# Patient Record
Sex: FEMALE | Race: BLACK OR AFRICAN AMERICAN | NOT HISPANIC OR LATINO | Employment: FULL TIME | URBAN - METROPOLITAN AREA
[De-identification: names, ages, dates, MRNs, and addresses within clinical notes are randomized per-mention and may not be internally consistent; named-entity substitution may affect disease eponyms.]

---

## 2017-04-25 ENCOUNTER — HOSPITAL ENCOUNTER (INPATIENT)
Facility: HOSPITAL | Age: 57
LOS: 5 days | Discharge: HOME | DRG: 392 | End: 2017-04-30
Attending: EMERGENCY MEDICINE | Admitting: INTERNAL MEDICINE
Payer: COMMERCIAL

## 2017-04-25 ENCOUNTER — APPOINTMENT (EMERGENCY)
Dept: CT IMAGING | Facility: HOSPITAL | Age: 57
DRG: 392 | End: 2017-04-25
Payer: COMMERCIAL

## 2017-04-25 DIAGNOSIS — K57.80 DIVERTICULITIS OF INTESTINE WITH ABSCESS: Primary | ICD-10-CM

## 2017-04-25 PROBLEM — E11.9 DM (DIABETES MELLITUS) (HCC): Chronic | Status: ACTIVE | Noted: 2017-04-25

## 2017-04-25 PROBLEM — E87.6 HYPOKALEMIA: Status: ACTIVE | Noted: 2017-04-25

## 2017-04-25 PROBLEM — K57.20 DIVERTICULITIS OF LARGE INTESTINE WITH PERFORATION AND ABSCESS: Status: ACTIVE | Noted: 2017-04-25

## 2017-04-25 PROBLEM — I10 HTN (HYPERTENSION): Chronic | Status: ACTIVE | Noted: 2017-04-25

## 2017-04-25 LAB
ALBUMIN SERPL BCP-MCNC: 3.3 G/DL (ref 3.5–5)
ALP SERPL-CCNC: 91 U/L (ref 46–116)
ALT SERPL W P-5'-P-CCNC: 19 U/L (ref 12–78)
ANION GAP SERPL CALCULATED.3IONS-SCNC: 9 MMOL/L (ref 4–13)
AST SERPL W P-5'-P-CCNC: 9 U/L (ref 5–45)
BACTERIA UR QL AUTO: ABNORMAL /HPF
BASOPHILS # BLD AUTO: 0.02 THOUSANDS/ΜL (ref 0–0.1)
BASOPHILS NFR BLD AUTO: 0 % (ref 0–1)
BILIRUB SERPL-MCNC: 1.8 MG/DL (ref 0.2–1)
BILIRUB UR QL STRIP: ABNORMAL
BUN SERPL-MCNC: 18 MG/DL (ref 5–25)
CALCIUM SERPL-MCNC: 9.4 MG/DL (ref 8.3–10.1)
CHLORIDE SERPL-SCNC: 100 MMOL/L (ref 100–108)
CLARITY UR: CLEAR
CLARITY, POC: CLEAR
CO2 SERPL-SCNC: 29 MMOL/L (ref 21–32)
COLOR UR: ABNORMAL
COLOR, POC: ABNORMAL
CREAT SERPL-MCNC: 0.74 MG/DL (ref 0.6–1.3)
EOSINOPHIL # BLD AUTO: 0.04 THOUSAND/ΜL (ref 0–0.61)
EOSINOPHIL NFR BLD AUTO: 0 % (ref 0–6)
ERYTHROCYTE [DISTWIDTH] IN BLOOD BY AUTOMATED COUNT: 12.9 % (ref 11.6–15.1)
EXT BILIRUBIN, UA: ABNORMAL
EXT BLOOD URINE: ABNORMAL
EXT GLUCOSE, UA: NEGATIVE
EXT KETONES: ABNORMAL
EXT NITRITE, UA: NEGATIVE
EXT PH, UA: 6
EXT PROTEIN, UA: ABNORMAL
EXT SPECIFIC GRAVITY, UA: 1.01
EXT UROBILINOGEN: 8
GFR SERPL CREATININE-BSD FRML MDRD: >60 ML/MIN/1.73SQ M
GLUCOSE SERPL-MCNC: 135 MG/DL (ref 65–140)
GLUCOSE SERPL-MCNC: 174 MG/DL (ref 65–140)
GLUCOSE UR STRIP-MCNC: NEGATIVE MG/DL
HCT VFR BLD AUTO: 38.5 % (ref 34.8–46.1)
HGB BLD-MCNC: 12.7 G/DL (ref 11.5–15.4)
HGB UR QL STRIP.AUTO: ABNORMAL
KETONES UR STRIP-MCNC: ABNORMAL MG/DL
LEUKOCYTE ESTERASE UR QL STRIP: NEGATIVE
LIPASE SERPL-CCNC: 83 U/L (ref 73–393)
LYMPHOCYTES # BLD AUTO: 2.05 THOUSANDS/ΜL (ref 0.6–4.47)
LYMPHOCYTES NFR BLD AUTO: 14 % (ref 14–44)
MCH RBC QN AUTO: 31.2 PG (ref 26.8–34.3)
MCHC RBC AUTO-ENTMCNC: 33 G/DL (ref 31.4–37.4)
MCV RBC AUTO: 95 FL (ref 82–98)
MONOCYTES # BLD AUTO: 0.93 THOUSAND/ΜL (ref 0.17–1.22)
MONOCYTES NFR BLD AUTO: 6 % (ref 4–12)
NEUTROPHILS # BLD AUTO: 11.38 THOUSANDS/ΜL (ref 1.85–7.62)
NEUTS SEG NFR BLD AUTO: 79 % (ref 43–75)
NITRITE UR QL STRIP: NEGATIVE
NON-SQ EPI CELLS URNS QL MICRO: ABNORMAL /HPF
NRBC BLD AUTO-RTO: 0 /100 WBCS
PH UR STRIP.AUTO: 5.5 [PH] (ref 4.5–8)
PLATELET # BLD AUTO: 291 THOUSANDS/UL (ref 149–390)
PMV BLD AUTO: 8.9 FL (ref 8.9–12.7)
POTASSIUM SERPL-SCNC: 3.2 MMOL/L (ref 3.5–5.3)
PROT SERPL-MCNC: 7.9 G/DL (ref 6.4–8.2)
PROT UR STRIP-MCNC: ABNORMAL MG/DL
RBC # BLD AUTO: 4.07 MILLION/UL (ref 3.81–5.12)
RBC #/AREA URNS AUTO: ABNORMAL /HPF
SODIUM SERPL-SCNC: 138 MMOL/L (ref 136–145)
SP GR UR STRIP.AUTO: 1.02 (ref 1–1.03)
UROBILINOGEN UR QL STRIP.AUTO: >=8 E.U./DL
WBC # BLD AUTO: 14.5 THOUSAND/UL (ref 4.31–10.16)
WBC # BLD EST: NEGATIVE 10*3/UL
WBC #/AREA URNS AUTO: ABNORMAL /HPF

## 2017-04-25 PROCEDURE — 74177 CT ABD & PELVIS W/CONTRAST: CPT

## 2017-04-25 PROCEDURE — 36415 COLL VENOUS BLD VENIPUNCTURE: CPT | Performed by: EMERGENCY MEDICINE

## 2017-04-25 PROCEDURE — 96375 TX/PRO/DX INJ NEW DRUG ADDON: CPT

## 2017-04-25 PROCEDURE — 96361 HYDRATE IV INFUSION ADD-ON: CPT

## 2017-04-25 PROCEDURE — 81002 URINALYSIS NONAUTO W/O SCOPE: CPT | Performed by: EMERGENCY MEDICINE

## 2017-04-25 PROCEDURE — 81001 URINALYSIS AUTO W/SCOPE: CPT | Performed by: EMERGENCY MEDICINE

## 2017-04-25 PROCEDURE — A9270 NON-COVERED ITEM OR SERVICE: HCPCS | Performed by: PHYSICIAN ASSISTANT

## 2017-04-25 PROCEDURE — 83036 HEMOGLOBIN GLYCOSYLATED A1C: CPT | Performed by: PHYSICIAN ASSISTANT

## 2017-04-25 PROCEDURE — 85025 COMPLETE CBC W/AUTO DIFF WBC: CPT | Performed by: EMERGENCY MEDICINE

## 2017-04-25 PROCEDURE — 80053 COMPREHEN METABOLIC PANEL: CPT | Performed by: EMERGENCY MEDICINE

## 2017-04-25 PROCEDURE — 83690 ASSAY OF LIPASE: CPT | Performed by: EMERGENCY MEDICINE

## 2017-04-25 PROCEDURE — 96374 THER/PROPH/DIAG INJ IV PUSH: CPT

## 2017-04-25 PROCEDURE — 82948 REAGENT STRIP/BLOOD GLUCOSE: CPT

## 2017-04-25 RX ORDER — ONDANSETRON 2 MG/ML
4 INJECTION INTRAMUSCULAR; INTRAVENOUS ONCE
Status: COMPLETED | OUTPATIENT
Start: 2017-04-25 | End: 2017-04-25

## 2017-04-25 RX ORDER — ACETAMINOPHEN 325 MG/1
650 TABLET ORAL EVERY 6 HOURS PRN
Status: DISCONTINUED | OUTPATIENT
Start: 2017-04-25 | End: 2017-04-30 | Stop reason: HOSPADM

## 2017-04-25 RX ORDER — METOPROLOL SUCCINATE 100 MG/1
100 TABLET, EXTENDED RELEASE ORAL DAILY
Status: ON HOLD | COMMUNITY
End: 2017-05-16

## 2017-04-25 RX ORDER — CALCIUM CARBONATE 200(500)MG
1000 TABLET,CHEWABLE ORAL DAILY PRN
Status: DISCONTINUED | OUTPATIENT
Start: 2017-04-25 | End: 2017-04-30 | Stop reason: HOSPADM

## 2017-04-25 RX ORDER — MORPHINE SULFATE 2 MG/ML
1 INJECTION, SOLUTION INTRAMUSCULAR; INTRAVENOUS EVERY 4 HOURS PRN
Status: DISCONTINUED | OUTPATIENT
Start: 2017-04-25 | End: 2017-04-29

## 2017-04-25 RX ORDER — OXYCODONE HYDROCHLORIDE 5 MG/1
5 TABLET ORAL EVERY 4 HOURS PRN
Status: DISCONTINUED | OUTPATIENT
Start: 2017-04-25 | End: 2017-04-28

## 2017-04-25 RX ORDER — ONDANSETRON 2 MG/ML
4 INJECTION INTRAMUSCULAR; INTRAVENOUS EVERY 6 HOURS PRN
Status: DISCONTINUED | OUTPATIENT
Start: 2017-04-25 | End: 2017-04-30 | Stop reason: HOSPADM

## 2017-04-25 RX ORDER — POLYETHYLENE GLYCOL 3350 17 G/17G
17 POWDER, FOR SOLUTION ORAL DAILY
Status: DISCONTINUED | OUTPATIENT
Start: 2017-04-26 | End: 2017-04-30 | Stop reason: HOSPADM

## 2017-04-25 RX ORDER — SODIUM CHLORIDE 9 MG/ML
100 INJECTION, SOLUTION INTRAVENOUS CONTINUOUS
Status: DISCONTINUED | OUTPATIENT
Start: 2017-04-25 | End: 2017-04-26

## 2017-04-25 RX ORDER — POTASSIUM CHLORIDE 20 MEQ/1
20 TABLET, EXTENDED RELEASE ORAL ONCE
Status: COMPLETED | OUTPATIENT
Start: 2017-04-25 | End: 2017-04-25

## 2017-04-25 RX ADMIN — SODIUM CHLORIDE 100 ML/HR: 0.9 INJECTION, SOLUTION INTRAVENOUS at 22:54

## 2017-04-25 RX ADMIN — METRONIDAZOLE 500 MG: 500 INJECTION, SOLUTION INTRAVENOUS at 21:06

## 2017-04-25 RX ADMIN — POTASSIUM CHLORIDE 20 MEQ: 1500 TABLET, EXTENDED RELEASE ORAL at 21:37

## 2017-04-25 RX ADMIN — SODIUM CHLORIDE 1000 ML: 0.9 INJECTION, SOLUTION INTRAVENOUS at 19:04

## 2017-04-25 RX ADMIN — CEFAZOLIN SODIUM 2000 MG: 2 SOLUTION INTRAVENOUS at 20:22

## 2017-04-25 RX ADMIN — IOHEXOL 85 ML: 350 INJECTION, SOLUTION INTRAVENOUS at 19:31

## 2017-04-25 RX ADMIN — ONDANSETRON 4 MG: 2 INJECTION INTRAMUSCULAR; INTRAVENOUS at 19:02

## 2017-04-26 ENCOUNTER — APPOINTMENT (INPATIENT)
Dept: CT IMAGING | Facility: HOSPITAL | Age: 57
DRG: 392 | End: 2017-04-26
Payer: COMMERCIAL

## 2017-04-26 PROBLEM — E11.9 CONTROLLED TYPE 2 DIABETES MELLITUS WITHOUT COMPLICATION (HCC): Status: ACTIVE | Noted: 2017-04-25

## 2017-04-26 PROBLEM — E87.6 HYPOKALEMIA: Status: RESOLVED | Noted: 2017-04-25 | Resolved: 2017-04-26

## 2017-04-26 LAB
ANION GAP SERPL CALCULATED.3IONS-SCNC: 8 MMOL/L (ref 4–13)
BUN SERPL-MCNC: 11 MG/DL (ref 5–25)
CALCIUM SERPL-MCNC: 8.3 MG/DL (ref 8.3–10.1)
CHLORIDE SERPL-SCNC: 104 MMOL/L (ref 100–108)
CO2 SERPL-SCNC: 26 MMOL/L (ref 21–32)
CREAT SERPL-MCNC: 0.52 MG/DL (ref 0.6–1.3)
ERYTHROCYTE [DISTWIDTH] IN BLOOD BY AUTOMATED COUNT: 13.1 % (ref 11.6–15.1)
EST. AVERAGE GLUCOSE BLD GHB EST-MCNC: 120 MG/DL
GFR SERPL CREATININE-BSD FRML MDRD: >60 ML/MIN/1.73SQ M
GLUCOSE SERPL-MCNC: 123 MG/DL (ref 65–140)
GLUCOSE SERPL-MCNC: 129 MG/DL (ref 65–140)
GLUCOSE SERPL-MCNC: 135 MG/DL (ref 65–140)
GLUCOSE SERPL-MCNC: 142 MG/DL (ref 65–140)
GLUCOSE SERPL-MCNC: 146 MG/DL (ref 65–140)
HBA1C MFR BLD: 5.8 % (ref 4.2–6.3)
HCT VFR BLD AUTO: 35.8 % (ref 34.8–46.1)
HGB BLD-MCNC: 11.9 G/DL (ref 11.5–15.4)
MCH RBC QN AUTO: 31.6 PG (ref 26.8–34.3)
MCHC RBC AUTO-ENTMCNC: 33.2 G/DL (ref 31.4–37.4)
MCV RBC AUTO: 95 FL (ref 82–98)
PLATELET # BLD AUTO: 263 THOUSANDS/UL (ref 149–390)
PLATELET # BLD AUTO: 292 THOUSANDS/UL (ref 149–390)
PMV BLD AUTO: 8.8 FL (ref 8.9–12.7)
PMV BLD AUTO: 8.9 FL (ref 8.9–12.7)
POTASSIUM SERPL-SCNC: 3.5 MMOL/L (ref 3.5–5.3)
RBC # BLD AUTO: 3.77 MILLION/UL (ref 3.81–5.12)
SODIUM SERPL-SCNC: 138 MMOL/L (ref 136–145)
WBC # BLD AUTO: 12.92 THOUSAND/UL (ref 4.31–10.16)

## 2017-04-26 PROCEDURE — 85027 COMPLETE CBC AUTOMATED: CPT | Performed by: PHYSICIAN ASSISTANT

## 2017-04-26 PROCEDURE — 82948 REAGENT STRIP/BLOOD GLUCOSE: CPT

## 2017-04-26 PROCEDURE — 99285 EMERGENCY DEPT VISIT HI MDM: CPT

## 2017-04-26 PROCEDURE — 36415 COLL VENOUS BLD VENIPUNCTURE: CPT | Performed by: PHYSICIAN ASSISTANT

## 2017-04-26 PROCEDURE — A9270 NON-COVERED ITEM OR SERVICE: HCPCS | Performed by: PHYSICIAN ASSISTANT

## 2017-04-26 PROCEDURE — 85049 AUTOMATED PLATELET COUNT: CPT | Performed by: PHYSICIAN ASSISTANT

## 2017-04-26 PROCEDURE — 74176 CT ABD & PELVIS W/O CONTRAST: CPT

## 2017-04-26 PROCEDURE — 80048 BASIC METABOLIC PNL TOTAL CA: CPT | Performed by: PHYSICIAN ASSISTANT

## 2017-04-26 RX ORDER — DEXTROSE MONOHYDRATE, SODIUM CHLORIDE, SODIUM LACTATE, POTASSIUM CHLORIDE, CALCIUM CHLORIDE 5; 600; 310; 179; 20 G/100ML; MG/100ML; MG/100ML; MG/100ML; MG/100ML
100 INJECTION, SOLUTION INTRAVENOUS CONTINUOUS
Status: DISCONTINUED | OUTPATIENT
Start: 2017-04-26 | End: 2017-04-26 | Stop reason: SDUPTHER

## 2017-04-26 RX ORDER — METOPROLOL SUCCINATE 100 MG/1
100 TABLET, EXTENDED RELEASE ORAL DAILY
Status: DISCONTINUED | OUTPATIENT
Start: 2017-04-26 | End: 2017-04-30 | Stop reason: HOSPADM

## 2017-04-26 RX ADMIN — POTASSIUM CHLORIDE: 2 INJECTION, SOLUTION, CONCENTRATE INTRAVENOUS at 11:32

## 2017-04-26 RX ADMIN — METRONIDAZOLE 500 MG: 500 INJECTION, SOLUTION INTRAVENOUS at 14:22

## 2017-04-26 RX ADMIN — POLYETHYLENE GLYCOL 3350 17 G: 17 POWDER, FOR SOLUTION ORAL at 09:03

## 2017-04-26 RX ADMIN — OXYCODONE HYDROCHLORIDE 5 MG: 5 TABLET ORAL at 09:00

## 2017-04-26 RX ADMIN — MORPHINE SULFATE 1 MG: 2 INJECTION, SOLUTION INTRAMUSCULAR; INTRAVENOUS at 01:09

## 2017-04-26 RX ADMIN — CEFAZOLIN SODIUM 2000 MG: 2 SOLUTION INTRAVENOUS at 13:22

## 2017-04-26 RX ADMIN — METRONIDAZOLE 500 MG: 500 INJECTION, SOLUTION INTRAVENOUS at 06:06

## 2017-04-26 RX ADMIN — CEFAZOLIN SODIUM 2000 MG: 2 SOLUTION INTRAVENOUS at 21:55

## 2017-04-26 RX ADMIN — METRONIDAZOLE 500 MG: 500 INJECTION, SOLUTION INTRAVENOUS at 22:31

## 2017-04-26 RX ADMIN — OXYCODONE HYDROCHLORIDE 5 MG: 5 TABLET ORAL at 03:15

## 2017-04-26 RX ADMIN — ENOXAPARIN SODIUM 40 MG: 40 INJECTION SUBCUTANEOUS at 13:21

## 2017-04-26 RX ADMIN — CEFAZOLIN SODIUM 2000 MG: 2 SOLUTION INTRAVENOUS at 05:33

## 2017-04-26 RX ADMIN — METOPROLOL SUCCINATE 100 MG: 100 TABLET, FILM COATED, EXTENDED RELEASE ORAL at 11:44

## 2017-04-27 LAB
ANION GAP SERPL CALCULATED.3IONS-SCNC: 9 MMOL/L (ref 4–13)
BUN SERPL-MCNC: 10 MG/DL (ref 5–25)
CALCIUM SERPL-MCNC: 8.6 MG/DL (ref 8.3–10.1)
CHLORIDE SERPL-SCNC: 105 MMOL/L (ref 100–108)
CO2 SERPL-SCNC: 28 MMOL/L (ref 21–32)
CREAT SERPL-MCNC: 0.51 MG/DL (ref 0.6–1.3)
ERYTHROCYTE [DISTWIDTH] IN BLOOD BY AUTOMATED COUNT: 13 % (ref 11.6–15.1)
GFR SERPL CREATININE-BSD FRML MDRD: >60 ML/MIN/1.73SQ M
GLUCOSE SERPL-MCNC: 130 MG/DL (ref 65–140)
GLUCOSE SERPL-MCNC: 144 MG/DL (ref 65–140)
GLUCOSE SERPL-MCNC: 159 MG/DL (ref 65–140)
GLUCOSE SERPL-MCNC: 77 MG/DL (ref 65–140)
GLUCOSE SERPL-MCNC: 84 MG/DL (ref 65–140)
HCT VFR BLD AUTO: 34.5 % (ref 34.8–46.1)
HGB BLD-MCNC: 11.4 G/DL (ref 11.5–15.4)
MCH RBC QN AUTO: 31.4 PG (ref 26.8–34.3)
MCHC RBC AUTO-ENTMCNC: 33 G/DL (ref 31.4–37.4)
MCV RBC AUTO: 95 FL (ref 82–98)
PLATELET # BLD AUTO: 297 THOUSANDS/UL (ref 149–390)
PMV BLD AUTO: 9.5 FL (ref 8.9–12.7)
POTASSIUM SERPL-SCNC: 3.5 MMOL/L (ref 3.5–5.3)
RBC # BLD AUTO: 3.63 MILLION/UL (ref 3.81–5.12)
SODIUM SERPL-SCNC: 142 MMOL/L (ref 136–145)
WBC # BLD AUTO: 11.49 THOUSAND/UL (ref 4.31–10.16)

## 2017-04-27 PROCEDURE — 85027 COMPLETE CBC AUTOMATED: CPT | Performed by: FAMILY MEDICINE

## 2017-04-27 PROCEDURE — A9270 NON-COVERED ITEM OR SERVICE: HCPCS | Performed by: PHYSICIAN ASSISTANT

## 2017-04-27 PROCEDURE — 82948 REAGENT STRIP/BLOOD GLUCOSE: CPT

## 2017-04-27 PROCEDURE — 80048 BASIC METABOLIC PNL TOTAL CA: CPT | Performed by: FAMILY MEDICINE

## 2017-04-27 RX ORDER — HYDRALAZINE HYDROCHLORIDE 10 MG/1
5 TABLET, FILM COATED ORAL 2 TIMES DAILY
Status: DISCONTINUED | OUTPATIENT
Start: 2017-04-27 | End: 2017-04-29

## 2017-04-27 RX ADMIN — ACETAMINOPHEN 650 MG: 325 TABLET ORAL at 00:35

## 2017-04-27 RX ADMIN — CEFAZOLIN SODIUM 2000 MG: 2 SOLUTION INTRAVENOUS at 06:40

## 2017-04-27 RX ADMIN — CEFAZOLIN SODIUM 2000 MG: 2 SOLUTION INTRAVENOUS at 21:45

## 2017-04-27 RX ADMIN — ACETAMINOPHEN 650 MG: 325 TABLET ORAL at 07:55

## 2017-04-27 RX ADMIN — CEFAZOLIN SODIUM 2000 MG: 2 SOLUTION INTRAVENOUS at 13:39

## 2017-04-27 RX ADMIN — METRONIDAZOLE 500 MG: 500 INJECTION, SOLUTION INTRAVENOUS at 05:46

## 2017-04-27 RX ADMIN — METRONIDAZOLE 500 MG: 500 INJECTION, SOLUTION INTRAVENOUS at 13:42

## 2017-04-27 RX ADMIN — INSULIN LISPRO 1 UNITS: 100 INJECTION, SOLUTION INTRAVENOUS; SUBCUTANEOUS at 13:45

## 2017-04-27 RX ADMIN — ACETAMINOPHEN 650 MG: 325 TABLET ORAL at 21:41

## 2017-04-27 RX ADMIN — METOPROLOL SUCCINATE 100 MG: 100 TABLET, FILM COATED, EXTENDED RELEASE ORAL at 09:25

## 2017-04-27 RX ADMIN — ACETAMINOPHEN 650 MG: 325 TABLET ORAL at 13:42

## 2017-04-27 RX ADMIN — METRONIDAZOLE 500 MG: 500 INJECTION, SOLUTION INTRAVENOUS at 21:49

## 2017-04-27 RX ADMIN — ENOXAPARIN SODIUM 40 MG: 40 INJECTION SUBCUTANEOUS at 09:25

## 2017-04-27 RX ADMIN — POTASSIUM CHLORIDE: 2 INJECTION, SOLUTION, CONCENTRATE INTRAVENOUS at 02:22

## 2017-04-28 LAB
ERYTHROCYTE [DISTWIDTH] IN BLOOD BY AUTOMATED COUNT: 12.6 % (ref 11.6–15.1)
GLUCOSE SERPL-MCNC: 137 MG/DL (ref 65–140)
GLUCOSE SERPL-MCNC: 155 MG/DL (ref 65–140)
GLUCOSE SERPL-MCNC: 75 MG/DL (ref 65–140)
GLUCOSE SERPL-MCNC: 83 MG/DL (ref 65–140)
HCT VFR BLD AUTO: 33.6 % (ref 34.8–46.1)
HGB BLD-MCNC: 10.9 G/DL (ref 11.5–15.4)
MCH RBC QN AUTO: 30.8 PG (ref 26.8–34.3)
MCHC RBC AUTO-ENTMCNC: 32.4 G/DL (ref 31.4–37.4)
MCV RBC AUTO: 95 FL (ref 82–98)
PLATELET # BLD AUTO: 312 THOUSANDS/UL (ref 149–390)
PMV BLD AUTO: 9.2 FL (ref 8.9–12.7)
RBC # BLD AUTO: 3.54 MILLION/UL (ref 3.81–5.12)
WBC # BLD AUTO: 11.8 THOUSAND/UL (ref 4.31–10.16)

## 2017-04-28 PROCEDURE — A9270 NON-COVERED ITEM OR SERVICE: HCPCS | Performed by: PHYSICIAN ASSISTANT

## 2017-04-28 PROCEDURE — 85027 COMPLETE CBC AUTOMATED: CPT | Performed by: FAMILY MEDICINE

## 2017-04-28 PROCEDURE — 82948 REAGENT STRIP/BLOOD GLUCOSE: CPT

## 2017-04-28 PROCEDURE — A9270 NON-COVERED ITEM OR SERVICE: HCPCS | Performed by: FAMILY MEDICINE

## 2017-04-28 RX ORDER — OXYCODONE HYDROCHLORIDE AND ACETAMINOPHEN 5; 325 MG/1; MG/1
1 TABLET ORAL EVERY 4 HOURS PRN
Status: DISCONTINUED | OUTPATIENT
Start: 2017-04-28 | End: 2017-04-30 | Stop reason: HOSPADM

## 2017-04-28 RX ADMIN — HYDRALAZINE HYDROCHLORIDE 5 MG: 10 TABLET, FILM COATED ORAL at 09:55

## 2017-04-28 RX ADMIN — HYDRALAZINE HYDROCHLORIDE 5 MG: 10 TABLET, FILM COATED ORAL at 17:13

## 2017-04-28 RX ADMIN — METRONIDAZOLE 500 MG: 500 INJECTION, SOLUTION INTRAVENOUS at 05:56

## 2017-04-28 RX ADMIN — CEFAZOLIN SODIUM 2000 MG: 2 SOLUTION INTRAVENOUS at 06:49

## 2017-04-28 RX ADMIN — ACETAMINOPHEN 650 MG: 325 TABLET ORAL at 16:33

## 2017-04-28 RX ADMIN — METRONIDAZOLE 500 MG: 500 INJECTION, SOLUTION INTRAVENOUS at 22:30

## 2017-04-28 RX ADMIN — CEFAZOLIN SODIUM 2000 MG: 2 SOLUTION INTRAVENOUS at 21:43

## 2017-04-28 RX ADMIN — ENOXAPARIN SODIUM 40 MG: 40 INJECTION SUBCUTANEOUS at 09:40

## 2017-04-28 RX ADMIN — INSULIN LISPRO 1 UNITS: 100 INJECTION, SOLUTION INTRAVENOUS; SUBCUTANEOUS at 16:35

## 2017-04-28 RX ADMIN — ACETAMINOPHEN 650 MG: 325 TABLET ORAL at 05:56

## 2017-04-28 RX ADMIN — CEFAZOLIN SODIUM 2000 MG: 2 SOLUTION INTRAVENOUS at 14:08

## 2017-04-28 RX ADMIN — METOPROLOL SUCCINATE 100 MG: 100 TABLET, FILM COATED, EXTENDED RELEASE ORAL at 09:41

## 2017-04-28 RX ADMIN — METRONIDAZOLE 500 MG: 500 INJECTION, SOLUTION INTRAVENOUS at 14:32

## 2017-04-29 LAB
ERYTHROCYTE [DISTWIDTH] IN BLOOD BY AUTOMATED COUNT: 12.9 % (ref 11.6–15.1)
GLUCOSE SERPL-MCNC: 121 MG/DL (ref 65–140)
GLUCOSE SERPL-MCNC: 189 MG/DL (ref 65–140)
GLUCOSE SERPL-MCNC: 192 MG/DL (ref 65–140)
GLUCOSE SERPL-MCNC: 210 MG/DL (ref 65–140)
HCT VFR BLD AUTO: 33.1 % (ref 34.8–46.1)
HGB BLD-MCNC: 10.9 G/DL (ref 11.5–15.4)
MCH RBC QN AUTO: 31 PG (ref 26.8–34.3)
MCHC RBC AUTO-ENTMCNC: 32.9 G/DL (ref 31.4–37.4)
MCV RBC AUTO: 94 FL (ref 82–98)
PLATELET # BLD AUTO: 237 THOUSANDS/UL (ref 149–390)
PMV BLD AUTO: 9.5 FL (ref 8.9–12.7)
RBC # BLD AUTO: 3.52 MILLION/UL (ref 3.81–5.12)
WBC # BLD AUTO: 11.79 THOUSAND/UL (ref 4.31–10.16)

## 2017-04-29 PROCEDURE — 82948 REAGENT STRIP/BLOOD GLUCOSE: CPT

## 2017-04-29 PROCEDURE — A9270 NON-COVERED ITEM OR SERVICE: HCPCS | Performed by: FAMILY MEDICINE

## 2017-04-29 PROCEDURE — A9270 NON-COVERED ITEM OR SERVICE: HCPCS | Performed by: PHYSICIAN ASSISTANT

## 2017-04-29 PROCEDURE — 85027 COMPLETE CBC AUTOMATED: CPT | Performed by: FAMILY MEDICINE

## 2017-04-29 RX ORDER — LACTOBACILLUS ACIDOPHILUS / LACTOBACILLUS BULGARICUS 100 MILLION CFU STRENGTH
1 GRANULES ORAL
Status: DISCONTINUED | OUTPATIENT
Start: 2017-04-29 | End: 2017-04-30 | Stop reason: HOSPADM

## 2017-04-29 RX ORDER — SIMETHICONE 80 MG
80 TABLET,CHEWABLE ORAL EVERY 6 HOURS PRN
Status: DISCONTINUED | OUTPATIENT
Start: 2017-04-29 | End: 2017-04-30 | Stop reason: HOSPADM

## 2017-04-29 RX ORDER — METRONIDAZOLE 500 MG/1
500 TABLET ORAL EVERY 8 HOURS SCHEDULED
Status: DISCONTINUED | OUTPATIENT
Start: 2017-04-29 | End: 2017-04-30 | Stop reason: HOSPADM

## 2017-04-29 RX ORDER — HYDRALAZINE HYDROCHLORIDE 10 MG/1
10 TABLET, FILM COATED ORAL 2 TIMES DAILY
Status: DISCONTINUED | OUTPATIENT
Start: 2017-04-29 | End: 2017-04-30 | Stop reason: HOSPADM

## 2017-04-29 RX ORDER — AMOXICILLIN AND CLAVULANATE POTASSIUM 875; 125 MG/1; MG/1
1 TABLET, FILM COATED ORAL EVERY 12 HOURS SCHEDULED
Status: DISCONTINUED | OUTPATIENT
Start: 2017-04-29 | End: 2017-04-30 | Stop reason: HOSPADM

## 2017-04-29 RX ORDER — KETOROLAC TROMETHAMINE 30 MG/ML
30 INJECTION, SOLUTION INTRAMUSCULAR; INTRAVENOUS EVERY 6 HOURS PRN
Status: DISCONTINUED | OUTPATIENT
Start: 2017-04-29 | End: 2017-04-30 | Stop reason: HOSPADM

## 2017-04-29 RX ADMIN — KETOROLAC TROMETHAMINE 30 MG: 30 INJECTION, SOLUTION INTRAMUSCULAR at 17:09

## 2017-04-29 RX ADMIN — KETOROLAC TROMETHAMINE 30 MG: 30 INJECTION, SOLUTION INTRAMUSCULAR at 08:54

## 2017-04-29 RX ADMIN — CEFAZOLIN SODIUM 2000 MG: 2 SOLUTION INTRAVENOUS at 05:19

## 2017-04-29 RX ADMIN — INSULIN LISPRO 2 UNITS: 100 INJECTION, SOLUTION INTRAVENOUS; SUBCUTANEOUS at 11:41

## 2017-04-29 RX ADMIN — METRONIDAZOLE 500 MG: 500 TABLET ORAL at 21:00

## 2017-04-29 RX ADMIN — METRONIDAZOLE 500 MG: 500 INJECTION, SOLUTION INTRAVENOUS at 06:13

## 2017-04-29 RX ADMIN — AMOXICILLIN AND CLAVULANATE POTASSIUM 1 TABLET: 875; 125 TABLET, FILM COATED ORAL at 08:29

## 2017-04-29 RX ADMIN — INSULIN LISPRO 1 UNITS: 100 INJECTION, SOLUTION INTRAVENOUS; SUBCUTANEOUS at 22:54

## 2017-04-29 RX ADMIN — LACTOBACILLUS ACIDOPHILUS / LACTOBACILLUS BULGARICUS 1 PACKET: 100 MILLION CFU STRENGTH GRANULES at 08:29

## 2017-04-29 RX ADMIN — INSULIN LISPRO 2 UNITS: 100 INJECTION, SOLUTION INTRAVENOUS; SUBCUTANEOUS at 17:05

## 2017-04-29 RX ADMIN — HYDRALAZINE HYDROCHLORIDE 10 MG: 10 TABLET, FILM COATED ORAL at 17:10

## 2017-04-29 RX ADMIN — AMOXICILLIN AND CLAVULANATE POTASSIUM 1 TABLET: 875; 125 TABLET, FILM COATED ORAL at 21:00

## 2017-04-29 RX ADMIN — HYDRALAZINE HYDROCHLORIDE 5 MG: 10 TABLET, FILM COATED ORAL at 08:00

## 2017-04-29 RX ADMIN — LACTOBACILLUS ACIDOPHILUS / LACTOBACILLUS BULGARICUS 1 PACKET: 100 MILLION CFU STRENGTH GRANULES at 11:40

## 2017-04-29 RX ADMIN — LACTOBACILLUS ACIDOPHILUS / LACTOBACILLUS BULGARICUS 1 PACKET: 100 MILLION CFU STRENGTH GRANULES at 17:14

## 2017-04-29 RX ADMIN — KETOROLAC TROMETHAMINE 30 MG: 30 INJECTION, SOLUTION INTRAMUSCULAR at 23:00

## 2017-04-29 RX ADMIN — OXYCODONE HYDROCHLORIDE AND ACETAMINOPHEN 1 TABLET: 5; 325 TABLET ORAL at 01:07

## 2017-04-29 RX ADMIN — METOPROLOL SUCCINATE 100 MG: 100 TABLET, FILM COATED, EXTENDED RELEASE ORAL at 08:29

## 2017-04-29 RX ADMIN — METRONIDAZOLE 500 MG: 500 TABLET ORAL at 13:40

## 2017-04-30 VITALS
TEMPERATURE: 98.6 F | WEIGHT: 177.25 LBS | BODY MASS INDEX: 34.8 KG/M2 | HEART RATE: 75 BPM | DIASTOLIC BLOOD PRESSURE: 79 MMHG | HEIGHT: 60 IN | OXYGEN SATURATION: 96 % | SYSTOLIC BLOOD PRESSURE: 162 MMHG | RESPIRATION RATE: 18 BRPM

## 2017-04-30 LAB
BACTERIA UR QL AUTO: ABNORMAL /HPF
BASOPHILS # BLD MANUAL: 0 THOUSAND/UL (ref 0–0.1)
BASOPHILS NFR MAR MANUAL: 0 % (ref 0–1)
BILIRUB UR QL STRIP: ABNORMAL
CLARITY UR: CLEAR
COLOR UR: YELLOW
EOSINOPHIL # BLD MANUAL: 0.24 THOUSAND/UL (ref 0–0.4)
EOSINOPHIL NFR BLD MANUAL: 2 % (ref 0–6)
ERYTHROCYTE [DISTWIDTH] IN BLOOD BY AUTOMATED COUNT: 13.1 % (ref 11.6–15.1)
ERYTHROCYTE [DISTWIDTH] IN BLOOD BY AUTOMATED COUNT: 13.2 % (ref 11.6–15.1)
GLUCOSE SERPL-MCNC: 141 MG/DL (ref 65–140)
GLUCOSE SERPL-MCNC: 225 MG/DL (ref 65–140)
GLUCOSE UR STRIP-MCNC: NEGATIVE MG/DL
HCT VFR BLD AUTO: 31.3 % (ref 34.8–46.1)
HCT VFR BLD AUTO: 32.7 % (ref 34.8–46.1)
HGB BLD-MCNC: 10.4 G/DL (ref 11.5–15.4)
HGB BLD-MCNC: 10.8 G/DL (ref 11.5–15.4)
HGB UR QL STRIP.AUTO: NEGATIVE
KETONES UR STRIP-MCNC: ABNORMAL MG/DL
LEUKOCYTE ESTERASE UR QL STRIP: ABNORMAL
LYMPHOCYTES # BLD AUTO: 1.65 THOUSAND/UL (ref 0.6–4.47)
LYMPHOCYTES # BLD AUTO: 14 % (ref 14–44)
MCH RBC QN AUTO: 31.2 PG (ref 26.8–34.3)
MCH RBC QN AUTO: 31.3 PG (ref 26.8–34.3)
MCHC RBC AUTO-ENTMCNC: 33 G/DL (ref 31.4–37.4)
MCHC RBC AUTO-ENTMCNC: 33.2 G/DL (ref 31.4–37.4)
MCV RBC AUTO: 94 FL (ref 82–98)
MCV RBC AUTO: 95 FL (ref 82–98)
MONOCYTES # BLD AUTO: 0.24 THOUSAND/UL (ref 0–1.22)
MONOCYTES NFR BLD: 2 % (ref 4–12)
MUCOUS THREADS UR QL AUTO: ABNORMAL
NEUTROPHILS # BLD MANUAL: 9.65 THOUSAND/UL (ref 1.85–7.62)
NEUTS BAND NFR BLD MANUAL: 1 % (ref 0–8)
NEUTS SEG NFR BLD AUTO: 81 % (ref 43–75)
NITRITE UR QL STRIP: POSITIVE
NON-SQ EPI CELLS URNS QL MICRO: ABNORMAL /HPF
NRBC BLD AUTO-RTO: 0 /100 WBCS
PH UR STRIP.AUTO: 6.5 [PH] (ref 4.5–8)
PLATELET # BLD AUTO: 350 THOUSANDS/UL (ref 149–390)
PLATELET # BLD AUTO: 354 THOUSANDS/UL (ref 149–390)
PLATELET BLD QL SMEAR: ADEQUATE
PMV BLD AUTO: 9 FL (ref 8.9–12.7)
PMV BLD AUTO: 9.5 FL (ref 8.9–12.7)
PROT UR STRIP-MCNC: ABNORMAL MG/DL
RBC # BLD AUTO: 3.33 MILLION/UL (ref 3.81–5.12)
RBC # BLD AUTO: 3.45 MILLION/UL (ref 3.81–5.12)
RBC #/AREA URNS AUTO: ABNORMAL /HPF
SP GR UR STRIP.AUTO: 1.02 (ref 1–1.03)
TOTAL CELLS COUNTED SPEC: 100
UROBILINOGEN UR QL STRIP.AUTO: 1 E.U./DL
WBC # BLD AUTO: 11.77 THOUSAND/UL (ref 4.31–10.16)
WBC # BLD AUTO: 12.12 THOUSAND/UL (ref 4.31–10.16)
WBC #/AREA URNS AUTO: ABNORMAL /HPF

## 2017-04-30 PROCEDURE — A9270 NON-COVERED ITEM OR SERVICE: HCPCS | Performed by: FAMILY MEDICINE

## 2017-04-30 PROCEDURE — A9270 NON-COVERED ITEM OR SERVICE: HCPCS | Performed by: PHYSICIAN ASSISTANT

## 2017-04-30 PROCEDURE — 81001 URINALYSIS AUTO W/SCOPE: CPT | Performed by: FAMILY MEDICINE

## 2017-04-30 PROCEDURE — 85027 COMPLETE CBC AUTOMATED: CPT | Performed by: FAMILY MEDICINE

## 2017-04-30 PROCEDURE — 85007 BL SMEAR W/DIFF WBC COUNT: CPT | Performed by: FAMILY MEDICINE

## 2017-04-30 PROCEDURE — 87086 URINE CULTURE/COLONY COUNT: CPT | Performed by: FAMILY MEDICINE

## 2017-04-30 PROCEDURE — 82948 REAGENT STRIP/BLOOD GLUCOSE: CPT

## 2017-04-30 RX ORDER — TRAMADOL HYDROCHLORIDE 50 MG/1
50 TABLET ORAL EVERY 6 HOURS PRN
Qty: 5 TABLET | Refills: 0 | Status: SHIPPED | OUTPATIENT
Start: 2017-04-30 | End: 2017-05-16 | Stop reason: HOSPADM

## 2017-04-30 RX ORDER — LACTOBACILLUS ACIDOPHILUS / LACTOBACILLUS BULGARICUS 100 MILLION CFU STRENGTH
1 GRANULES ORAL
Qty: 90 PACKET | Refills: 0 | Status: SHIPPED | OUTPATIENT
Start: 2017-04-30 | End: 2017-05-30

## 2017-04-30 RX ORDER — KETOROLAC TROMETHAMINE 10 MG/1
10 TABLET, FILM COATED ORAL EVERY 6 HOURS PRN
Qty: 20 TABLET | Refills: 0 | Status: SHIPPED | OUTPATIENT
Start: 2017-04-30 | End: 2017-05-16 | Stop reason: HOSPADM

## 2017-04-30 RX ORDER — POLYETHYLENE GLYCOL 3350 17 G/17G
17 POWDER, FOR SOLUTION ORAL DAILY PRN
Qty: 510 G | Refills: 0 | Status: SHIPPED | OUTPATIENT
Start: 2017-04-30 | End: 2017-05-16 | Stop reason: HOSPADM

## 2017-04-30 RX ORDER — METRONIDAZOLE 500 MG/1
500 TABLET ORAL EVERY 8 HOURS SCHEDULED
Qty: 12 TABLET | Refills: 0 | Status: SHIPPED | OUTPATIENT
Start: 2017-04-30 | End: 2017-05-16 | Stop reason: HOSPADM

## 2017-04-30 RX ORDER — HYDRALAZINE HYDROCHLORIDE 10 MG/1
10 TABLET, FILM COATED ORAL 2 TIMES DAILY
Qty: 60 TABLET | Refills: 0 | Status: SHIPPED | OUTPATIENT
Start: 2017-04-30 | End: 2017-09-14 | Stop reason: HOSPADM

## 2017-04-30 RX ORDER — AMOXICILLIN AND CLAVULANATE POTASSIUM 875; 125 MG/1; MG/1
1 TABLET, FILM COATED ORAL EVERY 12 HOURS SCHEDULED
Qty: 8 TABLET | Refills: 0 | Status: SHIPPED | OUTPATIENT
Start: 2017-04-30 | End: 2017-05-16 | Stop reason: HOSPADM

## 2017-04-30 RX ORDER — SIMETHICONE 80 MG
80 TABLET,CHEWABLE ORAL EVERY 6 HOURS PRN
Qty: 30 TABLET | Refills: 0 | Status: SHIPPED | OUTPATIENT
Start: 2017-04-30 | End: 2017-05-16 | Stop reason: HOSPADM

## 2017-04-30 RX ADMIN — LACTOBACILLUS ACIDOPHILUS / LACTOBACILLUS BULGARICUS 1 PACKET: 100 MILLION CFU STRENGTH GRANULES at 07:54

## 2017-04-30 RX ADMIN — LACTOBACILLUS ACIDOPHILUS / LACTOBACILLUS BULGARICUS 1 PACKET: 100 MILLION CFU STRENGTH GRANULES at 11:40

## 2017-04-30 RX ADMIN — METRONIDAZOLE 500 MG: 500 TABLET ORAL at 13:08

## 2017-04-30 RX ADMIN — METOPROLOL SUCCINATE 100 MG: 100 TABLET, FILM COATED, EXTENDED RELEASE ORAL at 08:04

## 2017-04-30 RX ADMIN — METRONIDAZOLE 500 MG: 500 TABLET ORAL at 06:26

## 2017-04-30 RX ADMIN — KETOROLAC TROMETHAMINE 30 MG: 30 INJECTION, SOLUTION INTRAMUSCULAR at 14:43

## 2017-04-30 RX ADMIN — AMOXICILLIN AND CLAVULANATE POTASSIUM 1 TABLET: 875; 125 TABLET, FILM COATED ORAL at 08:04

## 2017-04-30 RX ADMIN — HYDRALAZINE HYDROCHLORIDE 10 MG: 10 TABLET, FILM COATED ORAL at 08:05

## 2017-04-30 RX ADMIN — INSULIN LISPRO 2 UNITS: 100 INJECTION, SOLUTION INTRAVENOUS; SUBCUTANEOUS at 11:40

## 2017-04-30 RX ADMIN — POLYETHYLENE GLYCOL 3350 17 G: 17 POWDER, FOR SOLUTION ORAL at 08:04

## 2017-04-30 RX ADMIN — KETOROLAC TROMETHAMINE 30 MG: 30 INJECTION, SOLUTION INTRAMUSCULAR at 06:29

## 2017-05-01 ENCOUNTER — ANESTHESIA (INPATIENT)
Dept: PERIOP | Facility: HOSPITAL | Age: 57
DRG: 329 | End: 2017-05-01
Payer: COMMERCIAL

## 2017-05-01 ENCOUNTER — GENERIC CONVERSION - ENCOUNTER (OUTPATIENT)
Dept: OTHER | Facility: OTHER | Age: 57
End: 2017-05-01

## 2017-05-01 ENCOUNTER — ANESTHESIA EVENT (INPATIENT)
Dept: PERIOP | Facility: HOSPITAL | Age: 57
DRG: 329 | End: 2017-05-01
Payer: COMMERCIAL

## 2017-05-01 ENCOUNTER — HOSPITAL ENCOUNTER (INPATIENT)
Facility: HOSPITAL | Age: 57
LOS: 15 days | Discharge: HOME HEALTH | DRG: 329 | End: 2017-05-16
Attending: EMERGENCY MEDICINE | Admitting: SURGERY
Payer: COMMERCIAL

## 2017-05-01 ENCOUNTER — APPOINTMENT (EMERGENCY)
Dept: CT IMAGING | Facility: HOSPITAL | Age: 57
DRG: 329 | End: 2017-05-01
Payer: COMMERCIAL

## 2017-05-01 DIAGNOSIS — K57.20 DIVERTICULITIS OF LARGE INTESTINE WITH PERFORATION AND ABSCESS WITHOUT BLEEDING: Primary | ICD-10-CM

## 2017-05-01 DIAGNOSIS — E11.9 CONTROLLED TYPE 2 DIABETES MELLITUS WITHOUT COMPLICATION (HCC): ICD-10-CM

## 2017-05-01 DIAGNOSIS — I31.3 PERICARDIAL EFFUSION: ICD-10-CM

## 2017-05-01 DIAGNOSIS — E87.6 HYPOKALEMIA: ICD-10-CM

## 2017-05-01 DIAGNOSIS — I10 HTN (HYPERTENSION): Chronic | ICD-10-CM

## 2017-05-01 PROBLEM — E83.42 HYPOMAGNESEMIA: Status: ACTIVE | Noted: 2017-05-01

## 2017-05-01 PROBLEM — J45.909 UNCOMPLICATED ASTHMA: Status: ACTIVE | Noted: 2017-05-01

## 2017-05-01 LAB
ALBUMIN SERPL BCP-MCNC: 2.1 G/DL (ref 3.5–5)
ALBUMIN SERPL BCP-MCNC: 2.4 G/DL (ref 3.5–5)
ALBUMIN SERPL BCP-MCNC: 2.9 G/DL (ref 3.5–5)
ALP SERPL-CCNC: 110 U/L (ref 46–116)
ALP SERPL-CCNC: 64 U/L (ref 46–116)
ALP SERPL-CCNC: 89 U/L (ref 46–116)
ALT SERPL W P-5'-P-CCNC: 19 U/L (ref 12–78)
ALT SERPL W P-5'-P-CCNC: 20 U/L (ref 12–78)
ALT SERPL W P-5'-P-CCNC: 23 U/L (ref 12–78)
ANION GAP SERPL CALCULATED.3IONS-SCNC: 10 MMOL/L (ref 4–13)
ANION GAP SERPL CALCULATED.3IONS-SCNC: 10 MMOL/L (ref 4–13)
ANION GAP SERPL CALCULATED.3IONS-SCNC: 9 MMOL/L (ref 4–13)
APTT PPP: 24 SECONDS (ref 23–35)
AST SERPL W P-5'-P-CCNC: 28 U/L (ref 5–45)
AST SERPL W P-5'-P-CCNC: 29 U/L (ref 5–45)
AST SERPL W P-5'-P-CCNC: 32 U/L (ref 5–45)
ATRIAL RATE: 100 BPM
ATRIAL RATE: 81 BPM
BACTERIA UR CULT: NORMAL
BACTERIA UR QL AUTO: ABNORMAL /HPF
BASOPHILS # BLD MANUAL: 0 THOUSAND/UL (ref 0–0.1)
BASOPHILS # BLD MANUAL: 0 THOUSAND/UL (ref 0–0.1)
BASOPHILS NFR MAR MANUAL: 0 % (ref 0–1)
BASOPHILS NFR MAR MANUAL: 0 % (ref 0–1)
BILIRUB SERPL-MCNC: 1.1 MG/DL (ref 0.2–1)
BILIRUB SERPL-MCNC: 1.2 MG/DL (ref 0.2–1)
BILIRUB SERPL-MCNC: 1.4 MG/DL (ref 0.2–1)
BILIRUB UR QL STRIP: ABNORMAL
BUN SERPL-MCNC: 17 MG/DL (ref 5–25)
BUN SERPL-MCNC: 18 MG/DL (ref 5–25)
BUN SERPL-MCNC: 20 MG/DL (ref 5–25)
CA-I BLD-SCNC: 1.06 MMOL/L (ref 1.12–1.32)
CALCIUM SERPL-MCNC: 7.3 MG/DL (ref 8.3–10.1)
CALCIUM SERPL-MCNC: 8.1 MG/DL (ref 8.3–10.1)
CALCIUM SERPL-MCNC: 9.1 MG/DL (ref 8.3–10.1)
CHLORIDE SERPL-SCNC: 104 MMOL/L (ref 100–108)
CHLORIDE SERPL-SCNC: 104 MMOL/L (ref 100–108)
CHLORIDE SERPL-SCNC: 99 MMOL/L (ref 100–108)
CLARITY UR: ABNORMAL
CO2 SERPL-SCNC: 24 MMOL/L (ref 21–32)
CO2 SERPL-SCNC: 27 MMOL/L (ref 21–32)
CO2 SERPL-SCNC: 29 MMOL/L (ref 21–32)
COLOR UR: YELLOW
CREAT SERPL-MCNC: 0.63 MG/DL (ref 0.6–1.3)
CREAT SERPL-MCNC: 0.72 MG/DL (ref 0.6–1.3)
CREAT SERPL-MCNC: 0.74 MG/DL (ref 0.6–1.3)
EOSINOPHIL # BLD MANUAL: 0 THOUSAND/UL (ref 0–0.4)
EOSINOPHIL # BLD MANUAL: 0 THOUSAND/UL (ref 0–0.4)
EOSINOPHIL NFR BLD MANUAL: 0 % (ref 0–6)
EOSINOPHIL NFR BLD MANUAL: 0 % (ref 0–6)
ERYTHROCYTE [DISTWIDTH] IN BLOOD BY AUTOMATED COUNT: 13.5 % (ref 11.6–15.1)
ERYTHROCYTE [DISTWIDTH] IN BLOOD BY AUTOMATED COUNT: 13.6 % (ref 11.6–15.1)
ERYTHROCYTE [DISTWIDTH] IN BLOOD BY AUTOMATED COUNT: 13.7 % (ref 11.6–15.1)
GFR SERPL CREATININE-BSD FRML MDRD: >60 ML/MIN/1.73SQ M
GLUCOSE SERPL-MCNC: 187 MG/DL (ref 65–140)
GLUCOSE SERPL-MCNC: 205 MG/DL (ref 65–140)
GLUCOSE SERPL-MCNC: 210 MG/DL (ref 65–140)
GLUCOSE SERPL-MCNC: 230 MG/DL (ref 65–140)
GLUCOSE SERPL-MCNC: 231 MG/DL (ref 65–140)
GLUCOSE SERPL-MCNC: 252 MG/DL (ref 65–140)
GLUCOSE UR STRIP-MCNC: NEGATIVE MG/DL
HCT VFR BLD AUTO: 31.7 % (ref 34.8–46.1)
HCT VFR BLD AUTO: 32.8 % (ref 34.8–46.1)
HCT VFR BLD AUTO: 36.2 % (ref 34.8–46.1)
HGB BLD-MCNC: 10.2 G/DL (ref 11.5–15.4)
HGB BLD-MCNC: 10.8 G/DL (ref 11.5–15.4)
HGB BLD-MCNC: 11.9 G/DL (ref 11.5–15.4)
HGB UR QL STRIP.AUTO: NEGATIVE
INR PPP: 1.07 (ref 0.86–1.16)
KETONES UR STRIP-MCNC: ABNORMAL MG/DL
LACTATE SERPL-SCNC: 1 MMOL/L (ref 0.5–2)
LACTATE SERPL-SCNC: 1.7 MMOL/L (ref 0.5–2)
LEUKOCYTE ESTERASE UR QL STRIP: NEGATIVE
LG PLATELETS BLD QL SMEAR: PRESENT
LIPASE SERPL-CCNC: 62 U/L (ref 73–393)
LYMPHOCYTES # BLD AUTO: 0.7 THOUSAND/UL (ref 0.6–4.47)
LYMPHOCYTES # BLD AUTO: 0.88 THOUSAND/UL (ref 0.6–4.47)
LYMPHOCYTES # BLD AUTO: 13 % (ref 14–44)
LYMPHOCYTES # BLD AUTO: 19 % (ref 14–44)
MAGNESIUM SERPL-MCNC: 1.1 MG/DL (ref 1.6–2.6)
MAGNESIUM SERPL-MCNC: 1.5 MG/DL (ref 1.6–2.6)
MCH RBC QN AUTO: 31.4 PG (ref 26.8–34.3)
MCH RBC QN AUTO: 31.4 PG (ref 26.8–34.3)
MCH RBC QN AUTO: 31.5 PG (ref 26.8–34.3)
MCHC RBC AUTO-ENTMCNC: 32.2 G/DL (ref 31.4–37.4)
MCHC RBC AUTO-ENTMCNC: 32.9 G/DL (ref 31.4–37.4)
MCHC RBC AUTO-ENTMCNC: 32.9 G/DL (ref 31.4–37.4)
MCV RBC AUTO: 96 FL (ref 82–98)
MCV RBC AUTO: 96 FL (ref 82–98)
MCV RBC AUTO: 98 FL (ref 82–98)
METAMYELOCYTES NFR BLD MANUAL: 3 % (ref 0–1)
METAMYELOCYTES NFR BLD MANUAL: 3 % (ref 0–1)
MONOCYTES # BLD AUTO: 0.14 THOUSAND/UL (ref 0–1.22)
MONOCYTES # BLD AUTO: 0.16 THOUSAND/UL (ref 0–1.22)
MONOCYTES NFR BLD: 3 % (ref 4–12)
MONOCYTES NFR BLD: 3 % (ref 4–12)
MYELOCYTES NFR BLD MANUAL: 1 % (ref 0–1)
NEUTROPHILS # BLD MANUAL: 3.33 THOUSAND/UL (ref 1.85–7.62)
NEUTROPHILS # BLD MANUAL: 4.13 THOUSAND/UL (ref 1.85–7.62)
NEUTS BAND NFR BLD MANUAL: 21 % (ref 0–8)
NEUTS BAND NFR BLD MANUAL: 25 % (ref 0–8)
NEUTS SEG NFR BLD AUTO: 51 % (ref 43–75)
NEUTS SEG NFR BLD AUTO: 52 % (ref 43–75)
NITRITE UR QL STRIP: NEGATIVE
NON-SQ EPI CELLS URNS QL MICRO: ABNORMAL /HPF
NRBC BLD AUTO-RTO: 0 /100 WBCS
NRBC BLD AUTO-RTO: 0 /100 WBCS
P AXIS: 65 DEGREES
P AXIS: 75 DEGREES
PH UR STRIP.AUTO: 5.5 [PH] (ref 4.5–8)
PHOSPHATE SERPL-MCNC: 3.5 MG/DL (ref 2.7–4.5)
PLATELET # BLD AUTO: 335 THOUSANDS/UL (ref 149–390)
PLATELET # BLD AUTO: 348 THOUSANDS/UL (ref 149–390)
PLATELET # BLD AUTO: 389 THOUSANDS/UL (ref 149–390)
PLATELET BLD QL SMEAR: ADEQUATE
PLATELET BLD QL SMEAR: ADEQUATE
PLATELET CLUMP BLD QL SMEAR: PRESENT
PMV BLD AUTO: 8.8 FL (ref 8.9–12.7)
PMV BLD AUTO: 9.1 FL (ref 8.9–12.7)
PMV BLD AUTO: 9.3 FL (ref 8.9–12.7)
POTASSIUM SERPL-SCNC: 3.4 MMOL/L (ref 3.5–5.3)
POTASSIUM SERPL-SCNC: 3.6 MMOL/L (ref 3.5–5.3)
POTASSIUM SERPL-SCNC: 3.8 MMOL/L (ref 3.5–5.3)
PR INTERVAL: 166 MS
PR INTERVAL: 182 MS
PROT SERPL-MCNC: 5.4 G/DL (ref 6.4–8.2)
PROT SERPL-MCNC: 6.1 G/DL (ref 6.4–8.2)
PROT SERPL-MCNC: 7.2 G/DL (ref 6.4–8.2)
PROT UR STRIP-MCNC: ABNORMAL MG/DL
PROTHROMBIN TIME: 14.1 SECONDS (ref 12.1–14.4)
QRS AXIS: -1 DEGREES
QRS AXIS: 11 DEGREES
QRSD INTERVAL: 90 MS
QRSD INTERVAL: 94 MS
QT INTERVAL: 344 MS
QT INTERVAL: 396 MS
QTC INTERVAL: 443 MS
QTC INTERVAL: 460 MS
RBC # BLD AUTO: 3.25 MILLION/UL (ref 3.81–5.12)
RBC # BLD AUTO: 3.43 MILLION/UL (ref 3.81–5.12)
RBC # BLD AUTO: 3.79 MILLION/UL (ref 3.81–5.12)
RBC #/AREA URNS AUTO: ABNORMAL /HPF
SODIUM SERPL-SCNC: 138 MMOL/L (ref 136–145)
SODIUM SERPL-SCNC: 138 MMOL/L (ref 136–145)
SODIUM SERPL-SCNC: 140 MMOL/L (ref 136–145)
SP GR UR STRIP.AUTO: 1.01 (ref 1–1.03)
T WAVE AXIS: 109 DEGREES
T WAVE AXIS: 167 DEGREES
TOTAL CELLS COUNTED SPEC: 100
TOTAL CELLS COUNTED SPEC: 100
UROBILINOGEN UR QL STRIP.AUTO: 1 E.U./DL
VARIANT LYMPHS # BLD AUTO: 3 %
VARIANT LYMPHS # BLD AUTO: 3 %
VENTRICULAR RATE: 100 BPM
VENTRICULAR RATE: 81 BPM
WBC # BLD AUTO: 4.63 THOUSAND/UL (ref 4.31–10.16)
WBC # BLD AUTO: 5.37 THOUSAND/UL (ref 4.31–10.16)
WBC # BLD AUTO: 7.48 THOUSAND/UL (ref 4.31–10.16)
WBC #/AREA URNS AUTO: ABNORMAL /HPF

## 2017-05-01 PROCEDURE — 88307 TISSUE EXAM BY PATHOLOGIST: CPT | Performed by: SURGERY

## 2017-05-01 PROCEDURE — 82948 REAGENT STRIP/BLOOD GLUCOSE: CPT

## 2017-05-01 PROCEDURE — 85027 COMPLETE CBC AUTOMATED: CPT | Performed by: SURGERY

## 2017-05-01 PROCEDURE — 85027 COMPLETE CBC AUTOMATED: CPT | Performed by: PHYSICIAN ASSISTANT

## 2017-05-01 PROCEDURE — 80053 COMPREHEN METABOLIC PANEL: CPT | Performed by: PHYSICIAN ASSISTANT

## 2017-05-01 PROCEDURE — 85007 BL SMEAR W/DIFF WBC COUNT: CPT | Performed by: EMERGENCY MEDICINE

## 2017-05-01 PROCEDURE — 83605 ASSAY OF LACTIC ACID: CPT | Performed by: PHYSICIAN ASSISTANT

## 2017-05-01 PROCEDURE — 81001 URINALYSIS AUTO W/SCOPE: CPT | Performed by: EMERGENCY MEDICINE

## 2017-05-01 PROCEDURE — 36415 COLL VENOUS BLD VENIPUNCTURE: CPT

## 2017-05-01 PROCEDURE — 99285 EMERGENCY DEPT VISIT HI MDM: CPT

## 2017-05-01 PROCEDURE — 83735 ASSAY OF MAGNESIUM: CPT | Performed by: SURGERY

## 2017-05-01 PROCEDURE — 87070 CULTURE OTHR SPECIMN AEROBIC: CPT | Performed by: SURGERY

## 2017-05-01 PROCEDURE — 87040 BLOOD CULTURE FOR BACTERIA: CPT | Performed by: EMERGENCY MEDICINE

## 2017-05-01 PROCEDURE — 93005 ELECTROCARDIOGRAM TRACING: CPT

## 2017-05-01 PROCEDURE — 85730 THROMBOPLASTIN TIME PARTIAL: CPT | Performed by: EMERGENCY MEDICINE

## 2017-05-01 PROCEDURE — 87205 SMEAR GRAM STAIN: CPT | Performed by: SURGERY

## 2017-05-01 PROCEDURE — 93005 ELECTROCARDIOGRAM TRACING: CPT | Performed by: PHYSICIAN ASSISTANT

## 2017-05-01 PROCEDURE — 96375 TX/PRO/DX INJ NEW DRUG ADDON: CPT

## 2017-05-01 PROCEDURE — 0D1M0Z4 BYPASS DESCENDING COLON TO CUTANEOUS, OPEN APPROACH: ICD-10-PCS | Performed by: SURGERY

## 2017-05-01 PROCEDURE — 0DTN0ZZ RESECTION OF SIGMOID COLON, OPEN APPROACH: ICD-10-PCS | Performed by: SURGERY

## 2017-05-01 PROCEDURE — A9270 NON-COVERED ITEM OR SERVICE: HCPCS | Performed by: NURSE PRACTITIONER

## 2017-05-01 PROCEDURE — 85610 PROTHROMBIN TIME: CPT | Performed by: EMERGENCY MEDICINE

## 2017-05-01 PROCEDURE — 82330 ASSAY OF CALCIUM: CPT | Performed by: SURGERY

## 2017-05-01 PROCEDURE — C1765 ADHESION BARRIER: HCPCS | Performed by: SURGERY

## 2017-05-01 PROCEDURE — 83735 ASSAY OF MAGNESIUM: CPT | Performed by: PHYSICIAN ASSISTANT

## 2017-05-01 PROCEDURE — A9270 NON-COVERED ITEM OR SERVICE: HCPCS | Performed by: NURSE ANESTHETIST, CERTIFIED REGISTERED

## 2017-05-01 PROCEDURE — 87176 TISSUE HOMOGENIZATION CULTR: CPT | Performed by: SURGERY

## 2017-05-01 PROCEDURE — 87186 SC STD MICRODIL/AGAR DIL: CPT | Performed by: SURGERY

## 2017-05-01 PROCEDURE — 74177 CT ABD & PELVIS W/CONTRAST: CPT

## 2017-05-01 PROCEDURE — 84100 ASSAY OF PHOSPHORUS: CPT | Performed by: SURGERY

## 2017-05-01 PROCEDURE — 87075 CULTR BACTERIA EXCEPT BLOOD: CPT | Performed by: SURGERY

## 2017-05-01 PROCEDURE — 83605 ASSAY OF LACTIC ACID: CPT | Performed by: EMERGENCY MEDICINE

## 2017-05-01 PROCEDURE — 85007 BL SMEAR W/DIFF WBC COUNT: CPT | Performed by: SURGERY

## 2017-05-01 PROCEDURE — 96361 HYDRATE IV INFUSION ADD-ON: CPT

## 2017-05-01 PROCEDURE — 85027 COMPLETE CBC AUTOMATED: CPT | Performed by: EMERGENCY MEDICINE

## 2017-05-01 PROCEDURE — 80053 COMPREHEN METABOLIC PANEL: CPT | Performed by: EMERGENCY MEDICINE

## 2017-05-01 PROCEDURE — 93005 ELECTROCARDIOGRAM TRACING: CPT | Performed by: EMERGENCY MEDICINE

## 2017-05-01 PROCEDURE — 87077 CULTURE AEROBIC IDENTIFY: CPT | Performed by: SURGERY

## 2017-05-01 PROCEDURE — 83690 ASSAY OF LIPASE: CPT | Performed by: EMERGENCY MEDICINE

## 2017-05-01 PROCEDURE — 96374 THER/PROPH/DIAG INJ IV PUSH: CPT

## 2017-05-01 PROCEDURE — 80053 COMPREHEN METABOLIC PANEL: CPT | Performed by: SURGERY

## 2017-05-01 PROCEDURE — C9113 INJ PANTOPRAZOLE SODIUM, VIA: HCPCS | Performed by: SURGERY

## 2017-05-01 RX ORDER — ONDANSETRON 2 MG/ML
4 INJECTION INTRAMUSCULAR; INTRAVENOUS ONCE
Status: COMPLETED | OUTPATIENT
Start: 2017-05-01 | End: 2017-05-01

## 2017-05-01 RX ORDER — FENTANYL CITRATE/PF 50 MCG/ML
25 SYRINGE (ML) INJECTION
Status: DISCONTINUED | OUTPATIENT
Start: 2017-05-01 | End: 2017-05-01 | Stop reason: HOSPADM

## 2017-05-01 RX ORDER — SODIUM CHLORIDE 9 MG/ML
INJECTION, SOLUTION INTRAVENOUS CONTINUOUS PRN
Status: DISCONTINUED | OUTPATIENT
Start: 2017-05-01 | End: 2017-05-01 | Stop reason: SURG

## 2017-05-01 RX ORDER — MAGNESIUM SULFATE HEPTAHYDRATE 40 MG/ML
2 INJECTION, SOLUTION INTRAVENOUS ONCE
Status: COMPLETED | OUTPATIENT
Start: 2017-05-01 | End: 2017-05-01

## 2017-05-01 RX ORDER — FENTANYL CITRATE 50 UG/ML
INJECTION, SOLUTION INTRAMUSCULAR; INTRAVENOUS AS NEEDED
Status: DISCONTINUED | OUTPATIENT
Start: 2017-05-01 | End: 2017-05-01 | Stop reason: SURG

## 2017-05-01 RX ORDER — ONDANSETRON 2 MG/ML
INJECTION INTRAMUSCULAR; INTRAVENOUS AS NEEDED
Status: DISCONTINUED | OUTPATIENT
Start: 2017-05-01 | End: 2017-05-01 | Stop reason: SURG

## 2017-05-01 RX ORDER — GLYCOPYRROLATE 0.2 MG/ML
INJECTION INTRAMUSCULAR; INTRAVENOUS AS NEEDED
Status: DISCONTINUED | OUTPATIENT
Start: 2017-05-01 | End: 2017-05-01 | Stop reason: SURG

## 2017-05-01 RX ORDER — MIDAZOLAM HYDROCHLORIDE 1 MG/ML
INJECTION INTRAMUSCULAR; INTRAVENOUS AS NEEDED
Status: DISCONTINUED | OUTPATIENT
Start: 2017-05-01 | End: 2017-05-01 | Stop reason: SURG

## 2017-05-01 RX ORDER — HYDRALAZINE HYDROCHLORIDE 20 MG/ML
5 INJECTION INTRAMUSCULAR; INTRAVENOUS EVERY 4 HOURS PRN
Status: DISCONTINUED | OUTPATIENT
Start: 2017-05-01 | End: 2017-05-04

## 2017-05-01 RX ORDER — ALBUTEROL SULFATE 90 UG/1
AEROSOL, METERED RESPIRATORY (INHALATION) AS NEEDED
Status: DISCONTINUED | OUTPATIENT
Start: 2017-05-01 | End: 2017-05-01 | Stop reason: SURG

## 2017-05-01 RX ORDER — MAGNESIUM SULFATE 500 MG/ML
VIAL (ML) INJECTION AS NEEDED
Status: DISCONTINUED | OUTPATIENT
Start: 2017-05-01 | End: 2017-05-01 | Stop reason: SURG

## 2017-05-01 RX ORDER — ALBUMIN, HUMAN INJ 5% 5 %
SOLUTION INTRAVENOUS CONTINUOUS PRN
Status: DISCONTINUED | OUTPATIENT
Start: 2017-05-01 | End: 2017-05-01 | Stop reason: SURG

## 2017-05-01 RX ORDER — PANTOPRAZOLE SODIUM 40 MG/1
40 INJECTION, POWDER, FOR SOLUTION INTRAVENOUS
Status: DISCONTINUED | OUTPATIENT
Start: 2017-05-01 | End: 2017-05-11

## 2017-05-01 RX ORDER — SODIUM CHLORIDE, SODIUM LACTATE, POTASSIUM CHLORIDE, CALCIUM CHLORIDE 600; 310; 30; 20 MG/100ML; MG/100ML; MG/100ML; MG/100ML
125 INJECTION, SOLUTION INTRAVENOUS CONTINUOUS
Status: DISCONTINUED | OUTPATIENT
Start: 2017-05-01 | End: 2017-05-01

## 2017-05-01 RX ORDER — KETOROLAC TROMETHAMINE 30 MG/ML
30 INJECTION, SOLUTION INTRAMUSCULAR; INTRAVENOUS ONCE
Status: COMPLETED | OUTPATIENT
Start: 2017-05-01 | End: 2017-05-01

## 2017-05-01 RX ORDER — MEPERIDINE HYDROCHLORIDE 25 MG/ML
12.5 INJECTION INTRAMUSCULAR; INTRAVENOUS; SUBCUTANEOUS ONCE AS NEEDED
Status: DISCONTINUED | OUTPATIENT
Start: 2017-05-01 | End: 2017-05-01 | Stop reason: HOSPADM

## 2017-05-01 RX ORDER — MORPHINE SULFATE 2 MG/ML
1 INJECTION, SOLUTION INTRAMUSCULAR; INTRAVENOUS EVERY 2 HOUR PRN
Status: DISCONTINUED | OUTPATIENT
Start: 2017-05-01 | End: 2017-05-01

## 2017-05-01 RX ORDER — MORPHINE SULFATE 10 MG/ML
6 INJECTION, SOLUTION INTRAMUSCULAR; INTRAVENOUS ONCE
Status: COMPLETED | OUTPATIENT
Start: 2017-05-01 | End: 2017-05-01

## 2017-05-01 RX ORDER — ONDANSETRON 2 MG/ML
4 INJECTION INTRAMUSCULAR; INTRAVENOUS ONCE AS NEEDED
Status: DISCONTINUED | OUTPATIENT
Start: 2017-05-01 | End: 2017-05-01 | Stop reason: HOSPADM

## 2017-05-01 RX ORDER — HEPARIN SODIUM 5000 [USP'U]/ML
5000 INJECTION, SOLUTION INTRAVENOUS; SUBCUTANEOUS EVERY 8 HOURS SCHEDULED
Status: DISCONTINUED | OUTPATIENT
Start: 2017-05-01 | End: 2017-05-01

## 2017-05-01 RX ORDER — MORPHINE SULFATE 2 MG/ML
2 INJECTION, SOLUTION INTRAMUSCULAR; INTRAVENOUS
Status: DISCONTINUED | OUTPATIENT
Start: 2017-05-01 | End: 2017-05-03

## 2017-05-01 RX ORDER — ROCURONIUM BROMIDE 10 MG/ML
INJECTION, SOLUTION INTRAVENOUS AS NEEDED
Status: DISCONTINUED | OUTPATIENT
Start: 2017-05-01 | End: 2017-05-01 | Stop reason: SURG

## 2017-05-01 RX ORDER — MAGNESIUM HYDROXIDE 1200 MG/15ML
LIQUID ORAL AS NEEDED
Status: DISCONTINUED | OUTPATIENT
Start: 2017-05-01 | End: 2017-05-01 | Stop reason: HOSPADM

## 2017-05-01 RX ORDER — METOCLOPRAMIDE HYDROCHLORIDE 5 MG/ML
10 INJECTION INTRAMUSCULAR; INTRAVENOUS ONCE
Status: DISCONTINUED | OUTPATIENT
Start: 2017-05-01 | End: 2017-05-01 | Stop reason: HOSPADM

## 2017-05-01 RX ORDER — KETAMINE HYDROCHLORIDE 50 MG/ML
INJECTION, SOLUTION, CONCENTRATE INTRAMUSCULAR; INTRAVENOUS AS NEEDED
Status: DISCONTINUED | OUTPATIENT
Start: 2017-05-01 | End: 2017-05-01 | Stop reason: SURG

## 2017-05-01 RX ORDER — DEXTROSE, SODIUM CHLORIDE, AND POTASSIUM CHLORIDE 5; .45; .15 G/100ML; G/100ML; G/100ML
125 INJECTION INTRAVENOUS CONTINUOUS
Status: DISCONTINUED | OUTPATIENT
Start: 2017-05-01 | End: 2017-05-02

## 2017-05-01 RX ORDER — SUCCINYLCHOLINE/SOD CL,ISO/PF 100 MG/5ML
SYRINGE (ML) INTRAVENOUS AS NEEDED
Status: DISCONTINUED | OUTPATIENT
Start: 2017-05-01 | End: 2017-05-01 | Stop reason: SURG

## 2017-05-01 RX ORDER — PROPOFOL 10 MG/ML
INJECTION, EMULSION INTRAVENOUS AS NEEDED
Status: DISCONTINUED | OUTPATIENT
Start: 2017-05-01 | End: 2017-05-01 | Stop reason: SURG

## 2017-05-01 RX ADMIN — ENOXAPARIN SODIUM 40 MG: 40 INJECTION SUBCUTANEOUS at 10:29

## 2017-05-01 RX ADMIN — DEXTROSE, SODIUM CHLORIDE, AND POTASSIUM CHLORIDE 125 ML/HR: 5; .45; .15 INJECTION INTRAVENOUS at 13:20

## 2017-05-01 RX ADMIN — METRONIDAZOLE 500 MG: 500 SOLUTION INTRAVENOUS at 06:09

## 2017-05-01 RX ADMIN — SODIUM CHLORIDE, SODIUM LACTATE, POTASSIUM CHLORIDE, AND CALCIUM CHLORIDE: .6; .31; .03; .02 INJECTION, SOLUTION INTRAVENOUS at 06:20

## 2017-05-01 RX ADMIN — Medication 100 MG: at 06:32

## 2017-05-01 RX ADMIN — CEFAZOLIN SODIUM 2000 MG: 2 SOLUTION INTRAVENOUS at 11:03

## 2017-05-01 RX ADMIN — SODIUM CHLORIDE: 0.9 INJECTION, SOLUTION INTRAVENOUS at 07:35

## 2017-05-01 RX ADMIN — ONDANSETRON 4 MG: 2 INJECTION INTRAMUSCULAR; INTRAVENOUS at 02:22

## 2017-05-01 RX ADMIN — INSULIN LISPRO 2 UNITS: 100 INJECTION, SOLUTION INTRAVENOUS; SUBCUTANEOUS at 18:08

## 2017-05-01 RX ADMIN — MORPHINE SULFATE 1 MG: 2 INJECTION, SOLUTION INTRAMUSCULAR; INTRAVENOUS at 15:38

## 2017-05-01 RX ADMIN — PANTOPRAZOLE SODIUM 40 MG: 40 INJECTION, POWDER, FOR SOLUTION INTRAVENOUS at 10:29

## 2017-05-01 RX ADMIN — KETOROLAC TROMETHAMINE 30 MG: 30 INJECTION, SOLUTION INTRAMUSCULAR at 02:22

## 2017-05-01 RX ADMIN — METOPROLOL TARTRATE 5 MG: 5 INJECTION INTRAVENOUS at 10:29

## 2017-05-01 RX ADMIN — DEXTROSE, SODIUM CHLORIDE, AND POTASSIUM CHLORIDE 125 ML/HR: 5; .45; .15 INJECTION INTRAVENOUS at 21:23

## 2017-05-01 RX ADMIN — ALBUMIN HUMAN: 0.05 INJECTION, SOLUTION INTRAVENOUS at 06:38

## 2017-05-01 RX ADMIN — SODIUM CHLORIDE, SODIUM LACTATE, POTASSIUM CHLORIDE, AND CALCIUM CHLORIDE 125 ML/HR: .6; .31; .03; .02 INJECTION, SOLUTION INTRAVENOUS at 04:22

## 2017-05-01 RX ADMIN — MORPHINE SULFATE 6 MG: 10 INJECTION, SOLUTION INTRAMUSCULAR; INTRAVENOUS at 02:28

## 2017-05-01 RX ADMIN — METRONIDAZOLE 500 MG: 500 INJECTION, SOLUTION INTRAVENOUS at 20:58

## 2017-05-01 RX ADMIN — PROPOFOL 200 MG: 10 INJECTION, EMULSION INTRAVENOUS at 06:32

## 2017-05-01 RX ADMIN — SODIUM CHLORIDE: 0.9 INJECTION, SOLUTION INTRAVENOUS at 06:37

## 2017-05-01 RX ADMIN — SODIUM CHLORIDE 500 ML: 0.9 INJECTION, SOLUTION INTRAVENOUS at 13:53

## 2017-05-01 RX ADMIN — METOPROLOL TARTRATE 5 MG: 5 INJECTION INTRAVENOUS at 21:26

## 2017-05-01 RX ADMIN — HYDROMORPHONE HYDROCHLORIDE 0.5 MG: 1 INJECTION, SOLUTION INTRAMUSCULAR; INTRAVENOUS; SUBCUTANEOUS at 08:20

## 2017-05-01 RX ADMIN — ALBUTEROL SULFATE 1 PUFF: 90 AEROSOL, METERED RESPIRATORY (INHALATION) at 06:29

## 2017-05-01 RX ADMIN — INSULIN LISPRO 2 UNITS: 100 INJECTION, SOLUTION INTRAVENOUS; SUBCUTANEOUS at 13:23

## 2017-05-01 RX ADMIN — MORPHINE SULFATE 1 MG: 2 INJECTION, SOLUTION INTRAMUSCULAR; INTRAVENOUS at 19:18

## 2017-05-01 RX ADMIN — MAGNESIUM SULFATE HEPTAHYDRATE 2 G: 40 INJECTION, SOLUTION INTRAVENOUS at 11:39

## 2017-05-01 RX ADMIN — METRONIDAZOLE 500 MG: 500 INJECTION, SOLUTION INTRAVENOUS at 11:42

## 2017-05-01 RX ADMIN — ONDANSETRON 4 MG: 2 INJECTION INTRAMUSCULAR; INTRAVENOUS at 08:10

## 2017-05-01 RX ADMIN — PIPERACILLIN SODIUM AND TAZOBACTAM SODIUM 4.5 G: 4; .5 INJECTION, POWDER, LYOPHILIZED, FOR SOLUTION INTRAVENOUS at 04:38

## 2017-05-01 RX ADMIN — GLYCOPYRROLATE 0.6 MG: 0.2 INJECTION, SOLUTION INTRAMUSCULAR; INTRAVENOUS at 08:10

## 2017-05-01 RX ADMIN — CEFTRIAXONE 1000 MG: 1 INJECTION, POWDER, FOR SOLUTION INTRAMUSCULAR; INTRAVENOUS at 11:48

## 2017-05-01 RX ADMIN — CEFAZOLIN SODIUM 2000 MG: 2 SOLUTION INTRAVENOUS at 05:28

## 2017-05-01 RX ADMIN — INSULIN LISPRO 2 UNITS: 100 INJECTION, SOLUTION INTRAVENOUS; SUBCUTANEOUS at 23:15

## 2017-05-01 RX ADMIN — SODIUM CHLORIDE, SODIUM LACTATE, POTASSIUM CHLORIDE, AND CALCIUM CHLORIDE 125 ML/HR: .6; .31; .03; .02 INJECTION, SOLUTION INTRAVENOUS at 09:28

## 2017-05-01 RX ADMIN — FENTANYL CITRATE 100 MCG: 50 INJECTION, SOLUTION INTRAMUSCULAR; INTRAVENOUS at 06:32

## 2017-05-01 RX ADMIN — HYDROMORPHONE HYDROCHLORIDE 0.2 MG: 1 INJECTION, SOLUTION INTRAMUSCULAR; INTRAVENOUS; SUBCUTANEOUS at 23:11

## 2017-05-01 RX ADMIN — IOHEXOL 100 ML: 350 INJECTION, SOLUTION INTRAVENOUS at 02:18

## 2017-05-01 RX ADMIN — LIDOCAINE HYDROCHLORIDE 100 MG: 20 INJECTION, SOLUTION INTRAVENOUS at 06:32

## 2017-05-01 RX ADMIN — KETAMINE HYDROCHLORIDE 25 MG: 50 INJECTION INTRAMUSCULAR; INTRAVENOUS at 06:55

## 2017-05-01 RX ADMIN — MIDAZOLAM HYDROCHLORIDE 2 MG: 1 INJECTION, SOLUTION INTRAMUSCULAR; INTRAVENOUS at 06:28

## 2017-05-01 RX ADMIN — IOHEXOL 100 ML: 350 INJECTION, SOLUTION INTRAVENOUS at 02:37

## 2017-05-01 RX ADMIN — NEOSTIGMINE METHYLSULFATE 3 MG: 1 INJECTION INTRAMUSCULAR; INTRAVENOUS; SUBCUTANEOUS at 08:10

## 2017-05-01 RX ADMIN — METOPROLOL TARTRATE 5 MG: 5 INJECTION INTRAVENOUS at 16:57

## 2017-05-01 RX ADMIN — HYDROMORPHONE HYDROCHLORIDE 0.5 MG: 1 INJECTION, SOLUTION INTRAMUSCULAR; INTRAVENOUS; SUBCUTANEOUS at 07:39

## 2017-05-01 RX ADMIN — MAGNESIUM SULFATE 2 G: 500 INJECTION, SOLUTION INTRAMUSCULAR; INTRAVENOUS at 06:41

## 2017-05-01 RX ADMIN — ROCURONIUM BROMIDE 30 MG: 10 INJECTION, SOLUTION INTRAVENOUS at 06:41

## 2017-05-01 RX ADMIN — SODIUM CHLORIDE 3000 ML: 0.9 INJECTION, SOLUTION INTRAVENOUS at 02:22

## 2017-05-01 RX ADMIN — HYDROMORPHONE HYDROCHLORIDE 0.5 MG: 1 INJECTION, SOLUTION INTRAMUSCULAR; INTRAVENOUS; SUBCUTANEOUS at 08:24

## 2017-05-02 LAB
ANION GAP SERPL CALCULATED.3IONS-SCNC: 6 MMOL/L (ref 4–13)
ATRIAL RATE: 86 BPM
BASOPHILS # BLD MANUAL: 0 THOUSAND/UL (ref 0–0.1)
BASOPHILS NFR MAR MANUAL: 0 % (ref 0–1)
BUN SERPL-MCNC: 16 MG/DL (ref 5–25)
CALCIUM SERPL-MCNC: 7.6 MG/DL (ref 8.3–10.1)
CHLORIDE SERPL-SCNC: 105 MMOL/L (ref 100–108)
CO2 SERPL-SCNC: 27 MMOL/L (ref 21–32)
CREAT SERPL-MCNC: 0.7 MG/DL (ref 0.6–1.3)
EOSINOPHIL # BLD MANUAL: 0 THOUSAND/UL (ref 0–0.4)
EOSINOPHIL NFR BLD MANUAL: 0 % (ref 0–6)
ERYTHROCYTE [DISTWIDTH] IN BLOOD BY AUTOMATED COUNT: 13.8 % (ref 11.6–15.1)
GFR SERPL CREATININE-BSD FRML MDRD: >60 ML/MIN/1.73SQ M
GLUCOSE SERPL-MCNC: 183 MG/DL (ref 65–140)
GLUCOSE SERPL-MCNC: 220 MG/DL (ref 65–140)
GLUCOSE SERPL-MCNC: 220 MG/DL (ref 65–140)
GLUCOSE SERPL-MCNC: 237 MG/DL (ref 65–140)
HCT VFR BLD AUTO: 31.3 % (ref 34.8–46.1)
HGB BLD-MCNC: 10 G/DL (ref 11.5–15.4)
LYMPHOCYTES # BLD AUTO: 1.21 THOUSAND/UL (ref 0.6–4.47)
LYMPHOCYTES # BLD AUTO: 7 % (ref 14–44)
MAGNESIUM SERPL-MCNC: 2.2 MG/DL (ref 1.6–2.6)
MCH RBC QN AUTO: 31.4 PG (ref 26.8–34.3)
MCHC RBC AUTO-ENTMCNC: 31.9 G/DL (ref 31.4–37.4)
MCV RBC AUTO: 98 FL (ref 82–98)
METAMYELOCYTES NFR BLD MANUAL: 2 % (ref 0–1)
MONOCYTES # BLD AUTO: 0.69 THOUSAND/UL (ref 0–1.22)
MONOCYTES NFR BLD: 4 % (ref 4–12)
NEUTROPHILS # BLD MANUAL: 15.02 THOUSAND/UL (ref 1.85–7.62)
NEUTS BAND NFR BLD MANUAL: 6 % (ref 0–8)
NEUTS SEG NFR BLD AUTO: 81 % (ref 43–75)
NRBC BLD AUTO-RTO: 0 /100 WBCS
P AXIS: 67 DEGREES
PLATELET # BLD AUTO: 342 THOUSANDS/UL (ref 149–390)
PLATELET BLD QL SMEAR: ADEQUATE
PMV BLD AUTO: 9 FL (ref 8.9–12.7)
POTASSIUM SERPL-SCNC: 4.6 MMOL/L (ref 3.5–5.3)
PR INTERVAL: 166 MS
QRS AXIS: 10 DEGREES
QRSD INTERVAL: 88 MS
QT INTERVAL: 354 MS
QTC INTERVAL: 423 MS
RBC # BLD AUTO: 3.18 MILLION/UL (ref 3.81–5.12)
SODIUM SERPL-SCNC: 138 MMOL/L (ref 136–145)
T WAVE AXIS: 144 DEGREES
TOTAL CELLS COUNTED SPEC: 100
VENTRICULAR RATE: 86 BPM
WBC # BLD AUTO: 17.27 THOUSAND/UL (ref 4.31–10.16)

## 2017-05-02 PROCEDURE — 82948 REAGENT STRIP/BLOOD GLUCOSE: CPT

## 2017-05-02 PROCEDURE — 85027 COMPLETE CBC AUTOMATED: CPT | Performed by: PHYSICIAN ASSISTANT

## 2017-05-02 PROCEDURE — C9113 INJ PANTOPRAZOLE SODIUM, VIA: HCPCS | Performed by: SURGERY

## 2017-05-02 PROCEDURE — 83735 ASSAY OF MAGNESIUM: CPT | Performed by: NURSE PRACTITIONER

## 2017-05-02 PROCEDURE — A9270 NON-COVERED ITEM OR SERVICE: HCPCS | Performed by: NURSE PRACTITIONER

## 2017-05-02 PROCEDURE — 85007 BL SMEAR W/DIFF WBC COUNT: CPT | Performed by: PHYSICIAN ASSISTANT

## 2017-05-02 PROCEDURE — 80048 BASIC METABOLIC PNL TOTAL CA: CPT | Performed by: PHYSICIAN ASSISTANT

## 2017-05-02 RX ORDER — LEVALBUTEROL INHALATION SOLUTION 0.63 MG/3ML
0.63 SOLUTION RESPIRATORY (INHALATION) EVERY 8 HOURS PRN
Status: DISCONTINUED | OUTPATIENT
Start: 2017-05-02 | End: 2017-05-16

## 2017-05-02 RX ORDER — DEXTROSE AND SODIUM CHLORIDE 5; .45 G/100ML; G/100ML
75 INJECTION, SOLUTION INTRAVENOUS CONTINUOUS
Status: DISCONTINUED | OUTPATIENT
Start: 2017-05-02 | End: 2017-05-03

## 2017-05-02 RX ORDER — SODIUM CHLORIDE, SODIUM LACTATE, POTASSIUM CHLORIDE, CALCIUM CHLORIDE 600; 310; 30; 20 MG/100ML; MG/100ML; MG/100ML; MG/100ML
1000 INJECTION, SOLUTION INTRAVENOUS CONTINUOUS
Status: DISPENSED | OUTPATIENT
Start: 2017-05-02 | End: 2017-05-02

## 2017-05-02 RX ORDER — KETOROLAC TROMETHAMINE 30 MG/ML
15 INJECTION, SOLUTION INTRAMUSCULAR; INTRAVENOUS EVERY 6 HOURS SCHEDULED
Status: COMPLETED | OUTPATIENT
Start: 2017-05-02 | End: 2017-05-03

## 2017-05-02 RX ADMIN — DEXTROSE AND SODIUM CHLORIDE 125 ML/HR: 5; .45 INJECTION, SOLUTION INTRAVENOUS at 12:10

## 2017-05-02 RX ADMIN — HYDROMORPHONE HYDROCHLORIDE 0.2 MG: 1 INJECTION, SOLUTION INTRAMUSCULAR; INTRAVENOUS; SUBCUTANEOUS at 03:39

## 2017-05-02 RX ADMIN — METRONIDAZOLE 500 MG: 500 INJECTION, SOLUTION INTRAVENOUS at 03:40

## 2017-05-02 RX ADMIN — KETOROLAC TROMETHAMINE 15 MG: 30 INJECTION, SOLUTION INTRAMUSCULAR at 17:53

## 2017-05-02 RX ADMIN — HYDROMORPHONE HYDROCHLORIDE 0.2 MG: 1 INJECTION, SOLUTION INTRAMUSCULAR; INTRAVENOUS; SUBCUTANEOUS at 15:37

## 2017-05-02 RX ADMIN — HYDROMORPHONE HYDROCHLORIDE 0.2 MG: 1 INJECTION, SOLUTION INTRAMUSCULAR; INTRAVENOUS; SUBCUTANEOUS at 07:16

## 2017-05-02 RX ADMIN — INSULIN LISPRO 4 UNITS: 100 INJECTION, SOLUTION INTRAVENOUS; SUBCUTANEOUS at 11:57

## 2017-05-02 RX ADMIN — DEXTROSE, SODIUM CHLORIDE, AND POTASSIUM CHLORIDE 125 ML/HR: 5; .45; .15 INJECTION INTRAVENOUS at 07:23

## 2017-05-02 RX ADMIN — KETOROLAC TROMETHAMINE 15 MG: 30 INJECTION, SOLUTION INTRAMUSCULAR at 23:42

## 2017-05-02 RX ADMIN — INSULIN LISPRO 4 UNITS: 100 INJECTION, SOLUTION INTRAVENOUS; SUBCUTANEOUS at 17:00

## 2017-05-02 RX ADMIN — INSULIN LISPRO 1 UNITS: 100 INJECTION, SOLUTION INTRAVENOUS; SUBCUTANEOUS at 05:24

## 2017-05-02 RX ADMIN — MORPHINE SULFATE 2 MG: 2 INJECTION, SOLUTION INTRAMUSCULAR; INTRAVENOUS at 12:30

## 2017-05-02 RX ADMIN — CEFTRIAXONE 1000 MG: 1 INJECTION, POWDER, FOR SOLUTION INTRAMUSCULAR; INTRAVENOUS at 12:05

## 2017-05-02 RX ADMIN — METOPROLOL TARTRATE 5 MG: 5 INJECTION INTRAVENOUS at 03:40

## 2017-05-02 RX ADMIN — MORPHINE SULFATE 2 MG: 2 INJECTION, SOLUTION INTRAMUSCULAR; INTRAVENOUS at 10:00

## 2017-05-02 RX ADMIN — KETOROLAC TROMETHAMINE 15 MG: 30 INJECTION, SOLUTION INTRAMUSCULAR at 11:55

## 2017-05-02 RX ADMIN — MORPHINE SULFATE 2 MG: 2 INJECTION, SOLUTION INTRAMUSCULAR; INTRAVENOUS at 20:14

## 2017-05-02 RX ADMIN — METRONIDAZOLE 500 MG: 500 INJECTION, SOLUTION INTRAVENOUS at 20:14

## 2017-05-02 RX ADMIN — ENOXAPARIN SODIUM 40 MG: 40 INJECTION SUBCUTANEOUS at 08:09

## 2017-05-02 RX ADMIN — SODIUM CHLORIDE, SODIUM LACTATE, POTASSIUM CHLORIDE, AND CALCIUM CHLORIDE 1000 ML/HR: .6; .31; .03; .02 INJECTION, SOLUTION INTRAVENOUS at 20:15

## 2017-05-02 RX ADMIN — METOPROLOL TARTRATE 5 MG: 5 INJECTION INTRAVENOUS at 10:07

## 2017-05-02 RX ADMIN — METRONIDAZOLE 500 MG: 500 INJECTION, SOLUTION INTRAVENOUS at 12:16

## 2017-05-02 RX ADMIN — DEXTROSE AND SODIUM CHLORIDE 125 ML/HR: 5; .45 INJECTION, SOLUTION INTRAVENOUS at 21:15

## 2017-05-02 RX ADMIN — PANTOPRAZOLE SODIUM 40 MG: 40 INJECTION, POWDER, FOR SOLUTION INTRAVENOUS at 08:09

## 2017-05-02 RX ADMIN — SODIUM CHLORIDE, SODIUM LACTATE, POTASSIUM CHLORIDE, AND CALCIUM CHLORIDE 1000 ML/HR: .6; .31; .03; .02 INJECTION, SOLUTION INTRAVENOUS at 11:52

## 2017-05-03 ENCOUNTER — APPOINTMENT (INPATIENT)
Dept: RADIOLOGY | Facility: HOSPITAL | Age: 57
DRG: 329 | End: 2017-05-03
Payer: COMMERCIAL

## 2017-05-03 ENCOUNTER — APPOINTMENT (INPATIENT)
Dept: NON INVASIVE DIAGNOSTICS | Facility: HOSPITAL | Age: 57
DRG: 329 | End: 2017-05-03
Payer: COMMERCIAL

## 2017-05-03 ENCOUNTER — GENERIC CONVERSION - ENCOUNTER (OUTPATIENT)
Dept: OTHER | Facility: OTHER | Age: 57
End: 2017-05-03

## 2017-05-03 PROBLEM — E83.42 HYPOMAGNESEMIA: Status: RESOLVED | Noted: 2017-05-01 | Resolved: 2017-05-03

## 2017-05-03 PROBLEM — E87.6 HYPOKALEMIA: Status: RESOLVED | Noted: 2017-04-25 | Resolved: 2017-05-03

## 2017-05-03 LAB
ANION GAP SERPL CALCULATED.3IONS-SCNC: 5 MMOL/L (ref 4–13)
BACTERIA SPEC ANAEROBE CULT: NORMAL
BACTERIA TISS AEROBE CULT: NORMAL
BUN SERPL-MCNC: 17 MG/DL (ref 5–25)
CALCIUM SERPL-MCNC: 8.1 MG/DL (ref 8.3–10.1)
CHLORIDE SERPL-SCNC: 105 MMOL/L (ref 100–108)
CO2 SERPL-SCNC: 28 MMOL/L (ref 21–32)
CREAT SERPL-MCNC: 0.67 MG/DL (ref 0.6–1.3)
ERYTHROCYTE [DISTWIDTH] IN BLOOD BY AUTOMATED COUNT: 14.1 % (ref 11.6–15.1)
GFR SERPL CREATININE-BSD FRML MDRD: >60 ML/MIN/1.73SQ M
GLUCOSE SERPL-MCNC: 117 MG/DL (ref 65–140)
GLUCOSE SERPL-MCNC: 137 MG/DL (ref 65–140)
GLUCOSE SERPL-MCNC: 173 MG/DL (ref 65–140)
GLUCOSE SERPL-MCNC: 191 MG/DL (ref 65–140)
GLUCOSE SERPL-MCNC: 218 MG/DL (ref 65–140)
GRAM STN SPEC: NORMAL
HCT VFR BLD AUTO: 28.6 % (ref 34.8–46.1)
HGB BLD-MCNC: 8.8 G/DL (ref 11.5–15.4)
MAGNESIUM SERPL-MCNC: 2.3 MG/DL (ref 1.6–2.6)
MCH RBC QN AUTO: 31 PG (ref 26.8–34.3)
MCHC RBC AUTO-ENTMCNC: 30.8 G/DL (ref 31.4–37.4)
MCV RBC AUTO: 101 FL (ref 82–98)
PLATELET # BLD AUTO: 297 THOUSANDS/UL (ref 149–390)
PMV BLD AUTO: 9.5 FL (ref 8.9–12.7)
POTASSIUM SERPL-SCNC: 4.3 MMOL/L (ref 3.5–5.3)
RBC # BLD AUTO: 2.84 MILLION/UL (ref 3.81–5.12)
SODIUM SERPL-SCNC: 138 MMOL/L (ref 136–145)
WBC # BLD AUTO: 15.98 THOUSAND/UL (ref 4.31–10.16)

## 2017-05-03 PROCEDURE — C9113 INJ PANTOPRAZOLE SODIUM, VIA: HCPCS | Performed by: SURGERY

## 2017-05-03 PROCEDURE — A9270 NON-COVERED ITEM OR SERVICE: HCPCS | Performed by: PHYSICIAN ASSISTANT

## 2017-05-03 PROCEDURE — G8988 SELF CARE GOAL STATUS: HCPCS

## 2017-05-03 PROCEDURE — 94760 N-INVAS EAR/PLS OXIMETRY 1: CPT

## 2017-05-03 PROCEDURE — 93306 TTE W/DOPPLER COMPLETE: CPT

## 2017-05-03 PROCEDURE — 94640 AIRWAY INHALATION TREATMENT: CPT

## 2017-05-03 PROCEDURE — 85027 COMPLETE CBC AUTOMATED: CPT | Performed by: NURSE PRACTITIONER

## 2017-05-03 PROCEDURE — 97167 OT EVAL HIGH COMPLEX 60 MIN: CPT

## 2017-05-03 PROCEDURE — 82948 REAGENT STRIP/BLOOD GLUCOSE: CPT

## 2017-05-03 PROCEDURE — 80048 BASIC METABOLIC PNL TOTAL CA: CPT | Performed by: NURSE PRACTITIONER

## 2017-05-03 PROCEDURE — G8987 SELF CARE CURRENT STATUS: HCPCS

## 2017-05-03 PROCEDURE — G8979 MOBILITY GOAL STATUS: HCPCS

## 2017-05-03 PROCEDURE — G8978 MOBILITY CURRENT STATUS: HCPCS

## 2017-05-03 PROCEDURE — 97163 PT EVAL HIGH COMPLEX 45 MIN: CPT

## 2017-05-03 PROCEDURE — 94664 DEMO&/EVAL PT USE INHALER: CPT

## 2017-05-03 PROCEDURE — 71010 HB CHEST X-RAY 1 VIEW FRONTAL (PORTABLE): CPT

## 2017-05-03 PROCEDURE — 2W03X6Z CHANGE PRESSURE DRESSING ON ABDOMINAL WALL: ICD-10-PCS | Performed by: SURGERY

## 2017-05-03 PROCEDURE — 83735 ASSAY OF MAGNESIUM: CPT | Performed by: NURSE PRACTITIONER

## 2017-05-03 RX ORDER — ACETAMINOPHEN 325 MG/1
650 TABLET ORAL EVERY 6 HOURS SCHEDULED
Status: DISCONTINUED | OUTPATIENT
Start: 2017-05-03 | End: 2017-05-16

## 2017-05-03 RX ORDER — OXYCODONE HYDROCHLORIDE 10 MG/1
10 TABLET ORAL EVERY 4 HOURS PRN
Status: DISCONTINUED | OUTPATIENT
Start: 2017-05-03 | End: 2017-05-04

## 2017-05-03 RX ORDER — FUROSEMIDE 10 MG/ML
10 INJECTION INTRAMUSCULAR; INTRAVENOUS ONCE
Status: COMPLETED | OUTPATIENT
Start: 2017-05-03 | End: 2017-05-03

## 2017-05-03 RX ORDER — LEVALBUTEROL 1.25 MG/.5ML
1.25 SOLUTION, CONCENTRATE RESPIRATORY (INHALATION)
Status: DISCONTINUED | OUTPATIENT
Start: 2017-05-03 | End: 2017-05-09

## 2017-05-03 RX ORDER — FUROSEMIDE 10 MG/ML
20 INJECTION INTRAMUSCULAR; INTRAVENOUS ONCE
Status: COMPLETED | OUTPATIENT
Start: 2017-05-03 | End: 2017-05-03

## 2017-05-03 RX ORDER — OXYCODONE HYDROCHLORIDE 5 MG/1
5 TABLET ORAL EVERY 4 HOURS PRN
Status: DISCONTINUED | OUTPATIENT
Start: 2017-05-03 | End: 2017-05-16

## 2017-05-03 RX ORDER — SODIUM CHLORIDE FOR INHALATION 0.9 %
VIAL, NEBULIZER (ML) INHALATION
Status: DISCONTINUED
Start: 2017-05-03 | End: 2017-05-03 | Stop reason: WASHOUT

## 2017-05-03 RX ADMIN — METRONIDAZOLE 500 MG: 500 INJECTION, SOLUTION INTRAVENOUS at 05:28

## 2017-05-03 RX ADMIN — LEVALBUTEROL HYDROCHLORIDE 1.25 MG: 1.25 SOLUTION, CONCENTRATE RESPIRATORY (INHALATION) at 13:13

## 2017-05-03 RX ADMIN — LEVALBUTEROL HYDROCHLORIDE 1.25 MG: 1.25 SOLUTION, CONCENTRATE RESPIRATORY (INHALATION) at 07:49

## 2017-05-03 RX ADMIN — PANTOPRAZOLE SODIUM 40 MG: 40 INJECTION, POWDER, FOR SOLUTION INTRAVENOUS at 10:00

## 2017-05-03 RX ADMIN — DEXTROSE AND SODIUM CHLORIDE 125 ML/HR: 5; .45 INJECTION, SOLUTION INTRAVENOUS at 08:03

## 2017-05-03 RX ADMIN — LEVALBUTEROL HYDROCHLORIDE 1.25 MG: 1.25 SOLUTION, CONCENTRATE RESPIRATORY (INHALATION) at 19:21

## 2017-05-03 RX ADMIN — METOPROLOL TARTRATE 5 MG: 5 INJECTION INTRAVENOUS at 10:51

## 2017-05-03 RX ADMIN — METRONIDAZOLE 500 MG: 500 INJECTION, SOLUTION INTRAVENOUS at 12:49

## 2017-05-03 RX ADMIN — METRONIDAZOLE 500 MG: 500 INJECTION, SOLUTION INTRAVENOUS at 20:16

## 2017-05-03 RX ADMIN — ENOXAPARIN SODIUM 40 MG: 40 INJECTION SUBCUTANEOUS at 10:50

## 2017-05-03 RX ADMIN — CEFAZOLIN SODIUM 2000 MG: 2 SOLUTION INTRAVENOUS at 19:44

## 2017-05-03 RX ADMIN — HYDROMORPHONE HYDROCHLORIDE 0.5 MG: 1 INJECTION, SOLUTION INTRAMUSCULAR; INTRAVENOUS; SUBCUTANEOUS at 14:23

## 2017-05-03 RX ADMIN — KETOROLAC TROMETHAMINE 15 MG: 30 INJECTION, SOLUTION INTRAMUSCULAR at 06:06

## 2017-05-03 RX ADMIN — INSULIN LISPRO 2 UNITS: 100 INJECTION, SOLUTION INTRAVENOUS; SUBCUTANEOUS at 00:38

## 2017-05-03 RX ADMIN — LEVALBUTEROL HYDROCHLORIDE 0.63 MG: 0.63 SOLUTION RESPIRATORY (INHALATION) at 02:32

## 2017-05-03 RX ADMIN — INSULIN LISPRO 2 UNITS: 100 INJECTION, SOLUTION INTRAVENOUS; SUBCUTANEOUS at 16:28

## 2017-05-03 RX ADMIN — FUROSEMIDE 20 MG: 10 INJECTION, SOLUTION INTRAMUSCULAR; INTRAVENOUS at 14:23

## 2017-05-03 RX ADMIN — INSULIN LISPRO 2 UNITS: 100 INJECTION, SOLUTION INTRAVENOUS; SUBCUTANEOUS at 12:00

## 2017-05-03 RX ADMIN — ACETAMINOPHEN 650 MG: 325 TABLET ORAL at 20:04

## 2017-05-03 RX ADMIN — METOPROLOL TARTRATE 5 MG: 5 INJECTION INTRAVENOUS at 16:27

## 2017-05-03 RX ADMIN — IPRATROPIUM BROMIDE 0.5 MG: 0.5 SOLUTION RESPIRATORY (INHALATION) at 07:49

## 2017-05-03 RX ADMIN — IPRATROPIUM BROMIDE 0.5 MG: 0.5 SOLUTION RESPIRATORY (INHALATION) at 19:21

## 2017-05-03 RX ADMIN — FUROSEMIDE 10 MG: 10 INJECTION, SOLUTION INTRAMUSCULAR; INTRAVENOUS at 02:43

## 2017-05-03 RX ADMIN — IPRATROPIUM BROMIDE 0.5 MG: 0.5 SOLUTION RESPIRATORY (INHALATION) at 13:13

## 2017-05-03 RX ADMIN — METOPROLOL TARTRATE 5 MG: 5 INJECTION INTRAVENOUS at 22:40

## 2017-05-03 RX ADMIN — CEFAZOLIN SODIUM 2000 MG: 2 SOLUTION INTRAVENOUS at 10:51

## 2017-05-03 RX ADMIN — HYDROMORPHONE HYDROCHLORIDE 0.5 MG: 1 INJECTION, SOLUTION INTRAMUSCULAR; INTRAVENOUS; SUBCUTANEOUS at 20:06

## 2017-05-03 RX ADMIN — INSULIN LISPRO 2 UNITS: 100 INJECTION, SOLUTION INTRAVENOUS; SUBCUTANEOUS at 06:02

## 2017-05-04 LAB
ANION GAP SERPL CALCULATED.3IONS-SCNC: 7 MMOL/L (ref 4–13)
BASOPHILS # BLD MANUAL: 0 THOUSAND/UL (ref 0–0.1)
BASOPHILS NFR MAR MANUAL: 0 % (ref 0–1)
BUN SERPL-MCNC: 16 MG/DL (ref 5–25)
CALCIUM SERPL-MCNC: 8.5 MG/DL (ref 8.3–10.1)
CHLORIDE SERPL-SCNC: 104 MMOL/L (ref 100–108)
CO2 SERPL-SCNC: 28 MMOL/L (ref 21–32)
CREAT SERPL-MCNC: 0.65 MG/DL (ref 0.6–1.3)
EOSINOPHIL # BLD MANUAL: 0.13 THOUSAND/UL (ref 0–0.4)
EOSINOPHIL NFR BLD MANUAL: 1 % (ref 0–6)
ERYTHROCYTE [DISTWIDTH] IN BLOOD BY AUTOMATED COUNT: 14.3 % (ref 11.6–15.1)
GFR SERPL CREATININE-BSD FRML MDRD: >60 ML/MIN/1.73SQ M
GLUCOSE SERPL-MCNC: 130 MG/DL (ref 65–140)
GLUCOSE SERPL-MCNC: 151 MG/DL (ref 65–140)
GLUCOSE SERPL-MCNC: 173 MG/DL (ref 65–140)
GLUCOSE SERPL-MCNC: 176 MG/DL (ref 65–140)
GLUCOSE SERPL-MCNC: 183 MG/DL (ref 65–140)
HCT VFR BLD AUTO: 27.5 % (ref 34.8–46.1)
HGB BLD-MCNC: 8.6 G/DL (ref 11.5–15.4)
LYMPHOCYTES # BLD AUTO: 1.55 THOUSAND/UL (ref 0.6–4.47)
LYMPHOCYTES # BLD AUTO: 12 % (ref 14–44)
MAGNESIUM SERPL-MCNC: 2.2 MG/DL (ref 1.6–2.6)
MCH RBC QN AUTO: 30.8 PG (ref 26.8–34.3)
MCHC RBC AUTO-ENTMCNC: 31.3 G/DL (ref 31.4–37.4)
MCV RBC AUTO: 99 FL (ref 82–98)
MONOCYTES # BLD AUTO: 0.26 THOUSAND/UL (ref 0–1.22)
MONOCYTES NFR BLD: 2 % (ref 4–12)
NEUTROPHILS # BLD MANUAL: 10.69 THOUSAND/UL (ref 1.85–7.62)
NEUTS BAND NFR BLD MANUAL: 4 % (ref 0–8)
NEUTS SEG NFR BLD AUTO: 79 % (ref 43–75)
NRBC BLD AUTO-RTO: 0 /100 WBCS
PHOSPHATE SERPL-MCNC: 3 MG/DL (ref 2.7–4.5)
PLATELET # BLD AUTO: 324 THOUSANDS/UL (ref 149–390)
PLATELET BLD QL SMEAR: ADEQUATE
PMV BLD AUTO: 8.8 FL (ref 8.9–12.7)
POTASSIUM SERPL-SCNC: 4 MMOL/L (ref 3.5–5.3)
RBC # BLD AUTO: 2.79 MILLION/UL (ref 3.81–5.12)
SODIUM SERPL-SCNC: 139 MMOL/L (ref 136–145)
TOTAL CELLS COUNTED SPEC: 100
VARIANT LYMPHS # BLD AUTO: 2 %
WBC # BLD AUTO: 12.88 THOUSAND/UL (ref 4.31–10.16)

## 2017-05-04 PROCEDURE — A9270 NON-COVERED ITEM OR SERVICE: HCPCS | Performed by: PHYSICIAN ASSISTANT

## 2017-05-04 PROCEDURE — 94760 N-INVAS EAR/PLS OXIMETRY 1: CPT

## 2017-05-04 PROCEDURE — 84100 ASSAY OF PHOSPHORUS: CPT | Performed by: PHYSICIAN ASSISTANT

## 2017-05-04 PROCEDURE — 80048 BASIC METABOLIC PNL TOTAL CA: CPT | Performed by: PHYSICIAN ASSISTANT

## 2017-05-04 PROCEDURE — 83735 ASSAY OF MAGNESIUM: CPT | Performed by: PHYSICIAN ASSISTANT

## 2017-05-04 PROCEDURE — 82948 REAGENT STRIP/BLOOD GLUCOSE: CPT

## 2017-05-04 PROCEDURE — 94669 MECHANICAL CHEST WALL OSCILL: CPT

## 2017-05-04 PROCEDURE — 85007 BL SMEAR W/DIFF WBC COUNT: CPT | Performed by: PHYSICIAN ASSISTANT

## 2017-05-04 PROCEDURE — 85027 COMPLETE CBC AUTOMATED: CPT | Performed by: PHYSICIAN ASSISTANT

## 2017-05-04 PROCEDURE — 94640 AIRWAY INHALATION TREATMENT: CPT

## 2017-05-04 PROCEDURE — C9113 INJ PANTOPRAZOLE SODIUM, VIA: HCPCS | Performed by: SURGERY

## 2017-05-04 PROCEDURE — A9270 NON-COVERED ITEM OR SERVICE: HCPCS | Performed by: NURSE PRACTITIONER

## 2017-05-04 RX ORDER — SODIUM CHLORIDE 9 MG/ML
75 INJECTION, SOLUTION INTRAVENOUS CONTINUOUS
Status: DISCONTINUED | OUTPATIENT
Start: 2017-05-04 | End: 2017-05-06

## 2017-05-04 RX ORDER — HYDRALAZINE HYDROCHLORIDE 20 MG/ML
10 INJECTION INTRAMUSCULAR; INTRAVENOUS EVERY 4 HOURS PRN
Status: DISCONTINUED | OUTPATIENT
Start: 2017-05-04 | End: 2017-05-07

## 2017-05-04 RX ORDER — FUROSEMIDE 10 MG/ML
20 INJECTION INTRAMUSCULAR; INTRAVENOUS ONCE
Status: COMPLETED | OUTPATIENT
Start: 2017-05-04 | End: 2017-05-04

## 2017-05-04 RX ORDER — LORAZEPAM 2 MG/ML
0.5 INJECTION INTRAMUSCULAR ONCE
Status: COMPLETED | OUTPATIENT
Start: 2017-05-04 | End: 2017-05-04

## 2017-05-04 RX ADMIN — HYDRALAZINE HYDROCHLORIDE 5 MG: 20 INJECTION INTRAMUSCULAR; INTRAVENOUS at 06:33

## 2017-05-04 RX ADMIN — IPRATROPIUM BROMIDE 0.5 MG: 0.5 SOLUTION RESPIRATORY (INHALATION) at 14:38

## 2017-05-04 RX ADMIN — LEVALBUTEROL HYDROCHLORIDE 1.25 MG: 1.25 SOLUTION, CONCENTRATE RESPIRATORY (INHALATION) at 01:52

## 2017-05-04 RX ADMIN — METRONIDAZOLE 500 MG: 500 INJECTION, SOLUTION INTRAVENOUS at 13:27

## 2017-05-04 RX ADMIN — METOPROLOL TARTRATE 5 MG: 5 INJECTION INTRAVENOUS at 16:17

## 2017-05-04 RX ADMIN — METRONIDAZOLE 500 MG: 500 INJECTION, SOLUTION INTRAVENOUS at 05:26

## 2017-05-04 RX ADMIN — ACETAMINOPHEN 650 MG: 325 TABLET ORAL at 00:56

## 2017-05-04 RX ADMIN — FUROSEMIDE 20 MG: 10 INJECTION, SOLUTION INTRAMUSCULAR; INTRAVENOUS at 09:25

## 2017-05-04 RX ADMIN — METOPROLOL TARTRATE 5 MG: 5 INJECTION INTRAVENOUS at 05:26

## 2017-05-04 RX ADMIN — INSULIN LISPRO 2 UNITS: 100 INJECTION, SOLUTION INTRAVENOUS; SUBCUTANEOUS at 08:00

## 2017-05-04 RX ADMIN — METOPROLOL TARTRATE 5 MG: 5 INJECTION INTRAVENOUS at 22:10

## 2017-05-04 RX ADMIN — OXYCODONE HYDROCHLORIDE 5 MG: 5 TABLET ORAL at 14:28

## 2017-05-04 RX ADMIN — HYDRALAZINE HYDROCHLORIDE 10 MG: 20 INJECTION INTRAMUSCULAR; INTRAVENOUS at 19:13

## 2017-05-04 RX ADMIN — LEVALBUTEROL HYDROCHLORIDE 1.25 MG: 1.25 SOLUTION, CONCENTRATE RESPIRATORY (INHALATION) at 08:02

## 2017-05-04 RX ADMIN — HYDRALAZINE HYDROCHLORIDE 5 MG: 20 INJECTION INTRAMUSCULAR; INTRAVENOUS at 16:13

## 2017-05-04 RX ADMIN — LORAZEPAM 0.5 MG: 2 INJECTION INTRAMUSCULAR; INTRAVENOUS at 22:09

## 2017-05-04 RX ADMIN — LEVALBUTEROL HYDROCHLORIDE 1.25 MG: 1.25 SOLUTION, CONCENTRATE RESPIRATORY (INHALATION) at 14:38

## 2017-05-04 RX ADMIN — METOPROLOL TARTRATE 5 MG: 5 INJECTION INTRAVENOUS at 09:25

## 2017-05-04 RX ADMIN — HYDROMORPHONE HYDROCHLORIDE 0.3 MG: 1 INJECTION, SOLUTION INTRAMUSCULAR; INTRAVENOUS; SUBCUTANEOUS at 20:41

## 2017-05-04 RX ADMIN — OXYCODONE HYDROCHLORIDE 10 MG: 10 TABLET ORAL at 01:58

## 2017-05-04 RX ADMIN — ENOXAPARIN SODIUM 40 MG: 40 INJECTION SUBCUTANEOUS at 08:13

## 2017-05-04 RX ADMIN — PANTOPRAZOLE SODIUM 40 MG: 40 INJECTION, POWDER, FOR SOLUTION INTRAVENOUS at 08:14

## 2017-05-04 RX ADMIN — IPRATROPIUM BROMIDE 0.5 MG: 0.5 SOLUTION RESPIRATORY (INHALATION) at 21:04

## 2017-05-04 RX ADMIN — CEFAZOLIN SODIUM 2000 MG: 2 SOLUTION INTRAVENOUS at 13:24

## 2017-05-04 RX ADMIN — ACETAMINOPHEN 650 MG: 325 TABLET ORAL at 05:25

## 2017-05-04 RX ADMIN — ACETAMINOPHEN 650 MG: 325 TABLET ORAL at 13:26

## 2017-05-04 RX ADMIN — IPRATROPIUM BROMIDE 0.5 MG: 0.5 SOLUTION RESPIRATORY (INHALATION) at 08:02

## 2017-05-04 RX ADMIN — INSULIN LISPRO 1 UNITS: 100 INJECTION, SOLUTION INTRAVENOUS; SUBCUTANEOUS at 22:10

## 2017-05-04 RX ADMIN — LEVALBUTEROL HYDROCHLORIDE 1.25 MG: 1.25 SOLUTION, CONCENTRATE RESPIRATORY (INHALATION) at 21:04

## 2017-05-04 RX ADMIN — IPRATROPIUM BROMIDE 0.5 MG: 0.5 SOLUTION RESPIRATORY (INHALATION) at 01:52

## 2017-05-04 RX ADMIN — METRONIDAZOLE 500 MG: 500 INJECTION, SOLUTION INTRAVENOUS at 20:15

## 2017-05-04 RX ADMIN — SODIUM CHLORIDE 125 ML/HR: 0.9 INJECTION, SOLUTION INTRAVENOUS at 18:00

## 2017-05-04 RX ADMIN — CEFAZOLIN SODIUM 2000 MG: 2 SOLUTION INTRAVENOUS at 19:18

## 2017-05-04 RX ADMIN — CEFAZOLIN SODIUM 2000 MG: 2 SOLUTION INTRAVENOUS at 02:03

## 2017-05-05 ENCOUNTER — GENERIC CONVERSION - ENCOUNTER (OUTPATIENT)
Dept: OTHER | Facility: OTHER | Age: 57
End: 2017-05-05

## 2017-05-05 ENCOUNTER — APPOINTMENT (INPATIENT)
Dept: RADIOLOGY | Facility: HOSPITAL | Age: 57
DRG: 329 | End: 2017-05-05
Payer: COMMERCIAL

## 2017-05-05 LAB
ANION GAP SERPL CALCULATED.3IONS-SCNC: 10 MMOL/L (ref 4–13)
ANION GAP SERPL CALCULATED.3IONS-SCNC: 7 MMOL/L (ref 4–13)
BUN SERPL-MCNC: 10 MG/DL (ref 5–25)
BUN SERPL-MCNC: 11 MG/DL (ref 5–25)
CALCIUM SERPL-MCNC: 8.5 MG/DL (ref 8.3–10.1)
CALCIUM SERPL-MCNC: 8.5 MG/DL (ref 8.3–10.1)
CHLORIDE SERPL-SCNC: 105 MMOL/L (ref 100–108)
CHLORIDE SERPL-SCNC: 105 MMOL/L (ref 100–108)
CO2 SERPL-SCNC: 26 MMOL/L (ref 21–32)
CO2 SERPL-SCNC: 32 MMOL/L (ref 21–32)
CREAT SERPL-MCNC: 0.58 MG/DL (ref 0.6–1.3)
CREAT SERPL-MCNC: 0.65 MG/DL (ref 0.6–1.3)
ERYTHROCYTE [DISTWIDTH] IN BLOOD BY AUTOMATED COUNT: 14.7 % (ref 11.6–15.1)
GFR SERPL CREATININE-BSD FRML MDRD: >60 ML/MIN/1.73SQ M
GFR SERPL CREATININE-BSD FRML MDRD: >60 ML/MIN/1.73SQ M
GLUCOSE SERPL-MCNC: 139 MG/DL (ref 65–140)
GLUCOSE SERPL-MCNC: 145 MG/DL (ref 65–140)
GLUCOSE SERPL-MCNC: 160 MG/DL (ref 65–140)
GLUCOSE SERPL-MCNC: 168 MG/DL (ref 65–140)
GLUCOSE SERPL-MCNC: 169 MG/DL (ref 65–140)
GLUCOSE SERPL-MCNC: 185 MG/DL (ref 65–140)
HCT VFR BLD AUTO: 30.2 % (ref 34.8–46.1)
HGB BLD-MCNC: 9.2 G/DL (ref 11.5–15.4)
MAGNESIUM SERPL-MCNC: 1.5 MG/DL (ref 1.6–2.6)
MCH RBC QN AUTO: 31.4 PG (ref 26.8–34.3)
MCHC RBC AUTO-ENTMCNC: 30.5 G/DL (ref 31.4–37.4)
MCV RBC AUTO: 103 FL (ref 82–98)
PLATELET # BLD AUTO: 367 THOUSANDS/UL (ref 149–390)
PMV BLD AUTO: 9.2 FL (ref 8.9–12.7)
POTASSIUM SERPL-SCNC: 2.8 MMOL/L (ref 3.5–5.3)
POTASSIUM SERPL-SCNC: 4 MMOL/L (ref 3.5–5.3)
RBC # BLD AUTO: 2.93 MILLION/UL (ref 3.81–5.12)
SODIUM SERPL-SCNC: 141 MMOL/L (ref 136–145)
SODIUM SERPL-SCNC: 144 MMOL/L (ref 136–145)
WBC # BLD AUTO: 13.38 THOUSAND/UL (ref 4.31–10.16)

## 2017-05-05 PROCEDURE — 94760 N-INVAS EAR/PLS OXIMETRY 1: CPT

## 2017-05-05 PROCEDURE — 71010 HB CHEST X-RAY 1 VIEW FRONTAL (PORTABLE): CPT

## 2017-05-05 PROCEDURE — A9270 NON-COVERED ITEM OR SERVICE: HCPCS | Performed by: PHYSICIAN ASSISTANT

## 2017-05-05 PROCEDURE — 80048 BASIC METABOLIC PNL TOTAL CA: CPT | Performed by: PHYSICIAN ASSISTANT

## 2017-05-05 PROCEDURE — 80048 BASIC METABOLIC PNL TOTAL CA: CPT | Performed by: NURSE PRACTITIONER

## 2017-05-05 PROCEDURE — 82948 REAGENT STRIP/BLOOD GLUCOSE: CPT

## 2017-05-05 PROCEDURE — C9113 INJ PANTOPRAZOLE SODIUM, VIA: HCPCS | Performed by: SURGERY

## 2017-05-05 PROCEDURE — 94640 AIRWAY INHALATION TREATMENT: CPT

## 2017-05-05 PROCEDURE — 94668 MNPJ CHEST WALL SBSQ: CPT

## 2017-05-05 PROCEDURE — 97530 THERAPEUTIC ACTIVITIES: CPT

## 2017-05-05 PROCEDURE — 97110 THERAPEUTIC EXERCISES: CPT

## 2017-05-05 PROCEDURE — 83735 ASSAY OF MAGNESIUM: CPT | Performed by: PHYSICIAN ASSISTANT

## 2017-05-05 PROCEDURE — A9270 NON-COVERED ITEM OR SERVICE: HCPCS | Performed by: NURSE PRACTITIONER

## 2017-05-05 PROCEDURE — 85027 COMPLETE CBC AUTOMATED: CPT | Performed by: NURSE PRACTITIONER

## 2017-05-05 PROCEDURE — 94669 MECHANICAL CHEST WALL OSCILL: CPT

## 2017-05-05 RX ORDER — FUROSEMIDE 10 MG/ML
20 INJECTION INTRAMUSCULAR; INTRAVENOUS ONCE
Status: COMPLETED | OUTPATIENT
Start: 2017-05-05 | End: 2017-05-05

## 2017-05-05 RX ORDER — POTASSIUM CHLORIDE 14.9 MG/ML
20 INJECTION INTRAVENOUS
Status: COMPLETED | OUTPATIENT
Start: 2017-05-05 | End: 2017-05-13

## 2017-05-05 RX ORDER — LABETALOL HYDROCHLORIDE 5 MG/ML
10 INJECTION, SOLUTION INTRAVENOUS EVERY 6 HOURS PRN
Status: DISCONTINUED | OUTPATIENT
Start: 2017-05-05 | End: 2017-05-16 | Stop reason: HOSPADM

## 2017-05-05 RX ORDER — MAGNESIUM SULFATE HEPTAHYDRATE 40 MG/ML
4 INJECTION, SOLUTION INTRAVENOUS ONCE
Status: COMPLETED | OUTPATIENT
Start: 2017-05-05 | End: 2017-05-13

## 2017-05-05 RX ADMIN — ENOXAPARIN SODIUM 40 MG: 40 INJECTION SUBCUTANEOUS at 10:00

## 2017-05-05 RX ADMIN — METOPROLOL TARTRATE 5 MG: 5 INJECTION INTRAVENOUS at 10:02

## 2017-05-05 RX ADMIN — HYDROMORPHONE HYDROCHLORIDE 0.3 MG: 1 INJECTION, SOLUTION INTRAMUSCULAR; INTRAVENOUS; SUBCUTANEOUS at 06:34

## 2017-05-05 RX ADMIN — CEFAZOLIN SODIUM 2000 MG: 2 SOLUTION INTRAVENOUS at 18:00

## 2017-05-05 RX ADMIN — CEFAZOLIN SODIUM 2000 MG: 2 SOLUTION INTRAVENOUS at 10:13

## 2017-05-05 RX ADMIN — LEVALBUTEROL HYDROCHLORIDE 1.25 MG: 1.25 SOLUTION, CONCENTRATE RESPIRATORY (INHALATION) at 20:44

## 2017-05-05 RX ADMIN — IPRATROPIUM BROMIDE 0.5 MG: 0.5 SOLUTION RESPIRATORY (INHALATION) at 01:39

## 2017-05-05 RX ADMIN — HYDRALAZINE HYDROCHLORIDE 10 MG: 20 INJECTION INTRAMUSCULAR; INTRAVENOUS at 11:53

## 2017-05-05 RX ADMIN — ACETAMINOPHEN 650 MG: 325 TABLET ORAL at 17:00

## 2017-05-05 RX ADMIN — INSULIN LISPRO 1 UNITS: 100 INJECTION, SOLUTION INTRAVENOUS; SUBCUTANEOUS at 09:58

## 2017-05-05 RX ADMIN — SODIUM CHLORIDE 125 ML/HR: 0.9 INJECTION, SOLUTION INTRAVENOUS at 02:27

## 2017-05-05 RX ADMIN — HYDROMORPHONE HYDROCHLORIDE 0.3 MG: 1 INJECTION, SOLUTION INTRAMUSCULAR; INTRAVENOUS; SUBCUTANEOUS at 10:00

## 2017-05-05 RX ADMIN — SODIUM CHLORIDE 125 ML/HR: 0.9 INJECTION, SOLUTION INTRAVENOUS at 13:46

## 2017-05-05 RX ADMIN — IPRATROPIUM BROMIDE 0.5 MG: 0.5 SOLUTION RESPIRATORY (INHALATION) at 13:52

## 2017-05-05 RX ADMIN — IPRATROPIUM BROMIDE 0.5 MG: 0.5 SOLUTION RESPIRATORY (INHALATION) at 08:24

## 2017-05-05 RX ADMIN — LEVALBUTEROL HYDROCHLORIDE 1.25 MG: 1.25 SOLUTION, CONCENTRATE RESPIRATORY (INHALATION) at 13:52

## 2017-05-05 RX ADMIN — POTASSIUM CHLORIDE 20 MEQ: 200 INJECTION, SOLUTION INTRAVENOUS at 21:29

## 2017-05-05 RX ADMIN — LEVALBUTEROL HYDROCHLORIDE 1.25 MG: 1.25 SOLUTION, CONCENTRATE RESPIRATORY (INHALATION) at 01:39

## 2017-05-05 RX ADMIN — METRONIDAZOLE 500 MG: 500 INJECTION, SOLUTION INTRAVENOUS at 11:36

## 2017-05-05 RX ADMIN — OXYCODONE HYDROCHLORIDE 5 MG: 5 TABLET ORAL at 11:53

## 2017-05-05 RX ADMIN — INSULIN LISPRO 1 UNITS: 100 INJECTION, SOLUTION INTRAVENOUS; SUBCUTANEOUS at 18:25

## 2017-05-05 RX ADMIN — METOPROLOL TARTRATE 5 MG: 5 INJECTION INTRAVENOUS at 21:27

## 2017-05-05 RX ADMIN — HYDROMORPHONE HYDROCHLORIDE 0.3 MG: 1 INJECTION, SOLUTION INTRAMUSCULAR; INTRAVENOUS; SUBCUTANEOUS at 02:33

## 2017-05-05 RX ADMIN — FUROSEMIDE 20 MG: 10 INJECTION, SOLUTION INTRAMUSCULAR; INTRAVENOUS at 17:42

## 2017-05-05 RX ADMIN — IPRATROPIUM BROMIDE 0.5 MG: 0.5 SOLUTION RESPIRATORY (INHALATION) at 20:44

## 2017-05-05 RX ADMIN — PANTOPRAZOLE SODIUM 40 MG: 40 INJECTION, POWDER, FOR SOLUTION INTRAVENOUS at 09:00

## 2017-05-05 RX ADMIN — METRONIDAZOLE 500 MG: 500 INJECTION, SOLUTION INTRAVENOUS at 20:31

## 2017-05-05 RX ADMIN — HYDROMORPHONE HYDROCHLORIDE 0.3 MG: 1 INJECTION, SOLUTION INTRAMUSCULAR; INTRAVENOUS; SUBCUTANEOUS at 16:52

## 2017-05-05 RX ADMIN — LEVALBUTEROL HYDROCHLORIDE 1.25 MG: 1.25 SOLUTION, CONCENTRATE RESPIRATORY (INHALATION) at 08:24

## 2017-05-05 RX ADMIN — METRONIDAZOLE 500 MG: 500 INJECTION, SOLUTION INTRAVENOUS at 04:51

## 2017-05-05 RX ADMIN — FUROSEMIDE 20 MG: 10 INJECTION, SOLUTION INTRAMUSCULAR; INTRAVENOUS at 10:01

## 2017-05-05 RX ADMIN — CEFAZOLIN SODIUM 2000 MG: 2 SOLUTION INTRAVENOUS at 02:27

## 2017-05-05 RX ADMIN — LABETALOL HYDROCHLORIDE 10 MG: 5 INJECTION, SOLUTION INTRAVENOUS at 12:44

## 2017-05-05 RX ADMIN — INSULIN LISPRO 1 UNITS: 100 INJECTION, SOLUTION INTRAVENOUS; SUBCUTANEOUS at 11:44

## 2017-05-05 RX ADMIN — HYDROMORPHONE HYDROCHLORIDE 0.5 MG: 1 INJECTION, SOLUTION INTRAMUSCULAR; INTRAVENOUS; SUBCUTANEOUS at 20:30

## 2017-05-05 RX ADMIN — ACETAMINOPHEN 650 MG: 325 TABLET ORAL at 11:36

## 2017-05-05 RX ADMIN — METOPROLOL TARTRATE 5 MG: 5 INJECTION INTRAVENOUS at 04:51

## 2017-05-05 RX ADMIN — METOPROLOL TARTRATE 5 MG: 5 INJECTION INTRAVENOUS at 16:52

## 2017-05-06 ENCOUNTER — APPOINTMENT (INPATIENT)
Dept: RADIOLOGY | Facility: HOSPITAL | Age: 57
DRG: 329 | End: 2017-05-06
Payer: COMMERCIAL

## 2017-05-06 LAB
ALBUMIN SERPL BCP-MCNC: 1.8 G/DL (ref 3.5–5)
ALP SERPL-CCNC: 67 U/L (ref 46–116)
ALT SERPL W P-5'-P-CCNC: 8 U/L (ref 12–78)
ANION GAP SERPL CALCULATED.3IONS-SCNC: 9 MMOL/L (ref 4–13)
ANION GAP SERPL CALCULATED.3IONS-SCNC: 9 MMOL/L (ref 4–13)
AST SERPL W P-5'-P-CCNC: 10 U/L (ref 5–45)
BACTERIA BLD CULT: NORMAL
BACTERIA BLD CULT: NORMAL
BILIRUB SERPL-MCNC: 1 MG/DL (ref 0.2–1)
BUN SERPL-MCNC: 11 MG/DL (ref 5–25)
BUN SERPL-MCNC: 11 MG/DL (ref 5–25)
CALCIUM SERPL-MCNC: 8.4 MG/DL (ref 8.3–10.1)
CALCIUM SERPL-MCNC: 8.5 MG/DL (ref 8.3–10.1)
CHLORIDE SERPL-SCNC: 105 MMOL/L (ref 100–108)
CHLORIDE SERPL-SCNC: 106 MMOL/L (ref 100–108)
CO2 SERPL-SCNC: 30 MMOL/L (ref 21–32)
CO2 SERPL-SCNC: 30 MMOL/L (ref 21–32)
CREAT SERPL-MCNC: 0.56 MG/DL (ref 0.6–1.3)
CREAT SERPL-MCNC: 0.61 MG/DL (ref 0.6–1.3)
ERYTHROCYTE [DISTWIDTH] IN BLOOD BY AUTOMATED COUNT: 14.8 % (ref 11.6–15.1)
GFR SERPL CREATININE-BSD FRML MDRD: >60 ML/MIN/1.73SQ M
GFR SERPL CREATININE-BSD FRML MDRD: >60 ML/MIN/1.73SQ M
GLUCOSE SERPL-MCNC: 136 MG/DL (ref 65–140)
GLUCOSE SERPL-MCNC: 141 MG/DL (ref 65–140)
GLUCOSE SERPL-MCNC: 155 MG/DL (ref 65–140)
GLUCOSE SERPL-MCNC: 160 MG/DL (ref 65–140)
GLUCOSE SERPL-MCNC: 168 MG/DL (ref 65–140)
GLUCOSE SERPL-MCNC: 171 MG/DL (ref 65–140)
HCT VFR BLD AUTO: 26.7 % (ref 34.8–46.1)
HGB BLD-MCNC: 8.5 G/DL (ref 11.5–15.4)
MAGNESIUM SERPL-MCNC: 1.8 MG/DL (ref 1.6–2.6)
MCH RBC QN AUTO: 31.6 PG (ref 26.8–34.3)
MCHC RBC AUTO-ENTMCNC: 31.8 G/DL (ref 31.4–37.4)
MCV RBC AUTO: 99 FL (ref 82–98)
PLATELET # BLD AUTO: 388 THOUSANDS/UL (ref 149–390)
PMV BLD AUTO: 9.1 FL (ref 8.9–12.7)
POTASSIUM SERPL-SCNC: 3.3 MMOL/L (ref 3.5–5.3)
POTASSIUM SERPL-SCNC: 3.7 MMOL/L (ref 3.5–5.3)
PROT SERPL-MCNC: 6.4 G/DL (ref 6.4–8.2)
RBC # BLD AUTO: 2.69 MILLION/UL (ref 3.81–5.12)
SODIUM SERPL-SCNC: 144 MMOL/L (ref 136–145)
SODIUM SERPL-SCNC: 145 MMOL/L (ref 136–145)
WBC # BLD AUTO: 12.38 THOUSAND/UL (ref 4.31–10.16)

## 2017-05-06 PROCEDURE — 83735 ASSAY OF MAGNESIUM: CPT | Performed by: PHYSICIAN ASSISTANT

## 2017-05-06 PROCEDURE — 82948 REAGENT STRIP/BLOOD GLUCOSE: CPT

## 2017-05-06 PROCEDURE — 94760 N-INVAS EAR/PLS OXIMETRY 1: CPT

## 2017-05-06 PROCEDURE — 80053 COMPREHEN METABOLIC PANEL: CPT | Performed by: NURSE PRACTITIONER

## 2017-05-06 PROCEDURE — 94669 MECHANICAL CHEST WALL OSCILL: CPT

## 2017-05-06 PROCEDURE — 94640 AIRWAY INHALATION TREATMENT: CPT

## 2017-05-06 PROCEDURE — 85027 COMPLETE CBC AUTOMATED: CPT | Performed by: NURSE PRACTITIONER

## 2017-05-06 PROCEDURE — A9270 NON-COVERED ITEM OR SERVICE: HCPCS | Performed by: PHYSICIAN ASSISTANT

## 2017-05-06 PROCEDURE — 80048 BASIC METABOLIC PNL TOTAL CA: CPT | Performed by: PHYSICIAN ASSISTANT

## 2017-05-06 PROCEDURE — 71010 HB CHEST X-RAY 1 VIEW FRONTAL (PORTABLE): CPT

## 2017-05-06 PROCEDURE — C9113 INJ PANTOPRAZOLE SODIUM, VIA: HCPCS | Performed by: SURGERY

## 2017-05-06 RX ORDER — HYDRALAZINE HYDROCHLORIDE 20 MG/ML
5 INJECTION INTRAMUSCULAR; INTRAVENOUS ONCE
Status: COMPLETED | OUTPATIENT
Start: 2017-05-06 | End: 2017-05-06

## 2017-05-06 RX ORDER — FUROSEMIDE 10 MG/ML
20 INJECTION INTRAMUSCULAR; INTRAVENOUS ONCE
Status: COMPLETED | OUTPATIENT
Start: 2017-05-06 | End: 2017-05-06

## 2017-05-06 RX ORDER — POTASSIUM CHLORIDE 14.9 MG/ML
20 INJECTION INTRAVENOUS ONCE
Status: COMPLETED | OUTPATIENT
Start: 2017-05-07 | End: 2017-05-13

## 2017-05-06 RX ORDER — MAGNESIUM SULFATE HEPTAHYDRATE 40 MG/ML
2 INJECTION, SOLUTION INTRAVENOUS ONCE
Status: COMPLETED | OUTPATIENT
Start: 2017-05-06 | End: 2017-05-13

## 2017-05-06 RX ORDER — POTASSIUM CHLORIDE 14.9 MG/ML
20 INJECTION INTRAVENOUS ONCE
Status: COMPLETED | OUTPATIENT
Start: 2017-05-06 | End: 2017-05-13

## 2017-05-06 RX ADMIN — IPRATROPIUM BROMIDE 0.5 MG: 0.5 SOLUTION RESPIRATORY (INHALATION) at 01:59

## 2017-05-06 RX ADMIN — METOPROLOL TARTRATE 5 MG: 5 INJECTION INTRAVENOUS at 04:52

## 2017-05-06 RX ADMIN — FUROSEMIDE 20 MG: 10 INJECTION, SOLUTION INTRAMUSCULAR; INTRAVENOUS at 09:00

## 2017-05-06 RX ADMIN — MAGNESIUM SULFATE HEPTAHYDRATE 4 G: 40 INJECTION, SOLUTION INTRAVENOUS at 02:52

## 2017-05-06 RX ADMIN — HYDROMORPHONE HYDROCHLORIDE 0.5 MG: 1 INJECTION, SOLUTION INTRAMUSCULAR; INTRAVENOUS; SUBCUTANEOUS at 10:32

## 2017-05-06 RX ADMIN — MAGNESIUM SULFATE HEPTAHYDRATE 2 G: 40 INJECTION, SOLUTION INTRAVENOUS at 23:30

## 2017-05-06 RX ADMIN — HYDRALAZINE HYDROCHLORIDE 10 MG: 20 INJECTION INTRAMUSCULAR; INTRAVENOUS at 21:01

## 2017-05-06 RX ADMIN — ACETAMINOPHEN 650 MG: 325 TABLET ORAL at 17:31

## 2017-05-06 RX ADMIN — CEFAZOLIN SODIUM 2000 MG: 2 SOLUTION INTRAVENOUS at 04:51

## 2017-05-06 RX ADMIN — IPRATROPIUM BROMIDE 0.5 MG: 0.5 SOLUTION RESPIRATORY (INHALATION) at 20:04

## 2017-05-06 RX ADMIN — ACETAMINOPHEN 650 MG: 325 TABLET ORAL at 00:36

## 2017-05-06 RX ADMIN — METOPROLOL TARTRATE 5 MG: 5 INJECTION INTRAVENOUS at 09:15

## 2017-05-06 RX ADMIN — METOPROLOL TARTRATE 5 MG: 5 INJECTION INTRAVENOUS at 15:25

## 2017-05-06 RX ADMIN — LEVALBUTEROL HYDROCHLORIDE 1.25 MG: 1.25 SOLUTION, CONCENTRATE RESPIRATORY (INHALATION) at 01:59

## 2017-05-06 RX ADMIN — HYDRALAZINE HYDROCHLORIDE 5 MG: 20 INJECTION INTRAMUSCULAR; INTRAVENOUS at 00:37

## 2017-05-06 RX ADMIN — METRONIDAZOLE 500 MG: 500 INJECTION, SOLUTION INTRAVENOUS at 20:43

## 2017-05-06 RX ADMIN — LEVALBUTEROL HYDROCHLORIDE 1.25 MG: 1.25 SOLUTION, CONCENTRATE RESPIRATORY (INHALATION) at 07:52

## 2017-05-06 RX ADMIN — CEFAZOLIN SODIUM 2000 MG: 2 SOLUTION INTRAVENOUS at 10:36

## 2017-05-06 RX ADMIN — ENOXAPARIN SODIUM 40 MG: 40 INJECTION SUBCUTANEOUS at 09:00

## 2017-05-06 RX ADMIN — IPRATROPIUM BROMIDE 0.5 MG: 0.5 SOLUTION RESPIRATORY (INHALATION) at 07:52

## 2017-05-06 RX ADMIN — HYDRALAZINE HYDROCHLORIDE 10 MG: 20 INJECTION INTRAMUSCULAR; INTRAVENOUS at 10:31

## 2017-05-06 RX ADMIN — POTASSIUM CHLORIDE 20 MEQ: 200 INJECTION, SOLUTION INTRAVENOUS at 02:47

## 2017-05-06 RX ADMIN — POTASSIUM CHLORIDE 20 MEQ: 200 INJECTION, SOLUTION INTRAVENOUS at 23:29

## 2017-05-06 RX ADMIN — HYDROMORPHONE HYDROCHLORIDE 0.5 MG: 1 INJECTION, SOLUTION INTRAMUSCULAR; INTRAVENOUS; SUBCUTANEOUS at 15:27

## 2017-05-06 RX ADMIN — CEFAZOLIN SODIUM 2000 MG: 2 SOLUTION INTRAVENOUS at 21:49

## 2017-05-06 RX ADMIN — LEVALBUTEROL HYDROCHLORIDE 1.25 MG: 1.25 SOLUTION, CONCENTRATE RESPIRATORY (INHALATION) at 20:04

## 2017-05-06 RX ADMIN — METOPROLOL TARTRATE 5 MG: 5 INJECTION INTRAVENOUS at 21:57

## 2017-05-06 RX ADMIN — OXYCODONE HYDROCHLORIDE 5 MG: 5 TABLET ORAL at 00:36

## 2017-05-06 RX ADMIN — HYDROMORPHONE HYDROCHLORIDE 0.5 MG: 1 INJECTION, SOLUTION INTRAMUSCULAR; INTRAVENOUS; SUBCUTANEOUS at 02:19

## 2017-05-06 RX ADMIN — INSULIN LISPRO 1 UNITS: 100 INJECTION, SOLUTION INTRAVENOUS; SUBCUTANEOUS at 17:32

## 2017-05-06 RX ADMIN — NYSTATIN 500000 UNITS: 500000 SUSPENSION ORAL at 21:57

## 2017-05-06 RX ADMIN — POTASSIUM CHLORIDE 20 MEQ: 200 INJECTION, SOLUTION INTRAVENOUS at 04:51

## 2017-05-06 RX ADMIN — LABETALOL HYDROCHLORIDE 10 MG: 5 INJECTION, SOLUTION INTRAVENOUS at 02:11

## 2017-05-06 RX ADMIN — ACETAMINOPHEN 650 MG: 325 TABLET ORAL at 05:07

## 2017-05-06 RX ADMIN — HYDROMORPHONE HYDROCHLORIDE 0.5 MG: 1 INJECTION, SOLUTION INTRAMUSCULAR; INTRAVENOUS; SUBCUTANEOUS at 21:00

## 2017-05-06 RX ADMIN — OXYCODONE HYDROCHLORIDE 5 MG: 5 TABLET ORAL at 05:07

## 2017-05-06 RX ADMIN — ACETAMINOPHEN 650 MG: 325 TABLET ORAL at 11:44

## 2017-05-06 RX ADMIN — PANTOPRAZOLE SODIUM 40 MG: 40 INJECTION, POWDER, FOR SOLUTION INTRAVENOUS at 09:00

## 2017-05-06 RX ADMIN — LEVALBUTEROL HYDROCHLORIDE 1.25 MG: 1.25 SOLUTION, CONCENTRATE RESPIRATORY (INHALATION) at 14:00

## 2017-05-06 RX ADMIN — INSULIN LISPRO 1 UNITS: 100 INJECTION, SOLUTION INTRAVENOUS; SUBCUTANEOUS at 12:48

## 2017-05-06 RX ADMIN — IPRATROPIUM BROMIDE 0.5 MG: 0.5 SOLUTION RESPIRATORY (INHALATION) at 14:00

## 2017-05-07 LAB
ANION GAP SERPL CALCULATED.3IONS-SCNC: 11 MMOL/L (ref 4–13)
BUN SERPL-MCNC: 11 MG/DL (ref 5–25)
CALCIUM SERPL-MCNC: 8.3 MG/DL (ref 8.3–10.1)
CHLORIDE SERPL-SCNC: 103 MMOL/L (ref 100–108)
CO2 SERPL-SCNC: 30 MMOL/L (ref 21–32)
CREAT SERPL-MCNC: 0.56 MG/DL (ref 0.6–1.3)
ERYTHROCYTE [DISTWIDTH] IN BLOOD BY AUTOMATED COUNT: 15 % (ref 11.6–15.1)
GFR SERPL CREATININE-BSD FRML MDRD: >60 ML/MIN/1.73SQ M
GLUCOSE SERPL-MCNC: 115 MG/DL (ref 65–140)
GLUCOSE SERPL-MCNC: 150 MG/DL (ref 65–140)
GLUCOSE SERPL-MCNC: 152 MG/DL (ref 65–140)
GLUCOSE SERPL-MCNC: 167 MG/DL (ref 65–140)
GLUCOSE SERPL-MCNC: 179 MG/DL (ref 65–140)
GLUCOSE SERPL-MCNC: 181 MG/DL (ref 65–140)
HCT VFR BLD AUTO: 28 % (ref 34.8–46.1)
HGB BLD-MCNC: 8.8 G/DL (ref 11.5–15.4)
MCH RBC QN AUTO: 31.1 PG (ref 26.8–34.3)
MCHC RBC AUTO-ENTMCNC: 31.4 G/DL (ref 31.4–37.4)
MCV RBC AUTO: 99 FL (ref 82–98)
PLATELET # BLD AUTO: 423 THOUSANDS/UL (ref 149–390)
PMV BLD AUTO: 8.5 FL (ref 8.9–12.7)
POTASSIUM SERPL-SCNC: 3.8 MMOL/L (ref 3.5–5.3)
RBC # BLD AUTO: 2.83 MILLION/UL (ref 3.81–5.12)
SODIUM SERPL-SCNC: 144 MMOL/L (ref 136–145)
WBC # BLD AUTO: 10.91 THOUSAND/UL (ref 4.31–10.16)

## 2017-05-07 PROCEDURE — 97110 THERAPEUTIC EXERCISES: CPT

## 2017-05-07 PROCEDURE — 94669 MECHANICAL CHEST WALL OSCILL: CPT

## 2017-05-07 PROCEDURE — 82948 REAGENT STRIP/BLOOD GLUCOSE: CPT

## 2017-05-07 PROCEDURE — C9113 INJ PANTOPRAZOLE SODIUM, VIA: HCPCS | Performed by: SURGERY

## 2017-05-07 PROCEDURE — 97116 GAIT TRAINING THERAPY: CPT

## 2017-05-07 PROCEDURE — 85027 COMPLETE CBC AUTOMATED: CPT | Performed by: NURSE PRACTITIONER

## 2017-05-07 PROCEDURE — A9270 NON-COVERED ITEM OR SERVICE: HCPCS | Performed by: PHYSICIAN ASSISTANT

## 2017-05-07 PROCEDURE — 94640 AIRWAY INHALATION TREATMENT: CPT

## 2017-05-07 PROCEDURE — 94760 N-INVAS EAR/PLS OXIMETRY 1: CPT

## 2017-05-07 PROCEDURE — 80048 BASIC METABOLIC PNL TOTAL CA: CPT | Performed by: NURSE PRACTITIONER

## 2017-05-07 RX ORDER — MAGNESIUM SULFATE HEPTAHYDRATE 40 MG/ML
2 INJECTION, SOLUTION INTRAVENOUS ONCE
Status: DISCONTINUED | OUTPATIENT
Start: 2017-05-07 | End: 2017-05-07

## 2017-05-07 RX ORDER — HYDRALAZINE HYDROCHLORIDE 20 MG/ML
10 INJECTION INTRAMUSCULAR; INTRAVENOUS EVERY 6 HOURS SCHEDULED
Status: DISCONTINUED | OUTPATIENT
Start: 2017-05-07 | End: 2017-05-07

## 2017-05-07 RX ORDER — POTASSIUM CHLORIDE 14.9 MG/ML
20 INJECTION INTRAVENOUS ONCE
Status: COMPLETED | OUTPATIENT
Start: 2017-05-07 | End: 2017-05-07

## 2017-05-07 RX ORDER — FUROSEMIDE 10 MG/ML
20 INJECTION INTRAMUSCULAR; INTRAVENOUS ONCE
Status: COMPLETED | OUTPATIENT
Start: 2017-05-07 | End: 2017-05-07

## 2017-05-07 RX ORDER — POTASSIUM CHLORIDE 14.9 MG/ML
20 INJECTION INTRAVENOUS ONCE
Status: DISCONTINUED | OUTPATIENT
Start: 2017-05-07 | End: 2017-05-07

## 2017-05-07 RX ORDER — HYDRALAZINE HYDROCHLORIDE 20 MG/ML
10 INJECTION INTRAMUSCULAR; INTRAVENOUS EVERY 8 HOURS
Status: DISCONTINUED | OUTPATIENT
Start: 2017-05-07 | End: 2017-05-10

## 2017-05-07 RX ADMIN — LEVALBUTEROL HYDROCHLORIDE 1.25 MG: 1.25 SOLUTION, CONCENTRATE RESPIRATORY (INHALATION) at 02:01

## 2017-05-07 RX ADMIN — CEFAZOLIN SODIUM 2000 MG: 2 SOLUTION INTRAVENOUS at 03:33

## 2017-05-07 RX ADMIN — ACETAMINOPHEN 650 MG: 325 TABLET ORAL at 17:31

## 2017-05-07 RX ADMIN — LEVALBUTEROL HYDROCHLORIDE 1.25 MG: 1.25 SOLUTION, CONCENTRATE RESPIRATORY (INHALATION) at 14:27

## 2017-05-07 RX ADMIN — IPRATROPIUM BROMIDE 0.5 MG: 0.5 SOLUTION RESPIRATORY (INHALATION) at 07:53

## 2017-05-07 RX ADMIN — POTASSIUM CHLORIDE 20 MEQ: 200 INJECTION, SOLUTION INTRAVENOUS at 00:00

## 2017-05-07 RX ADMIN — METOPROLOL TARTRATE 5 MG: 5 INJECTION INTRAVENOUS at 10:46

## 2017-05-07 RX ADMIN — METRONIDAZOLE 500 MG: 500 INJECTION, SOLUTION INTRAVENOUS at 03:43

## 2017-05-07 RX ADMIN — METRONIDAZOLE 500 MG: 500 INJECTION, SOLUTION INTRAVENOUS at 10:43

## 2017-05-07 RX ADMIN — LABETALOL HYDROCHLORIDE 10 MG: 5 INJECTION, SOLUTION INTRAVENOUS at 12:30

## 2017-05-07 RX ADMIN — POTASSIUM CHLORIDE 20 MEQ: 200 INJECTION, SOLUTION INTRAVENOUS at 08:14

## 2017-05-07 RX ADMIN — METOPROLOL TARTRATE 5 MG: 5 INJECTION INTRAVENOUS at 05:00

## 2017-05-07 RX ADMIN — METRONIDAZOLE 500 MG: 500 INJECTION, SOLUTION INTRAVENOUS at 17:34

## 2017-05-07 RX ADMIN — IPRATROPIUM BROMIDE 0.5 MG: 0.5 SOLUTION RESPIRATORY (INHALATION) at 14:27

## 2017-05-07 RX ADMIN — METOPROLOL TARTRATE 5 MG: 5 INJECTION INTRAVENOUS at 15:22

## 2017-05-07 RX ADMIN — LEVALBUTEROL HYDROCHLORIDE 1.25 MG: 1.25 SOLUTION, CONCENTRATE RESPIRATORY (INHALATION) at 07:53

## 2017-05-07 RX ADMIN — CEFAZOLIN SODIUM 2000 MG: 2 SOLUTION INTRAVENOUS at 10:43

## 2017-05-07 RX ADMIN — HYDROMORPHONE HYDROCHLORIDE 0.5 MG: 1 INJECTION, SOLUTION INTRAMUSCULAR; INTRAVENOUS; SUBCUTANEOUS at 20:35

## 2017-05-07 RX ADMIN — ACETAMINOPHEN 650 MG: 325 TABLET ORAL at 01:00

## 2017-05-07 RX ADMIN — NYSTATIN 500000 UNITS: 500000 SUSPENSION ORAL at 08:15

## 2017-05-07 RX ADMIN — INSULIN LISPRO 1 UNITS: 100 INJECTION, SOLUTION INTRAVENOUS; SUBCUTANEOUS at 22:21

## 2017-05-07 RX ADMIN — NYSTATIN 500000 UNITS: 500000 SUSPENSION ORAL at 17:31

## 2017-05-07 RX ADMIN — ENOXAPARIN SODIUM 40 MG: 40 INJECTION SUBCUTANEOUS at 08:14

## 2017-05-07 RX ADMIN — HYDRALAZINE HYDROCHLORIDE 10 MG: 20 INJECTION INTRAMUSCULAR; INTRAVENOUS at 10:47

## 2017-05-07 RX ADMIN — INSULIN LISPRO 1 UNITS: 100 INJECTION, SOLUTION INTRAVENOUS; SUBCUTANEOUS at 12:30

## 2017-05-07 RX ADMIN — NYSTATIN 500000 UNITS: 500000 SUSPENSION ORAL at 12:30

## 2017-05-07 RX ADMIN — CEFAZOLIN SODIUM 2000 MG: 2 SOLUTION INTRAVENOUS at 18:22

## 2017-05-07 RX ADMIN — ACETAMINOPHEN 650 MG: 325 TABLET ORAL at 05:35

## 2017-05-07 RX ADMIN — IPRATROPIUM BROMIDE 0.5 MG: 0.5 SOLUTION RESPIRATORY (INHALATION) at 20:29

## 2017-05-07 RX ADMIN — INSULIN LISPRO 1 UNITS: 100 INJECTION, SOLUTION INTRAVENOUS; SUBCUTANEOUS at 06:20

## 2017-05-07 RX ADMIN — NYSTATIN 500000 UNITS: 500000 SUSPENSION ORAL at 22:22

## 2017-05-07 RX ADMIN — HYDROMORPHONE HYDROCHLORIDE 0.5 MG: 1 INJECTION, SOLUTION INTRAMUSCULAR; INTRAVENOUS; SUBCUTANEOUS at 01:31

## 2017-05-07 RX ADMIN — ACETAMINOPHEN 650 MG: 325 TABLET ORAL at 12:29

## 2017-05-07 RX ADMIN — HYDRALAZINE HYDROCHLORIDE 10 MG: 20 INJECTION INTRAMUSCULAR; INTRAVENOUS at 20:34

## 2017-05-07 RX ADMIN — FUROSEMIDE 20 MG: 10 INJECTION, SOLUTION INTRAMUSCULAR; INTRAVENOUS at 10:42

## 2017-05-07 RX ADMIN — PANTOPRAZOLE SODIUM 40 MG: 40 INJECTION, POWDER, FOR SOLUTION INTRAVENOUS at 08:14

## 2017-05-07 RX ADMIN — LEVALBUTEROL HYDROCHLORIDE 1.25 MG: 1.25 SOLUTION, CONCENTRATE RESPIRATORY (INHALATION) at 20:29

## 2017-05-07 RX ADMIN — LABETALOL HYDROCHLORIDE 10 MG: 5 INJECTION, SOLUTION INTRAVENOUS at 07:18

## 2017-05-07 RX ADMIN — METOPROLOL TARTRATE 5 MG: 5 INJECTION INTRAVENOUS at 22:21

## 2017-05-07 RX ADMIN — IPRATROPIUM BROMIDE 0.5 MG: 0.5 SOLUTION RESPIRATORY (INHALATION) at 02:01

## 2017-05-07 RX ADMIN — INSULIN LISPRO 1 UNITS: 100 INJECTION, SOLUTION INTRAVENOUS; SUBCUTANEOUS at 01:00

## 2017-05-08 LAB
ANION GAP SERPL CALCULATED.3IONS-SCNC: 9 MMOL/L (ref 4–13)
BUN SERPL-MCNC: 11 MG/DL (ref 5–25)
CALCIUM SERPL-MCNC: 8.1 MG/DL (ref 8.3–10.1)
CHLORIDE SERPL-SCNC: 103 MMOL/L (ref 100–108)
CO2 SERPL-SCNC: 30 MMOL/L (ref 21–32)
CREAT SERPL-MCNC: 0.62 MG/DL (ref 0.6–1.3)
ERYTHROCYTE [DISTWIDTH] IN BLOOD BY AUTOMATED COUNT: 15.9 % (ref 11.6–15.1)
GFR SERPL CREATININE-BSD FRML MDRD: >60 ML/MIN/1.73SQ M
GLUCOSE SERPL-MCNC: 160 MG/DL (ref 65–140)
GLUCOSE SERPL-MCNC: 174 MG/DL (ref 65–140)
GLUCOSE SERPL-MCNC: 175 MG/DL (ref 65–140)
GLUCOSE SERPL-MCNC: 175 MG/DL (ref 65–140)
GLUCOSE SERPL-MCNC: 193 MG/DL (ref 65–140)
HCT VFR BLD AUTO: 27.9 % (ref 34.8–46.1)
HGB BLD-MCNC: 8.7 G/DL (ref 11.5–15.4)
MAGNESIUM SERPL-MCNC: 1.8 MG/DL (ref 1.6–2.6)
MCH RBC QN AUTO: 31.4 PG (ref 26.8–34.3)
MCHC RBC AUTO-ENTMCNC: 31.2 G/DL (ref 31.4–37.4)
MCV RBC AUTO: 101 FL (ref 82–98)
PLATELET # BLD AUTO: 453 THOUSANDS/UL (ref 149–390)
PMV BLD AUTO: 8.7 FL (ref 8.9–12.7)
POTASSIUM SERPL-SCNC: 3.6 MMOL/L (ref 3.5–5.3)
RBC # BLD AUTO: 2.77 MILLION/UL (ref 3.81–5.12)
SODIUM SERPL-SCNC: 142 MMOL/L (ref 136–145)
WBC # BLD AUTO: 11.37 THOUSAND/UL (ref 4.31–10.16)

## 2017-05-08 PROCEDURE — 94760 N-INVAS EAR/PLS OXIMETRY 1: CPT

## 2017-05-08 PROCEDURE — 82948 REAGENT STRIP/BLOOD GLUCOSE: CPT

## 2017-05-08 PROCEDURE — A9270 NON-COVERED ITEM OR SERVICE: HCPCS | Performed by: PHYSICIAN ASSISTANT

## 2017-05-08 PROCEDURE — A9270 NON-COVERED ITEM OR SERVICE: HCPCS | Performed by: NURSE PRACTITIONER

## 2017-05-08 PROCEDURE — 94640 AIRWAY INHALATION TREATMENT: CPT

## 2017-05-08 PROCEDURE — 85027 COMPLETE CBC AUTOMATED: CPT | Performed by: NURSE PRACTITIONER

## 2017-05-08 PROCEDURE — 97110 THERAPEUTIC EXERCISES: CPT

## 2017-05-08 PROCEDURE — 97116 GAIT TRAINING THERAPY: CPT

## 2017-05-08 PROCEDURE — 80048 BASIC METABOLIC PNL TOTAL CA: CPT | Performed by: NURSE PRACTITIONER

## 2017-05-08 PROCEDURE — C9113 INJ PANTOPRAZOLE SODIUM, VIA: HCPCS | Performed by: SURGERY

## 2017-05-08 PROCEDURE — 94669 MECHANICAL CHEST WALL OSCILL: CPT

## 2017-05-08 PROCEDURE — 83735 ASSAY OF MAGNESIUM: CPT | Performed by: NURSE PRACTITIONER

## 2017-05-08 RX ORDER — POTASSIUM CHLORIDE 14.9 MG/ML
20 INJECTION INTRAVENOUS ONCE
Status: DISCONTINUED | OUTPATIENT
Start: 2017-05-08 | End: 2017-05-08

## 2017-05-08 RX ORDER — MAGNESIUM SULFATE HEPTAHYDRATE 40 MG/ML
2 INJECTION, SOLUTION INTRAVENOUS ONCE
Status: COMPLETED | OUTPATIENT
Start: 2017-05-08 | End: 2017-05-08

## 2017-05-08 RX ORDER — POTASSIUM CHLORIDE 29.8 MG/ML
40 INJECTION INTRAVENOUS ONCE
Status: DISCONTINUED | OUTPATIENT
Start: 2017-05-08 | End: 2017-05-08 | Stop reason: SDUPTHER

## 2017-05-08 RX ORDER — POTASSIUM CHLORIDE 20 MEQ/1
40 TABLET, EXTENDED RELEASE ORAL ONCE
Status: COMPLETED | OUTPATIENT
Start: 2017-05-08 | End: 2017-05-08

## 2017-05-08 RX ADMIN — METOPROLOL TARTRATE 5 MG: 5 INJECTION INTRAVENOUS at 22:02

## 2017-05-08 RX ADMIN — MAGNESIUM SULFATE HEPTAHYDRATE 2 G: 40 INJECTION, SOLUTION INTRAVENOUS at 05:55

## 2017-05-08 RX ADMIN — LEVALBUTEROL HYDROCHLORIDE 1.25 MG: 1.25 SOLUTION, CONCENTRATE RESPIRATORY (INHALATION) at 01:22

## 2017-05-08 RX ADMIN — HYDROMORPHONE HYDROCHLORIDE 0.5 MG: 1 INJECTION, SOLUTION INTRAMUSCULAR; INTRAVENOUS; SUBCUTANEOUS at 20:12

## 2017-05-08 RX ADMIN — HYDROMORPHONE HYDROCHLORIDE 0.5 MG: 1 INJECTION, SOLUTION INTRAMUSCULAR; INTRAVENOUS; SUBCUTANEOUS at 06:03

## 2017-05-08 RX ADMIN — IPRATROPIUM BROMIDE 0.5 MG: 0.5 SOLUTION RESPIRATORY (INHALATION) at 08:34

## 2017-05-08 RX ADMIN — HYDRALAZINE HYDROCHLORIDE 10 MG: 20 INJECTION INTRAMUSCULAR; INTRAVENOUS at 11:33

## 2017-05-08 RX ADMIN — LEVALBUTEROL HYDROCHLORIDE 1.25 MG: 1.25 SOLUTION, CONCENTRATE RESPIRATORY (INHALATION) at 21:12

## 2017-05-08 RX ADMIN — CEFAZOLIN SODIUM 2000 MG: 2 SOLUTION INTRAVENOUS at 11:29

## 2017-05-08 RX ADMIN — NYSTATIN 500000 UNITS: 500000 SUSPENSION ORAL at 22:01

## 2017-05-08 RX ADMIN — INSULIN LISPRO 1 UNITS: 100 INJECTION, SOLUTION INTRAVENOUS; SUBCUTANEOUS at 11:29

## 2017-05-08 RX ADMIN — METRONIDAZOLE 500 MG: 500 INJECTION, SOLUTION INTRAVENOUS at 10:07

## 2017-05-08 RX ADMIN — IPRATROPIUM BROMIDE 0.5 MG: 0.5 SOLUTION RESPIRATORY (INHALATION) at 13:58

## 2017-05-08 RX ADMIN — LEVALBUTEROL HYDROCHLORIDE 1.25 MG: 1.25 SOLUTION, CONCENTRATE RESPIRATORY (INHALATION) at 13:58

## 2017-05-08 RX ADMIN — ENOXAPARIN SODIUM 40 MG: 40 INJECTION SUBCUTANEOUS at 10:04

## 2017-05-08 RX ADMIN — NYSTATIN 500000 UNITS: 500000 SUSPENSION ORAL at 10:05

## 2017-05-08 RX ADMIN — METRONIDAZOLE 500 MG: 500 INJECTION, SOLUTION INTRAVENOUS at 17:49

## 2017-05-08 RX ADMIN — HYDRALAZINE HYDROCHLORIDE 10 MG: 20 INJECTION INTRAMUSCULAR; INTRAVENOUS at 04:13

## 2017-05-08 RX ADMIN — CEFAZOLIN SODIUM 2000 MG: 2 SOLUTION INTRAVENOUS at 19:58

## 2017-05-08 RX ADMIN — ACETAMINOPHEN 650 MG: 325 TABLET ORAL at 17:48

## 2017-05-08 RX ADMIN — POTASSIUM CHLORIDE 40 MEQ: 1500 TABLET, EXTENDED RELEASE ORAL at 05:55

## 2017-05-08 RX ADMIN — IPRATROPIUM BROMIDE 0.5 MG: 0.5 SOLUTION RESPIRATORY (INHALATION) at 01:22

## 2017-05-08 RX ADMIN — INSULIN LISPRO 1 UNITS: 100 INJECTION, SOLUTION INTRAVENOUS; SUBCUTANEOUS at 06:04

## 2017-05-08 RX ADMIN — LEVALBUTEROL HYDROCHLORIDE 1.25 MG: 1.25 SOLUTION, CONCENTRATE RESPIRATORY (INHALATION) at 08:34

## 2017-05-08 RX ADMIN — INSULIN LISPRO 1 UNITS: 100 INJECTION, SOLUTION INTRAVENOUS; SUBCUTANEOUS at 16:08

## 2017-05-08 RX ADMIN — METRONIDAZOLE 500 MG: 500 INJECTION, SOLUTION INTRAVENOUS at 02:48

## 2017-05-08 RX ADMIN — METOPROLOL TARTRATE 5 MG: 5 INJECTION INTRAVENOUS at 04:13

## 2017-05-08 RX ADMIN — IPRATROPIUM BROMIDE 0.5 MG: 0.5 SOLUTION RESPIRATORY (INHALATION) at 21:12

## 2017-05-08 RX ADMIN — PANTOPRAZOLE SODIUM 40 MG: 40 INJECTION, POWDER, FOR SOLUTION INTRAVENOUS at 10:04

## 2017-05-08 RX ADMIN — NYSTATIN 500000 UNITS: 500000 SUSPENSION ORAL at 17:48

## 2017-05-08 RX ADMIN — ACETAMINOPHEN 650 MG: 325 TABLET ORAL at 05:55

## 2017-05-08 RX ADMIN — HYDRALAZINE HYDROCHLORIDE 10 MG: 20 INJECTION INTRAMUSCULAR; INTRAVENOUS at 19:59

## 2017-05-08 RX ADMIN — HYDROMORPHONE HYDROCHLORIDE 1 MG: 1 INJECTION, SOLUTION INTRAMUSCULAR; INTRAVENOUS; SUBCUTANEOUS at 11:31

## 2017-05-08 RX ADMIN — CEFAZOLIN SODIUM 2000 MG: 2 SOLUTION INTRAVENOUS at 02:52

## 2017-05-08 RX ADMIN — METOPROLOL TARTRATE 5 MG: 5 INJECTION INTRAVENOUS at 10:04

## 2017-05-08 RX ADMIN — INSULIN LISPRO 1 UNITS: 100 INJECTION, SOLUTION INTRAVENOUS; SUBCUTANEOUS at 22:16

## 2017-05-08 RX ADMIN — ACETAMINOPHEN 650 MG: 325 TABLET ORAL at 00:02

## 2017-05-08 RX ADMIN — METOPROLOL TARTRATE 5 MG: 5 INJECTION INTRAVENOUS at 16:07

## 2017-05-09 LAB
ANION GAP SERPL CALCULATED.3IONS-SCNC: 4 MMOL/L (ref 4–13)
BUN SERPL-MCNC: 12 MG/DL (ref 5–25)
CALCIUM SERPL-MCNC: 8.4 MG/DL (ref 8.3–10.1)
CHLORIDE SERPL-SCNC: 103 MMOL/L (ref 100–108)
CO2 SERPL-SCNC: 33 MMOL/L (ref 21–32)
CREAT SERPL-MCNC: 0.67 MG/DL (ref 0.6–1.3)
ERYTHROCYTE [DISTWIDTH] IN BLOOD BY AUTOMATED COUNT: 16.6 % (ref 11.6–15.1)
GFR SERPL CREATININE-BSD FRML MDRD: >60 ML/MIN/1.73SQ M
GLUCOSE SERPL-MCNC: 104 MG/DL (ref 65–140)
GLUCOSE SERPL-MCNC: 145 MG/DL (ref 65–140)
GLUCOSE SERPL-MCNC: 152 MG/DL (ref 65–140)
GLUCOSE SERPL-MCNC: 176 MG/DL (ref 65–140)
GLUCOSE SERPL-MCNC: 180 MG/DL (ref 65–140)
HCT VFR BLD AUTO: 29.8 % (ref 34.8–46.1)
HGB BLD-MCNC: 9.2 G/DL (ref 11.5–15.4)
MAGNESIUM SERPL-MCNC: 1.8 MG/DL (ref 1.6–2.6)
MCH RBC QN AUTO: 31.6 PG (ref 26.8–34.3)
MCHC RBC AUTO-ENTMCNC: 30.9 G/DL (ref 31.4–37.4)
MCV RBC AUTO: 102 FL (ref 82–98)
PLATELET # BLD AUTO: 498 THOUSANDS/UL (ref 149–390)
PMV BLD AUTO: 8.9 FL (ref 8.9–12.7)
POTASSIUM SERPL-SCNC: 4.1 MMOL/L (ref 3.5–5.3)
RBC # BLD AUTO: 2.91 MILLION/UL (ref 3.81–5.12)
SODIUM SERPL-SCNC: 140 MMOL/L (ref 136–145)
WBC # BLD AUTO: 11.06 THOUSAND/UL (ref 4.31–10.16)

## 2017-05-09 PROCEDURE — 94760 N-INVAS EAR/PLS OXIMETRY 1: CPT

## 2017-05-09 PROCEDURE — 85027 COMPLETE CBC AUTOMATED: CPT | Performed by: NURSE PRACTITIONER

## 2017-05-09 PROCEDURE — A9270 NON-COVERED ITEM OR SERVICE: HCPCS | Performed by: PHYSICIAN ASSISTANT

## 2017-05-09 PROCEDURE — 94640 AIRWAY INHALATION TREATMENT: CPT

## 2017-05-09 PROCEDURE — 83735 ASSAY OF MAGNESIUM: CPT | Performed by: NURSE PRACTITIONER

## 2017-05-09 PROCEDURE — 80048 BASIC METABOLIC PNL TOTAL CA: CPT | Performed by: NURSE PRACTITIONER

## 2017-05-09 PROCEDURE — C9113 INJ PANTOPRAZOLE SODIUM, VIA: HCPCS | Performed by: SURGERY

## 2017-05-09 PROCEDURE — 94669 MECHANICAL CHEST WALL OSCILL: CPT

## 2017-05-09 PROCEDURE — 82948 REAGENT STRIP/BLOOD GLUCOSE: CPT

## 2017-05-09 PROCEDURE — 94668 MNPJ CHEST WALL SBSQ: CPT

## 2017-05-09 RX ORDER — LEVALBUTEROL 1.25 MG/.5ML
1.25 SOLUTION, CONCENTRATE RESPIRATORY (INHALATION)
Status: DISCONTINUED | OUTPATIENT
Start: 2017-05-09 | End: 2017-05-16 | Stop reason: HOSPADM

## 2017-05-09 RX ORDER — MAGNESIUM SULFATE HEPTAHYDRATE 40 MG/ML
2 INJECTION, SOLUTION INTRAVENOUS ONCE
Status: COMPLETED | OUTPATIENT
Start: 2017-05-09 | End: 2017-05-09

## 2017-05-09 RX ADMIN — ACETAMINOPHEN 650 MG: 325 TABLET ORAL at 23:11

## 2017-05-09 RX ADMIN — CEFAZOLIN SODIUM 2000 MG: 2 SOLUTION INTRAVENOUS at 18:29

## 2017-05-09 RX ADMIN — CEFAZOLIN SODIUM 2000 MG: 2 SOLUTION INTRAVENOUS at 10:44

## 2017-05-09 RX ADMIN — PANTOPRAZOLE SODIUM 40 MG: 40 INJECTION, POWDER, FOR SOLUTION INTRAVENOUS at 08:17

## 2017-05-09 RX ADMIN — LEVALBUTEROL HYDROCHLORIDE 1.25 MG: 1.25 SOLUTION, CONCENTRATE RESPIRATORY (INHALATION) at 08:08

## 2017-05-09 RX ADMIN — INSULIN LISPRO 1 UNITS: 100 INJECTION, SOLUTION INTRAVENOUS; SUBCUTANEOUS at 13:26

## 2017-05-09 RX ADMIN — MAGNESIUM SULFATE HEPTAHYDRATE 2 G: 40 INJECTION, SOLUTION INTRAVENOUS at 06:19

## 2017-05-09 RX ADMIN — INSULIN LISPRO 1 UNITS: 100 INJECTION, SOLUTION INTRAVENOUS; SUBCUTANEOUS at 08:36

## 2017-05-09 RX ADMIN — LEVALBUTEROL HYDROCHLORIDE 1.25 MG: 1.25 SOLUTION, CONCENTRATE RESPIRATORY (INHALATION) at 19:55

## 2017-05-09 RX ADMIN — ENOXAPARIN SODIUM 40 MG: 40 INJECTION SUBCUTANEOUS at 08:17

## 2017-05-09 RX ADMIN — HYDROMORPHONE HYDROCHLORIDE 0.5 MG: 1 INJECTION, SOLUTION INTRAMUSCULAR; INTRAVENOUS; SUBCUTANEOUS at 01:08

## 2017-05-09 RX ADMIN — OXYCODONE HYDROCHLORIDE 5 MG: 5 TABLET ORAL at 23:10

## 2017-05-09 RX ADMIN — METRONIDAZOLE 500 MG: 500 INJECTION, SOLUTION INTRAVENOUS at 03:31

## 2017-05-09 RX ADMIN — METOPROLOL TARTRATE 5 MG: 5 INJECTION INTRAVENOUS at 21:59

## 2017-05-09 RX ADMIN — METOPROLOL TARTRATE 5 MG: 5 INJECTION INTRAVENOUS at 10:38

## 2017-05-09 RX ADMIN — CEFAZOLIN SODIUM 2000 MG: 2 SOLUTION INTRAVENOUS at 03:45

## 2017-05-09 RX ADMIN — ACETAMINOPHEN 650 MG: 325 TABLET ORAL at 01:08

## 2017-05-09 RX ADMIN — ACETAMINOPHEN 650 MG: 325 TABLET ORAL at 13:30

## 2017-05-09 RX ADMIN — METRONIDAZOLE 500 MG: 500 INJECTION, SOLUTION INTRAVENOUS at 17:51

## 2017-05-09 RX ADMIN — HYDRALAZINE HYDROCHLORIDE 10 MG: 20 INJECTION INTRAMUSCULAR; INTRAVENOUS at 20:23

## 2017-05-09 RX ADMIN — NYSTATIN 500000 UNITS: 500000 SUSPENSION ORAL at 13:27

## 2017-05-09 RX ADMIN — METRONIDAZOLE 500 MG: 500 INJECTION, SOLUTION INTRAVENOUS at 10:38

## 2017-05-09 RX ADMIN — ACETAMINOPHEN 650 MG: 325 TABLET ORAL at 05:31

## 2017-05-09 RX ADMIN — IPRATROPIUM BROMIDE 0.5 MG: 0.5 SOLUTION RESPIRATORY (INHALATION) at 13:59

## 2017-05-09 RX ADMIN — ACETAMINOPHEN 650 MG: 325 TABLET ORAL at 17:27

## 2017-05-09 RX ADMIN — IPRATROPIUM BROMIDE 0.5 MG: 0.5 SOLUTION RESPIRATORY (INHALATION) at 08:08

## 2017-05-09 RX ADMIN — HYDRALAZINE HYDROCHLORIDE 10 MG: 20 INJECTION INTRAMUSCULAR; INTRAVENOUS at 13:30

## 2017-05-09 RX ADMIN — HYDRALAZINE HYDROCHLORIDE 10 MG: 20 INJECTION INTRAMUSCULAR; INTRAVENOUS at 03:38

## 2017-05-09 RX ADMIN — LEVALBUTEROL HYDROCHLORIDE 1.25 MG: 1.25 SOLUTION, CONCENTRATE RESPIRATORY (INHALATION) at 13:59

## 2017-05-09 RX ADMIN — NYSTATIN 500000 UNITS: 500000 SUSPENSION ORAL at 17:27

## 2017-05-09 RX ADMIN — IPRATROPIUM BROMIDE 0.5 MG: 0.5 SOLUTION RESPIRATORY (INHALATION) at 19:55

## 2017-05-09 RX ADMIN — METOPROLOL TARTRATE 5 MG: 5 INJECTION INTRAVENOUS at 05:31

## 2017-05-09 RX ADMIN — METOPROLOL TARTRATE 5 MG: 5 INJECTION INTRAVENOUS at 17:00

## 2017-05-10 LAB
ANION GAP SERPL CALCULATED.3IONS-SCNC: 16 MMOL/L (ref 4–13)
BUN SERPL-MCNC: 12 MG/DL (ref 5–25)
CALCIUM SERPL-MCNC: 7.8 MG/DL (ref 8.3–10.1)
CHLORIDE SERPL-SCNC: 94 MMOL/L (ref 100–108)
CO2 SERPL-SCNC: 25 MMOL/L (ref 21–32)
CREAT SERPL-MCNC: 1.66 MG/DL (ref 0.6–1.3)
ERYTHROCYTE [DISTWIDTH] IN BLOOD BY AUTOMATED COUNT: 17.4 % (ref 11.6–15.1)
GFR SERPL CREATININE-BSD FRML MDRD: 38.7 ML/MIN/1.73SQ M
GLUCOSE SERPL-MCNC: 162 MG/DL (ref 65–140)
GLUCOSE SERPL-MCNC: 177 MG/DL (ref 65–140)
GLUCOSE SERPL-MCNC: 201 MG/DL (ref 65–140)
GLUCOSE SERPL-MCNC: 336 MG/DL (ref 65–140)
HCT VFR BLD AUTO: 26.7 % (ref 34.8–46.1)
HGB BLD-MCNC: 8.3 G/DL (ref 11.5–15.4)
MAGNESIUM SERPL-MCNC: 1.7 MG/DL (ref 1.6–2.6)
MCH RBC QN AUTO: 32.3 PG (ref 26.8–34.3)
MCHC RBC AUTO-ENTMCNC: 31.1 G/DL (ref 31.4–37.4)
MCV RBC AUTO: 104 FL (ref 82–98)
PLATELET # BLD AUTO: 413 THOUSANDS/UL (ref 149–390)
PMV BLD AUTO: 9.4 FL (ref 8.9–12.7)
POTASSIUM SERPL-SCNC: 3.7 MMOL/L (ref 3.5–5.3)
RBC # BLD AUTO: 2.57 MILLION/UL (ref 3.81–5.12)
SODIUM SERPL-SCNC: 135 MMOL/L (ref 136–145)
WBC # BLD AUTO: 11.87 THOUSAND/UL (ref 4.31–10.16)

## 2017-05-10 PROCEDURE — A9270 NON-COVERED ITEM OR SERVICE: HCPCS | Performed by: PHYSICIAN ASSISTANT

## 2017-05-10 PROCEDURE — C9113 INJ PANTOPRAZOLE SODIUM, VIA: HCPCS | Performed by: SURGERY

## 2017-05-10 PROCEDURE — 94760 N-INVAS EAR/PLS OXIMETRY 1: CPT

## 2017-05-10 PROCEDURE — A9270 NON-COVERED ITEM OR SERVICE: HCPCS | Performed by: NURSE PRACTITIONER

## 2017-05-10 PROCEDURE — 82948 REAGENT STRIP/BLOOD GLUCOSE: CPT

## 2017-05-10 PROCEDURE — 83735 ASSAY OF MAGNESIUM: CPT | Performed by: NURSE PRACTITIONER

## 2017-05-10 PROCEDURE — 94668 MNPJ CHEST WALL SBSQ: CPT

## 2017-05-10 PROCEDURE — 80048 BASIC METABOLIC PNL TOTAL CA: CPT | Performed by: NURSE PRACTITIONER

## 2017-05-10 PROCEDURE — 85027 COMPLETE CBC AUTOMATED: CPT | Performed by: NURSE PRACTITIONER

## 2017-05-10 PROCEDURE — 94640 AIRWAY INHALATION TREATMENT: CPT

## 2017-05-10 RX ORDER — HYDRALAZINE HYDROCHLORIDE 10 MG/1
10 TABLET, FILM COATED ORAL 2 TIMES DAILY
Status: DISCONTINUED | OUTPATIENT
Start: 2017-05-10 | End: 2017-05-16 | Stop reason: HOSPADM

## 2017-05-10 RX ORDER — METOPROLOL SUCCINATE 100 MG/1
100 TABLET, EXTENDED RELEASE ORAL DAILY
Status: DISCONTINUED | OUTPATIENT
Start: 2017-05-10 | End: 2017-05-16 | Stop reason: HOSPADM

## 2017-05-10 RX ORDER — LORAZEPAM 2 MG/ML
0.5 INJECTION INTRAMUSCULAR ONCE
Status: COMPLETED | OUTPATIENT
Start: 2017-05-10 | End: 2017-05-11

## 2017-05-10 RX ORDER — LANOLIN ALCOHOL/MO/W.PET/CERES
3 CREAM (GRAM) TOPICAL
Status: DISCONTINUED | OUTPATIENT
Start: 2017-05-10 | End: 2017-05-16 | Stop reason: HOSPADM

## 2017-05-10 RX ADMIN — METRONIDAZOLE 500 MG: 500 INJECTION, SOLUTION INTRAVENOUS at 03:23

## 2017-05-10 RX ADMIN — INSULIN LISPRO 1 UNITS: 100 INJECTION, SOLUTION INTRAVENOUS; SUBCUTANEOUS at 11:54

## 2017-05-10 RX ADMIN — HYDRALAZINE HYDROCHLORIDE 10 MG: 10 TABLET, FILM COATED ORAL at 17:48

## 2017-05-10 RX ADMIN — CEFAZOLIN SODIUM 2000 MG: 2 SOLUTION INTRAVENOUS at 10:25

## 2017-05-10 RX ADMIN — NYSTATIN 500000 UNITS: 500000 SUSPENSION ORAL at 17:48

## 2017-05-10 RX ADMIN — ACETAMINOPHEN 650 MG: 325 TABLET ORAL at 05:04

## 2017-05-10 RX ADMIN — ACETAMINOPHEN 650 MG: 325 TABLET ORAL at 17:47

## 2017-05-10 RX ADMIN — LEVALBUTEROL HYDROCHLORIDE 1.25 MG: 1.25 SOLUTION, CONCENTRATE RESPIRATORY (INHALATION) at 08:33

## 2017-05-10 RX ADMIN — HYDRALAZINE HYDROCHLORIDE 10 MG: 10 TABLET, FILM COATED ORAL at 08:59

## 2017-05-10 RX ADMIN — LEVALBUTEROL HYDROCHLORIDE 1.25 MG: 1.25 SOLUTION, CONCENTRATE RESPIRATORY (INHALATION) at 13:20

## 2017-05-10 RX ADMIN — LEVALBUTEROL HYDROCHLORIDE 1.25 MG: 1.25 SOLUTION, CONCENTRATE RESPIRATORY (INHALATION) at 20:17

## 2017-05-10 RX ADMIN — ACETAMINOPHEN 650 MG: 325 TABLET ORAL at 23:25

## 2017-05-10 RX ADMIN — CEFAZOLIN SODIUM 2000 MG: 2 SOLUTION INTRAVENOUS at 04:00

## 2017-05-10 RX ADMIN — METOPROLOL TARTRATE 5 MG: 5 INJECTION INTRAVENOUS at 04:19

## 2017-05-10 RX ADMIN — ENOXAPARIN SODIUM 40 MG: 40 INJECTION SUBCUTANEOUS at 08:59

## 2017-05-10 RX ADMIN — IPRATROPIUM BROMIDE 0.5 MG: 0.5 SOLUTION RESPIRATORY (INHALATION) at 08:33

## 2017-05-10 RX ADMIN — IPRATROPIUM BROMIDE 0.5 MG: 0.5 SOLUTION RESPIRATORY (INHALATION) at 13:20

## 2017-05-10 RX ADMIN — HYDRALAZINE HYDROCHLORIDE 10 MG: 20 INJECTION INTRAMUSCULAR; INTRAVENOUS at 03:22

## 2017-05-10 RX ADMIN — IPRATROPIUM BROMIDE 0.5 MG: 0.5 SOLUTION RESPIRATORY (INHALATION) at 20:17

## 2017-05-10 RX ADMIN — MELATONIN TAB 3 MG 3 MG: 3 TAB at 21:40

## 2017-05-10 RX ADMIN — CEFAZOLIN SODIUM 2000 MG: 2 SOLUTION INTRAVENOUS at 18:09

## 2017-05-10 RX ADMIN — METOPROLOL SUCCINATE 100 MG: 100 TABLET, FILM COATED, EXTENDED RELEASE ORAL at 08:59

## 2017-05-10 RX ADMIN — NYSTATIN 500000 UNITS: 500000 SUSPENSION ORAL at 08:59

## 2017-05-10 RX ADMIN — NYSTATIN 500000 UNITS: 500000 SUSPENSION ORAL at 11:56

## 2017-05-10 RX ADMIN — METRONIDAZOLE 500 MG: 500 INJECTION, SOLUTION INTRAVENOUS at 10:23

## 2017-05-10 RX ADMIN — NYSTATIN 500000 UNITS: 500000 SUSPENSION ORAL at 21:40

## 2017-05-10 RX ADMIN — INSULIN LISPRO 1 UNITS: 100 INJECTION, SOLUTION INTRAVENOUS; SUBCUTANEOUS at 21:41

## 2017-05-10 RX ADMIN — PANTOPRAZOLE SODIUM 40 MG: 40 INJECTION, POWDER, FOR SOLUTION INTRAVENOUS at 09:15

## 2017-05-10 RX ADMIN — INSULIN LISPRO 2 UNITS: 100 INJECTION, SOLUTION INTRAVENOUS; SUBCUTANEOUS at 16:18

## 2017-05-10 RX ADMIN — METRONIDAZOLE 500 MG: 500 INJECTION, SOLUTION INTRAVENOUS at 17:48

## 2017-05-11 ENCOUNTER — APPOINTMENT (INPATIENT)
Dept: CT IMAGING | Facility: HOSPITAL | Age: 57
DRG: 329 | End: 2017-05-11
Payer: COMMERCIAL

## 2017-05-11 ENCOUNTER — GENERIC CONVERSION - ENCOUNTER (OUTPATIENT)
Dept: OTHER | Facility: OTHER | Age: 57
End: 2017-05-11

## 2017-05-11 LAB
ANION GAP SERPL CALCULATED.3IONS-SCNC: 8 MMOL/L (ref 4–13)
BACTERIA UR QL AUTO: ABNORMAL /HPF
BILIRUB UR QL STRIP: ABNORMAL
BUN SERPL-MCNC: 12 MG/DL (ref 5–25)
CALCIUM SERPL-MCNC: 8.4 MG/DL (ref 8.3–10.1)
CHLORIDE SERPL-SCNC: 102 MMOL/L (ref 100–108)
CLARITY UR: ABNORMAL
CO2 SERPL-SCNC: 30 MMOL/L (ref 21–32)
COLOR UR: ABNORMAL
CREAT SERPL-MCNC: 0.67 MG/DL (ref 0.6–1.3)
ERYTHROCYTE [DISTWIDTH] IN BLOOD BY AUTOMATED COUNT: 18.2 % (ref 11.6–15.1)
GFR SERPL CREATININE-BSD FRML MDRD: >60 ML/MIN/1.73SQ M
GLUCOSE SERPL-MCNC: 129 MG/DL (ref 65–140)
GLUCOSE SERPL-MCNC: 140 MG/DL (ref 65–140)
GLUCOSE SERPL-MCNC: 147 MG/DL (ref 65–140)
GLUCOSE SERPL-MCNC: 150 MG/DL (ref 65–140)
GLUCOSE SERPL-MCNC: 185 MG/DL (ref 65–140)
GLUCOSE SERPL-MCNC: 72 MG/DL (ref 65–140)
GLUCOSE UR STRIP-MCNC: NEGATIVE MG/DL
HCT VFR BLD AUTO: 29.5 % (ref 34.8–46.1)
HGB BLD-MCNC: 9.1 G/DL (ref 11.5–15.4)
HGB UR QL STRIP.AUTO: NEGATIVE
KETONES UR STRIP-MCNC: ABNORMAL MG/DL
LEUKOCYTE ESTERASE UR QL STRIP: NEGATIVE
MCH RBC QN AUTO: 32 PG (ref 26.8–34.3)
MCHC RBC AUTO-ENTMCNC: 30.8 G/DL (ref 31.4–37.4)
MCV RBC AUTO: 104 FL (ref 82–98)
NITRITE UR QL STRIP: POSITIVE
NON-SQ EPI CELLS URNS QL MICRO: ABNORMAL /HPF
PH UR STRIP.AUTO: 5.5 [PH] (ref 4.5–8)
PLATELET # BLD AUTO: 503 THOUSANDS/UL (ref 149–390)
PMV BLD AUTO: 9.2 FL (ref 8.9–12.7)
POTASSIUM SERPL-SCNC: 4.2 MMOL/L (ref 3.5–5.3)
PROT UR STRIP-MCNC: ABNORMAL MG/DL
RBC # BLD AUTO: 2.84 MILLION/UL (ref 3.81–5.12)
RBC #/AREA URNS AUTO: ABNORMAL /HPF
SODIUM SERPL-SCNC: 140 MMOL/L (ref 136–145)
SP GR UR STRIP.AUTO: 1.02 (ref 1–1.03)
UROBILINOGEN UR QL STRIP.AUTO: 1 E.U./DL
WBC # BLD AUTO: 13.17 THOUSAND/UL (ref 4.31–10.16)
WBC #/AREA URNS AUTO: ABNORMAL /HPF

## 2017-05-11 PROCEDURE — A9270 NON-COVERED ITEM OR SERVICE: HCPCS | Performed by: PHYSICIAN ASSISTANT

## 2017-05-11 PROCEDURE — 80048 BASIC METABOLIC PNL TOTAL CA: CPT | Performed by: NURSE PRACTITIONER

## 2017-05-11 PROCEDURE — 82948 REAGENT STRIP/BLOOD GLUCOSE: CPT

## 2017-05-11 PROCEDURE — 94669 MECHANICAL CHEST WALL OSCILL: CPT

## 2017-05-11 PROCEDURE — 85027 COMPLETE CBC AUTOMATED: CPT | Performed by: NURSE PRACTITIONER

## 2017-05-11 PROCEDURE — A9270 NON-COVERED ITEM OR SERVICE: HCPCS | Performed by: NURSE PRACTITIONER

## 2017-05-11 PROCEDURE — 81001 URINALYSIS AUTO W/SCOPE: CPT | Performed by: NURSE PRACTITIONER

## 2017-05-11 PROCEDURE — 74177 CT ABD & PELVIS W/CONTRAST: CPT

## 2017-05-11 PROCEDURE — 94760 N-INVAS EAR/PLS OXIMETRY 1: CPT

## 2017-05-11 PROCEDURE — 94640 AIRWAY INHALATION TREATMENT: CPT

## 2017-05-11 PROCEDURE — 87040 BLOOD CULTURE FOR BACTERIA: CPT | Performed by: NURSE PRACTITIONER

## 2017-05-11 RX ORDER — PANTOPRAZOLE SODIUM 40 MG/1
40 TABLET, DELAYED RELEASE ORAL
Status: DISCONTINUED | OUTPATIENT
Start: 2017-05-11 | End: 2017-05-16 | Stop reason: HOSPADM

## 2017-05-11 RX ORDER — SODIUM CHLORIDE 450 MG/100ML
75 INJECTION, SOLUTION INTRAVENOUS CONTINUOUS
Status: DISCONTINUED | OUTPATIENT
Start: 2017-05-11 | End: 2017-05-11

## 2017-05-11 RX ADMIN — LEVALBUTEROL HYDROCHLORIDE 1.25 MG: 1.25 SOLUTION, CONCENTRATE RESPIRATORY (INHALATION) at 13:00

## 2017-05-11 RX ADMIN — IOHEXOL 100 ML: 350 INJECTION, SOLUTION INTRAVENOUS at 17:10

## 2017-05-11 RX ADMIN — CEFAZOLIN SODIUM 2000 MG: 2 SOLUTION INTRAVENOUS at 03:25

## 2017-05-11 RX ADMIN — INSULIN LISPRO 1 UNITS: 100 INJECTION, SOLUTION INTRAVENOUS; SUBCUTANEOUS at 12:00

## 2017-05-11 RX ADMIN — PANTOPRAZOLE SODIUM 40 MG: 40 TABLET, DELAYED RELEASE ORAL at 05:34

## 2017-05-11 RX ADMIN — MELATONIN TAB 3 MG 3 MG: 3 TAB at 23:40

## 2017-05-11 RX ADMIN — METRONIDAZOLE 500 MG: 500 INJECTION, SOLUTION INTRAVENOUS at 03:00

## 2017-05-11 RX ADMIN — IPRATROPIUM BROMIDE 0.5 MG: 0.5 SOLUTION RESPIRATORY (INHALATION) at 23:22

## 2017-05-11 RX ADMIN — HYDRALAZINE HYDROCHLORIDE 10 MG: 10 TABLET, FILM COATED ORAL at 08:19

## 2017-05-11 RX ADMIN — NYSTATIN 500000 UNITS: 500000 SUSPENSION ORAL at 12:00

## 2017-05-11 RX ADMIN — ACETAMINOPHEN 650 MG: 325 TABLET ORAL at 11:51

## 2017-05-11 RX ADMIN — NYSTATIN 500000 UNITS: 500000 SUSPENSION ORAL at 08:20

## 2017-05-11 RX ADMIN — NYSTATIN 500000 UNITS: 500000 SUSPENSION ORAL at 21:48

## 2017-05-11 RX ADMIN — IPRATROPIUM BROMIDE 0.5 MG: 0.5 SOLUTION RESPIRATORY (INHALATION) at 13:00

## 2017-05-11 RX ADMIN — LEVALBUTEROL HYDROCHLORIDE 1.25 MG: 1.25 SOLUTION, CONCENTRATE RESPIRATORY (INHALATION) at 23:21

## 2017-05-11 RX ADMIN — METRONIDAZOLE 500 MG: 500 INJECTION, SOLUTION INTRAVENOUS at 11:30

## 2017-05-11 RX ADMIN — NYSTATIN 500000 UNITS: 500000 SUSPENSION ORAL at 17:49

## 2017-05-11 RX ADMIN — HYDRALAZINE HYDROCHLORIDE 10 MG: 10 TABLET, FILM COATED ORAL at 17:49

## 2017-05-11 RX ADMIN — ACETAMINOPHEN 650 MG: 325 TABLET ORAL at 05:34

## 2017-05-11 RX ADMIN — ENOXAPARIN SODIUM 40 MG: 40 INJECTION SUBCUTANEOUS at 08:19

## 2017-05-11 RX ADMIN — CEFAZOLIN SODIUM 2000 MG: 2 SOLUTION INTRAVENOUS at 12:01

## 2017-05-11 RX ADMIN — METOPROLOL SUCCINATE 100 MG: 100 TABLET, FILM COATED, EXTENDED RELEASE ORAL at 08:19

## 2017-05-11 RX ADMIN — IOHEXOL 50 ML: 240 INJECTION, SOLUTION INTRATHECAL; INTRAVASCULAR; INTRAVENOUS; ORAL at 17:10

## 2017-05-11 RX ADMIN — LORAZEPAM 0.5 MG: 2 INJECTION INTRAMUSCULAR; INTRAVENOUS at 00:08

## 2017-05-12 ENCOUNTER — GENERIC CONVERSION - ENCOUNTER (OUTPATIENT)
Dept: OTHER | Facility: OTHER | Age: 57
End: 2017-05-12

## 2017-05-12 LAB
ANION GAP SERPL CALCULATED.3IONS-SCNC: 5 MMOL/L (ref 4–13)
BASOPHILS # BLD AUTO: 0.01 THOUSANDS/ΜL (ref 0–0.1)
BASOPHILS NFR BLD AUTO: 0 % (ref 0–1)
BUN SERPL-MCNC: 9 MG/DL (ref 5–25)
CALCIUM SERPL-MCNC: 7.9 MG/DL (ref 8.3–10.1)
CHLORIDE SERPL-SCNC: 102 MMOL/L (ref 100–108)
CO2 SERPL-SCNC: 31 MMOL/L (ref 21–32)
CREAT SERPL-MCNC: 0.56 MG/DL (ref 0.6–1.3)
EOSINOPHIL # BLD AUTO: 0.07 THOUSAND/ΜL (ref 0–0.61)
EOSINOPHIL NFR BLD AUTO: 1 % (ref 0–6)
ERYTHROCYTE [DISTWIDTH] IN BLOOD BY AUTOMATED COUNT: 18.4 % (ref 11.6–15.1)
GFR SERPL CREATININE-BSD FRML MDRD: >60 ML/MIN/1.73SQ M
GLUCOSE SERPL-MCNC: 135 MG/DL (ref 65–140)
GLUCOSE SERPL-MCNC: 153 MG/DL (ref 65–140)
GLUCOSE SERPL-MCNC: 164 MG/DL (ref 65–140)
GLUCOSE SERPL-MCNC: 164 MG/DL (ref 65–140)
GLUCOSE SERPL-MCNC: 185 MG/DL (ref 65–140)
HCT VFR BLD AUTO: 27.7 % (ref 34.8–46.1)
HGB BLD-MCNC: 8.4 G/DL (ref 11.5–15.4)
LYMPHOCYTES # BLD AUTO: 1.6 THOUSANDS/ΜL (ref 0.6–4.47)
LYMPHOCYTES NFR BLD AUTO: 14 % (ref 14–44)
MCH RBC QN AUTO: 31.8 PG (ref 26.8–34.3)
MCHC RBC AUTO-ENTMCNC: 30.3 G/DL (ref 31.4–37.4)
MCV RBC AUTO: 105 FL (ref 82–98)
MONOCYTES # BLD AUTO: 0.76 THOUSAND/ΜL (ref 0.17–1.22)
MONOCYTES NFR BLD AUTO: 7 % (ref 4–12)
NEUTROPHILS # BLD AUTO: 9.12 THOUSANDS/ΜL (ref 1.85–7.62)
NEUTS SEG NFR BLD AUTO: 78 % (ref 43–75)
NRBC BLD AUTO-RTO: 0 /100 WBCS
PLATELET # BLD AUTO: 423 THOUSANDS/UL (ref 149–390)
PMV BLD AUTO: 8.7 FL (ref 8.9–12.7)
POTASSIUM SERPL-SCNC: 4.2 MMOL/L (ref 3.5–5.3)
RBC # BLD AUTO: 2.64 MILLION/UL (ref 3.81–5.12)
SODIUM SERPL-SCNC: 138 MMOL/L (ref 136–145)
WBC # BLD AUTO: 11.68 THOUSAND/UL (ref 4.31–10.16)

## 2017-05-12 PROCEDURE — 82948 REAGENT STRIP/BLOOD GLUCOSE: CPT

## 2017-05-12 PROCEDURE — A9270 NON-COVERED ITEM OR SERVICE: HCPCS | Performed by: NURSE PRACTITIONER

## 2017-05-12 PROCEDURE — 94668 MNPJ CHEST WALL SBSQ: CPT

## 2017-05-12 PROCEDURE — 80048 BASIC METABOLIC PNL TOTAL CA: CPT | Performed by: NURSE PRACTITIONER

## 2017-05-12 PROCEDURE — 94760 N-INVAS EAR/PLS OXIMETRY 1: CPT

## 2017-05-12 PROCEDURE — A9270 NON-COVERED ITEM OR SERVICE: HCPCS | Performed by: PHYSICIAN ASSISTANT

## 2017-05-12 PROCEDURE — 94640 AIRWAY INHALATION TREATMENT: CPT

## 2017-05-12 PROCEDURE — 85025 COMPLETE CBC W/AUTO DIFF WBC: CPT | Performed by: NURSE PRACTITIONER

## 2017-05-12 RX ORDER — FUROSEMIDE 10 MG/ML
20 INJECTION INTRAMUSCULAR; INTRAVENOUS DAILY
Status: DISCONTINUED | OUTPATIENT
Start: 2017-05-12 | End: 2017-05-13

## 2017-05-12 RX ADMIN — LEVALBUTEROL HYDROCHLORIDE 1.25 MG: 1.25 SOLUTION, CONCENTRATE RESPIRATORY (INHALATION) at 09:57

## 2017-05-12 RX ADMIN — ACETAMINOPHEN 650 MG: 325 TABLET ORAL at 18:04

## 2017-05-12 RX ADMIN — INSULIN LISPRO 1 UNITS: 100 INJECTION, SOLUTION INTRAVENOUS; SUBCUTANEOUS at 08:03

## 2017-05-12 RX ADMIN — IPRATROPIUM BROMIDE 0.5 MG: 0.5 SOLUTION RESPIRATORY (INHALATION) at 15:05

## 2017-05-12 RX ADMIN — HYDRALAZINE HYDROCHLORIDE 10 MG: 10 TABLET, FILM COATED ORAL at 18:04

## 2017-05-12 RX ADMIN — NYSTATIN 500000 UNITS: 500000 SUSPENSION ORAL at 11:49

## 2017-05-12 RX ADMIN — HYDRALAZINE HYDROCHLORIDE 10 MG: 10 TABLET, FILM COATED ORAL at 08:03

## 2017-05-12 RX ADMIN — FUROSEMIDE 20 MG: 10 INJECTION, SOLUTION INTRAMUSCULAR; INTRAVENOUS at 15:07

## 2017-05-12 RX ADMIN — IPRATROPIUM BROMIDE 0.5 MG: 0.5 SOLUTION RESPIRATORY (INHALATION) at 09:57

## 2017-05-12 RX ADMIN — INSULIN LISPRO 1 UNITS: 100 INJECTION, SOLUTION INTRAVENOUS; SUBCUTANEOUS at 21:47

## 2017-05-12 RX ADMIN — METOPROLOL SUCCINATE 100 MG: 100 TABLET, FILM COATED, EXTENDED RELEASE ORAL at 08:03

## 2017-05-12 RX ADMIN — LEVALBUTEROL HYDROCHLORIDE 1.25 MG: 1.25 SOLUTION, CONCENTRATE RESPIRATORY (INHALATION) at 20:33

## 2017-05-12 RX ADMIN — PANTOPRAZOLE SODIUM 40 MG: 40 TABLET, DELAYED RELEASE ORAL at 05:11

## 2017-05-12 RX ADMIN — INSULIN LISPRO 1 UNITS: 100 INJECTION, SOLUTION INTRAVENOUS; SUBCUTANEOUS at 11:49

## 2017-05-12 RX ADMIN — IPRATROPIUM BROMIDE 0.5 MG: 0.5 SOLUTION RESPIRATORY (INHALATION) at 20:33

## 2017-05-12 RX ADMIN — ACETAMINOPHEN 650 MG: 325 TABLET ORAL at 11:49

## 2017-05-12 RX ADMIN — LEVALBUTEROL HYDROCHLORIDE 1.25 MG: 1.25 SOLUTION, CONCENTRATE RESPIRATORY (INHALATION) at 15:05

## 2017-05-12 RX ADMIN — ENOXAPARIN SODIUM 40 MG: 40 INJECTION SUBCUTANEOUS at 08:03

## 2017-05-12 RX ADMIN — INSULIN LISPRO 1 UNITS: 100 INJECTION, SOLUTION INTRAVENOUS; SUBCUTANEOUS at 16:40

## 2017-05-12 RX ADMIN — ACETAMINOPHEN 650 MG: 325 TABLET ORAL at 05:11

## 2017-05-12 RX ADMIN — NYSTATIN 500000 UNITS: 500000 SUSPENSION ORAL at 21:46

## 2017-05-13 LAB
ERYTHROCYTE [DISTWIDTH] IN BLOOD BY AUTOMATED COUNT: 18.8 % (ref 11.6–15.1)
GLUCOSE SERPL-MCNC: 152 MG/DL (ref 65–140)
GLUCOSE SERPL-MCNC: 154 MG/DL (ref 65–140)
GLUCOSE SERPL-MCNC: 157 MG/DL (ref 65–140)
GLUCOSE SERPL-MCNC: 172 MG/DL (ref 65–140)
HCT VFR BLD AUTO: 30.9 % (ref 34.8–46.1)
HGB BLD-MCNC: 9.5 G/DL (ref 11.5–15.4)
MCH RBC QN AUTO: 32.2 PG (ref 26.8–34.3)
MCHC RBC AUTO-ENTMCNC: 30.7 G/DL (ref 31.4–37.4)
MCV RBC AUTO: 105 FL (ref 82–98)
NT-PROBNP SERPL-MCNC: 2574 PG/ML
PLATELET # BLD AUTO: 437 THOUSANDS/UL (ref 149–390)
PMV BLD AUTO: 8.9 FL (ref 8.9–12.7)
RBC # BLD AUTO: 2.95 MILLION/UL (ref 3.81–5.12)
WBC # BLD AUTO: 9.28 THOUSAND/UL (ref 4.31–10.16)

## 2017-05-13 PROCEDURE — 94668 MNPJ CHEST WALL SBSQ: CPT

## 2017-05-13 PROCEDURE — 94760 N-INVAS EAR/PLS OXIMETRY 1: CPT

## 2017-05-13 PROCEDURE — A9270 NON-COVERED ITEM OR SERVICE: HCPCS | Performed by: PHYSICIAN ASSISTANT

## 2017-05-13 PROCEDURE — 82948 REAGENT STRIP/BLOOD GLUCOSE: CPT

## 2017-05-13 PROCEDURE — 85027 COMPLETE CBC AUTOMATED: CPT | Performed by: FAMILY MEDICINE

## 2017-05-13 PROCEDURE — 83880 ASSAY OF NATRIURETIC PEPTIDE: CPT | Performed by: FAMILY MEDICINE

## 2017-05-13 PROCEDURE — 94640 AIRWAY INHALATION TREATMENT: CPT

## 2017-05-13 RX ORDER — FUROSEMIDE 10 MG/ML
40 INJECTION INTRAMUSCULAR; INTRAVENOUS DAILY
Status: DISCONTINUED | OUTPATIENT
Start: 2017-05-14 | End: 2017-05-16

## 2017-05-13 RX ORDER — FUROSEMIDE 10 MG/ML
20 INJECTION INTRAMUSCULAR; INTRAVENOUS ONCE
Status: COMPLETED | OUTPATIENT
Start: 2017-05-13 | End: 2017-05-13

## 2017-05-13 RX ADMIN — INSULIN LISPRO 1 UNITS: 100 INJECTION, SOLUTION INTRAVENOUS; SUBCUTANEOUS at 09:00

## 2017-05-13 RX ADMIN — ACETAMINOPHEN 650 MG: 325 TABLET ORAL at 23:00

## 2017-05-13 RX ADMIN — IPRATROPIUM BROMIDE 0.5 MG: 0.5 SOLUTION RESPIRATORY (INHALATION) at 19:59

## 2017-05-13 RX ADMIN — PANTOPRAZOLE SODIUM 40 MG: 40 TABLET, DELAYED RELEASE ORAL at 07:19

## 2017-05-13 RX ADMIN — IPRATROPIUM BROMIDE 0.5 MG: 0.5 SOLUTION RESPIRATORY (INHALATION) at 13:51

## 2017-05-13 RX ADMIN — MELATONIN TAB 3 MG 3 MG: 3 TAB at 02:12

## 2017-05-13 RX ADMIN — LEVALBUTEROL HYDROCHLORIDE 1.25 MG: 1.25 SOLUTION, CONCENTRATE RESPIRATORY (INHALATION) at 13:51

## 2017-05-13 RX ADMIN — FUROSEMIDE 20 MG: 10 INJECTION, SOLUTION INTRAMUSCULAR; INTRAVENOUS at 20:54

## 2017-05-13 RX ADMIN — ENOXAPARIN SODIUM 40 MG: 40 INJECTION SUBCUTANEOUS at 08:57

## 2017-05-13 RX ADMIN — MELATONIN TAB 3 MG 3 MG: 3 TAB at 22:57

## 2017-05-13 RX ADMIN — HYDRALAZINE HYDROCHLORIDE 10 MG: 10 TABLET, FILM COATED ORAL at 20:56

## 2017-05-13 RX ADMIN — ACETAMINOPHEN 650 MG: 325 TABLET ORAL at 07:20

## 2017-05-13 RX ADMIN — INSULIN LISPRO 1 UNITS: 100 INJECTION, SOLUTION INTRAVENOUS; SUBCUTANEOUS at 12:04

## 2017-05-13 RX ADMIN — LEVALBUTEROL HYDROCHLORIDE 1.25 MG: 1.25 SOLUTION, CONCENTRATE RESPIRATORY (INHALATION) at 19:59

## 2017-05-13 RX ADMIN — ACETAMINOPHEN 650 MG: 325 TABLET ORAL at 12:03

## 2017-05-13 RX ADMIN — NYSTATIN 500000 UNITS: 500000 SUSPENSION ORAL at 12:03

## 2017-05-13 RX ADMIN — NYSTATIN 500000 UNITS: 500000 SUSPENSION ORAL at 08:57

## 2017-05-13 RX ADMIN — METOPROLOL SUCCINATE 100 MG: 100 TABLET, FILM COATED, EXTENDED RELEASE ORAL at 08:57

## 2017-05-13 RX ADMIN — HYDRALAZINE HYDROCHLORIDE 10 MG: 10 TABLET, FILM COATED ORAL at 08:59

## 2017-05-13 RX ADMIN — INSULIN LISPRO 1 UNITS: 100 INJECTION, SOLUTION INTRAVENOUS; SUBCUTANEOUS at 16:20

## 2017-05-13 RX ADMIN — FUROSEMIDE 20 MG: 10 INJECTION, SOLUTION INTRAMUSCULAR; INTRAVENOUS at 08:57

## 2017-05-13 RX ADMIN — ACETAMINOPHEN 650 MG: 325 TABLET ORAL at 02:13

## 2017-05-14 LAB
ANION GAP SERPL CALCULATED.3IONS-SCNC: 7 MMOL/L (ref 4–13)
BASOPHILS # BLD AUTO: 0.02 THOUSANDS/ΜL (ref 0–0.1)
BASOPHILS NFR BLD AUTO: 0 % (ref 0–1)
BUN SERPL-MCNC: 10 MG/DL (ref 5–25)
CALCIUM SERPL-MCNC: 8.3 MG/DL (ref 8.3–10.1)
CHLORIDE SERPL-SCNC: 102 MMOL/L (ref 100–108)
CO2 SERPL-SCNC: 31 MMOL/L (ref 21–32)
CREAT SERPL-MCNC: 0.57 MG/DL (ref 0.6–1.3)
EOSINOPHIL # BLD AUTO: 0.03 THOUSAND/ΜL (ref 0–0.61)
EOSINOPHIL NFR BLD AUTO: 0 % (ref 0–6)
ERYTHROCYTE [DISTWIDTH] IN BLOOD BY AUTOMATED COUNT: 18.9 % (ref 11.6–15.1)
GFR SERPL CREATININE-BSD FRML MDRD: >60 ML/MIN/1.73SQ M
GLUCOSE SERPL-MCNC: 146 MG/DL (ref 65–140)
GLUCOSE SERPL-MCNC: 147 MG/DL (ref 65–140)
GLUCOSE SERPL-MCNC: 151 MG/DL (ref 65–140)
GLUCOSE SERPL-MCNC: 172 MG/DL (ref 65–140)
GLUCOSE SERPL-MCNC: 194 MG/DL (ref 65–140)
HCT VFR BLD AUTO: 27.7 % (ref 34.8–46.1)
HGB BLD-MCNC: 8.4 G/DL (ref 11.5–15.4)
LYMPHOCYTES # BLD AUTO: 1.5 THOUSANDS/ΜL (ref 0.6–4.47)
LYMPHOCYTES NFR BLD AUTO: 20 % (ref 14–44)
MAGNESIUM SERPL-MCNC: 1.6 MG/DL (ref 1.6–2.6)
MCH RBC QN AUTO: 31.8 PG (ref 26.8–34.3)
MCHC RBC AUTO-ENTMCNC: 30.3 G/DL (ref 31.4–37.4)
MCV RBC AUTO: 105 FL (ref 82–98)
MONOCYTES # BLD AUTO: 0.71 THOUSAND/ΜL (ref 0.17–1.22)
MONOCYTES NFR BLD AUTO: 9 % (ref 4–12)
NEUTROPHILS # BLD AUTO: 5.23 THOUSANDS/ΜL (ref 1.85–7.62)
NEUTS SEG NFR BLD AUTO: 69 % (ref 43–75)
NRBC BLD AUTO-RTO: 0 /100 WBCS
PHOSPHATE SERPL-MCNC: 3.7 MG/DL (ref 2.7–4.5)
PLATELET # BLD AUTO: 430 THOUSANDS/UL (ref 149–390)
PMV BLD AUTO: 9 FL (ref 8.9–12.7)
POTASSIUM SERPL-SCNC: 4.1 MMOL/L (ref 3.5–5.3)
RBC # BLD AUTO: 2.64 MILLION/UL (ref 3.81–5.12)
SODIUM SERPL-SCNC: 140 MMOL/L (ref 136–145)
WBC # BLD AUTO: 7.54 THOUSAND/UL (ref 4.31–10.16)

## 2017-05-14 PROCEDURE — 80048 BASIC METABOLIC PNL TOTAL CA: CPT | Performed by: FAMILY MEDICINE

## 2017-05-14 PROCEDURE — A9270 NON-COVERED ITEM OR SERVICE: HCPCS | Performed by: PHYSICIAN ASSISTANT

## 2017-05-14 PROCEDURE — 94640 AIRWAY INHALATION TREATMENT: CPT

## 2017-05-14 PROCEDURE — 85025 COMPLETE CBC W/AUTO DIFF WBC: CPT | Performed by: FAMILY MEDICINE

## 2017-05-14 PROCEDURE — 94668 MNPJ CHEST WALL SBSQ: CPT

## 2017-05-14 PROCEDURE — 94760 N-INVAS EAR/PLS OXIMETRY 1: CPT

## 2017-05-14 PROCEDURE — 82948 REAGENT STRIP/BLOOD GLUCOSE: CPT

## 2017-05-14 PROCEDURE — 84100 ASSAY OF PHOSPHORUS: CPT | Performed by: FAMILY MEDICINE

## 2017-05-14 PROCEDURE — 83735 ASSAY OF MAGNESIUM: CPT | Performed by: FAMILY MEDICINE

## 2017-05-14 RX ADMIN — NYSTATIN 500000 UNITS: 500000 SUSPENSION ORAL at 11:27

## 2017-05-14 RX ADMIN — HYDRALAZINE HYDROCHLORIDE 10 MG: 10 TABLET, FILM COATED ORAL at 18:06

## 2017-05-14 RX ADMIN — LEVALBUTEROL HYDROCHLORIDE 1.25 MG: 1.25 SOLUTION, CONCENTRATE RESPIRATORY (INHALATION) at 20:08

## 2017-05-14 RX ADMIN — ENOXAPARIN SODIUM 40 MG: 40 INJECTION SUBCUTANEOUS at 11:27

## 2017-05-14 RX ADMIN — ACETAMINOPHEN 650 MG: 325 TABLET ORAL at 18:06

## 2017-05-14 RX ADMIN — IPRATROPIUM BROMIDE 0.5 MG: 0.5 SOLUTION RESPIRATORY (INHALATION) at 14:17

## 2017-05-14 RX ADMIN — HYDRALAZINE HYDROCHLORIDE 10 MG: 10 TABLET, FILM COATED ORAL at 11:28

## 2017-05-14 RX ADMIN — INSULIN LISPRO 1 UNITS: 100 INJECTION, SOLUTION INTRAVENOUS; SUBCUTANEOUS at 18:07

## 2017-05-14 RX ADMIN — LEVALBUTEROL HYDROCHLORIDE 1.25 MG: 1.25 SOLUTION, CONCENTRATE RESPIRATORY (INHALATION) at 14:17

## 2017-05-14 RX ADMIN — INSULIN LISPRO 2 UNITS: 100 INJECTION, SOLUTION INTRAVENOUS; SUBCUTANEOUS at 11:28

## 2017-05-14 RX ADMIN — PANTOPRAZOLE SODIUM 40 MG: 40 TABLET, DELAYED RELEASE ORAL at 06:34

## 2017-05-14 RX ADMIN — ACETAMINOPHEN 650 MG: 325 TABLET ORAL at 06:34

## 2017-05-14 RX ADMIN — FUROSEMIDE 40 MG: 10 INJECTION, SOLUTION INTRAMUSCULAR; INTRAVENOUS at 11:28

## 2017-05-14 RX ADMIN — NYSTATIN 500000 UNITS: 500000 SUSPENSION ORAL at 18:05

## 2017-05-14 RX ADMIN — ACETAMINOPHEN 650 MG: 325 TABLET ORAL at 11:29

## 2017-05-14 RX ADMIN — METOPROLOL SUCCINATE 100 MG: 100 TABLET, FILM COATED, EXTENDED RELEASE ORAL at 11:28

## 2017-05-14 RX ADMIN — LEVALBUTEROL HYDROCHLORIDE 1.25 MG: 1.25 SOLUTION, CONCENTRATE RESPIRATORY (INHALATION) at 07:52

## 2017-05-14 RX ADMIN — IPRATROPIUM BROMIDE 0.5 MG: 0.5 SOLUTION RESPIRATORY (INHALATION) at 07:52

## 2017-05-14 RX ADMIN — IPRATROPIUM BROMIDE 0.5 MG: 0.5 SOLUTION RESPIRATORY (INHALATION) at 20:08

## 2017-05-15 ENCOUNTER — APPOINTMENT (INPATIENT)
Dept: NON INVASIVE DIAGNOSTICS | Facility: HOSPITAL | Age: 57
DRG: 329 | End: 2017-05-15
Payer: COMMERCIAL

## 2017-05-15 ENCOUNTER — GENERIC CONVERSION - ENCOUNTER (OUTPATIENT)
Dept: OTHER | Facility: OTHER | Age: 57
End: 2017-05-15

## 2017-05-15 LAB
ALBUMIN SERPL BCP-MCNC: 2.1 G/DL (ref 3.5–5)
ALP SERPL-CCNC: 68 U/L (ref 46–116)
ALT SERPL W P-5'-P-CCNC: <6 U/L (ref 12–78)
ANION GAP SERPL CALCULATED.3IONS-SCNC: 4 MMOL/L (ref 4–13)
ANION GAP SERPL CALCULATED.3IONS-SCNC: 5 MMOL/L (ref 4–13)
AST SERPL W P-5'-P-CCNC: 17 U/L (ref 5–45)
BILIRUB SERPL-MCNC: 0.4 MG/DL (ref 0.2–1)
BUN SERPL-MCNC: 6 MG/DL (ref 5–25)
BUN SERPL-MCNC: 8 MG/DL (ref 5–25)
CALCIUM SERPL-MCNC: 8 MG/DL (ref 8.3–10.1)
CALCIUM SERPL-MCNC: 8.1 MG/DL (ref 8.3–10.1)
CHLORIDE SERPL-SCNC: 102 MMOL/L (ref 100–108)
CHLORIDE SERPL-SCNC: 102 MMOL/L (ref 100–108)
CO2 SERPL-SCNC: 32 MMOL/L (ref 21–32)
CO2 SERPL-SCNC: 34 MMOL/L (ref 21–32)
CREAT SERPL-MCNC: 0.58 MG/DL (ref 0.6–1.3)
CREAT SERPL-MCNC: 0.67 MG/DL (ref 0.6–1.3)
ERYTHROCYTE [DISTWIDTH] IN BLOOD BY AUTOMATED COUNT: 18.6 % (ref 11.6–15.1)
GFR SERPL CREATININE-BSD FRML MDRD: >60 ML/MIN/1.73SQ M
GFR SERPL CREATININE-BSD FRML MDRD: >60 ML/MIN/1.73SQ M
GLUCOSE SERPL-MCNC: 127 MG/DL (ref 65–140)
GLUCOSE SERPL-MCNC: 142 MG/DL (ref 65–140)
GLUCOSE SERPL-MCNC: 142 MG/DL (ref 65–140)
GLUCOSE SERPL-MCNC: 187 MG/DL (ref 65–140)
GLUCOSE SERPL-MCNC: 191 MG/DL (ref 65–140)
GLUCOSE SERPL-MCNC: 214 MG/DL (ref 65–140)
HCT VFR BLD AUTO: 29.7 % (ref 34.8–46.1)
HGB BLD-MCNC: 9 G/DL (ref 11.5–15.4)
MCH RBC QN AUTO: 31.7 PG (ref 26.8–34.3)
MCHC RBC AUTO-ENTMCNC: 30.3 G/DL (ref 31.4–37.4)
MCV RBC AUTO: 105 FL (ref 82–98)
PLATELET # BLD AUTO: 404 THOUSANDS/UL (ref 149–390)
PMV BLD AUTO: 9.2 FL (ref 8.9–12.7)
POTASSIUM SERPL-SCNC: 3.8 MMOL/L (ref 3.5–5.3)
POTASSIUM SERPL-SCNC: 4.1 MMOL/L (ref 3.5–5.3)
PROT SERPL-MCNC: 6.6 G/DL (ref 6.4–8.2)
RBC # BLD AUTO: 2.84 MILLION/UL (ref 3.81–5.12)
SODIUM SERPL-SCNC: 139 MMOL/L (ref 136–145)
SODIUM SERPL-SCNC: 140 MMOL/L (ref 136–145)
WBC # BLD AUTO: 6.22 THOUSAND/UL (ref 4.31–10.16)

## 2017-05-15 PROCEDURE — 85027 COMPLETE CBC AUTOMATED: CPT | Performed by: FAMILY MEDICINE

## 2017-05-15 PROCEDURE — A9270 NON-COVERED ITEM OR SERVICE: HCPCS | Performed by: PHYSICIAN ASSISTANT

## 2017-05-15 PROCEDURE — 97164 PT RE-EVAL EST PLAN CARE: CPT

## 2017-05-15 PROCEDURE — G8979 MOBILITY GOAL STATUS: HCPCS

## 2017-05-15 PROCEDURE — G8978 MOBILITY CURRENT STATUS: HCPCS

## 2017-05-15 PROCEDURE — 94760 N-INVAS EAR/PLS OXIMETRY 1: CPT

## 2017-05-15 PROCEDURE — 80053 COMPREHEN METABOLIC PANEL: CPT | Performed by: PHYSICIAN ASSISTANT

## 2017-05-15 PROCEDURE — 80048 BASIC METABOLIC PNL TOTAL CA: CPT | Performed by: FAMILY MEDICINE

## 2017-05-15 PROCEDURE — 93308 TTE F-UP OR LMTD: CPT

## 2017-05-15 PROCEDURE — 82948 REAGENT STRIP/BLOOD GLUCOSE: CPT

## 2017-05-15 PROCEDURE — 94640 AIRWAY INHALATION TREATMENT: CPT

## 2017-05-15 RX ADMIN — IPRATROPIUM BROMIDE 0.5 MG: 0.5 SOLUTION RESPIRATORY (INHALATION) at 07:57

## 2017-05-15 RX ADMIN — HYDRALAZINE HYDROCHLORIDE 10 MG: 10 TABLET, FILM COATED ORAL at 12:05

## 2017-05-15 RX ADMIN — FUROSEMIDE 40 MG: 10 INJECTION, SOLUTION INTRAMUSCULAR; INTRAVENOUS at 10:39

## 2017-05-15 RX ADMIN — NYSTATIN 500000 UNITS: 500000 SUSPENSION ORAL at 22:10

## 2017-05-15 RX ADMIN — INSULIN LISPRO 1 UNITS: 100 INJECTION, SOLUTION INTRAVENOUS; SUBCUTANEOUS at 22:09

## 2017-05-15 RX ADMIN — LEVALBUTEROL HYDROCHLORIDE 1.25 MG: 1.25 SOLUTION, CONCENTRATE RESPIRATORY (INHALATION) at 20:47

## 2017-05-15 RX ADMIN — ENOXAPARIN SODIUM 40 MG: 40 INJECTION SUBCUTANEOUS at 12:07

## 2017-05-15 RX ADMIN — ACETAMINOPHEN 650 MG: 325 TABLET ORAL at 12:05

## 2017-05-15 RX ADMIN — IPRATROPIUM BROMIDE 0.5 MG: 0.5 SOLUTION RESPIRATORY (INHALATION) at 20:48

## 2017-05-15 RX ADMIN — LEVALBUTEROL HYDROCHLORIDE 1.25 MG: 1.25 SOLUTION, CONCENTRATE RESPIRATORY (INHALATION) at 14:16

## 2017-05-15 RX ADMIN — LEVALBUTEROL HYDROCHLORIDE 1.25 MG: 1.25 SOLUTION, CONCENTRATE RESPIRATORY (INHALATION) at 07:57

## 2017-05-15 RX ADMIN — NYSTATIN 500000 UNITS: 500000 SUSPENSION ORAL at 10:38

## 2017-05-15 RX ADMIN — INSULIN LISPRO 2 UNITS: 100 INJECTION, SOLUTION INTRAVENOUS; SUBCUTANEOUS at 12:07

## 2017-05-15 RX ADMIN — PANTOPRAZOLE SODIUM 40 MG: 40 TABLET, DELAYED RELEASE ORAL at 09:18

## 2017-05-15 RX ADMIN — ACETAMINOPHEN 650 MG: 325 TABLET ORAL at 18:15

## 2017-05-15 RX ADMIN — ACETAMINOPHEN 650 MG: 325 TABLET ORAL at 09:18

## 2017-05-15 RX ADMIN — HYDRALAZINE HYDROCHLORIDE 10 MG: 10 TABLET, FILM COATED ORAL at 18:16

## 2017-05-15 RX ADMIN — METOPROLOL SUCCINATE 100 MG: 100 TABLET, FILM COATED, EXTENDED RELEASE ORAL at 10:38

## 2017-05-15 RX ADMIN — IPRATROPIUM BROMIDE 0.5 MG: 0.5 SOLUTION RESPIRATORY (INHALATION) at 14:16

## 2017-05-15 RX ADMIN — NYSTATIN 500000 UNITS: 500000 SUSPENSION ORAL at 18:15

## 2017-05-16 ENCOUNTER — ALLSCRIPTS OFFICE VISIT (OUTPATIENT)
Dept: OTHER | Facility: OTHER | Age: 57
End: 2017-05-16

## 2017-05-16 VITALS
DIASTOLIC BLOOD PRESSURE: 89 MMHG | SYSTOLIC BLOOD PRESSURE: 173 MMHG | HEIGHT: 60 IN | BODY MASS INDEX: 37.57 KG/M2 | HEART RATE: 76 BPM | RESPIRATION RATE: 19 BRPM | TEMPERATURE: 98.4 F | WEIGHT: 191.36 LBS | OXYGEN SATURATION: 97 %

## 2017-05-16 LAB
BACTERIA BLD CULT: NORMAL
BACTERIA BLD CULT: NORMAL
GLUCOSE SERPL-MCNC: 133 MG/DL (ref 65–140)
GLUCOSE SERPL-MCNC: 142 MG/DL (ref 65–140)

## 2017-05-16 PROCEDURE — 82948 REAGENT STRIP/BLOOD GLUCOSE: CPT

## 2017-05-16 PROCEDURE — A9270 NON-COVERED ITEM OR SERVICE: HCPCS | Performed by: PHYSICIAN ASSISTANT

## 2017-05-16 PROCEDURE — 94640 AIRWAY INHALATION TREATMENT: CPT

## 2017-05-16 PROCEDURE — 94760 N-INVAS EAR/PLS OXIMETRY 1: CPT

## 2017-05-16 RX ORDER — METOPROLOL SUCCINATE 100 MG/1
100 TABLET, EXTENDED RELEASE ORAL DAILY
Qty: 30 TABLET | Refills: 0 | Status: SHIPPED | OUTPATIENT
Start: 2017-05-16 | End: 2017-09-14 | Stop reason: HOSPADM

## 2017-05-16 RX ORDER — PANTOPRAZOLE SODIUM 40 MG/1
40 TABLET, DELAYED RELEASE ORAL
Qty: 30 TABLET | Refills: 0 | Status: SHIPPED | OUTPATIENT
Start: 2017-05-16 | End: 2017-09-14 | Stop reason: HOSPADM

## 2017-05-16 RX ORDER — LANOLIN ALCOHOL/MO/W.PET/CERES
3 CREAM (GRAM) TOPICAL
Qty: 30 TABLET | Refills: 0 | Status: SHIPPED | OUTPATIENT
Start: 2017-05-16 | End: 2017-06-15

## 2017-05-16 RX ORDER — FUROSEMIDE 20 MG/1
20 TABLET ORAL DAILY
Qty: 30 TABLET | Refills: 0 | Status: SHIPPED | OUTPATIENT
Start: 2017-05-16 | End: 2017-05-16

## 2017-05-16 RX ORDER — FUROSEMIDE 20 MG/1
20 TABLET ORAL DAILY
Qty: 30 TABLET | Refills: 0 | Status: SHIPPED | OUTPATIENT
Start: 2017-05-16 | End: 2017-08-15 | Stop reason: ALTCHOICE

## 2017-05-16 RX ADMIN — IPRATROPIUM BROMIDE 0.5 MG: 0.5 SOLUTION RESPIRATORY (INHALATION) at 07:29

## 2017-05-16 RX ADMIN — FUROSEMIDE 40 MG: 10 INJECTION, SOLUTION INTRAMUSCULAR; INTRAVENOUS at 09:34

## 2017-05-16 RX ADMIN — HYDRALAZINE HYDROCHLORIDE 10 MG: 10 TABLET, FILM COATED ORAL at 09:33

## 2017-05-16 RX ADMIN — MELATONIN TAB 3 MG 3 MG: 3 TAB at 00:11

## 2017-05-16 RX ADMIN — LEVALBUTEROL HYDROCHLORIDE 1.25 MG: 1.25 SOLUTION, CONCENTRATE RESPIRATORY (INHALATION) at 07:29

## 2017-05-16 RX ADMIN — ACETAMINOPHEN 650 MG: 325 TABLET ORAL at 00:11

## 2017-05-16 RX ADMIN — METOPROLOL SUCCINATE 100 MG: 100 TABLET, FILM COATED, EXTENDED RELEASE ORAL at 09:34

## 2017-05-16 RX ADMIN — PANTOPRAZOLE SODIUM 40 MG: 40 TABLET, DELAYED RELEASE ORAL at 05:50

## 2017-05-16 RX ADMIN — ENOXAPARIN SODIUM 40 MG: 40 INJECTION SUBCUTANEOUS at 09:34

## 2017-05-16 RX ADMIN — NYSTATIN 500000 UNITS: 500000 SUSPENSION ORAL at 09:33

## 2017-05-16 RX ADMIN — ACETAMINOPHEN 650 MG: 325 TABLET ORAL at 05:50

## 2017-05-23 ENCOUNTER — ALLSCRIPTS OFFICE VISIT (OUTPATIENT)
Dept: OTHER | Facility: OTHER | Age: 57
End: 2017-05-23

## 2017-05-23 DIAGNOSIS — I31.3 NONINFLAMMATORY PERICARDIAL EFFUSION: ICD-10-CM

## 2017-05-24 ENCOUNTER — ALLSCRIPTS OFFICE VISIT (OUTPATIENT)
Dept: OTHER | Facility: OTHER | Age: 57
End: 2017-05-24

## 2017-05-25 ENCOUNTER — GENERIC CONVERSION - ENCOUNTER (OUTPATIENT)
Dept: OTHER | Facility: OTHER | Age: 57
End: 2017-05-25

## 2017-06-01 ENCOUNTER — ALLSCRIPTS OFFICE VISIT (OUTPATIENT)
Dept: OTHER | Facility: OTHER | Age: 57
End: 2017-06-01

## 2017-06-01 DIAGNOSIS — E78.5 HYPERLIPIDEMIA: ICD-10-CM

## 2017-06-01 DIAGNOSIS — D62 ACUTE POSTHEMORRHAGIC ANEMIA: ICD-10-CM

## 2017-06-01 DIAGNOSIS — E11.9 TYPE 2 DIABETES MELLITUS WITHOUT COMPLICATIONS (HCC): ICD-10-CM

## 2017-06-01 DIAGNOSIS — I31.3 NONINFLAMMATORY PERICARDIAL EFFUSION: ICD-10-CM

## 2017-06-08 ENCOUNTER — GENERIC CONVERSION - ENCOUNTER (OUTPATIENT)
Dept: OTHER | Facility: OTHER | Age: 57
End: 2017-06-08

## 2017-06-15 ENCOUNTER — ALLSCRIPTS OFFICE VISIT (OUTPATIENT)
Dept: OTHER | Facility: OTHER | Age: 57
End: 2017-06-15

## 2017-06-20 ENCOUNTER — TRANSCRIBE ORDERS (OUTPATIENT)
Dept: LAB | Facility: CLINIC | Age: 57
End: 2017-06-20

## 2017-06-20 ENCOUNTER — ALLSCRIPTS OFFICE VISIT (OUTPATIENT)
Dept: OTHER | Facility: OTHER | Age: 57
End: 2017-06-20

## 2017-06-20 ENCOUNTER — APPOINTMENT (OUTPATIENT)
Dept: LAB | Facility: CLINIC | Age: 57
End: 2017-06-20
Payer: COMMERCIAL

## 2017-06-20 DIAGNOSIS — E11.9 TYPE 2 DIABETES MELLITUS WITHOUT COMPLICATIONS (HCC): ICD-10-CM

## 2017-06-20 DIAGNOSIS — D62 ACUTE POSTHEMORRHAGIC ANEMIA: ICD-10-CM

## 2017-06-20 DIAGNOSIS — I31.3 NONINFLAMMATORY PERICARDIAL EFFUSION: ICD-10-CM

## 2017-06-20 LAB
ANION GAP SERPL CALCULATED.3IONS-SCNC: 8 MMOL/L (ref 4–13)
BUN SERPL-MCNC: 11 MG/DL (ref 5–25)
CALCIUM SERPL-MCNC: 9 MG/DL (ref 8.3–10.1)
CHLORIDE SERPL-SCNC: 103 MMOL/L (ref 100–108)
CHOLEST SERPL-MCNC: 255 MG/DL (ref 50–200)
CO2 SERPL-SCNC: 31 MMOL/L (ref 21–32)
CREAT SERPL-MCNC: 0.63 MG/DL (ref 0.6–1.3)
CREAT UR-MCNC: 284 MG/DL
ERYTHROCYTE [DISTWIDTH] IN BLOOD BY AUTOMATED COUNT: 16 % (ref 11.6–15.1)
GFR SERPL CREATININE-BSD FRML MDRD: >60 ML/MIN/1.73SQ M
GLUCOSE P FAST SERPL-MCNC: 88 MG/DL (ref 65–99)
HCT VFR BLD AUTO: 41.7 % (ref 34.8–46.1)
HDLC SERPL-MCNC: 39 MG/DL (ref 40–60)
HGB BLD-MCNC: 13.3 G/DL (ref 11.5–15.4)
LDLC SERPL CALC-MCNC: 181 MG/DL (ref 0–100)
MCH RBC QN AUTO: 29.8 PG (ref 26.8–34.3)
MCHC RBC AUTO-ENTMCNC: 31.9 G/DL (ref 31.4–37.4)
MCV RBC AUTO: 93 FL (ref 82–98)
MICROALBUMIN UR-MCNC: 936 MG/L (ref 0–20)
MICROALBUMIN/CREAT 24H UR: 330 MG/G CREATININE (ref 0–30)
PLATELET # BLD AUTO: 351 THOUSANDS/UL (ref 149–390)
PMV BLD AUTO: 9.7 FL (ref 8.9–12.7)
POTASSIUM SERPL-SCNC: 3.5 MMOL/L (ref 3.5–5.3)
RBC # BLD AUTO: 4.47 MILLION/UL (ref 3.81–5.12)
SODIUM SERPL-SCNC: 142 MMOL/L (ref 136–145)
TRIGL SERPL-MCNC: 177 MG/DL
WBC # BLD AUTO: 5.82 THOUSAND/UL (ref 4.31–10.16)

## 2017-06-20 PROCEDURE — 82570 ASSAY OF URINE CREATININE: CPT

## 2017-06-20 PROCEDURE — 82043 UR ALBUMIN QUANTITATIVE: CPT

## 2017-06-20 PROCEDURE — 80061 LIPID PANEL: CPT

## 2017-06-20 PROCEDURE — 36415 COLL VENOUS BLD VENIPUNCTURE: CPT

## 2017-06-20 PROCEDURE — 85027 COMPLETE CBC AUTOMATED: CPT

## 2017-06-20 PROCEDURE — 80048 BASIC METABOLIC PNL TOTAL CA: CPT

## 2017-06-22 ENCOUNTER — ALLSCRIPTS OFFICE VISIT (OUTPATIENT)
Dept: OTHER | Facility: OTHER | Age: 57
End: 2017-06-22

## 2017-07-26 ENCOUNTER — HOSPITAL ENCOUNTER (OUTPATIENT)
Dept: NON INVASIVE DIAGNOSTICS | Facility: CLINIC | Age: 57
Discharge: HOME | End: 2017-07-26
Payer: COMMERCIAL

## 2017-07-26 DIAGNOSIS — I50.32 CHRONIC DIASTOLIC HEART FAILURE OF UNKNOWN ETIOLOGY (HCC): ICD-10-CM

## 2017-07-26 DIAGNOSIS — E78.5 HYPERLIPIDEMIA: ICD-10-CM

## 2017-07-26 PROCEDURE — 93350 STRESS TTE ONLY: CPT

## 2017-07-27 LAB
ARRHY DURING EX: NORMAL
CHEST PAIN STATEMENT: NORMAL
MAX DIASTOLIC BP: 120 MMHG
MAX HEART RATE: 117 BPM
MAX PREDICTED HEART RATE: 164 BPM
MAX. SYSTOLIC BP: 206 MMHG
PROTOCOL NAME: NORMAL
REASON FOR TERMINATION: NORMAL
TARGET HR FORMULA: NORMAL
TEST INDICATION: NORMAL
TIME IN EXERCISE PHASE: 174 S

## 2017-07-31 ENCOUNTER — GENERIC CONVERSION - ENCOUNTER (OUTPATIENT)
Dept: OTHER | Facility: OTHER | Age: 57
End: 2017-07-31

## 2017-08-02 ENCOUNTER — ALLSCRIPTS OFFICE VISIT (OUTPATIENT)
Dept: OTHER | Facility: OTHER | Age: 57
End: 2017-08-02

## 2017-08-03 ENCOUNTER — APPOINTMENT (OUTPATIENT)
Dept: LAB | Facility: CLINIC | Age: 57
End: 2017-08-03
Payer: COMMERCIAL

## 2017-08-03 ENCOUNTER — ALLSCRIPTS OFFICE VISIT (OUTPATIENT)
Dept: OTHER | Facility: OTHER | Age: 57
End: 2017-08-03

## 2017-08-03 DIAGNOSIS — K57.20 DIVERTICULITIS OF LARGE INTESTINE WITH PERFORATION AND ABSCESS WITHOUT BLEEDING: ICD-10-CM

## 2017-08-03 DIAGNOSIS — E11.21 TYPE 2 DIABETES MELLITUS WITH DIABETIC NEPHROPATHY (HCC): ICD-10-CM

## 2017-08-03 DIAGNOSIS — E11.9 TYPE 2 DIABETES MELLITUS WITHOUT COMPLICATIONS (HCC): ICD-10-CM

## 2017-08-03 DIAGNOSIS — Z93.3 COLOSTOMY STATUS (HCC): ICD-10-CM

## 2017-08-03 LAB
ALBUMIN SERPL BCP-MCNC: 3.6 G/DL (ref 3.5–5)
ALP SERPL-CCNC: 57 U/L (ref 46–116)
ALT SERPL W P-5'-P-CCNC: 17 U/L (ref 12–78)
ANION GAP SERPL CALCULATED.3IONS-SCNC: 7 MMOL/L (ref 4–13)
AST SERPL W P-5'-P-CCNC: 9 U/L (ref 5–45)
BASOPHILS # BLD AUTO: 0.01 THOUSANDS/ΜL (ref 0–0.1)
BASOPHILS NFR BLD AUTO: 0 % (ref 0–1)
BILIRUB SERPL-MCNC: 0.91 MG/DL (ref 0.2–1)
BUN SERPL-MCNC: 13 MG/DL (ref 5–25)
CALCIUM SERPL-MCNC: 9.4 MG/DL (ref 8.3–10.1)
CHLORIDE SERPL-SCNC: 103 MMOL/L (ref 100–108)
CO2 SERPL-SCNC: 30 MMOL/L (ref 21–32)
CREAT SERPL-MCNC: 0.62 MG/DL (ref 0.6–1.3)
EOSINOPHIL # BLD AUTO: 0.07 THOUSAND/ΜL (ref 0–0.61)
EOSINOPHIL NFR BLD AUTO: 1 % (ref 0–6)
ERYTHROCYTE [DISTWIDTH] IN BLOOD BY AUTOMATED COUNT: 16.7 % (ref 11.6–15.1)
GFR SERPL CREATININE-BSD FRML MDRD: 117 ML/MIN/1.73SQ M
GLUCOSE P FAST SERPL-MCNC: 105 MG/DL (ref 65–99)
HCT VFR BLD AUTO: 42.4 % (ref 34.8–46.1)
HGB BLD-MCNC: 14.1 G/DL (ref 11.5–15.4)
LYMPHOCYTES # BLD AUTO: 2.16 THOUSANDS/ΜL (ref 0.6–4.47)
LYMPHOCYTES NFR BLD AUTO: 32 % (ref 14–44)
MCH RBC QN AUTO: 30.1 PG (ref 26.8–34.3)
MCHC RBC AUTO-ENTMCNC: 33.3 G/DL (ref 31.4–37.4)
MCV RBC AUTO: 90 FL (ref 82–98)
MONOCYTES # BLD AUTO: 0.34 THOUSAND/ΜL (ref 0.17–1.22)
MONOCYTES NFR BLD AUTO: 5 % (ref 4–12)
NEUTROPHILS # BLD AUTO: 4.24 THOUSANDS/ΜL (ref 1.85–7.62)
NEUTS SEG NFR BLD AUTO: 62 % (ref 43–75)
NRBC BLD AUTO-RTO: 0 /100 WBCS
PLATELET # BLD AUTO: 306 THOUSANDS/UL (ref 149–390)
PMV BLD AUTO: 9.9 FL (ref 8.9–12.7)
POTASSIUM SERPL-SCNC: 3.7 MMOL/L (ref 3.5–5.3)
PROT SERPL-MCNC: 7.7 G/DL (ref 6.4–8.2)
RBC # BLD AUTO: 4.69 MILLION/UL (ref 3.81–5.12)
SODIUM SERPL-SCNC: 140 MMOL/L (ref 136–145)
WBC # BLD AUTO: 6.83 THOUSAND/UL (ref 4.31–10.16)

## 2017-08-03 PROCEDURE — 36415 COLL VENOUS BLD VENIPUNCTURE: CPT

## 2017-08-03 PROCEDURE — 85025 COMPLETE CBC W/AUTO DIFF WBC: CPT

## 2017-08-03 PROCEDURE — 80053 COMPREHEN METABOLIC PANEL: CPT

## 2017-08-04 RX ORDER — ATORVASTATIN CALCIUM 20 MG/1
20 TABLET, FILM COATED ORAL DAILY
COMMUNITY
End: 2018-04-23 | Stop reason: SDUPTHER

## 2017-08-04 RX ORDER — FUROSEMIDE 40 MG/1
40 TABLET ORAL 2 TIMES DAILY
COMMUNITY
End: 2018-01-17 | Stop reason: ALTCHOICE

## 2017-08-04 RX ORDER — PANTOPRAZOLE SODIUM 40 MG/1
40 TABLET, DELAYED RELEASE ORAL DAILY
COMMUNITY
End: 2018-01-17 | Stop reason: ALTCHOICE

## 2017-08-04 RX ORDER — METOPROLOL SUCCINATE 100 MG/1
100 TABLET, EXTENDED RELEASE ORAL DAILY
COMMUNITY
End: 2018-01-17 | Stop reason: ALTCHOICE

## 2017-08-04 RX ORDER — HYDRALAZINE HYDROCHLORIDE 10 MG/1
20 TABLET, FILM COATED ORAL DAILY
COMMUNITY
End: 2018-01-28 | Stop reason: SDUPTHER

## 2017-08-04 RX ORDER — LISINOPRIL 5 MG/1
5 TABLET ORAL DAILY
COMMUNITY
End: 2017-09-14 | Stop reason: HOSPADM

## 2017-08-04 RX ORDER — ASPIRIN 81 MG/1
81 TABLET, CHEWABLE ORAL DAILY
COMMUNITY
End: 2019-01-31 | Stop reason: CLARIF

## 2017-08-15 ENCOUNTER — ANESTHESIA EVENT (OUTPATIENT)
Dept: PERIOP | Facility: HOSPITAL | Age: 57
End: 2017-08-15
Payer: COMMERCIAL

## 2017-08-16 ENCOUNTER — HOSPITAL ENCOUNTER (OUTPATIENT)
Facility: HOSPITAL | Age: 57
Setting detail: OUTPATIENT SURGERY
Discharge: HOME | End: 2017-08-16
Attending: SURGERY | Admitting: SURGERY
Payer: COMMERCIAL

## 2017-08-16 ENCOUNTER — ANESTHESIA (OUTPATIENT)
Dept: PERIOP | Facility: HOSPITAL | Age: 57
End: 2017-08-16
Payer: COMMERCIAL

## 2017-08-16 VITALS
HEART RATE: 62 BPM | DIASTOLIC BLOOD PRESSURE: 76 MMHG | BODY MASS INDEX: 31.16 KG/M2 | RESPIRATION RATE: 18 BRPM | HEIGHT: 60 IN | OXYGEN SATURATION: 100 % | WEIGHT: 158.73 LBS | TEMPERATURE: 97.8 F | SYSTOLIC BLOOD PRESSURE: 163 MMHG

## 2017-08-16 DIAGNOSIS — K57.20 DIVERTICULITIS OF LARGE INTESTINE WITH PERFORATION AND ABSCESS WITHOUT BLEEDING: ICD-10-CM

## 2017-08-16 PROBLEM — D36.9 ADENOMATOUS POLYPS: Chronic | Status: ACTIVE | Noted: 2017-08-16

## 2017-08-16 LAB — GLUCOSE SERPL-MCNC: 79 MG/DL (ref 65–140)

## 2017-08-16 PROCEDURE — 88305 TISSUE EXAM BY PATHOLOGIST: CPT | Performed by: SURGERY

## 2017-08-16 PROCEDURE — 82948 REAGENT STRIP/BLOOD GLUCOSE: CPT

## 2017-08-16 RX ORDER — LIDOCAINE HYDROCHLORIDE 10 MG/ML
INJECTION, SOLUTION INFILTRATION; PERINEURAL AS NEEDED
Status: DISCONTINUED | OUTPATIENT
Start: 2017-08-16 | End: 2017-08-16 | Stop reason: SURG

## 2017-08-16 RX ORDER — SODIUM CHLORIDE, SODIUM LACTATE, POTASSIUM CHLORIDE, CALCIUM CHLORIDE 600; 310; 30; 20 MG/100ML; MG/100ML; MG/100ML; MG/100ML
100 INJECTION, SOLUTION INTRAVENOUS CONTINUOUS
Status: DISCONTINUED | OUTPATIENT
Start: 2017-08-16 | End: 2017-08-16 | Stop reason: HOSPADM

## 2017-08-16 RX ORDER — PROPOFOL 10 MG/ML
INJECTION, EMULSION INTRAVENOUS AS NEEDED
Status: DISCONTINUED | OUTPATIENT
Start: 2017-08-16 | End: 2017-08-16 | Stop reason: SURG

## 2017-08-16 RX ORDER — SODIUM CHLORIDE, SODIUM LACTATE, POTASSIUM CHLORIDE, CALCIUM CHLORIDE 600; 310; 30; 20 MG/100ML; MG/100ML; MG/100ML; MG/100ML
125 INJECTION, SOLUTION INTRAVENOUS CONTINUOUS
Status: DISCONTINUED | OUTPATIENT
Start: 2017-08-16 | End: 2017-08-16 | Stop reason: HOSPADM

## 2017-08-16 RX ADMIN — SODIUM CHLORIDE, SODIUM LACTATE, POTASSIUM CHLORIDE, AND CALCIUM CHLORIDE: .6; .31; .03; .02 INJECTION, SOLUTION INTRAVENOUS at 12:00

## 2017-08-16 RX ADMIN — PROPOFOL 30 MG: 10 INJECTION, EMULSION INTRAVENOUS at 12:14

## 2017-08-16 RX ADMIN — PROPOFOL 120 MG: 10 INJECTION, EMULSION INTRAVENOUS at 12:05

## 2017-08-16 RX ADMIN — PROPOFOL 30 MG: 10 INJECTION, EMULSION INTRAVENOUS at 12:11

## 2017-08-16 RX ADMIN — LIDOCAINE HYDROCHLORIDE 50 MG: 10 INJECTION, SOLUTION INFILTRATION; PERINEURAL at 12:05

## 2017-08-16 RX ADMIN — PROPOFOL 10 MG: 10 INJECTION, EMULSION INTRAVENOUS at 12:18

## 2017-08-16 RX ADMIN — PROPOFOL 30 MG: 10 INJECTION, EMULSION INTRAVENOUS at 12:08

## 2017-09-01 ENCOUNTER — ALLSCRIPTS OFFICE VISIT (OUTPATIENT)
Dept: OTHER | Facility: OTHER | Age: 57
End: 2017-09-01

## 2017-09-01 ENCOUNTER — OFFICE VISIT (OUTPATIENT)
Dept: LAB | Facility: HOSPITAL | Age: 57
End: 2017-09-01
Attending: SURGERY
Payer: COMMERCIAL

## 2017-09-01 ENCOUNTER — APPOINTMENT (OUTPATIENT)
Dept: LAB | Facility: CLINIC | Age: 57
End: 2017-09-01
Payer: COMMERCIAL

## 2017-09-01 ENCOUNTER — TRANSCRIBE ORDERS (OUTPATIENT)
Dept: ADMINISTRATIVE | Facility: HOSPITAL | Age: 57
End: 2017-09-01

## 2017-09-01 ENCOUNTER — APPOINTMENT (OUTPATIENT)
Dept: LAB | Facility: HOSPITAL | Age: 57
End: 2017-09-01
Attending: SURGERY
Payer: COMMERCIAL

## 2017-09-01 DIAGNOSIS — E11.9 TYPE 2 DIABETES MELLITUS WITHOUT COMPLICATIONS (HCC): ICD-10-CM

## 2017-09-01 DIAGNOSIS — Z93.3 COLOSTOMY STATUS (HCC): Primary | ICD-10-CM

## 2017-09-01 DIAGNOSIS — Z93.3 COLOSTOMY STATUS (HCC): ICD-10-CM

## 2017-09-01 LAB
ALBUMIN SERPL BCP-MCNC: 3.9 G/DL (ref 3.5–5)
ALP SERPL-CCNC: 75 U/L (ref 46–116)
ALT SERPL W P-5'-P-CCNC: 17 U/L (ref 12–78)
ANION GAP SERPL CALCULATED.3IONS-SCNC: 5 MMOL/L (ref 4–13)
AST SERPL W P-5'-P-CCNC: 8 U/L (ref 5–45)
ATRIAL RATE: 66 BPM
BILIRUB SERPL-MCNC: 0.77 MG/DL (ref 0.2–1)
BUN SERPL-MCNC: 14 MG/DL (ref 5–25)
CALCIUM SERPL-MCNC: 9.7 MG/DL (ref 8.3–10.1)
CHLORIDE SERPL-SCNC: 101 MMOL/L (ref 100–108)
CO2 SERPL-SCNC: 32 MMOL/L (ref 21–32)
CREAT SERPL-MCNC: 0.66 MG/DL (ref 0.6–1.3)
ERYTHROCYTE [DISTWIDTH] IN BLOOD BY AUTOMATED COUNT: 16 % (ref 11.6–15.1)
EST. AVERAGE GLUCOSE BLD GHB EST-MCNC: 103 MG/DL
GFR SERPL CREATININE-BSD FRML MDRD: 114 ML/MIN/1.73SQ M
GLUCOSE P FAST SERPL-MCNC: 109 MG/DL (ref 65–99)
HBA1C MFR BLD: 5.2 % (ref 4.2–6.3)
HCT VFR BLD AUTO: 43.2 % (ref 34.8–46.1)
HGB BLD-MCNC: 14.3 G/DL (ref 11.5–15.4)
MCH RBC QN AUTO: 30.9 PG (ref 26.8–34.3)
MCHC RBC AUTO-ENTMCNC: 33.1 G/DL (ref 31.4–37.4)
MCV RBC AUTO: 93 FL (ref 82–98)
P AXIS: 63 DEGREES
PLATELET # BLD AUTO: 369 THOUSANDS/UL (ref 149–390)
PMV BLD AUTO: 10.3 FL (ref 8.9–12.7)
POTASSIUM SERPL-SCNC: 3.7 MMOL/L (ref 3.5–5.3)
PR INTERVAL: 264 MS
PREALB SERPL-MCNC: 22.7 MG/DL (ref 18–40)
PROT SERPL-MCNC: 8.4 G/DL (ref 6.4–8.2)
QRS AXIS: -10 DEGREES
QRSD INTERVAL: 88 MS
QT INTERVAL: 402 MS
QTC INTERVAL: 421 MS
RBC # BLD AUTO: 4.63 MILLION/UL (ref 3.81–5.12)
SODIUM SERPL-SCNC: 138 MMOL/L (ref 136–145)
T WAVE AXIS: 256 DEGREES
VENTRICULAR RATE: 66 BPM
WBC # BLD AUTO: 8.41 THOUSAND/UL (ref 4.31–10.16)

## 2017-09-01 PROCEDURE — 84134 ASSAY OF PREALBUMIN: CPT

## 2017-09-01 PROCEDURE — 87081 CULTURE SCREEN ONLY: CPT

## 2017-09-01 PROCEDURE — 85027 COMPLETE CBC AUTOMATED: CPT

## 2017-09-01 PROCEDURE — 83036 HEMOGLOBIN GLYCOSYLATED A1C: CPT

## 2017-09-01 PROCEDURE — 93005 ELECTROCARDIOGRAM TRACING: CPT

## 2017-09-01 PROCEDURE — 36415 COLL VENOUS BLD VENIPUNCTURE: CPT

## 2017-09-01 PROCEDURE — 80053 COMPREHEN METABOLIC PANEL: CPT

## 2017-09-02 LAB — MRSA NOSE QL CULT: NORMAL

## 2017-09-07 ENCOUNTER — ANESTHESIA EVENT (OUTPATIENT)
Dept: PERIOP | Facility: HOSPITAL | Age: 57
DRG: 330 | End: 2017-09-07
Payer: COMMERCIAL

## 2017-09-08 ENCOUNTER — HOSPITAL ENCOUNTER (INPATIENT)
Facility: HOSPITAL | Age: 57
LOS: 6 days | Discharge: HOME | DRG: 330 | End: 2017-09-14
Attending: SURGERY | Admitting: SURGERY
Payer: COMMERCIAL

## 2017-09-08 ENCOUNTER — ANESTHESIA (OUTPATIENT)
Dept: PERIOP | Facility: HOSPITAL | Age: 57
DRG: 330 | End: 2017-09-08
Payer: COMMERCIAL

## 2017-09-08 DIAGNOSIS — I10 ESSENTIAL HYPERTENSION: Chronic | ICD-10-CM

## 2017-09-08 DIAGNOSIS — J45.909 UNCOMPLICATED ASTHMA, UNSPECIFIED ASTHMA SEVERITY: ICD-10-CM

## 2017-09-08 DIAGNOSIS — I50.22 CHRONIC SYSTOLIC HEART FAILURE (HCC): Chronic | ICD-10-CM

## 2017-09-08 DIAGNOSIS — E61.2 MAGNESIUM DEFICIENCY: ICD-10-CM

## 2017-09-08 DIAGNOSIS — E11.9 CONTROLLED TYPE 2 DIABETES MELLITUS WITHOUT COMPLICATION, WITHOUT LONG-TERM CURRENT USE OF INSULIN (HCC): Primary | ICD-10-CM

## 2017-09-08 PROBLEM — IMO0002 HISTORY OF COLOSTOMY: Chronic | Status: ACTIVE | Noted: 2017-09-08

## 2017-09-08 LAB
ABO GROUP BLD: NORMAL
BLD GP AB SCN SERPL QL: NEGATIVE
GLUCOSE SERPL-MCNC: 139 MG/DL (ref 65–140)
GLUCOSE SERPL-MCNC: 139 MG/DL (ref 65–140)
GLUCOSE SERPL-MCNC: 75 MG/DL (ref 65–140)
RH BLD: POSITIVE
SPECIMEN EXPIRATION DATE: NORMAL

## 2017-09-08 PROCEDURE — 94760 N-INVAS EAR/PLS OXIMETRY 1: CPT

## 2017-09-08 PROCEDURE — 0DQM0ZZ REPAIR DESCENDING COLON, OPEN APPROACH: ICD-10-PCS | Performed by: SURGERY

## 2017-09-08 PROCEDURE — 86850 RBC ANTIBODY SCREEN: CPT | Performed by: ANESTHESIOLOGY

## 2017-09-08 PROCEDURE — C9113 INJ PANTOPRAZOLE SODIUM, VIA: HCPCS | Performed by: PHYSICIAN ASSISTANT

## 2017-09-08 PROCEDURE — 86900 BLOOD TYPING SEROLOGIC ABO: CPT | Performed by: ANESTHESIOLOGY

## 2017-09-08 PROCEDURE — 0WQF0ZZ REPAIR ABDOMINAL WALL, OPEN APPROACH: ICD-10-PCS | Performed by: SURGERY

## 2017-09-08 PROCEDURE — 94640 AIRWAY INHALATION TREATMENT: CPT

## 2017-09-08 PROCEDURE — 86901 BLOOD TYPING SEROLOGIC RH(D): CPT | Performed by: ANESTHESIOLOGY

## 2017-09-08 PROCEDURE — 0DQP0ZZ REPAIR RECTUM, OPEN APPROACH: ICD-10-PCS | Performed by: SURGERY

## 2017-09-08 PROCEDURE — 82948 REAGENT STRIP/BLOOD GLUCOSE: CPT

## 2017-09-08 RX ORDER — LIDOCAINE HYDROCHLORIDE 10 MG/ML
INJECTION, SOLUTION INFILTRATION; PERINEURAL AS NEEDED
Status: DISCONTINUED | OUTPATIENT
Start: 2017-09-08 | End: 2017-09-08 | Stop reason: SURG

## 2017-09-08 RX ORDER — LABETALOL HYDROCHLORIDE 5 MG/ML
INJECTION, SOLUTION INTRAVENOUS AS NEEDED
Status: DISCONTINUED | OUTPATIENT
Start: 2017-09-08 | End: 2017-09-08 | Stop reason: SURG

## 2017-09-08 RX ORDER — MIDAZOLAM HYDROCHLORIDE 1 MG/ML
INJECTION INTRAMUSCULAR; INTRAVENOUS AS NEEDED
Status: DISCONTINUED | OUTPATIENT
Start: 2017-09-08 | End: 2017-09-08 | Stop reason: SURG

## 2017-09-08 RX ORDER — HYDRALAZINE HYDROCHLORIDE 20 MG/ML
INJECTION INTRAMUSCULAR; INTRAVENOUS AS NEEDED
Status: DISCONTINUED | OUTPATIENT
Start: 2017-09-08 | End: 2017-09-08 | Stop reason: SURG

## 2017-09-08 RX ORDER — LEVALBUTEROL 1.25 MG/.5ML
1.25 SOLUTION, CONCENTRATE RESPIRATORY (INHALATION)
Status: DISCONTINUED | OUTPATIENT
Start: 2017-09-08 | End: 2017-09-09

## 2017-09-08 RX ORDER — SODIUM CHLORIDE 9 MG/ML
125 INJECTION, SOLUTION INTRAVENOUS CONTINUOUS
Status: DISCONTINUED | OUTPATIENT
Start: 2017-09-08 | End: 2017-09-09

## 2017-09-08 RX ORDER — ONDANSETRON 2 MG/ML
4 INJECTION INTRAMUSCULAR; INTRAVENOUS ONCE
Status: DISCONTINUED | OUTPATIENT
Start: 2017-09-08 | End: 2017-09-08 | Stop reason: HOSPADM

## 2017-09-08 RX ORDER — LEVALBUTEROL 1.25 MG/.5ML
1.25 SOLUTION, CONCENTRATE RESPIRATORY (INHALATION)
Status: DISCONTINUED | OUTPATIENT
Start: 2017-09-08 | End: 2017-09-08

## 2017-09-08 RX ORDER — SODIUM CHLORIDE 9 MG/ML
INJECTION, SOLUTION INTRAVENOUS CONTINUOUS PRN
Status: DISCONTINUED | OUTPATIENT
Start: 2017-09-08 | End: 2017-09-08 | Stop reason: SURG

## 2017-09-08 RX ORDER — SODIUM CHLORIDE, SODIUM LACTATE, POTASSIUM CHLORIDE, CALCIUM CHLORIDE 600; 310; 30; 20 MG/100ML; MG/100ML; MG/100ML; MG/100ML
INJECTION, SOLUTION INTRAVENOUS CONTINUOUS PRN
Status: DISCONTINUED | OUTPATIENT
Start: 2017-09-08 | End: 2017-09-08 | Stop reason: SURG

## 2017-09-08 RX ORDER — ONDANSETRON 2 MG/ML
4 INJECTION INTRAMUSCULAR; INTRAVENOUS EVERY 6 HOURS PRN
Status: DISCONTINUED | OUTPATIENT
Start: 2017-09-08 | End: 2017-09-14 | Stop reason: HOSPADM

## 2017-09-08 RX ORDER — PROPOFOL 10 MG/ML
INJECTION, EMULSION INTRAVENOUS AS NEEDED
Status: DISCONTINUED | OUTPATIENT
Start: 2017-09-08 | End: 2017-09-08 | Stop reason: SURG

## 2017-09-08 RX ORDER — ROCURONIUM BROMIDE 10 MG/ML
INJECTION, SOLUTION INTRAVENOUS AS NEEDED
Status: DISCONTINUED | OUTPATIENT
Start: 2017-09-08 | End: 2017-09-08 | Stop reason: SURG

## 2017-09-08 RX ORDER — FENTANYL CITRATE/PF 50 MCG/ML
25 SYRINGE (ML) INJECTION
Status: DISCONTINUED | OUTPATIENT
Start: 2017-09-08 | End: 2017-09-08 | Stop reason: HOSPADM

## 2017-09-08 RX ORDER — MAGNESIUM HYDROXIDE 1200 MG/15ML
LIQUID ORAL AS NEEDED
Status: DISCONTINUED | OUTPATIENT
Start: 2017-09-08 | End: 2017-09-08 | Stop reason: HOSPADM

## 2017-09-08 RX ORDER — BUPIVACAINE HYDROCHLORIDE 2.5 MG/ML
INJECTION, SOLUTION INFILTRATION; PERINEURAL AS NEEDED
Status: DISCONTINUED | OUTPATIENT
Start: 2017-09-08 | End: 2017-09-08 | Stop reason: SURG

## 2017-09-08 RX ORDER — PANTOPRAZOLE SODIUM 40 MG/1
40 INJECTION, POWDER, FOR SOLUTION INTRAVENOUS
Status: DISCONTINUED | OUTPATIENT
Start: 2017-09-08 | End: 2017-09-14 | Stop reason: HOSPADM

## 2017-09-08 RX ORDER — ACETAMINOPHEN 325 MG/1
650 TABLET ORAL EVERY 6 HOURS PRN
Status: DISCONTINUED | OUTPATIENT
Start: 2017-09-08 | End: 2017-09-14 | Stop reason: HOSPADM

## 2017-09-08 RX ORDER — ONDANSETRON 2 MG/ML
INJECTION INTRAMUSCULAR; INTRAVENOUS AS NEEDED
Status: DISCONTINUED | OUTPATIENT
Start: 2017-09-08 | End: 2017-09-08 | Stop reason: SURG

## 2017-09-08 RX ORDER — DEXMEDETOMIDINE HYDROCHLORIDE 100 UG/ML
INJECTION, SOLUTION INTRAVENOUS AS NEEDED
Status: DISCONTINUED | OUTPATIENT
Start: 2017-09-08 | End: 2017-09-08 | Stop reason: SURG

## 2017-09-08 RX ORDER — SODIUM CHLORIDE, SODIUM LACTATE, POTASSIUM CHLORIDE, CALCIUM CHLORIDE 600; 310; 30; 20 MG/100ML; MG/100ML; MG/100ML; MG/100ML
100 INJECTION, SOLUTION INTRAVENOUS
Status: COMPLETED | OUTPATIENT
Start: 2017-09-08 | End: 2017-09-08

## 2017-09-08 RX ORDER — FENTANYL CITRATE 50 UG/ML
INJECTION, SOLUTION INTRAMUSCULAR; INTRAVENOUS AS NEEDED
Status: DISCONTINUED | OUTPATIENT
Start: 2017-09-08 | End: 2017-09-08 | Stop reason: SURG

## 2017-09-08 RX ORDER — SUCCINYLCHOLINE CHLORIDE 20 MG/ML
INJECTION INTRAMUSCULAR; INTRAVENOUS AS NEEDED
Status: DISCONTINUED | OUTPATIENT
Start: 2017-09-08 | End: 2017-09-08 | Stop reason: SURG

## 2017-09-08 RX ORDER — GLYCOPYRROLATE 0.2 MG/ML
INJECTION INTRAMUSCULAR; INTRAVENOUS AS NEEDED
Status: DISCONTINUED | OUTPATIENT
Start: 2017-09-08 | End: 2017-09-08 | Stop reason: SURG

## 2017-09-08 RX ADMIN — LEVALBUTEROL HYDROCHLORIDE 1.25 MG: 1.25 SOLUTION, CONCENTRATE RESPIRATORY (INHALATION) at 20:37

## 2017-09-08 RX ADMIN — BUPIVACAINE HYDROCHLORIDE 25 ML: 2.5 INJECTION, SOLUTION INFILTRATION; PERINEURAL at 14:34

## 2017-09-08 RX ADMIN — PANTOPRAZOLE SODIUM 40 MG: 40 INJECTION, POWDER, FOR SOLUTION INTRAVENOUS at 18:18

## 2017-09-08 RX ADMIN — SODIUM CHLORIDE, SODIUM LACTATE, POTASSIUM CHLORIDE, AND CALCIUM CHLORIDE: .6; .31; .03; .02 INJECTION, SOLUTION INTRAVENOUS at 10:36

## 2017-09-08 RX ADMIN — SODIUM CHLORIDE 125 ML/HR: 0.9 INJECTION, SOLUTION INTRAVENOUS at 23:06

## 2017-09-08 RX ADMIN — METRONIDAZOLE 500 MG: 500 INJECTION, SOLUTION INTRAVENOUS at 18:18

## 2017-09-08 RX ADMIN — HYDRALAZINE HYDROCHLORIDE 5 MG: 20 INJECTION INTRAMUSCULAR; INTRAVENOUS at 13:20

## 2017-09-08 RX ADMIN — FENTANYL CITRATE 25 MCG: 50 INJECTION INTRAMUSCULAR; INTRAVENOUS at 15:01

## 2017-09-08 RX ADMIN — FENTANYL CITRATE 25 MCG: 50 INJECTION INTRAMUSCULAR; INTRAVENOUS at 14:50

## 2017-09-08 RX ADMIN — HYDRALAZINE HYDROCHLORIDE 5 MG: 20 INJECTION INTRAMUSCULAR; INTRAVENOUS at 13:15

## 2017-09-08 RX ADMIN — METRONIDAZOLE 500 MG: 500 INJECTION, SOLUTION INTRAVENOUS at 11:27

## 2017-09-08 RX ADMIN — SODIUM CHLORIDE: 0.9 INJECTION, SOLUTION INTRAVENOUS at 11:32

## 2017-09-08 RX ADMIN — IPRATROPIUM BROMIDE 0.5 MG: 0.5 SOLUTION RESPIRATORY (INHALATION) at 20:37

## 2017-09-08 RX ADMIN — ENOXAPARIN SODIUM 40 MG: 40 INJECTION SUBCUTANEOUS at 10:33

## 2017-09-08 RX ADMIN — BUPIVACAINE HYDROCHLORIDE 25 ML: 2.5 INJECTION, SOLUTION INFILTRATION; PERINEURAL at 14:27

## 2017-09-08 RX ADMIN — HYDROMORPHONE HYDROCHLORIDE 0.2 MG: 1 INJECTION, SOLUTION INTRAMUSCULAR; INTRAVENOUS; SUBCUTANEOUS at 15:18

## 2017-09-08 RX ADMIN — HYDROMORPHONE HYDROCHLORIDE 1 MG: 1 INJECTION, SOLUTION INTRAMUSCULAR; INTRAVENOUS; SUBCUTANEOUS at 12:50

## 2017-09-08 RX ADMIN — HYDROMORPHONE HYDROCHLORIDE 0.2 MG: 1 INJECTION, SOLUTION INTRAMUSCULAR; INTRAVENOUS; SUBCUTANEOUS at 15:04

## 2017-09-08 RX ADMIN — DEXMEDETOMIDINE 12 MCG: 100 INJECTION, SOLUTION, CONCENTRATE INTRAVENOUS at 13:20

## 2017-09-08 RX ADMIN — MIDAZOLAM HYDROCHLORIDE 2 MG: 1 INJECTION, SOLUTION INTRAMUSCULAR; INTRAVENOUS at 11:18

## 2017-09-08 RX ADMIN — ROCURONIUM BROMIDE 50 MG: 10 INJECTION, SOLUTION INTRAVENOUS at 11:40

## 2017-09-08 RX ADMIN — FENTANYL CITRATE 50 MCG: 50 INJECTION, SOLUTION INTRAMUSCULAR; INTRAVENOUS at 11:50

## 2017-09-08 RX ADMIN — GLYCOPYRROLATE 0.4 MG: 0.2 INJECTION, SOLUTION INTRAMUSCULAR; INTRAVENOUS at 14:15

## 2017-09-08 RX ADMIN — FENTANYL CITRATE 25 MCG: 50 INJECTION INTRAMUSCULAR; INTRAVENOUS at 14:54

## 2017-09-08 RX ADMIN — HYDROMORPHONE HYDROCHLORIDE 0.2 MG: 1 INJECTION, SOLUTION INTRAMUSCULAR; INTRAVENOUS; SUBCUTANEOUS at 15:23

## 2017-09-08 RX ADMIN — GLUCAGON HYDROCHLORIDE 1 MG: KIT at 12:16

## 2017-09-08 RX ADMIN — NEOSTIGMINE METHYLSULFATE 3 MG: 1 INJECTION, SOLUTION INTRAMUSCULAR; INTRAVENOUS; SUBCUTANEOUS at 14:15

## 2017-09-08 RX ADMIN — HYDROMORPHONE HYDROCHLORIDE 0.2 MG: 1 INJECTION, SOLUTION INTRAMUSCULAR; INTRAVENOUS; SUBCUTANEOUS at 15:10

## 2017-09-08 RX ADMIN — SUCCINYLCHOLINE CHLORIDE 100 MG: 20 INJECTION, SOLUTION INTRAMUSCULAR; INTRAVENOUS at 11:27

## 2017-09-08 RX ADMIN — SODIUM CHLORIDE 125 ML/HR: 0.9 INJECTION, SOLUTION INTRAVENOUS at 16:22

## 2017-09-08 RX ADMIN — FENTANYL CITRATE 25 MCG: 50 INJECTION INTRAMUSCULAR; INTRAVENOUS at 14:58

## 2017-09-08 RX ADMIN — DEXAMETHASONE SODIUM PHOSPHATE 10 MG: 10 INJECTION INTRAMUSCULAR; INTRAVENOUS at 11:42

## 2017-09-08 RX ADMIN — ROCURONIUM BROMIDE 10 MG: 10 INJECTION, SOLUTION INTRAVENOUS at 13:00

## 2017-09-08 RX ADMIN — CEFAZOLIN SODIUM 2000 MG: 2 SOLUTION INTRAVENOUS at 18:18

## 2017-09-08 RX ADMIN — SODIUM CHLORIDE, SODIUM LACTATE, POTASSIUM CHLORIDE, AND CALCIUM CHLORIDE: .6; .31; .03; .02 INJECTION, SOLUTION INTRAVENOUS at 12:50

## 2017-09-08 RX ADMIN — LABETALOL HYDROCHLORIDE 10 MG: 5 INJECTION, SOLUTION INTRAVENOUS at 11:57

## 2017-09-08 RX ADMIN — PROPOFOL 200 MG: 10 INJECTION, EMULSION INTRAVENOUS at 11:26

## 2017-09-08 RX ADMIN — FENTANYL CITRATE 50 MCG: 50 INJECTION, SOLUTION INTRAMUSCULAR; INTRAVENOUS at 11:26

## 2017-09-08 RX ADMIN — CEFAZOLIN SODIUM 2000 MG: 2 SOLUTION INTRAVENOUS at 11:21

## 2017-09-08 RX ADMIN — Medication: at 16:07

## 2017-09-08 RX ADMIN — DEXMEDETOMIDINE 12 MCG: 100 INJECTION, SOLUTION, CONCENTRATE INTRAVENOUS at 12:20

## 2017-09-08 RX ADMIN — HYDROMORPHONE HYDROCHLORIDE 0.2 MG: 1 INJECTION, SOLUTION INTRAMUSCULAR; INTRAVENOUS; SUBCUTANEOUS at 15:29

## 2017-09-08 RX ADMIN — LIDOCAINE HYDROCHLORIDE 50 MG: 10 INJECTION, SOLUTION INFILTRATION; PERINEURAL at 11:26

## 2017-09-08 RX ADMIN — LABETALOL HYDROCHLORIDE 10 MG: 5 INJECTION, SOLUTION INTRAVENOUS at 12:30

## 2017-09-08 RX ADMIN — HYDRALAZINE HYDROCHLORIDE 5 MG: 20 INJECTION INTRAMUSCULAR; INTRAVENOUS at 13:45

## 2017-09-08 RX ADMIN — DEXMEDETOMIDINE 16 MCG: 100 INJECTION, SOLUTION, CONCENTRATE INTRAVENOUS at 11:31

## 2017-09-08 RX ADMIN — ONDANSETRON 4 MG: 2 INJECTION INTRAMUSCULAR; INTRAVENOUS at 13:47

## 2017-09-08 RX ADMIN — HYDRALAZINE HYDROCHLORIDE 5 MG: 20 INJECTION INTRAMUSCULAR; INTRAVENOUS at 14:30

## 2017-09-09 PROBLEM — E78.5 HYPERLIPIDEMIA: Status: ACTIVE | Noted: 2017-09-09

## 2017-09-09 PROBLEM — I10 HYPERTENSION: Status: ACTIVE | Noted: 2017-09-09

## 2017-09-09 PROBLEM — E11.9 DIABETES MELLITUS (HCC): Status: ACTIVE | Noted: 2017-09-09

## 2017-09-09 LAB
ALBUMIN SERPL BCP-MCNC: 2.8 G/DL (ref 3.5–5)
ALP SERPL-CCNC: 52 U/L (ref 46–116)
ALT SERPL W P-5'-P-CCNC: 15 U/L (ref 12–78)
ANION GAP SERPL CALCULATED.3IONS-SCNC: 11 MMOL/L (ref 4–13)
AST SERPL W P-5'-P-CCNC: 15 U/L (ref 5–45)
BILIRUB SERPL-MCNC: 0.6 MG/DL (ref 0.2–1)
BUN SERPL-MCNC: 18 MG/DL (ref 5–25)
CALCIUM SERPL-MCNC: 8.3 MG/DL (ref 8.3–10.1)
CHLORIDE SERPL-SCNC: 107 MMOL/L (ref 100–108)
CO2 SERPL-SCNC: 23 MMOL/L (ref 21–32)
CREAT SERPL-MCNC: 0.8 MG/DL (ref 0.6–1.3)
ERYTHROCYTE [DISTWIDTH] IN BLOOD BY AUTOMATED COUNT: 14.9 % (ref 11.6–15.1)
GFR SERPL CREATININE-BSD FRML MDRD: 95 ML/MIN/1.73SQ M
GLUCOSE SERPL-MCNC: 138 MG/DL (ref 65–140)
GLUCOSE SERPL-MCNC: 141 MG/DL (ref 65–140)
GLUCOSE SERPL-MCNC: 179 MG/DL (ref 65–140)
GLUCOSE SERPL-MCNC: 192 MG/DL (ref 65–140)
HCT VFR BLD AUTO: 38.1 % (ref 34.8–46.1)
HGB BLD-MCNC: 12.4 G/DL (ref 11.5–15.4)
MCH RBC QN AUTO: 31.1 PG (ref 26.8–34.3)
MCHC RBC AUTO-ENTMCNC: 32.5 G/DL (ref 31.4–37.4)
MCV RBC AUTO: 96 FL (ref 82–98)
PLATELET # BLD AUTO: 297 THOUSANDS/UL (ref 149–390)
PMV BLD AUTO: 9.2 FL (ref 8.9–12.7)
POTASSIUM SERPL-SCNC: 4 MMOL/L (ref 3.5–5.3)
PROT SERPL-MCNC: 6.4 G/DL (ref 6.4–8.2)
RBC # BLD AUTO: 3.99 MILLION/UL (ref 3.81–5.12)
SODIUM SERPL-SCNC: 141 MMOL/L (ref 136–145)
WBC # BLD AUTO: 8.55 THOUSAND/UL (ref 4.31–10.16)

## 2017-09-09 PROCEDURE — C9113 INJ PANTOPRAZOLE SODIUM, VIA: HCPCS | Performed by: PHYSICIAN ASSISTANT

## 2017-09-09 PROCEDURE — 94760 N-INVAS EAR/PLS OXIMETRY 1: CPT

## 2017-09-09 PROCEDURE — 85027 COMPLETE CBC AUTOMATED: CPT | Performed by: PHYSICIAN ASSISTANT

## 2017-09-09 PROCEDURE — 94640 AIRWAY INHALATION TREATMENT: CPT

## 2017-09-09 PROCEDURE — 82948 REAGENT STRIP/BLOOD GLUCOSE: CPT

## 2017-09-09 PROCEDURE — 80053 COMPREHEN METABOLIC PANEL: CPT | Performed by: PHYSICIAN ASSISTANT

## 2017-09-09 RX ORDER — LEVALBUTEROL 1.25 MG/.5ML
1.25 SOLUTION, CONCENTRATE RESPIRATORY (INHALATION) EVERY 8 HOURS PRN
Status: DISCONTINUED | OUTPATIENT
Start: 2017-09-09 | End: 2017-09-09

## 2017-09-09 RX ORDER — METOPROLOL SUCCINATE 100 MG/1
100 TABLET, EXTENDED RELEASE ORAL DAILY
Status: DISCONTINUED | OUTPATIENT
Start: 2017-09-09 | End: 2017-09-14 | Stop reason: HOSPADM

## 2017-09-09 RX ORDER — SODIUM CHLORIDE FOR INHALATION 0.9 %
3 VIAL, NEBULIZER (ML) INHALATION EVERY 8 HOURS PRN
Status: DISCONTINUED | OUTPATIENT
Start: 2017-09-09 | End: 2017-09-09

## 2017-09-09 RX ORDER — SODIUM CHLORIDE FOR INHALATION 0.9 %
3 VIAL, NEBULIZER (ML) INHALATION EVERY MORNING
Status: DISCONTINUED | OUTPATIENT
Start: 2017-09-10 | End: 2017-09-14 | Stop reason: HOSPADM

## 2017-09-09 RX ORDER — DEXTROSE, SODIUM CHLORIDE, AND POTASSIUM CHLORIDE 5; .45; .15 G/100ML; G/100ML; G/100ML
75 INJECTION INTRAVENOUS CONTINUOUS
Status: DISCONTINUED | OUTPATIENT
Start: 2017-09-09 | End: 2017-09-12

## 2017-09-09 RX ORDER — LEVALBUTEROL 1.25 MG/.5ML
1.25 SOLUTION, CONCENTRATE RESPIRATORY (INHALATION) EVERY MORNING
Status: DISCONTINUED | OUTPATIENT
Start: 2017-09-10 | End: 2017-09-14 | Stop reason: HOSPADM

## 2017-09-09 RX ADMIN — LEVALBUTEROL HYDROCHLORIDE 1.25 MG: 1.25 SOLUTION, CONCENTRATE RESPIRATORY (INHALATION) at 08:20

## 2017-09-09 RX ADMIN — METRONIDAZOLE 500 MG: 500 INJECTION, SOLUTION INTRAVENOUS at 18:29

## 2017-09-09 RX ADMIN — CEFAZOLIN SODIUM 2000 MG: 2 SOLUTION INTRAVENOUS at 17:18

## 2017-09-09 RX ADMIN — IPRATROPIUM BROMIDE 0.5 MG: 0.5 SOLUTION RESPIRATORY (INHALATION) at 08:20

## 2017-09-09 RX ADMIN — METRONIDAZOLE 500 MG: 500 INJECTION, SOLUTION INTRAVENOUS at 09:26

## 2017-09-09 RX ADMIN — DEXTROSE, SODIUM CHLORIDE, AND POTASSIUM CHLORIDE 75 ML/HR: 5; .45; .15 INJECTION INTRAVENOUS at 13:19

## 2017-09-09 RX ADMIN — CEFAZOLIN SODIUM 2000 MG: 2 SOLUTION INTRAVENOUS at 09:26

## 2017-09-09 RX ADMIN — INSULIN LISPRO 1 UNITS: 100 INJECTION, SOLUTION INTRAVENOUS; SUBCUTANEOUS at 22:40

## 2017-09-09 RX ADMIN — LEVALBUTEROL HYDROCHLORIDE 1.25 MG: 1.25 SOLUTION, CONCENTRATE RESPIRATORY (INHALATION) at 13:38

## 2017-09-09 RX ADMIN — LEVALBUTEROL HYDROCHLORIDE 1.25 MG: 1.25 SOLUTION, CONCENTRATE RESPIRATORY (INHALATION) at 19:41

## 2017-09-09 RX ADMIN — PANTOPRAZOLE SODIUM 40 MG: 40 INJECTION, POWDER, FOR SOLUTION INTRAVENOUS at 09:26

## 2017-09-09 RX ADMIN — IPRATROPIUM BROMIDE 0.5 MG: 0.5 SOLUTION RESPIRATORY (INHALATION) at 13:38

## 2017-09-09 RX ADMIN — METRONIDAZOLE 500 MG: 500 INJECTION, SOLUTION INTRAVENOUS at 00:44

## 2017-09-09 RX ADMIN — IPRATROPIUM BROMIDE 0.5 MG: 0.5 SOLUTION RESPIRATORY (INHALATION) at 19:41

## 2017-09-09 RX ADMIN — DEXTROSE, SODIUM CHLORIDE, AND POTASSIUM CHLORIDE 75 ML/HR: 5; .45; .15 INJECTION INTRAVENOUS at 21:33

## 2017-09-09 RX ADMIN — SODIUM CHLORIDE 125 ML/HR: 0.9 INJECTION, SOLUTION INTRAVENOUS at 07:38

## 2017-09-09 RX ADMIN — CEFAZOLIN SODIUM 2000 MG: 2 SOLUTION INTRAVENOUS at 00:20

## 2017-09-09 RX ADMIN — METOPROLOL SUCCINATE 100 MG: 100 TABLET, FILM COATED, EXTENDED RELEASE ORAL at 13:25

## 2017-09-10 LAB
ANION GAP SERPL CALCULATED.3IONS-SCNC: 7 MMOL/L (ref 4–13)
BUN SERPL-MCNC: 12 MG/DL (ref 5–25)
CALCIUM SERPL-MCNC: 8.1 MG/DL (ref 8.3–10.1)
CHLORIDE SERPL-SCNC: 104 MMOL/L (ref 100–108)
CO2 SERPL-SCNC: 26 MMOL/L (ref 21–32)
CREAT SERPL-MCNC: 0.59 MG/DL (ref 0.6–1.3)
ERYTHROCYTE [DISTWIDTH] IN BLOOD BY AUTOMATED COUNT: 15 % (ref 11.6–15.1)
GFR SERPL CREATININE-BSD FRML MDRD: 118 ML/MIN/1.73SQ M
GLUCOSE SERPL-MCNC: 126 MG/DL (ref 65–140)
GLUCOSE SERPL-MCNC: 138 MG/DL (ref 65–140)
GLUCOSE SERPL-MCNC: 175 MG/DL (ref 65–140)
GLUCOSE SERPL-MCNC: 180 MG/DL (ref 65–140)
HCT VFR BLD AUTO: 33.9 % (ref 34.8–46.1)
HGB BLD-MCNC: 11 G/DL (ref 11.5–15.4)
MCH RBC QN AUTO: 30.6 PG (ref 26.8–34.3)
MCHC RBC AUTO-ENTMCNC: 32.4 G/DL (ref 31.4–37.4)
MCV RBC AUTO: 94 FL (ref 82–98)
PLATELET # BLD AUTO: 256 THOUSANDS/UL (ref 149–390)
PMV BLD AUTO: 9.4 FL (ref 8.9–12.7)
POTASSIUM SERPL-SCNC: 4.1 MMOL/L (ref 3.5–5.3)
RBC # BLD AUTO: 3.59 MILLION/UL (ref 3.81–5.12)
SODIUM SERPL-SCNC: 137 MMOL/L (ref 136–145)
WBC # BLD AUTO: 8.69 THOUSAND/UL (ref 4.31–10.16)

## 2017-09-10 PROCEDURE — 94640 AIRWAY INHALATION TREATMENT: CPT

## 2017-09-10 PROCEDURE — C9113 INJ PANTOPRAZOLE SODIUM, VIA: HCPCS | Performed by: PHYSICIAN ASSISTANT

## 2017-09-10 PROCEDURE — 82948 REAGENT STRIP/BLOOD GLUCOSE: CPT

## 2017-09-10 PROCEDURE — 94760 N-INVAS EAR/PLS OXIMETRY 1: CPT

## 2017-09-10 PROCEDURE — 80048 BASIC METABOLIC PNL TOTAL CA: CPT | Performed by: FAMILY MEDICINE

## 2017-09-10 PROCEDURE — 85027 COMPLETE CBC AUTOMATED: CPT | Performed by: FAMILY MEDICINE

## 2017-09-10 RX ORDER — HYDRALAZINE HYDROCHLORIDE 10 MG/1
10 TABLET, FILM COATED ORAL 2 TIMES DAILY
Status: DISCONTINUED | OUTPATIENT
Start: 2017-09-10 | End: 2017-09-14 | Stop reason: HOSPADM

## 2017-09-10 RX ORDER — LISINOPRIL 5 MG/1
5 TABLET ORAL DAILY
Status: DISCONTINUED | OUTPATIENT
Start: 2017-09-10 | End: 2017-09-12

## 2017-09-10 RX ADMIN — METRONIDAZOLE 500 MG: 500 INJECTION, SOLUTION INTRAVENOUS at 02:30

## 2017-09-10 RX ADMIN — METRONIDAZOLE 500 MG: 500 INJECTION, SOLUTION INTRAVENOUS at 19:14

## 2017-09-10 RX ADMIN — CEFAZOLIN SODIUM 2000 MG: 2 SOLUTION INTRAVENOUS at 01:49

## 2017-09-10 RX ADMIN — CEFAZOLIN SODIUM 2000 MG: 2 SOLUTION INTRAVENOUS at 17:05

## 2017-09-10 RX ADMIN — PANTOPRAZOLE SODIUM 40 MG: 40 INJECTION, POWDER, FOR SOLUTION INTRAVENOUS at 09:37

## 2017-09-10 RX ADMIN — DEXTROSE, SODIUM CHLORIDE, AND POTASSIUM CHLORIDE 75 ML/HR: 5; .45; .15 INJECTION INTRAVENOUS at 05:53

## 2017-09-10 RX ADMIN — ENOXAPARIN SODIUM 40 MG: 40 INJECTION SUBCUTANEOUS at 10:48

## 2017-09-10 RX ADMIN — ISODIUM CHLORIDE 3 ML: 0.03 SOLUTION RESPIRATORY (INHALATION) at 07:48

## 2017-09-10 RX ADMIN — HYDRALAZINE HYDROCHLORIDE 10 MG: 10 TABLET, FILM COATED ORAL at 18:50

## 2017-09-10 RX ADMIN — HYDRALAZINE HYDROCHLORIDE 10 MG: 10 TABLET, FILM COATED ORAL at 14:04

## 2017-09-10 RX ADMIN — LEVALBUTEROL HYDROCHLORIDE 1.25 MG: 1.25 SOLUTION, CONCENTRATE RESPIRATORY (INHALATION) at 07:48

## 2017-09-10 RX ADMIN — CEFAZOLIN SODIUM 2000 MG: 2 SOLUTION INTRAVENOUS at 09:36

## 2017-09-10 RX ADMIN — Medication: at 16:34

## 2017-09-10 RX ADMIN — METOPROLOL SUCCINATE 100 MG: 100 TABLET, FILM COATED, EXTENDED RELEASE ORAL at 09:37

## 2017-09-10 RX ADMIN — METRONIDAZOLE 500 MG: 500 INJECTION, SOLUTION INTRAVENOUS at 10:39

## 2017-09-10 RX ADMIN — LISINOPRIL 5 MG: 5 TABLET ORAL at 12:43

## 2017-09-11 LAB
ANION GAP SERPL CALCULATED.3IONS-SCNC: 6 MMOL/L (ref 4–13)
BUN SERPL-MCNC: 5 MG/DL (ref 5–25)
CALCIUM SERPL-MCNC: 8.7 MG/DL (ref 8.3–10.1)
CHLORIDE SERPL-SCNC: 100 MMOL/L (ref 100–108)
CO2 SERPL-SCNC: 29 MMOL/L (ref 21–32)
CREAT SERPL-MCNC: 0.6 MG/DL (ref 0.6–1.3)
GFR SERPL CREATININE-BSD FRML MDRD: 118 ML/MIN/1.73SQ M
GLUCOSE SERPL-MCNC: 126 MG/DL (ref 65–140)
GLUCOSE SERPL-MCNC: 128 MG/DL (ref 65–140)
GLUCOSE SERPL-MCNC: 133 MG/DL (ref 65–140)
GLUCOSE SERPL-MCNC: 137 MG/DL (ref 65–140)
GLUCOSE SERPL-MCNC: 138 MG/DL (ref 65–140)
POTASSIUM SERPL-SCNC: 3.8 MMOL/L (ref 3.5–5.3)
SODIUM SERPL-SCNC: 135 MMOL/L (ref 136–145)

## 2017-09-11 PROCEDURE — C9113 INJ PANTOPRAZOLE SODIUM, VIA: HCPCS | Performed by: PHYSICIAN ASSISTANT

## 2017-09-11 PROCEDURE — 94640 AIRWAY INHALATION TREATMENT: CPT

## 2017-09-11 PROCEDURE — 80048 BASIC METABOLIC PNL TOTAL CA: CPT | Performed by: FAMILY MEDICINE

## 2017-09-11 PROCEDURE — 94760 N-INVAS EAR/PLS OXIMETRY 1: CPT

## 2017-09-11 PROCEDURE — 82948 REAGENT STRIP/BLOOD GLUCOSE: CPT

## 2017-09-11 RX ADMIN — METOPROLOL SUCCINATE 100 MG: 100 TABLET, FILM COATED, EXTENDED RELEASE ORAL at 08:53

## 2017-09-11 RX ADMIN — DEXTROSE, SODIUM CHLORIDE, AND POTASSIUM CHLORIDE 75 ML/HR: 5; .45; .15 INJECTION INTRAVENOUS at 22:48

## 2017-09-11 RX ADMIN — HYDRALAZINE HYDROCHLORIDE 10 MG: 10 TABLET, FILM COATED ORAL at 08:53

## 2017-09-11 RX ADMIN — LEVALBUTEROL HYDROCHLORIDE 1.25 MG: 1.25 SOLUTION, CONCENTRATE RESPIRATORY (INHALATION) at 08:11

## 2017-09-11 RX ADMIN — ISODIUM CHLORIDE 3 ML: 0.03 SOLUTION RESPIRATORY (INHALATION) at 08:11

## 2017-09-11 RX ADMIN — ENOXAPARIN SODIUM 40 MG: 40 INJECTION SUBCUTANEOUS at 08:53

## 2017-09-11 RX ADMIN — Medication: at 18:37

## 2017-09-11 RX ADMIN — PANTOPRAZOLE SODIUM 40 MG: 40 INJECTION, POWDER, FOR SOLUTION INTRAVENOUS at 08:54

## 2017-09-11 RX ADMIN — CEFAZOLIN SODIUM 2000 MG: 2 SOLUTION INTRAVENOUS at 01:42

## 2017-09-11 RX ADMIN — METRONIDAZOLE 500 MG: 500 INJECTION, SOLUTION INTRAVENOUS at 08:55

## 2017-09-11 RX ADMIN — METRONIDAZOLE 500 MG: 500 INJECTION, SOLUTION INTRAVENOUS at 01:01

## 2017-09-11 RX ADMIN — CEFAZOLIN SODIUM 2000 MG: 2 SOLUTION INTRAVENOUS at 08:52

## 2017-09-11 RX ADMIN — LISINOPRIL 5 MG: 5 TABLET ORAL at 08:54

## 2017-09-11 RX ADMIN — HYDRALAZINE HYDROCHLORIDE 10 MG: 10 TABLET, FILM COATED ORAL at 18:48

## 2017-09-12 LAB
GLUCOSE SERPL-MCNC: 100 MG/DL (ref 65–140)
GLUCOSE SERPL-MCNC: 134 MG/DL (ref 65–140)
GLUCOSE SERPL-MCNC: 141 MG/DL (ref 65–140)
GLUCOSE SERPL-MCNC: 95 MG/DL (ref 65–140)

## 2017-09-12 PROCEDURE — 94760 N-INVAS EAR/PLS OXIMETRY 1: CPT

## 2017-09-12 PROCEDURE — 94640 AIRWAY INHALATION TREATMENT: CPT

## 2017-09-12 PROCEDURE — C9113 INJ PANTOPRAZOLE SODIUM, VIA: HCPCS | Performed by: PHYSICIAN ASSISTANT

## 2017-09-12 PROCEDURE — 82948 REAGENT STRIP/BLOOD GLUCOSE: CPT

## 2017-09-12 RX ORDER — SIMETHICONE 80 MG
80 TABLET,CHEWABLE ORAL EVERY 6 HOURS PRN
Status: DISCONTINUED | OUTPATIENT
Start: 2017-09-12 | End: 2017-09-14 | Stop reason: HOSPADM

## 2017-09-12 RX ORDER — BUTALBITAL, ACETAMINOPHEN AND CAFFEINE 50; 325; 40 MG/1; MG/1; MG/1
1 TABLET ORAL EVERY 6 HOURS PRN
Status: DISCONTINUED | OUTPATIENT
Start: 2017-09-12 | End: 2017-09-14 | Stop reason: HOSPADM

## 2017-09-12 RX ORDER — LISINOPRIL 20 MG/1
20 TABLET ORAL DAILY
Status: DISCONTINUED | OUTPATIENT
Start: 2017-09-13 | End: 2017-09-14 | Stop reason: HOSPADM

## 2017-09-12 RX ADMIN — ISODIUM CHLORIDE 3 ML: 0.03 SOLUTION RESPIRATORY (INHALATION) at 07:57

## 2017-09-12 RX ADMIN — HYDRALAZINE HYDROCHLORIDE 10 MG: 10 TABLET, FILM COATED ORAL at 17:28

## 2017-09-12 RX ADMIN — SIMETHICONE CHEW TAB 80 MG 80 MG: 80 TABLET ORAL at 09:28

## 2017-09-12 RX ADMIN — LISINOPRIL 5 MG: 5 TABLET ORAL at 09:29

## 2017-09-12 RX ADMIN — LEVALBUTEROL HYDROCHLORIDE 1.25 MG: 1.25 SOLUTION, CONCENTRATE RESPIRATORY (INHALATION) at 07:57

## 2017-09-12 RX ADMIN — METOPROLOL SUCCINATE 100 MG: 100 TABLET, FILM COATED, EXTENDED RELEASE ORAL at 09:21

## 2017-09-12 RX ADMIN — HYDRALAZINE HYDROCHLORIDE 10 MG: 10 TABLET, FILM COATED ORAL at 09:28

## 2017-09-12 RX ADMIN — Medication: at 19:52

## 2017-09-12 RX ADMIN — ACETAMINOPHEN 650 MG: 325 TABLET ORAL at 09:21

## 2017-09-12 RX ADMIN — PANTOPRAZOLE SODIUM 40 MG: 40 INJECTION, POWDER, FOR SOLUTION INTRAVENOUS at 09:22

## 2017-09-13 LAB
ANION GAP SERPL CALCULATED.3IONS-SCNC: 6 MMOL/L (ref 4–13)
BUN SERPL-MCNC: 5 MG/DL (ref 5–25)
CALCIUM SERPL-MCNC: 8.7 MG/DL (ref 8.3–10.1)
CHLORIDE SERPL-SCNC: 101 MMOL/L (ref 100–108)
CO2 SERPL-SCNC: 29 MMOL/L (ref 21–32)
CREAT SERPL-MCNC: 0.48 MG/DL (ref 0.6–1.3)
GFR SERPL CREATININE-BSD FRML MDRD: 127 ML/MIN/1.73SQ M
GLUCOSE SERPL-MCNC: 118 MG/DL (ref 65–140)
GLUCOSE SERPL-MCNC: 142 MG/DL (ref 65–140)
GLUCOSE SERPL-MCNC: 75 MG/DL (ref 65–140)
GLUCOSE SERPL-MCNC: 78 MG/DL (ref 65–140)
GLUCOSE SERPL-MCNC: 90 MG/DL (ref 65–140)
MAGNESIUM SERPL-MCNC: 1.3 MG/DL (ref 1.6–2.6)
POTASSIUM SERPL-SCNC: 3.6 MMOL/L (ref 3.5–5.3)
SODIUM SERPL-SCNC: 136 MMOL/L (ref 136–145)

## 2017-09-13 PROCEDURE — 83735 ASSAY OF MAGNESIUM: CPT | Performed by: SURGERY

## 2017-09-13 PROCEDURE — 82948 REAGENT STRIP/BLOOD GLUCOSE: CPT

## 2017-09-13 PROCEDURE — 94640 AIRWAY INHALATION TREATMENT: CPT

## 2017-09-13 PROCEDURE — 80048 BASIC METABOLIC PNL TOTAL CA: CPT | Performed by: SURGERY

## 2017-09-13 PROCEDURE — 94760 N-INVAS EAR/PLS OXIMETRY 1: CPT

## 2017-09-13 PROCEDURE — C9113 INJ PANTOPRAZOLE SODIUM, VIA: HCPCS | Performed by: PHYSICIAN ASSISTANT

## 2017-09-13 RX ORDER — MAGNESIUM SULFATE HEPTAHYDRATE 40 MG/ML
2 INJECTION, SOLUTION INTRAVENOUS ONCE
Status: DISCONTINUED | OUTPATIENT
Start: 2017-09-13 | End: 2017-09-13

## 2017-09-13 RX ORDER — OXYCODONE HYDROCHLORIDE 10 MG/1
10 TABLET ORAL EVERY 4 HOURS PRN
Status: DISCONTINUED | OUTPATIENT
Start: 2017-09-13 | End: 2017-09-14

## 2017-09-13 RX ORDER — MORPHINE SULFATE 4 MG/ML
4 INJECTION, SOLUTION INTRAMUSCULAR; INTRAVENOUS EVERY 4 HOURS PRN
Status: DISCONTINUED | OUTPATIENT
Start: 2017-09-13 | End: 2017-09-14

## 2017-09-13 RX ORDER — OXYCODONE HYDROCHLORIDE 5 MG/1
5 TABLET ORAL EVERY 4 HOURS PRN
Status: DISCONTINUED | OUTPATIENT
Start: 2017-09-13 | End: 2017-09-14

## 2017-09-13 RX ADMIN — OXYCODONE HYDROCHLORIDE 5 MG: 5 TABLET ORAL at 12:51

## 2017-09-13 RX ADMIN — METOPROLOL SUCCINATE 100 MG: 100 TABLET, FILM COATED, EXTENDED RELEASE ORAL at 09:05

## 2017-09-13 RX ADMIN — LEVALBUTEROL HYDROCHLORIDE 1.25 MG: 1.25 SOLUTION, CONCENTRATE RESPIRATORY (INHALATION) at 08:10

## 2017-09-13 RX ADMIN — LISINOPRIL 20 MG: 20 TABLET ORAL at 09:05

## 2017-09-13 RX ADMIN — ENOXAPARIN SODIUM 40 MG: 40 INJECTION SUBCUTANEOUS at 09:05

## 2017-09-13 RX ADMIN — Medication 800 MG: at 18:51

## 2017-09-13 RX ADMIN — HYDRALAZINE HYDROCHLORIDE 10 MG: 10 TABLET, FILM COATED ORAL at 09:07

## 2017-09-13 RX ADMIN — OXYCODONE HYDROCHLORIDE 5 MG: 5 TABLET ORAL at 18:56

## 2017-09-13 RX ADMIN — HYDRALAZINE HYDROCHLORIDE 10 MG: 10 TABLET, FILM COATED ORAL at 18:51

## 2017-09-13 RX ADMIN — PANTOPRAZOLE SODIUM 40 MG: 40 INJECTION, POWDER, FOR SOLUTION INTRAVENOUS at 09:05

## 2017-09-13 RX ADMIN — ISODIUM CHLORIDE 3 ML: 0.03 SOLUTION RESPIRATORY (INHALATION) at 08:10

## 2017-09-14 VITALS
WEIGHT: 154.1 LBS | TEMPERATURE: 97.8 F | OXYGEN SATURATION: 99 % | HEART RATE: 71 BPM | BODY MASS INDEX: 30.25 KG/M2 | DIASTOLIC BLOOD PRESSURE: 86 MMHG | HEIGHT: 60 IN | SYSTOLIC BLOOD PRESSURE: 186 MMHG | RESPIRATION RATE: 18 BRPM

## 2017-09-14 LAB
ANION GAP SERPL CALCULATED.3IONS-SCNC: 9 MMOL/L (ref 4–13)
BUN SERPL-MCNC: 9 MG/DL (ref 5–25)
CALCIUM SERPL-MCNC: 8.5 MG/DL (ref 8.3–10.1)
CHLORIDE SERPL-SCNC: 100 MMOL/L (ref 100–108)
CO2 SERPL-SCNC: 28 MMOL/L (ref 21–32)
CREAT SERPL-MCNC: 0.51 MG/DL (ref 0.6–1.3)
GFR SERPL CREATININE-BSD FRML MDRD: 124 ML/MIN/1.73SQ M
GLUCOSE SERPL-MCNC: 165 MG/DL (ref 65–140)
GLUCOSE SERPL-MCNC: 90 MG/DL (ref 65–140)
GLUCOSE SERPL-MCNC: 91 MG/DL (ref 65–140)
MAGNESIUM SERPL-MCNC: 1.4 MG/DL (ref 1.6–2.6)
POTASSIUM SERPL-SCNC: 3.5 MMOL/L (ref 3.5–5.3)
SODIUM SERPL-SCNC: 137 MMOL/L (ref 136–145)

## 2017-09-14 PROCEDURE — 82948 REAGENT STRIP/BLOOD GLUCOSE: CPT

## 2017-09-14 PROCEDURE — 80048 BASIC METABOLIC PNL TOTAL CA: CPT | Performed by: SURGERY

## 2017-09-14 PROCEDURE — 94640 AIRWAY INHALATION TREATMENT: CPT

## 2017-09-14 PROCEDURE — 94760 N-INVAS EAR/PLS OXIMETRY 1: CPT

## 2017-09-14 PROCEDURE — 83735 ASSAY OF MAGNESIUM: CPT | Performed by: SURGERY

## 2017-09-14 RX ORDER — OXYCODONE HYDROCHLORIDE AND ACETAMINOPHEN 5; 325 MG/1; MG/1
1 TABLET ORAL EVERY 4 HOURS PRN
Status: DISCONTINUED | OUTPATIENT
Start: 2017-09-14 | End: 2017-09-14 | Stop reason: HOSPADM

## 2017-09-14 RX ORDER — OXYCODONE HYDROCHLORIDE AND ACETAMINOPHEN 5; 325 MG/1; MG/1
1 TABLET ORAL EVERY 4 HOURS PRN
Qty: 20 TABLET | Refills: 0 | Status: SHIPPED | OUTPATIENT
Start: 2017-09-14 | End: 2017-09-17

## 2017-09-14 RX ORDER — LISINOPRIL 20 MG/1
20 TABLET ORAL DAILY
Qty: 30 TABLET | Refills: 2 | Status: SHIPPED | OUTPATIENT
Start: 2017-09-14 | End: 2018-11-15 | Stop reason: CLARIF

## 2017-09-14 RX ORDER — OXYCODONE HYDROCHLORIDE AND ACETAMINOPHEN 5; 325 MG/1; MG/1
2 TABLET ORAL EVERY 6 HOURS PRN
Status: DISCONTINUED | OUTPATIENT
Start: 2017-09-14 | End: 2017-09-14 | Stop reason: HOSPADM

## 2017-09-14 RX ADMIN — OXYCODONE HYDROCHLORIDE 5 MG: 5 TABLET ORAL at 00:57

## 2017-09-14 RX ADMIN — HYDRALAZINE HYDROCHLORIDE 10 MG: 10 TABLET, FILM COATED ORAL at 09:01

## 2017-09-14 RX ADMIN — LISINOPRIL 20 MG: 20 TABLET ORAL at 09:02

## 2017-09-14 RX ADMIN — METOPROLOL SUCCINATE 100 MG: 100 TABLET, FILM COATED, EXTENDED RELEASE ORAL at 09:01

## 2017-09-14 RX ADMIN — OXYCODONE HYDROCHLORIDE AND ACETAMINOPHEN 2 TABLET: 5; 325 TABLET ORAL at 12:39

## 2017-09-14 RX ADMIN — ISODIUM CHLORIDE 3 ML: 0.03 SOLUTION RESPIRATORY (INHALATION) at 07:35

## 2017-09-14 RX ADMIN — INSULIN LISPRO 1 UNITS: 100 INJECTION, SOLUTION INTRAVENOUS; SUBCUTANEOUS at 12:47

## 2017-09-14 RX ADMIN — Medication 800 MG: at 09:01

## 2017-09-14 RX ADMIN — LEVALBUTEROL HYDROCHLORIDE 1.25 MG: 1.25 SOLUTION, CONCENTRATE RESPIRATORY (INHALATION) at 07:35

## 2017-09-19 ENCOUNTER — GENERIC CONVERSION - ENCOUNTER (OUTPATIENT)
Dept: OTHER | Facility: OTHER | Age: 57
End: 2017-09-19

## 2017-09-19 ENCOUNTER — ALLSCRIPTS OFFICE VISIT (OUTPATIENT)
Dept: OTHER | Facility: OTHER | Age: 57
End: 2017-09-19

## 2017-09-26 ENCOUNTER — GENERIC CONVERSION - ENCOUNTER (OUTPATIENT)
Dept: OTHER | Facility: OTHER | Age: 57
End: 2017-09-26

## 2017-10-17 ENCOUNTER — GENERIC CONVERSION - ENCOUNTER (OUTPATIENT)
Dept: OTHER | Facility: OTHER | Age: 57
End: 2017-10-17

## 2017-11-07 NOTE — PRE-PROCEDURE INSTRUCTIONS
Pre-Surgery Instructions:   Medication Instructions    aspirin 81 mg chewable tablet Patient was instructed by Physician and understands   atorvastatin (LIPITOR) 20 mg tablet Patient was instructed by Physician and understands   furosemide (LASIX) 40 mg tablet Patient was instructed by Physician and understands   hydrALAZINE (APRESOLINE) 10 mg tablet Patient was instructed by Physician and understands   lisinopril (ZESTRIL) 20 mg tablet Patient was instructed by Physician and understands   metoprolol succinate (TOPROL-XL) 100 mg 24 hr tablet Patient was instructed by Physician and understands   pantoprazole (PROTONIX) 40 mg tablet Patient was instructed by Physician and understands

## 2017-11-29 ENCOUNTER — ANESTHESIA EVENT (OUTPATIENT)
Dept: PERIOP | Facility: HOSPITAL | Age: 57
End: 2017-11-29
Payer: COMMERCIAL

## 2017-11-29 ENCOUNTER — HOSPITAL ENCOUNTER (OUTPATIENT)
Facility: HOSPITAL | Age: 57
Setting detail: OUTPATIENT SURGERY
Discharge: HOME | End: 2017-11-29
Attending: SURGERY | Admitting: SURGERY
Payer: COMMERCIAL

## 2017-11-29 ENCOUNTER — ANESTHESIA (OUTPATIENT)
Dept: PERIOP | Facility: HOSPITAL | Age: 57
End: 2017-11-29
Payer: COMMERCIAL

## 2017-11-29 VITALS
HEART RATE: 76 BPM | DIASTOLIC BLOOD PRESSURE: 67 MMHG | OXYGEN SATURATION: 94 % | SYSTOLIC BLOOD PRESSURE: 138 MMHG | RESPIRATION RATE: 20 BRPM | TEMPERATURE: 97.8 F

## 2017-11-29 DIAGNOSIS — L72.9 SCALP CYST: ICD-10-CM

## 2017-11-29 LAB — GLUCOSE SERPL-MCNC: 92 MG/DL (ref 65–140)

## 2017-11-29 PROCEDURE — 82948 REAGENT STRIP/BLOOD GLUCOSE: CPT

## 2017-11-29 PROCEDURE — 88304 TISSUE EXAM BY PATHOLOGIST: CPT | Performed by: SURGERY

## 2017-11-29 RX ORDER — IPRATROPIUM BROMIDE AND ALBUTEROL SULFATE 2.5; .5 MG/3ML; MG/3ML
3 SOLUTION RESPIRATORY (INHALATION) ONCE
Status: COMPLETED | OUTPATIENT
Start: 2017-11-29 | End: 2017-11-29

## 2017-11-29 RX ORDER — FENTANYL CITRATE/PF 50 MCG/ML
25 SYRINGE (ML) INJECTION AS NEEDED
Status: DISCONTINUED | OUTPATIENT
Start: 2017-11-29 | End: 2017-11-29 | Stop reason: HOSPADM

## 2017-11-29 RX ORDER — METOCLOPRAMIDE HYDROCHLORIDE 5 MG/ML
10 INJECTION INTRAMUSCULAR; INTRAVENOUS ONCE AS NEEDED
Status: DISCONTINUED | OUTPATIENT
Start: 2017-11-29 | End: 2017-11-29 | Stop reason: HOSPADM

## 2017-11-29 RX ORDER — FENTANYL CITRATE 50 UG/ML
INJECTION, SOLUTION INTRAMUSCULAR; INTRAVENOUS AS NEEDED
Status: DISCONTINUED | OUTPATIENT
Start: 2017-11-29 | End: 2017-11-29 | Stop reason: SURG

## 2017-11-29 RX ORDER — ONDANSETRON 2 MG/ML
4 INJECTION INTRAMUSCULAR; INTRAVENOUS ONCE AS NEEDED
Status: COMPLETED | OUTPATIENT
Start: 2017-11-29 | End: 2017-11-29

## 2017-11-29 RX ORDER — LIDOCAINE HYDROCHLORIDE 10 MG/ML
INJECTION, SOLUTION EPIDURAL; INFILTRATION; INTRACAUDAL; PERINEURAL AS NEEDED
Status: DISCONTINUED | OUTPATIENT
Start: 2017-11-29 | End: 2017-11-29 | Stop reason: SURG

## 2017-11-29 RX ORDER — SODIUM CHLORIDE, SODIUM LACTATE, POTASSIUM CHLORIDE, CALCIUM CHLORIDE 600; 310; 30; 20 MG/100ML; MG/100ML; MG/100ML; MG/100ML
125 INJECTION, SOLUTION INTRAVENOUS CONTINUOUS
Status: DISCONTINUED | OUTPATIENT
Start: 2017-11-29 | End: 2017-11-29 | Stop reason: HOSPADM

## 2017-11-29 RX ORDER — ONDANSETRON 2 MG/ML
4 INJECTION INTRAMUSCULAR; INTRAVENOUS EVERY 6 HOURS PRN
Status: DISCONTINUED | OUTPATIENT
Start: 2017-11-29 | End: 2017-11-29 | Stop reason: HOSPADM

## 2017-11-29 RX ORDER — SODIUM CHLORIDE, SODIUM LACTATE, POTASSIUM CHLORIDE, CALCIUM CHLORIDE 600; 310; 30; 20 MG/100ML; MG/100ML; MG/100ML; MG/100ML
100 INJECTION, SOLUTION INTRAVENOUS CONTINUOUS
Status: DISCONTINUED | OUTPATIENT
Start: 2017-11-29 | End: 2017-11-29 | Stop reason: HOSPADM

## 2017-11-29 RX ORDER — MIDAZOLAM HYDROCHLORIDE 1 MG/ML
INJECTION INTRAMUSCULAR; INTRAVENOUS AS NEEDED
Status: DISCONTINUED | OUTPATIENT
Start: 2017-11-29 | End: 2017-11-29 | Stop reason: SURG

## 2017-11-29 RX ORDER — ACETAMINOPHEN AND CODEINE PHOSPHATE 300; 30 MG/1; MG/1
1 TABLET ORAL EVERY 6 HOURS PRN
Qty: 20 TABLET | Refills: 0 | Status: SHIPPED | OUTPATIENT
Start: 2017-11-29 | End: 2017-12-09

## 2017-11-29 RX ORDER — LIDOCAINE HYDROCHLORIDE 10 MG/ML
INJECTION, SOLUTION INFILTRATION; PERINEURAL AS NEEDED
Status: DISCONTINUED | OUTPATIENT
Start: 2017-11-29 | End: 2017-11-29 | Stop reason: HOSPADM

## 2017-11-29 RX ORDER — OXYCODONE HYDROCHLORIDE AND ACETAMINOPHEN 5; 325 MG/1; MG/1
1 TABLET ORAL EVERY 4 HOURS PRN
Status: DISCONTINUED | OUTPATIENT
Start: 2017-11-29 | End: 2017-11-29 | Stop reason: HOSPADM

## 2017-11-29 RX ORDER — PROPOFOL 10 MG/ML
INJECTION, EMULSION INTRAVENOUS AS NEEDED
Status: DISCONTINUED | OUTPATIENT
Start: 2017-11-29 | End: 2017-11-29 | Stop reason: SURG

## 2017-11-29 RX ADMIN — SODIUM CHLORIDE, SODIUM LACTATE, POTASSIUM CHLORIDE, AND CALCIUM CHLORIDE: .6; .31; .03; .02 INJECTION, SOLUTION INTRAVENOUS at 08:59

## 2017-11-29 RX ADMIN — LIDOCAINE HYDROCHLORIDE 20 MG: 10 INJECTION, SOLUTION EPIDURAL; INFILTRATION; INTRACAUDAL; PERINEURAL at 09:29

## 2017-11-29 RX ADMIN — CEFAZOLIN SODIUM 2000 MG: 2 SOLUTION INTRAVENOUS at 09:23

## 2017-11-29 RX ADMIN — ONDANSETRON 4 MG: 2 INJECTION INTRAMUSCULAR; INTRAVENOUS at 09:41

## 2017-11-29 RX ADMIN — MIDAZOLAM 1 MG: 1 INJECTION INTRAMUSCULAR; INTRAVENOUS at 09:15

## 2017-11-29 RX ADMIN — FENTANYL CITRATE 50 MCG: 50 INJECTION, SOLUTION INTRAMUSCULAR; INTRAVENOUS at 09:23

## 2017-11-29 RX ADMIN — MIDAZOLAM 1 MG: 1 INJECTION INTRAMUSCULAR; INTRAVENOUS at 09:23

## 2017-11-29 RX ADMIN — PROPOFOL 40 MG: 10 INJECTION, EMULSION INTRAVENOUS at 09:30

## 2017-11-29 RX ADMIN — PROPOFOL 40 MG: 10 INJECTION, EMULSION INTRAVENOUS at 09:36

## 2017-11-29 RX ADMIN — PROPOFOL 40 MG: 10 INJECTION, EMULSION INTRAVENOUS at 09:45

## 2017-11-29 RX ADMIN — FENTANYL CITRATE 50 MCG: 50 INJECTION, SOLUTION INTRAMUSCULAR; INTRAVENOUS at 09:15

## 2017-11-29 RX ADMIN — IPRATROPIUM BROMIDE AND ALBUTEROL SULFATE 3 ML: .5; 3 SOLUTION RESPIRATORY (INHALATION) at 08:42

## 2017-11-29 NOTE — ANESTHESIA POSTPROCEDURE EVALUATION
Post-Op Assessment Note      CV Status:  Stable    Mental Status:  Alert    Hydration Status:  Stable    PONV Controlled:  None    Airway Patency:  Patent    Post Op Vitals Reviewed: Yes          Staff: Anesthesiologist, CRNA           /62 (11/29/17 1001)    Temp 98 1 °F (36 7 °C) (11/29/17 1001)    Pulse 78 (11/29/17 1001)   Resp 12 (11/29/17 1001)    SpO2 100 % (11/29/17 1001)

## 2017-11-29 NOTE — DISCHARGE INSTRUCTIONS
Cyst   WHAT YOU NEED TO KNOW:   A cyst is a round, firm lump found almost anywhere on your body  Cysts may grow slowly but are not cancerous  Treatment is not needed if you have no symptoms  Cysts can be opened and drained if they become infected or cause problems  Cysts can grow larger and make it hard for you to do your daily activities  You may also need antibiotics if there is an infection  You may need surgery to remove the cyst completely  DISCHARGE INSTRUCTIONS:   Medicines:   · Antibiotics  may be given to treat or prevent an infection  · Take your medicine as directed  Contact your healthcare provider if you think your medicine is not helping or if you have side effects  Tell him of her if you are allergic to any medicine  Keep a list of the medicines, vitamins, and herbs you take  Include the amounts, and when and why you take them  Bring the list or the pill bottles to follow-up visits  Carry your medicine list with you in case of an emergency  Return to the emergency department if:   · You develop a fever  · The area around your cyst becomes swollen, red, and painful  · Your cyst continues to drain for 2 days after you start antibiotics  Care for your wound as directed: If you have had your cyst drained or removed, care for your wound as directed  Carefully wash the wound with soap and water  Dry the area and put on new, clean bandages as directed  Change your bandages when they get wet or dirty  Follow up with your healthcare provider as directed: Your wound may need to be checked if your cyst was removed in the emergency department  You may need to see a surgeon if your cyst could not be removed  Write down your questions so you remember to ask during your visits  © 2017 [address]  Information is for End User's use only and may not be sold, redistributed or otherwise used for commercial purposes   All illustrations and images included in CareNotes® are the copyrighted property of [employer]  or Vishal Dang  The above information is an  only  It is not intended as medical advice for individual conditions or treatments  Talk to your doctor, nurse or pharmacist before following any medical regimen to see if it is safe and effective for you    No diet restriction for this surgery  May shower every day  Call office to make an appointment in 2 weeks  Call office with any issues regarding the surgery  No driving, heavy lifting or strenuous exercise until tomorrow  Resume home medications  Apply ice to the incisions  May take Tylenol 3, regular Tylenol or ibuprofen for pain

## 2017-11-29 NOTE — ANESTHESIA PREPROCEDURE EVALUATION
Review of Systems/Medical History  Patient summary reviewed  Chart reviewed  No history of anesthetic complications     Cardiovascular  Negative cardio ROS Exercise tolerance: good,  Hyperlipidemia, Hypertension , CAD, ,   Comment: Hx of positive exercise stress test  Patient tolerated ostomy reversal previously  Will proceed with MAC + local for scalp mass excision,  Pulmonary  Asthma: PRN med  controlled , No shortness of breath, ,        GI/Hepatic      Comment: Diverticulitis  History of colon resection, ostomy creation, reversal           Endo/Other  Diabetes Diet controlled,      GYN       Hematology  Negative hematology ROS      Musculoskeletal  Negative musculoskeletal ROS        Neurology  Negative neurology ROS      Psychology           Physical Exam    Airway    Mallampati score: II         Dental   No notable dental hx     Cardiovascular  Comment: Negative ROS, Rhythm: regular, Rate: normal,     Pulmonary  Comment: Faint right lung wheezing, Wheezes,     Other Findings        Anesthesia Plan  ASA Score- 3       Anesthesia Type- IV sedation with anesthesia with ASA Monitors  Additional Monitors:   Airway Plan:           Induction- intravenous  Informed Consent- Anesthetic plan and risks discussed with patient  I personally reviewed this patient with the CRNA  Discussed and agreed on the Anesthesia Plan with the CRNA  Nisa Polk

## 2017-11-29 NOTE — OP NOTE
OPERATIVE REPORT  PATIENT NAME: Douglas Glynn    :  1960  MRN: [id number]  Pt Location: [facility] OR ROOM 04    SURGERY DATE: 2017    Surgeon(s) and Role:     Ethel Valencia MD - Primary    Preop Diagnosis:  Scalp cyst [L72 9]    Post-Op Diagnosis Codes:     * Scalp cyst [L72 9]    Procedure(s) (LRB):  SCALP MASS EXCISION X 2 (N/A)    Specimen(s):  ID Type Source Tests Collected by Time Destination   1 : scalp cyst Tissue Head TISSUE EXAM Adam Starkey MD [id number]        Estimated Blood Loss:   Minimal    Drains:       Anesthesia Type:   IV Sedation with Anesthesia    Operative Indications:  Scalp cyst [L72 9]  [age] year female who had a scalp cysts for a long time, increasing in size and causing discomfort  She also had a small scalp cyst anterior to the large scalp cyst   The patient was advised to undergo excision  Operative Findings:  The patient had dermoid cyst of the scalp measure approximately 5 cm in largest diameter  She had a small cyst measure approximately 0 8 cm  Complications:   None    Procedure and Technique:  The patient was identified she was placed on the operating table in a supine position  A after adequate IV sedation the scalp with a 2 cysts were was prepped and draped in a sterile usual fashion with ChloraPrep  Time-out was called the patient was identified as well as surgical site  1% lidocaine was infiltrated  Transverse incision was made on the large scalps it with a scalpel, taken down through the subcutaneous tissue with hemostat dissection until the cyst was completely excised bluntly and removed  Then hemostasis was accomplished with electrocautery  The incision was closed with a 2 nylon in a continuous interlocking fashion  Our attention was directed to the small cyst   Longitudinal incision was made over the cyst with a scalpel, taken down through the subcutaneous tissue with mosquito dissection until the cyst was completely excised    The incision was closed with a 2 0 nylon in a continuous interlocking fashion  Neosporin ointment was applied to both incisions  At the end of the case instrument, needles, and sponges counts were correct  The patient tolerated the procedure well     I was present for the entire procedure and A qualified resident physician was not available    Patient Disposition:  PACU     SIGNATURE: Leah Mike MD  DATE: November 29, 2017  TIME: 9:58 AM

## 2017-12-12 ENCOUNTER — ALLSCRIPTS OFFICE VISIT (OUTPATIENT)
Dept: OTHER | Facility: OTHER | Age: 57
End: 2017-12-12

## 2017-12-13 NOTE — PROGRESS NOTES
Assessment    1  Postoperative state (V45 89) (Z98 890)    Plan  Postoperative state    · Follow-up PRN Evaluation and Treatment  Follow-up  Status: Complete  Done:80Oip3372 09:57AM   Ordered; For: Postoperative state; Ordered By: Elyssa Ramos Performed:  Due: [id number]    Discussion/Summary    The patient did well after excision of cyst from the scalp x2  She is discharged from my care and I will be glad to see her if any problem arises in the future  The treatment plan was reviewed with the patient/guardian  The patient/guardian understands and agrees with the treatment plan      Chief Complaint  Post op suture removal from scalp      Post-Op  HPI: I had the pleasure of seeing Douglas Glynn in the office today for 1st postop follow-up after excision of Trichilemmal cyst of the scalp x2  She offers no complaints at this time  Active Problems    1  Abnormal stress test (794 39) (R94 39)   2  Chronic diastolic HF (heart failure) (428 32) (I50 32)   3  Controlled type 2 diabetes mellitus (250 00) (E11 9)   4  Diabetic nephropathy associated with type 2 diabetes mellitus (250 40,583 81) (E11 21)   5  Diverticulitis of large intestine with perforation and abscess without bleeding (562 11) (K57 20)   6  Encounter for preprocedural cardiovascular examination (V72 81) (Z01 810)   7  Essential hypertension (401 9) (I10)   8  History of colostomy (V12 70) (Z98 890)   9  Hyperlipidemia (272 4) (E78 5)   10  Obesity (BMI 30-39 9) (278 00) (E66 9)   11  Pericardial effusion (423 9) (I31 3)   12  Postoperative state (V45 89) (Z98 890)   13  S/P colostomy (V44 3) (Z93 3)   14  Scalp cyst (706 2) (L72 9)    Social History     · Denied: History of Alcohol use   · Denied: History of Drug use   · Former smoker (V15 82) (M02 597)    Current Meds   1  AmLODIPine Besylate 10 MG Oral Tablet; TAKE 1 TABLET DAILY; Therapy: 58IKW0742 to (Evaluate:38Hvv0279)  Requested for: 38ZSE0209; Last Rx:53Twm5435 Ordered   2   Aspirin 81 MG Oral Tablet Delayed Release; TAKE 1 TABLET DAILY; Therapy: 02Aug2017 to (Evaluate:81Bqq8988)  Requested for: 45Zht2183; Last Rx:64Jux0251 Ordered   3  Atorvastatin Calcium 20 MG Oral Tablet; Take 1 tablet daily; Therapy: 21Jun2017 to (Last Jeramy Lock)  Requested for: 21Jun2017 Ordered   4  Floranex Oral Packet; TAKE 1 PACKET 3 times daily; Therapy: 75LUS3179 to (Last Rx:68Jdl4793)  Requested for: 67CZW9847 Ordered   5  Furosemide 40 MG Oral Tablet; TAKE 1 TABLET TWICE DAILY  Requested for: 34FKI1395; Last JEAN:68MKO7679 Ordered   6  Ipratropium Bromide 0 02 % Inhalation Solution; Therapy: (Recorded:16May2017) to Recorded   7  Lisinopril 20 MG Oral Tablet; TAKE 1 TABLET DAILY; Therapy: 21Jun2017 to (Evaluate:13Jan2018)  Requested for: 37Cbx4316 Recorded   8  Melatonin 3 MG Oral Tablet; Therapy: (Recorded:16May2017) to Recorded   9  Protonix 40 MG Oral Tablet Delayed Release; Therapy: (Recorded:16May2017) to Recorded   10  Toprol  MG Oral Tablet Extended Release 24 Hour; TAKE 1 TABLET DAILY; Therapy: (Recorded:16May2017) to Recorded    Allergies  1  Ciprofloxacin HCl TABS    Vitals   Recorded: 84JHV7838 54:66GA   Systolic 850, RUE, Sitting   Diastolic 99, RUE, Sitting   Height 5 ft 1 in   Weight 153 lb 3 2 oz   BMI Calculated 28 95   BSA Calculated 1 69       Physical Exam   Additional Exam:  The incisions from the scalp bowel well-healed without evidence of infection  The sutures were removed the office  Future Appointments    Date/Time Provider Specialty Site   12/27/2017 02:00 PM DIANN Wetsbrook   Cardiology [facility]   01/16/2018 11:30 AM Leyla Acevedo Arm,  Internal Medicine [facility]       Signatures   Electronically signed by : Anand King MD; Dec 12 2017  9:57AM EST                       (Author)

## 2018-01-08 DIAGNOSIS — R94.39 ABNORMAL RESULT OF OTHER CARDIOVASCULAR FUNCTION STUDY (CODE): ICD-10-CM

## 2018-01-08 DIAGNOSIS — I31.3 NONINFLAMMATORY PERICARDIAL EFFUSION: ICD-10-CM

## 2018-01-08 DIAGNOSIS — E11.9 TYPE 2 DIABETES MELLITUS WITHOUT COMPLICATIONS (HCC): ICD-10-CM

## 2018-01-09 ENCOUNTER — GENERIC CONVERSION - ENCOUNTER (OUTPATIENT)
Dept: OTHER | Facility: OTHER | Age: 58
End: 2018-01-09

## 2018-01-12 VITALS
SYSTOLIC BLOOD PRESSURE: 140 MMHG | HEIGHT: 60 IN | WEIGHT: 161 LBS | TEMPERATURE: 97.6 F | DIASTOLIC BLOOD PRESSURE: 92 MMHG | BODY MASS INDEX: 31.61 KG/M2 | RESPIRATION RATE: 14 BRPM

## 2018-01-12 VITALS
HEIGHT: 60 IN | RESPIRATION RATE: 14 BRPM | BODY MASS INDEX: 34.16 KG/M2 | TEMPERATURE: 98.1 F | DIASTOLIC BLOOD PRESSURE: 98 MMHG | WEIGHT: 174 LBS | SYSTOLIC BLOOD PRESSURE: 148 MMHG | HEART RATE: 70 BPM

## 2018-01-12 VITALS — TEMPERATURE: 98.1 F | HEIGHT: 61 IN | BODY MASS INDEX: 28.89 KG/M2 | WEIGHT: 153 LBS

## 2018-01-12 NOTE — PROCEDURES
Procedures by Ilana Massey PA-C  at 5/12/2017  2:08 PM      Author:  Ilana Massey PA-C Service:  Surgery-General Author Type:  Physician Assistant    Filed:  5/12/2017  2:12 PM Date of Service:  5/12/2017  2:08 PM Status:  Signed    :  Ilana Massey PA-C (Physician Assistant)  Cosigner:  Jimy To MD at 5/12/2017  2:31 PM         V  A C  Change Note    Date: 5/12 17    Location of wound: abd incisions    Dimensions of wound: 15 x1x1 cm     Description of wound: incision is almost completely healed  Wound is clean, no erythema minimal drainage    Vac was changed because it was not maintaining suction     Sponges removed:  2 Black Sponges  0 White Sponges    Sponges placed:  2 Black Sponges  0 White Sponges    VAC settings:  125 mmHg  Continuous    Cande Kay           Received for:Bobbi Blandon [facility]  May 12 2017  2:32PM [place] Standard Time

## 2018-01-13 VITALS
OXYGEN SATURATION: 97 % | BODY MASS INDEX: 29.83 KG/M2 | SYSTOLIC BLOOD PRESSURE: 180 MMHG | HEART RATE: 67 BPM | DIASTOLIC BLOOD PRESSURE: 120 MMHG | HEIGHT: 61 IN | WEIGHT: 158 LBS

## 2018-01-13 VITALS
WEIGHT: 183.25 LBS | SYSTOLIC BLOOD PRESSURE: 128 MMHG | HEIGHT: 60 IN | DIASTOLIC BLOOD PRESSURE: 84 MMHG | BODY MASS INDEX: 35.98 KG/M2 | HEART RATE: 80 BPM | OXYGEN SATURATION: 83 %

## 2018-01-13 VITALS
BODY MASS INDEX: 28.51 KG/M2 | OXYGEN SATURATION: 100 % | HEART RATE: 65 BPM | DIASTOLIC BLOOD PRESSURE: 100 MMHG | HEIGHT: 61 IN | SYSTOLIC BLOOD PRESSURE: 154 MMHG | WEIGHT: 151 LBS

## 2018-01-13 VITALS
SYSTOLIC BLOOD PRESSURE: 180 MMHG | WEIGHT: 174.38 LBS | HEART RATE: 74 BPM | HEIGHT: 60 IN | OXYGEN SATURATION: 97 % | BODY MASS INDEX: 34.24 KG/M2 | DIASTOLIC BLOOD PRESSURE: 98 MMHG

## 2018-01-13 VITALS
HEART RATE: 86 BPM | BODY MASS INDEX: 30.23 KG/M2 | DIASTOLIC BLOOD PRESSURE: 86 MMHG | HEIGHT: 61 IN | SYSTOLIC BLOOD PRESSURE: 132 MMHG | WEIGHT: 160.13 LBS | OXYGEN SATURATION: 95 %

## 2018-01-13 NOTE — PROGRESS NOTES
History of Present Illness  Care Coordination Encounter Information:   Type of Encounter: Telephonic   Contact: Initial Contact    Spoke to Patient  Care Coordination SL Nurse [facility]:   The reason for call is to discuss outreach for follow up/needed services and coordination of meeting care plan treatment goals  Patient auto enrolled into care coordination s/p discharge  Multiple attempts made to contact patient with out success  Left x3 Voicemails; no return call  Active Problems    1  Chronic diastolic HF (heart failure) (428 32) (I50 32)   2  Controlled type 2 diabetes mellitus (250 00) (E11 9)   3  Diverticulitis of large intestine with perforation and abscess without bleeding (562 11)   (K57 20)   4  Essential hypertension (401 9) (I10)   5  Obesity (BMI 30-39 9) (278 00) (E66 9)   6  Postoperative anemia due to acute blood loss (285 1) (D62)   7  Postoperative state (V45 89) (Z98 890)    Past Medical History    1  History of Acute on chronic congestive heart failure with left ventricular diastolic   dysfunction (972 77) (I50 33)   2  History of Atelectasis (518 0) (J98 11)   3  History of asthma (V12 69) (Z87 09)   4  Denied: History of depression   5  Denied: History of substance abuse   6  History of Lesion of spleen (289 50) (D73 9)   7  History of Pericardial effusion (423 9) (I31 3)    Surgical History    1  History of Negative Pressure Wound Therapy Using Durable Medical Equipment   2  History of Partial Colectomy, End Colostomy & Distal Segment Closure    Family History  Mother    1  Family history of diabetes mellitus (V18 0) (Z83 3)   2  Family history of hypertension (V17 49) (Z82 49)   3  Denied: Family history of mental disorder   4  Denied: Family history of substance abuse  Father    5  Denied: Family history of mental disorder   6   Denied: Family history of substance abuse    Social History    · Denied: History of Alcohol use   · Denied: History of Drug use   · Former smoker (V15 82) (O75 569)    Current Meds    1  Furosemide 40 MG Oral Tablet; TAKE 1 TABLET TWICE DAILY; Therapy: (Recorded:23May2017) to Recorded   2  Toprol  MG Oral Tablet Extended Release 24 Hour (Metoprolol Succinate ER);   TAKE 1 TABLET DAILY; Therapy: (Recorded:16May2017) to Recorded    3  Janumet  MG Oral Tablet; TAKE 1 TABLET DAILY AS DIRECTED; Therapy: (Recorded:16May2017) to Recorded    4  Floranex Oral Packet; TAKE 1 PACKET 3 times daily; Therapy: 39AMO4976 to (Last Rx:17May2017)  Requested for: 19PNX6928 Ordered    5  HydrALAZINE HCl - 10 MG Oral Tablet; Take 1 tablet twice daily  Requested for:   73FSJ8888; Last Rx:17May2017 Ordered    6  Ipratropium Bromide 0 02 % Inhalation Solution; Therapy: (Recorded:16May2017) to Recorded   7  Melatonin 3 MG Oral Tablet; Therapy: (Recorded:16May2017) to Recorded   8  Protonix 40 MG Oral Tablet Delayed Release (Pantoprazole Sodium); Therapy: (Recorded:16May2017) to Recorded    Allergies    1  Ciprofloxacin HCl TABS    End of Encounter Meds    1  Furosemide 40 MG Oral Tablet; TAKE 1 TABLET TWICE DAILY; Therapy: (Recorded:23May2017) to Recorded   2  Toprol  MG Oral Tablet Extended Release 24 Hour (Metoprolol Succinate ER);   TAKE 1 TABLET DAILY; Therapy: (Recorded:16May2017) to Recorded    3  Janumet  MG Oral Tablet; TAKE 1 TABLET DAILY AS DIRECTED; Therapy: (Recorded:16May2017) to Recorded    4  Floranex Oral Packet; TAKE 1 PACKET 3 times daily; Therapy: 63HMA0834 to (Last Rx:17May2017)  Requested for: 40MAP8468 Ordered    5  HydrALAZINE HCl - 10 MG Oral Tablet; Take 1 tablet twice daily  Requested for:   75EIJ2887; Last Rx:17May2017 Ordered    6  Ipratropium Bromide 0 02 % Inhalation Solution; Therapy: (Recorded:16May2017) to Recorded   7  Melatonin 3 MG Oral Tablet; Therapy: (Recorded:16May2017) to Recorded   8  Protonix 40 MG Oral Tablet Delayed Release (Pantoprazole Sodium);    Therapy: (Recorded:16May2017) to Recorded    Future Appointments    Date/Time Provider Specialty Site   06/15/2017 11:30 AM Merline Poor, MD General Surgery [facility] [place]   06/22/2017 02:00 PM DIANN Richard   Cardiology [facility]   06/20/2017 01:30 PM Harleen Acevedo DO Internal Medicine [facility]     Signatures   Electronically signed by : Priyanka Najera RN; Jun 8 2017  4:02PM EST                       (Author)

## 2018-01-13 NOTE — PROCEDURES
Procedures by Greg Cheney PA-C at  5/5/2017  3:15 PM      Author:  Greg Cheney PA-C Service:  Surgery-General Author Type:  Physician Assistant    Filed:  5/5/2017  3:18 PM Date of Service:  5/5/2017  3:15 PM Status:  Signed    :  Greg Cheney PA-C (Physician Assistant)  Cosigner:  Alejandra Graham MD at 5/6/2017 11:14 AM         V  A C   Change Note    Date: 5/5/17    Location of wound: abd    Dimensions of wound: 15cm x 2 cm x 1 cm    Description of wound: open abd incision     Sponges removed:  1 Black Sponges  0 White Sponges    Sponges placed:  1 Black Sponges  0 White Sponges    VAC settings:  125 mmHg  Continuous    Greg Cheney [place]           Received for:Bobbi Mccrary ankush [facility]  May  6 2017 11:14AM [place] Standard Time

## 2018-01-14 VITALS
DIASTOLIC BLOOD PRESSURE: 86 MMHG | SYSTOLIC BLOOD PRESSURE: 136 MMHG | HEIGHT: 61 IN | WEIGHT: 154 LBS | BODY MASS INDEX: 29.07 KG/M2 | TEMPERATURE: 98.2 F

## 2018-01-14 VITALS
SYSTOLIC BLOOD PRESSURE: 130 MMHG | HEART RATE: 77 BPM | TEMPERATURE: 97.8 F | DIASTOLIC BLOOD PRESSURE: 88 MMHG | RESPIRATION RATE: 14 BRPM

## 2018-01-14 NOTE — RESULT NOTES
Verified Results  ECHO STRESS TEST W CONTRAST IF INDICATED 10Wtm3662 03:12PM Makenna Dykes Order Number: [id number]    - Patient Instructions: To schedule this appointment, please contact Central Scheduling at [phone]  Test Name Result Flag Reference   ECHO STRESS TEST W CONTRAST IF INDICATED (Report)     [address]   [phone]     Exercise Stress Echocardiography     Study date: 2017     Patient: Jennifer Ocampo   MR number: [id number]   Account number: [de-identified]   : 1960   Age: 65 years   Gender: Female   Study date: 2017   Status: Outpatient   Location: [facility]   Height: 61 in   Weight: 158 lb   BP: 178// 98 mmHg     Indications: Chronic Heart Failure     Diagnosis: I50 32 - Chronic diastolic (congestive) heart failure     Sonographer: Jordan RCS   Interpreting Physician: Ke Lees MD   Primary Physician: Jessa Frank   Referring Physician: Ke Lees MD   Group: [facility]   Other: Ramnó Jaime MS, CCT     IMPRESSIONS:   Abnormal study  Findings suggest single vessel coronary artery disease  Diagnostic sensitivity was limited by submaximal stress  SUMMARY     STRESS RESULTS:   Duration of exercise was 3 min  Maximal work rate was 4 6 METs  Functional capacity was decreased (greater than 40%)  Maximal heart rate during stress was 115 bpm ( 70 % of maximal predicted heart rate)  Target heart rate was not achieved  There was resting hypertension with a hypertensive blood pressure response to stress  There was no chest pain during stress  ECG CONCLUSIONS:   The stress ECG was consistent with myocardial ischemia  BASELINE:   There were no regional wall motion abnormalities  Estimated left ventricular ejection fraction was 55 %   PEAK STRESS:   There was akinesis of the basal-mid inferior wall(s)  ECHO CONCLUSIONS:   Echocardiographic findings were positive for stress-induced ischemia  HISTORY: The patient is a 65year old  female  Chest pain status: no chest pain  Coronary artery disease risk factors: dyslipidemia, hypertension, diabetes mellitus, and post-menopausal state  Cardiovascular history:   congestive heart failure  Medications: a beta blocker, an ACE inhibitor/ARB, a diuretic, and a lipid lowering agent  REST ECG: There was 2 mm, downsloping ST depression in leads II, III, aVF, V5 and V6  PROCEDURE: The study was performed in the stress lab  The study was performed in the [address] [place] [address]  Treadmill exercise testing was performed, using the Ravin protocol  Stress and rest echocardiographic evaluation was   performed from multiple acoustic windows for evaluation of ventricular function  RAVIN PROTOCOL:   HR bpm SBP mmHg DBP mmHg Symptoms Rhythm/conduct   Baseline 75 178 98 none --   Stage 1 115 206 120 -- occasional PVC's   Stage 7 -- -- -- -- ventricular bigeminy   Immediate 115 -- -- -- --   Recovery 1 93 -- -- -- --   Recovery 2 91 170 118 -- rare PVC's   Recovery 3 80 -- -- -- --   Recovery 5 74 144 90 -- --     MEDICATIONS GIVEN: No medications or fluids given  STRESS RESULTS: Duration of exercise was 3 min  The patient exercised to protocol stage 1  Maximal work rate was 4 6 METs  Functional capacity was decreased (greater than 40%)  Maximal heart rate during stress was 115 bpm ( 70 % of maximal   predicted heart rate)  Target heart rate was not achieved  The heart rate response to stress was normal  Maximal systolic blood pressure during stress was 206 mmHg  There was resting hypertension with a hypertensive blood pressure response   to stress  The rate-pressure product for the peak heart rate and blood pressure was 55887  There was no chest pain during stress  The stress test was terminated due to hypertension       ECG CONCLUSIONS: The stress ECG was consistent with myocardial ischemia  There were no stress arrhythmias or conduction abnormalities  STRESS 2D ECHO RESULTS:     BASELINE: There were no regional wall motion abnormalities  Left ventricular size was normal  Overall left ventricular systolic function was normal  Estimated left ventricular ejection fraction was 55 %   PEAK STRESS: There was akinesis of the basal-mid inferior wall(s)  ECHO CONCLUSIONS: Echocardiographic findings were positive for stress-induced ischemia       Prepared and electronically signed by     Carol Purcell MD   Signed 27-Jul-2017 18:39:49

## 2018-01-14 NOTE — PROGRESS NOTES
History of Present Illness  Care Coordination Encounter Information:   Type of Encounter: Telephonic   Contact: Initial Contact    Spoke to Patient  Care Coordination SL Nurse [facility]:   The reason for call is to discuss outreach for follow up/needed services and coordination of meeting care plan treatment goals  Received patient information from care coordination at [facility]  Patient recently discharged on 5/16/17  Attempted to contact patient for f/u and any needed services  Voicemail left; awaiting return call  Active Problems    1  Chronic diastolic HF (heart failure) (428 32) (I50 32)   2  Controlled type 2 diabetes mellitus (250 00) (E11 9)   3  Diverticulitis of large intestine with perforation and abscess without bleeding (562 11)   (K57 20)   4  Essential hypertension (401 9) (I10)   5  Obesity (BMI 30-39 9) (278 00) (E66 9)   6  Postoperative anemia due to acute blood loss (285 1) (D62)   7  Postoperative state (V45 89) (Z98 890)    Past Medical History    1  History of Acute on chronic congestive heart failure with left ventricular diastolic   dysfunction (357 02) (I50 33)   2  History of Atelectasis (518 0) (J98 11)   3  History of asthma (V12 69) (Z87 09)   4  Denied: History of depression   5  Denied: History of substance abuse   6  History of Lesion of spleen (289 50) (D73 9)   7  History of Pericardial effusion (423 9) (I31 3)    Surgical History    1  History of Negative Pressure Wound Therapy Using Durable Medical Equipment   2  History of Partial Colectomy, End Colostomy & Distal Segment Closure    Family History  Mother    1  Family history of diabetes mellitus (V18 0) (Z83 3)   2  Family history of hypertension (V17 49) (Z82 49)   3  Denied: Family history of mental disorder   4  Denied: Family history of substance abuse  Father    5  Denied: Family history of mental disorder   6   Denied: Family history of substance abuse    Social History    · Denied: History of Alcohol use   · Denied: History of Drug use   · Former smoker (G67 41) (Y71 350)    Current Meds    1  Furosemide 40 MG Oral Tablet; TAKE 1 TABLET TWICE DAILY; Therapy: (Recorded:23May2017) to Recorded   2  Toprol  MG Oral Tablet Extended Release 24 Hour (Metoprolol Succinate ER);   TAKE 1 TABLET DAILY; Therapy: (Recorded:16May2017) to Recorded    3  Janumet  MG Oral Tablet; TAKE 1 TABLET DAILY AS DIRECTED; Therapy: (Recorded:16May2017) to Recorded    4  Floranex Oral Packet; TAKE 1 PACKET 3 times daily; Therapy: [id number] to (Last Rx:17May2017)  Requested for: 72SLL1423 Ordered    5  HydrALAZINE HCl - 10 MG Oral Tablet; Take 1 tablet twice daily  Requested for:   16KHP4410; Last Rx:17May2017 Ordered    6  Ipratropium Bromide 0 02 % Inhalation Solution; Therapy: (Recorded:16May2017) to Recorded   7  Melatonin 3 MG Oral Tablet; Therapy: (Recorded:16May2017) to Recorded   8  Protonix 40 MG Oral Tablet Delayed Release (Pantoprazole Sodium); Therapy: (Recorded:16May2017) to Recorded    Allergies    1  Ciprofloxacin HCl TABS    End of Encounter Meds    1  Furosemide 40 MG Oral Tablet; TAKE 1 TABLET TWICE DAILY; Therapy: (Recorded:23May2017) to Recorded   2  Toprol  MG Oral Tablet Extended Release 24 Hour (Metoprolol Succinate ER);   TAKE 1 TABLET DAILY; Therapy: (Recorded:16May2017) to Recorded    3  Janumet  MG Oral Tablet; TAKE 1 TABLET DAILY AS DIRECTED; Therapy: (Recorded:16May2017) to Recorded    4  Floranex Oral Packet; TAKE 1 PACKET 3 times daily; Therapy: 19VND7139 to (Last Rx:17May2017)  Requested for: 67IDZ7695 Ordered    5  HydrALAZINE HCl - 10 MG Oral Tablet; Take 1 tablet twice daily  Requested for:   00PES2325; Last Rx:17May2017 Ordered    6  Ipratropium Bromide 0 02 % Inhalation Solution; Therapy: (Recorded:16May2017) to Recorded   7  Melatonin 3 MG Oral Tablet; Therapy: (Recorded:16May2017) to Recorded   8   Protonix 40 MG Oral Tablet Delayed Release (Pantoprazole Sodium); Therapy: (Recorded:00Hrj5266) to Recorded    Future Appointments    Date/Time Provider Specialty Site   06/01/2017 09:00 AM Rey Moreno MD General Surgery [facility] SURGICAL [place]   06/22/2017 02:00 PM DIANN Westbrook   Cardiology [facility]IOLOGY [facility]   06/20/2017 01:30 PM Leyla Acevedo Arm, DO Internal Medicine [facility]     Signatures   Electronically signed by : Irene Vick RN; May 25 2017  3:56PM EST                       (Author)

## 2018-01-16 ENCOUNTER — TELEPHONE (OUTPATIENT)
Dept: NON INVASIVE DIAGNOSTICS | Facility: HOSPITAL | Age: 58
End: 2018-01-16

## 2018-01-16 RX ORDER — SODIUM CHLORIDE 9 MG/ML
75 INJECTION, SOLUTION INTRAVENOUS CONTINUOUS
Status: CANCELLED | OUTPATIENT
Start: 2018-01-16

## 2018-01-17 ENCOUNTER — HOSPITAL ENCOUNTER (OUTPATIENT)
Dept: INTERVENTIONAL RADIOLOGY/VASCULAR | Facility: HOSPITAL | Age: 58
Discharge: HOME | End: 2018-01-17
Attending: INTERNAL MEDICINE | Admitting: INTERNAL MEDICINE
Payer: COMMERCIAL

## 2018-01-17 ENCOUNTER — TELEPHONE (OUTPATIENT)
Dept: SURGERY | Facility: HOSPITAL | Age: 58
End: 2018-01-17

## 2018-01-17 ENCOUNTER — TELEPHONE (OUTPATIENT)
Dept: INTERVENTIONAL RADIOLOGY/VASCULAR | Facility: HOSPITAL | Age: 58
End: 2018-01-17

## 2018-01-17 ENCOUNTER — GENERIC CONVERSION - ENCOUNTER (OUTPATIENT)
Dept: OTHER | Facility: OTHER | Age: 58
End: 2018-01-17

## 2018-01-17 VITALS
HEIGHT: 60 IN | TEMPERATURE: 97.4 F | RESPIRATION RATE: 20 BRPM | SYSTOLIC BLOOD PRESSURE: 149 MMHG | HEART RATE: 71 BPM | DIASTOLIC BLOOD PRESSURE: 75 MMHG | WEIGHT: 156.53 LBS | OXYGEN SATURATION: 100 % | BODY MASS INDEX: 30.73 KG/M2

## 2018-01-17 DIAGNOSIS — R94.39 ABNORMAL RESULT OF OTHER CARDIOVASCULAR FUNCTION STUDY (CODE): ICD-10-CM

## 2018-01-17 LAB
ANION GAP SERPL CALCULATED.3IONS-SCNC: 7 MMOL/L (ref 4–13)
BUN SERPL-MCNC: 17 MG/DL (ref 5–25)
CALCIUM SERPL-MCNC: 9.2 MG/DL (ref 8.3–10.1)
CHLORIDE SERPL-SCNC: 106 MMOL/L (ref 100–108)
CO2 SERPL-SCNC: 30 MMOL/L (ref 21–32)
CREAT SERPL-MCNC: 0.58 MG/DL (ref 0.6–1.3)
ERYTHROCYTE [DISTWIDTH] IN BLOOD BY AUTOMATED COUNT: 14.3 % (ref 11.6–15.1)
GFR SERPL CREATININE-BSD FRML MDRD: 119 ML/MIN/1.73SQ M
GLUCOSE P FAST SERPL-MCNC: 115 MG/DL (ref 65–99)
GLUCOSE SERPL-MCNC: 114 MG/DL (ref 65–140)
GLUCOSE SERPL-MCNC: 115 MG/DL (ref 65–140)
HCT VFR BLD AUTO: 38 % (ref 34.8–46.1)
HGB BLD-MCNC: 11.9 G/DL (ref 11.5–15.4)
INR PPP: 0.95 (ref 0.86–1.16)
MCH RBC QN AUTO: 29.2 PG (ref 26.8–34.3)
MCHC RBC AUTO-ENTMCNC: 31.3 G/DL (ref 31.4–37.4)
MCV RBC AUTO: 93 FL (ref 82–98)
PLATELET # BLD AUTO: 311 THOUSANDS/UL (ref 149–390)
PMV BLD AUTO: 9.2 FL (ref 8.9–12.7)
POTASSIUM SERPL-SCNC: 4.6 MMOL/L (ref 3.5–5.3)
PROTHROMBIN TIME: 12.9 SECONDS (ref 12.1–14.4)
RBC # BLD AUTO: 4.08 MILLION/UL (ref 3.81–5.12)
SODIUM SERPL-SCNC: 143 MMOL/L (ref 136–145)
WBC # BLD AUTO: 6.44 THOUSAND/UL (ref 4.31–10.16)

## 2018-01-17 PROCEDURE — C1894 INTRO/SHEATH, NON-LASER: HCPCS | Performed by: INTERNAL MEDICINE

## 2018-01-17 PROCEDURE — 93458 L HRT ARTERY/VENTRICLE ANGIO: CPT | Performed by: INTERNAL MEDICINE

## 2018-01-17 PROCEDURE — 99153 MOD SED SAME PHYS/QHP EA: CPT | Performed by: INTERNAL MEDICINE

## 2018-01-17 PROCEDURE — 85027 COMPLETE CBC AUTOMATED: CPT | Performed by: INTERNAL MEDICINE

## 2018-01-17 PROCEDURE — C1769 GUIDE WIRE: HCPCS | Performed by: INTERNAL MEDICINE

## 2018-01-17 PROCEDURE — 80048 BASIC METABOLIC PNL TOTAL CA: CPT | Performed by: INTERNAL MEDICINE

## 2018-01-17 PROCEDURE — 82948 REAGENT STRIP/BLOOD GLUCOSE: CPT

## 2018-01-17 PROCEDURE — 85610 PROTHROMBIN TIME: CPT | Performed by: INTERNAL MEDICINE

## 2018-01-17 PROCEDURE — 99152 MOD SED SAME PHYS/QHP 5/>YRS: CPT | Performed by: INTERNAL MEDICINE

## 2018-01-17 RX ORDER — LIDOCAINE HYDROCHLORIDE 10 MG/ML
INJECTION, SOLUTION INFILTRATION; PERINEURAL CODE/TRAUMA/SEDATION MEDICATION
Status: COMPLETED | OUTPATIENT
Start: 2018-01-17 | End: 2018-01-17

## 2018-01-17 RX ORDER — LANOLIN ALCOHOL/MO/W.PET/CERES
3 CREAM (GRAM) TOPICAL
COMMUNITY
End: 2018-01-17 | Stop reason: ALTCHOICE

## 2018-01-17 RX ORDER — VERAPAMIL HCL 2.5 MG/ML
AMPUL (ML) INTRAVENOUS CODE/TRAUMA/SEDATION MEDICATION
Status: COMPLETED | OUTPATIENT
Start: 2018-01-17 | End: 2018-01-17

## 2018-01-17 RX ORDER — MIDAZOLAM HYDROCHLORIDE 1 MG/ML
INJECTION INTRAMUSCULAR; INTRAVENOUS CODE/TRAUMA/SEDATION MEDICATION
Status: COMPLETED | OUTPATIENT
Start: 2018-01-17 | End: 2018-01-17

## 2018-01-17 RX ORDER — AMLODIPINE BESYLATE 10 MG/1
10 TABLET ORAL DAILY
COMMUNITY
End: 2018-10-18 | Stop reason: SDUPTHER

## 2018-01-17 RX ORDER — SODIUM CHLORIDE 9 MG/ML
75 INJECTION, SOLUTION INTRAVENOUS CONTINUOUS
Status: DISPENSED | OUTPATIENT
Start: 2018-01-17 | End: 2018-01-17

## 2018-01-17 RX ORDER — LACTOBACILLUS ACIDOPHILUS / LACTOBACILLUS BULGARICUS 100 MILLION CFU STRENGTH
1 GRANULES ORAL
COMMUNITY
End: 2018-01-17 | Stop reason: ALTCHOICE

## 2018-01-17 RX ORDER — NITROGLYCERIN 20 MG/100ML
INJECTION INTRAVENOUS CODE/TRAUMA/SEDATION MEDICATION
Status: COMPLETED | OUTPATIENT
Start: 2018-01-17 | End: 2018-01-17

## 2018-01-17 RX ORDER — FENTANYL CITRATE 50 UG/ML
INJECTION, SOLUTION INTRAMUSCULAR; INTRAVENOUS CODE/TRAUMA/SEDATION MEDICATION
Status: COMPLETED | OUTPATIENT
Start: 2018-01-17 | End: 2018-01-17

## 2018-01-17 RX ORDER — SODIUM CHLORIDE 9 MG/ML
75 INJECTION, SOLUTION INTRAVENOUS CONTINUOUS
Status: DISCONTINUED | OUTPATIENT
Start: 2018-01-17 | End: 2018-01-21 | Stop reason: HOSPADM

## 2018-01-17 RX ADMIN — IOHEXOL 55 ML: 350 INJECTION, SOLUTION INTRAVENOUS at 11:52

## 2018-01-17 RX ADMIN — VERAPAMIL HYDROCHLORIDE 2.5 MG: 2.5 INJECTION, SOLUTION INTRAVENOUS at 11:25

## 2018-01-17 RX ADMIN — LIDOCAINE HYDROCHLORIDE 10 ML: 10 INJECTION, SOLUTION INFILTRATION; PERINEURAL at 11:30

## 2018-01-17 RX ADMIN — NITROGLYCERIN 200 MCG: 20 INJECTION INTRAVENOUS at 11:25

## 2018-01-17 RX ADMIN — SODIUM CHLORIDE 75 ML/HR: 0.9 INJECTION, SOLUTION INTRAVENOUS at 10:35

## 2018-01-17 RX ADMIN — MIDAZOLAM HYDROCHLORIDE 1 MG: 1 INJECTION, SOLUTION INTRAMUSCULAR; INTRAVENOUS at 11:29

## 2018-01-17 RX ADMIN — LIDOCAINE HYDROCHLORIDE 1 ML: 10 INJECTION, SOLUTION INFILTRATION; PERINEURAL at 11:22

## 2018-01-17 RX ADMIN — FENTANYL CITRATE 25 MCG: 50 INJECTION, SOLUTION INTRAMUSCULAR; INTRAVENOUS at 11:29

## 2018-01-17 RX ADMIN — FENTANYL CITRATE 50 MCG: 50 INJECTION, SOLUTION INTRAMUSCULAR; INTRAVENOUS at 11:24

## 2018-01-17 RX ADMIN — MIDAZOLAM HYDROCHLORIDE 1 MG: 1 INJECTION, SOLUTION INTRAMUSCULAR; INTRAVENOUS at 11:24

## 2018-01-17 NOTE — DISCHARGE INSTRUCTIONS
After Heart Catheterization   AMBULATORY CARE:   Call 911 for any of the following:   · You have any of the following signs of a heart attack:      ¨ Squeezing, pressure, or pain in your chest that lasts longer than 5 minutes or returns    ¨ Discomfort or pain in your back, neck, jaw, stomach, or arm     ¨ Trouble breathing    ¨ Nausea or vomiting    ¨ Lightheadedness or a sudden cold sweat, especially with chest pain or trouble breathing    · You have any of the following signs of a stroke:      ¨ Numbness or drooping on one side of your face     ¨ Weakness in an arm or leg    ¨ Confusion or difficulty speaking    ¨ Dizziness, a severe headache, or vision loss    · You feel lightheaded, short of breath, and have chest pain  · You cough up blood  · You have trouble breathing  · You cannot stop the bleeding from your wound even after you hold firm pressure for 10 minutes  Seek care immediately if:   · Blood soaks through your bandage  · Your stitches come apart  · Your arm or leg feels numb, cool, or looks pale  · Your wound gets swollen quickly  Contact your healthcare provider if:   · You have a fever or chills  · Your wound is red, swollen, or draining pus  · Your wound looks more bruised or you have new bruising on the side of your leg or arm  · You have nausea or are vomiting  · Your skin is itchy, swollen, or you have a rash  · You have questions or concerns about your condition or care  Medicines: You may need any of the following:  · Blood thinners    help prevent blood clots  Examples of blood thinners include heparin and warfarin  Clots can cause strokes, heart attacks, and death  The following are general safety guidelines to follow while you are taking a blood thinner:    ¨ Watch for bleeding and bruising while you take blood thinners  Watch for bleeding from your gums or nose  Watch for blood in your urine and bowel movements   Use a soft washcloth on your skin, and a soft toothbrush to brush your teeth  This can keep your skin and gums from bleeding  If you shave, use an electric shaver  Do not play contact sports  ¨ Tell your dentist and other healthcare providers that you take anticoagulants  Wear a bracelet or necklace that says you take this medicine  ¨ Do not start or stop any medicines unless your healthcare provider tells you to  Many medicines cannot be used with blood thinners  ¨ Tell your healthcare provider right away if you forget to take the medicine, or if you take too much  ¨ Warfarin  is a blood thinner that you may need to take  The following are things you should be aware of if you take warfarin  § Foods and medicines can affect the amount of warfarin in your blood  Do not make major changes to your diet while you take warfarin  Warfarin works best when you eat about the same amount of vitamin K every day  Vitamin K is found in green leafy vegetables and certain other foods  Ask for more information about what to eat when you are taking warfarin  § You will need to see your healthcare provider for follow-up visits when you are on warfarin  You will need regular blood tests  These tests are used to decide how much medicine you need  · Acetaminophen  helps decrease pain and fever  This medicine is available without a doctor's order  Ask how much medicine is safe to take, and how often to take it  Acetaminophen can cause liver damage if not taken correctly  · Take your medicine as directed  Contact your healthcare provider if you think your medicine is not helping or if you have side effects  Tell him or her if you are allergic to any medicine  Keep a list of the medicines, vitamins, and herbs you take  Include the amounts, and when and why you take them  Bring the list or the pill bottles to follow-up visits  Carry your medicine list with you in case of an emergency  Bathing:   You may be able to shower the day after your procedure  Remove your pressure bandage before you shower  Do not take baths or go in hot tubs or pools  Carefully wash the wound with soap and water  Pat the area dry  Care for your wound as directed:  Change your bandage when it gets wet or dirty  A small bandage can be placed on your wound after you remove the pressure bandage  Do not put powders, lotions, or creams on your wound  They may cause your wound to get infected  Monitor your wound every day for signs of infection, such as redness, swelling, or pus  Mild bruising is normal and expected  If bleeding from your wound occurs:  Apply firm, steady pressure to stop the bleeding  Apply pressure with a clean gauze or towel for 5 to 10 minutes  Call 911 if bleeding becomes heavy or does not stop  Activity:  Do not lift anything heavier than 5 pounds until directed by your healthcare provider  Heavy lifting can put stress on your wound and cause bleeding  Do not push or pull with the arm that was used for the procedure  Do not do vigorous activity for at least 48 hours  Vigorous activity may cause bleeding from your wound  Rest and do quiet activities  Short walks to the bathroom and around the house are okay  Limit your stair climbing to prevent bleeding  Ask your healthcare provider when you can return to your normal activities  Do not strain when you have a bowel movement:  Your wound may bleed if you strain to have a bowel movement  Keep your legs flat on the floor and your hips at a 90° angle  Talk to your healthcare provider if you are constipated  You may need medicine to make it easier for you to have a bowel movement and to prevent straining  Drink liquids as directed:  Liquids will help flush the contrast liquid from your body  Ask how much liquid to drink each day and which liquids are best for you  Driving:  Ask your healthcare provider when it is okay for you to drive   He may tell you to wait 48 hours before you drive to decrease your risk for bleeding  Returning to work: You may not be able to return to work for at least 2 days after your procedure if your job involves heavy lifting  Ask your healthcare provider when it is okay for you to return to work  © 2017 [address] [employer] Information is for End User's use only and may not be sold, redistributed or otherwise used for commercial purposes  All illustrations and images included in CareNotes® are the copyrighted property of [employer]  or [address]  The above information is an  only  It is not intended as medical advice for individual conditions or treatments  Talk to your doctor, nurse or pharmacist before following any medical regimen to see if it is safe and effective for you

## 2018-01-17 NOTE — DISCHARGE SUMMARY
Discharge Summary - Amaya Caban 65 y o  female MRN: [id number]  Unit/Bed#:  Encounter: [id number]    Admission Date: 1/17/2018   Discharge Date:  01/17/2018    Disposition: Home    Discharge Diagnosis:  Nonobstructive CAD    Condition at Discharge: stable   Procedures:  Left heart cardiac catheterization  Discharge weight:   Vitals:    01/17/18 1034   Weight: 71 kg (156 lb 8 4 oz)           Physical Exam    HPI and Hospital Course:  Patient presented for elective outpatient cardiac catheterization as part of evaluation of abnormal stress test   Left heart catheterization was performed which revealed nonobstructive CAD  No interventions were performed  Patient to be discharged with Cardiology follow-up in 1-2 weeks  Risk factor modification to continue  Discharge Medications:  See after visit summary for reconciled discharge medications provided to patient and family        Current Facility-Administered Medications   Medication Dose Route Frequency    sodium chloride 0 9 % infusion  75 mL/hr Intravenous Continuous       Pertinent Labs/diagnostics:        CBC with diff:   Results from last 7 days  Lab Units 01/17/18  1045   WBC Thousand/uL 6 44   HEMOGLOBIN g/dL 11 9   HEMATOCRIT % 38 0   MCV fL 93   PLATELETS Thousands/uL 311   MCH pg 29 2   MCHC g/dL 31 3*   RDW % 14 3   MPV fL 9 2       CMP:  Results from last 7 days  Lab Units 01/17/18  1045   SODIUM mmol/L 143   POTASSIUM mmol/L 4 6   CHLORIDE mmol/L 106   CO2 mmol/L 30   ANION GAP mmol/L 7   BUN mg/dL 17   CREATININE mg/dL 0 58*   GLUCOSE RANDOM mg/dL 115   CALCIUM mg/dL 9 2   EGFR ml/min/1 73sq m 119       Lipid Profile:   Lab Results   Component Value Date    CHOL 255 (H) 06/20/2017     Lab Results   Component Value Date    HDL 39 (L) 06/20/2017     Lab Results   Component Value Date    LDLCALC 181 (H) 06/20/2017     Lab Results   Component Value Date    TRIG 177 (H) 06/20/2017         Cardiac testing:   Results for orders placed during the hospital encounter of 17   Echo complete with contrast if indicated    Narrative [address] [place] [phone]    Transthoracic Echocardiogram  2D, M-mode, Doppler, and Color Doppler    Study date:  03-May-2017    Patient: Fani Saldana  MR number: [id number]  Account number: [de-identified]  : 1960  Age: 65 years  Gender: Female  Status: Inpatient  Location: Bedside  Height: 60 in  Weight: 198 lb  BP: 141/ 77 mmHg    Indications: Cardiomegaly, Assess left ventricular function  Diagnoses: I51 7 - Cardiomegaly    Sonographer:   Tc Tran RDCS  Interpreting Physician:  Arthur Chery MD  Primary Physician:  Estelle Iyer PA-C  Group:  [facility]    SUMMARY    LEFT VENTRICLE:  Systolic function was normal  Ejection fraction was estimated in the range of 55 % to 65 %  There were no regional wall motion abnormalities  Doppler parameters were consistent with abnormal left ventricular relaxation (grade 1 diastolic dysfunction)  LEFT ATRIUM:  The atrium was mildly dilated  MITRAL VALVE:  There was mild regurgitation  IVC, HEPATIC VEINS:  The inferior vena cava was dilated  HISTORY: PRIOR HISTORY: Risk factors: hypertension and diabetes  PROCEDURE: The procedure was performed at the bedside  This was a routine study  The transthoracic approach was used  The study included complete 2D imaging, M-mode, complete spectral Doppler, and color Doppler  The heart rate was 100 bpm,  at the start of the study  Images were obtained from the parasternal, apical, subcostal, and suprasternal notch acoustic windows  Image quality was adequate  LEFT VENTRICLE: Size was normal  Systolic function was normal  Ejection fraction was estimated in the range of 55 % to 65 %  There were no regional wall motion abnormalities   Wall thickness was normal  DOPPLER: Doppler parameters were  consistent with abnormal left ventricular relaxation (grade 1 diastolic dysfunction)  RIGHT VENTRICLE: The size was normal  Systolic function was normal  Wall thickness was normal     LEFT ATRIUM: The atrium was mildly dilated  RIGHT ATRIUM: Size was normal     MITRAL VALVE: Valve structure was normal  There was normal leaflet separation  DOPPLER: The transmitral velocity was within the normal range  There was no evidence for stenosis  There was mild regurgitation  AORTIC VALVE: The valve was trileaflet  Leaflets exhibited normal thickness and normal cuspal separation  DOPPLER: Transaortic velocity was within the normal range  There was no evidence for stenosis  There was no regurgitation  TRICUSPID VALVE: The valve structure was normal  There was normal leaflet separation  DOPPLER: The transtricuspid velocity was within the normal range  There was no evidence for stenosis  There was no regurgitation  PULMONIC VALVE: Leaflets exhibited normal thickness, no calcification, and normal cuspal separation  DOPPLER: The transpulmonic velocity was within the normal range  There was no regurgitation  PERICARDIUM: There was no pericardial effusion  The pericardium was normal in appearance  AORTA: The root exhibited normal size  SYSTEMIC VEINS: IVC: The inferior vena cava was dilated      SYSTEM MEASUREMENT TABLES    2D  %FS: 35 4 %  Ao Diam: 3 5 cm  EDV(Teich): 163 3 ml  EF(Teich): 64 1 %  ESV(Teich): 58 7 ml  IVSd: 0 8 cm  LA Area: 24 4 cm2  LA Diam: 3 8 cm  LVEDV MOD A4C: 75 4 ml  LVEF MOD A4C: 64 4 %  LVESV MOD A4C: 26 9 ml  LVIDd: 5 7 cm  LVIDs: 3 7 cm  LVLd A4C: 8 6 cm  LVLs A4C: 6 9 cm  LVPWd: 0 8 cm  RA Area: 21 8 cm2  RVIDd: 5 1 cm  SV MOD A4C: 48 5 ml  SV(Teich): 104 6 ml    MM  TAPSE: 2 3 cm    PW  MV A [place]: 1 m/s  MV Dec [place]: 6 5 m/s2  MV DecT: 141 6 ms  MV E [place]: 0 9 m/s  MV E/A Ratio: 0 9  MV PHT: 41 1 ms  MVA By PHT: 5 4 cm2    [employer] Accredited Echocardiography Laboratory    Prepared and electronically signed by    Nasra Gallegos MD  Signed [id number] 12:27:06           Discharge instructions/Information to patient and family:   See after visit summary for information provided to patient and family  Provisions for Follow-Up Care:  See after visit summary for information related to follow-up care and any pertinent home health orders  Discharge Statement   I spent 15 minutes minutes discharging the patient  This time was spent on the day of discharge  I had direct contact with the patient on the day of discharge  Additional documentation is required if more than 30 minutes were spent on discharge       Rita Delgado PA-C  1/17/2018,,12:07 PM

## 2018-01-17 NOTE — PROCEDURES
Procedures by Lacie Nuñez PA-C at  5/3/2017  2:52 PM      Author:  Lacie Nuñez PA-C Service:  Surgery-General Author Type:  Physician Assistant    Filed:  5/3/2017  2:56 PM Date of Service:  5/3/2017  2:52 PM Status:  Attested    :  Lacie Nuñez PA-C (Physician Assistant)  Cosigner:  Wyline Schilder, MD at 5/3/2017  4:55 PM      Pre-procedure Diagnoses:       1  Open abdominal incision with drainage, initial encounter [T81 31XA]                Procedures:       1  [address] [facility] [[id number] (Custom)]              Attestation signed by Wyline Schilder, MD at 5/3/2017  4:55 PM               Split/Shared Statement  I saw/examined the patient  I agree with the Advanced Practitioner's note with the following additions/exceptions: Passing flatus and stool via colostomy bag  Also C/O of SOB but no wheezing or chest pain  The abdomen is soft, mildly distended with a wound VAC on the incision  JAROD serous fluid  Plan: cap IV, diuresis,increase activity, continue with IV antibiotics  VAC DRESSING CHANGE    V  A C   Change Note    Date: 5/3/17    Location of wound: abd    Dimensions of wound: 12 cm x 2 cm x 2 cm     Description of wound: open surgical incision abd    Sponges removed:  1 Black Sponges  0 White Sponges    Sponges placed:  1 Black Sponges  0 White Sponges    VAC settings:  125 mmHg  Continuous    Cande Lucero           Received for:Bobbi Dc [facility]  May  3 2017  4:55PM [facility] Standard Time

## 2018-01-17 NOTE — MISCELLANEOUS
Assessment   1  1   2  History of colostomy (V12 70) (Z98 890)  3  Diverticulitis of large intestine with perforation and abscess without bleeding (562 11)   (K57 20)  4  Diabetic nephropathy associated with type 2 diabetes mellitus (250 40,583 81) (E11 21)  5  Controlled type 2 diabetes mellitus (250 00) (E11 9)  6  Chronic diastolic HF (heart failure) (428 32) (I50 32)  7  Abnormal stress test (794 39) (R94 39)  8  Essential hypertension (401 9) (I10)  9  Postoperative state (V45 89) (Z98 890)1      1 Amended By: Michele Early; Sep 19 2017 1:07 PM EST    Plan  Controlled type 2 diabetes mellitus    · (1) BASIC METABOLIC PROFILE; Status:Active; Requested PVH:[id number]; Perform:[facility]; PXA:[id number];ISZKZAG; For:Controlled type 2 diabetes   mellitus; Ordered By:Travis Acevedo;   · (1) HEMOGLOBIN A1C; Status:Active; Requested NKI:75GKU1632; Perform:[facility]; TGE:[id number];XTEQMPE; For:Controlled type 2 diabetes   mellitus; Ordered By:Gallo Acevedo; Health Maintenance    · COLONOSCOPY; Status:Complete;   Done: 61PES0147 12:00AM  Performed:*Other; Due:10Wzr3083; Ordered; For:Health Maintenance; Ordered   By:Gallo Acevedo;    Discussion/Summary  Discussion Summary:   [facility] records reviewed in full with the patient  - Labs were reviewed as well as stress test from back in July  - She is going to call to schedule appointment with cardiology and discuss getting left heart cath  - She should continue current BP meds at this time and return in 1 month to repeat  Suspect there is pain aspect to her elevated BPs   - Follow-up with Dr Myah Mora in 2 weeks   - Diabetes has been well controlled with last A1C 5 2%  She would like to trial off DM medications to see if she can control it with just diet  Will repeat A1C in January 2018 to see how well her sugars are controlled with just diet  Discussed possibility of adding back meds if A1C significantly increases     Counseling Documentation With Imm: The patient was counseled regarding diagnostic results, instructions for management, risk factor reductions, prognosis, patient and family education, impressions, risks and benefits of treatment options, importance of compliance with treatment  Medication SE Review and Pt Understands Tx: Possible side effects of new medications were reviewed with the patient/guardian today  The treatment plan was reviewed with the patient/guardian  The patient/guardian understands and agrees with the treatment plan   Self Referrals:   Self Referrals: No      Chief Complaint  Chief Complaint Free Text Note Form: hospital follow-up reversal of colostomy      History of Present Illness  TCM Communication [facility] 8730 records were reviewed  She was hospitalized at [address] [place] [address]  The date of admission: 9/8/17, date of discharge: 9/14/17  Diagnosis: s/p reversal of colostomy  She was discharged to home  Medications reviewed and updated today  She scheduled a follow up appointment  Communication performed and completed by   HPI: Back in May patient had perforated diverticulitis s/p Hernandez procedure  She recently presented to [address] [place] [address] for reversal of colostomy  She had an abnormal stress test (ECHO stress 7/31/17: findings suggest single vessel CAD) and case discussed with cardiology who ultimately decided to proceed with surgery and to perform left heart catheterization to evaluate for CAD  She had no complications during surgery  EKG on September 5th showed sinus rhythm with 1st degree AV block and LVH with repolarization abnormality  She has history of chronic diastolic CHF, HTN, HLD, diabetes, and diabetic neuropathy  A1C was 5 2% recently  She was able to be discharged on post-op day #6  She was seen by internal medicine in the hospital due to spikes in her blood pressure  Lisinopril was increased to 20mg from 5mg  She followed up today with Dr Dior Simons for first post-operative visit  Noted that she was doing well and he would see her back in 2 weeks  Back in August she had colonoscopy by Dr Yuniel Hawkins and 2 benign polyps were removed  No fever, chills, chest pain, shortness of breath  In mild amount of pain  Only taking tylenol  Threw away narcotic script  Review of Systems  Complete-Female:   Constitutional: No fever, no chills, feels well, no tiredness, no recent weight gain or weight loss  Eyes: No complaints of eye pain, no red eyes, no eyesight problems, no discharge, no dry eyes, no itching of eyes  ENT: no complaints of earache, no loss of hearing, no nose bleeds, no nasal discharge, no sore throat, no hoarseness  Cardiovascular: No complaints of slow heart rate, no fast heart rate, no chest pain, no palpitations, no leg claudication, no lower extremity edema  Respiratory: No complaints of shortness of breath, no wheezing, no cough, no SOB on exertion, no orthopnea, no PND  Gastrointestinal: abdominal pain, but no nausea, no vomiting, no constipation, no diarrhea and no blood in stools  Genitourinary: No complaints of dysuria, no incontinence, no pelvic pain, no dysmenorrhea, no vaginal discharge or bleeding  Musculoskeletal: No complaints of arthralgias, no myalgias, no joint swelling or stiffness, no limb pain or swelling  Integumentary: skin wound, but no rashes, no itching and no skin lesions  Neurological: No complaints of headache, no confusion, no convulsions, no numbness, no dizziness or fainting, no tingling, no limb weakness, no difficulty walking  Active Problems    1  Abnormal stress test (794 39) (R94 39)  2  Chronic diastolic HF (heart failure) (428 32) (I50 32)  3  Controlled type 2 diabetes mellitus (250 00) (E11 9)  4  Diabetic nephropathy associated with type 2 diabetes mellitus (250 40,583 81) (E11 21)  5  Diverticulitis of large intestine with perforation and abscess without bleeding (562 11)   (K57 20)  6   Encounter for preprocedural cardiovascular examination (V72 81) (Z01 810)  7  Essential hypertension (401 9) (I10)  8  History of colostomy (V12 70) (Z98 890)  9  Hyperlipidemia (272 4) (E78 5)  10  Obesity (BMI 30-39 9) (278 00) (E66 9)  11  Pericardial effusion (423 9) (I31 3)  12  Postoperative anemia due to acute blood loss (285 1) (D62)  13  S/P colostomy (V44 3) (Z93 3)    Postoperative state (V45 89) (Z98 890)          Past Medical History   1  History of Acute on chronic congestive heart failure with left ventricular diastolic   dysfunction (835 14) (I50 33)  2  History of Atelectasis (518 0) (J98 11)  3  History of asthma (V12 69) (Z87 09)  4  Denied: History of depression  5  Denied: History of substance abuse  6  History of Lesion of spleen (289 50) (D73 9)    Surgical History   1  History of Negative Pressure Wound Therapy Using Durable Medical Equipment  2  History of Partial Colectomy, End Colostomy & Distal Segment Closure  Surgical History Reviewed: The surgical history was reviewed and updated today  Family History  Mother   1  Family history of diabetes mellitus (V18 0) (Z83 3)  2  Family history of hypertension (V17 49) (Z82 49)  3  Denied: Family history of mental disorder  4  Denied: Family history of substance abuse  Father   5  Denied: Family history of mental disorder  6  Denied: Family history of substance abuse  Family History Reviewed: The family history was reviewed and updated today  Social History    · Denied: History of Alcohol use   · Denied: History of Drug use   · Former smoker (V15 82) (Q12 560)  Social History Reviewed: The social history was reviewed and updated today  Current Meds  1  Aspirin 81 MG Oral Tablet Delayed Release; TAKE 1 TABLET DAILY; Therapy: 60Syb8754 to (Evaluate:93Nqo4930)  Requested for: 02Aug2017; Last   Rx:27Dpm1367 Ordered  2  Atorvastatin Calcium 20 MG Oral Tablet; Take 1 tablet daily;    Therapy: 21Jun2017 to (Last Jw Barrio)  Requested for: 21Jun2017 Ordered  3  Floranex Oral Packet; TAKE 1 PACKET 3 times daily; Therapy: 56VLO6125 to (Last Rx:17May2017)  Requested for: 24BBO6471 Ordered  4  Furosemide 40 MG Oral Tablet; TAKE 1 TABLET TWICE DAILY  Requested for:   26Jun2017; Last KB:86ZNH2734 Ordered  5  HydrALAZINE HCl - 10 MG Oral Tablet; Take 1 tablet twice daily  Requested for:   75COP0086; Last Rx:17May2017 Ordered  6  Ipratropium Bromide 0 02 % Inhalation Solution; Therapy: (Recorded:16May2017) to Recorded  7  Lisinopril 20 MG Oral Tablet; TAKE 1 TABLET DAILY; Therapy: 21Jun2017 to (Evaluate:13Jan2018)  Requested for: 20Tgg4485 Recorded  8  Melatonin 3 MG Oral Tablet; Therapy: (Recorded:16May2017) to Recorded  9  Protonix 40 MG Oral Tablet Delayed Release; Therapy: (Recorded:16May2017) to Recorded  10  Toprol  MG Oral Tablet Extended Release 24 Hour; TAKE 1 TABLET DAILY; Therapy: (Recorded:16May2017) to Recorded  Medication List Reviewed: The medication list was reviewed and updated today  Allergies   1  Ciprofloxacin HCl TABS    Vitals  Signs   Recorded: 13UCS9502 87:40CZ   Systolic: 042  Diastolic: 88  Recorded: 85MNB7654 12:43PM   Temperature: 98 4 F  Heart Rate: 70  Systolic: 885  Diastolic: 98  Height: 5 ft 1 in  Weight: 155 lb 4 oz  BMI Calculated: 29 33  BSA Calculated: 1 7  O2 Saturation: 99    Physical Exam    Constitutional   General appearance: No acute distress, well appearing and well nourished  Ears, Nose, Mouth, and Throat   Oropharynx: Normal with no erythema, edema, exudate or lesions  Pulmonary   Respiratory effort: No increased work of breathing or signs of respiratory distress  Auscultation of lungs: Clear to auscultation  Cardiovascular   Auscultation of heart: Normal rate and rhythm, normal S1 and S2, without murmurs  Examination of extremities for edema and/or varicosities: Normal     Carotid pulses: Normal     Abdomen   Abdomen: Abnormal   soft/NT/ND/+BS; incision healing well   No evidence of infection or abnormal drainage from ostomy side wound  Liver and spleen: No hepatomegaly or splenomegaly      Psychiatric   Orientation to person, place, and time: Normal     Mood and affect: Normal          Results/Data  COLONOSCOPY 37Nfw3601 12:00AM Jose Antonio Wagner     Test Name Result Flag Reference   Colonoscopy 08/16/2017       Summary / No summary entered :      No summary entered  Documents attached :      EPIC OP NOTE - Connor Monson; Enc: [id number] - Transcription Encounter - [facility] - (General Surgery) (Additional Information Document)    Future Appointments    Date/Time Provider Specialty Site   10/03/2017 09:15 AM Connor Monson MD General Surgery [facility]     Signatures   Electronically signed by : Yarely Bueno DO; Sep 19 2017  1:07PM EST                       (Author)    Electronically signed by : Yarely Bueno DO; Sep 19 2017  1:08PM EST                       (Author)

## 2018-01-17 NOTE — ED NOTES
Report given to ASHLEY Atnoine Rt wrist and Rt groin no signs of bleeding no hematoma   Site witnessed by receving RN

## 2018-01-22 VITALS
SYSTOLIC BLOOD PRESSURE: 170 MMHG | WEIGHT: 150 LBS | OXYGEN SATURATION: 99 % | HEIGHT: 61 IN | HEART RATE: 68 BPM | DIASTOLIC BLOOD PRESSURE: 102 MMHG | BODY MASS INDEX: 28.32 KG/M2

## 2018-01-22 VITALS
SYSTOLIC BLOOD PRESSURE: 140 MMHG | DIASTOLIC BLOOD PRESSURE: 100 MMHG | BODY MASS INDEX: 28.34 KG/M2 | TEMPERATURE: 97.6 F | WEIGHT: 150 LBS

## 2018-01-22 VITALS
SYSTOLIC BLOOD PRESSURE: 144 MMHG | HEIGHT: 61 IN | DIASTOLIC BLOOD PRESSURE: 86 MMHG | TEMPERATURE: 98.6 F | BODY MASS INDEX: 28.7 KG/M2 | WEIGHT: 152 LBS

## 2018-01-22 VITALS
HEIGHT: 61 IN | OXYGEN SATURATION: 99 % | WEIGHT: 155.25 LBS | TEMPERATURE: 98.4 F | SYSTOLIC BLOOD PRESSURE: 150 MMHG | DIASTOLIC BLOOD PRESSURE: 88 MMHG | BODY MASS INDEX: 29.31 KG/M2 | HEART RATE: 70 BPM

## 2018-01-23 VITALS
DIASTOLIC BLOOD PRESSURE: 99 MMHG | BODY MASS INDEX: 28.92 KG/M2 | SYSTOLIC BLOOD PRESSURE: 144 MMHG | WEIGHT: 153.2 LBS | HEIGHT: 61 IN

## 2018-01-24 ENCOUNTER — OFFICE VISIT (OUTPATIENT)
Dept: CARDIOLOGY CLINIC | Facility: CLINIC | Age: 58
End: 2018-01-24
Payer: COMMERCIAL

## 2018-01-24 VITALS
BODY MASS INDEX: 29.66 KG/M2 | SYSTOLIC BLOOD PRESSURE: 138 MMHG | HEIGHT: 61 IN | DIASTOLIC BLOOD PRESSURE: 88 MMHG | WEIGHT: 157.13 LBS | HEART RATE: 80 BPM

## 2018-01-24 VITALS
HEART RATE: 79 BPM | OXYGEN SATURATION: 90 % | SYSTOLIC BLOOD PRESSURE: 164 MMHG | BODY MASS INDEX: 31.18 KG/M2 | WEIGHT: 158.8 LBS | DIASTOLIC BLOOD PRESSURE: 88 MMHG | HEIGHT: 60 IN

## 2018-01-24 DIAGNOSIS — I10 HTN (HYPERTENSION): ICD-10-CM

## 2018-01-24 DIAGNOSIS — I25.10 CAD (CORONARY ARTERY DISEASE): Primary | ICD-10-CM

## 2018-01-24 DIAGNOSIS — E78.5 HYPERLIPIDEMIA: ICD-10-CM

## 2018-01-24 PROCEDURE — 99213 OFFICE O/P EST LOW 20 MIN: CPT | Performed by: INTERNAL MEDICINE

## 2018-01-24 NOTE — PATIENT INSTRUCTIONS
Coronary Artery Disease   WHAT YOU SHOULD KNOW:   Coronary artery disease (CAD) is narrowing of the arteries to your heart caused by a buildup of plaque  Plaque is made up of cholesterol and other substances  The narrowing in your arteries decreases the amount of blood that can flow to your heart  This causes your heart to get less oxygen  INSTRUCTIONS:   Medicines: You may  need any of the following:  · Blood pressure medicines  are given to lower your blood pressure  These medicines may include ACE inhibitors and beta-blockers  ACE inhibitors help keep your blood vessels relaxed and open, which helps keep blood flowing into your heart  Beta-blockers keep your heart pumping strongly and regularly  This helps keep your heart from working too hard to get oxygen  · Cholesterol-lowering medicines  help lower high blood cholesterol levels  · Nitrates , such as nitroglycerin, relax the arteries of your heart so it gets more oxygen  They help to relieve your chest pain  · Antiplatelet medicines , such as aspirin, keep platelets from sticking to a damaged part of your artery  Platelets are a part of your blood that stick together to help heal injuries  They may cause a blockage in your artery and keep blood from flowing to your heart  · Anticoagulants    are a type of blood thinner medicine that helps prevent clots  Clots can cause strokes, heart attacks, and death  These medicines may cause you to bleed or bruise more easily  ¨ Watch for bleeding from your gums or nose  Watch for blood in your urine and bowel movements  Use a soft washcloth and a soft toothbrush  If you shave, use an electric razor  Avoid activities that can cause bruising or bleeding  ¨ Tell your healthcare provider about all medicines you take because many medicines cannot be used with anticoagulants  Do not start or stop any medicines unless your healthcare provider tells you to   Tell your dentist and other healthcare providers that you take anticoagulants  Wear a bracelet or necklace that says you take this medicine  ¨ You will need regular blood tests so your healthcare provider can decide how much medicine you need  Take anticoagulants exactly as directed  Tell your healthcare provider right away if you forget to take the medicine, or if you take too much  ¨ If you take warfarin, some foods can change how your blood clots  Do not make major changes to your diet while you take warfarin  Warfarin works best when you eat about the same amount of vitamin K every day  Vitamin K is found in green leafy vegetables, broccoli, grapes, and other foods  Ask for more information about what to eat when you take warfarin  · Take your medicine as directed  Call your healthcare provider if you think your medicine is not helping or if you have side effects  Tell him if you are allergic to any medicine  Keep a list of the medicines, vitamins, and herbs you take  Include the amounts, and when and why you take them  Bring the list or the pill bottles to follow-up visits  Carry your medicine list with you in case of an emergency  Follow up with your primary healthcare provider or cardiologist as directed: You may need to return for other tests  You may also be referred to a cardiac surgeon  Write down your questions so you remember to ask them during your visits  Cardiac rehabilitation:  Your primary healthcare provider or cardiologist may recommend that you attend cardiac rehabilitation (rehab)  This is a program run by specialists who will help you safely strengthen your heart and reduce the risk of more heart disease  The plan includes exercise, relaxation, stress management, and heart-healthy nutrition  Caregivers will also check to make sure any medicines you are taking are working  Manage CAD:   · Exercise regularly  Exercise at least 30 minutes per day, on most days of the week   Exercise helps to lower high cholesterol and high blood pressure  It can also help you to maintain a healthy weight  Ask your primary healthcare provider about the kind of exercise you should do and how to get started  · Maintain a healthy weight  If you are overweight, talk to your primary healthcare provider about how to lose weight  A weight loss of 10% can improve your heart health  · Eat heart-healthy foods  Include fresh fruits and vegetables in your meal plan  Choose low-fat foods, such as skim or 1% fat milk, low-fat cheese and yogurt, fish, chicken (without skin), and lean meats  Eat two 4-ounce servings of fish high in omega-3 fats each week, such as salmon, fresh tuna, and herring  Avoid foods that are high in sodium, such as canned foods, potato chips, salty snacks, and cold cuts  Put less table salt on your food  · Do not smoke  Smoking increases your risk of a heart attack  If you smoke, it is never too late to quit  Ask primary healthcare provider for information if you need help quitting  · Limit alcohol  Women should limit alcohol to 1 drink a day  Men should limit alcohol to 2 drinks a day  A drink of alcohol is 12 ounces of beer, 5 ounces of wine, or 1½ ounces of liquor  · Manage other health conditions  Follow your primary healthcare provider's advice on how to manage other conditions that can affect your heart health  These include diabetes, high blood pressure, and high cholesterol  You may need to take medicines for these conditions and make other lifestyle changes  Talk to your primary healthcare provider if you are depressed  He may recommend treatment for your depression  · Ask if you should have a flu vaccine  The flu can be dangerous for a person who has CAD  The flu vaccine is available every year in the fall         Contact your primary healthcare provider if:   · You have chest pain that is more frequent, or you have chest pain at rest      · You have questions or concerns about your condition or care   Return to the emergency department if:  You have any of the following signs of a heart attack:  · Squeezing, pressure, fullness, or pain in your chest that lasts longer than a few minutes or returns     · Discomfort or pain in your back, neck, jaw, stomach, or arm    · Shortness of breath or breathing problems    · A sudden cold sweat, lightheadedness, dizziness, or nausea, especially with chest pain or trouble breathing  © 2014 [address] is for End User's use only and may not be sold, redistributed or otherwise used for commercial purposes  All illustrations and images included in CareNotes® are the copyrighted property of [employer]  or [address]  The above information is an  only  It is not intended as medical advice for individual conditions or treatments  Talk to your doctor, nurse or pharmacist before following any medical regimen to see if it is safe and effective for you

## 2018-01-24 NOTE — PROGRESS NOTES
General Cardiology   Progress Note   Shiela Maldonado 65 y o  female MRN: [id number]   Encounter: [id number]        Subjective:   Patient is here for follow-up after cardiac catheterization  Patient cardiac catheterization due to abnormal nuclear stress test  Cardiac catheter showed 50% RCA lesion  Patient is doing well and did not have any complications after cardiac catheterization  She denies having any chest pain or shortness of breath on exertion  I discussed the cardiac catheter results with the patient  REVIEW OF SYSTEMS:  Constitutional:  Denies fever or chills   Eyes:  Denies change in visual acuity   HENT:  Denies nasal congestion or sore throat   Respiratory:  Denies cough or shortness of breath   Cardiovascular:  Denies chest pain or edema   GI:  Denies abdominal pain, nausea, vomiting, bloody stools or diarrhea   :  Denies dysuria, frequency, difficulty in micturition and nocturia  Musculoskeletal:  Denies back pain or joint pain   Neurologic:  Denies headache, focal weakness or sensory changes   Endocrine:  Denies polyuria or polydipsia   Lymphatic:  Denies swollen glands   Psychiatric:  Denies depression or anxiety     Objective:   Vitals:  Vitals:    01/24/18 1535   BP: 164/88   Pulse: 79   SpO2: 90%       Body mass index is 31 01 kg/m²  @HGWRLP(79)@  @SFYFKH(61)@  [unfilled]  Weight (last 2 days)     Date/Time   Weight    01/24/18 1535  72 (158 8)              PHYSICAL EXAMS:  General:  Patient is not in acute distress, laying in the bed comfortably, awake, alert responding to commands  Head: Normocephalic, Atraumatic  HEENT: White sclera, pink conjunctiva,  PERRLA,pharynx benign  Neck:  Supple, no neck vein distention, carotids+2/+2 no bruits, thyromegaly, adenopathy  Respiratory: clear to P/A  Cardiovascular:  PMI normal, S1-S2 normal, No  Murmurs, thrills, gallops, rubs   Regular rhythm  GI:  Abdomen soft nontender   No hepatosplenomegaly, adenopathy, ascites,or rebound tenderness  Extremities: No edema, normal pulses, no calf tenderness, no joint deformities, no venous disease   Integument:  No skin rashes or ulceration  Lymphatic:  No cervical or inguinal lymphadenopathy  Neurologic:  Patient is awake alert, responding to command, well-oriented to time and place and person moving all extremities      LABORATORY RESULTS:      CBC with diff:       Invalid input(s):  RBC, TOTALCELLSCOUNTED, SEGS%, GRANS%, LYMPHS%, EOS%, BASO%, ABNEUT, ABGRANS, ABLYMPHS, ABMOMOS, ABEOS, ABBASO    CMP:      BMP:                             Lipid Profile:   Lab Results   Component Value Date    CHOL 255 (H) 2017     Lab Results   Component Value Date    HDL 39 (L) 2017     Lab Results   Component Value Date    LDLCALC 181 (H) 2017     Lab Results   Component Value Date    TRIG 177 (H) 2017       Cardiac testing:   Results for orders placed during the hospital encounter of 17   Echo complete with contrast if indicated    [address] [id number] [phone]    Transthoracic Echocardiogram  2D, M-mode, Doppler, and Color Doppler    Study date:  03-May-2017    Patient: Mila Mercado  MR number: [id number]  Account number: [de-identified]  : 1960  Age: 65 years  Gender: Female  Status: Inpatient  Location: Bedside  Height: 60 in  Weight: 198 lb  BP: 141/ 77 mmHg    Indications: Cardiomegaly, Assess left ventricular function  Diagnoses: I51 7 - Cardiomegaly    Sonographer:   [facility] Tr[facility]  Interpreting Physician:  Jeremy Bustillo MD  Primary Physician:  Casa Eli PA-C  Group:  [facility]    SUMMARY    LEFT VENTRICLE:  Systolic function was normal  Ejection fraction was estimated in the range of 55 % to 65 %  There were no regional wall motion abnormalities    Doppler parameters were consistent with abnormal left ventricular relaxation (grade 1 diastolic dysfunction)  LEFT ATRIUM:  The atrium was mildly dilated  MITRAL VALVE:  There was mild regurgitation  IVC, HEPATIC VEINS:  The inferior vena cava was dilated  HISTORY: PRIOR HISTORY: Risk factors: hypertension and diabetes  PROCEDURE: The procedure was performed at the bedside  This was a routine study  The transthoracic approach was used  The study included complete 2D imaging, M-mode, complete spectral Doppler, and color Doppler  The heart rate was 100 bpm,  at the start of the study  Images were obtained from the parasternal, apical, subcostal, and suprasternal notch acoustic windows  Image quality was adequate  LEFT VENTRICLE: Size was normal  Systolic function was normal  Ejection fraction was estimated in the range of 55 % to 65 %  There were no regional wall motion abnormalities  Wall thickness was normal  DOPPLER: Doppler parameters were  consistent with abnormal left ventricular relaxation (grade 1 diastolic dysfunction)  RIGHT VENTRICLE: The size was normal  Systolic function was normal  Wall thickness was normal     LEFT ATRIUM: The atrium was mildly dilated  RIGHT ATRIUM: Size was normal     MITRAL VALVE: Valve structure was normal  There was normal leaflet separation  DOPPLER: The transmitral velocity was within the normal range  There was no evidence for stenosis  There was mild regurgitation  AORTIC VALVE: The valve was trileaflet  Leaflets exhibited normal thickness and normal cuspal separation  DOPPLER: Transaortic velocity was within the normal range  There was no evidence for stenosis  There was no regurgitation  TRICUSPID VALVE: The valve structure was normal  There was normal leaflet separation  DOPPLER: The transtricuspid velocity was within the normal range  There was no evidence for stenosis  There was no regurgitation  PULMONIC VALVE: Leaflets exhibited normal thickness, no calcification, and normal cuspal separation   DOPPLER: The transpulmonic velocity was within the normal range  There was no regurgitation  PERICARDIUM: There was no pericardial effusion  The pericardium was normal in appearance  AORTA: The root exhibited normal size  SYSTEMIC VEINS: IVC: The inferior vena cava was dilated  SYSTEM MEASUREMENT TABLES    2D  %FS: 35 4 %  Ao Diam: 3 5 cm  EDV(Teich): 163 3 ml  EF(Teich): 64 1 %  ESV(Teich): 58 7 ml  IVSd: 0 8 cm  LA Area: 24 4 cm2  LA Diam: 3 8 cm  LVEDV MOD A4C: 75 4 ml  LVEF MOD A4C: 64 4 %  LVESV MOD A4C: 26 9 ml  LVIDd: 5 7 cm  LVIDs: 3 7 cm  LVLd A4C: 8 6 cm  LVLs A4C: 6 9 cm  LVPWd: 0 8 cm  RA Area: 21 8 cm2  RVIDd: 5 1 cm  SV MOD A4C: 48 5 ml  SV(Teich): 104 6 ml    MM  TAPSE: 2 3 cm    PW  MV A [place]: 1 m/s  MV Dec Morton: 6 5 m/s2  MV DecT: 141 6 ms  MV E [place]: 0 9 m/s  MV E/A Ratio: 0 9  MV PHT: 41 1 ms  MVA By PHT: 5 4 cm2    [facility] Accredited Echocardiography [facility]    Prepared and electronically signed by    Angelica Gerard MD  Signed 03-May-2017 12:27:06       No results found for this or any previous visit  No results found for this or any previous visit  No procedure found  No results found for this or any previous visit  Meds/Allergies   all current active meds have been reviewed    Assessment/Plan:  #1  Coronary artery disease, 50% mid RCA lesion  She is currently asymptomatic  Continue aspirin and statin for now  Follow-up with me in 6 months    #2  Hypertension: blood pressure is elevated in office today  I asked the patient to monitor blood pressure and called me for systolic blood pressures consistently more than 140 mmHg  #3  Hyperlipidemia, on statins  Counseling / Coordination of Care  Total floor / unit time spent today 25 minutes  Greater than 50% of total time was spent with the patient and / or family counseling and / or coordination of care  ** Please Note: Dragon 360 Dictation voice to text software may have been used in the creation of this document   **

## 2018-01-28 DIAGNOSIS — I10 ESSENTIAL HYPERTENSION: Primary | ICD-10-CM

## 2018-01-28 RX ORDER — HYDRALAZINE HYDROCHLORIDE 10 MG/1
TABLET, FILM COATED ORAL
Qty: 60 TABLET | Refills: 5 | Status: SHIPPED | OUTPATIENT
Start: 2018-01-28 | End: 2018-01-30 | Stop reason: SDUPTHER

## 2018-01-30 DIAGNOSIS — I10 ESSENTIAL HYPERTENSION: ICD-10-CM

## 2018-01-30 RX ORDER — HYDRALAZINE HYDROCHLORIDE 10 MG/1
TABLET, FILM COATED ORAL
Qty: 60 TABLET | Refills: 5 | Status: SHIPPED | OUTPATIENT
Start: 2018-01-30 | End: 2018-02-01 | Stop reason: SDUPTHER

## 2018-02-01 DIAGNOSIS — I10 ESSENTIAL HYPERTENSION: ICD-10-CM

## 2018-02-01 RX ORDER — HYDRALAZINE HYDROCHLORIDE 10 MG/1
10 TABLET, FILM COATED ORAL 2 TIMES DAILY
Qty: 60 TABLET | Refills: 5 | Status: SHIPPED | OUTPATIENT
Start: 2018-02-01 | End: 2018-11-15 | Stop reason: CLARIF

## 2018-02-01 RX ORDER — HYDRALAZINE HYDROCHLORIDE 10 MG/1
10 TABLET, FILM COATED ORAL 2 TIMES DAILY
Qty: 60 TABLET | Refills: 5 | Status: SHIPPED | OUTPATIENT
Start: 2018-02-01 | End: 2018-02-01 | Stop reason: SDUPTHER

## 2018-04-23 DIAGNOSIS — E78.2 MIXED HYPERLIPIDEMIA: Primary | ICD-10-CM

## 2018-04-23 RX ORDER — ATORVASTATIN CALCIUM 20 MG/1
TABLET, FILM COATED ORAL
Qty: 30 TABLET | Refills: 6 | Status: SHIPPED | OUTPATIENT
Start: 2018-04-23 | End: 2019-04-07 | Stop reason: SDUPTHER

## 2018-07-12 DIAGNOSIS — I50.9 CHRONIC CONGESTIVE HEART FAILURE, UNSPECIFIED HEART FAILURE TYPE (HCC): Primary | ICD-10-CM

## 2018-07-12 RX ORDER — FUROSEMIDE 40 MG/1
TABLET ORAL
Qty: 180 TABLET | Refills: 2 | Status: SHIPPED | OUTPATIENT
Start: 2018-07-12 | End: 2020-05-20 | Stop reason: SDUPTHER

## 2018-07-24 ENCOUNTER — OFFICE VISIT (OUTPATIENT)
Dept: SURGERY | Facility: CLINIC | Age: 58
End: 2018-07-24
Payer: COMMERCIAL

## 2018-07-24 ENCOUNTER — APPOINTMENT (OUTPATIENT)
Dept: LAB | Facility: HOSPITAL | Age: 58
End: 2018-07-24
Attending: SURGERY
Payer: COMMERCIAL

## 2018-07-24 VITALS
HEIGHT: 60 IN | RESPIRATION RATE: 16 BRPM | BODY MASS INDEX: 33.97 KG/M2 | SYSTOLIC BLOOD PRESSURE: 136 MMHG | HEART RATE: 85 BPM | TEMPERATURE: 98.9 F | DIASTOLIC BLOOD PRESSURE: 90 MMHG | WEIGHT: 173.04 LBS

## 2018-07-24 DIAGNOSIS — K43.2 INCISIONAL HERNIA, WITHOUT OBSTRUCTION OR GANGRENE: Primary | ICD-10-CM

## 2018-07-24 DIAGNOSIS — K43.2 INCISIONAL HERNIA, WITHOUT OBSTRUCTION OR GANGRENE: ICD-10-CM

## 2018-07-24 LAB
ANION GAP SERPL CALCULATED.3IONS-SCNC: 5 MMOL/L (ref 4–13)
BUN SERPL-MCNC: 16 MG/DL (ref 5–25)
CALCIUM SERPL-MCNC: 9.3 MG/DL (ref 8.3–10.1)
CHLORIDE SERPL-SCNC: 102 MMOL/L (ref 100–108)
CO2 SERPL-SCNC: 31 MMOL/L (ref 21–32)
CREAT SERPL-MCNC: 0.61 MG/DL (ref 0.6–1.3)
GFR SERPL CREATININE-BSD FRML MDRD: 116 ML/MIN/1.73SQ M
GLUCOSE P FAST SERPL-MCNC: 177 MG/DL (ref 65–99)
POTASSIUM SERPL-SCNC: 4 MMOL/L (ref 3.5–5.3)
SODIUM SERPL-SCNC: 138 MMOL/L (ref 136–145)

## 2018-07-24 PROCEDURE — 80048 BASIC METABOLIC PNL TOTAL CA: CPT

## 2018-07-24 PROCEDURE — 36415 COLL VENOUS BLD VENIPUNCTURE: CPT

## 2018-07-24 PROCEDURE — 99213 OFFICE O/P EST LOW 20 MIN: CPT | Performed by: SURGERY

## 2018-07-24 NOTE — PROGRESS NOTES
Follow Up - General Surgery   Vasu hCaves 63 y o  female MRN: [id number]  Unit/Bed#:  Encounter: [id number]    Assessment/Plan     Assessment:  Incisional hernia from the colostomy site  Plan:  The patient noticed a bulge for the past 2 months and is getting bigger  I also suspect midline incisional hernia  I advised the patient to have a CT scan of the abdomen and pelvis with p o  and IV contrast and she will come back to my office in 2 weeks to review the results  History of Present Illness     HPI:  Vasu Chaves is a 63 y o  female who presents to my office complaining of a bulge from the colostomy site for the past 2 months, she noticed that the bulge has increased in size  She also stated that she gained some weight in the bulge is being more prominent  She denies having any nausea, vomiting, diarrhea, constipation, chills, fever or urinary symptoms  She does not recall any single event the provoked the bulge  The patient is status post Dexter's procedure for perforated diverticulitis and laparoscopic hand assisted reversal colostomy  Review of Systems: The rest of the review of system total of 10 were negative except for the HPI      Historical Information   Past Medical History:   Diagnosis Date    Asthma     [facility]     Diabetes mellitus [facility]     Diverticulitis     with perforation and abscess     Hyperlipidemia     Hypertension     Pericardial effusion      Past Surgical History:   Procedure Laterality Date    ABDOMINAL SURGERY       SECTION      COLON SURGERY      COLONOSCOPY      COLOSTOMY  2017    HARTMANS PROCEDURE N/A 2017    Procedure: EXPLORATORY LAPAROTOMY; PARTIAL SMALL BOWEL RESECTION; HARTMANS PROCEDURE; OSTOMY;  Surgeon: Delmy Multani MD;  [facility];  Service:     VT CLOSE ENTEROSTOMY N/A 2017    Procedure: OPEN COLOSTOMY REVERSAL;  Surgeon: Delmy Multani MD;  [facility];  Service: General  MD COLONOSCOPY FLX DX W/COLLJ SPEC WHEN PFRMD N/A 8/16/2017    Procedure: COLONOSCOPY; DILATION OF OSTOMY;  Surgeon: Itzel Pelaez MD;  [facility]; Service: General    MD EXC SKIN BENIG <0 5 CM REMAINDER BODY N/A 11/29/2017    Procedure: SCALP MASS EXCISION X 2;  Surgeon: Itzel Pelaez MD;  [facility];  Service: General    VAC DRESSING APPLICATION N/A 0/3/1892    Procedure: APPLICATION VAC DRESSING;  Surgeon: Itzel Pelaez MD;  [facility];  Service:      Social History   History   Alcohol Use    Yes     Comment: socially     History   Drug Use No     History   Smoking Status    Current Some Day Smoker    Packs/day: 0 25    Years: 20 00   Smokeless Tobacco    Never Used     Family History: non-contributory    Meds/Allergies   all medications and allergies reviewed     Current Outpatient Prescriptions:     amLODIPine (NORVASC) 10 mg tablet, Take 10 mg by mouth daily, Disp: , Rfl:     aspirin 81 mg chewable tablet, Chew 81 mg daily, Disp: , Rfl:     atorvastatin (LIPITOR) 20 mg tablet, TAKE 1 TABLET DAILY, Disp: 30 tablet, Rfl: 6    furosemide (LASIX) 40 mg tablet, TAKE 1 TABLET TWICE DAILY  , Disp: 180 tablet, Rfl: 2    hydrALAZINE (APRESOLINE) 10 mg tablet, Take 1 tablet (10 mg total) by mouth 2 (two) times a day, Disp: 60 tablet, Rfl: 5    lisinopril (ZESTRIL) 20 mg tablet, Take 1 tablet by mouth daily, Disp: 30 tablet, Rfl: 2  Allergies   Allergen Reactions    Ciprofloxacin Itching       Objective     Current Vitals:          Invasive Devices          No matching active lines, drains, or airways          Physical Exam   Constitutional: She is oriented to person, place, and time  She appears well-developed and well-nourished  No distress  HENT:   Head: Normocephalic  Mouth/Throat: No oropharyngeal exudate  Eyes: Pupils are equal, round, and reactive to light  No scleral icterus  Neck: Normal range of motion     Cardiovascular: Normal rate and regular rhythm  No murmur heard  Pulmonary/Chest: Effort normal and breath sounds normal  No respiratory distress  Abdominal:   Abdomen is soft, nondistended and nontender  An obvious hernia from the colostomy site, I was able to reduce it without any issues  There is also small bulge from the midline incision, nontender to palpation  There is no visceromegaly or mass palpable at this time  Musculoskeletal: She exhibits no edema or tenderness  Lymphadenopathy:     She has no cervical adenopathy  Neurological: She is alert and oriented to person, place, and time  No cranial nerve deficit  Skin: No rash noted  No erythema  Psychiatric: She has a normal mood and affect   Her behavior is normal

## 2018-07-31 ENCOUNTER — HOSPITAL ENCOUNTER (OUTPATIENT)
Dept: CT IMAGING | Facility: HOSPITAL | Age: 58
Discharge: HOME | End: 2018-07-31
Attending: SURGERY
Payer: COMMERCIAL

## 2018-07-31 DIAGNOSIS — K43.2 INCISIONAL HERNIA, WITHOUT OBSTRUCTION OR GANGRENE: ICD-10-CM

## 2018-07-31 PROCEDURE — 74177 CT ABD & PELVIS W/CONTRAST: CPT

## 2018-07-31 RX ADMIN — IOHEXOL 100 ML: 350 INJECTION, SOLUTION INTRAVENOUS at 07:58

## 2018-08-01 RX ORDER — LANOLIN ALCOHOL/MO/W.PET/CERES
CREAM (GRAM) TOPICAL
Status: ON HOLD | COMMUNITY
End: 2019-02-27 | Stop reason: ALTCHOICE

## 2018-08-01 RX ORDER — METOPROLOL SUCCINATE 100 MG/1
1 TABLET, EXTENDED RELEASE ORAL DAILY
COMMUNITY
End: 2018-09-11 | Stop reason: ALTCHOICE

## 2018-08-01 RX ORDER — PANTOPRAZOLE SODIUM 40 MG/1
TABLET, DELAYED RELEASE ORAL
COMMUNITY
End: 2018-09-11 | Stop reason: ALTCHOICE

## 2018-08-02 ENCOUNTER — OFFICE VISIT (OUTPATIENT)
Dept: INTERNAL MEDICINE CLINIC | Facility: CLINIC | Age: 58
End: 2018-08-02
Payer: COMMERCIAL

## 2018-08-02 VITALS
HEART RATE: 89 BPM | SYSTOLIC BLOOD PRESSURE: 128 MMHG | DIASTOLIC BLOOD PRESSURE: 88 MMHG | HEIGHT: 60 IN | WEIGHT: 176.4 LBS | OXYGEN SATURATION: 94 % | BODY MASS INDEX: 34.63 KG/M2

## 2018-08-02 DIAGNOSIS — E11.9 CONTROLLED TYPE 2 DIABETES MELLITUS WITHOUT COMPLICATION, WITHOUT LONG-TERM CURRENT USE OF INSULIN (HCC): Chronic | ICD-10-CM

## 2018-08-02 DIAGNOSIS — Z13.31 DEPRESSION SCREENING NEGATIVE: ICD-10-CM

## 2018-08-02 DIAGNOSIS — L84 CORN OF TOE: Primary | ICD-10-CM

## 2018-08-02 DIAGNOSIS — L81.9 PIGMENTED SKIN LESION: ICD-10-CM

## 2018-08-02 DIAGNOSIS — Z12.31 ENCOUNTER FOR SCREENING MAMMOGRAM FOR BREAST CANCER: ICD-10-CM

## 2018-08-02 DIAGNOSIS — Z71.6 TOBACCO ABUSE COUNSELING: ICD-10-CM

## 2018-08-02 DIAGNOSIS — Z00.00 ADULT GENERAL MEDICAL EXAMINATION: ICD-10-CM

## 2018-08-02 PROBLEM — K57.20 DIVERTICULITIS OF LARGE INTESTINE WITH PERFORATION AND ABSCESS: Status: RESOLVED | Noted: 2017-04-25 | Resolved: 2018-08-02

## 2018-08-02 PROBLEM — E11.21 DIABETIC NEPHROPATHY ASSOCIATED WITH TYPE 2 DIABETES MELLITUS (HCC): Status: ACTIVE | Noted: 2017-06-21

## 2018-08-02 PROBLEM — I31.3 PERICARDIAL EFFUSION: Status: ACTIVE | Noted: 2017-05-16

## 2018-08-02 PROBLEM — L72.9 SCALP CYST: Chronic | Status: RESOLVED | Noted: 2017-11-29 | Resolved: 2018-08-02

## 2018-08-02 PROBLEM — J45.909 UNCOMPLICATED ASTHMA: Chronic | Status: ACTIVE | Noted: 2017-05-01

## 2018-08-02 PROBLEM — I25.10 CAD IN NATIVE ARTERY: Chronic | Status: ACTIVE | Noted: 2018-01-17

## 2018-08-02 PROBLEM — I10 HTN (HYPERTENSION): Chronic | Status: RESOLVED | Noted: 2017-04-25 | Resolved: 2018-08-02

## 2018-08-02 PROBLEM — I31.39 PERICARDIAL EFFUSION: Status: ACTIVE | Noted: 2017-05-16

## 2018-08-02 PROBLEM — D36.9 ADENOMATOUS POLYPS: Chronic | Status: RESOLVED | Noted: 2017-08-16 | Resolved: 2018-08-02

## 2018-08-02 PROBLEM — I31.39 PERICARDIAL EFFUSION: Status: RESOLVED | Noted: 2017-05-16 | Resolved: 2018-08-02

## 2018-08-02 PROBLEM — E78.5 HYPERLIPIDEMIA: Chronic | Status: ACTIVE | Noted: 2017-09-09

## 2018-08-02 PROBLEM — I50.22 CHRONIC SYSTOLIC HEART FAILURE (HCC): Chronic | Status: RESOLVED | Noted: 2017-09-08 | Resolved: 2018-08-02

## 2018-08-02 PROBLEM — I10 ESSENTIAL HYPERTENSION: Chronic | Status: ACTIVE | Noted: 2017-09-09

## 2018-08-02 PROBLEM — E11.21 DIABETIC NEPHROPATHY ASSOCIATED WITH TYPE 2 DIABETES MELLITUS (HCC): Chronic | Status: ACTIVE | Noted: 2017-06-21

## 2018-08-02 PROBLEM — I50.32 CHRONIC DIASTOLIC HF (HEART FAILURE) (HCC): Status: ACTIVE | Noted: 2017-05-16

## 2018-08-02 PROBLEM — I50.32 CHRONIC DIASTOLIC HF (HEART FAILURE) (HCC): Chronic | Status: ACTIVE | Noted: 2017-05-16

## 2018-08-02 PROBLEM — I31.3 PERICARDIAL EFFUSION: Status: RESOLVED | Noted: 2017-05-16 | Resolved: 2018-08-02

## 2018-08-02 PROCEDURE — 99213 OFFICE O/P EST LOW 20 MIN: CPT | Performed by: INTERNAL MEDICINE

## 2018-08-02 NOTE — PROGRESS NOTES
INTERNAL MEDICINE FOLLOW-UP OFFICE VISIT  [facility] SELIN HAMMOND    NAME: Eilse Heller  AGE: 63 y o  SEX: female  : 1960     DATE: 2018     Assessment and Plan:     1  Pigmented skin lesion  2  Controlled type 2 diabetes mellitus without complication, without long-term current use of insulin ([address] )    Need to get back on diet  Wear proper footwear  Follow-up with podiatry  Patient counseled on need to quit smoking  Patient counseled on need to lose weight  Return in about 3 months (around 2018) for Follow-up  Chief Complaint:     Chief Complaint   Patient presents with    Diabetes     questions/concerns      History of Present Illness:     Patient has underlying diabetes without vascular disease or neuropathy  Was in [place] about 1 month ago  When she came back she noticed small brown lesion on her distal pad of left 3rd toe  She denies any pain  Lesion hasn't grown  The following portions of the patient's history were reviewed and updated as appropriate: allergies, current medications, past family history, past medical history, past social history, past surgical history and problem list      Review of Systems:     Review of Systems   Constitutional: Negative for chills, diaphoresis and fatigue  Skin: Positive for wound  Negative for rash  Neurological: Negative for numbness        Problem List:     Patient Active Problem List   Diagnosis    Controlled type 2 diabetes mellitus without complication ([address] )    Uncomplicated asthma    History of colostomy    Essential hypertension    Hyperlipidemia    Chronic diastolic HF (heart failure) ([address] )    Diabetic nephropathy associated with type 2 diabetes mellitus ([address] )    CAD in native artery      Objective:     /88 (BP Location: Left arm, Patient Position: Sitting, Cuff Size: Standard)   Pulse 89   Ht 5' (1 524 m)   Wt 80 kg (176 lb 6 4 oz)   LMP 2014 (Approximate)   SpO2 94% BMI 34 45 kg/m²     Physical Exam   Constitutional: She appears well-developed and well-nourished  No distress  Musculoskeletal:        Feet:    Skin: She is not diaphoretic  PHQ-9  Negative for depression with PHQ2 score of 0  Current Medications:     Current Outpatient Prescriptions   Medication Sig Dispense Refill    amLODIPine (NORVASC) 10 mg tablet Take 10 mg by mouth daily      aspirin 81 mg chewable tablet Chew 81 mg daily      atorvastatin (LIPITOR) 20 mg tablet TAKE 1 TABLET DAILY 30 tablet 6    furosemide (LASIX) 40 mg tablet TAKE 1 TABLET TWICE DAILY  180 tablet 2    hydrALAZINE (APRESOLINE) 10 mg tablet Take 1 tablet (10 mg total) by mouth 2 (two) times a day 60 tablet 5    lisinopril (ZESTRIL) 20 mg tablet Take 1 tablet by mouth daily 30 tablet 2    melatonin 3 mg Take by mouth      metoprolol succinate (TOPROL XL) 100 mg 24 hr tablet Take 1 tablet by mouth daily      pantoprazole (PROTONIX) 40 mg tablet Take by mouth      ipratropium (ATROVENT) 0 02 % nebulizer solution Inhale      Lactobacillus (FLORANEX PO) Take by mouth every 8 (eight) hours       No current facility-administered medications for this visit        Facility-Administered Medications Ordered in Other Visits   Medication Dose Route Frequency Provider Last Rate Last Dose    barium sulfate 2 1 % suspension 900 mL  900 mL Oral Once in imaging MD Chilango Luna [place]

## 2018-08-31 DIAGNOSIS — I10 ESSENTIAL HYPERTENSION: Primary | ICD-10-CM

## 2018-08-31 RX ORDER — ASPIRIN 81 MG/1
TABLET ORAL
Qty: 30 TABLET | Refills: 9 | Status: SHIPPED | OUTPATIENT
Start: 2018-08-31 | End: 2019-02-08 | Stop reason: SDUPTHER

## 2018-09-09 DIAGNOSIS — I10 ESSENTIAL HYPERTENSION: ICD-10-CM

## 2018-09-09 RX ORDER — HYDRALAZINE HYDROCHLORIDE 10 MG/1
10 TABLET, FILM COATED ORAL 2 TIMES DAILY
Qty: 60 TABLET | Refills: 5 | Status: SHIPPED | OUTPATIENT
Start: 2018-09-09 | End: 2018-11-15 | Stop reason: CLARIF

## 2018-09-11 ENCOUNTER — OFFICE VISIT (OUTPATIENT)
Dept: SURGERY | Facility: CLINIC | Age: 58
End: 2018-09-11
Payer: COMMERCIAL

## 2018-09-11 ENCOUNTER — HOSPITAL ENCOUNTER (OUTPATIENT)
Dept: MAMMOGRAPHY | Facility: CLINIC | Age: 58
Discharge: HOME | End: 2018-09-11
Payer: COMMERCIAL

## 2018-09-11 VITALS
HEIGHT: 60 IN | HEART RATE: 78 BPM | TEMPERATURE: 98.2 F | SYSTOLIC BLOOD PRESSURE: 148 MMHG | RESPIRATION RATE: 17 BRPM | WEIGHT: 173 LBS | BODY MASS INDEX: 33.96 KG/M2 | DIASTOLIC BLOOD PRESSURE: 100 MMHG

## 2018-09-11 DIAGNOSIS — K43.2 INCISIONAL HERNIA, WITHOUT OBSTRUCTION OR GANGRENE: Primary | ICD-10-CM

## 2018-09-11 DIAGNOSIS — Z12.31 ENCOUNTER FOR SCREENING MAMMOGRAM FOR BREAST CANCER: ICD-10-CM

## 2018-09-11 PROCEDURE — 77067 SCR MAMMO BI INCL CAD: CPT

## 2018-09-11 PROCEDURE — 77063 BREAST TOMOSYNTHESIS BI: CPT

## 2018-09-11 PROCEDURE — 99213 OFFICE O/P EST LOW 20 MIN: CPT | Performed by: SURGERY

## 2018-09-11 NOTE — PROGRESS NOTES
Follow Up - General Surgery   Quang Ford 63 y o  female MRN: [id number]  Unit/Bed#:  Encounter: [id number]    Assessment/Plan     Assessment:  Incisional hernias, stable    Plan:  The patient would like to wait to have hernias repair until the beginning of next year  The patient will return to my office in December for preop evaluation  History of Present Illness     HPI:  Quang Ford is a 63 y o  female who presents to my office for follow-up  The patient stated doing well, having mild discomfort from the incisional hernias, denies having any nausea, vomiting or abdominal pain  She is complaining of increasing flatulence  I discussed the CT scan report and films with in the office with the patient  Review of Systems: The rest of the review of system total of 10 were negative except for the HPI      Historical Information   Past Medical History:   Diagnosis Date    Acute on chronic congestive heart failure with left ventricular diastolic dysfunction (HCC)     last assessed 2017    Asthma     Atelectasis     [facility]     Depression     Diabetes mellitus ([facility]     Diverticulitis     with perforation and abscess     Hyperlipidemia     Hypertension     Lesion of spleen     Pericardial effusion      Past Surgical History:   Procedure Laterality Date    ABDOMINAL SURGERY       SECTION      COLON SURGERY      COLONOSCOPY      COLOSTOMY  2017    HARTMANS PROCEDURE N/A 2017    Procedure: EXPLORATORY LAPAROTOMY; PARTIAL SMALL BOWEL RESECTION; HARTMANS PROCEDURE; OSTOMY;  Surgeon: Enedelia Peñaloza MD;  [facility];  Service:     RI CLOSE ENTEROSTOMY N/A 2017    Procedure: OPEN COLOSTOMY REVERSAL;  Surgeon: Enedelia Peñaloza MD;  [facility];  Service: General    RI COLONOSCOPY FLX DX W/MAYELA Mccain 1978 PFRMD N/A 2017    Procedure: COLONOSCOPY; DILATION OF OSTOMY;  Surgeon: Enedelia Peñaloza MD;  [facility]; Service: General    ME EXC SKIN BENIG <0 5 CM REMAINDER BODY N/A 11/29/2017    Procedure: SCALP MASS EXCISION X 2;  Surgeon: Huy Benavides MD;  [facility];  Service: General    VAC DRESSING APPLICATION N/A 6/8/6237    Procedure: Everardo Montague;  Surgeon: Huy Benavides MD;  [facility];  Service:      Social History   History   Alcohol Use    Yes     Comment: socially - per allscripts 'denied hx of alcohol use'     History   Drug Use No     History   Smoking Status    Current Every Day Smoker    Packs/day: 0 25    Years: 20 00    Types: Cigarettes    Start date: 8/2/1991   Smokeless Tobacco    Never Used     Comment: per allscripts 'former smoker'     Family History: non-contributory    Meds/Allergies   all medications and allergies reviewed     Current Outpatient Prescriptions:     amLODIPine (NORVASC) 10 mg tablet, Take 10 mg by mouth daily, Disp: , Rfl:     aspirin (ECOTRIN LOW STRENGTH) 81 mg EC tablet, TAKE 1 TABLET DAILY  , Disp: 30 tablet, Rfl: 9    atorvastatin (LIPITOR) 20 mg tablet, TAKE 1 TABLET DAILY, Disp: 30 tablet, Rfl: 6    furosemide (LASIX) 40 mg tablet, TAKE 1 TABLET TWICE DAILY  , Disp: 180 tablet, Rfl: 2    hydrALAZINE (APRESOLINE) 10 mg tablet, TAKE 1 TABLET (10 MG TOTAL) BY MOUTH 2 (TWO) TIMES A DAY, Disp: 60 tablet, Rfl: 5    aspirin 81 mg chewable tablet, Chew 81 mg daily, Disp: , Rfl:     hydrALAZINE (APRESOLINE) 10 mg tablet, Take 1 tablet (10 mg total) by mouth 2 (two) times a day (Patient not taking: Reported on 9/11/2018 ), Disp: 60 tablet, Rfl: 5    ipratropium (ATROVENT) 0 02 % nebulizer solution, Inhale, Disp: , Rfl:     lisinopril (ZESTRIL) 20 mg tablet, Take 1 tablet by mouth daily (Patient not taking: Reported on 9/11/2018 ), Disp: 30 tablet, Rfl: 2    melatonin 3 mg, Take by mouth, Disp: , Rfl:   Allergies   Allergen Reactions    Ciprofloxacin Itching       Objective     Current Vitals:   Blood Pressure: 148/100 (09/11/18 1042)  Pulse: 78 (09/11/18 1042)  Temperature: 98 2 °F (36 8 °C) (09/11/18 1042)  Temp Source: Oral (09/11/18 1042)  Respirations: 17 (09/11/18 1042)  Height: 5' (152 4 cm) (09/11/18 1042)  Weight - Scale: 78 5 kg (173 lb) (09/11/18 1042)      Invasive Devices          No matching active lines, drains, or airways          Physical Exam   Constitutional: She is oriented to person, place, and time  She appears well-developed and well-nourished  No distress  HENT:   Head: Normocephalic  Mouth/Throat: No oropharyngeal exudate  Eyes: Pupils are equal, round, and reactive to light  No scleral icterus  Neck: Normal range of motion  Cardiovascular: Normal rate and regular rhythm  No murmur heard  Pulmonary/Chest: Effort normal and breath sounds normal  No respiratory distress  Abdominal: Soft  Bowel sounds are normal  She exhibits no mass  There is no tenderness  There are least 2 palpable incisional hernias 1 from the colostomy side and 1 from the midline incision  Musculoskeletal: She exhibits no edema or tenderness  Lymphadenopathy:     She has no cervical adenopathy  Neurological: She is alert and oriented to person, place, and time  No cranial nerve deficit  Skin: No rash noted  No erythema  Psychiatric: She has a normal mood and affect  Her behavior is normal        Procedure: Ct Abdomen Pelvis W Contrast    Result Date: 8/1/2018  Narrative: CT ABDOMEN AND PELVIS WITH IV CONTRAST INDICATION:   K43 2: Incisional hernia without obstruction or gangrene  COMPARISON:  None  TECHNIQUE:  CT examination of the abdomen and pelvis was performed  Axial, sagittal, and coronal 2D reformatted images were created from the source data and submitted for interpretation  Radiation dose length product (DLP) for this visit:  871 mGy-cm     This examination, like all CT scans performed in the [facility], was performed utilizing techniques to minimize radiation dose exposure, including the use of iterative reconstruction and automated exposure control  IV Contrast:  100 mL of iohexol (OMNIPAQUE) Enteric Contrast:  Enteric contrast was not administered  FINDINGS: ABDOMEN LOWER CHEST:  No clinically significant abnormality identified in the visualized lower chest  LIVER/BILIARY TREE:  A rounded hypodensity seen in the right liver, measuring about 8 mm, stable GALLBLADDER:  No calcified gallstones  No pericholecystic inflammatory change  SPLEEN:  Again noted is a splenic lesion measuring about 6 7 x 6 2 cm  This is indeterminate  This is not a cystic lesion  On the previous study of April 20 1517 this was measuring 6 7 x 6 2 cm  Additional lesion is seen in the spleen posteriorly, measuring about 1 8 x 1 4 cm  PANCREAS:  Unremarkable  ADRENAL GLANDS:  The right adrenal gland appear unremarkable There is enlargement of the left adrenal gland related to adenoma  KIDNEYS/URETERS:  Unremarkable  No hydronephrosis  STOMACH AND BOWEL:  A ventral hernia seen containing small bowel loops in the left mid to lower abdomen There is no evidence of bowel obstruction Moderate amount of fecal debris in the colon seen Surgical anastomosis seen in the colon APPENDIX:  No findings to suggest appendicitis  ABDOMINOPELVIC CAVITY:  No ascites or free intraperitoneal air  No lymphadenopathy  VESSELS:  The celiac trunk appears unremarkable The SMA appears unremarkable PELVIS REPRODUCTIVE ORGANS:  Fibroid uterus seen URINARY BLADDER:  Unremarkable  ABDOMINAL WALL/INGUINAL REGIONS:  Ventral abdominal hernia seen in the left mid to lower abdomen Wall midline incisional hernia at the level of the umbilicus, seen in sagittal image 68 of series 602 OSSEOUS STRUCTURES:  No acute compression collapse of the vertebra No gross lytic lesion seen     Impression: Ventral abdominal wall hernia in the mid to lower abdomen on the left side with hernial sac measuring 10 2 x 2 9 cm the neck of the hernial sac measuring about 3 9 cm   Small midline ventral abdominal hernia seen containing fat at the level of the umbilicus  Indeterminate , stable solid splenic lesions, can be characterized further with MRI There is no new focal liver lesion No retroperitoneal or abdominopelvic lymphadenopathy   Workstation performed: [id number]     EKG, Pathology, and Other Studies: I have personally reviewed pertinent films in PACS

## 2018-09-11 NOTE — PATIENT INSTRUCTIONS
Gas and Bloating   WHAT YOU NEED TO KNOW:   What is gas and bloating? Gas forms inside your body when you eat certain foods or swallow too much air  Bloating is the tight, full feeling you get from too much gas  What causes gas and bloating? · Vegetables, such as beans, cabbage, and broccoli    · Starches, such as potatoes, corn, wheat, and pasta    · Dairy products    · High-fiber cereals and breads    · High-fat foods    · Carbonated drinks    · Chewing gum, smoking, and loose dentures  What are the symptoms of gas and bloating? · Abdominal pain    · Bloating that is worse during the day and better at night    · Burping or passing gas more often than usual    · Constipation  How is gas and bloating diagnosed? Your healthcare provider will ask about what you eat and examine your abdomen  You may need any of the following tests to check for a medical condition that may be causing your symptoms:  · Blood tests: You may need blood taken to give caregivers information about how your body is working  The blood may be taken from your hand, arm, or IV  · Imaging tests: These tests can help healthcare providers find a blockage in your bowels  · Endoscopy: This test uses a scope to see the inside of your digestive system  A scope is a long, flexible tube with a light and tiny camera on the end  This test is used to check for blockage or other problems  Samples may be taken from your bowels and sent to a lab for tests  How is gas and bloating treated? Gas relief medicines may help decrease gas pain and bloating  These can be bought without a doctor's order  How do I manage my symptoms? · Keep a log:  Write down what you eat and drink and how often you pass gas each day  · Eat and drink slowly:  Choose foods that do not cause gas, such as meat, poultry, fish, and eggs  Avoid high-fat foods and vegetables or starches that can cause gas  Do not drink carbonated drinks   Add foods back into your diet one at a time after 1 week  If the food causes symptoms, avoid it  · Exercise:  Exercise can help relieve gas  · Do not smoke or chew gum: This can cause you to swallow air  · Make sure your dentures fit properly:  Have your dentures fixed if they are loose  Loose dentures can cause you to swallow too much air  When should I call my healthcare provider? · You have a fever  · You vomit or have diarrhea  · You lose weight without trying  · You have questions or concerns about your condition or care  When should I seek immediate care? · You have severe abdominal pain  · You have blood in your bowel movement  CARE AGREEMENT:   You have the right to help plan your care  Learn about your health condition and how it may be treated  Discuss treatment options with your caregivers to decide what care you want to receive  You always have the right to refuse treatment  The above information is an  only  It is not intended as medical advice for individual conditions or treatments  Talk to your doctor, nurse or pharmacist before following any medical regimen to see if it is safe and effective for you  © 2017 [address] [employer]  Information is for End User's use only and may not be sold, redistributed or otherwise used for commercial purposes  All illustrations and images included in CareNotes® are the copyrighted property of [employer]  or Vishal Dang

## 2018-09-25 ENCOUNTER — HOSPITAL ENCOUNTER (OUTPATIENT)
Dept: MAMMOGRAPHY | Facility: CLINIC | Age: 58
Discharge: HOME | End: 2018-09-25
Payer: COMMERCIAL

## 2018-09-25 DIAGNOSIS — R92.8 ABNORMAL MAMMOGRAM: ICD-10-CM

## 2018-09-25 PROCEDURE — 77065 DX MAMMO INCL CAD UNI: CPT

## 2018-09-25 NOTE — PROGRESS NOTES
Met with patient and Dr Matthew Brown regarding recommendation for;      _____ RIGHT ___X___LEFT      _____Ultrasound guided  ___X___Stereotactic  Breast biopsy  __X___Verbalized understanding        Blood thinners:  __X___yes _____no (ASA 81mg)    Date stopped: ____N/A_______    Biopsy teaching sheet given:  ___X____yes ______no

## 2018-10-04 ENCOUNTER — HOSPITAL ENCOUNTER (OUTPATIENT)
Dept: MAMMOGRAPHY | Facility: CLINIC | Age: 58
Discharge: HOME | End: 2018-10-04

## 2018-10-04 ENCOUNTER — HOSPITAL ENCOUNTER (OUTPATIENT)
Dept: MAMMOGRAPHY | Facility: CLINIC | Age: 58
Discharge: HOME | End: 2018-10-04
Payer: COMMERCIAL

## 2018-10-04 VITALS — HEART RATE: 82 BPM | DIASTOLIC BLOOD PRESSURE: 90 MMHG | SYSTOLIC BLOOD PRESSURE: 192 MMHG

## 2018-10-04 DIAGNOSIS — R92.8 ABNORMAL MAMMOGRAM: ICD-10-CM

## 2018-10-04 PROCEDURE — 88342 IMHCHEM/IMCYTCHM 1ST ANTB: CPT | Performed by: PATHOLOGY

## 2018-10-04 PROCEDURE — 88305 TISSUE EXAM BY PATHOLOGIST: CPT | Performed by: PATHOLOGY

## 2018-10-04 PROCEDURE — 88341 IMHCHEM/IMCYTCHM EA ADD ANTB: CPT | Performed by: PATHOLOGY

## 2018-10-04 PROCEDURE — 19081 BX BREAST 1ST LESION STRTCTC: CPT

## 2018-10-04 RX ORDER — LIDOCAINE HYDROCHLORIDE 10 MG/ML
5 INJECTION, SOLUTION INFILTRATION; PERINEURAL ONCE
Status: COMPLETED | OUTPATIENT
Start: 2018-10-04 | End: 2018-10-04

## 2018-10-04 RX ORDER — LIDOCAINE HYDROCHLORIDE AND EPINEPHRINE 20; 5 MG/ML; UG/ML
10 INJECTION, SOLUTION EPIDURAL; INFILTRATION; INTRACAUDAL; PERINEURAL ONCE
Status: COMPLETED | OUTPATIENT
Start: 2018-10-04 | End: 2018-10-04

## 2018-10-04 RX ADMIN — LIDOCAINE HYDROCHLORIDE 5 ML: 10 INJECTION, SOLUTION INFILTRATION; PERINEURAL at 14:26

## 2018-10-04 RX ADMIN — LIDOCAINE HYDROCHLORIDE AND EPINEPHRINE 10 ML: 20; 5 INJECTION, SOLUTION EPIDURAL; INFILTRATION; INTRACAUDAL; PERINEURAL at 14:26

## 2018-10-04 NOTE — PROGRESS NOTES
Ice pack given:    ___x__yes _____no    Discharge instructions signed by patient:    __x___yes _____no    Discharge instructions given to patient:    ___x__yes _____no    Discharged via:    __x___amulatory    _____wheelchair    _____stretcher    Stable on discharge:    ___x__yes ____no  Patient wants results over the phone

## 2018-10-04 NOTE — DISCHARGE INSTR - OTHER ORDERS
POST LARGE CORE BREAST BIOPSY PATIENT INFORMATION      1  Place an ice pack inside your bra over the top of the dressing every hour for 20 minutes (20 minutes on, 60 minutes off)  Do this until bedtime  2  Do not shower or bathe until the following morning  3  You may bathe your breast carefully with the steri-strips in place  Be careful    Not to loosen them  The steri-strips will fall off in 3-5 days  4  You may have mild discomfort, and you may have some bruising where the   Needle entered the skin  This should clear within 5-7 days  5  If you need medicine for discomfort, take acetaminophen products such as   Tylenol  You may also take Advil or Motrin products  6  Do not participate in strenuous activities such as-tennis, aerobics, skiing,  Weight lifting, etc  for 24 hours  Refrain from swimming/soaking for 72 hours  7  Wearing a bra for sleeping may be more comfortable for the first 24-48 hours  8  Watch for continued bleeding, pain or fever over 101; please call with any questions or concerns  For procedures done at the [facility]  [facility] [address] call:  Luc Ohara RN at [phone]  Amalia Venegas RN at [phone]                    *After 4 PM call the Interventional Radiology Department                    [phone] and ask to speak with the nurse on call  For procedures done at the [address] [place] [address] call:         Elizabeth Domínguez RN at   *After 4 PM call the Interventional Radiology Department   [phone] and ask to speak with the nurse on call  For procedures done at [address] [place] [address] call: The Radiology Nurse at [phone]  *After 4 PM call your physician, or go to the Emergency Department  9          The final results of your biopsy are usually available within one week

## 2018-10-04 NOTE — PROGRESS NOTES
Patient arrived via:    ___x__ambulatory    _____wheelchair    _____stretcher      Domestic violence screen    ____x__negative______positive    Breast Implants:    _______yes ____x____no

## 2018-10-05 NOTE — PROGRESS NOTES
Post procedure call completed    Bleeding: _____yes __X___no    Pain: __X___yes ______no (Pt c/o tenderness, denies taking any medications and states she does not want to take any)    Redness/Swelling: ______yes ___X___no (pt states she has some slight bruising)    Band aid removed: __X___yes _____no    Steri-Strips intact: ___X___yes _____no

## 2018-10-09 ENCOUNTER — TELEPHONE (OUTPATIENT)
Dept: MAMMOGRAPHY | Facility: CLINIC | Age: 58
End: 2018-10-09

## 2018-10-09 NOTE — TELEPHONE ENCOUNTER
Email sent to Dr Perez Look:     Hi Dr Perez Look,     I just wanted to let you know that the above patient was given her negative breast biopsy results   The patient had no questions and was advised to return to yearly screenings  If you have any questions or need further assistance please let me know       Thank you,   Luz Maria Simmons, MSN, RN   Breast Nurse Navigator

## 2018-10-18 DIAGNOSIS — I10 ESSENTIAL HYPERTENSION: Primary | ICD-10-CM

## 2018-10-18 RX ORDER — AMLODIPINE BESYLATE 10 MG/1
TABLET ORAL
Qty: 90 TABLET | Refills: 2 | Status: SHIPPED | OUTPATIENT
Start: 2018-10-18 | End: 2019-09-20 | Stop reason: SDUPTHER

## 2018-11-07 ENCOUNTER — TELEPHONE (OUTPATIENT)
Dept: INTERNAL MEDICINE CLINIC | Facility: CLINIC | Age: 58
End: 2018-11-07

## 2018-11-15 ENCOUNTER — OFFICE VISIT (OUTPATIENT)
Dept: INTERNAL MEDICINE CLINIC | Facility: CLINIC | Age: 58
End: 2018-11-15
Payer: COMMERCIAL

## 2018-11-15 ENCOUNTER — APPOINTMENT (OUTPATIENT)
Dept: LAB | Facility: CLINIC | Age: 58
End: 2018-11-15
Payer: COMMERCIAL

## 2018-11-15 VITALS
HEART RATE: 103 BPM | HEIGHT: 60 IN | DIASTOLIC BLOOD PRESSURE: 70 MMHG | WEIGHT: 167.8 LBS | BODY MASS INDEX: 32.94 KG/M2 | OXYGEN SATURATION: 97 % | SYSTOLIC BLOOD PRESSURE: 126 MMHG

## 2018-11-15 DIAGNOSIS — Z11.59 NEED FOR HEPATITIS C SCREENING TEST: ICD-10-CM

## 2018-11-15 DIAGNOSIS — E78.2 MIXED HYPERLIPIDEMIA: Chronic | ICD-10-CM

## 2018-11-15 DIAGNOSIS — E11.9 CONTROLLED TYPE 2 DIABETES MELLITUS WITHOUT COMPLICATION, WITHOUT LONG-TERM CURRENT USE OF INSULIN (HCC): Primary | Chronic | ICD-10-CM

## 2018-11-15 DIAGNOSIS — E66.9 OBESITY (BMI 30-39.9): ICD-10-CM

## 2018-11-15 DIAGNOSIS — E11.9 CONTROLLED TYPE 2 DIABETES MELLITUS WITHOUT COMPLICATION, WITHOUT LONG-TERM CURRENT USE OF INSULIN (HCC): Chronic | ICD-10-CM

## 2018-11-15 DIAGNOSIS — B37.3 CANDIDAL VAGINITIS: ICD-10-CM

## 2018-11-15 DIAGNOSIS — I25.10 CAD IN NATIVE ARTERY: Chronic | ICD-10-CM

## 2018-11-15 DIAGNOSIS — I10 ESSENTIAL HYPERTENSION: Chronic | ICD-10-CM

## 2018-11-15 DIAGNOSIS — Z72.0 TOBACCO ABUSE: ICD-10-CM

## 2018-11-15 LAB
ANION GAP SERPL CALCULATED.3IONS-SCNC: 4 MMOL/L (ref 4–13)
BUN SERPL-MCNC: 13 MG/DL (ref 5–25)
CALCIUM SERPL-MCNC: 8.8 MG/DL (ref 8.3–10.1)
CHLORIDE SERPL-SCNC: 96 MMOL/L (ref 100–108)
CHOLEST SERPL-MCNC: 137 MG/DL (ref 50–200)
CO2 SERPL-SCNC: 27 MMOL/L (ref 21–32)
CREAT SERPL-MCNC: 0.75 MG/DL (ref 0.6–1.3)
CREAT UR-MCNC: 42.1 MG/DL
EST. AVERAGE GLUCOSE BLD GHB EST-MCNC: 301 MG/DL
GFR SERPL CREATININE-BSD FRML MDRD: 102 ML/MIN/1.73SQ M
GLUCOSE P FAST SERPL-MCNC: 464 MG/DL (ref 65–99)
HBA1C MFR BLD: 12.1 % (ref 4.2–6.3)
HCV AB SER QL: NORMAL
HDLC SERPL-MCNC: 40 MG/DL (ref 40–60)
LDLC SERPL CALC-MCNC: 60 MG/DL (ref 0–100)
LEFT EYE DIABETIC RETINOPATHY: NORMAL
LEFT EYE IMAGE QUALITY: NORMAL
MICROALBUMIN UR-MCNC: 59.4 MG/L (ref 0–20)
MICROALBUMIN/CREAT 24H UR: 141 MG/G CREATININE (ref 0–30)
POTASSIUM SERPL-SCNC: 4.1 MMOL/L (ref 3.5–5.3)
RIGHT EYE DIABETIC RETINOPATHY: NORMAL
RIGHT EYE IMAGE QUALITY: NORMAL
SEVERITY (EYE EXAM): NORMAL
SODIUM SERPL-SCNC: 127 MMOL/L (ref 136–145)
TRIGL SERPL-MCNC: 186 MG/DL

## 2018-11-15 PROCEDURE — 92250 FUNDUS PHOTOGRAPHY W/I&R: CPT | Performed by: INTERNAL MEDICINE

## 2018-11-15 PROCEDURE — 82043 UR ALBUMIN QUANTITATIVE: CPT

## 2018-11-15 PROCEDURE — 82570 ASSAY OF URINE CREATININE: CPT

## 2018-11-15 PROCEDURE — 80048 BASIC METABOLIC PNL TOTAL CA: CPT

## 2018-11-15 PROCEDURE — 3060F POS MICROALBUMINURIA REV: CPT | Performed by: INTERNAL MEDICINE

## 2018-11-15 PROCEDURE — 80061 LIPID PANEL: CPT

## 2018-11-15 PROCEDURE — 3074F SYST BP LT 130 MM HG: CPT | Performed by: INTERNAL MEDICINE

## 2018-11-15 PROCEDURE — 3078F DIAST BP <80 MM HG: CPT | Performed by: INTERNAL MEDICINE

## 2018-11-15 PROCEDURE — 83036 HEMOGLOBIN GLYCOSYLATED A1C: CPT

## 2018-11-15 PROCEDURE — 3072F LOW RISK FOR RETINOPATHY: CPT | Performed by: INTERNAL MEDICINE

## 2018-11-15 PROCEDURE — 4004F PT TOBACCO SCREEN RCVD TLK: CPT | Performed by: INTERNAL MEDICINE

## 2018-11-15 PROCEDURE — 86803 HEPATITIS C AB TEST: CPT

## 2018-11-15 PROCEDURE — 99214 OFFICE O/P EST MOD 30 MIN: CPT | Performed by: INTERNAL MEDICINE

## 2018-11-15 PROCEDURE — 36415 COLL VENOUS BLD VENIPUNCTURE: CPT

## 2018-11-15 PROCEDURE — 3008F BODY MASS INDEX DOCD: CPT | Performed by: INTERNAL MEDICINE

## 2018-11-15 RX ORDER — FLUCONAZOLE 150 MG/1
TABLET ORAL
Qty: 2 TABLET | Refills: 0 | Status: SHIPPED | OUTPATIENT
Start: 2018-11-15 | End: 2018-11-20

## 2018-11-15 NOTE — PROGRESS NOTES
INTERNAL MEDICINE FOLLOW-UP OFFICE VISIT  [facility] [address] [place] [address]    NAME: Cecilia Gibson  AGE: 63 y o  SEX: female  : 1960     DATE: 11/15/2018     Assessment and Plan:     1  Controlled type 2 diabetes mellitus without complication, without long-term current use of insulin ([address] )    Patient is up-to-date lab work  Will call patient with results  Diabetic diet and increasing physical activity was discussed with patient  Routine monitoring of her blood sugar was discussed with patient  Basic carbohydrate counting was discussed with patient  Will get an office eye exam today  - Basic metabolic panel; Future  - Lipid Panel with Direct LDL reflex; Future  - Microalbumin / creatinine urine ratio; Future  - Hemoglobin A1C; Future  - POCT diabetic eye exam    2  Essential hypertension    Patient's blood pressure is well controlled  Continue current antihypertensives as prescribed  3  Mixed hyperlipidemia    Check lipid panel  Continue statin as prescribed  4  CAD in native artery    Stable without current angina  Heart healthy diet was recommended  Increase physical activity was recommended  Continue current medications as prescribed  5  Candidal vaginitis    Will prescribe a course of fluconazole  Patient previously has tried Monistat and did not improve her symptoms fully  - fluconazole (DIFLUCAN) 150 mg tablet; Take 1 tablet for one dose and then repeat in 4 days  Dispense: 2 tablet; Refill: 0    6  Need for hepatitis C screening test  - Hepatitis C antibody; Future    7  Obesity (BMI 30-39  9)    Body mass index is 32 77 kg/m²  Discussed with patient's BMI with her  The BMI is above average  BMI counseling and education was provided to the patient   Nutrition recommendations include reducing portion sizes, decreasing overall calorie intake, 3-5 servings of fruits/vegetables daily, decreasing soda and/or juice intake, moderation in carbohydrate intake, increasing intake of lean protein and reducing intake of saturated fat and trans fat  Exercise recommendations include moderate aerobic physical activity for 150 minutes/week, exercising 3-5 times per week and joining a gym  8  Tobacco abuse     Tobacco cessation counseling and education was provided  The patient is sincerely urged to quit consumption of tobacco  She is not ready to quit tobacco  The numerous health risks of tobacco consumption were discussed  If she decides to quit, there are  anumber of helpful adjunctive aids, and she can see me to discuss nicotine replacement therapy, chantix, or bupropion anytime in the future  Return in about 4 months (around 3/15/2019) for Follow-up  Chief Complaint:     Chief Complaint   Patient presents with    Follow-up     3 month and labs- 10/04/2018      History of Present Illness:     Patient presents for routine follow-up  Patient has not had any recent blood work for her diabetes  Patient states that she has been very bad has not been eating healthy  Patient thinks she has a vaginal yeast infection with increased discharge and vaginal pruritus  She denies any chest pain, shortness of breath, palpitations, lower extremity swelling  She has not been exercising on a regular basis  Patient does not want the flu and the pneumonia shot  Patient is due for an eye exam       The following portions of the patient's history were reviewed and updated as appropriate: allergies, current medications, past family history, past medical history, past social history, past surgical history and problem list      Review of Systems:     Review of Systems   Constitutional: Negative for activity change, appetite change and fatigue  Respiratory: Negative for apnea, cough, chest tightness, shortness of breath and wheezing  Cardiovascular: Negative for chest pain, palpitations and leg swelling     Gastrointestinal: Negative for abdominal distention, abdominal pain, blood in stool, constipation, diarrhea, nausea and vomiting  Genitourinary: Positive for vaginal discharge  Negative for difficulty urinating and dysuria  Musculoskeletal: Negative for arthralgias, back pain, gait problem, joint swelling and myalgias  Skin: Negative for rash and wound  Neurological: Negative for dizziness, tremors, seizures, syncope, facial asymmetry, speech difficulty, weakness, light-headedness, numbness and headaches  Psychiatric/Behavioral: Negative for behavioral problems, confusion, hallucinations, sleep disturbance and suicidal ideas  The patient is not nervous/anxious  Problem List:     Patient Active Problem List   Diagnosis    Controlled type 2 diabetes mellitus without complication ([facility] )    Uncomplicated asthma    History of colostomy    Essential hypertension    Hyperlipidemia    Chronic diastolic HF (heart failure) ([facility] )    Diabetic nephropathy associated with type 2 diabetes mellitus ([place] [address] )    CAD in native artery      Objective:     /70 (BP Location: Left arm, Patient Position: Sitting, Cuff Size: Standard)   Pulse 103   Ht 5' (1 524 m)   Wt 76 1 kg (167 lb 12 8 oz)   LMP 08/16/2014 (Approximate)   SpO2 97%   BMI 32 77 kg/m²     Physical Exam   Constitutional: She is oriented to person, place, and time  She appears well-developed and well-nourished  No distress  Obesity   Eyes: Conjunctivae are normal  Right eye exhibits no discharge  Left eye exhibits no discharge  No scleral icterus  Neck: Neck supple  No JVD present  No thyromegaly present  Cardiovascular: Normal rate, regular rhythm and normal heart sounds  Pulses are no weak pulses  No murmur heard  Pulses:       Dorsalis pedis pulses are 2+ on the right side, and 2+ on the left side  Posterior tibial pulses are 2+ on the right side, and 2+ on the left side  Pulmonary/Chest: Effort normal and breath sounds normal  No respiratory distress  She has no wheezes   She has no rales  She exhibits no tenderness  Abdominal: Soft  Bowel sounds are normal  She exhibits no distension and no mass  There is no tenderness  There is no rebound and no guarding  No hernia  Musculoskeletal: She exhibits no edema  Feet:   Right Foot:   Skin Integrity: Negative for ulcer, skin breakdown, erythema, warmth, callus or dry skin  Left Foot:   Skin Integrity: Negative for ulcer, skin breakdown, erythema, warmth, callus or dry skin  Lymphadenopathy:     She has no cervical adenopathy  Neurological: She is alert and oriented to person, place, and time  Skin: Skin is warm and dry  She is not diaphoretic  Psychiatric: She has a normal mood and affect  Her behavior is normal    Vitals reviewed  Diabetic Foot Exam  Patient's shoes and socks removed  Right Foot/Ankle   Right Foot Inspection  Skin Exam: skin normal and skin intact no dry skin, no warmth, no callus, no erythema, no maceration, no abnormal color, no pre-ulcer, no ulcer and no callus                          Toe Exam: ROM and strength within normal limits  Sensory     Proprioception: intact   Monofilament testing: intact  Vascular  Capillary refills: < 3 seconds  The right DP pulse is 2+  The right PT pulse is 2+  Left Foot/Ankle  Left Foot Inspection  Skin Exam: skin normal and skin intactno dry skin, no warmth, no erythema, no maceration, normal color, no pre-ulcer, no ulcer and no callus                         Toe Exam: ROM and strength within normal limits                   Sensory     Proprioception: intact  Monofilament: intact  Vascular  Capillary refills: < 3 seconds  The left DP pulse is 2+  The left PT pulse is 2+  Assign Risk Category:  No deformity present; No loss of protective sensation;  No weak pulses       Risk: 0    Patricia Preston,   MEDICAL [id number]

## 2018-11-15 NOTE — PATIENT INSTRUCTIONS
Type 2 Diabetes in Adults   AMBULATORY CARE:   Type 2 diabetes  is a disease that affects how your body uses glucose (sugar)  Normally, when the blood sugar level increases, the pancreas makes more insulin  Insulin helps move sugar out of the blood so it can be used for energy  Type 2 diabetes develops because either the body cannot make enough insulin, or it cannot use the insulin correctly  After many years, your pancreas may stop making insulin  Common symptoms include the following:   · More hunger or thirst than usual     · Frequent urination     · Weight loss without trying     · Blurred vision  Call 911 if you have any of the following:   · You have any of the following signs of a stroke:      ¨ Numbness or drooping on one side of your face     ¨ Weakness in an arm or leg    ¨ Confusion or difficulty speaking    ¨ Dizziness, a severe headache, or vision loss    · You have any of the following signs of a heart attack:      ¨ Squeezing, pressure, or pain in your chest that lasts longer than 5 minutes or returns    ¨ Discomfort or pain in your back, neck, jaw, stomach, or arm     ¨ Trouble breathing    ¨ Nausea or vomiting    ¨ Lightheadedness or a sudden cold sweat, especially with chest pain or trouble breathing  Seek care immediately if:   · You have severe abdominal pain, or the pain spreads to your back  You may also be vomiting  · You have trouble staying awake or focusing  · You are shaking or sweating  · You have blurred or double vision  · Your breath has a fruity, sweet smell  · Your breathing is deep and labored, or rapid and shallow  · Your heartbeat is fast and weak  Contact your healthcare provider if:   · You are vomiting or have diarrhea  · You have an upset stomach and cannot eat the foods on your meal plan  · You feel weak or more tired than usual      · You feel dizzy, have headaches, or are easily irritated  · Your skin is red, warm, dry, or swollen  · You have a wound that does not heal      · You have numbness in your arms or legs  · You have trouble coping with your illness, or you feel anxious or depressed  · You have questions or concerns about your condition or care  Treatment for type 2 diabetes  includes keeping your blood sugar at a normal level  You must eat the right foods, and exercise regularly  You may need medicine if you cannot control your blood sugar level with nutrition and exercise  You may also need medicine to prevent heart disease, a complication of type 2 diabetes  You may  need any of the following:  · Hypoglycemic medicines or insulin  may be given to decrease the amount of sugar in your blood  · Blood pressure medicine  may be given to lower your blood pressure  Your blood pressure should be less than 140/90  · Cholesterol lowering medicine  may be given to prevent heart disease  · Antiplatelets , such as aspirin, help prevent blood clots  Take your antiplatelet medicine exactly as directed  These medicines make it more likely for you to bleed or bruise  If you are told to take aspirin, do not take acetaminophen or ibuprofen instead  · Take your medicine as directed  Contact your healthcare provider if you think your medicine is not helping or if you have side effects  Tell him or her if you are allergic to any medicine  Keep a list of the medicines, vitamins, and herbs you take  Include the amounts, and when and why you take them  Bring the list or the pill bottles to follow-up visits  Carry your medicine list with you in case of an emergency  Check your blood sugar level: You will be taught how to check a small drop of blood in a glucose monitor  You will need to check your blood sugar level at least 3 times each day if you are on insulin  Ask your healthcare provider when and how often to check during the day  If you check your blood sugar level before a meal , it should be between 80 and 130 mg/dL   If you check your blood sugar level 1 to 2 hours after a meal , it should be less than 180 mg/dL  Ask your healthcare provider if these are good goals for you  Write down your results, and show them to your healthcare provider  He may use the results to make changes to your medicine, food, and exercise schedules  If your blood sugar level is too low: Your blood sugar level is too low if it goes below 70 mg/dL  If the level is too low, eat or drink 15 grams of fast-acting carbohydrate  These are found naturally in fruits  Fast-acting carbohydrates will raise your blood sugar level quickly  Examples of 15 grams of fast-acting carbohydrate are 4 ounces (½ cup) of fruit juice or 4 ounces of regular soda  Other examples are 2 tablespoons of raisins or 3 to 4 glucose tablets  Check your blood sugar level 15 minutes later  If the level is still low (less than 100 mg/dL), eat another 15 grams of carbohydrate  When the level returns to 100 mg/dL, eat a snack or meal that contains carbohydrates  This will help prevent another drop in blood sugar  Always carefully follow your healthcare provider's instructions on how to treat low blood sugar levels  Check your feet each day for sores:  Wear shoes and socks that fit correctly  Do not trim your toenails  Ask your healthcare provider for more information about foot care  Follow your meal plan:  A dietitian will help you make a meal plan to keep your blood sugar level steady  Do not skip meals  Your blood sugar level may drop too low if you have taken diabetes medicine and do not eat  · Keep track of carbohydrates (sugar and starchy foods)  Your blood sugar level can get too high if you eat too many carbohydrates  Your dietitian will help you plan meals and snacks that have the right amount of carbohydrates  · Eat low-fat foods , such as skinless chicken and low-fat milk  · Eat less sodium (salt)    Limit high-sodium foods, such as soy sauce, potato chips, and soup  Do not add salt to food you cook  Limit your use of table salt  You should have less than 2,300 mg of sodium per day  · Eat high-fiber foods , such as vegetables, whole grain breads, and beans  · Limit alcohol  Alcohol affects your blood sugar level and can make it harder to manage your diabetes  Limit alcohol to 1 drink a day if you are a woman  Limit alcohol to 2 drinks a day if you are a man  A drink of alcohol is 12 ounces of beer, 5 ounces of wine, or 1½ ounces of liquor  Maintain a healthy weight:  Ask your healthcare provider how much you should weigh  A healthy weight can help you control your diabetes  Ask your provider to help you create a weight loss plan if you are overweight  Together you can set manageable weight loss goals  Exercise as directed:  Exercise can help keep your blood sugar level steady, decrease your risk of heart disease, and help you lose weight  Stretch before and after you exercise  Exercise for at least 150 minutes every week  Spread this amount of exercise over at least 3 days a week  Do not skip exercise more than 2 days in a row  Include muscle strengthening activities 2 to 3 days each week  Older adults should include balance training 2 to 3 times each week  Activities that help increase balance include yoga and dwayne chi  Work with your healthcare provider to create an exercise plan  · Check your blood sugar level before and after exercise  Healthcare providers may tell you to change the amount of insulin you take or food you eat  If your blood sugar level is high, check your blood or urine for ketones before you exercise  Do not exercise if your blood sugar level is high and you have ketones  · If your blood sugar level is less than 100 mg/dL, have a carbohydrate snack before you exercise  Examples are 4 to 6 crackers, ½ banana, 8 ounces (1 cup) of milk, or 4 ounces (½ cup) of juice   Drink water or liquids that do not contain sugar before, during, and after exercise  Ask your dietitian or healthcare provider which liquids you should drink when you exercise  · Do not sit for longer than 30 minutes  If you cannot walk around, at least stand up  This will help you stay active and keep your blood circulating  Do not smoke:  Nicotine and other chemicals in cigarettes and cigars can cause lung damage and make it more difficult to manage your diabetes  Ask your healthcare provider for information if you currently smoke and need help to quit  Do not use e-cigarettes or smokeless tobacco in place of cigarettes or to help you quit  They still contain nicotine  Check your blood pressure as directed:  Ask your healthcare provider what your blood pressure should be  Most adults with diabetes and high blood pressure should have a systolic blood pressure (first number) less than 140  Your diastolic blood pressure (second number) should be less than 90  Wear medical alert identification:  Wear medical alert jewelry or carry a card that says you have diabetes  Ask your healthcare provider where to get these items  Ask about vaccines: You have a higher risk for serious illness if you get the flu, pneumonia, or hepatitis  Ask your healthcare provider if you should get a flu, pneumonia, or hepatitis B vaccine, and when to get the vaccine  Follow up with your healthcare provider as directed: You may need to return to have your A1c checked every 3 months  You will need to return at least once each year to have your feet checked  You will need an eye exam once a year to check for retinopathy  You will also need urine tests every year to check for kidney problems  You may need tests to monitor for heart disease such as an EKG, stress test, blood pressure monitoring, and blood tests  Write down your questions so you remember to ask them during your visits     © 2017 [employer] [employer] Information is for End User's use only and may not be sold, redistributed or otherwise used for commercial purposes  All illustrations and images included in CareNotes® are the copyrighted property of [employer]  or Vishal Dang  The above information is an  only  It is not intended as medical advice for individual conditions or treatments  Talk to your doctor, nurse or pharmacist before following any medical regimen to see if it is safe and effective for you

## 2018-11-16 ENCOUNTER — TELEPHONE (OUTPATIENT)
Dept: INTERNAL MEDICINE CLINIC | Facility: CLINIC | Age: 58
End: 2018-11-16

## 2018-11-16 DIAGNOSIS — I10 ESSENTIAL HYPERTENSION: Chronic | ICD-10-CM

## 2018-11-16 DIAGNOSIS — E78.2 MIXED HYPERLIPIDEMIA: Chronic | ICD-10-CM

## 2018-11-16 DIAGNOSIS — E11.9 CONTROLLED TYPE 2 DIABETES MELLITUS WITHOUT COMPLICATION, WITHOUT LONG-TERM CURRENT USE OF INSULIN (HCC): Primary | Chronic | ICD-10-CM

## 2018-11-16 NOTE — TELEPHONE ENCOUNTER
Spoke to patient about labs  She does not wish to go back on insulin at this time  Will make diet changes  I will put her back on januvia and metformin

## 2018-11-19 ENCOUNTER — TELEPHONE (OUTPATIENT)
Dept: INTERNAL MEDICINE CLINIC | Facility: CLINIC | Age: 58
End: 2018-11-19

## 2018-11-19 NOTE — TELEPHONE ENCOUNTER
----- Message from Laurence Fernandes DO sent at 11/19/2018  9:40 AM EST -----  Please let patient know there was no evidence of diabetic retinopathy on her eye exam from our office

## 2018-12-17 ENCOUNTER — TELEPHONE (OUTPATIENT)
Dept: INTERNAL MEDICINE CLINIC | Facility: CLINIC | Age: 58
End: 2018-12-17

## 2018-12-17 DIAGNOSIS — I10 ESSENTIAL HYPERTENSION: ICD-10-CM

## 2018-12-17 RX ORDER — AMLODIPINE BESYLATE 10 MG
10 TABLET ORAL DAILY
Qty: 90 TABLET | Refills: 0 | Status: SHIPPED | OUTPATIENT
Start: 2018-12-17 | End: 2019-01-31 | Stop reason: CLARIF

## 2018-12-17 NOTE — TELEPHONE ENCOUNTER
Patient called: She takes Amlodipine 10mg and the pharmacy didn't tell her of changes to the look-color and changes in size and wasn't told  She's asking to take the name brand: Norvasc  Can we send that in for her?     [facility] in [place]    OP#[phone]

## 2018-12-17 NOTE — TELEPHONE ENCOUNTER
Pt called back, she is aware the medication could be most costly   States she just picked up medication

## 2018-12-17 NOTE — TELEPHONE ENCOUNTER
I can certainly send it but more than likely the brand Norvasc may end up being more costly than generic amlodipine

## 2018-12-18 NOTE — TELEPHONE ENCOUNTER
Norvasc is to expensive for the patient she is requesting that you send in another alternative    Please advise

## 2018-12-18 NOTE — TELEPHONE ENCOUNTER
Patient called, she is unable to take the [facility], the cost is $700, she was expecting at least $200 but not $700, would it be possible for something else to be called in     # [phone]

## 2019-01-16 ENCOUNTER — TELEPHONE (OUTPATIENT)
Dept: SURGERY | Facility: CLINIC | Age: 59
End: 2019-01-16

## 2019-01-16 NOTE — TELEPHONE ENCOUNTER
[facility]ara Miranda pt last seen 9/11/18  Pt said she developed a surgical hernia from when Chiara Miranda operated  I offered her the first available appt ([facility])- which is next week Thursday  Pt declined  Wants Tuesday  She is aware nothing available  She asked to speak to  or MA to riage her call and open the schedule to accommodate her    Pt is needing a call back today

## 2019-01-16 NOTE — TELEPHONE ENCOUNTER
Pt called and stated that her hernia was causing her some pain, I scheduled the pt for tomorrow at 2:30pm

## 2019-01-17 ENCOUNTER — OFFICE VISIT (OUTPATIENT)
Dept: SURGERY | Facility: CLINIC | Age: 59
End: 2019-01-17
Payer: COMMERCIAL

## 2019-01-17 VITALS
WEIGHT: 173 LBS | DIASTOLIC BLOOD PRESSURE: 102 MMHG | BODY MASS INDEX: 33.79 KG/M2 | TEMPERATURE: 98.4 F | SYSTOLIC BLOOD PRESSURE: 170 MMHG

## 2019-01-17 DIAGNOSIS — K43.2 INCISIONAL HERNIA, WITHOUT OBSTRUCTION OR GANGRENE: Primary | ICD-10-CM

## 2019-01-17 PROCEDURE — 99213 OFFICE O/P EST LOW 20 MIN: CPT | Performed by: SURGERY

## 2019-01-17 NOTE — PROGRESS NOTES
Follow Up - General Surgery   Amarjit Rivera 63 y o  female MRN: [id number]  Unit/Bed#:  Encounter: [id number]    Assessment/Plan     Assessment:  Incarcerated incisional hernia, symptomatic  History of diabetes  Dilated cardiomyopathy  Plan:  The patient will require cardiac risk assessment before surgery  I will also order basic lab work  The patient will return to my office in 2 weeks for follow-up  She will ready had CT scan of the abdomen and pelvis back in 2018  History of Present Illness     HPI:  Amarjit Rivera is a 63 y o  female who presents to my office complaining of moderate to severe pain from the incisional hernia  She stated that she gained some weight and she started complaining of pain on and off for the past 3 months, exacerbated with increasing activities, walking and sitting long periods of time  She denied having any nausea, vomiting, diarrhea, constipation or any other constitutional symptoms  I reviewed the CT scan report and films in the office with the patient from 2018  Review of Systems   All other systems reviewed and are negative        Historical Information   Past Medical History:   Diagnosis Date    Acute on chronic congestive heart failure with left ventricular diastolic dysfunction (HCC)     last assessed 2017    Asthma     Atelectasis     [facility] [address] [facility]     Depression     Diabetes mellitus [facility] [address] [facility]     Diverticulitis 2017    with perforation and abscess     Hyperlipidemia     Hypertension     Lesion of spleen     Pericardial effusion      Past Surgical History:   Procedure Laterality Date    ABDOMINAL SURGERY       SECTION      COLON SURGERY      COLONOSCOPY      COLOSTOMY  2017    HARTMANS PROCEDURE N/A 2017    Procedure: EXPLORATORY LAPAROTOMY; PARTIAL SMALL BOWEL RESECTION; HARTMANS PROCEDURE; OSTOMY;  Surgeon: Nathaniel Garrido MD;  Location: [facility];  Service:     MAMMO STEREOTACTIC BREAST BIOPSY LEFT (ALL INC) Left 10/4/2018    NM CLOSE ENTEROSTOMY N/A 9/8/2017    Procedure: OPEN COLOSTOMY REVERSAL;  Surgeon: Pavel Choudhury MD;  [facility];  Service: General    NM COLONOSCOPY FLX DX W/COLLJ SPEC WHEN PFRMD N/A 8/16/2017    Procedure: COLONOSCOPY; DILATION OF OSTOMY;  Surgeon: Pavel Choudhury MD;  Location: [facility]; Service: General    NM EXC SKIN BENIG <0 5 CM REMAINDER BODY N/A 11/29/2017    Procedure: SCALP MASS EXCISION X 2;  Surgeon: Pavel Choudhury MD;  [facility];  Service: General    VAC DRESSING APPLICATION N/A 3/9/8400    Procedure: Arden Meliza;  Surgeon: Pavel Choudhury MD;  [facility];  Service:      Social History   History   Alcohol Use    Yes     Comment: socially - per allscripts 'denied hx of alcohol use'     History   Drug Use No     History   Smoking Status    Current Every Day Smoker    Packs/day: 0 25    Years: 20 00    Types: Cigarettes    Start date: 8/2/1991   Smokeless Tobacco    Never Used     Family History: non-contributory    Meds/Allergies   all medications and allergies reviewed     Current Outpatient Prescriptions:     amLODIPine (NORVASC) 10 mg tablet, TAKE 1 TABLET DAILY  , Disp: 90 tablet, Rfl: 2    aspirin (ECOTRIN LOW STRENGTH) 81 mg EC tablet, TAKE 1 TABLET DAILY  , Disp: 30 tablet, Rfl: 9    aspirin 81 mg chewable tablet, Chew 81 mg daily, Disp: , Rfl:     atorvastatin (LIPITOR) 20 mg tablet, TAKE 1 TABLET DAILY, Disp: 30 tablet, Rfl: 6    furosemide (LASIX) 40 mg tablet, TAKE 1 TABLET TWICE DAILY  , Disp: 180 tablet, Rfl: 2    ipratropium (ATROVENT) 0 02 % nebulizer solution, Inhale, Disp: , Rfl:     melatonin 3 mg, Take by mouth, Disp: , Rfl:     metFORMIN (GLUCOPHAGE) 1000 MG tablet, Take 1 tablet (1,000 mg total) by mouth 2 (two) times a day with meals, Disp: 60 tablet, Rfl: 5    NORVASC 10 MG tablet, Take 1 tablet (10 mg total) by mouth daily, Disp: 90 tablet, Rfl: 0    sitaGLIPtin (JANUVIA) 100 mg tablet, Take 1 tablet (100 mg total) by mouth daily, Disp: 30 tablet, Rfl: 5  Allergies   Allergen Reactions    Ciprofloxacin Itching     Starts at hands to feet then the whole entire body       Objective     Current Vitals:   Blood Pressure: (!) 170/102 (01/17/19 1405)  Temperature: 98 4 °F (36 9 °C) (01/17/19 1405)  Temp Source: Oral (01/17/19 1405)  Weight - Scale: 78 5 kg (173 lb) (01/17/19 1405)      Invasive Devices          No matching active lines, drains, or airways          Physical Exam   Constitutional: She is oriented to person, place, and time  She appears well-developed and well-nourished  No distress  HENT:   Head: Normocephalic  Mouth/Throat: No oropharyngeal exudate  Eyes: Pupils are equal, round, and reactive to light  No scleral icterus  Neck: Normal range of motion  Cardiovascular: Normal rate and regular rhythm  No murmur heard  Pulmonary/Chest: [place] normal and breath sounds normal  No respiratory distress  Abdominal:   Abdomen is obese, soft, nondistended and mildly tender over the incarcerated incisional hernia from the ostomy site  There is a small incisional hernia from the midline incision  There is no visceromegaly or mass palpable  Musculoskeletal: She exhibits no edema or tenderness  Lymphadenopathy:     She has no cervical adenopathy  Neurological: She is alert and oriented to person, place, and time  No cranial nerve deficit  Skin: No rash noted  No erythema  Psychiatric: She has a normal mood and affect   Her behavior is normal

## 2019-01-17 NOTE — PATIENT INSTRUCTIONS
Incisional Hernia   WHAT YOU NEED TO KNOW:   What is an incisional hernia? An incisional hernia is a bulge through the healed incision of a previous surgery in your abdomen  An incisional hernia is usually caused by weakness in the tissues and muscles of your abdomen  The bulge is usually caused by a part of your intestine, but it may also be tissue or fat pushing through the weakness  What increases my risk for an incisional hernia? · Older age    · Obesity or poor nutrition    · Pregnancy    · Previous infection at the incision site    · Smoking    · Straining during bowel movements, coughing, or heavy lifting    · Medicines, such as steroids  What are the signs and symptoms of an incisional hernia? · Pain    · Nausea or vomiting    · Swelling or a soft bulge along your old incision    · Bloating  How is an incisional hernia diagnosed? Your healthcare provider will look and feel for a bulge in your incision  He may ask you to lie down with your legs bent  He may also ask you to cough while standing up  You may need a CT scan to show the hernia  You may be asked to drink contrast liquid to help the hernia show up better in the pictures  Tell the healthcare provider if you have ever had an allergic reaction to contrast liquid  How is an incisional hernia treated? · Medicines:      ¨ NSAIDs , such as ibuprofen, help decrease swelling, pain, and fever  This medicine is available with or without a doctor's order  NSAIDs can cause stomach bleeding or kidney problems in certain people  If you take blood thinner medicine, always ask your healthcare provider if NSAIDs are safe for you  Always read the medicine label and follow directions  ¨ Prescription pain medicine  may be given  Ask your how to take this medicine safely  · A hernia reduction  is when your healthcare provider pushes the hernia back into your body without surgery   You may be given medicine to relax your muscles to make it easier to push the bulge back in  · Surgery  may be needed to repair your hernia  Surgery may be done through a few small incisions in your abdomen or one larger incision  Mesh may be placed to strengthen your abdominal tissues  How can I help prevent another incisional hernia? · Maintain a healthy weight  Ask your healthcare provider how much you should weigh  Ask him to help you create a weight loss plan if you are overweight  Ask your healthcare provider what types of food you should eat  · Do not strain  when you have a bowel movement  Take an over-the-counter bowel movement softener and drink plenty of water  When you cough, hold a pillow against your incision to prevent strain  · Do not smoke  If you smoke, it is never too late to quit  Ask for information if you need help quitting  · Wear your support device as directed  You may need to wear a support device, such as an abdominal binder for up to 2 weeks after surgery  This helps decrease pain and the risk of fluid collecting under your skin  · Return to your usual activities as directed  Do not lift more than 10 pounds or do strenuous activity for up to 6 weeks  Call 911 for any of the following:   · You have sudden trouble breathing  When should I seek immediate care? · You have severe pain  · You have bloody bowel movements  · You stop having bowel movements or passing gas  · Your abdomen is suddenly very hard  When should I contact my healthcare provider? · You have a fever  · You have nausea and are vomiting  · You are constipated  · Your hernia has returned  · You have a lump, or collection of fluid, under your skin  · You have pain that does not go away, even after you take pain medicine  · You have questions or concerns about your condition or care  CARE AGREEMENT:   You have the right to help plan your care  Learn about your health condition and how it may be treated   Discuss treatment options with your caregivers to decide what care you want to receive  You always have the right to refuse treatment  The above information is an  only  It is not intended as medical advice for individual conditions or treatments  Talk to your doctor, nurse or pharmacist before following any medical regimen to see if it is safe and effective for you  © 2017 [employer] Information is for End User's use only and may not be sold, redistributed or otherwise used for commercial purposes  All illustrations and images included in CareNotes® are the copyrighted property of A [employer] M [employer]  or Vishal Dang

## 2019-01-21 ENCOUNTER — TELEPHONE (OUTPATIENT)
Dept: CARDIOLOGY CLINIC | Facility: CLINIC | Age: 59
End: 2019-01-21

## 2019-01-21 NOTE — TELEPHONE ENCOUNTER
PT NEEDS A CLEARANCE APPT AND HAD ONE FOR TODAY BUT CANCELED  PT WANTS Wednesday OR Thursday OF THIS WEEK ONLY  I OFFERED PT Friday AT [facility] WHERE THERE WAS AN OPENING BUT PT DECLINED AND SAID SHE CANNOT WAIT UNTIL February  WILL DR Denver Kitten SEE PT ON Wednesday 01/23 OR Thursday 01/24  PLEASE ADVISE   Brooks Braxton

## 2019-01-31 ENCOUNTER — HOSPITAL ENCOUNTER (OUTPATIENT)
Dept: RADIOLOGY | Facility: HOSPITAL | Age: 59
Discharge: HOME | End: 2019-01-31
Attending: SURGERY
Payer: COMMERCIAL

## 2019-01-31 ENCOUNTER — OFFICE VISIT (OUTPATIENT)
Dept: CARDIOLOGY CLINIC | Facility: CLINIC | Age: 59
End: 2019-01-31
Payer: COMMERCIAL

## 2019-01-31 ENCOUNTER — LAB (OUTPATIENT)
Dept: LAB | Facility: HOSPITAL | Age: 59
End: 2019-01-31
Attending: SURGERY
Payer: COMMERCIAL

## 2019-01-31 VITALS
SYSTOLIC BLOOD PRESSURE: 158 MMHG | DIASTOLIC BLOOD PRESSURE: 90 MMHG | BODY MASS INDEX: 33.38 KG/M2 | WEIGHT: 170 LBS | HEIGHT: 60 IN | HEART RATE: 91 BPM | OXYGEN SATURATION: 97 %

## 2019-01-31 DIAGNOSIS — E78.2 MIXED HYPERLIPIDEMIA: Chronic | ICD-10-CM

## 2019-01-31 DIAGNOSIS — Z01.818 PRE-OPERATIVE CLEARANCE: Primary | ICD-10-CM

## 2019-01-31 DIAGNOSIS — K43.2 INCISIONAL HERNIA, WITHOUT OBSTRUCTION OR GANGRENE: ICD-10-CM

## 2019-01-31 DIAGNOSIS — I31.3 PERICARDIAL EFFUSION: ICD-10-CM

## 2019-01-31 DIAGNOSIS — I10 ESSENTIAL HYPERTENSION: Chronic | ICD-10-CM

## 2019-01-31 DIAGNOSIS — I25.10 CAD IN NATIVE ARTERY: Chronic | ICD-10-CM

## 2019-01-31 DIAGNOSIS — I50.32 CHRONIC DIASTOLIC HF (HEART FAILURE) (HCC): Chronic | ICD-10-CM

## 2019-01-31 LAB
ALBUMIN SERPL BCP-MCNC: 3.9 G/DL (ref 3.5–5)
ALP SERPL-CCNC: 56 U/L (ref 46–116)
ALT SERPL W P-5'-P-CCNC: 10 U/L (ref 12–78)
ANION GAP SERPL CALCULATED.3IONS-SCNC: 12 MMOL/L (ref 4–13)
AST SERPL W P-5'-P-CCNC: 13 U/L (ref 5–45)
BASOPHILS # BLD AUTO: 0.04 THOUSANDS/ΜL (ref 0–0.1)
BASOPHILS NFR BLD AUTO: 1 % (ref 0–1)
BILIRUB SERPL-MCNC: 0.8 MG/DL (ref 0.2–1)
BUN SERPL-MCNC: 16 MG/DL (ref 5–25)
CALCIUM SERPL-MCNC: 9.3 MG/DL (ref 8.3–10.1)
CHLORIDE SERPL-SCNC: 101 MMOL/L (ref 100–108)
CO2 SERPL-SCNC: 27 MMOL/L (ref 21–32)
CREAT SERPL-MCNC: 0.65 MG/DL (ref 0.6–1.3)
EOSINOPHIL # BLD AUTO: 0.04 THOUSAND/ΜL (ref 0–0.61)
EOSINOPHIL NFR BLD AUTO: 1 % (ref 0–6)
ERYTHROCYTE [DISTWIDTH] IN BLOOD BY AUTOMATED COUNT: 14.7 % (ref 11.6–15.1)
EST. AVERAGE GLUCOSE BLD GHB EST-MCNC: 120 MG/DL
GFR SERPL CREATININE-BSD FRML MDRD: 114 ML/MIN/1.73SQ M
GLUCOSE P FAST SERPL-MCNC: 129 MG/DL (ref 65–99)
HBA1C MFR BLD: 5.8 % (ref 4.2–6.3)
HCT VFR BLD AUTO: 41.6 % (ref 34.8–46.1)
HGB BLD-MCNC: 13.4 G/DL (ref 11.5–15.4)
IMM GRANULOCYTES # BLD AUTO: 0.04 THOUSAND/UL (ref 0–0.2)
IMM GRANULOCYTES NFR BLD AUTO: 1 % (ref 0–2)
LYMPHOCYTES # BLD AUTO: 1.9 THOUSANDS/ΜL (ref 0.6–4.47)
LYMPHOCYTES NFR BLD AUTO: 26 % (ref 14–44)
MCH RBC QN AUTO: 30.2 PG (ref 26.8–34.3)
MCHC RBC AUTO-ENTMCNC: 32.2 G/DL (ref 31.4–37.4)
MCV RBC AUTO: 94 FL (ref 82–98)
MONOCYTES # BLD AUTO: 0.44 THOUSAND/ΜL (ref 0.17–1.22)
MONOCYTES NFR BLD AUTO: 6 % (ref 4–12)
NEUTROPHILS # BLD AUTO: 4.73 THOUSANDS/ΜL (ref 1.85–7.62)
NEUTS SEG NFR BLD AUTO: 65 % (ref 43–75)
NRBC BLD AUTO-RTO: 0 /100 WBCS
PLATELET # BLD AUTO: 326 THOUSANDS/UL (ref 149–390)
PMV BLD AUTO: 9.8 FL (ref 8.9–12.7)
POTASSIUM SERPL-SCNC: 3.9 MMOL/L (ref 3.5–5.3)
PREALB SERPL-MCNC: 24.9 MG/DL (ref 18–40)
PROT SERPL-MCNC: 7.7 G/DL (ref 6.4–8.2)
RBC # BLD AUTO: 4.44 MILLION/UL (ref 3.81–5.12)
SODIUM SERPL-SCNC: 140 MMOL/L (ref 136–145)
WBC # BLD AUTO: 7.19 THOUSAND/UL (ref 4.31–10.16)

## 2019-01-31 PROCEDURE — 80053 COMPREHEN METABOLIC PANEL: CPT

## 2019-01-31 PROCEDURE — 99214 OFFICE O/P EST MOD 30 MIN: CPT | Performed by: INTERNAL MEDICINE

## 2019-01-31 PROCEDURE — 71046 X-RAY EXAM CHEST 2 VIEWS: CPT

## 2019-01-31 PROCEDURE — 85025 COMPLETE CBC W/AUTO DIFF WBC: CPT

## 2019-01-31 PROCEDURE — 87081 CULTURE SCREEN ONLY: CPT

## 2019-01-31 PROCEDURE — 36415 COLL VENOUS BLD VENIPUNCTURE: CPT

## 2019-01-31 PROCEDURE — 93000 ELECTROCARDIOGRAM COMPLETE: CPT | Performed by: INTERNAL MEDICINE

## 2019-01-31 PROCEDURE — 83036 HEMOGLOBIN GLYCOSYLATED A1C: CPT

## 2019-01-31 PROCEDURE — 84134 ASSAY OF PREALBUMIN: CPT

## 2019-01-31 RX ORDER — METOPROLOL SUCCINATE 25 MG/1
25 TABLET, EXTENDED RELEASE ORAL DAILY
Qty: 90 TABLET | Refills: 3 | Status: SHIPPED | OUTPATIENT
Start: 2019-01-31 | End: 2019-08-14 | Stop reason: SDUPTHER

## 2019-01-31 NOTE — PROGRESS NOTES
Cardiology Consultation     Maurilio Rowe  [id number]  1960  [address] [place] [address]    1  Pre-operative clearance  POCT ECG   2  Incisional hernia, without obstruction or gangrene  Ambulatory referral to Cardiology    POCT ECG     INTERVAL HISTORY:  52-year-old female patient with past medical history of chronic diastolic CHF, coronary disease (50% RCA lesion), hypertension, hyperlipidemia, pericardial effusion, diabetes is here for preop evaluation prior to incisional hernia repair  Patient is very active and walks every day, BETWEEN [phone] STEPS A DAY  She denies having any chest pain or shortness of breath on exertion  Patient also denies having any significant weight gain, lower extremity edema, orthopnea paroxysmal nocturnal dyspnea  She is compliant with a cardiac medications  Patient monitor her blood pressure at home and her systolic blood pressure is usually between 140-160 mm of mercury      Normal sinus rhythm, left ventricular hypertrophy, T-wave inversions inferior lateral leads probably due to LVH, frequent PVCs    Patient Active Problem List   Diagnosis    Controlled type 2 diabetes mellitus without complication ([address] )    Uncomplicated asthma    History of colostomy    Essential hypertension    Hyperlipidemia    Chronic diastolic HF (heart failure) ([address] )    Diabetic nephropathy associated with type 2 diabetes mellitus ([address] )    CAD in native artery     Past Medical History:   Diagnosis Date    Acute on chronic congestive heart failure with left ventricular diastolic dysfunction ([address] )     last assessed 5/23/2017    Asthma     Atelectasis     CHF (congestive heart failure) ([address] )     Depression     Diabetes mellitus ([address] )     Diverticulitis 2017    with perforation and abscess     Hyperlipidemia     Hypertension     Lesion of spleen     Pericardial effusion      Social History     Social History    Marital status:      Spouse name: N/A    Number of children: N/A    Years of education: N/A     Occupational History    Not on file  Social History Main Topics    Smoking status: Current Every Day Smoker     Packs/day: 0 25     Years: 20 00     Types: Cigarettes     Start date: 1991    Smokeless tobacco: Never Used    Alcohol use Yes      Comment: socially - per allscripts 'denied hx of alcohol use'    Drug use: No    Sexual activity: Yes     Partners: Male     Other Topics Concern    Not on file     Social History Narrative    No narrative on file      Family History   Problem Relation Age of Onset    Hypertension Mother     Diabetes Mother      Past Surgical History:   Procedure Laterality Date    ABDOMINAL SURGERY       SECTION      COLON SURGERY      COLONOSCOPY      COLOSTOMY  2017    HARTMANS PROCEDURE N/A 2017    Procedure: EXPLORATORY LAPAROTOMY; PARTIAL SMALL BOWEL RESECTION; HARTMANS PROCEDURE; OSTOMY;  Surgeon: Ahmet Andersen MD;  [facility];  Service:     MAMMO STEREOTACTIC BREAST BIOPSY LEFT (ALL INC) Left 10/4/2018    ND CLOSE ENTEROSTOMY N/A 2017    Procedure: OPEN COLOSTOMY REVERSAL;  Surgeon: Ahmet Andersen MD;  [facility];  Service: General    ND COLONOSCOPY FLX DX W/SIMEONJ Soevie 1978 PFRMD N/A 2017    Procedure: COLONOSCOPY; DILATION OF OSTOMY;  Surgeon: Ahmet Andersen MD;  [facility]; Service: General    ND EXC SKIN BENIG <0 5 CM REMAINDER BODY N/A 2017    Procedure: SCALP MASS EXCISION X 2;  Surgeon: Ahmet Andersen MD;  [facility];  Service: General    VAC DRESSING APPLICATION N/A     Procedure: APPLICATION VAC DRESSING;  Surgeon: Ahmet Andersen MD;  [facility];  Service:        Current Outpatient Prescriptions:     amLODIPine (NORVASC) 10 mg tablet, TAKE 1 TABLET DAILY  , Disp: 90 tablet, Rfl: 2   aspirin (ECOTRIN LOW STRENGTH) 81 mg EC tablet, TAKE 1 TABLET DAILY  , Disp: 30 tablet, Rfl: 9    atorvastatin (LIPITOR) 20 mg tablet, TAKE 1 TABLET DAILY, Disp: 30 tablet, Rfl: 6    furosemide (LASIX) 40 mg tablet, TAKE 1 TABLET TWICE DAILY  , Disp: 180 tablet, Rfl: 2    metFORMIN (GLUCOPHAGE) 1000 MG tablet, Take 1 tablet (1,000 mg total) by mouth 2 (two) times a day with meals, Disp: 60 tablet, Rfl: 5    sitaGLIPtin (JANUVIA) 100 mg tablet, Take 1 tablet (100 mg total) by mouth daily, Disp: 30 tablet, Rfl: 5    ipratropium (ATROVENT) 0 02 % nebulizer solution, Inhale, Disp: , Rfl:     melatonin 3 mg, Take by mouth, Disp: , Rfl:   Allergies   Allergen Reactions    Ciprofloxacin Itching     Starts at hands to feet then the whole entire body     Vitals:    01/31/19 1325   BP: 158/90   BP Location: Left arm   Patient Position: Sitting   Cuff Size: Adult   Pulse: 91   SpO2: 97%   Weight: 77 1 kg (170 lb)   Height: 5' (1 524 m)       Labs:  No visits with results within 2 Month(s) from this visit  Latest known visit with results is:   Appointment on 11/15/2018   Component Date Value    Sodium 11/15/2018 127*    Potassium 11/15/2018 4 1     Chloride 11/15/2018 96*    CO2 11/15/2018 27     ANION GAP 11/15/2018 4     BUN 11/15/2018 13     Creatinine 11/15/2018 0 75     Glucose, Fasting 11/15/2018 464*    Calcium 11/15/2018 8 8     eGFR 11/15/2018 102     Cholesterol 11/15/2018 137     Triglycerides 11/15/2018 186*    HDL, Direct 11/15/2018 40     LDL Calculated 11/15/2018 60     Creatinine, Ur 11/15/2018 42 1     Microalbum  ,U,Random 11/15/2018 59 4*    Microalb Creat Ratio 11/15/2018 141*    Hemoglobin A1C 11/15/2018 12 1*    EAG 11/15/2018 301     Hepatitis C Ab 11/15/2018 Non-reactive      Imaging: No results found  Review of Systems:  Review of Systems   Constitutional: Negative for diaphoresis and fatigue  HENT: Negative for congestion and facial swelling      Eyes: Negative for photophobia and visual disturbance  Respiratory: Negative for chest tightness and shortness of breath  Cardiovascular: Negative for chest pain, palpitations and leg swelling  Gastrointestinal: Negative for abdominal pain and nausea  Endocrine: Negative for cold intolerance and heat intolerance  Musculoskeletal: Negative for arthralgias and myalgias  Skin: Negative for pallor and rash  Neurological: Negative for dizziness and tremors  Psychiatric/Behavioral: Negative for sleep disturbance  The patient is not nervous/anxious  Physical Exam:  Physical Exam   Constitutional: She is oriented to person, place, and time  She appears well-developed and well-nourished  HENT:   Head: Normocephalic and atraumatic  Eyes: Pupils are equal, round, and reactive to light  Conjunctivae and EOM are normal    Neck: Normal range of motion  Neck supple  No JVD present  No thyromegaly present  Cardiovascular: Normal rate, regular rhythm, S1 normal, S2 normal and intact distal pulses  Frequent extrasystoles are present  Exam reveals no gallop and no friction rub  Murmur heard  Systolic murmur is present with a grade of 3/6   Pulses:       Carotid pulses are 2+ on the right side, and 2+ on the left side  Pulmonary/Chest: Effort normal and breath sounds normal  No respiratory distress  She has no wheezes  She has no rales  Abdominal: Soft  Bowel sounds are normal  She exhibits no distension  There is no tenderness  There is no rebound and no guarding  Musculoskeletal: Normal range of motion  She exhibits no edema  Neurological: She is alert and oriented to person, place, and time  She has normal reflexes  No cranial nerve deficit  Skin: Skin is warm and dry  Psychiatric: She has a normal mood and affect  Discussion/Summary:  Preop evaluation prior to incisional hernia repair:  I see no cardiac contraindications for surgery    She should be at low-to-moderate risk for cardiac events during surgery  I will get echocardiogram to evaluate for pericardial effusion and left ventricular hypertrophy  I will also start the patient on low-dose beta-blocker for better control of blood pressure prior to surgery and to control the PVCs  Call me if you have any questions  Continue low-dose aspirin vicente-operatively if possible    Coronary artery disease, 50% mid RCA lesion  She is currently asymptomatic  Continue aspirin and statin for now     Hypertension:  Blood pressure elevated  Will order low-dose beta-blocker     Hyperlipidemia, on statins  Chronic diastolic CHF/pericardial effusion  Currently euvolemic  Continue Lasix    Follow-up after surgery to evaluate blood pressure

## 2019-02-01 ENCOUNTER — TELEPHONE (OUTPATIENT)
Dept: CARDIOLOGY CLINIC | Facility: CLINIC | Age: 59
End: 2019-02-01

## 2019-02-01 NOTE — TELEPHONE ENCOUNTER
PT HAD AN APPT WITH DR LYN YESTERDAY ON 01/31/19  PT WAS GIVEN A NEW BP MED AND PT WANTS TO KNOW IF SHE SHOULD CONTINUE TO TAKE HER OLD ONE AS WELL   Brooks Braxton

## 2019-02-02 LAB — MRSA NOSE QL CULT: NORMAL

## 2019-02-04 ENCOUNTER — TELEPHONE (OUTPATIENT)
Dept: OBGYN CLINIC | Age: 59
End: 2019-02-04

## 2019-02-04 ENCOUNTER — TELEPHONE (OUTPATIENT)
Dept: SURGERY | Facility: CLINIC | Age: 59
End: 2019-02-04

## 2019-02-04 NOTE — TELEPHONE ENCOUNTER
I called Dr Du Preston office today to schedule a consult for Mrs Gonzalez Herrera  However they did not have any openings until April 2019  The young lady I spoke with Zachery Slaughter said she will speak with the Dr Du Preston and call the pt herself to schedule her  I called Mrs Gonzalez Herrera and informed her of this  She is aware and awaiting on their call  I explained the Dx is for Large Fibroid Uterus and that this pt will be undergoing surgery with Dr Ambar Ortiz as well  That he would like to see if both procedures can be done the same day to avoid pt having to go under on two separate times

## 2019-02-04 NOTE — TELEPHONE ENCOUNTER
Dr Tommie Lozoya:    I have gone ahead and scheduled Ms Alexandra Tobias for a consultation with you tomorrow @ 11:45  Dr Ayla Mitchell is requesting to do a dual surgery with you ASAP  Let me know if there's anything else I can do

## 2019-02-04 NOTE — TELEPHONE ENCOUNTER
Patient has large uterine fibroid, Dr Cathi Quinn would like to do a joint case with KTM  Dr Cathi Quinn would like to do surgery ASAP

## 2019-02-05 ENCOUNTER — OFFICE VISIT (OUTPATIENT)
Dept: SURGERY | Facility: CLINIC | Age: 59
End: 2019-02-05
Payer: COMMERCIAL

## 2019-02-05 ENCOUNTER — HOSPITAL ENCOUNTER (OUTPATIENT)
Dept: NON INVASIVE DIAGNOSTICS | Facility: CLINIC | Age: 59
Discharge: HOME | End: 2019-02-05
Payer: COMMERCIAL

## 2019-02-05 ENCOUNTER — OFFICE VISIT (OUTPATIENT)
Dept: OBGYN CLINIC | Age: 59
End: 2019-02-05
Payer: COMMERCIAL

## 2019-02-05 VITALS — WEIGHT: 172 LBS | BODY MASS INDEX: 33.77 KG/M2 | TEMPERATURE: 98.6 F | HEIGHT: 60 IN

## 2019-02-05 VITALS
DIASTOLIC BLOOD PRESSURE: 86 MMHG | BODY MASS INDEX: 33.77 KG/M2 | WEIGHT: 172 LBS | HEIGHT: 60 IN | SYSTOLIC BLOOD PRESSURE: 126 MMHG

## 2019-02-05 DIAGNOSIS — D73.89 LESION OF SPLEEN: ICD-10-CM

## 2019-02-05 DIAGNOSIS — I31.3 PERICARDIAL EFFUSION: ICD-10-CM

## 2019-02-05 DIAGNOSIS — I10 ESSENTIAL HYPERTENSION: Chronic | ICD-10-CM

## 2019-02-05 DIAGNOSIS — K43.2 INCISIONAL HERNIA, WITHOUT OBSTRUCTION OR GANGRENE: Primary | ICD-10-CM

## 2019-02-05 DIAGNOSIS — D25.9 UTERINE LEIOMYOMA, UNSPECIFIED LOCATION: Primary | ICD-10-CM

## 2019-02-05 DIAGNOSIS — I50.32 CHRONIC DIASTOLIC HF (HEART FAILURE) (HCC): Chronic | ICD-10-CM

## 2019-02-05 DIAGNOSIS — Z01.419 ENCOUNTER FOR GYNECOLOGICAL EXAMINATION WITHOUT ABNORMAL FINDING: ICD-10-CM

## 2019-02-05 PROCEDURE — 99214 OFFICE O/P EST MOD 30 MIN: CPT | Performed by: SURGERY

## 2019-02-05 PROCEDURE — G0145 SCR C/V CYTO,THINLAYER,RESCR: HCPCS | Performed by: OBSTETRICS & GYNECOLOGY

## 2019-02-05 PROCEDURE — 93306 TTE W/DOPPLER COMPLETE: CPT

## 2019-02-05 PROCEDURE — 99214 OFFICE O/P EST MOD 30 MIN: CPT | Performed by: OBSTETRICS & GYNECOLOGY

## 2019-02-05 PROCEDURE — 87624 HPV HI-RISK TYP POOLED RSLT: CPT | Performed by: OBSTETRICS & GYNECOLOGY

## 2019-02-05 RX ORDER — CEFAZOLIN SODIUM 2 G/50ML
2000 SOLUTION INTRAVENOUS
Status: CANCELLED | OUTPATIENT
Start: 2019-02-27 | End: 2019-02-28

## 2019-02-05 RX ORDER — SODIUM CHLORIDE, SODIUM LACTATE, POTASSIUM CHLORIDE, CALCIUM CHLORIDE 600; 310; 30; 20 MG/100ML; MG/100ML; MG/100ML; MG/100ML
125 INJECTION, SOLUTION INTRAVENOUS
Status: CANCELLED | OUTPATIENT
Start: 2019-02-27 | End: 2019-02-28

## 2019-02-05 RX ORDER — ENOXAPARIN SODIUM 300 MG/3ML
40 INJECTION INTRAVENOUS; SUBCUTANEOUS
Status: CANCELLED | OUTPATIENT
Start: 2019-02-05 | End: 2019-02-06

## 2019-02-05 NOTE — ASSESSMENT & PLAN NOTE
Uterus enlarged 16 week size  Plan for joint procedure with Dr Good Bernal for hernia repair  [id number]

## 2019-02-05 NOTE — H&P (VIEW-ONLY)
Follow Up - General Surgery   Paul Mcgee 63 y o  female MRN: [id number]  Unit/Bed#:  Encounter: [id number]    Assessment/Plan     Assessment:  Incarcerated incisional hernia  Diabetes  Hypercholesterolemia  Hypertension  Dilated cardiomyopathy  Morbid obesity  Plan:  In light of the symptomatology for the incisional hernia in the increasing in size the patient was advised to undergo incisional hernia repair with mesh, possible component separation  In addition the patient is scheduled to see GYN for a large calcified fibroid uterus, she may require hysterectomy at the same time  I discussed the operative procedure for incisional hernia, risks, benefits and alternatives, she understood and agreed to proceed  I will also get MRI of the abdomen to better characterize the spleen lesion  History of Present Illness     HPI:  Paul Mcgee is a 63 y o  female who presents to my office for follow-up  The patient still complains of pain and discomfort specially the incisional hernia from the ostomy site  Denies having any nausea vomiting  She also noted increasing size of the hernia  Review of Systems   All other systems reviewed and are negative        Historical Information   Past Medical History:   Diagnosis Date    Acute on chronic congestive heart failure with left ventricular diastolic dysfunction (HCC)     last assessed 2017    Asthma     Atelectasis     CHF (congestive heart failure) ([address]     Depression     Diabetes mellitus ([address] )     Diverticulitis 2017    with perforation and abscess     Hyperlipidemia     Hypertension     Lesion of spleen     Pericardial effusion      Past Surgical History:   Procedure Laterality Date    ABDOMINAL SURGERY       SECTION      COLON SURGERY      COLONOSCOPY      COLOSTOMY  2017    HARTMANS PROCEDURE N/A 2017    Procedure: EXPLORATORY LAPAROTOMY; PARTIAL SMALL BOWEL RESECTION; HARTMANS PROCEDURE; OSTOMY;  Surgeon: Deepali Yao MD; [facility];  Service:     MAMMO STEREOTACTIC BREAST BIOPSY LEFT (ALL INC) Left 10/4/2018    IL CLOSE ENTEROSTOMY N/A 9/8/2017    Procedure: OPEN COLOSTOMY REVERSAL;  Surgeon: Mary Bolton MD;  [facility];  Service: General    IL COLONOSCOPY FLX DX W/COLLJ SPEC WHEN PFRMD N/A 8/16/2017    Procedure: COLONOSCOPY; DILATION OF OSTOMY;  Surgeon: Mary Bolton MD;  [facility]; Service: General    IL EXC SKIN BENIG <0 5 CM REMAINDER BODY N/A 11/29/2017    Procedure: SCALP MASS EXCISION X 2;  Surgeon: Mary Bolton MD;  [facility];  Service: General    VAC DRESSING APPLICATION N/A 5/0/1617    Procedure: Bearl Mad;  Surgeon: Mary Bolton MD;  [facility];  Service:      Social History   History   Alcohol Use    Yes     Comment: socially - per allscripts 'denied hx of alcohol use'     History   Drug Use No     History   Smoking Status    Current Every Day Smoker    Packs/day: 0 25    Years: 20 00    Types: Cigarettes    Start date: 8/2/1991   Smokeless Tobacco    Never Used     Family History: non-contributory    Meds/Allergies   all medications and allergies reviewed     Current Outpatient Prescriptions:     amLODIPine (NORVASC) 10 mg tablet, TAKE 1 TABLET DAILY  , Disp: 90 tablet, Rfl: 2    aspirin (ECOTRIN LOW STRENGTH) 81 mg EC tablet, TAKE 1 TABLET DAILY  , Disp: 30 tablet, Rfl: 9    atorvastatin (LIPITOR) 20 mg tablet, TAKE 1 TABLET DAILY, Disp: 30 tablet, Rfl: 6    furosemide (LASIX) 40 mg tablet, TAKE 1 TABLET TWICE DAILY  , Disp: 180 tablet, Rfl: 2    ipratropium (ATROVENT) 0 02 % nebulizer solution, Inhale, Disp: , Rfl:     melatonin 3 mg, Take by mouth, Disp: , Rfl:     metFORMIN (GLUCOPHAGE) 1000 MG tablet, Take 1 tablet (1,000 mg total) by mouth 2 (two) times a day with meals, Disp: 60 tablet, Rfl: 5    metoprolol succinate (TOPROL-XL) 25 mg 24 hr tablet, Take 1 tablet (25 mg total) by mouth daily, Disp: 90 tablet, Rfl: 3    sitaGLIPtin (JANUVIA) 100 mg tablet, Take 1 tablet (100 mg total) by mouth daily, Disp: 30 tablet, Rfl: 5  Allergies   Allergen Reactions    Ciprofloxacin Itching     Starts at hands to feet then the whole entire body       Objective     Current Vitals:   Temperature: 98 6 °F (37 °C) (02/05/19 0804)  Temp Source: Oral (02/05/19 0804)  Height: 5' (152 4 cm) (02/05/19 0804)  Weight - Scale: 78 kg (172 lb) (02/05/19 0804)      Invasive Devices          No matching active lines, drains, or airways          Physical Exam   Constitutional: She is oriented to person, place, and time  She appears well-developed and well-nourished  No distress  HENT:   Head: Normocephalic  Mouth/Throat: No oropharyngeal exudate  Eyes: Pupils are equal, round, and reactive to light  No scleral icterus  Neck: Normal range of motion  Neck supple  Cardiovascular: Normal rate and regular rhythm  No murmur heard  Pulmonary/Chest: Effort normal and breath sounds normal  No respiratory distress  Abdominal:   The abdomen is soft, mildly obese, nondistended and nontender  There is an obvious ostomy site incisional hernia, partially reducible  There is a small midline incisional hernia, partially reducible  Musculoskeletal: She exhibits no edema or tenderness  Lymphadenopathy:     She has no cervical adenopathy  Neurological: She is alert and oriented to person, place, and time  No cranial nerve deficit  Skin: No rash noted  No erythema  Psychiatric: She has a normal mood and affect  Her behavior is normal        Lab Results:   MRSA culture   Order: [id number]   Status:  Final result   Visible to patient:  No (Inaccessible in [address])   Next appt: Today at 11:00 AM in Cardiology Imaging (AN POC ECHO 1)   Dx: Incisional hernia, without obstructio       Specimen Information: Nares        MRSA Culture Only No Methicillin Resistant Staphlyococcus aureus (MRSA) isolated             Specimen Collected: 01/31/19 12:19 PM Last Resulted: 02/02/19 12:52 PM              Hemoglobin A1C   Order: [id number]   Status:  Final result   Visible to patient:  No (Inaccessible in [address])   Next appt: Today at 11:00 AM in Cardiology Imaging (AN POC ECHO 1)   Dx: Incisional hernia, without obstructio    Notes recorded by Jak Mays MA on 2/1/2019 at 9:39 AM EST  Called pt regarding results  lmom  ------    Notes recorded by Franki Nichole MD on 2/1/2019 at 8:52 AM EST  I will review results with patient on next office visit  Ref Range & Units 1/31/19 12:19 PM   Hemoglobin A1C 4 2 - 6 3 % 5 8    EAG mg/dl 120       Specimen Collected: 01/31/19 12:19 PM   Last Resulted: 01/31/19  7:30 PM              Prealbumin   Order: [id number]   Status:  Final result   Visible to patient:  No (Inaccessible in MyChart)   Next appt: Today at 11:00 AM in Cardiology Imaging (AN POC ECHO 1)   Dx: Incisional hernia, without obstructio    Notes recorded by Jak Mays MA on 2/1/2019 at 9:39 AM EST  Called pt regarding results  lmom  ------    Notes recorded by Franki Nichole MD on 2/1/2019 at 8:52 AM EST  I will review results with patient on next office visit  Ref Range & Units 1/31/19 12:19 PM   Prealbumin 18 0 - 40 0 mg/dL 24 9       Specimen Collected: 01/31/19 12:19 PM   Last Resulted: 01/31/19  7:07 PM              Comprehensive metabolic panel   Order: [id number]   Status:  Final result   Visible to patient:  No (Inaccessible in MyChart)   Next appt: Today at 11:00 AM in Cardiology Imaging (AN POC ECHO 1)   Dx: Incisional hernia, without obstructio    Notes recorded by Jak Mays MA on 2/1/2019 at 9:39 AM EST  Called pt regarding results  lmom  ------    Notes recorded by Jak Mays MA on 2/1/2019 at 9:39 AM EST  Called pt regarding results   lmom  ------    Notes recorded by Franki Nichole MD on 2/1/2019 at 8:52 AM EST  I will review results with patient on next office visit   ------    Notes recorded by Precious Ferrara MD on 1/31/2019 at 2:08 PM EST  I will review results with patient on next office visit  Ref Range & Units 1/31/19 12:19 PM Flag   Sodium 136 - 145 mmol/L 140     Potassium 3 5 - 5 3 mmol/L 3 9     Chloride 100 - 108 mmol/L 101     CO2 21 - 32 mmol/L 27     ANION GAP 4 - 13 mmol/L 12     BUN 5 - 25 mg/dL 16     Creatinine 0 60 - 1 30 mg/dL 0 65     Comment: Standardized to IDMS reference method   Glucose, Fasting 65 - 99 mg/dL 129   H    Comment:    Specimen collection should occur prior to Sulfasalazine administration due to the potential for falsely depressed results  Specimen collection should occur prior to Sulfapyridine administration due to the potential for falsely elevated results  Calcium 8 3 - 10 1 mg/dL 9 3     AST 5 - 45 U/L 13     Comment:    Specimen collection should occur prior to Sulfasalazine administration due to the potential for falsely depressed results  ALT 12 - 78 U/L 10   L    Comment:    Specimen collection should occur prior to Sulfasalazine administration due to the potential for falsely depressed results      Alkaline Phosphatase 46 - 116 U/L 56     Total Protein 6 4 - 8 2 g/dL 7 7     Albumin 3 5 - 5 0 g/dL 3 9     Total Bilirubin 0 20 - 1 00 mg/dL 0 80     eGFR ml/min/1 73sq m 114             Component      Latest Ref Rng & Units 1/31/2019             WBC      4 31 - 10 16 Thousand/uL 7 19   Red Blood Cell Count      3 81 - 5 12 Million/uL 4 44   Hemoglobin      11 5 - 15 4 g/dL 13 4   HCT      34 8 - 46 1 % 41 6   MCV      82 - 98 fL 94   MCH      26 8 - 34 3 pg 30 2   MCHC      31 4 - 37 4 g/dL 32 2   RDW      11 6 - 15 1 % 14 7   MPV      8 9 - 12 7 fL 9 8   Platelet Count      066 - 390 Thousands/uL 326   nRBC      /100 WBCs 0   Neutrophils %      43 - 75 % 65   Immat GRANS %      0 - 2 % 1   Lymphocytes Relative      14 - 44 % 26   Monocytes Relative      4 - 12 % 6   Eosinophils      0 - 6 % 1   Basophils Relative      0 - 1 % 1   Absolute Neutrophils      1 85 - 7 62 Thousands/µL 4 73   Immature Grans Absolute      0 00 - 0 20 Thousand/uL 0 04   Lymphocytes Absolute      0 60 - 4 47 Thousands/µL 1 90   Absolute Monocytes      0 17 - 1 22 Thousand/µL 0 44   Absolute Eosinophils      0 00 - 0 61 Thousand/µL 0 04   Basophils Absolute      0 00 - 0 10 Thousands/µL 0 04   Segs Relative      43 - 75 %    Bands Relative      0 - 8 %    Lymphocytes %      14 - 44 %    Monocytes      4 - 12 %    Metamyelocytes%      0 - 1 %    Myelocytes %      0 - 1 %    Atypical Lymphocytes %      <=0 %    Abs Neutrophils      1 85 - 7 62 Thousand/uL    LYMPHOCYTES ABSOLUTE      0 60 - 4 47 Thousand/uL    Monocytes Absolute      0 00 - 1 22 Thousand/uL    Total Counted          Platelet Estimate      Adequate    Large Platelet          CLUMPED PLATELETS          CT ABDOMEN AND PELVIS WITH IV CONTRAST     INDICATION:   K43 2: Incisional hernia without obstruction or gangrene      COMPARISON:  None      TECHNIQUE:  CT examination of the abdomen and pelvis was performed  Axial, sagittal, and coronal 2D reformatted images were created from the source data and submitted for interpretation      Radiation dose length product (DLP) for this visit:  871 mGy-cm   This examination, like all CT scans performed in the [facility], was performed utilizing techniques to minimize radiation dose exposure, including the use of iterative   reconstruction and automated exposure control      IV Contrast:  100 mL of iohexol (OMNIPAQUE)  Enteric Contrast:  Enteric contrast was not administered      FINDINGS:     ABDOMEN     LOWER CHEST:  No clinically significant abnormality identified in the visualized lower chest      LIVER/BILIARY TREE:  A rounded hypodensity seen in the right liver, measuring about 8 mm, stable     GALLBLADDER:  No calcified gallstones  No pericholecystic inflammatory change      SPLEEN:  Again noted is a splenic lesion measuring about 6 7 x 6 2 cm    This is indeterminate  This is not a cystic lesion  On the previous study of April 20 1517 this was measuring 6 7 x 6 2 cm  Additional lesion is seen in the spleen posteriorly, measuring about 1 8 x 1 4 cm  PANCREAS:  Unremarkable      ADRENAL GLANDS:  The right adrenal gland appear unremarkable  There is enlargement of the left adrenal gland related to adenoma      KIDNEYS/URETERS:  Unremarkable  No hydronephrosis      STOMACH AND BOWEL:  A ventral hernia seen containing small bowel loops in the left mid to lower abdomen  There is no evidence of bowel obstruction  Moderate amount of fecal debris in the colon seen  Surgical anastomosis seen in the colon     APPENDIX:  No findings to suggest appendicitis      ABDOMINOPELVIC CAVITY:  No ascites or free intraperitoneal air  No lymphadenopathy      VESSELS:  The celiac trunk appears unremarkable  The SMA appears unremarkable     PELVIS     REPRODUCTIVE ORGANS:  Fibroid uterus seen     URINARY BLADDER:  Unremarkable      ABDOMINAL WALL/INGUINAL REGIONS:  Ventral abdominal hernia seen in the left mid to lower abdomen  Wall midline incisional hernia at the level of the umbilicus, seen in sagittal image 68 of series 602     OSSEOUS STRUCTURES:  No acute compression collapse of the vertebra  No gross lytic lesion seen     IMPRESSION:  Ventral abdominal wall hernia in the mid to lower abdomen on the left side with hernial sac measuring 10 2 x 2 9 cm the neck of the hernial sac measuring about 3 9 cm      Small midline ventral abdominal hernia seen containing fat at the level of the umbilicus      Indeterminate , stable solid splenic lesions, can be characterized further with MRI     There is no new focal liver lesion     No retroperitoneal or abdominopelvic lymphadenopathy  Imaging: I have personally reviewed pertinent reports  No results found    EKG, Pathology, and Other Studies: I have personally reviewed pertinent films in PACS

## 2019-02-05 NOTE — PATIENT INSTRUCTIONS
Uterine Fibroids   WHAT YOU NEED TO KNOW:   What are uterine fibroids? Uterine fibroids are growths found inside your uterus (womb)  Uterine fibroids also may be called tumors (lumps) or leiomyomas  Uterine fibroids often appear in groups, or you may have only one  They can be small or large, and they can grow in size  They are almost always benign (not cancer) and likely will not spread to other parts of your body  What increases my risk of getting uterine fibroids? The cause of uterine fibroids is not clear  Ask your healthcare provider about these and other risk factors for uterine fibroids:  · Heredity:  Your risk for uterine fibroids may increase if a close family member also has fibroids  · Hormone imbalance:  Hormones are chemicals that affect your growth  Too many hormones may cause uterine fibroids or make them grow  · Early onset of menstrual periods: If you started your period at an early age, you may be at risk for uterine fibroids  · Childbearing age:  Uterine fibroids occur more often in women of childbearing age  They are even more common when you are 36to 61years old  They may grow when you are pregnant and shrink after you no longer have a monthly period  · Excess weight:  Too much body weight may increase your hormone levels and lead to uterine fibroids  Ask your healthcare provider about your ideal weight for your height  What are the signs and symptoms of uterine fibroids? You may have no signs or symptoms  Symptoms depend on the size, type, and number of fibroids you have  Symptoms also depend on where the fibroids are inside your uterus  Signs and symptoms of fibroids include the following:  · Heavy or painful menstrual bleeding  · Pelvic pressure and pain  You may have increased pelvic pain during sex  · Constipation or pain when you have a bowel movement (BM)  · Frequent need to urinate  How are uterine fibroids diagnosed?   Ask your healthcare provider about these and other tests that you may need:  · Blood tests: You may need blood taken to give caregivers information about how your body is working  The blood may be taken from your hand, arm, or IV  · Pelvic exam:  This is also called an internal or vaginal exam  During a pelvic exam, your healthcare provider gently puts a speculum into your vagina  A speculum is a tool that opens your vagina  This lets your healthcare provider see your cervix (bottom part of your uterus)  With gloved hands, your healthcare provider will check the size and shape of your uterus and ovaries  · Vaginal ultrasound:  During this test, your healthcare provider places a small wand in your vagina  Sound waves from the wand show pictures of the inside of your uterus (womb) and ovaries  · Biopsy:  A biopsy is a tissue sample of a fibroid that your healthcare provider takes from your uterus for testing  How are uterine fibroids treated? You may not need treatment for your fibroids if you do not have symptoms  The following treatments may shrink your fibroids and help your pain:  · Medicines:     ¨ Hormone medicine: This medicine changes the level of certain hormones and may then help shrink your fibroids  ¨ Contraceptives: These medicines help prevent pregnancy  They also may help shrink your fibroids  ¨ Nonsteroidal anti-inflammatory medicine: This group of medicine is also called NSAIDs  Nonsteroidal anti-inflammatory medicine may help decrease pain, fever, and swelling  This medicine can be bought without a doctor's order  This medicine can cause stomach bleeding or kidney problems in certain people  · Surgery: The type of surgery you may have depends on the size, number, and location of your fibroids  Ask your healthcare provider for more information about the following:     ¨ Embolization: This surgery blocks or slows the flow of blood to the fibroid  This may make your fibroids shrink or disappear  ¨ Myomectomy: This surgery removes your uterine fibroids  ¨ Hysterectomy:  For this surgery, your healthcare provider removes your uterus from your body  You may need a hysterectomy if your condition is severe (very bad)  After this surgery, you will no longer be able to have children  When should I contact my healthcare provider? · Your symptoms, such as heavy bleeding, pain, or pelvic pressure, worsen  · You feel weak and are more tired than usual      · You do not feel like your bladder is empty after you urinate  You also may urinate small amounts more often  · You have more trouble having or are not able to have a BM  · You have new or worse hot flashes  · You have any questions about your condition or care  When should I seek immediate care or call 911? · Your heart begins to race, and you feel faint  · You begin to pass large blood clots from your vagina  CARE AGREEMENT:   You have the right to help plan your care  Learn about your health condition and how it may be treated  Discuss treatment options with your caregivers to decide what care you want to receive  You always have the right to refuse treatment  The above information is an  only  It is not intended as medical advice for individual conditions or treatments  Talk to your doctor, nurse or pharmacist before following any medical regimen to see if it is safe and effective for you  © 2017 [employer] Information is for End User's use only and may not be sold, redistributed or otherwise used for commercial purposes  All illustrations and images included in CareNotes® are the copyrighted property of [employer]  or Vishal Dang

## 2019-02-05 NOTE — PROGRESS NOTES
Follow Up - General Surgery   Pieter Rubi 63 y o  female MRN: [id number]  Unit/Bed#:  Encounter: [id number]    Assessment/Plan     Assessment:  Incarcerated incisional hernia  Diabetes  Hypercholesterolemia  Hypertension  Dilated cardiomyopathy  Morbid obesity  Plan:  In light of the symptomatology for the incisional hernia in the increasing in size the patient was advised to undergo incisional hernia repair with mesh, possible component separation  In addition the patient is scheduled to see GYN for a large calcified fibroid uterus, she may require hysterectomy at the same time  I discussed the operative procedure for incisional hernia, risks, benefits and alternatives, she understood and agreed to proceed  I will also get MRI of the abdomen to better characterize the spleen lesion  History of Present Illness     HPI:  Pieter Rubi is a 63 y o  female who presents to my office for follow-up  The patient still complains of pain and discomfort specially the incisional hernia from the ostomy site  Denies having any nausea vomiting  She also noted increasing size of the hernia  Review of Systems   All other systems reviewed and are negative        Historical Information   Past Medical History:   Diagnosis Date    Acute on chronic congestive heart failure with left ventricular diastolic dysfunction (HCC)     last assessed 2017    Asthma     Atelectasis     [facility] [address] [facility]     Depression     [facility]     Diverticulitis 2017    with perforation and abscess     Hyperlipidemia     Hypertension     Lesion of spleen     Pericardial effusion      Past Surgical History:   Procedure Laterality Date    ABDOMINAL SURGERY       SECTION      COLON SURGERY      COLONOSCOPY      COLOSTOMY  2017    HARTMANS PROCEDURE N/A 2017    Procedure: EXPLORATORY LAPAROTOMY; PARTIAL SMALL BOWEL RESECTION; HARTMANS PROCEDURE; OSTOMY;  Surgeon: Precious Ferrara MD; Location: [facility];  Service:     MAMMO STEREOTACTIC BREAST BIOPSY LEFT (ALL INC) Left 10/4/2018    WI CLOSE ENTEROSTOMY N/A 9/8/2017    Procedure: OPEN COLOSTOMY REVERSAL;  Surgeon: Pavel Cohudhury MD;  [facility];  Service: General    WI COLONOSCOPY FLX DX W/COLLJ SPEC WHEN PFRMD N/A 8/16/2017    Procedure: COLONOSCOPY; DILATION OF OSTOMY;  Surgeon: Pavel Choudhury MD;  Location: [facility]; Service: General    WI EXC SKIN BENIG <0 5 CM REMAINDER BODY N/A 11/29/2017    Procedure: SCALP MASS EXCISION X 2;  Surgeon: Pavel Choudhury MD;  [facility];  Service: General    VAC DRESSING APPLICATION N/A 8/2/8819    Procedure: Arden Meliza;  Surgeon: Pavel Choudhury MD;  [facility];  Service:      Social History   History   Alcohol Use    Yes     Comment: socially - per allscripts 'denied hx of alcohol use'     History   Drug Use No     History   Smoking Status    Current Every Day Smoker    Packs/day: 0 25    Years: 20 00    Types: Cigarettes    Start date: 8/2/1991   Smokeless Tobacco    Never Used     Family History: non-contributory    Meds/Allergies   all medications and allergies reviewed     Current Outpatient Prescriptions:     amLODIPine (NORVASC) 10 mg tablet, TAKE 1 TABLET DAILY  , Disp: 90 tablet, Rfl: 2    aspirin (ECOTRIN LOW STRENGTH) 81 mg EC tablet, TAKE 1 TABLET DAILY  , Disp: 30 tablet, Rfl: 9    atorvastatin (LIPITOR) 20 mg tablet, TAKE 1 TABLET DAILY, Disp: 30 tablet, Rfl: 6    furosemide (LASIX) 40 mg tablet, TAKE 1 TABLET TWICE DAILY  , Disp: 180 tablet, Rfl: 2    ipratropium (ATROVENT) 0 02 % nebulizer solution, Inhale, Disp: , Rfl:     melatonin 3 mg, Take by mouth, Disp: , Rfl:     metFORMIN (GLUCOPHAGE) 1000 MG tablet, Take 1 tablet (1,000 mg total) by mouth 2 (two) times a day with meals, Disp: 60 tablet, Rfl: 5    metoprolol succinate (TOPROL-XL) 25 mg 24 hr tablet, Take 1 tablet (25 mg total) by mouth daily, Disp: 90 tablet, Rfl: 3    sitaGLIPtin (JANUVIA) 100 mg tablet, Take 1 tablet (100 mg total) by mouth daily, Disp: 30 tablet, Rfl: 5  Allergies   Allergen Reactions    Ciprofloxacin Itching     Starts at hands to feet then the whole entire body       Objective     Current Vitals:   Temperature: 98 6 °F (37 °C) (02/05/19 0804)  Temp Source: Oral (02/05/19 0804)  Height: 5' (152 4 cm) (02/05/19 0804)  Weight - Scale: 78 kg (172 lb) (02/05/19 0804)      Invasive Devices          No matching active lines, drains, or airways          Physical Exam   Constitutional: She is oriented to person, place, and time  She appears well-developed and well-nourished  No distress  HENT:   Head: Normocephalic  Mouth/Throat: No oropharyngeal exudate  Eyes: Pupils are equal, round, and reactive to light  No scleral icterus  Neck: Normal range of motion  Neck supple  Cardiovascular: Normal rate and regular rhythm  No murmur heard  Pulmonary/Chest: Effort normal and breath sounds normal  No respiratory distress  Abdominal:   The abdomen is soft, mildly obese, nondistended and nontender  There is an obvious ostomy site incisional hernia, partially reducible  There is a small midline incisional hernia, partially reducible  Musculoskeletal: She exhibits no edema or tenderness  Lymphadenopathy:     She has no cervical adenopathy  Neurological: She is alert and oriented to person, place, and time  No cranial nerve deficit  Skin: No rash noted  No erythema  Psychiatric: She has a normal mood and affect  Her behavior is normal        Lab Results:   MRSA culture   Order: [id number]   Status:  Final result   Visible to patient:  No (Inaccessible in [address])   Next appt: Today at 11:00 AM in Cardiology Imaging (AN POC ECHO 1)   Dx: Incisional hernia, without obstructio       Specimen Information: Nares        MRSA Culture Only No Methicillin Resistant Staphlyococcus aureus (MRSA) isolated             Specimen Collected: 01/31/19 12:19 PM Last Resulted: 02/02/19 12:52 PM              Hemoglobin A1C   Order: [id number]   Status:  Final result   Visible to patient:  No (Inaccessible in [address])   Next appt: Today at 11:00 AM in Cardiology [facility] (AN POC ECHO 1)   Dx: Incisional hernia, without obstructio    Notes recorded by Aleksander Gould MA on 2/1/2019 at 9:39 AM EST  Called pt regarding results  lmom  ------    Notes recorded by Maru Lee MD on 2/1/2019 at 8:52 AM EST  I will review results with patient on next office visit  Ref Range & Units 1/31/19 12:19 PM   Hemoglobin A1C 4 2 - 6 3 % 5 8    EAG mg/dl 120       Specimen Collected: 01/31/19 12:19 PM   Last Resulted: 01/31/19  7:30 PM              Prealbumin   Order: [id number]   Status:  Final result   Visible to patient:  No (Inaccessible in MyChart)   Next appt: Today at 11:00 AM in Cardiology Imaging (AN POC ECHO 1)   Dx: Incisional hernia, without obstructio    Notes recorded by Aleksander Gould MA on 2/1/2019 at 9:39 AM EST  Called pt regarding results  lmom  ------    Notes recorded by Maru Lee MD on 2/1/2019 at 8:52 AM EST  I will review results with patient on next office visit  Ref Range & Units 1/31/19 12:19 PM   Prealbumin 18 0 - 40 0 mg/dL 24 9       Specimen Collected: 01/31/19 12:19 PM   Last Resulted: 01/31/19  7:07 PM              Comprehensive metabolic panel   Order: [id number]   Status:  Final result   Visible to patient:  No (Inaccessible in MyChart)   Next appt: Today at 11:00 AM in Cardiology Imaging (AN POC ECHO 1)   Dx: Incisional hernia, without obstructio    Notes recorded by Aleksander Gould MA on 2/1/2019 at 9:39 AM EST  Called pt regarding results  lmom  ------    Notes recorded by Aleksander Gould MA on 2/1/2019 at 9:39 AM EST  Called pt regarding results   lmom  ------    Notes recorded by Maru Lee MD on 2/1/2019 at 8:52 AM EST  I will review results with patient on next office visit   ------    Notes recorded by Teo Oates MD on 1/31/2019 at 2:08 PM EST  I will review results with patient on next office visit  Ref Range & Units 1/31/19 12:19 PM Flag   Sodium 136 - 145 mmol/L 140     Potassium 3 5 - 5 3 mmol/L 3 9     Chloride 100 - 108 mmol/L 101     CO2 21 - 32 mmol/L 27     ANION GAP 4 - 13 mmol/L 12     BUN 5 - 25 mg/dL 16     Creatinine 0 60 - 1 30 mg/dL 0 65     Comment: Standardized to IDMS reference method   Glucose, Fasting 65 - 99 mg/dL 129   H    Comment:    Specimen collection should occur prior to Sulfasalazine administration due to the potential for falsely depressed results  Specimen collection should occur prior to Sulfapyridine administration due to the potential for falsely elevated results  Calcium 8 3 - 10 1 mg/dL 9 3     AST 5 - 45 U/L 13     Comment:    Specimen collection should occur prior to Sulfasalazine administration due to the potential for falsely depressed results  ALT 12 - 78 U/L 10   L    Comment:    Specimen collection should occur prior to Sulfasalazine administration due to the potential for falsely depressed results      Alkaline Phosphatase 46 - 116 U/L 56     Total Protein 6 4 - 8 2 g/dL 7 7     Albumin 3 5 - 5 0 g/dL 3 9     Total Bilirubin 0 20 - 1 00 mg/dL 0 80     eGFR ml/min/1 73sq m 114             Component      Latest Ref Rng & Units 1/31/2019             WBC      4 31 - 10 16 Thousand/uL 7 19   Red Blood Cell Count      3 81 - 5 12 Million/uL 4 44   Hemoglobin      11 5 - 15 4 g/dL 13 4   HCT      34 8 - 46 1 % 41 6   MCV      82 - 98 fL 94   MCH      26 8 - 34 3 pg 30 2   MCHC      31 4 - 37 4 g/dL 32 2   RDW      11 6 - 15 1 % 14 7   MPV      8 9 - 12 7 fL 9 8   Platelet Count      202 - 390 Thousands/uL 326   nRBC      /100 WBCs 0   Neutrophils %      43 - 75 % 65   Immat GRANS %      0 - 2 % 1   Lymphocytes Relative      14 - 44 % 26   Monocytes Relative      4 - 12 % 6   Eosinophils      0 - 6 % 1   Basophils Relative      0 - 1 % 1   Absolute Neutrophils      1 85 - 7 62 Thousands/µL 4 73   Immature Grans Absolute      0 00 - 0 20 Thousand/uL 0 04   Lymphocytes Absolute      0 60 - 4 47 Thousands/µL 1 90   Absolute Monocytes      0 17 - 1 22 Thousand/µL 0 44   Absolute Eosinophils      0 00 - 0 61 Thousand/µL 0 04   Basophils Absolute      0 00 - 0 10 Thousands/µL 0 04   Segs Relative      43 - 75 %    Bands Relative      0 - 8 %    Lymphocytes %      14 - 44 %    Monocytes      4 - 12 %    Metamyelocytes%      0 - 1 %    Myelocytes %      0 - 1 %    Atypical Lymphocytes %      <=0 %    Abs Neutrophils      1 85 - 7 62 Thousand/uL    LYMPHOCYTES ABSOLUTE      0 60 - 4 47 Thousand/uL    Monocytes Absolute      0 00 - 1 22 Thousand/uL    Total Counted          Platelet Estimate      Adequate    Large Platelet          CLUMPED PLATELETS          CT ABDOMEN AND PELVIS WITH IV CONTRAST     INDICATION:   K43 2: Incisional hernia without obstruction or gangrene      COMPARISON:  None      TECHNIQUE:  CT examination of the abdomen and pelvis was performed  Axial, sagittal, and coronal 2D reformatted images were created from the source data and submitted for interpretation      Radiation dose length product (DLP) for this visit:  871 mGy-cm   This examination, like all CT scans performed in the [facility], was performed utilizing techniques to minimize radiation dose exposure, including the use of iterative   reconstruction and automated exposure control      IV Contrast:  100 mL of iohexol (OMNIPAQUE)  Enteric Contrast:  Enteric contrast was not administered      FINDINGS:     ABDOMEN     LOWER CHEST:  No clinically significant abnormality identified in the visualized lower chest      LIVER/BILIARY TREE:  A rounded hypodensity seen in the right liver, measuring about 8 mm, stable     GALLBLADDER:  No calcified gallstones  No pericholecystic inflammatory change      SPLEEN:  Again noted is a splenic lesion measuring about 6 7 x 6 2 cm    This is indeterminate  This is not a cystic lesion  On the previous study of April 20 1517 this was measuring 6 7 x 6 2 cm  Additional lesion is seen in the spleen posteriorly, measuring about 1 8 x 1 4 cm  PANCREAS:  Unremarkable      ADRENAL GLANDS:  The right adrenal gland appear unremarkable  There is enlargement of the left adrenal gland related to adenoma      KIDNEYS/URETERS:  Unremarkable  No hydronephrosis      STOMACH AND BOWEL:  A ventral hernia seen containing small bowel loops in the left mid to lower abdomen  There is no evidence of bowel obstruction  Moderate amount of fecal debris in the colon seen  Surgical anastomosis seen in the colon     APPENDIX:  No findings to suggest appendicitis      ABDOMINOPELVIC CAVITY:  No ascites or free intraperitoneal air  No lymphadenopathy      VESSELS:  The celiac trunk appears unremarkable  The SMA appears unremarkable     PELVIS     REPRODUCTIVE ORGANS:  Fibroid uterus seen     URINARY BLADDER:  Unremarkable      ABDOMINAL WALL/INGUINAL REGIONS:  Ventral abdominal hernia seen in the left mid to lower abdomen  Wall midline incisional hernia at the level of the umbilicus, seen in sagittal image 68 of series 602     OSSEOUS STRUCTURES:  No acute compression collapse of the vertebra  No gross lytic lesion seen     IMPRESSION:  Ventral abdominal wall hernia in the mid to lower abdomen on the left side with hernial sac measuring 10 2 x 2 9 cm the neck of the hernial sac measuring about 3 9 cm      Small midline ventral abdominal hernia seen containing fat at the level of the umbilicus      Indeterminate , stable solid splenic lesions, can be characterized further with MRI     There is no new focal liver lesion     No retroperitoneal or abdominopelvic lymphadenopathy  Imaging: I have personally reviewed pertinent reports  No results found    EKG, Pathology, and Other Studies: I have personally reviewed pertinent films in PACS

## 2019-02-05 NOTE — PROGRESS NOTES
Assessment/Plan:    Uterine leiomyoma  Uterus enlarged 16 week size  Plan for joint procedure with Dr Yuniel Hawkins for hernia repair  KHUSHI-BSO           Diagnoses and all orders for this visit:    Uterine leiomyoma, unspecified location    Encounter for gynecological examination without abnormal finding  -     Liquid-based pap, screening          Subjective:      Patient ID: Gustavo Gaming is a 63 y o  female  Patient here for new patient consult for fibroids reff by Emerald Pedersen  U/s done 18 showed fibroid uterus  63year old  (cs x 3, tubal) who presents for gyn evaluation  She has not seen a gyn in many years  Menopause occurred 4 years ago  She has  not had bleeding  Denies pelvic pain  She is having abdominal pain associated with incisional hernia  On CT SCAN her uterus is enlarged due to fibroids  Largest fibroid is 7cm    Patient is requesting myomectomy only  Reason for consultation is because she is about to undergo ventral hernia repair with mesh  Dr Yuniel Hawkins is requesting hysterectomy be completed in hopes that no further abdominal incisions will be needed in the future  A myomectomy alone would not accomplish this  In fact, may increase her risk of needing further surgery if pain/scar tissue occurs after surgery  Gynecologic Exam   The patient's pertinent negatives include no genital itching, genital lesions, genital odor, genital rash, pelvic pain, vaginal bleeding or vaginal discharge  The patient is experiencing no pain  Associated symptoms include abdominal pain and urgency  Pertinent negatives include no chills, constipation, diarrhea, fever, frequency, nausea, painful intercourse, sore throat or vomiting  She is not sexually active  She uses tubal ligation for contraception  She is postmenopausal  Her past medical history is significant for a  section         The following portions of the patient's history were reviewed and updated as appropriate:   She  has a past medical history of Acute on chronic congestive heart failure with left ventricular diastolic dysfunction ([facility] [address] ); Asthma; Atelectasis; CHF (congestive heart failure) ([facility] ); Depression; Diabetes mellitus ([facility] ); Diverticulitis (); Hyperlipidemia; Hypertension; Lesion of spleen; and Pericardial effusion  She   Patient Active Problem List    Diagnosis Date Noted    Uterine leiomyoma 2019    Incisional hernia, without obstruction or gangrene 2019    Pre-operative clearance 2019    CAD in native artery 2018    Essential hypertension 2017    Hyperlipidemia 2017    History of colostomy 2017    Diabetic nephropathy associated with type 2 diabetes mellitus ([place] [address] ) 2017    Chronic diastolic HF (heart failure) ([place] [address] ) 2017    Pericardial effusion     Uncomplicated asthma     Controlled type 2 diabetes mellitus without complication ([place] [address] )      She  has a past surgical history that includes HARTMANS PROCEDURE (N/A, 2017); VAC DRESSING APPLICATION (N/A, 4257); Colostomy (2017); Abdominal surgery;  section; pr colonoscopy flx dx w/collj spec when pfrmd (N/A, 2017); Colonoscopy; Colon surgery; pr close enterostomy (N/A, 2017); pr exc skin benig <0 5 cm remainder body (N/A, 2017); and Mammo stereotactic breast biopsy left (all inc) (Left, 10/4/2018)  Her family history includes Diabetes in her mother; Hypertension in her mother  She  reports that she has been smoking Cigarettes  She started smoking about 27 years ago  She has a 5 00 pack-year smoking history  She has never used smokeless tobacco  She reports that she drinks alcohol  She reports that she does not use drugs  Current Outpatient Prescriptions   Medication Sig Dispense Refill    amLODIPine (NORVASC) 10 mg tablet TAKE 1 TABLET DAILY  90 tablet 2    aspirin (ECOTRIN LOW STRENGTH) 81 mg EC tablet TAKE 1 TABLET DAILY   30 tablet 9    atorvastatin (LIPITOR) 20 mg tablet TAKE 1 TABLET DAILY 30 tablet 6    furosemide (LASIX) 40 mg tablet TAKE 1 TABLET TWICE DAILY  180 tablet 2    ipratropium (ATROVENT) 0 02 % nebulizer solution Inhale      melatonin 3 mg Take by mouth      metFORMIN (GLUCOPHAGE) 1000 MG tablet Take 1 tablet (1,000 mg total) by mouth 2 (two) times a day with meals 60 tablet 5    metoprolol succinate (TOPROL-XL) 25 mg 24 hr tablet Take 1 tablet (25 mg total) by mouth daily 90 tablet 3    sitaGLIPtin (JANUVIA) 100 mg tablet Take 1 tablet (100 mg total) by mouth daily 30 tablet 5     No current facility-administered medications for this visit  Current Outpatient Prescriptions on File Prior to Visit   Medication Sig    amLODIPine (NORVASC) 10 mg tablet TAKE 1 TABLET DAILY   aspirin (ECOTRIN LOW STRENGTH) 81 mg EC tablet TAKE 1 TABLET DAILY   atorvastatin (LIPITOR) 20 mg tablet TAKE 1 TABLET DAILY    furosemide (LASIX) 40 mg tablet TAKE 1 TABLET TWICE DAILY   ipratropium (ATROVENT) 0 02 % nebulizer solution Inhale    melatonin 3 mg Take by mouth    metFORMIN (GLUCOPHAGE) 1000 MG tablet Take 1 tablet (1,000 mg total) by mouth 2 (two) times a day with meals    metoprolol succinate (TOPROL-XL) 25 mg 24 hr tablet Take 1 tablet (25 mg total) by mouth daily    sitaGLIPtin (JANUVIA) 100 mg tablet Take 1 tablet (100 mg total) by mouth daily     No current facility-administered medications on file prior to visit  She is allergic to ciprofloxacin       Review of Systems   Constitutional: Negative for activity change, appetite change, chills, fatigue and fever  HENT: Negative for rhinorrhea, sneezing and sore throat  Eyes: Negative for visual disturbance  Respiratory: Negative for cough, shortness of breath and wheezing  Cardiovascular: Negative for chest pain, palpitations and leg swelling  Gastrointestinal: Positive for abdominal pain   Negative for abdominal distention, constipation, diarrhea, nausea and vomiting  Genitourinary: Positive for urgency  Negative for difficulty urinating, frequency, pelvic pain and vaginal discharge  Neurological: Negative for syncope and light-headedness  Objective:      /86 (BP Location: Right arm, Patient Position: Sitting, Cuff Size: Standard)   Ht 5' (1 524 m)   Wt 78 kg (172 lb)   LMP 08/16/2014 (Approximate)   Breastfeeding? No   BMI 33 59 kg/m²          Physical Exam   Constitutional: She is oriented to person, place, and time  She appears well-developed and well-nourished  No distress  Cardiovascular: Normal rate, regular rhythm and normal heart sounds  Exam reveals no gallop and no friction rub  No murmur heard  Pulmonary/Chest: Effort normal and breath sounds normal  No respiratory distress  Abdominal: Soft  Bowel sounds are normal  She exhibits mass  She exhibits no distension  There is tenderness  There is no rebound and no guarding  Genitourinary: There is no rash, tenderness or lesion on the right labia  There is no rash, tenderness or lesion on the left labia  Uterus is enlarged and fixed  Uterus is not deviated and not tender  Cervix exhibits no motion tenderness, no discharge and no friability  Right adnexum displays no mass, no tenderness and no fullness  Left adnexum displays no mass, no tenderness and no fullness  No erythema, tenderness or bleeding in the vagina  No vaginal discharge found  Genitourinary Comments: Fibroid palpated filling posterior cul de sac  Uterus is round, firm immobile, Fibroid estimated to be 10cm in size   Neurological: She is alert and oriented to person, place, and time  She has normal reflexes  Skin: She is not diaphoretic  Counseling / Coordination of Care  Total floor / unit time spent today 30 minutes  Greater than 50% of total time was spent with the patient and / or family counseling and / or coordination of care    A description of the counseling / coordination of care: treatment options for fibroids, types of surgeries and future implications  Irish Tomlinson

## 2019-02-05 NOTE — H&P (VIEW-ONLY)
Assessment/Plan:    Uterine leiomyoma  Uterus enlarged 16 week size  Plan for joint procedure with Dr Lucretia Burgess for hernia repair  KHUSHI-BSO           Diagnoses and all orders for this visit:    Uterine leiomyoma, unspecified location    Encounter for gynecological examination without abnormal finding  -     Liquid-based pap, screening          Subjective:      Patient ID: Amaya Caban is a 63 y o  female  Patient here for new patient consult for fibroids reff by Analilia White  U/s done 18 showed fibroid uterus  63year old  (cs x 3, tubal) who presents for gyn evaluation  She has not seen a gyn in many years  Menopause occurred 4 years ago  She has  not had bleeding  Denies pelvic pain  She is having abdominal pain associated with incisional hernia  On CT SCAN her uterus is enlarged due to fibroids  Largest fibroid is 7cm    Patient is requesting myomectomy only  Reason for consultation is because she is about to undergo ventral hernia repair with mesh  Dr Lucretia Burgess is requesting hysterectomy be completed in hopes that no further abdominal incisions will be needed in the future  A myomectomy alone would not accomplish this  In fact, may increase her risk of needing further surgery if pain/scar tissue occurs after surgery  Gynecologic Exam   The patient's pertinent negatives include no genital itching, genital lesions, genital odor, genital rash, pelvic pain, vaginal bleeding or vaginal discharge  The patient is experiencing no pain  Associated symptoms include abdominal pain and urgency  Pertinent negatives include no chills, constipation, diarrhea, fever, frequency, nausea, painful intercourse, sore throat or vomiting  She is not sexually active  She uses tubal ligation for contraception  She is postmenopausal  Her past medical history is significant for a  section         The following portions of the patient's history were reviewed and updated as appropriate:   She  has a past medical history of Acute on chronic congestive heart failure with left ventricular diastolic dysfunction ([facility] [address] ); Asthma; Atelectasis; CHF (congestive heart failure) ([facility] ); Depression; Diabetes mellitus ([facility] ); Diverticulitis (); Hyperlipidemia; Hypertension; Lesion of spleen; and Pericardial effusion  She   Patient Active Problem List    Diagnosis Date Noted    Uterine leiomyoma 2019    Incisional hernia, without obstruction or gangrene 2019    Pre-operative clearance 2019    CAD in native artery 2018    Essential hypertension 2017    Hyperlipidemia 2017    History of colostomy 2017    Diabetic nephropathy associated with type 2 diabetes mellitus ([place] [address] ) 2017    Chronic diastolic HF (heart failure) ([place] [address] ) 2017    Pericardial effusion     Uncomplicated asthma     Controlled type 2 diabetes mellitus without complication ([place] [address] )      She  has a past surgical history that includes HARTMANS PROCEDURE (N/A, 2017); VAC DRESSING APPLICATION (N/A, 7588); Colostomy (2017); Abdominal surgery;  section; pr colonoscopy flx dx w/collj spec when pfrmd (N/A, 2017); Colonoscopy; Colon surgery; pr close enterostomy (N/A, 2017); pr exc skin benig <0 5 cm remainder body (N/A, 2017); and Mammo stereotactic breast biopsy left (all inc) (Left, 10/4/2018)  Her family history includes Diabetes in her mother; Hypertension in her mother  She  reports that she has been smoking Cigarettes  She started smoking about 27 years ago  She has a 5 00 pack-year smoking history  She has never used smokeless tobacco  She reports that she drinks alcohol  She reports that she does not use drugs  Current Outpatient Prescriptions   Medication Sig Dispense Refill    amLODIPine (NORVASC) 10 mg tablet TAKE 1 TABLET DAILY  90 tablet 2    aspirin (ECOTRIN LOW STRENGTH) 81 mg EC tablet TAKE 1 TABLET DAILY   30 tablet 9    atorvastatin (LIPITOR) 20 mg tablet TAKE 1 TABLET DAILY 30 tablet 6    furosemide (LASIX) 40 mg tablet TAKE 1 TABLET TWICE DAILY  180 tablet 2    ipratropium (ATROVENT) 0 02 % nebulizer solution Inhale      melatonin 3 mg Take by mouth      metFORMIN (GLUCOPHAGE) 1000 MG tablet Take 1 tablet (1,000 mg total) by mouth 2 (two) times a day with meals 60 tablet 5    metoprolol succinate (TOPROL-XL) 25 mg 24 hr tablet Take 1 tablet (25 mg total) by mouth daily 90 tablet 3    sitaGLIPtin (JANUVIA) 100 mg tablet Take 1 tablet (100 mg total) by mouth daily 30 tablet 5     No current facility-administered medications for this visit  Current Outpatient Prescriptions on File Prior to Visit   Medication Sig    amLODIPine (NORVASC) 10 mg tablet TAKE 1 TABLET DAILY   aspirin (ECOTRIN LOW STRENGTH) 81 mg EC tablet TAKE 1 TABLET DAILY   atorvastatin (LIPITOR) 20 mg tablet TAKE 1 TABLET DAILY    furosemide (LASIX) 40 mg tablet TAKE 1 TABLET TWICE DAILY   ipratropium (ATROVENT) 0 02 % nebulizer solution Inhale    melatonin 3 mg Take by mouth    metFORMIN (GLUCOPHAGE) 1000 MG tablet Take 1 tablet (1,000 mg total) by mouth 2 (two) times a day with meals    metoprolol succinate (TOPROL-XL) 25 mg 24 hr tablet Take 1 tablet (25 mg total) by mouth daily    sitaGLIPtin (JANUVIA) 100 mg tablet Take 1 tablet (100 mg total) by mouth daily     No current facility-administered medications on file prior to visit  She is allergic to ciprofloxacin       Review of Systems   Constitutional: Negative for activity change, appetite change, chills, fatigue and fever  HENT: Negative for rhinorrhea, sneezing and sore throat  Eyes: Negative for visual disturbance  Respiratory: Negative for cough, shortness of breath and wheezing  Cardiovascular: Negative for chest pain, palpitations and leg swelling  Gastrointestinal: Positive for abdominal pain   Negative for abdominal distention, constipation, diarrhea, nausea and vomiting  Genitourinary: Positive for urgency  Negative for difficulty urinating, frequency, pelvic pain and vaginal discharge  Neurological: Negative for syncope and light-headedness  Objective:      /86 (BP Location: Right arm, Patient Position: Sitting, Cuff Size: Standard)   Ht 5' (1 524 m)   Wt 78 kg (172 lb)   LMP 08/16/2014 (Approximate)   Breastfeeding? No   BMI 33 59 kg/m²          Physical Exam   Constitutional: She is oriented to person, place, and time  She appears well-developed and well-nourished  No distress  Cardiovascular: Normal rate, regular rhythm and normal heart sounds  Exam reveals no gallop and no friction rub  No murmur heard  Pulmonary/Chest: Effort normal and breath sounds normal  No respiratory distress  Abdominal: Soft  Bowel sounds are normal  She exhibits mass  She exhibits no distension  There is tenderness  There is no rebound and no guarding  Genitourinary: There is no rash, tenderness or lesion on the right labia  There is no rash, tenderness or lesion on the left labia  Uterus is enlarged and fixed  Uterus is not deviated and not tender  Cervix exhibits no motion tenderness, no discharge and no friability  Right adnexum displays no mass, no tenderness and no fullness  Left adnexum displays no mass, no tenderness and no fullness  No erythema, tenderness or bleeding in the vagina  No vaginal discharge found  Genitourinary Comments: Fibroid palpated filling posterior cul de sac  Uterus is round, firm immobile, Fibroid estimated to be 10cm in size   Neurological: She is alert and oriented to person, place, and time  She has normal reflexes  Skin: She is not diaphoretic  Counseling / Coordination of Care  Total floor / unit time spent today 30 minutes  Greater than 50% of total time was spent with the patient and / or family counseling and / or coordination of care    A description of the counseling / coordination of care: treatment options for fibroids, types of surgeries and future implications  Matilde Nieves

## 2019-02-06 ENCOUNTER — TELEPHONE (OUTPATIENT)
Dept: SURGERY | Facility: CLINIC | Age: 59
End: 2019-02-06

## 2019-02-06 ENCOUNTER — TELEPHONE (OUTPATIENT)
Dept: CARDIOLOGY CLINIC | Facility: CLINIC | Age: 59
End: 2019-02-06

## 2019-02-06 DIAGNOSIS — D73.89 LESION OF SPLEEN: Primary | ICD-10-CM

## 2019-02-06 LAB
HPV HR 12 DNA CVX QL NAA+PROBE: POSITIVE
HPV16 DNA CVX QL NAA+PROBE: NEGATIVE
HPV18 DNA CVX QL NAA+PROBE: NEGATIVE

## 2019-02-06 PROCEDURE — 93306 TTE W/DOPPLER COMPLETE: CPT | Performed by: INTERNAL MEDICINE

## 2019-02-06 NOTE — TELEPHONE ENCOUNTER
----- Message from Lily Callejas MD sent at 2/6/2019 10:44 AM EST -----  Please inform the patient that her LV systolic function is low normal and will repeat echocardiogram in 1 year

## 2019-02-08 DIAGNOSIS — I10 ESSENTIAL HYPERTENSION: ICD-10-CM

## 2019-02-08 LAB
LAB AP GYN PRIMARY INTERPRETATION: NORMAL
Lab: NORMAL

## 2019-02-08 RX ORDER — ASPIRIN 81 MG/1
81 TABLET ORAL DAILY
Qty: 30 TABLET | Refills: 11 | Status: SHIPPED | OUTPATIENT
Start: 2019-02-08 | End: 2020-02-11

## 2019-02-15 ENCOUNTER — HOSPITAL ENCOUNTER (EMERGENCY)
Facility: HOSPITAL | Age: 59
Discharge: HOME | End: 2019-02-15
Attending: EMERGENCY MEDICINE | Admitting: EMERGENCY MEDICINE
Payer: COMMERCIAL

## 2019-02-15 ENCOUNTER — OFFICE VISIT (OUTPATIENT)
Dept: INTERNAL MEDICINE CLINIC | Facility: CLINIC | Age: 59
End: 2019-02-15
Payer: COMMERCIAL

## 2019-02-15 VITALS
HEART RATE: 88 BPM | SYSTOLIC BLOOD PRESSURE: 114 MMHG | WEIGHT: 173.4 LBS | TEMPERATURE: 98.5 F | BODY MASS INDEX: 34.04 KG/M2 | OXYGEN SATURATION: 90 % | DIASTOLIC BLOOD PRESSURE: 70 MMHG | HEIGHT: 60 IN

## 2019-02-15 VITALS
OXYGEN SATURATION: 100 % | RESPIRATION RATE: 16 BRPM | SYSTOLIC BLOOD PRESSURE: 142 MMHG | HEART RATE: 74 BPM | TEMPERATURE: 98.2 F | DIASTOLIC BLOOD PRESSURE: 80 MMHG

## 2019-02-15 DIAGNOSIS — K11.20 SIALOADENITIS OF SUBMANDIBULAR GLAND: ICD-10-CM

## 2019-02-15 DIAGNOSIS — R22.1 NECK MASS: ICD-10-CM

## 2019-02-15 DIAGNOSIS — K11.20 SIALOADENITIS: Primary | ICD-10-CM

## 2019-02-15 DIAGNOSIS — K11.8 SUBMANDIBULAR GLAND TENDERNESS: Primary | ICD-10-CM

## 2019-02-15 PROCEDURE — 99283 EMERGENCY DEPT VISIT LOW MDM: CPT

## 2019-02-15 PROCEDURE — 99214 OFFICE O/P EST MOD 30 MIN: CPT | Performed by: INTERNAL MEDICINE

## 2019-02-15 RX ORDER — AMOXICILLIN AND CLAVULANATE POTASSIUM 875; 125 MG/1; MG/1
1 TABLET, FILM COATED ORAL EVERY 12 HOURS
Qty: 14 TABLET | Refills: 0 | Status: SHIPPED | OUTPATIENT
Start: 2019-02-15 | End: 2019-02-22

## 2019-02-15 NOTE — DISCHARGE INSTRUCTIONS
Adenitis   WHAT YOU NEED TO KNOW:   What is adenitis? Adenitis is a condition that causes your lymph nodes to become swollen and tender You may also have a fever  Adenitis is a sign of infection usually caused by bacteria  What increases my risk for adenitis? · IV drug use     · Contact sports    · Animal bites or scratches    · Infection in your mouth and throat    · Recent surgery or hospital stay  How is adenitis diagnosed? Your healthcare provider will examine you to find the cause of your adenitis  A biopsy of the swollen node may be needed  How is adenitis treated? · Antibiotics  will treat your bacterial infection  · NSAIDs or acetaminophen  will help decrease pain, swelling and fever  These medicines are available without a doctor's order  NSAIDs can cause stomach bleeding or kidney problems in certain people  If you take blood thinner medicine, always ask if NSAIDs are safe for you  Always read the medicine label and follow directions  Do not give these medicines to children under 10months of age without direction from your child's healthcare provider  How can I manage my symptoms? · Apply moist heat  on your swollen lymph nodes for 20 to 30 minutes every 2 hours or as directed  Heat helps decrease pain and swelling  You can make a moist heat pack by soaking a small towel in hot water  Let it cool until you can hold it with your bare hands  Then wring out the excess water  Place the towel in a plastic bag, and wrap the bag with a dry towel around the bag  Place the pack over your swollen lymph nodes  · Elevate your head and upper back  Keep your head and upper back elevated when you rest, such as in a recliner  Place extra pillows under your head and neck when you sleep in bed  Elevation helps decrease swelling  When should I contact my healthcare provider? · Your symptoms do not improve after 10 days of treatment       · You have questions or concerns about your condition or care   When should I seek immediate care or call 911? · You have new or worsening redness or swelling  · You develop a large, soft bump that may leak pus  · You have difficulty breathing or swallowing  CARE AGREEMENT:   You have the right to help plan your care  Learn about your health condition and how it may be treated  Discuss treatment options with your caregivers to decide what care you want to receive  You always have the right to refuse treatment  The above information is an  only  It is not intended as medical advice for individual conditions or treatments  Talk to your doctor, nurse or pharmacist before following any medical regimen to see if it is safe and effective for you  © 2017 [address] [employer]  Information is for End User's use only and may not be sold, redistributed or otherwise used for commercial purposes  All illustrations and images included in CareNotes® are the copyrighted property of [employer]  or Vishal Dang  Sialoadeniebony   WHAT YOU NEED TO KNOW:   Sialoadenitis is an inflammation or infection of one or more of your salivary glands  A small stone can block the salivary gland and cause inflammation  Infection may be caused by a virus or bacteria  You can develop sialoadenitis on one or both sides of your face  You may have sialoadenitis once, or it may come back and last a long time  DISCHARGE INSTRUCTIONS:   Manage your sialoadenitis:  It is important to manage your sialoadenitis to help prevent future infections  · Drinking liquids:  Adults should drink about 9 to 13 cups of liquid each day  One cup is 8 ounces  Good choices of liquids for most people include water, juice, and milk  Coffee, soup, and fruit may be counted in your daily liquid amount  Ask your caregiver how much liquid you should drink each day  · Keep your mouth moist:  Suck on hard candy or chew sugarless gum to get your saliva flowing   Sour and tart flavors such as lemon and orange will help get saliva to flow  This will help keep your mouth moist and help push out a stone blocking your salivary duct  · Rinse your mouth:  Use water or mouthwash to clean out pus that may be draining into your mouth  · Massage your jaw:  Massage the area of your swollen gland  This may help relieve swelling and pain by pushing the pus out of the gland  · Apply heat:  Place a warm, moist cloth on the area  Medicines:   · NSAIDs  help decrease swelling and pain or fever  This medicine is available with or without a doctor's order  NSAIDs can cause stomach bleeding or kidney problems in certain people  If you take blood thinner medicine, always ask your healthcare provider if NSAIDs are safe for you  Always read the medicine label and follow directions  · Do not give aspirin to children under 25years of age  Your child could develop Reye syndrome if he takes aspirin  Reye syndrome can cause life-threatening brain and liver damage  Check your child's medicine labels for aspirin, salicylates, or oil of wintergreen  · Pain medicine: You may be given medicine to take away or decrease pain  Do not wait until the pain is severe before you take your medicine  · Antibiotics: This medicine will help fight an infection  You will be given antibiotics if your sialoadenitis is caused by a bacterial infection  Take your antibiotics until they are gone, even if you feel better  · Take your medicine as directed  Contact your healthcare provider if you think your medicine is not helping or if you have side effects  Tell him of her if you are allergic to any medicine  Keep a list of the medicines, vitamins, and herbs you take  Include the amounts, and when and why you take them  Bring the list or the pill bottles to follow-up visits  Carry your medicine list with you in case of an emergency    Follow up with your healthcare provider or ear, nose, and throat specialist as directed: Write down your questions so you remember to ask them during your visits  Contact your healthcare provider or ear, nose, and throat specialist if:   · The pain and swelling do not go away within 2 days, or they get worse  · Your mouth and eyes are very dry  · You lose movement on one side of your face  · You have questions about your condition or care  Return to the emergency department if:   · You have a fever  · Your salivary gland gets red and hot or drains pus  · You have trouble opening your mouth because of swelling  · You have trouble breathing or swallowing because of swelling  © 2017 [address] [employer]  Information is for End User's use only and may not be sold, redistributed or otherwise used for commercial purposes  All illustrations and images included in CareNotes® are the copyrighted property of [employer]  or Vishal Dang  The above information is an  only  It is not intended as medical advice for individual conditions or treatments  Talk to your doctor, nurse or pharmacist before following any medical regimen to see if it is safe and effective for you  Sialoadenitis   WHAT YOU NEED TO KNOW:   Sialoadenitis is an inflammation or infection of one or more of your salivary glands  A small stone can block the salivary gland and cause inflammation  Infection may be caused by a virus or bacteria  You can develop sialoadenitis on one or both sides of your face  You may have sialoadenitis once, or it may come back and last a long time  DISCHARGE INSTRUCTIONS:   Manage your sialoadenitis:  It is important to manage your sialoadenitis to help prevent future infections  · Drinking liquids:  Adults should drink about 9 to 13 cups of liquid each day  One cup is 8 ounces  Good choices of liquids for most people include water, juice, and milk  Coffee, soup, and fruit may be counted in your daily liquid amount   Ask your caregiver how much liquid you should drink each day  · Keep your mouth moist:  Suck on hard candy or chew sugarless gum to get your saliva flowing  Sour and tart flavors such as lemon and orange will help get saliva to flow  This will help keep your mouth moist and help push out a stone blocking your salivary duct  · Rinse your mouth:  Use water or mouthwash to clean out pus that may be draining into your mouth  · Massage your jaw:  Massage the area of your swollen gland  This may help relieve swelling and pain by pushing the pus out of the gland  · Apply heat:  Place a warm, moist cloth on the area  Medicines:   · NSAIDs  help decrease swelling and pain or fever  This medicine is available with or without a doctor's order  NSAIDs can cause stomach bleeding or kidney problems in certain people  If you take blood thinner medicine, always ask your healthcare provider if NSAIDs are safe for you  Always read the medicine label and follow directions  · Do not give aspirin to children under 25years of age  Your child could develop Reye syndrome if he takes aspirin  Reye syndrome can cause life-threatening brain and liver damage  Check your child's medicine labels for aspirin, salicylates, or oil of wintergreen  · Pain medicine: You may be given medicine to take away or decrease pain  Do not wait until the pain is severe before you take your medicine  · Antibiotics: This medicine will help fight an infection  You will be given antibiotics if your sialoadenitis is caused by a bacterial infection  Take your antibiotics until they are gone, even if you feel better  · Take your medicine as directed  Contact your healthcare provider if you think your medicine is not helping or if you have side effects  Tell him of her if you are allergic to any medicine  Keep a list of the medicines, vitamins, and herbs you take  Include the amounts, and when and why you take them  Bring the list or the pill bottles to follow-up visits  Carry your medicine list with you in case of an emergency  Follow up with your healthcare provider or ear, nose, and throat specialist as directed:  Write down your questions so you remember to ask them during your visits  Contact your healthcare provider or ear, nose, and throat specialist if:   · The pain and swelling do not go away within 2 days, or they get worse  · Your mouth and eyes are very dry  · You lose movement on one side of your face  · You have questions about your condition or care  Return to the emergency department if:   · You have a fever  · Your salivary gland gets red and hot or drains pus  · You have trouble opening your mouth because of swelling  · You have trouble breathing or swallowing because of swelling  © 2017 [address] [facility] Information is for End User's use only and may not be sold, redistributed or otherwise used for commercial purposes  All illustrations and images included in CareNotes® are the copyrighted property of [employer]  or Vishal Dang  The above information is an  only  It is not intended as medical advice for individual conditions or treatments  Talk to your doctor, nurse or pharmacist before following any medical regimen to see if it is safe and effective for you

## 2019-02-15 NOTE — ED PROVIDER NOTES
History  Chief Complaint   Patient presents with    Neck Swelling     pt presents with c/o R sided neck swelling, noticed swelling on tuesday     R sided neck pain and swelling x 3d which began immediately after eating  Pain is mild, localized, somewhat worsened after PO intake  Not associated w fever, neck stiffness, difficulty swallowing or abnormal phonation  No swelling of tongue or mouth  No dental pain  No other sxs  No h/o similar sxs  Currently symptomatic  Prior to Admission Medications   Prescriptions Last Dose Informant Patient Reported? Taking? amLODIPine (NORVASC) 10 mg tablet  Self No No   Sig: TAKE 1 TABLET DAILY  aspirin (ECOTRIN LOW STRENGTH) 81 mg EC tablet  Self No No   Sig: Take 1 tablet (81 mg total) by mouth daily   atorvastatin (LIPITOR) 20 mg tablet  Self No No   Sig: TAKE 1 TABLET DAILY   furosemide (LASIX) 40 mg tablet  Self No No   Sig: TAKE 1 TABLET TWICE DAILY     ipratropium (ATROVENT) 0 02 % nebulizer solution  Self Yes No   Sig: Inhale 0 5 mg as needed    melatonin 3 mg  Self Yes No   Sig: Take by mouth   metFORMIN (GLUCOPHAGE) 1000 MG tablet  Self No No   Sig: Take 1 tablet (1,000 mg total) by mouth 2 (two) times a day with meals   metoprolol succinate (TOPROL-XL) 25 mg 24 hr tablet  Self No No   Sig: Take 1 tablet (25 mg total) by mouth daily   sitaGLIPtin (JANUVIA) 100 mg tablet  Self No No   Sig: Take 1 tablet (100 mg total) by mouth daily      Facility-Administered Medications: None       Past Medical History:   Diagnosis Date    Acute on chronic congestive heart failure with left ventricular diastolic dysfunction (HCC)     last assessed 5/23/2017    Asthma     Atelectasis     [facility]     Depression     Diabetes mellitus ([facility] )     Diverticulitis 2017    with perforation and abscess     Hyperlipidemia     Hypertension     Lesion of spleen     Pericardial effusion        Past Surgical History:   Procedure Laterality Date    ABDOMINAL SURGERY       SECTION      COLON SURGERY      COLONOSCOPY      COLOSTOMY  2017    HARTMANS PROCEDURE N/A 2017    Procedure: EXPLORATORY LAPAROTOMY; PARTIAL SMALL BOWEL RESECTION; HARTMANS PROCEDURE; OSTOMY;  Surgeon: Precious Ferrara MD;  Location: [facility];  Service:     MAMMO STEREOTACTIC BREAST BIOPSY LEFT (ALL INC) Left 10/4/2018    NH CLOSE ENTEROSTOMY N/A 2017    Procedure: OPEN COLOSTOMY REVERSAL;  Surgeon: Precious Ferrara MD;  [facility]: [facility];  Service: General    NH COLONOSCOPY FLX DX W/COLLJ SPEC WHEN PFRMD N/A 2017    Procedure: COLONOSCOPY; DILATION OF OSTOMY;  Surgeon: Precious Ferrara MD;  Location: [facility]; Service: General    NH EXC SKIN BENIG <0 5 CM REMAINDER BODY N/A 2017    Procedure: SCALP MASS EXCISION X 2;  Surgeon: Precious Ferrara MD;  [facility];  Service: General    VAC DRESSING APPLICATION N/A 4719    Procedure: APPLICATION VAC DRESSING;  Surgeon: Precious Ferrara MD;  [facility];  Service:        Family History   Problem Relation Age of Onset    Hypertension Mother     Diabetes Mother     Heart disease Son      I have reviewed and agree with the history as documented  Social History     Tobacco Use    Smoking status: Current Every Day Smoker     Packs/day: 0 25     Years: 20 00     Pack years: 5 00     Types: Cigarettes     Start date: 1991    Smokeless tobacco: Never Used   Substance Use Topics    Alcohol use: Yes     Frequency: Monthly or less     Drinks per session: 1 or 2     Comment: socially     Drug use: No        Review of Systems   Constitutional: Negative for fatigue and fever  HENT: Positive for facial swelling  Negative for congestion, dental problem, drooling, ear discharge, ear pain, hearing loss, mouth sores, nosebleeds, postnasal drip, rhinorrhea, sinus pressure, sinus pain, sneezing, sore throat, tinnitus, trouble swallowing and voice change  Eyes: Negative      Respiratory: Negative for cough and shortness of breath  Gastrointestinal: Negative  Endocrine: Negative  Genitourinary: Negative  Musculoskeletal: Negative  Neurological: Negative  Hematological: Negative  Physical Exam  Physical Exam   Constitutional: She is oriented to person, place, and time  She appears well-developed and well-nourished  No distress  HENT:   Head: Normocephalic and atraumatic  Eyes: Pupils are equal, round, and reactive to light  Conjunctivae and EOM are normal  Right eye exhibits no discharge  Left eye exhibits no discharge  Neck: Normal range of motion  Neck supple  No JVD present  No tracheal deviation present  No thyromegaly present  Focal R submandibular swelling  No fluctuance  No tenderness or erythema  No skin breakdown  No crepitus  Floor of mouth soft, nontender  No visible dental abscess  Pulmonary/Chest: No stridor  Musculoskeletal: Normal range of motion  She exhibits no edema, tenderness or deformity  Lymphadenopathy:     She has no cervical adenopathy  Neurological: She is alert and oriented to person, place, and time  No cranial nerve deficit or sensory deficit  She exhibits normal muscle tone  Coordination normal    Skin: Skin is warm and dry  Capillary refill takes less than 2 seconds  She is not diaphoretic  Nursing note and vitals reviewed        Vital Signs  ED Triage Vitals   Temperature Pulse Respirations Blood Pressure SpO2   02/15/19 1701 02/15/19 1703 02/15/19 1703 02/15/19 1703 02/15/19 1703   98 2 °F (36 8 °C) 76 20 (!) 180/84 99 %      Temp Source Heart Rate Source Patient Position - Orthostatic VS BP Location FiO2 (%)   02/15/19 1701 02/15/19 1703 02/15/19 1703 02/15/19 1703 --   Oral Monitor Lying Left arm       Pain Score       02/15/19 1703       3           Vitals:    02/15/19 1703 02/15/19 1851   BP: (!) 180/84 142/80   Pulse: 76 74   Patient Position - Orthostatic VS: Lying        Visual Acuity      ED Medications  Medications - No data to display    Diagnostic Studies  Results Reviewed     None                 No orders to display              Procedures  Procedures       Phone Contacts  ED Phone Contact    ED Course                               MDM  Number of Diagnoses or Management Options  Sialoadenitis of submandibular gland:   Sialoadenitis:   Diagnosis management comments: Several days R submandibular swelling which began immediately after eating  No fever  No fluctuance  ddx discussed w pt including most likely sialadenitis or sialolithiasis, less likely malignancy  Plan - f/u pcp in next week for imaging if not resolved (offered labs and CT in ED but pt declines these) or rted if new or worsening sxs or fever etc        Disposition  Final diagnoses:   Sialoadenitis   Sialoadenitis of submandibular gland     Time reflects when diagnosis was documented in both MDM as applicable and the Disposition within this note     Time User Action Codes Description Comment    2/15/2019  6:46 PM Brittni Bryson Add [K11 20] Sialoadenitis     2/15/2019  6:46 PM Tre Irene Add [K11 20] Sialoadenitis of submandibular gland       ED Disposition     ED Disposition Condition Date/Time Comment    Discharge Stable Fri Feb 15, 2019  6:46 PM Reuben Martinez discharge to home/self care              Follow-up Information    None         Discharge Medication List as of 2/15/2019  6:46 PM      START taking these medications    Details   amoxicillin-clavulanate (AUGMENTIN) 875-125 mg per tablet Take 1 tablet by mouth every 12 (twelve) hours for 7 days, Starting Fri 2/15/2019, Until Fri 2/22/2019, Normal         CONTINUE these medications which have NOT CHANGED    Details   amLODIPine (NORVASC) 10 mg tablet TAKE 1 TABLET DAILY , Normal      aspirin (ECOTRIN LOW STRENGTH) 81 mg EC tablet Take 1 tablet (81 mg total) by mouth daily, Starting Fri 2/8/2019, Normal      atorvastatin (LIPITOR) 20 mg tablet TAKE 1 TABLET DAILY, Normal      furosemide (LASIX) 40 mg tablet TAKE 1 TABLET TWICE DAILY , Normal      ipratropium (ATROVENT) 0 02 % nebulizer solution Inhale 0 5 mg as needed , Historical Med      melatonin 3 mg Take by mouth, Historical Med      metFORMIN (GLUCOPHAGE) 1000 MG tablet Take 1 tablet (1,000 mg total) by mouth 2 (two) times a day with meals, Starting Fri 11/16/2018, Normal      metoprolol succinate (TOPROL-XL) 25 mg 24 hr tablet Take 1 tablet (25 mg total) by mouth daily, Starting Thu 1/31/2019, Normal      sitaGLIPtin (JANUVIA) 100 mg tablet Take 1 tablet (100 mg total) by mouth daily, Starting Fri 11/16/2018, Normal           No discharge procedures on file      ED Provider  Electronically Signed by           Jasmeet Collins MD  02/16/19 2004

## 2019-02-15 NOTE — PATIENT INSTRUCTIONS
Sialoadenitis   WHAT YOU NEED TO KNOW:   Sialoadenitis is an inflammation or infection of one or more of your salivary glands  A small stone can block the salivary gland and cause inflammation  Infection may be caused by a virus or bacteria  You can develop sialoadenitis on one or both sides of your face  You may have sialoadenitis once, or it may come back and last a long time  DISCHARGE INSTRUCTIONS:   Manage your sialoadenitis:  It is important to manage your sialoadenitis to help prevent future infections  · Drinking liquids:  Adults should drink about 9 to 13 cups of liquid each day  One cup is 8 ounces  Good choices of liquids for most people include water, juice, and milk  Coffee, soup, and fruit may be counted in your daily liquid amount  Ask your caregiver how much liquid you should drink each day  · Keep your mouth moist:  Suck on hard candy or chew sugarless gum to get your saliva flowing  Sour and tart flavors such as lemon and orange will help get saliva to flow  This will help keep your mouth moist and help push out a stone blocking your salivary duct  · Rinse your mouth:  Use water or mouthwash to clean out pus that may be draining into your mouth  · Massage your jaw:  Massage the area of your swollen gland  This may help relieve swelling and pain by pushing the pus out of the gland  · Apply heat:  Place a warm, moist cloth on the area  Medicines:   · NSAIDs  help decrease swelling and pain or fever  This medicine is available with or without a doctor's order  NSAIDs can cause stomach bleeding or kidney problems in certain people  If you take blood thinner medicine, always ask your healthcare provider if NSAIDs are safe for you  Always read the medicine label and follow directions  · Do not give aspirin to children under 25years of age  Your child could develop Reye syndrome if he takes aspirin  Reye syndrome can cause life-threatening brain and liver damage   Check your child's medicine labels for aspirin, salicylates, or oil of wintergreen  · Pain medicine: You may be given medicine to take away or decrease pain  Do not wait until the pain is severe before you take your medicine  · Antibiotics: This medicine will help fight an infection  You will be given antibiotics if your sialoadenitis is caused by a bacterial infection  Take your antibiotics until they are gone, even if you feel better  · Take your medicine as directed  Contact your healthcare provider if you think your medicine is not helping or if you have side effects  Tell him of her if you are allergic to any medicine  Keep a list of the medicines, vitamins, and herbs you take  Include the amounts, and when and why you take them  Bring the list or the pill bottles to follow-up visits  Carry your medicine list with you in case of an emergency  Follow up with your healthcare provider or ear, nose, and throat specialist as directed:  Write down your questions so you remember to ask them during your visits  Contact your healthcare provider or ear, nose, and throat specialist if:   · The pain and swelling do not go away within 2 days, or they get worse  · Your mouth and eyes are very dry  · You lose movement on one side of your face  · You have questions about your condition or care  Return to the emergency department if:   · You have a fever  · Your salivary gland gets red and hot or drains pus  · You have trouble opening your mouth because of swelling  · You have trouble breathing or swallowing because of swelling  © 2017 [address] [employer] Information is for End User's use only and may not be sold, redistributed or otherwise used for commercial purposes  All illustrations and images included in CareNotes® are the copyrighted property of [employer]  or Vishal Dang  The above information is an  only   It is not intended as medical advice for individual conditions or treatments  Talk to your doctor, nurse or pharmacist before following any medical regimen to see if it is safe and effective for you

## 2019-02-17 NOTE — PROGRESS NOTES
INTERNAL MEDICINE FOLLOW-UP OFFICE VISIT  [facility] - [facility]EL[facility]    NAME: Drew Duque  AGE: 63 y o  SEX: female  : 1960     DATE: 2019     Assessment and Plan:     1  Submandibular gland tenderness  2  Neck mass    Further evaluation recommend in ER for STAT imaging to delineate her anatomy and make sure there has been no spread into the retroperitoneal area  Chief Complaint:     Chief Complaint   Patient presents with    Facial Swelling     (R) jaw line swollen and tender        History of Present Illness:     3 days ago patient noted swelling in the right submandibular region  Swelling got worse, but things it is a little better over past day  The area is tender and hurts to swallow  No overlying erythema  No fever, chills, palpitations, night sweats  No dental pain or drainage from her mouth  She denies any headaches, vision changes, shortness of breath, abdominal pain, nausea, vomiting  The following portions of the patient's history were reviewed and updated as appropriate: allergies, current medications, past family history, past medical history, past social history, past surgical history and problem list      Review of Systems:     Review of Systems   Constitutional: Negative for chills, diaphoresis, fatigue and fever  HENT:        Painful swallowing  R submandibular swelling and tenderness   Respiratory: Negative  Cardiovascular: Negative  Gastrointestinal: Negative  Neurological: Negative         Problem List:     Patient Active Problem List   Diagnosis    Controlled type 2 diabetes mellitus without complication ([address] )    Uncomplicated asthma    History of colostomy    Essential hypertension    Hyperlipidemia    Chronic diastolic HF (heart failure) (HCC)    Pericardial effusion    Diabetic nephropathy associated with type 2 diabetes mellitus ([address] )    CAD in native artery    Incisional hernia, without obstruction or gangrene  Pre-operative clearance    Uterine leiomyoma        Objective:     /70 (BP Location: Left arm, Patient Position: Sitting, Cuff Size: Standard)   Pulse 88   Temp 98 5 °F (36 9 °C) (Tympanic) Comment: aspirin  Ht 5' (1 524 m)   Wt 78 7 kg (173 lb 6 4 oz)   LMP 08/16/2014 (Approximate)   SpO2 90%   BMI 33 86 kg/m²     Physical Exam   Constitutional: She appears well-developed and well-nourished  No distress  HENT:   Mouth/Throat: Oropharynx is clear and moist  No oropharyngeal exudate  Right submandibular swelling and tenderness; enlargement of right submandibular gland; no overlying erythema or warmth   Cardiovascular: Normal rate and regular rhythm  Exam reveals no friction rub  No murmur heard  Pulmonary/Chest: Effort normal and breath sounds normal  No stridor  No respiratory distress  She has no wheezes  Abdominal: Soft  Bowel sounds are normal  She exhibits no distension  There is no tenderness  Skin: Skin is warm and dry  She is not diaphoretic       Ann Or, DO  [facility]

## 2019-02-21 ENCOUNTER — HOSPITAL ENCOUNTER (OUTPATIENT)
Dept: MRI IMAGING | Facility: HOSPITAL | Age: 59
Discharge: HOME | End: 2019-02-21
Attending: SURGERY
Payer: COMMERCIAL

## 2019-02-21 DIAGNOSIS — D73.89 LESION OF SPLEEN: ICD-10-CM

## 2019-02-21 PROCEDURE — 74183 MRI ABD W/O CNTR FLWD CNTR: CPT

## 2019-02-21 PROCEDURE — A9585 GADOBUTROL INJECTION: HCPCS | Performed by: SURGERY

## 2019-02-21 RX ADMIN — GADOBUTROL 7 ML: 604.72 INJECTION INTRAVENOUS at 09:10

## 2019-02-25 PROBLEM — D25.0 SUBMUCOUS LEIOMYOMA OF UTERUS: Status: ACTIVE | Noted: 2019-02-06

## 2019-02-25 NOTE — PRE-PROCEDURE INSTRUCTIONS
Pre-Surgery Instructions:   Medication Instructions    amLODIPine (NORVASC) 10 mg tablet pt can take this med day of surgery    atorvastatin (LIPITOR) 20 mg tablet Patient was instructed by Physician and understands   furosemide (LASIX) 40 mg tablet Patient was instructed by Physician and understands   metFORMIN (GLUCOPHAGE) 1000 MG tablet Patient was instructed by Physician and understands   metoprolol succinate (TOPROL-XL) 25 mg 24 hr tablet pt can take this med morning of surgery    sitaGLIPtin (JANUVIA) 100 mg tablet Patient was instructed by Physician and understands

## 2019-02-26 ENCOUNTER — APPOINTMENT (OUTPATIENT)
Dept: LAB | Facility: HOSPITAL | Age: 59
End: 2019-02-26
Attending: OBSTETRICS & GYNECOLOGY
Payer: COMMERCIAL

## 2019-02-26 ENCOUNTER — ANESTHESIA EVENT (OUTPATIENT)
Dept: PERIOP | Facility: HOSPITAL | Age: 59
DRG: 742 | End: 2019-02-26
Payer: COMMERCIAL

## 2019-02-26 DIAGNOSIS — Z01.818 PRE-OP TESTING: ICD-10-CM

## 2019-02-26 LAB
ABO GROUP BLD: NORMAL
BLD GP AB SCN SERPL QL: NEGATIVE
RH BLD: POSITIVE
SPECIMEN EXPIRATION DATE: NORMAL

## 2019-02-26 PROCEDURE — 86850 RBC ANTIBODY SCREEN: CPT

## 2019-02-26 PROCEDURE — 36415 COLL VENOUS BLD VENIPUNCTURE: CPT

## 2019-02-26 PROCEDURE — 86900 BLOOD TYPING SEROLOGIC ABO: CPT

## 2019-02-26 PROCEDURE — 86901 BLOOD TYPING SEROLOGIC RH(D): CPT

## 2019-02-27 ENCOUNTER — ANESTHESIA (OUTPATIENT)
Dept: PERIOP | Facility: HOSPITAL | Age: 59
DRG: 742 | End: 2019-02-27
Payer: COMMERCIAL

## 2019-02-27 ENCOUNTER — HOSPITAL ENCOUNTER (INPATIENT)
Facility: HOSPITAL | Age: 59
LOS: 5 days | Discharge: HOME | DRG: 742 | End: 2019-03-04
Attending: SURGERY | Admitting: SURGERY
Payer: COMMERCIAL

## 2019-02-27 DIAGNOSIS — I50.32 CHRONIC DIASTOLIC HF (HEART FAILURE) (HCC): Chronic | ICD-10-CM

## 2019-02-27 DIAGNOSIS — D64.9 POSTOPERATIVE ANEMIA: ICD-10-CM

## 2019-02-27 DIAGNOSIS — F17.200 SMOKER: ICD-10-CM

## 2019-02-27 DIAGNOSIS — K43.2 INCISIONAL HERNIA, WITHOUT OBSTRUCTION OR GANGRENE: ICD-10-CM

## 2019-02-27 DIAGNOSIS — I10 ESSENTIAL HYPERTENSION: Primary | Chronic | ICD-10-CM

## 2019-02-27 DIAGNOSIS — J45.909 UNCOMPLICATED ASTHMA, UNSPECIFIED ASTHMA SEVERITY, UNSPECIFIED WHETHER PERSISTENT: Chronic | ICD-10-CM

## 2019-02-27 DIAGNOSIS — I25.10 CAD IN NATIVE ARTERY: Chronic | ICD-10-CM

## 2019-02-27 DIAGNOSIS — E11.9 CONTROLLED TYPE 2 DIABETES MELLITUS WITHOUT COMPLICATION, WITHOUT LONG-TERM CURRENT USE OF INSULIN (HCC): Chronic | ICD-10-CM

## 2019-02-27 DIAGNOSIS — D25.0 SUBMUCOUS LEIOMYOMA OF UTERUS: ICD-10-CM

## 2019-02-27 LAB
GLUCOSE SERPL-MCNC: 105 MG/DL (ref 65–140)
GLUCOSE SERPL-MCNC: 195 MG/DL (ref 65–140)
GLUCOSE SERPL-MCNC: 206 MG/DL (ref 65–140)
GLUCOSE SERPL-MCNC: 225 MG/DL (ref 65–140)

## 2019-02-27 PROCEDURE — 0UT20ZZ RESECTION OF BILATERAL OVARIES, OPEN APPROACH: ICD-10-PCS | Performed by: OBSTETRICS & GYNECOLOGY

## 2019-02-27 PROCEDURE — 88307 TISSUE EXAM BY PATHOLOGIST: CPT | Performed by: PATHOLOGY

## 2019-02-27 PROCEDURE — 49560 PR REPAIR INCISIONAL HERNIA,REDUCIBLE: CPT | Performed by: SURGERY

## 2019-02-27 PROCEDURE — 49568 PR IMPLANT MESH HERNIA REPAIR/DEBRIDEMENT CLOSURE: CPT | Performed by: SURGERY

## 2019-02-27 PROCEDURE — 82948 REAGENT STRIP/BLOOD GLUCOSE: CPT

## 2019-02-27 PROCEDURE — 15734 MUSCLE-SKIN GRAFT TRUNK: CPT | Performed by: SURGERY

## 2019-02-27 PROCEDURE — 58150 TOTAL HYSTERECTOMY: CPT | Performed by: OBSTETRICS & GYNECOLOGY

## 2019-02-27 PROCEDURE — C1781 MESH (IMPLANTABLE): HCPCS | Performed by: SURGERY

## 2019-02-27 PROCEDURE — 88311 DECALCIFY TISSUE: CPT | Performed by: PATHOLOGY

## 2019-02-27 PROCEDURE — 0DNW0ZZ RELEASE PERITONEUM, OPEN APPROACH: ICD-10-PCS | Performed by: SURGERY

## 2019-02-27 PROCEDURE — C9290 INJ, BUPIVACAINE LIPOSOME: HCPCS | Performed by: ANESTHESIOLOGY

## 2019-02-27 PROCEDURE — 0UT90ZZ RESECTION OF UTERUS, OPEN APPROACH: ICD-10-PCS | Performed by: OBSTETRICS & GYNECOLOGY

## 2019-02-27 PROCEDURE — 0WUF0JZ SUPPLEMENT ABDOMINAL WALL WITH SYNTHETIC SUBSTITUTE, OPEN APPROACH: ICD-10-PCS | Performed by: SURGERY

## 2019-02-27 PROCEDURE — 0UT70ZZ RESECTION OF BILATERAL FALLOPIAN TUBES, OPEN APPROACH: ICD-10-PCS | Performed by: OBSTETRICS & GYNECOLOGY

## 2019-02-27 DEVICE — VENTRALIGHT ST MESH
Type: IMPLANTABLE DEVICE | Site: ABDOMEN | Status: FUNCTIONAL
Brand: VENTRALIGHT ST

## 2019-02-27 RX ORDER — SODIUM CHLORIDE, SODIUM LACTATE, POTASSIUM CHLORIDE, CALCIUM CHLORIDE 600; 310; 30; 20 MG/100ML; MG/100ML; MG/100ML; MG/100ML
INJECTION, SOLUTION INTRAVENOUS CONTINUOUS PRN
Status: DISCONTINUED | OUTPATIENT
Start: 2019-02-27 | End: 2019-02-27 | Stop reason: SURG

## 2019-02-27 RX ORDER — CEFAZOLIN SODIUM 2 G/50ML
2000 SOLUTION INTRAVENOUS EVERY 8 HOURS
Status: COMPLETED | OUTPATIENT
Start: 2019-02-27 | End: 2019-03-01

## 2019-02-27 RX ORDER — ROCURONIUM BROMIDE 10 MG/ML
INJECTION, SOLUTION INTRAVENOUS AS NEEDED
Status: DISCONTINUED | OUTPATIENT
Start: 2019-02-27 | End: 2019-02-27 | Stop reason: SURG

## 2019-02-27 RX ORDER — BUPIVACAINE HYDROCHLORIDE 2.5 MG/ML
INJECTION, SOLUTION INFILTRATION; PERINEURAL
Status: DISCONTINUED | OUTPATIENT
Start: 2019-02-27 | End: 2019-02-27 | Stop reason: SURG

## 2019-02-27 RX ORDER — DIPHENHYDRAMINE HCL 25 MG
25 TABLET ORAL EVERY 6 HOURS PRN
Status: DISCONTINUED | OUTPATIENT
Start: 2019-02-27 | End: 2019-02-28

## 2019-02-27 RX ORDER — GABAPENTIN 300 MG/1
300 CAPSULE ORAL ONCE
Status: COMPLETED | OUTPATIENT
Start: 2019-02-27 | End: 2019-02-27

## 2019-02-27 RX ORDER — ASPIRIN 81 MG/1
81 TABLET ORAL DAILY
Status: DISCONTINUED | OUTPATIENT
Start: 2019-02-28 | End: 2019-03-04 | Stop reason: HOSPADM

## 2019-02-27 RX ORDER — MIDAZOLAM HYDROCHLORIDE 1 MG/ML
INJECTION INTRAMUSCULAR; INTRAVENOUS AS NEEDED
Status: DISCONTINUED | OUTPATIENT
Start: 2019-02-27 | End: 2019-02-27 | Stop reason: SURG

## 2019-02-27 RX ORDER — ALBUTEROL SULFATE 2.5 MG/3ML
2.5 SOLUTION RESPIRATORY (INHALATION) ONCE AS NEEDED
Status: DISCONTINUED | OUTPATIENT
Start: 2019-02-27 | End: 2019-02-27 | Stop reason: HOSPADM

## 2019-02-27 RX ORDER — DEXMEDETOMIDINE HYDROCHLORIDE 100 UG/ML
INJECTION, SOLUTION INTRAVENOUS AS NEEDED
Status: DISCONTINUED | OUTPATIENT
Start: 2019-02-27 | End: 2019-02-27 | Stop reason: SURG

## 2019-02-27 RX ORDER — DEXTROSE AND SODIUM CHLORIDE 5; .45 G/100ML; G/100ML
50 INJECTION, SOLUTION INTRAVENOUS CONTINUOUS
Status: DISCONTINUED | OUTPATIENT
Start: 2019-02-27 | End: 2019-03-03

## 2019-02-27 RX ORDER — NICOTINE 21 MG/24HR
1 PATCH, TRANSDERMAL 24 HOURS TRANSDERMAL DAILY
Status: DISCONTINUED | OUTPATIENT
Start: 2019-02-27 | End: 2019-03-04 | Stop reason: HOSPADM

## 2019-02-27 RX ORDER — PROPOFOL 10 MG/ML
INJECTION, EMULSION INTRAVENOUS AS NEEDED
Status: DISCONTINUED | OUTPATIENT
Start: 2019-02-27 | End: 2019-02-27 | Stop reason: SURG

## 2019-02-27 RX ORDER — SODIUM CHLORIDE, SODIUM LACTATE, POTASSIUM CHLORIDE, CALCIUM CHLORIDE 600; 310; 30; 20 MG/100ML; MG/100ML; MG/100ML; MG/100ML
100 INJECTION, SOLUTION INTRAVENOUS CONTINUOUS
Status: DISCONTINUED | OUTPATIENT
Start: 2019-02-27 | End: 2019-02-27

## 2019-02-27 RX ORDER — ONDANSETRON 2 MG/ML
4 INJECTION INTRAMUSCULAR; INTRAVENOUS EVERY 6 HOURS PRN
Status: DISCONTINUED | OUTPATIENT
Start: 2019-02-27 | End: 2019-03-04 | Stop reason: HOSPADM

## 2019-02-27 RX ORDER — HYDROMORPHONE HCL/PF 1 MG/ML
SYRINGE (ML) INJECTION AS NEEDED
Status: DISCONTINUED | OUTPATIENT
Start: 2019-02-27 | End: 2019-02-27 | Stop reason: SURG

## 2019-02-27 RX ORDER — MAGNESIUM HYDROXIDE 1200 MG/15ML
LIQUID ORAL AS NEEDED
Status: DISCONTINUED | OUTPATIENT
Start: 2019-02-27 | End: 2019-02-27 | Stop reason: HOSPADM

## 2019-02-27 RX ORDER — HYDROMORPHONE HCL/PF 1 MG/ML
0.5 SYRINGE (ML) INJECTION
Status: DISCONTINUED | OUTPATIENT
Start: 2019-02-27 | End: 2019-02-27 | Stop reason: HOSPADM

## 2019-02-27 RX ORDER — FENTANYL CITRATE 50 UG/ML
INJECTION, SOLUTION INTRAMUSCULAR; INTRAVENOUS AS NEEDED
Status: DISCONTINUED | OUTPATIENT
Start: 2019-02-27 | End: 2019-02-27 | Stop reason: SURG

## 2019-02-27 RX ORDER — FENTANYL CITRATE/PF 50 MCG/ML
50 SYRINGE (ML) INJECTION
Status: DISCONTINUED | OUTPATIENT
Start: 2019-02-27 | End: 2019-02-27 | Stop reason: HOSPADM

## 2019-02-27 RX ORDER — ACETAMINOPHEN 325 MG/1
650 TABLET ORAL EVERY 6 HOURS PRN
Status: DISCONTINUED | OUTPATIENT
Start: 2019-02-27 | End: 2019-03-04 | Stop reason: HOSPADM

## 2019-02-27 RX ORDER — DIPHENHYDRAMINE HYDROCHLORIDE 50 MG/ML
25 INJECTION INTRAMUSCULAR; INTRAVENOUS EVERY 6 HOURS PRN
Status: DISCONTINUED | OUTPATIENT
Start: 2019-02-27 | End: 2019-02-27

## 2019-02-27 RX ORDER — SODIUM CHLORIDE, SODIUM LACTATE, POTASSIUM CHLORIDE, CALCIUM CHLORIDE 600; 310; 30; 20 MG/100ML; MG/100ML; MG/100ML; MG/100ML
75 INJECTION, SOLUTION INTRAVENOUS CONTINUOUS
Status: DISCONTINUED | OUTPATIENT
Start: 2019-02-27 | End: 2019-02-27

## 2019-02-27 RX ORDER — DIPHENHYDRAMINE HYDROCHLORIDE 50 MG/ML
12.5 INJECTION INTRAMUSCULAR; INTRAVENOUS ONCE AS NEEDED
Status: DISCONTINUED | OUTPATIENT
Start: 2019-02-27 | End: 2019-02-27 | Stop reason: HOSPADM

## 2019-02-27 RX ORDER — SODIUM CHLORIDE, SODIUM LACTATE, POTASSIUM CHLORIDE, CALCIUM CHLORIDE 600; 310; 30; 20 MG/100ML; MG/100ML; MG/100ML; MG/100ML
125 INJECTION, SOLUTION INTRAVENOUS
Status: COMPLETED | OUTPATIENT
Start: 2019-02-27 | End: 2019-02-27

## 2019-02-27 RX ORDER — SODIUM CHLORIDE 9 MG/ML
INJECTION, SOLUTION INTRAVENOUS CONTINUOUS PRN
Status: DISCONTINUED | OUTPATIENT
Start: 2019-02-27 | End: 2019-02-27 | Stop reason: SURG

## 2019-02-27 RX ORDER — CEFAZOLIN SODIUM 2 G/50ML
2000 SOLUTION INTRAVENOUS
Status: COMPLETED | OUTPATIENT
Start: 2019-02-27 | End: 2019-02-27

## 2019-02-27 RX ORDER — ACETAMINOPHEN 325 MG/1
975 TABLET ORAL ONCE
Status: COMPLETED | OUTPATIENT
Start: 2019-02-27 | End: 2019-02-27

## 2019-02-27 RX ORDER — SODIUM CHLORIDE, SODIUM LACTATE, POTASSIUM CHLORIDE, CALCIUM CHLORIDE 600; 310; 30; 20 MG/100ML; MG/100ML; MG/100ML; MG/100ML
125 INJECTION, SOLUTION INTRAVENOUS CONTINUOUS
Status: DISCONTINUED | OUTPATIENT
Start: 2019-02-27 | End: 2019-02-27

## 2019-02-27 RX ORDER — GLYCOPYRROLATE 0.2 MG/ML
INJECTION INTRAMUSCULAR; INTRAVENOUS AS NEEDED
Status: DISCONTINUED | OUTPATIENT
Start: 2019-02-27 | End: 2019-02-27 | Stop reason: SURG

## 2019-02-27 RX ORDER — ONDANSETRON 2 MG/ML
4 INJECTION INTRAMUSCULAR; INTRAVENOUS ONCE AS NEEDED
Status: DISCONTINUED | OUTPATIENT
Start: 2019-02-27 | End: 2019-02-27 | Stop reason: HOSPADM

## 2019-02-27 RX ORDER — NEOSTIGMINE METHYLSULFATE 1 MG/ML
INJECTION INTRAVENOUS AS NEEDED
Status: DISCONTINUED | OUTPATIENT
Start: 2019-02-27 | End: 2019-02-27 | Stop reason: SURG

## 2019-02-27 RX ADMIN — PHENYLEPHRINE HYDROCHLORIDE 2 DROP: 1 SPRAY NASAL at 08:15

## 2019-02-27 RX ADMIN — SODIUM CHLORIDE, SODIUM LACTATE, POTASSIUM CHLORIDE, AND CALCIUM CHLORIDE: .6; .31; .03; .02 INJECTION, SOLUTION INTRAVENOUS at 10:28

## 2019-02-27 RX ADMIN — ENOXAPARIN SODIUM 40 MG: 40 INJECTION SUBCUTANEOUS at 16:25

## 2019-02-27 RX ADMIN — GABAPENTIN 300 MG: 300 CAPSULE ORAL at 07:38

## 2019-02-27 RX ADMIN — CEFAZOLIN SODIUM 2000 MG: 2 SOLUTION INTRAVENOUS at 23:42

## 2019-02-27 RX ADMIN — PROPOFOL 200 MG: 10 INJECTION, EMULSION INTRAVENOUS at 08:12

## 2019-02-27 RX ADMIN — HYDROMORPHONE HYDROCHLORIDE: 10 INJECTION, SOLUTION INTRAMUSCULAR; INTRAVENOUS; SUBCUTANEOUS at 13:23

## 2019-02-27 RX ADMIN — LIDOCAINE HYDROCHLORIDE 20 MG: 20 INJECTION, SOLUTION INTRAVENOUS at 08:12

## 2019-02-27 RX ADMIN — NEOSTIGMINE METHYLSULFATE 4 MG: 1 INJECTION, SOLUTION INTRAVENOUS at 10:41

## 2019-02-27 RX ADMIN — MIDAZOLAM HYDROCHLORIDE 2 MG: 1 INJECTION, SOLUTION INTRAMUSCULAR; INTRAVENOUS at 08:05

## 2019-02-27 RX ADMIN — BUPIVACAINE HYDROCHLORIDE 10 ML: 2.5 INJECTION, SOLUTION INFILTRATION; PERINEURAL at 12:09

## 2019-02-27 RX ADMIN — CEFAZOLIN SODIUM 2000 MG: 2 SOLUTION INTRAVENOUS at 08:05

## 2019-02-27 RX ADMIN — ROCURONIUM BROMIDE 10 MG: 10 INJECTION INTRAVENOUS at 09:55

## 2019-02-27 RX ADMIN — ROCURONIUM BROMIDE 50 MG: 10 INJECTION INTRAVENOUS at 08:12

## 2019-02-27 RX ADMIN — ROCURONIUM BROMIDE 20 MG: 10 INJECTION INTRAVENOUS at 09:05

## 2019-02-27 RX ADMIN — DEXAMETHASONE SODIUM PHOSPHATE 4 MG: 10 INJECTION INTRAMUSCULAR; INTRAVENOUS at 08:12

## 2019-02-27 RX ADMIN — DEXMEDETOMIDINE HCL 12 MCG: 100 INJECTION INTRAVENOUS at 08:16

## 2019-02-27 RX ADMIN — HYDROMORPHONE HYDROCHLORIDE 0.5 MG: 1 INJECTION, SOLUTION INTRAMUSCULAR; INTRAVENOUS; SUBCUTANEOUS at 09:28

## 2019-02-27 RX ADMIN — FENTANYL CITRATE 100 MCG: 50 INJECTION, SOLUTION INTRAMUSCULAR; INTRAVENOUS at 08:12

## 2019-02-27 RX ADMIN — FAMOTIDINE 20 MG: 10 INJECTION, SOLUTION INTRAVENOUS at 16:25

## 2019-02-27 RX ADMIN — FENTANYL CITRATE 50 MCG: 50 INJECTION, SOLUTION INTRAMUSCULAR; INTRAVENOUS at 12:28

## 2019-02-27 RX ADMIN — ACETAMINOPHEN 975 MG: 325 TABLET, FILM COATED ORAL at 07:38

## 2019-02-27 RX ADMIN — ENOXAPARIN SODIUM 40 MG: 40 INJECTION SUBCUTANEOUS at 07:38

## 2019-02-27 RX ADMIN — FENTANYL CITRATE 50 MCG: 50 INJECTION, SOLUTION INTRAMUSCULAR; INTRAVENOUS at 12:18

## 2019-02-27 RX ADMIN — SODIUM CHLORIDE: 0.9 INJECTION, SOLUTION INTRAVENOUS at 08:16

## 2019-02-27 RX ADMIN — ROCURONIUM BROMIDE 10 MG: 10 INJECTION INTRAVENOUS at 10:40

## 2019-02-27 RX ADMIN — CEFAZOLIN SODIUM 2000 MG: 2 SOLUTION INTRAVENOUS at 16:25

## 2019-02-27 RX ADMIN — DEXTROSE AND SODIUM CHLORIDE 100 ML/HR: 5; .45 INJECTION, SOLUTION INTRAVENOUS at 16:24

## 2019-02-27 RX ADMIN — GLYCOPYRROLATE 0.6 MG: 0.2 INJECTION, SOLUTION INTRAMUSCULAR; INTRAVENOUS at 10:41

## 2019-02-27 RX ADMIN — HYDROMORPHONE HYDROCHLORIDE 0.5 MG: 1 INJECTION, SOLUTION INTRAMUSCULAR; INTRAVENOUS; SUBCUTANEOUS at 10:30

## 2019-02-27 RX ADMIN — SODIUM CHLORIDE, SODIUM LACTATE, POTASSIUM CHLORIDE, AND CALCIUM CHLORIDE: .6; .31; .03; .02 INJECTION, SOLUTION INTRAVENOUS at 08:05

## 2019-02-27 RX ADMIN — SODIUM CHLORIDE, SODIUM LACTATE, POTASSIUM CHLORIDE, AND CALCIUM CHLORIDE 125 ML/HR: .6; .31; .03; .02 INJECTION, SOLUTION INTRAVENOUS at 07:48

## 2019-02-27 RX ADMIN — DEXMEDETOMIDINE HCL 12 MCG: 100 INJECTION INTRAVENOUS at 09:35

## 2019-02-27 NOTE — ANESTHESIA PREPROCEDURE EVALUATION
Review of Systems/Medical History  Patient summary reviewed  Chart reviewed      Cardiovascular  Hyperlipidemia, Hypertension controlled, CAD , CHF ,   Comment: EF 50%,  Pulmonary  Asthma , well controlled/ stable ,        GI/Hepatic  Negative GI/hepatic ROS          Negative  ROS        Endo/Other  Diabetes well controlled type 2 Oral agent,      GYN       Hematology  Negative hematology ROS      Musculoskeletal  Negative musculoskeletal ROS        Neurology  Negative neurology ROS      Psychology       Study date:  05-Feb-2019     Indications: CHF      Diagnoses: I50 32 - Chronic diastolic (congestive) heart failure     SUMMARY     LEFT VENTRICLE:  Systolic function was at the lower limits of normal  Ejection fraction was estimated to be 50 %  There were no regional wall motion abnormalities  Doppler parameters were consistent with abnormal left ventricular relaxation (grade 1 diastolic dysfunction)      MITRAL VALVE:  There was mild regurgitation      TRICUSPID VALVE:  There was mild regurgitation      PULMONIC VALVE:  There was mild regurgitation  Anesthesia Plan  ASA Score- 2     Anesthesia Type- general with ASA Monitors  Additional Monitors:   Airway Plan: ETT  Comment: TAP BLOCK  Plan Factors- Patient instructed to abstain from smoking on day of procedure  Patient smoked on day of surgery  Induction- intravenous  Postoperative Plan- Plan for postoperative opioid use  Planned trial extubation    Informed Consent- Anesthetic plan and risks discussed with patient  I personally reviewed this patient with the CRNA  Discussed and agreed on the Anesthesia Plan with the CRNA         A full conversation with patient pertaining to  Blood products took place prior to surgery    All products MAY be given but only if ABSOLUTELY NECESSARY as deemed by the anesthesia team    Dr Rita Hussein

## 2019-02-27 NOTE — ANESTHESIA POSTPROCEDURE EVALUATION
Post-Op Assessment Note    CV Status:  Stable    Pain management: adequate     Mental Status:  Alert and awake   Hydration Status:  Euvolemic   PONV Controlled:  Controlled   Airway Patency:  Patent   Post Op Vitals Reviewed: Yes      Staff: CRNA           /85   Temp   98 5   Pulse  64   Resp   18   SpO2  99%

## 2019-02-27 NOTE — PROGRESS NOTES
The Diphenhydramine has / have been converted to Oral [facility] for Ad[facility] as approved by the Pharmacy and Therapeutics Committee

## 2019-02-27 NOTE — OP NOTE
OPERATIVE REPORT  PATIENT NAME: Mary Ross    :  1960  MRN: [id number]  Pt Location: [facility] OR ROOM 04    SURGERY DATE: 2019    Surgeon(s) and Role:  Panel 1:     * Rayne Julio MD - Primary     * Cande Verma - Gerard Zhang MD - Co-surgeon  Panel 2:     * Yuri Hernandez MD - Primary    Preop Diagnosis:  Incisional hernia, without obstruction or gangrene [K43 2]  Submucous leiomyoma of uterus [D25 0]    Post-Op Diagnosis Codes:     * Incisional hernia, without obstruction or gangrene [K43 2]     * Submucous leiomyoma of uterus [D25 0]    Procedure(s) (LRB):  INCISIONAL HERNIA REPAIR, COMPONENT SEPARATION (N/A)  TOTAL ABDOMINAL HYSTERECTOMY; BSO (N/A)    Specimen(s):  ID Type Source Tests Collected by Time Destination   1 :  Tissue Uterus w/Bilateral Ovaries and Fallopian Tubes TISSUE EXAM Yuri Hernandez MD 2019 [id number]        Estimated Blood Loss:   650 mL    Drains:  Closed/Suction Drain Midline Abdomen Bulb 10 Fr  (Active)   Number of days: 0       Urethral Catheter Non-latex 16 Fr  (Active)   Number of days: 0       Anesthesia Type:   General    Operative Indications:  Incisional hernia, without obstruction or gangrene [K43 2]  Submucous leiomyoma of uterus [D25 0]      Operative Findings:  1  Enlarged fibroid uterus, Single fibroid encompassing the fundus approximately 8cm in size  2  Filmy adhesions to bilateral adnexa      Complications:   None    Procedure and Technique:  Preparation  The patient was taken to the operating room where a time out was performed to confirm correct patient and correct procedure  Anesthesia was adequately established and Ancef 2gm was given for antibiotic prophylaxis  The patient was then placed in a supine position  Meek catheter was placed in sterile fashion  Vaginal prep was completed  The patient was then prepped and draped in the usual sterile fashion   imeout was completed      Surgical Procedure  The initial surgical incision was completed by Dr Cynthia Andrews  Once the abdominal cavity was opened, I began my portion of the procedure  The uterus was visualized  The round ligament on the left side was visualized, clamped and divided using Bovie cautery  The anterior leaf of the broad ligament was brought down to midline with sharp dissection  The same procedure was completed on the right side  Vesicouterine peritoneum was dissected and the bladder was dissected away from the cervix  Attention then turned to the infundibulopelvic ligament  The left IPL was isolated grasped with Ang clamps, divided then suture ligated  The left uterine artery was isolated, clamped and divided, and suture ligated  Good hemostasis was noted from the vascular pedicles  The same procedure was followed on the opposite side  Lateral to the cervix Kocher clamps were used to divide the uterosacral ligaments  Lainz clamps were used to grasp the vaginal angles  The colpotomy was completed and the specimen sent to pathology  The vaginal cuff was closed with interrupted figure of eight suture  The cuff was inspected for hemostasis  The pelvis was irrigated and hemostasis verified at all vascular pedicles  All needle, sponge, and instrument counts were noted to be correct x 2 at the end of my portion of the procedure      Dr Cynthia Andrews returned to the operating room for abdominal wall closure         I was present for the entire procedure, A qualified resident physician was not available and A physician assistant was required during the procedure for retraction tissue handling,dissection and suturing    Patient Disposition:  PACU     SIGNATURE: Dipak Jauregui MD  DATE: February 27, 2019  TIME: 11:48 AM

## 2019-02-27 NOTE — ANESTHESIA PROCEDURE NOTES
Peripheral Block    Patient location during procedure: OR  Start time: 2/27/2019 11:55 AM  Staffing  Anesthesiologist: Ashleigh Lindsey MD  Performed: anesthesiologist   Preanesthetic Checklist  Completed: patient identified, site marked, surgical consent, pre-op evaluation, timeout performed, IV checked, risks and benefits discussed and monitors and equipment checked  Peripheral Block  Patient position: supine  Prep: ChloraPrep  Patient monitoring: heart rate, cardiac monitor, continuous pulse ox and frequent blood pressure checks  Block type: TAP  Laterality: bilateral  Injection technique: single-shot  Procedures: ultrasound guided  Ultrasound permanent image savedbupivacaine (MARCAINE) 0 25 % perineural infiltration, 10 mL (exparel 10 mls each side)  Needle  Needle type: Stimuplex   Needle gauge: 21 G  Needle length: 5 cm  Needle localization: anatomical landmarks and ultrasound guidance  Needle insertion depth: 3 5 cm  Test dose: negative  Assessment  Injection assessment: incremental injection, local visualized surrounding nerve on ultrasound and negative aspiration for heme  Paresthesia pain: none  Heart rate change: no  Slow fractionated injection: yes  Post-procedure:  site cleaned  patient tolerated the procedure well with no immediate complications

## 2019-02-27 NOTE — OP NOTE
OPERATIVE REPORT  PATIENT NAME: Chon Martinez    :  1960  MRN: [id number]  Pt Location: [facility] OR ROOM 04    SURGERY DATE: 2019    Surgeon(s) and Role:  Panel 1:     * Laurinda Lundborg, MD - Primary     * Chrissy Hager - Assisting     * London Zhang MD - Co-surgeon  Panel 2:     * Evon Rand MD - Primary    Preop Diagnosis:  Incisional hernia, without obstruction or gangrene [K43 2]  Submucous leiomyoma of uterus [D25 0]    Post-Op Diagnosis Codes:     * Incisional hernia, without obstruction or gangrene [K43 2]     * Submucous leiomyoma of uterus [D25 0]    Procedure(s) (LRB):  INCISIONAL HERNIA REPAIR, COMPONENT SEPARATION (N/A)  TOTAL ABDOMINAL HYSTERECTOMY; BSO (N/A)  Bilateral myocutaneous flap  Extensive lysis of adhesions  Bilateral posterior component separation and insertion of prosthetic mesh  Specimen(s):  ID Type Source Tests Collected by Time Destination   1 :  Tissue Uterus w/Bilateral Ovaries and Fallopian Tubes TISSUE EXAM Evon Rand MD 2019 [id number]        Estimated Blood Loss:   650 mL    Drains:  Closed/Suction Drain Abdomen Bulb 10 Fr  (Active)   Number of days: 0       Urethral Catheter Non-latex 16 Fr  (Active)   Number of days: 0       Anesthesia Type:   General    Operative Indications:  Incisional hernia, without obstruction or gangrene [K43 2]  Submucous leiomyoma of uterus [D25 0]      Operative Findings:  The patient had multiple small defects along the midline incision, there was also large incisional hernia from the previous ostomy site with small-bowel contents within the hernia sac  The defect measured approximately 10 cm  There were multiple adhesions between the omentum and anterior abdominal wall, also adhesions between loops of the small bowel and pelvic area  Complications:   None    Procedure and Technique:  The patient was identified he was placed in the operating table in a supine position    After adequate anesthesia induction and satisfactory endotracheal intubation the abdomen was prepped and draped in a sterile usual fashion with ChloraPrep  Time-out was called the patient was identified as was surgical site  Midline incision was made with with a scalpel from a prior incision, taken down through the subcutaneous tissue with cautery  Multiple small Swiss cheese hernia were identified, the hernia sacs were a open in the abdominal cavity was entered, the fascia was opened with cautery as well as the peritoneum  At this point proceeded to take down the adhesions between the omentum and anterior abdominal wall along the midline incision and in the upper abdomen and pelvis  Also I was able to take down all the adhesions between the loops of the small bowel and the pelvis was exposed, the hernia sac from the previous ostomy site was also reduced, once this was accomplished Dr Cristian Camargo took over the case to proceed with her part of surgery  After GYN was done with her far a proceeded to do the hernia repair  There was no evidence of bleeding from the pelvic area,  At this point I proceeded with bilateral myocutaneous flap by dividing the subcutaneous tissue off the fascia for approximately 5 cm on each side  At this point the anterior and posterior rectus sheath were  on the left side with cautery and dissection was carried along the posterior aspect of the rectus muscle,  the posterior rectus sheath going cephalad and caudally into the pelvis  In the same fashion the right rectus sheath was  between the anterior and posterior rectus sheath using cautery going cephalad and caudally  Once that was accomplished a proceeded to close the posterior rectus sheath and peritoneum with 1  Looped PDS in a continuous fashion    At this point ventral light mesh was placed in the retro rectus space and secured to the fascia with 0 Nurolon in an interrupted trans fascial U-stitch fashion, obtaining a least 5 cm overlap on each side  A 2nd piece of mesh was placed to cover the entire defect with which measure approximately 25 cm long and 10 cm wide  Once that was accomplished I proceeded to close the anterior rectus sheath with 1 Looped PDS in a continuous fashion  The defect on the anterior rectus sheath and muscle were closed from the prior ostomy site using 1  Vicryl in an interrupted fashion  The anterior rectus sheath was closed with 1  Looped PDS in a continuous fashion  JAROD drain was placed in the subcutaneous tissue, secured to the skin with 3 0 nylon in an interrupted fashion, the subcutaneous tissue was approximated with 2 0 Vicryl in an interrupted fashion the skin was closed with staples  At the end of the case instrument, needles, and sponges counts were correct  The patient tolerated the procedure well     I was present for the entire procedure, A qualified resident physician was not available, A co-surgeon was required because of skills and techniques relevant to speciality and A physician assistant was required during the procedure for retraction tissue handling,dissection and suturing    Patient Disposition:  PACU , hemodynamically stable and extubated and stable    SIGNATURE: Laurinda Lundborg, MD  DATE: February 27, 2019  TIME: 11:08 AM

## 2019-02-28 PROBLEM — Z72.0 TOBACCO USE: Status: ACTIVE | Noted: 2019-02-28

## 2019-02-28 LAB
ANION GAP SERPL CALCULATED.3IONS-SCNC: 9 MMOL/L (ref 4–13)
BUN SERPL-MCNC: 15 MG/DL (ref 5–25)
CALCIUM SERPL-MCNC: 7.4 MG/DL (ref 8.3–10.1)
CHLORIDE SERPL-SCNC: 106 MMOL/L (ref 100–108)
CHOLEST SERPL-MCNC: 106 MG/DL (ref 50–200)
CO2 SERPL-SCNC: 25 MMOL/L (ref 21–32)
CREAT SERPL-MCNC: 0.68 MG/DL (ref 0.6–1.3)
ERYTHROCYTE [DISTWIDTH] IN BLOOD BY AUTOMATED COUNT: 15.2 % (ref 11.6–15.1)
GFR SERPL CREATININE-BSD FRML MDRD: 112 ML/MIN/1.73SQ M
GLUCOSE SERPL-MCNC: 166 MG/DL (ref 65–140)
GLUCOSE SERPL-MCNC: 169 MG/DL (ref 65–140)
GLUCOSE SERPL-MCNC: 182 MG/DL (ref 65–140)
GLUCOSE SERPL-MCNC: 204 MG/DL (ref 65–140)
HCT VFR BLD AUTO: 27.8 % (ref 34.8–46.1)
HDLC SERPL-MCNC: 38 MG/DL (ref 40–60)
HGB BLD-MCNC: 8.9 G/DL (ref 11.5–15.4)
LDLC SERPL CALC-MCNC: 46 MG/DL (ref 0–100)
MAGNESIUM SERPL-MCNC: 1.2 MG/DL (ref 1.6–2.6)
MCH RBC QN AUTO: 30.6 PG (ref 26.8–34.3)
MCHC RBC AUTO-ENTMCNC: 32 G/DL (ref 31.4–37.4)
MCV RBC AUTO: 96 FL (ref 82–98)
NONHDLC SERPL-MCNC: 68 MG/DL
PHOSPHATE SERPL-MCNC: 3.4 MG/DL (ref 2.7–4.5)
PLATELET # BLD AUTO: 232 THOUSANDS/UL (ref 149–390)
PMV BLD AUTO: 9.5 FL (ref 8.9–12.7)
POTASSIUM SERPL-SCNC: 3.6 MMOL/L (ref 3.5–5.3)
RBC # BLD AUTO: 2.91 MILLION/UL (ref 3.81–5.12)
SODIUM SERPL-SCNC: 140 MMOL/L (ref 136–145)
TRIGL SERPL-MCNC: 108 MG/DL
WBC # BLD AUTO: 8.77 THOUSAND/UL (ref 4.31–10.16)

## 2019-02-28 PROCEDURE — 84100 ASSAY OF PHOSPHORUS: CPT | Performed by: PHYSICIAN ASSISTANT

## 2019-02-28 PROCEDURE — 80061 LIPID PANEL: CPT | Performed by: PHYSICIAN ASSISTANT

## 2019-02-28 PROCEDURE — 99252 IP/OBS CONSLTJ NEW/EST SF 35: CPT | Performed by: INTERNAL MEDICINE

## 2019-02-28 PROCEDURE — 99024 POSTOP FOLLOW-UP VISIT: CPT | Performed by: SURGERY

## 2019-02-28 PROCEDURE — G8978 MOBILITY CURRENT STATUS: HCPCS

## 2019-02-28 PROCEDURE — 80048 BASIC METABOLIC PNL TOTAL CA: CPT | Performed by: PHYSICIAN ASSISTANT

## 2019-02-28 PROCEDURE — 97163 PT EVAL HIGH COMPLEX 45 MIN: CPT

## 2019-02-28 PROCEDURE — G8979 MOBILITY GOAL STATUS: HCPCS

## 2019-02-28 PROCEDURE — 83735 ASSAY OF MAGNESIUM: CPT | Performed by: PHYSICIAN ASSISTANT

## 2019-02-28 PROCEDURE — 82948 REAGENT STRIP/BLOOD GLUCOSE: CPT

## 2019-02-28 PROCEDURE — 85027 COMPLETE CBC AUTOMATED: CPT | Performed by: PHYSICIAN ASSISTANT

## 2019-02-28 RX ORDER — MAGNESIUM SULFATE HEPTAHYDRATE 40 MG/ML
2 INJECTION, SOLUTION INTRAVENOUS ONCE
Status: COMPLETED | OUTPATIENT
Start: 2019-02-28 | End: 2019-02-28

## 2019-02-28 RX ORDER — DIPHENHYDRAMINE HYDROCHLORIDE 50 MG/ML
25 INJECTION INTRAMUSCULAR; INTRAVENOUS EVERY 6 HOURS PRN
Status: DISCONTINUED | OUTPATIENT
Start: 2019-02-28 | End: 2019-03-04 | Stop reason: HOSPADM

## 2019-02-28 RX ADMIN — INSULIN LISPRO 1 UNITS: 100 INJECTION, SOLUTION INTRAVENOUS; SUBCUTANEOUS at 20:39

## 2019-02-28 RX ADMIN — CEFAZOLIN SODIUM 2000 MG: 2 SOLUTION INTRAVENOUS at 10:27

## 2019-02-28 RX ADMIN — INSULIN LISPRO 1 UNITS: 100 INJECTION, SOLUTION INTRAVENOUS; SUBCUTANEOUS at 06:50

## 2019-02-28 RX ADMIN — MAGNESIUM SULFATE HEPTAHYDRATE 2 G: 40 INJECTION, SOLUTION INTRAVENOUS at 15:25

## 2019-02-28 RX ADMIN — FAMOTIDINE 20 MG: 10 INJECTION, SOLUTION INTRAVENOUS at 09:45

## 2019-02-28 RX ADMIN — ENOXAPARIN SODIUM 40 MG: 40 INJECTION SUBCUTANEOUS at 09:45

## 2019-02-28 RX ADMIN — INSULIN LISPRO 2 UNITS: 100 INJECTION, SOLUTION INTRAVENOUS; SUBCUTANEOUS at 01:37

## 2019-02-28 RX ADMIN — ASPIRIN 81 MG: 81 TABLET, COATED ORAL at 09:45

## 2019-02-28 NOTE — UTILIZATION REVIEW
Initial Clinical Review    Age/Sex: 63 y o  female  Surgery Date: 2/28  Procedure: INCISIONAL HERNIA REPAIR, COMPONENT SEPARATION (N/A)  TOTAL ABDOMINAL HYSTERECTOMY; BSO (N/A)  Bilateral myocutaneous flap  Extensive lysis of adhesions  Bilateral posterior component separation and insertion of prosthetic mesh      Anesthesia: general   Admission Orders: Date/Time/Statement: 2/27/19 @ 1147   Orders Placed This Encounter   Procedures    Inpatient Admission     Standing Status:   Standing     Number of Occurrences:   1     Order Specific Question:   Admitting Physician     Answer:   Ana Chakraborty     Order Specific Question:   Level of Care     Answer:   Med Surg [16]     Order Specific Question:   Estimated length of stay     Answer:   More than 2 Midnights     Order Specific Question:   Certification     Answer:   I certify that inpatient services are medically necessary for this patient for a duration of greater than two midnights  See H&P and MD Progress Notes for additional information about the patient's course of treatment  Vital Signs: /61   Pulse 80   Temp 98 8 °F (37 1 °C)   Resp 18   Ht 5' (1 524 m)   Wt 78 2 kg (172 lb 6 4 oz)   LMP 08/16/2014 (Approximate)   SpO2 91%   BMI 33 67 kg/m²   Diet:        Diet Orders   (From admission, onward)            Start     Ordered    02/27/19 1337  Diet NPO; Ice chips  Diet effective now     Comments:  Ice for mouth comfort only   Question Answer Comment   Diet Type NPO    NPO Except: Ice chips    RD to adjust diet per protocol?  Yes        02/27/19 1337        Mobility: amb pt   DVT Prophylaxis SCD  Pain Control:   Pain Medications       Dilaudid PCA pump   Iv zofran q6h prn   Iv benadryl q6h prn   Iv D5 1/2 NSS @ 100 /hr  Iv pepcid qd  UDN prn   nicoderm patch qd  SSI q6hr   Po asa qd    Fingerstick q6hr  NPO   IS   Up and OOB   I&O   Consult IM   Drain to bulb drainage     IM consult   2/28  Submucous leiomyoma of uterus  Assessment & Plan  · Patient is status post day 1 on a joint incisional hernia repair and KHUSHI/BSO  · No acute complaints at this time, patient is resting  · Continue with pain control and IV fluids  · Supportive care  · Prophylactic IV antibiotics   * Incisional hernia, without obstruction or gangrene  Assessment & Plan  · Patient is status post day 1 on a joint incisional hernia repair and KHUSHI/BSO  · No acute complaints at this time, patient is resting  · Continue with pain control and IV fluids  · Supportive care  · Prophylactic IV antibiotics   Hyperlipidemia  Assessment & Plan  · Most recent lipid panel was done in November 2018 which was within an acceptable range, her cholesterol is 137, mildly elevated triglycerides-186, LDL - 60 HDL -40  · Continue with atorvastatin 20 mg daily  · Will repeat lipid panel  · Continue with lifestyle modification, patient goes for walks    Essential hypertension  Assessment & Plan  Blood pressure is controlled at this time most recent BP is 118/61  · Continue with Norvasc 10 mg daily  · Toprol XL 25 mg Q 24  · ASA 81 mg daily  · Lasix 40 mg b i d   · Heart healthy carb controlled diet, when ready to advance diet   Controlled type 2 diabetes mellitus without complication [facility])  Assessment & Plan        Lab Results   Component Value Date     HGBA1C 5 8 01/31/2019             Recent Labs     02/27/19  0747 02/27/19  1147 02/27/19  1808 02/27/19  2341   POCGLU 105 195* 206* 225*         Blood Sugar Average: Last 72 hrs:  (P) 182 75      · Patient's most recent hemoglobin A1c is 5 8, she reports that she is well controlled at home   · She is on oral hypoglycemics at home Januvia 100 mg and Glucophage 1000 mg  · Will start correction subcutaneous insulin for tighter glycemic control while inpatient  · Monitor fingerstick glucose  · Carbohydrate controlled diet when ready to advance diet   CAD in native artery  Assessment & Plan  · Currently asymptomatic, she is on aspirin and statin  · Echocardiogram done 02/05/2019 revealed an ejection fraction of 50%   · it appears that patient had a cardiac catheterization done 01/07/2018 after an abnormal stress test, cardiac catheterization revealed mid RCA lesion with 50% stenosis   · it appears that they recommended aspirin and Plavix, patient is only on asa and statin for now   ·  Patient follows with Dr Linda Long outpatient    Chronic diastolic HF (heart failure) ([facility] [address] )  Assessment & Plan  · Currently euvolemic  · Continue with Lasix 40 b i d    · Last echocardiogram done 02/05/2019-EF 50%  · Patient follows with Dr Linda Long  · Patient is getting IV fluids so monitor volume status    Uncomplicated asthma  Assessment & Plan  · Stable, no current wheezing  · Continue with ipratropium (ATROVENT) 0 02 % nebulizer solution p r n    Tobacco use  Assessment & Plan  · Encouraged smoking cessation  · Continue with Nicoderm patch

## 2019-02-28 NOTE — ASSESSMENT & PLAN NOTE
· Patient is status post day 1 on a joint incisional hernia repair and KHUSHI/BSO  · No acute complaints at this time, patient is resting  · Continue with pain control and IV fluids  · Supportive care  · Prophylactic IV antibiotics

## 2019-02-28 NOTE — ASSESSMENT & PLAN NOTE
· Most recent lipid panel was done in November 2018 which was within an acceptable range, her cholesterol is 137, mildly elevated triglycerides-186, LDL - 60 HDL -40  · Continue with atorvastatin 20 mg daily  · Will repeat lipid panel  · Continue with lifestyle modification, patient goes for walks

## 2019-02-28 NOTE — ASSESSMENT & PLAN NOTE
· Currently asymptomatic, she is on aspirin and statin  · Echocardiogram done 02/05/2019 revealed an ejection fraction of 50%   · it appears that patient had a cardiac catheterization done 01/07/2018 after an abnormal stress test, cardiac catheterization revealed mid RCA lesion with 50% stenosis   · it appears that they recommended aspirin and Plavix, patient is only on asa and statin for now   ·  Patient follows with Dr Greer Fischer outpatient

## 2019-02-28 NOTE — PROGRESS NOTES
Progress Note - General Surgery   Rajan Long 63 y o  female MRN: [id number]  Unit/Bed#: -[facility] Encounter: [id number]    Assessment:  1  Rajan Long is a 63 y o  female POD#1 s/p total abdominal hysterectomy, incisional hernia repair with component separation, bilateral myocutaneous flap, extensive lysis of adhesions    Plan:  - Continue NPO/IVF ntil further return of bowel function  - Discontinue NG tube  Only 75 cc output over last day  - Can discontinue duran today  Adequate urine output  - Continue JAROD drain, drains with minimal serosang drainge  - Pain control PRN  PCA in place will continue  - Encourage ambulation/OOB  - DVT prophylaxis  - SLIM following    Subjective/Objective     Subjective: In process of moving to chair  Per nursing, patient slept all day post-operatively and this is first attempt at getting [address] [place] [address]  No flatus yet  Has not used PCA due to sleeping, but is complaining of significant abdominal pain with movement  No subjective fevers or chills  No nausea or vomiting  Objective:     Blood pressure 118/61, pulse 80, temperature 98 8 °F (37 1 °C), resp  rate 18, height 5' (1 524 m), weight 78 2 kg (172 lb 6 4 oz), last menstrual period 08/16/2014, SpO2 91 %, not currently breastfeeding  ,Body mass index is 33 67 kg/m²        Intake/Output Summary (Last 24 hours) at 2/28/2019 0952  Last data filed at 2/28/2019 0900  Gross per 24 hour   Intake 2054 1 ml   Output 1015 ml   Net 1039 1 ml       Invasive Devices     Peripheral Intravenous Line            Peripheral IV 02/27/19 Left Hand 1 day    Peripheral IV 02/27/19 Right Antecubital 1 day          Drain            Urethral Catheter Non-latex 16 Fr  1 day    Closed/Suction Drain Midline Abdomen Bulb 10 Fr  less than 1 day    NG/OG/Enteral Tube Nasogastric Right nares less than 1 day                Physical Exam: /61   Pulse 80   Temp 98 8 °F (37 1 °C)   Resp 18   Ht 5' (1 524 m)   Wt 78 2 kg (172 lb 6 4 oz)   LMP 08/16/2014 (Approximate)   SpO2 91%   BMI 33 67 kg/m²   General appearance: alert and oriented, in no acute distress  Lungs: clear to auscultation bilaterally  Heart: regular rate and rhythm, S1, S2 normal, no murmur, click, rub or gallop  Abdomen: soft, non-distended, appropriate incisional tenderness to palpation, hypoactive bowel sounds, midline incision covered with dressing    Lab, Imaging and other studies:I have personally reviewed pertinent lab results       VTE Pharmacologic Prophylaxis: Enoxaparin (Lovenox)  VTE Mechanical Prophylaxis: sequential compression device    Recent Results (from the past 36 hour(s))   Fingerstick Glucose (POCT)    Collection Time: 02/27/19  7:47 AM   Result Value Ref Range    POC Glucose 105 65 - 140 mg/dl   Fingerstick Glucose (POCT)    Collection Time: 02/27/19 11:47 AM   Result Value Ref Range    POC Glucose 195 (H) 65 - 140 mg/dl   Fingerstick Glucose (POCT)    Collection Time: 02/27/19  6:08 PM   Result Value Ref Range    POC Glucose 206 (H) 65 - 140 mg/dl   Fingerstick Glucose (POCT)    Collection Time: 02/27/19 11:41 PM   Result Value Ref Range    POC Glucose 225 (H) 65 - 140 mg/dl   CBC (With Platelets)    Collection Time: 02/28/19  5:28 AM   Result Value Ref Range    WBC 8 77 4 31 - 10 16 Thousand/uL    RBC 2 91 (L) 3 81 - 5 12 Million/uL    Hemoglobin 8 9 (L) 11 5 - 15 4 g/dL    Hematocrit 27 8 (L) 34 8 - 46 1 %    MCV 96 82 - 98 fL    MCH 30 6 26 8 - 34 3 pg    MCHC 32 0 31 4 - 37 4 g/dL    RDW 15 2 (H) 11 6 - 15 1 %    Platelets 126 609 - 496 Thousands/uL    MPV 9 5 8 9 - 12 7 fL   Magnesium    Collection Time: 02/28/19  5:28 AM   Result Value Ref Range    Magnesium 1 2 (L) 1 6 - 2 6 mg/dL   Phosphorus    Collection Time: 02/28/19  5:28 AM   Result Value Ref Range    Phosphorus 3 4 2 7 - 4 5 mg/dL   Basic metabolic panel    Collection Time: 02/28/19  5:28 AM   Result Value Ref Range    Sodium 140 136 - 145 mmol/L    Potassium 3 6 3 5 - 5 3 mmol/L    Chloride 106 100 - 108 mmol/L    CO2 25 21 - 32 mmol/L    ANION GAP 9 4 - 13 mmol/L    BUN 15 5 - 25 mg/dL    Creatinine 0 68 0 60 - 1 30 mg/dL    Glucose 169 (H) 65 - 140 mg/dL    Calcium 7 4 (L) 8 3 - 10 1 mg/dL    eGFR 112 ml/min/1 73sq m   Lipid panel    Collection Time: 02/28/19  5:28 AM   Result Value Ref Range    Cholesterol 106 50 - 200 mg/dL    Triglycerides 108 <=150 mg/dL    HDL, Direct 38 (L) 40 - 60 mg/dL    LDL Calculated 46 0 - 100 mg/dL    Non-HDL-Chol (CHOL-HDL) 68 mg/dl   Fingerstick Glucose (POCT)    Collection Time: 02/28/19  5:51 AM   Result Value Ref Range    POC Glucose 182 (H) 65 - 140 mg/dl

## 2019-02-28 NOTE — ASSESSMENT & PLAN NOTE
· Stable, no current wheezing  · Continue with ipratropium (ATROVENT) 0 02 % nebulizer solution p r n

## 2019-02-28 NOTE — PLAN OF CARE
Problem: DISCHARGE PLANNING - CARE MANAGEMENT  Goal: Discharge to post-acute care or home with appropriate resources  Description  INTERVENTIONS:  - Conduct assessment to determine patient/family and health care team treatment goals, and need for post-acute services based on payer coverage, community resources, and patient preferences, and barriers to discharge  - Address psychosocial, clinical, and financial barriers to discharge as identified in assessment in conjunction with the patient/family and health care team  - Arrange appropriate level of post-acute services according to patient?s   needs and preference and payer coverage in collaboration with the physician and health care team  - Communicate with and update the patient/family, physician, and health care team regarding progress on the discharge plan  - Arrange appropriate transportation to post-acute venues  Outcome: Progressing  Note:   Cm team will  follow up on vna recommendation

## 2019-02-28 NOTE — PLAN OF CARE
Problem: PHYSICAL THERAPY ADULT  Goal: Performs mobility at highest level of function for planned discharge setting  See evaluation for individualized goals  Description  Treatment/Interventions: Functional transfer training, LE strengthening/ROM, Therapeutic exercise, Endurance training, Elevations, Patient/family training, Equipment eval/education, Bed mobility, Gait training, Spoke to nursing  Equipment Recommended: Walker(RW, continue trials)       See flowsheet documentation for full assessment, interventions and recommendations  Note:   Prognosis: Good  Problem List: Decreased strength, Decreased endurance, Impaired balance, Decreased mobility, Pain  Assessment: Pt is 63 y o  female seen for PT evaluation on 2/28/2019 s/p admit to [facility] on 2/27/2019 w/ Incisional hernia, without obstruction or gangrene  Pt presents for incisional hernia repair, component separation & total abdominal hysterectomy  Pt now POD 1  PT consulted to assess pt's functional mobility and d/c needs  Order placed for PT eval and tx, w/ up and OOB as tolerated and ambulate patient order  Performed at least 2 patient identifiers during session: Name and wristband  Comorbidities affecting pt's physical performance at time of assessment include: acute on chronic CHF c L ventricular diastolic dysfunction, asthma, atelectasis, DM, diverticulitis, HLD, HTN, lesion of spleen, pericardial effusion  PTA, pt was independent w/ all functional mobility w/ no AD/DME, ambulates unrestricted distances and all terrain, has 3+6 JERRY, lives w/ dtr in bi- level home and works full time  Personal factors affecting pt at time of IE include: inaccessible home environment, lives in 2 story house, ambulating w/ assistive device, inability to navigate level surfaces w/o external assistance and decreased initiation and engagement   Please find objective findings from PT assessment regarding body systems outlined above with impairments and limitations including weakness, impaired balance, decreased endurance, gait deviations, pain and fall risk, as well as mobility assessment (need for SBA-min assist w/ all phases of mobility when usually ambulating independently and need for cueing for mobility technique)  The following objective measures performed on IE also reveal limitations: Barthel Index: 45/100 and Modified Gratis: 4 (moderate/severe disability)  Pt's clinical presentation is currently unstable/unpredictable seen in pt's presentation of need for input for task focus and mobility technique and ongoing medical assessment  Pt to benefit from continued PT tx to address deficits as defined above and maximize level of functional independent mobility and consistency  From PT/mobility standpoint, recommendation at time of d/c would be Home PT with family support pending progress in order to facilitate return to PLOF  Barriers to Discharge: Inaccessible home environment, Decreased caregiver support     Recommendation: Home PT, Home with family support(further PT needs TBD pending progress)     PT - OK to Discharge: No    See flowsheet documentation for full assessment

## 2019-02-28 NOTE — ASSESSMENT & PLAN NOTE
Blood pressure is controlled at this time most recent BP is 118/61  · Continue with Norvasc 10 mg daily  · Toprol XL 25 mg Q 24  · ASA 81 mg daily  · Lasix 40 mg b i d   · Heart healthy carb controlled diet, when ready to advance diet

## 2019-02-28 NOTE — PLAN OF CARE
Problem: Prexisting or High Potential for Compromised Skin Integrity  Goal: Skin integrity is maintained or improved  Description  INTERVENTIONS:  - Identify patients at risk for skin breakdown  - Assess and monitor skin integrity  - Assess and monitor nutrition and hydration status  - Monitor labs (i e  albumin)  - Assess for incontinence   - Turn and reposition patient  - Assist with mobility/ambulation  - Relieve pressure over bony prominences  - Avoid friction and shearing  - Provide appropriate hygiene as needed including keeping skin clean and dry  - Evaluate need for skin moisturizer/barrier cream  - Collaborate with interdisciplinary team (i e  Nutrition, Rehabilitation, etc )   - Patient/family teaching  Outcome: Progressing

## 2019-02-28 NOTE — CONSULTS
Consult- Niki Calderon 1960, 63 y o  female MRN: [id number]    Unit/Bed#: -01 Encounter: [id number]    Primary Care Provider: Patricia Robins DO   Date and time admitted to hospital: 2/27/2019  6:59 AM      Inpatient consult to Internal Medicine  Consult performed by: Bebe Traylor PA-C  Consult ordered by: Casey Syed PA-C        Submucous leiomyoma of uterus  Assessment & Plan  · Patient is status post day 1 on a joint incisional hernia repair and KHUSHI/BSO  · No acute complaints at this time, patient is resting  · Continue with pain control and IV fluids  · Supportive care  · Prophylactic IV antibiotics      * Incisional hernia, without obstruction or gangrene  Assessment & Plan  · Patient is status post day 1 on a joint incisional hernia repair and KHUSHI/BSO  · No acute complaints at this time, patient is resting  · Continue with pain control and IV fluids  · Supportive care  · Prophylactic IV antibiotics      Hyperlipidemia  Assessment & Plan  · Most recent lipid panel was done in November 2018 which was within an acceptable range, her cholesterol is 137, mildly elevated triglycerides-186, LDL - 60 HDL -40  · Continue with atorvastatin 20 mg daily  · Will repeat lipid panel  · Continue with lifestyle modification, patient goes for walks     Essential hypertension  Assessment & Plan  Blood pressure is controlled at this time most recent BP is 118/61  · Continue with Norvasc 10 mg daily  · Toprol XL 25 mg Q 24  · ASA 81 mg daily  · Lasix 40 mg b i d   · Heart healthy carb controlled diet, when ready to advance diet    Controlled type 2 diabetes mellitus without complication [facility])  Assessment & Plan  Lab Results   Component Value Date    HGBA1C 5 8 01/31/2019       Recent Labs     02/27/19  0747 02/27/19  1147 02/27/19  1808 02/27/19  2341   POCGLU 105 195* 206* 225*       Blood Sugar Average: Last 72 hrs:  (P) 182 75     · Patient's most recent hemoglobin A1c is 5 8, she reports that she is well controlled at home   · She is on oral hypoglycemics at home Januvia 100 mg and Glucophage 1000 mg  · Will start correction subcutaneous insulin for tighter glycemic control while inpatient  · Monitor fingerstick glucose  · Carbohydrate controlled diet when ready to advance diet    CAD in native artery  Assessment & Plan  · Currently asymptomatic, she is on aspirin and statin  · Echocardiogram done 02/05/2019 revealed an ejection fraction of 50%   · it appears that patient had a cardiac catheterization done 01/07/2018 after an abnormal stress test, cardiac catheterization revealed mid RCA lesion with 50% stenosis   · it appears that they recommended aspirin and Plavix, patient is only on asa and statin for now   ·  Patient follows with Dr Ravin Rascon outpatient     Chronic diastolic HF (heart failure) ([facility] [address] )  Assessment & Plan  · Currently euvolemic  · Continue with Lasix 40 b i d  · Last echocardiogram done 02/05/2019-EF 50%  · Patient follows with Dr Ravin Rascon  · Patient is getting IV fluids so monitor volume status     Uncomplicated asthma  Assessment & Plan  · Stable, no current wheezing  · Continue with ipratropium (ATROVENT) 0 02 % nebulizer solution p r n  Tobacco use  Assessment & Plan  · Encouraged smoking cessation  · Continue with Nicoderm patch            VTE Prophylaxis: Enoxaparin (Lovenox)  / sequential compression device     Recommendations for Discharge:  · Defer to General surgery/gyn    Counseling / Coordination of Care Time: 1 hour  Greater than 50% of total time spent on patient counseling and coordination of care  Collaboration of Care:  Were Recommendations Directly Discussed with Primary Treatment Team? - No     History of Present Illness:    Maurilio Rowe is a 63 y o  female who is originally admitted to the general surgery and gyn service due to an incisional hernia repair and uterine fibroids, patient is status post day 1 incisional hernia repair with mesh and a total abdominal hysterectomy bilateral salpingo-oophorectomy  We are consulted for management of her diabetes, CHF, CAD asthma hypertension while inpatient, patient follows with Dr Laurence Fernandes as her PCP and Dr Renee Ballard (cardiology)   Patient was seen and examined at bedside, she appears in no acute distress, patient is very fatigued as she is resting from her abdominal surgery and has been getting pain medication  Patient was able to give history however was limited due to fatigue  Patient denies any acute symptoms such as fever, chills,  chest pain or  shortness of breath  She does complain of abdominal soreness however that is expected  At home patient blood pressure and blood sugars are well controlled, she is currently on metformin and Januvia at home  She has been compliant with taking her medications, diet, and follow-up appointments with her specialist patient also and remains very active while at home  She also admitted that she is a smoker, I did encourage smoking cessation  Review of Systems:    Review of Systems   Constitutional: Negative for activity change and diaphoresis  HENT: Negative  Eyes: Negative  Respiratory: Negative  Endocrine: Negative  Genitourinary: Negative  Musculoskeletal: Negative  Psychiatric/Behavioral: Negative          Past Medical and Surgical History:     Past Medical History:   Diagnosis Date    Acute on chronic congestive heart failure with left ventricular diastolic dysfunction (HCC)     last assessed 2017    Asthma     Atelectasis     [facility]     [facility]     Diverticulitis 2017    with perforation and abscess     Hyperlipidemia     Hypertension     Lesion of spleen     Pericardial effusion        Past Surgical History:   Procedure Laterality Date    ABDOMINAL SURGERY       SECTION      COLON SURGERY      COLONOSCOPY      COLOSTOMY  2017    HARTMANS PROCEDURE N/A 2017 Procedure: EXPLORATORY LAPAROTOMY; PARTIAL SMALL BOWEL RESECTION; HARTMANS PROCEDURE; OSTOMY;  Surgeon: Donald Camara MD;  [facility];  Service:     MAMMO STEREOTACTIC BREAST BIOPSY LEFT (ALL INC) Left 10/4/2018    MT CLOSE ENTEROSTOMY N/A 9/8/2017    Procedure: OPEN COLOSTOMY REVERSAL;  Surgeon: Donald Camara MD;  [facility];  Service: General    MT COLONOSCOPY FLX DX W/COLLJ SPEC WHEN PFRMD N/A 8/16/2017    Procedure: COLONOSCOPY; DILATION OF OSTOMY;  Surgeon: Donald Camara MD;  Location: [facility]; Service: General    MT EXC SKIN BENIG <0 5 CM REMAINDER BODY N/A 11/29/2017    Procedure: SCALP MASS EXCISION X 2;  Surgeon: Donald Camara MD;  [facility];  Service: [facility] N/A 2/27/2019    Procedure: INCISIONAL HERNIA REPAIR, COMPONENT SEPARATION;  Surgeon: Donald Camara MD;  [facility];  Service: General    MT TOTAL ABDOM HYSTERECTOMY N/A 2/27/2019    Procedure: TOTAL ABDOMINAL HYSTERECTOMY; BSO;  Surgeon: David Ching MD;  [facility];  Service: Gynecology    VAC DRESSING APPLICATION N/A 1/0/7172    Procedure: APPLICATION VAC DRESSING;  Surgeon: Donald Camara MD;  [facility];  Service:        Meds/Allergies:    all medications and allergies reviewed    Allergies:    Allergies   Allergen Reactions    Ciprofloxacin Itching     Starts at hands to feet then the whole entire body       Social History:     Marital Status:     Substance Use History:   Social History     Substance and Sexual Activity   Alcohol Use Yes    Frequency: Monthly or less    Comment: socially      Social History     Tobacco Use   Smoking Status Current Every Day Smoker    Packs/day: 0 25    Years: 20 00    Pack years: 5 00    Types: Cigarettes    Start date: 8/2/1991   Smokeless Tobacco Never Used     Social History     Substance and Sexual Activity   Drug Use No       Family History:    non-contributory    Physical Exam:     Vitals:   Blood Pressure: 118/61 (02/27/19 2355)  Pulse: 78 (02/27/19 2355)  Temperature: 98 8 °F (37 1 °C) (02/27/19 1519)  Temp Source: Temporal (02/27/19 1139)  Respirations: 18 (02/27/19 2355)  Height: 5' (152 4 cm) (02/27/19 0731)  Weight - Scale: 78 2 kg (172 lb 6 4 oz) (02/27/19 0731)  SpO2: 92 % (02/27/19 2355)    Physical Exam   Constitutional: She is oriented to person, place, and time  She appears well-developed and well-nourished  HENT:   Head: Normocephalic  NG tube in place    Eyes: Pupils are equal, round, and reactive to light  Neck: Normal range of motion  Cardiovascular: Normal rate, regular rhythm, normal heart sounds and intact distal pulses  Exam reveals no gallop and no friction rub  No murmur heard  Pulmonary/Chest: Effort normal and breath sounds normal  No stridor  No respiratory distress  She has no wheezes  She has no rales  She exhibits no tenderness  Abdominal:   +ttp, Dressing covering midline abdomen appears clean, dry, intact, draine is in place  Meek in place  Musculoskeletal: Normal range of motion  She exhibits no edema or deformity  Neurological: She is alert and oriented to person, place, and time  Skin: Skin is warm and dry  Additional Data:     Lab Results: I have personally reviewed pertinent reports  Lab Results   Component Value Date/Time    HGBA1C 5 8 01/31/2019 12:19 PM    HGBA1C 12 1 (H) 11/15/2018 10:52 AM    HGBA1C 5 2 09/01/2017 10:55 AM     Results from last 7 days   Lab Units 02/27/19  2341 02/27/19  1808 02/27/19  1147 02/27/19  0747   POC GLUCOSE mg/dl 225* 206* 195* 105           Imaging: I have personally reviewed pertinent reports        No orders to display       EKG, Pathology, and Other Studies Reviewed on Admission:   · EKG:

## 2019-02-28 NOTE — PHYSICAL THERAPY NOTE
Physical Therapy Evaluation     Patient's Name: Kareem Kay    Admitting Diagnosis  Incisional hernia, without obstruction or gangrene [K43 2]  Submucous leiomyoma of uterus [D25 0]    Problem List  Patient Active Problem List   Diagnosis    Controlled type 2 diabetes mellitus without complication ([facility]    Uncomplicated asthma    History of colostomy    Essential hypertension    Hyperlipidemia    Chronic diastolic HF (heart failure) ([facility]    Pericardial effusion    Diabetic nephropathy associated with type 2 diabetes mellitus ([facility] )    CAD in native artery    Incisional hernia, without obstruction or gangrene    Pre-operative clearance    Uterine leiomyoma    Submucous leiomyoma of uterus    Tobacco use       Past Medical History  Past Medical History:   Diagnosis Date    Acute on chronic congestive heart failure with left ventricular diastolic dysfunction ([facility] )     last assessed 2017    Asthma     Atelectasis     CHF (congestive heart failure) ([facility]     Diabetes mellitus ([facility]     Diverticulitis 2017    with perforation and abscess     Hyperlipidemia     Hypertension     Lesion of spleen     Pericardial effusion        Past Surgical History  Past Surgical History:   Procedure Laterality Date    ABDOMINAL SURGERY       SECTION      COLON SURGERY      COLONOSCOPY      COLOSTOMY  2017    HARTMANS PROCEDURE N/A 2017    Procedure: EXPLORATORY LAPAROTOMY; PARTIAL SMALL BOWEL RESECTION; HARTMANS PROCEDURE; OSTOMY;  Surgeon: Merline Dunnings, MD;  [facility];  Service:     MAMMO STEREOTACTIC BREAST BIOPSY LEFT (ALL INC) Left 10/4/2018    [place] CLOSE ENTEROSTOMY N/A 2017    Procedure: OPEN COLOSTOMY REVERSAL;  Surgeon: Merline Dunnings, MD;  [facility];  Service: General    WY COLONOSCOPY FLX DX W/MAYELA Mccain 1977 PFRMD N/A 2017    Procedure: COLONOSCOPY; DILATION OF OSTOMY;  Surgeon: Merline Dunnings, MD;  [facility];   Service: General    W[place] EXC SKIN BENIG <0 5 CM REMAINDER BODY N/A 11/29/2017    Procedure: SCALP MASS EXCISION X 2;  Surgeon: Marielle Medeiros MD;  [facility];  Service: General    [address] [place] [address] N/A 2/27/2019    Procedure: INCISIONAL HERNIA REPAIR, COMPONENT SEPARATION;  Surgeon: Marielle Medeiros MD;  [facility];  Service: General    SD TOTAL ABDOM HYSTERECTOMY N/A 2/27/2019    Procedure: TOTAL ABDOMINAL HYSTERECTOMY; BSO;  Surgeon: Kayla Rivera MD;  [facility];  Service: Gynecology    VAC DRESSING APPLICATION N/A 3/6/9927    Procedure: APPLICATION VAC DRESSING;  Surgeon: Marielle Medeiros MD;  [facility];  Service:           02/28/19 1224   Note Type   Note type Eval only   Pain Assessment   Pain Assessment 0-10   Pain Score 4   Pain Type Surgical pain   Pain Location Abdomen   Pain Orientation Mid   Home Living   Type of [address] Two level;Performs ADLs on one level;Stairs to enter without rails  (bi-level, 3 JERRY w/o railing, 6 steps down to bedroom)   Bathroom Shower/Tub Tub/shower unit   Bathroom Toilet Standard   Bathroom Equipment   (no DME at baseline)   [address]   (no AD used at baseline)   Prior Function   Level of [place] Independent with ADLs and functional mobility   Lives With Daughter;Family   Receives Help From Family   ADL Assistance Independent   IADLs Independent   Falls in the last 6 months 0   Vocational Full time employment   Restrictions/Precautions   Weight Bearing Precautions Per Order No   Braces or Orthoses   (none per pt)   Other Precautions Chair Alarm; Bed Alarm;Multiple lines; Fall Risk;Pain  (abdominal precautions)   General   Family/Caregiver Present No   Cognition   Overall Cognitive Status WFL   Arousal/Participation Alert   Orientation Level Oriented X4   Memory Within functional limits   Following Commands Follows all commands and directions without difficulty   Comments pt agreeable to PT evaluation   RUE Assessment   RUE Assessment WFL   LUE Assessment   LUE Assessment WFL   RLE Assessment   RLE Assessment WFL   LLE Assessment   LLE Assessment WFL   Coordination   Movements are Fluid and Coordinated 1   Sensation WFL   Light Touch   RLE Light Touch Grossly intact   LLE Light Touch Grossly intact   Bed Mobility   Sit to Supine 4  Minimal assistance  (education for log roll technique)   Additional items Assist x 1;HOB elevated; Bedrails; Increased time required;Verbal cues;LE management   Additional Comments pt received OOB in recliner upon arrival   Transfers   Sit to Stand 5  Supervision   Additional items Assist x 1; Armrests; Increased time required;Verbal cues   Stand to Sit 5  Supervision   Additional items Assist x 1; Armrests; Increased time required;Verbal cues   Additional Comments Education to pt for continuing gait trials c NSG assistance, pt verbalizing understanding  Ambulation/Elevation   Gait pattern Decreased foot clearance; Short stride; Step to   Gait Assistance 5  Supervision   Additional items Assist x 1;Verbal cues; Tactile cues   Assistive Device Rolling walker   Distance 5', further distance declined by pt   Stair Management Assistance Not tested   Balance   Static Sitting Good   Dynamic Sitting Fair +   Static Standing Fair   Dynamic Standing Fair   Ambulatory Fair   Endurance Deficit   Endurance Deficit Yes   Activity Tolerance   Activity Tolerance Patient limited by fatigue;Patient limited by pain   Nurse Made Aware ASHLEY Baez verbalized pt appropriate to see, made aware of session outcome/recs   Assessment   Prognosis Good   Problem List Decreased strength;Decreased endurance; Impaired balance;Decreased mobility;Pain   Assessment Pt is 63 y o  female seen for PT evaluation on 2/28/2019 [facility] w/ Incisional hernia, without obstruction or gangrene   Pt presents for incisional hernia repair, component separation & total abdominal hysterectomy  Pt now POD 1  PT consulted to assess pt's functional mobility and d/c needs  Order placed for PT eval and tx, w/ up and OOB as tolerated and ambulate patient order  Performed at least 2 patient identifiers during session: Name and wristband  Comorbidities affecting pt's physical performance at time of assessment include: acute on chronic CHF c L ventricular diastolic dysfunction, asthma, atelectasis, DM, diverticulitis, HLD, HTN, lesion of spleen, pericardial effusion  PTA, pt was independent w/ all functional mobility w/ no AD/DME, ambulates unrestricted distances and all terrain, has 3+6 JERRY, lives w/ dtr in bi- level home and works full time  Personal factors affecting pt at time of IE include: inaccessible home environment, lives in 2 story house, ambulating w/ assistive device, inability to navigate level surfaces w/o external assistance and decreased initiation and engagement  Please find objective findings from PT assessment regarding body systems outlined above with impairments and limitations including weakness, impaired balance, decreased endurance, gait deviations, pain and fall risk, as well as mobility assessment (need for SBA-min assist w/ all phases of mobility when usually ambulating independently and need for cueing for mobility technique)  The following objective measures performed on IE also reveal limitations: Barthel Index: 45/100 and Modified Fede: 4 (moderate/severe disability)  Pt's clinical presentation is currently unstable/unpredictable seen in pt's presentation of need for input for task focus and mobility technique and ongoing medical assessment  Pt to benefit from continued PT tx to address deficits as defined above and maximize level of functional independent mobility and consistency  From PT/mobility standpoint, recommendation at time of d/c would be Home PT with family support pending progress in order to facilitate return to PLOF     Barriers to Discharge Inaccessible home environment;Decreased caregiver support   Goals   Patient Goals to get BTB   STG Expiration Date 03/10/19   Short Term Goal #1 In 7-10 days: Increase bilateral LE strength 1/2 grade to facilitate independent mobility, Perform all bed mobility tasks modified independent to decrease caregiver burden, Perform all transfers modified independent to improve independence, Ambulate > 150 ft  with least restrictive assistive device modified independent w/o LOB and w/ normalized gait pattern 100% of the time, Navigate 6 stairs modified independent with unilateral handrail to facilitate return to previous living environment, Increase all balance 1/2 grade to decrease risk for falls, Complete exercise program independently, Tolerate 4 hr OOB to faciliate upright tolerance, Improve Barthel Index score to 60 or greater to facilitate independence and PT provider will perform functional balance assessment to determine fall risk   Treatment Day 0   Plan   Treatment/Interventions Functional transfer training;LE strengthening/ROM; Therapeutic exercise; Endurance training;Elevations; Patient/family training;Equipment eval/education; Bed mobility;Gait training;Spoke to nursing   PT Frequency 5x/wk   Recommendation   Recommendation Home PT; Home with family support  (further PT needs TBD pending progress)   Equipment Recommended Walker  (RW, continue trials)   PT - OK to Discharge No   Modified Fede Scale   Modified [place] Scale 4   Barthel Index   Feeding 5   Bathing 0   Grooming Score 0   Dressing Score 5   Bladder Score 10   Bowels Score 10   Toilet Use Score 5   Transfers (Bed/Chair) Score 10   Mobility (Level Surface) Score 0   Stairs Score 0   Barthel Index Score 45         Anita White, PT, DPT

## 2019-02-28 NOTE — SOCIAL WORK
Cm met with patient at bedside, patient alert and oriented during interview  Patient reports residing in a private home with family  Patient reports being completely independent with ADL's, no dme use, is completely independent with ADL's, still works as a public service   Patient stated she will think about home vna when stable  Patient fills her prescriptions at [facility] [address] with no co-payment barriers  Patient reports having information regarding poa  No reported MH/SA, cm team will continue to follow and assess for needs  CM reviewed discharge planning process including the following: identifying help at home, patient preference for discharge planning needs, pharmacy preference, and availability of treatment team to discuss questions or concerns patient and/or family may have regarding understanding medications and recognizing signs and symptoms once discharged  CM also encouraged patient to follow up with all recommended appointments after discharge  Patient advised of importance for patient and family to participate in managing patients medical well being  CM name and role reviewed  Discharge Checklist reviewed and CM will continue to monitor for progress toward discharge goals in nursing and provider rounds

## 2019-02-28 NOTE — ASSESSMENT & PLAN NOTE
Lab Results   Component Value Date    HGBA1C 5 8 01/31/2019       Recent Labs     02/27/19  0747 02/27/19  1147 02/27/19  1808 02/27/19  2341   POCGLU 105 195* 206* 225*       Blood Sugar Average: Last 72 hrs:  (P) 182 75     · Patient's most recent hemoglobin A1c is 5 8, she reports that she is well controlled at home   · She is on oral hypoglycemics at home Januvia 100 mg and Glucophage 1000 mg  · Will start correction subcutaneous insulin for tighter glycemic control while inpatient  · Monitor fingerstick glucose  · Carbohydrate controlled diet when ready to advance diet

## 2019-02-28 NOTE — ASSESSMENT & PLAN NOTE
· Currently euvolemic  · Continue with Lasix 40 b i d    · Last echocardiogram done 02/05/2019-EF 50%  · Patient follows with Dr Alva Elizondo  · Patient is getting IV fluids so monitor volume status

## 2019-03-01 ENCOUNTER — APPOINTMENT (INPATIENT)
Dept: RADIOLOGY | Facility: HOSPITAL | Age: 59
DRG: 742 | End: 2019-03-01
Payer: COMMERCIAL

## 2019-03-01 PROBLEM — R50.82 POSTOPERATIVE FEVER: Status: ACTIVE | Noted: 2019-03-01

## 2019-03-01 LAB
ANION GAP SERPL CALCULATED.3IONS-SCNC: 7 MMOL/L (ref 4–13)
ANION GAP SERPL CALCULATED.3IONS-SCNC: 8 MMOL/L (ref 4–13)
BUN SERPL-MCNC: 11 MG/DL (ref 5–25)
BUN SERPL-MCNC: 12 MG/DL (ref 5–25)
CALCIUM SERPL-MCNC: 8.2 MG/DL (ref 8.3–10.1)
CALCIUM SERPL-MCNC: 8.2 MG/DL (ref 8.3–10.1)
CHLORIDE SERPL-SCNC: 106 MMOL/L (ref 100–108)
CHLORIDE SERPL-SCNC: 108 MMOL/L (ref 100–108)
CO2 SERPL-SCNC: 25 MMOL/L (ref 21–32)
CO2 SERPL-SCNC: 28 MMOL/L (ref 21–32)
CREAT SERPL-MCNC: 0.58 MG/DL (ref 0.6–1.3)
CREAT SERPL-MCNC: 0.62 MG/DL (ref 0.6–1.3)
ERYTHROCYTE [DISTWIDTH] IN BLOOD BY AUTOMATED COUNT: 15.4 % (ref 11.6–15.1)
ERYTHROCYTE [DISTWIDTH] IN BLOOD BY AUTOMATED COUNT: 15.6 % (ref 11.6–15.1)
GFR SERPL CREATININE-BSD FRML MDRD: 116 ML/MIN/1.73SQ M
GFR SERPL CREATININE-BSD FRML MDRD: 118 ML/MIN/1.73SQ M
GLUCOSE SERPL-MCNC: 152 MG/DL (ref 65–140)
GLUCOSE SERPL-MCNC: 153 MG/DL (ref 65–140)
GLUCOSE SERPL-MCNC: 163 MG/DL (ref 65–140)
GLUCOSE SERPL-MCNC: 166 MG/DL (ref 65–140)
GLUCOSE SERPL-MCNC: 173 MG/DL (ref 65–140)
GLUCOSE SERPL-MCNC: 173 MG/DL (ref 65–140)
GLUCOSE SERPL-MCNC: 188 MG/DL (ref 65–140)
HCT VFR BLD AUTO: 23.6 % (ref 34.8–46.1)
HCT VFR BLD AUTO: 26.2 % (ref 34.8–46.1)
HGB BLD-MCNC: 7.4 G/DL (ref 11.5–15.4)
HGB BLD-MCNC: 8 G/DL (ref 11.5–15.4)
MCH RBC QN AUTO: 30.3 PG (ref 26.8–34.3)
MCH RBC QN AUTO: 30.6 PG (ref 26.8–34.3)
MCHC RBC AUTO-ENTMCNC: 30.5 G/DL (ref 31.4–37.4)
MCHC RBC AUTO-ENTMCNC: 31.4 G/DL (ref 31.4–37.4)
MCV RBC AUTO: 98 FL (ref 82–98)
MCV RBC AUTO: 99 FL (ref 82–98)
PLATELET # BLD AUTO: 183 THOUSANDS/UL (ref 149–390)
PLATELET # BLD AUTO: 202 THOUSANDS/UL (ref 149–390)
PMV BLD AUTO: 10.1 FL (ref 8.9–12.7)
PMV BLD AUTO: 8.9 FL (ref 8.9–12.7)
POTASSIUM SERPL-SCNC: 3.6 MMOL/L (ref 3.5–5.3)
POTASSIUM SERPL-SCNC: 3.8 MMOL/L (ref 3.5–5.3)
RBC # BLD AUTO: 2.42 MILLION/UL (ref 3.81–5.12)
RBC # BLD AUTO: 2.64 MILLION/UL (ref 3.81–5.12)
SODIUM SERPL-SCNC: 141 MMOL/L (ref 136–145)
SODIUM SERPL-SCNC: 141 MMOL/L (ref 136–145)
WBC # BLD AUTO: 8.25 THOUSAND/UL (ref 4.31–10.16)
WBC # BLD AUTO: 8.27 THOUSAND/UL (ref 4.31–10.16)

## 2019-03-01 PROCEDURE — 80048 BASIC METABOLIC PNL TOTAL CA: CPT | Performed by: PHYSICIAN ASSISTANT

## 2019-03-01 PROCEDURE — 99232 SBSQ HOSP IP/OBS MODERATE 35: CPT | Performed by: PHYSICIAN ASSISTANT

## 2019-03-01 PROCEDURE — 85027 COMPLETE CBC AUTOMATED: CPT | Performed by: PHYSICIAN ASSISTANT

## 2019-03-01 PROCEDURE — 99024 POSTOP FOLLOW-UP VISIT: CPT | Performed by: SURGERY

## 2019-03-01 PROCEDURE — 71046 X-RAY EXAM CHEST 2 VIEWS: CPT

## 2019-03-01 PROCEDURE — 82948 REAGENT STRIP/BLOOD GLUCOSE: CPT

## 2019-03-01 RX ORDER — OXYCODONE HYDROCHLORIDE AND ACETAMINOPHEN 5; 325 MG/1; MG/1
1 TABLET ORAL EVERY 4 HOURS PRN
Status: DISCONTINUED | OUTPATIENT
Start: 2019-03-01 | End: 2019-03-04 | Stop reason: HOSPADM

## 2019-03-01 RX ORDER — OXYCODONE HYDROCHLORIDE AND ACETAMINOPHEN 5; 325 MG/1; MG/1
2 TABLET ORAL EVERY 4 HOURS PRN
Status: DISCONTINUED | OUTPATIENT
Start: 2019-03-01 | End: 2019-03-04 | Stop reason: HOSPADM

## 2019-03-01 RX ADMIN — INSULIN LISPRO 1 UNITS: 100 INJECTION, SOLUTION INTRAVENOUS; SUBCUTANEOUS at 00:37

## 2019-03-01 RX ADMIN — ACETAMINOPHEN 650 MG: 325 TABLET, FILM COATED ORAL at 00:05

## 2019-03-01 RX ADMIN — INSULIN LISPRO 1 UNITS: 100 INJECTION, SOLUTION INTRAVENOUS; SUBCUTANEOUS at 19:08

## 2019-03-01 RX ADMIN — OXYCODONE HYDROCHLORIDE AND ACETAMINOPHEN 1 TABLET: 5; 325 TABLET ORAL at 17:06

## 2019-03-01 RX ADMIN — DEXTROSE AND SODIUM CHLORIDE 100 ML/HR: 5; .45 INJECTION, SOLUTION INTRAVENOUS at 05:28

## 2019-03-01 RX ADMIN — DEXTROSE AND SODIUM CHLORIDE 100 ML/HR: 5; .45 INJECTION, SOLUTION INTRAVENOUS at 22:18

## 2019-03-01 RX ADMIN — FAMOTIDINE 20 MG: 10 INJECTION, SOLUTION INTRAVENOUS at 10:03

## 2019-03-01 RX ADMIN — OXYCODONE HYDROCHLORIDE AND ACETAMINOPHEN 1 TABLET: 5; 325 TABLET ORAL at 22:17

## 2019-03-01 RX ADMIN — INSULIN LISPRO 1 UNITS: 100 INJECTION, SOLUTION INTRAVENOUS; SUBCUTANEOUS at 12:48

## 2019-03-01 RX ADMIN — ENOXAPARIN SODIUM 40 MG: 40 INJECTION SUBCUTANEOUS at 10:04

## 2019-03-01 NOTE — ASSESSMENT & PLAN NOTE
· Currently euvolemic  · Continue with Lasix 40 b i d   Once taking PO  · Last echocardiogram done 02/05/2019-EF 50%, Grade 1 DD  · Patient follows with Dr Checo Barrios  · As she is NPO, she is on IVF post-op per surgical service - monitor volume status, resume diuretics once able

## 2019-03-01 NOTE — PLAN OF CARE
Problem: Prexisting or High Potential for Compromised Skin Integrity  Goal: Skin integrity is maintained or improved  Description  INTERVENTIONS:  - Identify patients at risk for skin breakdown  - Assess and monitor skin integrity  - Assess and monitor nutrition and hydration status  - Monitor labs (i e  albumin)  - Assess for incontinence   - Turn and reposition patient  - Assist with mobility/ambulation  - Relieve pressure over bony prominences  - Avoid friction and shearing  - Provide appropriate hygiene as needed including keeping skin clean and dry  - Evaluate need for skin moisturizer/barrier cream  - Collaborate with interdisciplinary team (i e  Nutrition, Rehabilitation, etc )   - Patient/family teaching  Outcome: Progressing     Problem: Potential for Falls  Goal: Patient will remain free of falls  Description  INTERVENTIONS:  - Assess patient frequently for physical needs  -  Identify cognitive and physical deficits and behaviors that affect risk of falls    -  [place] fall precautions as indicated by assessment   - Educate patient/family on patient safety including physical limitations  - Instruct patient to call for assistance with activity based on assessment  - Modify environment to reduce risk of injury  - Consider OT/PT consult to assist with strengthening/mobility  Outcome: Progressing     Problem: DISCHARGE PLANNING - CARE MANAGEMENT  Goal: Discharge to post-acute care or home with appropriate resources  Description  INTERVENTIONS:  - Conduct assessment to determine patient/family and health care team treatment goals, and need for post-acute services based on payer coverage, community resources, and patient preferences, and barriers to discharge  - Address psychosocial, clinical, and financial barriers to discharge as identified in assessment in conjunction with the patient/family and health care team  - Arrange appropriate level of post-acute services according to patient?s   needs and preference and payer coverage in collaboration with the physician and health care team  - Communicate with and update the patient/family, physician, and health care team regarding progress on the discharge plan  - Arrange appropriate transportation to post-acute venues  Outcome: Progressing

## 2019-03-01 NOTE — UTILIZATION REVIEW
Continued Stay Review    Date: 11-19    63year old female with incisional hernia  Pain  3/10 on  pca pump  Continuous iv dilaudid  Hemoglobin trending down from 8 9 to 8 0        Vital Signs: /76   Pulse 87   Temp 99 14 °F (37 3 °C)   Resp 18   Ht 5' (1 524 m)   Wt 78 2 kg (172 lb 6 4 oz)   LMP 08/16/2014 (Approximate)   SpO2 97%   BMI 33 67 kg/m²      RBC 2 64Low    Hemoglobin 8 0Low    Hematocrit 26 2Low    MCV 99High    MCHC 30 5Low    RDW 15 6High      FINGERSTICK Q6 HR   163   153       Assessment/Plan:    SURGERY DATE: 2/27/2019    INCISIONAL HERNIA REPAIR, COMPONENT SEPARATION (N/A)  TOTAL ABDOMINAL HYSTERECTOMY; BSO (N/A)  Post-Op Diagnosis Codes:     * Incisional hernia, without obstruction or gangrene [K43 2]     * Submucous leiomyoma of uterus [D25 0]  Operative Findings:  1  Enlarged fibroid uterus, Single fibroid encompassing the fundus approximately 8cm in size  2  Filmy adhesions to bilateral adnexa    2-28 NGT DISCONTINUED   CONTINUES NPO       Medications:     acetaminophen 650 mg Oral Q6H PRN   aspirin 81 mg Oral Daily   dextrose 5 % and sodium chloride 0 45 % 100 mL/hr Intravenous Continuous   diphenhydrAMINE 25 mg Intravenous Q6H PRN   enoxaparin 40 mg Subcutaneous Daily   famotidine 20 mg Intravenous Q24H SCARLETT   HYDROmorphone  Intravenous Continuous   insulin lispro 1-5 Units Subcutaneous Q6H [address]   ipratropium 0 5 mg Nebulization PRN   naloxone 0 04 mg Intravenous Q3 min PRN   nicotine 1 patch Transdermal Daily   ondansetron 4 mg Intravenous Q6H PRN         Discharge Plan:   To be determined

## 2019-03-01 NOTE — ASSESSMENT & PLAN NOTE
Lab Results   Component Value Date    HGBA1C 5 8 01/31/2019       Recent Labs     02/28/19  1918 03/01/19  0017 03/01/19  0658 03/01/19  1132   POCGLU 166* 163* 153* 173*       Blood Sugar Average: Last 72 hrs:  (P) 177 2   · Hemoglobin A1c is 5 8  · Home orals on hold (Januvia 100 mg and Glucophage 1000 mg) while hospitalized  · Continue SSI coverage (change from Q6hr scheduled to TID AC and QHS once cleared for PO diet by surgeon) Carbohydrate controlled diet when ready to advance diet

## 2019-03-01 NOTE — ASSESSMENT & PLAN NOTE
· POD#2 on a joint incisional hernia repair and KHUSHI/BSO  · Patient wants something to eat - diet per surgical service   · Pain control per surgeon  · Supportive care  · Prophylactic IV antibiotics

## 2019-03-01 NOTE — ASSESSMENT & PLAN NOTE
· /76  · Home regimen is as follows: amlodipine 10 mg daily, Toprol XL 25 mg daily, Lasix 40 mg b i d   · Heart healthy carb controlled diet, when ready to advance diet

## 2019-03-01 NOTE — PROGRESS NOTES
Progress Note - General Surgery   Karena Dose 63 y o  female MRN: [id number]  Unit/Bed#: -01 Encounter: [id number]    Assessment/Plan  POD 2 s/p KHUSHI-BSO/ Incisional Hernia repair with Component Separation, JAROD drain  Pt states that she had only small amount of flatus out  No bowel movement  No vaginal bleeding  Temp 101 8 max last 24 hours  Currently 99 1, no leukocytosis (8), decreased bibasilar breath sounds  JAROD drain with serous drainage 40 ml/24h  - d/c PCA for pain control,   -encouraged pt to ambulate in [place] with assistance if necessary  -encouraged pt to use p o   pain meds to allow ambulation and deep breathing as pt is splinting    -encouraged IS   -cont JAROD drain  -cont NPO until more bowel function returns  Chief Complaint: I had only one small puff of gas  I don't feel distended  No n/v/     Objective Directed Exam: pt sitting in chair, appears comfortable  Lungs with decreased breath sounds bilateral bases  Pt is splinting  COR apical rate 80 with occasional skipped beat  ABD: soft, minimal tenderness, JAROD drain with serous drainage, scant amount today  Incision healing well with staples intact  Ho erythema drainage  Faint  bowel sounds  Extrem:  No calf tenderness    Blood pressure 135/76, pulse 87, temperature 99 14 °F (37 3 °C), resp  rate 18, height 5' (1 524 m), weight 78 2 kg (172 lb 6 4 oz), last menstrual period 08/16/2014, SpO2 97 %, not currently breastfeeding  ,Body mass index is 33 67 kg/m²        Intake/Output Summary (Last 24 hours) at 3/1/2019 1248  Last data filed at 3/1/2019 0726  Gross per 24 hour   Intake 3790 47 ml   Output 830 ml   Net 2960 47 ml       Invasive Devices     Peripheral Intravenous Line            Peripheral IV 02/27/19 Left Hand 2 days    Peripheral IV 02/27/19 Right Antecubital 2 days          Drain            Closed/Suction Drain Midline Abdomen Bulb 10 Fr  2 days                Physical Exam:   General Appearance:    Alert and orientated x 3, cooperative, no distress   Lungs:     Clear to auscultation bilaterally, respirations unlabored    Heart:    Regular rate and rhythm with occasional skipped beat  Abdomen:     As above          Extremities:   Extremities normal,  no cyanosis or edema   Pulses:   2+ and symmetric all extremities, no calf tenderness   Skin:   Skin color, texture, turgor normal, no rashes or lesions   Neurologic:   CNII-XII intact, normal strength, sensation and reflexes     Throughout, affect appropriate                           Labs:   CBC with diff:   Lab Results   Component Value Date    WBC 8 25 03/01/2019    HGB 8 0 (L) 03/01/2019    HCT 26 2 (L) 03/01/2019    MCV 99 (H) 03/01/2019     03/01/2019    MCH 30 3 03/01/2019    MCHC 30 5 (L) 03/01/2019    RDW 15 6 (H) 03/01/2019    MPV 10 1 03/01/2019    NRBC 0 01/31/2019   ,   BMP/CMP:  Lab Results   Component Value Date    K 3 8 03/01/2019     03/01/2019    CO2 25 03/01/2019    BUN 12 03/01/2019    CREATININE 0 58 (L) 03/01/2019    CALCIUM 8 2 (L) 03/01/2019    AST 13 01/31/2019    ALT 10 (L) 01/31/2019    ALKPHOS 56 01/31/2019    EGFR 118 03/01/2019   ,   Lipid Panel: No results found for: CHOL,   Coags:   Lab Results   Component Value Date    PTT 24 05/01/2017    INR 0 95 01/17/2018   ,     Blood Culture:   Lab Results   Component Value Date    BLOODCX No Growth After 5 Days  05/11/2017    BLOODCX No Growth After 5 Days   05/11/2017   ,   Urinalysis:   Lab Results   Component Value Date    COLORU Dk Yellow 05/11/2017    CLARITYU Cloudy 05/11/2017    SPECGRAV 1 025 05/11/2017    PHUR 5 5 05/11/2017    LEUKOCYTESUR Negative 05/11/2017    NITRITE Positive (A) 05/11/2017    GLUCOSEU Negative 05/11/2017    KETONESU 15 (1+) (A) 05/11/2017    BILIRUBINUR Moderate (A) 05/11/2017    BLOODU Negative 05/11/2017   ,   Urine Culture:   Lab Results   Component Value Date    URINECX No Growth <1000 cfu/mL 04/30/2017   ,   Wound Culure: No results found for: WOUNDCULT      Imaging: Mri Abdomen W Wo Contrast    Result Date: 2/22/2019  Impression: Indeterminate splenic lesions again identified, likely solid although stable dating back to at least April 25, 2017  This would favor a benign etiology  Focal fat dome of the right hepatic lobe and subcentimeter right lobe T2 hyperintensity too small to characterize  Ventral abdominal wall hernia containing unobstructed loops of small bowel  2 6 x 1 8 cm well-circumscribed cystic structure left perirectal region is probably chronic in retrospect  Although its etiology is unclear, it has a simple appearance on the images in which it is visualized  Adenomatous thickening of the left adrenal gland, stable  Consider 1 year follow-up CT study to ensure stability of the above findings   Workstation performed: [id number]         Catarina Nunn PA-C  3/1/2019

## 2019-03-01 NOTE — ASSESSMENT & PLAN NOTE
· Noted to have fevers (100 8, 101 66, 101 1) overnight  · Check CXR  · She is without leukocytosis   · She has IS on board already

## 2019-03-01 NOTE — ASSESSMENT & PLAN NOTE
· Without acute exacerbation  · Continue with ipratropium (ATROVENT) 0 02 % nebulizer solution p r n

## 2019-03-01 NOTE — PROGRESS NOTES
Progress Note - Bobby Leigh 1960, 63 y o  female MRN: [id number]    Unit/Bed#: -01 Encounter: [id number]    Primary Care Provider: Chuy Villalpando DO   Date and time admitted to hospital: 2/27/2019  6:59 AM        * Incisional hernia, without obstruction or gangrene  Assessment & Plan  · POD#2 on a joint incisional hernia repair and KHUSHI/BSO  · Patient wants something to eat - diet per surgical service   · Pain control per surgeon  · Supportive care  · Prophylactic IV antibiotics      Submucous leiomyoma of uterus  Assessment & Plan  · POD#2 on a joint incisional hernia repair and KHUSHI/BSO  · Pain control per surgeon  · Supportive care  · Prophylactic IV antibiotics      Postoperative fever  Assessment & Plan  · Noted to have fevers (100 8, 101 66, 101 1) overnight  · Check CXR  · She is without leukocytosis   · She has IS on board already    Controlled type 2 diabetes mellitus without complication [facility])  Assessment & Plan  Lab Results   Component Value Date    HGBA1C 5 8 01/31/2019       Recent Labs     02/28/19  1918 03/01/19  0017 03/01/19  0658 03/01/19  1132   POCGLU 166* 163* 153* 173*       Blood Sugar Average: Last 72 hrs:  (P) 177 2   · Hemoglobin A1c is 5 8  · Home orals on hold (Januvia 100 mg and Glucophage 1000 mg) while hospitalized  · Continue SSI coverage (change from Q6hr scheduled to TID AC and QHS once cleared for PO diet by surgeon) Carbohydrate controlled diet when ready to advance diet    CAD in native artery  Assessment & Plan  · Currently asymptomatic, she is on aspirin and statin  · Mid RCA lesion with 50% stenosis   · Patient follows with Dr Katarzyna Jacobo outpatient - on aspirin and statin per Cardiology records    Tobacco use  Assessment & Plan  · Encourage smoking cessation  · Continue with Nicoderm patch    Essential hypertension  Assessment & Plan  · /76  · Home regimen is as follows: amlodipine 10 mg daily, Toprol XL 25 mg daily, Lasix 40 mg b i d   · Heart healthy carb controlled diet, when ready to advance diet    Uncomplicated asthma  Assessment & Plan  · Without acute exacerbation  · Continue with ipratropium (ATROVENT) 0 02 % nebulizer solution p r n  Hyperlipidemia  Assessment & Plan  · Continue statin    Chronic diastolic HF (heart failure) (HCC)  Assessment & Plan  · Currently euvolemic  · Continue with Lasix 40 b i d  Once taking PO  · Last echocardiogram done 2019-EF 50%, Grade 1 DD  · Patient follows with Dr Renee Tafoya  · As she is NPO, she is on IVF post-op per surgical service - monitor volume status, resume diuretics once able    VTE Pharmacologic Prophylaxis:   Pharmacologic: Enoxaparin (Lovenox)  Mechanical VTE Prophylaxis in Place: Yes    Patient Centered Rounds: I have performed bedside rounds with nursing staff today  Discussions with Specialists or Other Care Team Provider: Surgery    Education and Discussions with Family / Patient: Patient    Time Spent for Care: 20 minutes  More than 50% of total time spent on counseling and coordination of care as described above  Current Length of Stay: 2 day(s)    Current Patient Status: Inpatient   Certification Statement: per surgical service  Discharge Plan: SLIM will continue to follow    Code Status: Level 1 - Full Code      Subjective:   Ms Brett Bryant is frustrated because she wants to eat  She reports occasional cough  She denies chills or rigors  Denies CP, SOB  She reports incision line pain but states that she will not take her pain meds unless "absolutely necessary"  Objective:     Vitals:   Temp (24hrs), Av 4 °F (38 °C), Min:99 14 °F (37 3 °C), Max:101 66 °F (38 7 °C)    Temp:  [99 14 °F (37 3 °C)-101 66 °F (38 7 °C)] 99 14 °F (37 3 °C)  HR:  [87-99] 87  Resp:  [16-18] 18  BP: (131-135)/(75-76) 135/76  SpO2:  [88 %-99 %] 97 %  Body mass index is 33 67 kg/m²  Input and Output Summary (last 24 hours):        Intake/Output Summary (Last 24 hours) at 3/1/2019 8741  Last data filed at 3/1/2019 5189  Gross per 24 hour   Intake 3790 47 ml   Output 830 ml   Net 2960 47 ml       Physical Exam:     Physical Exam   Constitutional: She is oriented to person, place, and time  No distress  The sitting upright comfortably resting in bedside chair she is cooperative   Cardiovascular: Normal rate and regular rhythm  Pulmonary/Chest: Effort normal  No respiratory distress  She has no wheezes  Decreased at base   Abdominal: Soft  Bowel sounds are normal  There is no tenderness  Musculoskeletal: She exhibits no edema  Neurological: She is alert and oriented to person, place, and time  Skin: Skin is warm and dry  She is not diaphoretic  Incision dressing CDI   Psychiatric: She has a normal mood and affect  Her behavior is normal    Vitals reviewed  Additional Data:     Labs:    Results from last 7 days   Lab Units 03/01/19  0833   WBC Thousand/uL 8 25   HEMOGLOBIN g/dL 8 0*   HEMATOCRIT % 26 2*   PLATELETS Thousands/uL 183     Results from last 7 days   Lab Units 03/01/19  0833   SODIUM mmol/L 141   POTASSIUM mmol/L 3 8   CHLORIDE mmol/L 108   CO2 mmol/L 25   BUN mg/dL 12   CREATININE mg/dL 0 58*   ANION GAP mmol/L 8   CALCIUM mg/dL 8 2*   GLUCOSE RANDOM mg/dL 152*         Results from last 7 days   Lab Units 03/01/19  1132 03/01/19  0658 03/01/19  0017 02/28/19  1918 02/28/19  1235 02/28/19  0551 02/27/19  2341 02/27/19  1808 02/27/19  1147 02/27/19  0747   POC GLUCOSE mg/dl 173* 153* 163* 166* 204* 182* 225* 206* 195* 105                   * I Have Reviewed All Lab Data Listed Above  * Additional Pertinent Lab Tests Reviewed:  All Labs Within Last 24 Hours Reviewed    Imaging:    Imaging Reports Reviewed Today Include: None  Imaging Personally Reviewed by Myself Includes:  None    Recent Cultures (last 7 days):           Last 24 Hours Medication List:     Current Facility-Administered Medications:  acetaminophen 650 mg Oral Q6H PRN Candis Lipscomb PA-C    aspirin 81 mg Oral Daily Alicia Lipscomb, [facility]    dextrose 5 % and sodium chloride 0 45 % 100 mL/hr Intravenous Continuous Brijesh Lipscomb PA-C Last Rate: 100 mL/hr (03/01/19 0528)   diphenhydrAMINE 25 mg Intravenous Q6H PRN Alicia Lipscomb PA-C    enoxaparin 40 mg Subcutaneous Daily Alicia Lipscomb PA-C    famotidine 20 mg Intravenous Q24H [address] Alicia Lipscomb PA-C    insulin lispro 1-5 Units Subcutaneous Q6H [address] Valeria Guerrero PA-C    ipratropium 0 5 mg Nebulization PRN Brijesh Lipscomb PA-C    naloxone 0 04 mg Intravenous Q3 min PRN Brijesh Lipscomb PA-C    nicotine 1 patch Transdermal Daily Alicia Lipscomb PA-C    ondansetron 4 mg Intravenous Q6H PRN Brijesh Lipscomb PA-C    oxyCODONE-acetaminophen 1 tablet Oral Q4H PRN Parker Giordano PA-C    oxyCODONE-acetaminophen 2 tablet Oral Q4H PRN Parker Giordano PA-C         Today, Patient Was Seen By: Fei Kang PA-C    ** Please Note: Dictation voice to text software may have been used in the creation of this document   **

## 2019-03-01 NOTE — ASSESSMENT & PLAN NOTE
· Currently asymptomatic, she is on aspirin and statin  · Mid RCA lesion with 50% stenosis   · Patient follows with Dr Romayne Daring outpatient - on aspirin and statin per Cardiology records

## 2019-03-01 NOTE — ASSESSMENT & PLAN NOTE
· POD#2 on a joint incisional hernia repair and KHUSHI/BSO  · Pain control per surgeon  · Supportive care  · Prophylactic IV antibiotics

## 2019-03-02 PROBLEM — D64.9 POSTOPERATIVE ANEMIA: Status: ACTIVE | Noted: 2019-03-02

## 2019-03-02 LAB
ANION GAP SERPL CALCULATED.3IONS-SCNC: 7 MMOL/L (ref 4–13)
BUN SERPL-MCNC: 8 MG/DL (ref 5–25)
CALCIUM SERPL-MCNC: 8.2 MG/DL (ref 8.3–10.1)
CHLORIDE SERPL-SCNC: 106 MMOL/L (ref 100–108)
CO2 SERPL-SCNC: 27 MMOL/L (ref 21–32)
CREAT SERPL-MCNC: 0.54 MG/DL (ref 0.6–1.3)
ERYTHROCYTE [DISTWIDTH] IN BLOOD BY AUTOMATED COUNT: 15.3 % (ref 11.6–15.1)
GFR SERPL CREATININE-BSD FRML MDRD: 121 ML/MIN/1.73SQ M
GLUCOSE SERPL-MCNC: 141 MG/DL (ref 65–140)
GLUCOSE SERPL-MCNC: 164 MG/DL (ref 65–140)
GLUCOSE SERPL-MCNC: 171 MG/DL (ref 65–140)
GLUCOSE SERPL-MCNC: 174 MG/DL (ref 65–140)
GLUCOSE SERPL-MCNC: 205 MG/DL (ref 65–140)
HCT VFR BLD AUTO: 22.6 % (ref 34.8–46.1)
HGB BLD-MCNC: 7.1 G/DL (ref 11.5–15.4)
MCH RBC QN AUTO: 30.9 PG (ref 26.8–34.3)
MCHC RBC AUTO-ENTMCNC: 31.4 G/DL (ref 31.4–37.4)
MCV RBC AUTO: 98 FL (ref 82–98)
PLATELET # BLD AUTO: 210 THOUSANDS/UL (ref 149–390)
PMV BLD AUTO: 9.4 FL (ref 8.9–12.7)
POTASSIUM SERPL-SCNC: 3.5 MMOL/L (ref 3.5–5.3)
RBC # BLD AUTO: 2.3 MILLION/UL (ref 3.81–5.12)
SODIUM SERPL-SCNC: 140 MMOL/L (ref 136–145)
WBC # BLD AUTO: 8.05 THOUSAND/UL (ref 4.31–10.16)

## 2019-03-02 PROCEDURE — 85027 COMPLETE CBC AUTOMATED: CPT | Performed by: PHYSICIAN ASSISTANT

## 2019-03-02 PROCEDURE — 82948 REAGENT STRIP/BLOOD GLUCOSE: CPT

## 2019-03-02 PROCEDURE — 99232 SBSQ HOSP IP/OBS MODERATE 35: CPT | Performed by: PHYSICIAN ASSISTANT

## 2019-03-02 PROCEDURE — 80048 BASIC METABOLIC PNL TOTAL CA: CPT | Performed by: PHYSICIAN ASSISTANT

## 2019-03-02 PROCEDURE — 99253 IP/OBS CNSLTJ NEW/EST LOW 45: CPT | Performed by: INTERNAL MEDICINE

## 2019-03-02 PROCEDURE — 99024 POSTOP FOLLOW-UP VISIT: CPT | Performed by: SURGERY

## 2019-03-02 RX ORDER — FUROSEMIDE 10 MG/ML
40 INJECTION INTRAMUSCULAR; INTRAVENOUS ONCE
Status: COMPLETED | OUTPATIENT
Start: 2019-03-02 | End: 2019-03-02

## 2019-03-02 RX ORDER — FUROSEMIDE 40 MG/1
40 TABLET ORAL
Status: DISCONTINUED | OUTPATIENT
Start: 2019-03-03 | End: 2019-03-04 | Stop reason: HOSPADM

## 2019-03-02 RX ORDER — ALBUTEROL SULFATE 2.5 MG/3ML
2.5 SOLUTION RESPIRATORY (INHALATION) EVERY 4 HOURS PRN
Status: DISCONTINUED | OUTPATIENT
Start: 2019-03-02 | End: 2019-03-04 | Stop reason: HOSPADM

## 2019-03-02 RX ORDER — METOPROLOL SUCCINATE 25 MG/1
25 TABLET, EXTENDED RELEASE ORAL DAILY
Status: DISCONTINUED | OUTPATIENT
Start: 2019-03-02 | End: 2019-03-04 | Stop reason: HOSPADM

## 2019-03-02 RX ORDER — ATORVASTATIN CALCIUM 20 MG/1
20 TABLET, FILM COATED ORAL
Status: DISCONTINUED | OUTPATIENT
Start: 2019-03-02 | End: 2019-03-04 | Stop reason: HOSPADM

## 2019-03-02 RX ADMIN — FUROSEMIDE 40 MG: 10 INJECTION, SOLUTION INTRAVENOUS at 16:29

## 2019-03-02 RX ADMIN — INSULIN LISPRO 1 UNITS: 100 INJECTION, SOLUTION INTRAVENOUS; SUBCUTANEOUS at 21:45

## 2019-03-02 RX ADMIN — OXYCODONE HYDROCHLORIDE AND ACETAMINOPHEN 1 TABLET: 5; 325 TABLET ORAL at 09:11

## 2019-03-02 RX ADMIN — INSULIN LISPRO 1 UNITS: 100 INJECTION, SOLUTION INTRAVENOUS; SUBCUTANEOUS at 12:52

## 2019-03-02 RX ADMIN — ATORVASTATIN CALCIUM 20 MG: 20 TABLET, FILM COATED ORAL at 16:28

## 2019-03-02 RX ADMIN — OXYCODONE HYDROCHLORIDE AND ACETAMINOPHEN 1 TABLET: 5; 325 TABLET ORAL at 16:43

## 2019-03-02 RX ADMIN — METOPROLOL SUCCINATE 25 MG: 25 TABLET, EXTENDED RELEASE ORAL at 12:52

## 2019-03-02 RX ADMIN — ASPIRIN 81 MG: 81 TABLET, COATED ORAL at 08:54

## 2019-03-02 RX ADMIN — FAMOTIDINE 20 MG: 10 INJECTION, SOLUTION INTRAVENOUS at 08:54

## 2019-03-02 RX ADMIN — INSULIN LISPRO 1 UNITS: 100 INJECTION, SOLUTION INTRAVENOUS; SUBCUTANEOUS at 06:00

## 2019-03-02 RX ADMIN — DEXTROSE AND SODIUM CHLORIDE 100 ML/HR: 5; .45 INJECTION, SOLUTION INTRAVENOUS at 08:54

## 2019-03-02 RX ADMIN — ENOXAPARIN SODIUM 40 MG: 40 INJECTION SUBCUTANEOUS at 08:54

## 2019-03-02 NOTE — ASSESSMENT & PLAN NOTE
Lab Results   Component Value Date    HGBA1C 5 8 01/31/2019       Recent Labs     03/01/19  1904 03/01/19  2344 03/02/19  0547 03/02/19  1220   POCGLU 173* 188* 174* 171*       Blood Sugar Average: Last 72 hrs:  (P) 177   · Hemoglobin A1c is 5 8  · Home orals on hold (Januvia 100 mg and Glucophage 1000 mg) while hospitalized  · Change SSI coverage from Q6hr scheduled to TID AC and QHS now that on PO diet  · Cardiac/carb controlled diet ordered

## 2019-03-02 NOTE — ASSESSMENT & PLAN NOTE
· Currently asymptomatic, she is on aspirin and statin  · Mid RCA lesion with 50% stenosis   · Patient follows with Dr Matilde Akhtar outpatient - on aspirin and statin per Cardiology records  · Cardiology consult pending

## 2019-03-02 NOTE — CONSULTS
Consultation - Cardiology  Beata Esquedaer 63 y o  female MRN: [id number]  Unit/Bed#: -01 Encounter: [id number]    Consults    Physician Requesting Consult: Donald Camara MD  Reason for Consult / Principal Problem:  Shortness of breath and anemia        HPI: Cardiologist Dr Catina Cheney is a 63y o  year old female who had surgery for hernia as well as hysterectomy is found to have anemia  Patient does not have any history of coronary artery disease or MI in the past   Patient did have cardiac catheterization in the past which did not show any significant coronary artery disease  Patient does have history of hypertension and hyperlipidemia  Patient has been on diuretic therapy at home  Patient is noted to have significant decrease in hemoglobin  Patient presently has mild shortness of breath but lying comfortably  Patient is regularly followed by Nely Upton cardiology  REVIEW OF SYSTEMS:  Constitutional:  Denies   chills   Eyes:  Denies change in visual acuity   HENT:  Denies nasal congestion or sore throat   Respiratory:  Dshortness of breath   Cardiovascular:  Denies chest pain or edema   GI:  Denies abdominal pain, nausea, vomiting, bloody stools or diarrhea   :    Underwent hysterectomy  Musculoskeletal:  Denies back pain or joint pain   Neurologic:  Denies headache, focal weakness or sensory changes   Endocrine:  Denies polyuria or polydipsia   Lymphatic:  Denies swollen glands   Psychiatric:  Denies depression or anxiety     Historical Information   Past Medical History:   Diagnosis Date    Acute on chronic congestive heart failure with left ventricular diastolic dysfunction ([facility] [address] )     last assessed 5/23/2017    Asthma     Atelectasis     CHF (congestive heart failure) ([facility] [address] [facility]     Diabetes mellitus ([facility] [address] )     Diverticulitis 2017    with perforation and abscess     Hyperlipidemia     Hypertension     Lesion of spleen     Pericardial effusion      Past Surgical History: Procedure Laterality Date    ABDOMINAL SURGERY       SECTION      COLON SURGERY      COLONOSCOPY      COLOSTOMY  2017    HARTMANS PROCEDURE N/A 2017    Procedure: EXPLORATORY LAPAROTOMY; PARTIAL SMALL BOWEL RESECTION; HARTMANS PROCEDURE; OSTOMY;  Surgeon: Peggy Schroeder MD;  [facility];  Service:     MAMMO STEREOTACTIC BREAST BIOPSY LEFT (ALL INC) Left 10/4/2018    [place] CLOSE ENTEROSTOMY N/A 2017    Procedure: OPEN COLOSTOMY REVERSAL;  Surgeon: Peggy Schroeder MD;  [facility];  Service: General    [place] COLONOSCOPY FLX DX W/COLLJ SPEC WHEN PFRMD N/A 2017    Procedure: COLONOSCOPY; DILATION OF OSTOMY;  Surgeon: Peggy Schroeder MD;  [facility];   Service: General    [place] EXC SKIN BENIG <0 5 CM REMAINDER BODY N/A 2017    Procedure: SCALP MASS EXCISION X 2;  Surgeon: Peggy Schroeder MD;  [facility];  Service: [facility] N/A 2019    Procedure: INCISIONAL HERNIA REPAIR, COMPONENT SEPARATION;  Surgeon: Peggy Schroeder MD;  [facility];  Service: General    [place] TOTAL ABDOM HYSTERECTOMY N/A 2019    Procedure: TOTAL ABDOMINAL HYSTERECTOMY; BSO;  Surgeon: Isi Mccarty MD;  [facility];  Service: Gynecology    VAC DRESSING APPLICATION N/A 1425    Procedure: Ishaioanaozzy Thomas;  Surgeon: Peggy Schroeder MD;  [facility];  Service:      Social History     Substance and Sexual Activity   Alcohol Use Yes    Frequency: Monthly or less    Comment: socially      Social History     Substance and Sexual Activity   Drug Use No     Social History     Tobacco Use   Smoking Status Current Every Day Smoker    Packs/day: 0 25    Years: 20 00    Pack years: 5 00    Types: Cigarettes    Start date: 1991   Smokeless Tobacco Never Used     Family History: non-contributory    MEDS & ALLERGIES:  all current active meds have been reviewed    dextrose 5 % and sodium chloride 0 45 % 50 mL/hr Last Rate: 50 mL/hr (03/02/19 1151)     Allergies   Allergen Reactions    Ciprofloxacin Itching     Starts at hands to feet then the whole entire body       OBJECTIVE:  Vitals:   Vitals:    03/02/19 0728   BP: 136/77   Pulse: 100   Resp: 18   Temp: 99 5 °F (37 5 °C)   SpO2: (!) 87%     Body mass index is 33 67 kg/m²  Systolic (59KXN), MNP:699 , Min:122 , KFO:561     Diastolic (37GFE), LKN:26, Min:70, Max:77      Intake/Output Summary (Last 24 hours) at 3/2/2019 1211  Last data filed at 3/2/2019 5241  Gross per 24 hour   Intake 1240 ml   Output 350 ml   Net 890 ml     Weight (last 2 days)     None        Invasive Devices     Peripheral Intravenous Line            Peripheral IV 02/27/19 Left Hand 3 days    Peripheral IV 02/27/19 Right Antecubital 3 days          Drain            Closed/Suction Drain Midline Abdomen Bulb 10 Fr  3 days                PHYSICAL EXAMS:  General:  Patient is not in acute distress, laying in the bed comfortably, awake, alert responding to commands  Head: Normocephalic, Atraumatic  HEENT:  Both pupils normal-size atraumatic, normocephalic, nonicteric  Marked pallor  Neck:  JVP not raised   Trachea central  Respiratory:  Bronchovascular breathing all over the chest without any accompaniment  Cardiovascular:  S1-S2 normal without any murmur rails or rub  GI:  Abdomen status post surgery    Integument:  No skin rashes or ulceration  Lymphatic:  No cervical or inguinal lymphadenopathy  Neurologic:  Patient is awake alert, responding to command, well-oriented to time and place and person    LABORATORY RESULTS:      CBC with diff: Results from last 7 days   Lab Units 03/02/19  0545 03/01/19  1517 03/01/19  0833   WBC Thousand/uL 8 05 8 27 8 25   HEMOGLOBIN g/dL 7 1* 7 4* 8 0*   HEMATOCRIT % 22 6* 23 6* 26 2*   MCV fL 98 98 99*   PLATELETS Thousands/uL 210 202 183   MCH pg 30 9 30 6 30 3   MCHC g/dL 31 4 31 4 30 5*   RDW % 15 3* 15 4* 15 6*   MPV fL 9 4 8 9 10 1 CMP:  Results from last 7 days   Lab Units 19  0545 19  1517 19  0833   POTASSIUM mmol/L 3 5 3 6 3 8   CHLORIDE mmol/L 106 106 108   CO2 mmol/L 27 28 25   BUN mg/dL 8 11 12   CREATININE mg/dL 0 54* 0 62 0 58*   CALCIUM mg/dL 8 2* 8 2* 8 2*   EGFR ml/min/1 73sq m 121 116 118       BMP:  Results from last 7 days   Lab Units 19  0545 19  1517 19  0833   POTASSIUM mmol/L 3 5 3 6 3 8   CHLORIDE mmol/L 106 106 108   CO2 mmol/L 27 28 25   BUN mg/dL 8 11 12   CREATININE mg/dL 0 54* 0 62 0 58*   CALCIUM mg/dL 8 2* 8 2* 8 2*            Results from last 7 days   Lab Units 19  0528   MAGNESIUM mg/dL 1 2*                   Lipid Profile:   No results found for: CHOL  Lab Results   Component Value Date    HDL 38 (L) 2019    HDL 40 11/15/2018    HDL 39 (L) 2017     Lab Results   Component Value Date    LDLCALC 46 2019    LDLCALC 60 11/15/2018    LDLCALC 181 (H) 2017     Lab Results   Component Value Date    TRIG 108 2019    TRIG 186 (H) 11/15/2018    TRIG 177 (H) 2017       Cardiac testing:   Results for orders placed during the hospital encounter of 19   Echo complete with contrast if indicated    Narrative [place] [id number] [address] [place] [address]  [phone]    Transthoracic Echocardiogram  2D, M-mode, and Color Doppler    Study date:  2019    Patient: John Finch  MR number: [id number]  Account number: [de-identified]  : 1960  Age: 63 years  Gender: Female  Status: Outpatient  Location: [facility]  Height: 60 in  Weight: 170 lb  BP: 158/ 90 mmHg    Indications: CHF      Diagnoses: I50 32 - Chronic diastolic (congestive) heart failure    Sonographer:  Houser RCS  Interpreting Physician:  Anne Marie New MD  Primary Physician:  Fay Scheuermann, DO  Referring Physician:  Anne Marie New MD  Group:  [facility]    SUMMARY    LEFT VENTRICLE:  Systolic function was at the lower limits of normal  Ejection fraction was estimated to be 50 %  There were no regional wall motion abnormalities  Doppler parameters were consistent with abnormal left ventricular relaxation (grade 1 diastolic dysfunction)  MITRAL VALVE:  There was mild regurgitation  TRICUSPID VALVE:  There was mild regurgitation  PULMONIC VALVE:  There was mild regurgitation  HISTORY: PRIOR HISTORY: Medication-treated hyperlipidemia  CAD  Pericardial effusion  Risk factors: oral hypoglycemic-treated diabetes and a history of current cigarette use (within the last month)  PROCEDURE: The study was performed in the [address] [place] [address]  This was a routine study  The transthoracic approach was used  The study included complete 2D imaging, M-mode, and color Doppler  The heart rate was 75 bpm, at the  start of the study  Images were obtained from the parasternal, apical, subcostal, and suprasternal notch acoustic windows  Image quality was adequate  LEFT VENTRICLE: Size was normal  Systolic function was at the lower limits of normal  Ejection fraction was estimated to be 50 %  There were no regional wall motion abnormalities  Wall thickness was normal  DOPPLER: Doppler parameters were  consistent with abnormal left ventricular relaxation (grade 1 diastolic dysfunction)  RIGHT VENTRICLE: The size was normal  Systolic function was normal  Wall thickness was normal     LEFT ATRIUM: Size was normal     RIGHT ATRIUM: Size was normal     MITRAL VALVE: Valve structure was normal  There was normal leaflet separation  DOPPLER: The transmitral velocity was within the normal range  There was no evidence for stenosis  There was mild regurgitation  AORTIC VALVE: The valve was trileaflet  Leaflets exhibited normal thickness and normal cuspal separation  DOPPLER: Transaortic velocity was within the normal range  There was no evidence for stenosis   There was no regurgitation  TRICUSPID VALVE: The valve structure was normal  There was normal leaflet separation  DOPPLER: The transtricuspid velocity was within the normal range  There was no evidence for stenosis  There was mild regurgitation  PULMONIC VALVE: Leaflets exhibited normal thickness, no calcification, and normal cuspal separation  DOPPLER: The transpulmonic velocity was within the normal range  There was mild regurgitation  PERICARDIUM: There was no pericardial effusion  The pericardium was normal in appearance  AORTA: The root exhibited normal size  SYSTEMIC VEINS: IVC: The inferior vena cava was normal in size and course  Respirophasic changes were normal     SYSTEM MEASUREMENT TABLES    Apical four chamber  4 chamber Left Atrium Volume Index; Planimetry; End Systole; Apical four chamber;: 19 64 cm2  Left Ventricular Ejection Fraction; Method of Disks, Single Plane; Apical four chamber;: 53 6 %  Left Ventricular End Diastolic Volume; Method of Disks, Single Plane; Apical four chamber;: 128 3 ml  Left Ventricular End Systolic Volume; Method of Disks, Single Plane; Apical four chamber;: 59 5 ml  Right Atrium Systolic Area; Planimetry; End Systole; Apical four chamber;: 15 3 cm2  Right Ventricular Internal Diastolic Dimension; End Diastole; Apical four chamber;: 43 2 mm  TAPSE: 20 5 mm    Unspecified Scan Mode  Aortic Root Diameter; End Systole;: 29 6 mm  Gradient Pressure, Peak; Simplified Bernoulli; Antegrade Flow; Systole;: 7 3 mm[Hg]  Gradient pressure, average; Simplified Bernoulli; Antegrade Flow; Systole;: 4 9 mm[Hg]  Left Atrium to Aortic Root Ratio;: 1 58  Left atrial diameter; End Diastole;: 46 7 mm  Cardiac Output; Teichholz; Systole;: 9 02 L/min  Heart rate; Teichholz;: 74 /min  Interventricular Septum Diastolic Thickness; Teichholz; End Diastole;: 9 3 mm  Left Ventricle Internal End Diastolic Dimension; Teichholz;: 59 9 mm  Left Ventricle Internal Systolic Dimension;  Teichholz; End Systole;: 36 8 mm  Left Ventricle Mass; Mass AVCube with Teichholz; End Diastole;: 208 g  Left Ventricle Posterior Wall Diastolic Thickness; Teichholz; End Diastole;: 8 2 mm  Left Ventricular Ejection Fraction; Teichholz;: 68 %  Left Ventricular End Diastolic Volume; Teichholz;: 179 3 ml  Left Ventricular End Systolic Volume; Teichholz;: 57 4 ml  Left Ventricular Fractional Shortening;: 38 6 %  Stroke volume; Amadou Messing;: 121 9 ml  Mitral Valve Area; Area by Pressure Half-Time; Systole;: 4 49 cm2  Mitral Valve E to A Ratio; Systole;: 0 9  Pressure half time; Diastole;: 0 05 s    [address] Accredited Echocardiography [facility]    Prepared and electronically signed by    Myla Renee MD  Signed 06-Feb-2019 10:32:29       No results found for this or any previous visit  No procedure found  No results found for this or any previous visit  Imaging: I have personally reviewed pertinent reports  Assessment/Plan:  Principal Problem:    Incisional hernia, without obstruction or gangrene  Active Problems:    Controlled type 2 diabetes mellitus without complication (HCC)    Uncomplicated asthma    Essential hypertension    Hyperlipidemia    Chronic diastolic HF (heart failure) (HCC)    CAD in native artery    Submucous leiomyoma of uterus    Tobacco use    Postoperative fever    Patient who underwent hernia surgery as well as hysterectomy has postoperative fever and anemia  Patient appears overall clinically euvolemic except she did have some shortness of breath and hypoxia likely related to IV fluids which he received postoperatively  Agree with 1 dose of IV diuretics and back on her oral diuretics as at home  Restart beta-blockers as well as statin medications  Patient has severe anemia and does not want any blood transfusion at this time  Could consider Hematology evaluation for considerations for iron transfusion  Importance of salt restriction reinforced    Previous cardiac catheterization did not show any coronary artery disease which was reviewed  Last echocardiogram showed ejection fraction of 50%  Presently stable from cardiac standpoint otherwise  Discussed with Dr Bishop Ramos as well as hospitalist service  Code Status: Level 1 - Full Code    Thank you for allowing us to participate in this patient's care  This pt will follow up with Dr Claribel Messer once discharged  Counseling / Coordination of Care  Total floor / unit time spent today 35  minutes  Greater than 50% of total time was spent with the patient and / or family counseling and / or coordination of care  A description of the counseling / coordination of care: Review of history, current assessment, development of a plan  Maxime Love MD  3/2/2019,12:11 PM    Portions of the record may have been created with voice recognition software   Occasional wrong word or "sound a like" substitutions may have occurred due to the inherent limitations of voice recognition software   Read the chart carefully and recognize, using context, where substitutions have occurred

## 2019-03-02 NOTE — ASSESSMENT & PLAN NOTE
· Noted to have fevers (100 8, 101 66, 101 1) night of 2/28-3/1 and again 100 8 yesterday  · CXR with vascular congestion  · Remains without leukocytosis   · She has IS on board already

## 2019-03-02 NOTE — ASSESSMENT & PLAN NOTE
· /77  · Home regimen is as follows: amlodipine 10 mg daily, Toprol XL 25 mg daily, Lasix 40 mg b i d    · Cardiac/carb controlled diet now that diet advanced by Surgery

## 2019-03-02 NOTE — PROGRESS NOTES
Progress Note - Maurice Garcia 1960, 63 y o  female MRN: [id number]    Unit/Bed#: -01 Encounter: [id number]    Primary Care Provider: Zayda Campos DO   Date and time admitted to hospital: 2/27/2019  6:59 AM    Addendum: Discussed with Cardiologist who recommended 1x IV Lasix followed by resumption of home PO Lasix dosing beginning tomorrow  He informed me that patient refused blood transfusion to him  Consider hematology consult  She is afebrile thus far today  Discussed patient with SLIM attending       * Incisional hernia, without obstruction or gangrene  Assessment & Plan  · POD#3 on a joint incisional hernia repair and KHUSHI/BSO  · Diet was advanced today by primary Surgical service - change to carb controlled given her diabetes  · Pain control per surgeon  · Supportive care  · Prophylactic IV antibiotics      Submucous leiomyoma of uterus  Assessment & Plan  · POD#3 on a joint incisional hernia repair and KHUSHI/BSO  · Pain control per surgeon  · Supportive care  · Prophylactic IV antibiotics      Postoperative fever  Assessment & Plan  · Noted to have fevers (100 8, 101 66, 101 1) night of 2/28-3/1 and again 100 8 yesterday  · CXR with vascular congestion  · Remains without leukocytosis   · She has IS on board already    Controlled type 2 diabetes mellitus without complication [facility])  Assessment & Plan  Lab Results   Component Value Date    HGBA1C 5 8 01/31/2019       Recent Labs     03/01/19  1904 03/01/19  2344 03/02/19  0547 03/02/19  1220   POCGLU 173* 188* 174* 171*       Blood Sugar Average: Last 72 hrs:  (P) 177   · Hemoglobin A1c is 5 8  · Home orals on hold (Januvia 100 mg and Glucophage 1000 mg) while hospitalized  · Change SSI coverage from Q6hr scheduled to TID AC and QHS now that on PO diet  · Cardiac/carb controlled diet ordered    CAD in native artery  Assessment & Plan  · Currently asymptomatic, she is on aspirin and statin  · Mid RCA lesion with 50% stenosis   · Patient follows with Dr Philippe Boss outpatient - on aspirin and statin per Cardiology records  · Cardiology consult pending    Tobacco use  Assessment & Plan  · Encourage smoking cessation  · Continue with Nicoderm patch    Essential hypertension  Assessment & Plan  · /77  · Home regimen is as follows: amlodipine 10 mg daily, Toprol XL 25 mg daily, Lasix 40 mg b i d  · Cardiac/carb controlled diet now that diet advanced by Surgery     Uncomplicated asthma  Assessment & Plan  · Without acute exacerbation  · Continue with ipratropium (ATROVENT) 0 02 % nebulizer solution p r n  Hyperlipidemia  Assessment & Plan  · Continue statin    Chronic diastolic HF (heart failure) (HCC)  Assessment & Plan  · With suspected acute exacerbation given hypoxia and vascular congestion on CXR  Likely iatrogenic given she was on IVF as she was NPO and was not on her home dose diuretics  · Last echocardiogram done 02/05/2019-EF 50%, Grade 1 DD  · Cardiology consult pending   · Daily weights and I&Os    Postoperative anemia  Assessment & Plan  · Hemoglobin 7 1 today  Serosanguinous fluid noted in drain  · Cardiology consult pending  May need transfusion and likely diuresis    VTE Pharmacologic Prophylaxis:   Pharmacologic: Pharmacologic VTE Prophylaxis contraindicated due to post-op anemia, Lovenox was discontinued by Surgeon  Mechanical VTE Prophylaxis in Place: Yes    Patient Centered Rounds: I have performed bedside rounds with nursing staff today  Lynette    Discussions with Specialists or Other Care Team Provider: Nurse  Will discuss with attending and Cardiology     Education and Discussions with Family / Patient: Patient    Time Spent for Care: 20 minutes  More than 50% of total time spent on counseling and coordination of care as described above      Current Length of Stay: 3 day(s)    Current Patient Status: Inpatient   Certification Statement: The patient will continue to require additional inpatient hospital stay due to post-op fever and anemia, vascular congestion on CXR    Discharge Plan: not medically stable for d/c, [facility] will continue to follow    Code Status: Level 1 - Full Code      Subjective:   Mr Anand Rawls reports that she is feeling "so-so" today but cannot further elaborate  She just ordered lunch  She reports incision site pain  She reports occasional non-productive cough  Denies CP or SOB  Objective:     Vitals:   Temp (24hrs), Av 9 °F (37 7 °C), Min:99 3 °F (37 4 °C), Max:100 8 °F (38 2 °C)    Temp:  [99 3 °F (37 4 °C)-100 8 °F (38 2 °C)] 99 5 °F (37 5 °C)  HR:  [] 100  Resp:  [15-18] 18  BP: (122-136)/(70-77) 136/77  SpO2:  [86 %-98 %] 87 %  Body mass index is 33 67 kg/m²  Input and Output Summary (last 24 hours): Intake/Output Summary (Last 24 hours) at 3/2/2019 1252  Last data filed at 3/2/2019 0943  Gross per 24 hour   Intake 1240 ml   Output 350 ml   Net 890 ml       Physical Exam:     Physical Exam   Constitutional: She is oriented to person, place, and time  No distress  Patient seen lying in bed comfortably resting with her nurse present  She is pleasant and cooperative   Cardiovascular: Normal rate and regular rhythm  Pulmonary/Chest: Effort normal  No respiratory distress  She has no wheezes  Decreased at bilateral bases   Abdominal: Soft  Bowel sounds are normal    Midline surgical incision with staples  Watery serosanguineous fluid noted in drain   Musculoskeletal: She exhibits no edema  She denies calf tenderness to palpation bilaterally   Neurological: She is alert and oriented to person, place, and time  Skin: Skin is warm and dry  She is not diaphoretic  Psychiatric: She has a normal mood and affect  Her behavior is normal    Vitals reviewed        Additional Data:     Labs:    Results from last 7 days   Lab Units 19  0545   WBC Thousand/uL 8 05   HEMOGLOBIN g/dL 7 1*   HEMATOCRIT % 22 6*   PLATELETS Thousands/uL 210     Results from last 7 days   Lab Units 19  0545   SODIUM mmol/L 140   POTASSIUM mmol/L 3 5   CHLORIDE mmol/L 106   CO2 mmol/L 27   BUN mg/dL 8   CREATININE mg/dL 0 54*   ANION GAP mmol/L 7   CALCIUM mg/dL 8 2*   GLUCOSE RANDOM mg/dL 164*         Results from last 7 days   Lab Units 03/02/19  1220 03/02/19  0547 03/01/19  2344 03/01/19  1904 03/01/19  1132 03/01/19  0658 03/01/19  0017 02/28/19  1918 02/28/19  1235 02/28/19  0551 02/27/19  2341 02/27/19  1808   POC GLUCOSE mg/dl 171* 174* 188* 173* 173* 153* 163* 166* 204* 182* 225* 206*                   * I Have Reviewed All Lab Data Listed Above  * Additional Pertinent Lab Tests Reviewed:  All Labs Within Last 24 Hours Reviewed    Imaging:    Imaging Reports Reviewed Today Include: CXR  Imaging Personally Reviewed by Myself Includes:  None    Recent Cultures (last 7 days):           Last 24 Hours Medication List:     Current Facility-Administered Medications:  acetaminophen 650 mg Oral Q6H PRN Isidoro Lipscomb PA-C    albuterol 2 5 mg Nebulization Q4H PRN Kg Lazar MD    aspirin 81 mg Oral Daily Alicia Lipscomb PA-C    atorvastatin 20 mg Oral Daily With MD Justin    dextrose 5 % and sodium chloride 0 45 % 50 mL/hr Intravenous Continuous Kg Lazar MD Last Rate: 50 mL/hr (03/02/19 1151)   diphenhydrAMINE 25 mg Intravenous Q6H PRN Alicia Lipscomb PA-C    famotidine 20 mg Intravenous Q24H [address] Alicia Lipscomb PA-C    insulin lispro 1-5 Units Subcutaneous TID AC Ryan Servin PA-C    insulin lispro 1-5 Units Subcutaneous HS Ryan Servin PA-C    metoprolol succinate 25 mg Oral Daily Nilsa Garza MD    naloxone 0 04 mg Intravenous Q3 min PRN Isidoro Lipscomb PA-C    nicotine 1 patch Transdermal Daily Alicia Lipscomb PA-C    ondansetron 4 mg Intravenous Q6H PRN Isidoro Lipscomb PA-C    oxyCODONE-acetaminophen 1 tablet Oral Q4H PRN SHAYLEE GranadosC    oxyCODONE-acetaminophen 2 tablet Oral Q4H PRN Kwaku Arthur PA-C         Today, Patient Was Seen By: Ryan Servin PA-C    ** Please Note: Dictation voice to text software may have been used in the creation of this document   **

## 2019-03-02 NOTE — ASSESSMENT & PLAN NOTE
· POD#3 on a joint incisional hernia repair and KHUSHI/BSO  · Pain control per surgeon  · Supportive care  · Prophylactic IV antibiotics

## 2019-03-02 NOTE — ASSESSMENT & PLAN NOTE
· With suspected acute exacerbation given hypoxia and vascular congestion on CXR   Likely iatrogenic given she was on IVF as she was NPO and was not on her home dose diuretics  · Last echocardiogram done 02/05/2019-EF 50%, Grade 1 DD  · Cardiology consult pending   · Daily weights and I&Os

## 2019-03-02 NOTE — PROGRESS NOTES
Progress Note - General Surgery   Shiela Maldonado 63 y o  female MRN: [id number]  Unit/Bed#: -01 Encounter: [id number]    Assessment:  Status post incisional hernia repair, bilateral posterior component separation, insertion of mesh, bilateral myocutaneous flap and also myomectomy by GYN, stable  Postop anemia, most likely dilutional and post surgery  Hypertension    Plan:  The patient has hemoglobin of 7 1 today, down from 7 4 yesterday afternoon  She denies having any increasing shortness of breath, chest pain or any other constitutional symptoms  Consult Cardiology due to low hemoglobin, she may require blood transfusion    Subjective/Objective   Chief Complaint:  Not feeling well, denies any nausea, vomiting, she is passing flatus    Subjective:  Denies any chest pain or increasing shortness of breath    Objective:     Blood pressure 136/77, pulse 100, temperature 99 5 °F (37 5 °C), resp  rate 18, height 5' (1 524 m), weight 78 2 kg (172 lb 6 4 oz), last menstrual period 08/16/2014, SpO2 (!) 87 %, not currently breastfeeding  ,Body mass index is 33 67 kg/m²  Intake/Output Summary (Last 24 hours) at 3/2/2019 1101  Last data filed at 3/2/2019 8570  Gross per 24 hour   Intake 1240 ml   Output 350 ml   Net 890 ml       Invasive Devices     Peripheral Intravenous Line            Peripheral IV 02/27/19 Left Hand 3 days    Peripheral IV 02/27/19 Right Antecubital 3 days          Drain            Closed/Suction Drain Midline Abdomen Bulb 10 Fr  3 days                Physical Exam:  Abdomen is soft, nondistended and appropriately tender over the incision      Lab, Imaging and other studies:  CBC:   Lab Results   Component Value Date    WBC 8 05 03/02/2019    HGB 7 1 (L) 03/02/2019    HCT 22 6 (L) 03/02/2019    MCV 98 03/02/2019     03/02/2019    MCH 30 9 03/02/2019    MCHC 31 4 03/02/2019    RDW 15 3 (H) 03/02/2019    MPV 9 4 03/02/2019   , CMP:   Lab Results   Component Value Date    SODIUM 140 03/02/2019    K 3 5 03/02/2019     03/02/2019    CO2 27 03/02/2019    BUN 8 03/02/2019    CREATININE 0 54 (L) 03/02/2019    CALCIUM 8 2 (L) 03/02/2019    EGFR 121 03/02/2019     VTE Pharmacologic Prophylaxis: Sequential compression device (Venodyne)   VTE Mechanical Prophylaxis: sequential compression device

## 2019-03-02 NOTE — ASSESSMENT & PLAN NOTE
· Hemoglobin 7 1 today  Serosanguinous fluid noted in drain  · Cardiology consult pending   May need transfusion and likely diuresis

## 2019-03-02 NOTE — ASSESSMENT & PLAN NOTE
· POD#3 on a joint incisional hernia repair and KHUSHI/BSO  · Diet was advanced today by primary Surgical service - change to carb controlled given her diabetes  · Pain control per surgeon  · Supportive care  · Prophylactic IV antibiotics

## 2019-03-03 LAB
ANION GAP SERPL CALCULATED.3IONS-SCNC: 10 MMOL/L (ref 4–13)
BUN SERPL-MCNC: 12 MG/DL (ref 5–25)
CALCIUM SERPL-MCNC: 8.5 MG/DL (ref 8.3–10.1)
CHLORIDE SERPL-SCNC: 105 MMOL/L (ref 100–108)
CO2 SERPL-SCNC: 28 MMOL/L (ref 21–32)
CREAT SERPL-MCNC: 0.53 MG/DL (ref 0.6–1.3)
ERYTHROCYTE [DISTWIDTH] IN BLOOD BY AUTOMATED COUNT: 14.9 % (ref 11.6–15.1)
GFR SERPL CREATININE-BSD FRML MDRD: 122 ML/MIN/1.73SQ M
GLUCOSE SERPL-MCNC: 128 MG/DL (ref 65–140)
GLUCOSE SERPL-MCNC: 133 MG/DL (ref 65–140)
GLUCOSE SERPL-MCNC: 145 MG/DL (ref 65–140)
GLUCOSE SERPL-MCNC: 149 MG/DL (ref 65–140)
GLUCOSE SERPL-MCNC: 157 MG/DL (ref 65–140)
HCT VFR BLD AUTO: 23.5 % (ref 34.8–46.1)
HGB BLD-MCNC: 7.3 G/DL (ref 11.5–15.4)
MCH RBC QN AUTO: 29.9 PG (ref 26.8–34.3)
MCHC RBC AUTO-ENTMCNC: 31.1 G/DL (ref 31.4–37.4)
MCV RBC AUTO: 96 FL (ref 82–98)
PLATELET # BLD AUTO: 259 THOUSANDS/UL (ref 149–390)
PMV BLD AUTO: 9.7 FL (ref 8.9–12.7)
POTASSIUM SERPL-SCNC: 3.3 MMOL/L (ref 3.5–5.3)
RBC # BLD AUTO: 2.44 MILLION/UL (ref 3.81–5.12)
SODIUM SERPL-SCNC: 143 MMOL/L (ref 136–145)
WBC # BLD AUTO: 5.17 THOUSAND/UL (ref 4.31–10.16)

## 2019-03-03 PROCEDURE — 99024 POSTOP FOLLOW-UP VISIT: CPT | Performed by: SURGERY

## 2019-03-03 PROCEDURE — 85027 COMPLETE CBC AUTOMATED: CPT | Performed by: PHYSICIAN ASSISTANT

## 2019-03-03 PROCEDURE — 99232 SBSQ HOSP IP/OBS MODERATE 35: CPT | Performed by: INTERNAL MEDICINE

## 2019-03-03 PROCEDURE — 80048 BASIC METABOLIC PNL TOTAL CA: CPT | Performed by: PHYSICIAN ASSISTANT

## 2019-03-03 PROCEDURE — 82948 REAGENT STRIP/BLOOD GLUCOSE: CPT

## 2019-03-03 RX ORDER — POTASSIUM CHLORIDE 20 MEQ/1
40 TABLET, EXTENDED RELEASE ORAL ONCE
Status: COMPLETED | OUTPATIENT
Start: 2019-03-03 | End: 2019-03-03

## 2019-03-03 RX ADMIN — FUROSEMIDE 40 MG: 40 TABLET ORAL at 08:43

## 2019-03-03 RX ADMIN — ASPIRIN 81 MG: 81 TABLET, COATED ORAL at 08:44

## 2019-03-03 RX ADMIN — OXYCODONE HYDROCHLORIDE AND ACETAMINOPHEN 1 TABLET: 5; 325 TABLET ORAL at 23:14

## 2019-03-03 RX ADMIN — FUROSEMIDE 40 MG: 40 TABLET ORAL at 16:28

## 2019-03-03 RX ADMIN — POTASSIUM CHLORIDE 40 MEQ: 1500 TABLET, EXTENDED RELEASE ORAL at 12:24

## 2019-03-03 RX ADMIN — ATORVASTATIN CALCIUM 20 MG: 20 TABLET, FILM COATED ORAL at 16:29

## 2019-03-03 RX ADMIN — INSULIN LISPRO 1 UNITS: 100 INJECTION, SOLUTION INTRAVENOUS; SUBCUTANEOUS at 08:44

## 2019-03-03 RX ADMIN — FAMOTIDINE 20 MG: 10 INJECTION, SOLUTION INTRAVENOUS at 08:44

## 2019-03-03 RX ADMIN — METOPROLOL SUCCINATE 25 MG: 25 TABLET, EXTENDED RELEASE ORAL at 08:43

## 2019-03-03 RX ADMIN — DEXTROSE AND SODIUM CHLORIDE 50 ML/HR: 5; .45 INJECTION, SOLUTION INTRAVENOUS at 03:57

## 2019-03-03 NOTE — PROGRESS NOTES
[facility] Progress Note  Patient: Christopher Sanabria 63 y o  female   MRN: [id number]  PCP: Guillermo Olsen DO  Unit/Bed#: -[facility] Encounter: [id number]  Date Of Visit: 19    Assessment:    Principal Problem:    Incisional hernia, without obstruction or gangrene  Active Problems:    Controlled type 2 diabetes mellitus without complication ([address] )    Uncomplicated asthma    Essential hypertension    Hyperlipidemia    Chronic diastolic HF (heart failure) (HCC)    CAD in native artery    Submucous leiomyoma of uterus    Tobacco use    Postoperative fever    Postoperative anemia      Plan:    · 1  Incision hernia repair with KHUSHI/BSO- POD#4  Stable  Surgery following  · 2  Postop fever- resolved  · 3  Anemia- s/p surgery  Hgb stable  Patient does not want blood transfusions  · 4  DM- on ISS  · 5  HTN- on metoprolol, stable       VTE Pharmacologic Prophylaxis:   Pharmacologic: Pharmacologic VTE Prophylaxis contraindicated due to recent surgery  Mechanical VTE Prophylaxis in Place: Yes    Patient Centered Rounds: I have performed bedside rounds with nursing staff today  Discussions with Specialists or Other Care Team Provider:     Education and Discussions with Family / Patient:     Time Spent for Care: 20 minutes  More than 50% of total time spent on counseling and coordination of care as described above  Current Length of Stay: 4 day(s)    Current Patient Status: Inpatient   Certification Statement: The patient will continue to require additional inpatient hospital stay due to Incsional hernai repair    Discharge Plan / Estimated Discharge Date: am    Code Status: Level 1 - Full Code      Subjective:   Patient seen and examined at bedside  Patient tolerating diet      Objective:     Vitals:   Temp (24hrs), Av 5 °F (36 9 °C), Min:97 2 °F (36 2 °C), Max:100 °F (37 8 °C)    Temp:  [97 2 °F (36 2 °C)-100 °F (37 8 °C)] 97 2 °F (36 2 °C)  HR:  [77-99] 77  Resp:  [16-20] 20  BP: (148-155)/(80-85) 155/85  SpO2:  [88 %-94 %] 94 %  Body mass index is 33 54 kg/m²  Input and Output Summary (last 24 hours): Intake/Output Summary (Last 24 hours) at 3/3/2019 1321  Last data filed at 3/3/2019 0901  Gross per 24 hour   Intake 590 ml   Output 935 ml   Net -345 ml       Physical Exam:     Physical Exam   Constitutional: She is oriented to person, place, and time  She appears well-developed and well-nourished  HENT:   Head: Normocephalic and atraumatic  Eyes: Pupils are equal, round, and reactive to light  EOM are normal    Neck: Normal range of motion  Neck supple  No JVD present  No tracheal deviation present  No thyromegaly present  Cardiovascular: Normal rate, regular rhythm and normal heart sounds  Exam reveals no friction rub  No murmur heard  Pulmonary/Chest: Effort normal and breath sounds normal  No stridor  No respiratory distress  She has no wheezes  Abdominal: She exhibits no distension  There is tenderness  There is no guarding  Musculoskeletal: Normal range of motion  She exhibits no edema  Neurological: She is alert and oriented to person, place, and time  Skin: Skin is warm and dry  Vitals reviewed  Additional Data:     Labs:    Results from last 7 days   Lab Units 03/03/19  0505   WBC Thousand/uL 5 17   HEMOGLOBIN g/dL 7 3*   HEMATOCRIT % 23 5*   PLATELETS Thousands/uL 259     Results from last 7 days   Lab Units 03/03/19  0505   POTASSIUM mmol/L 3 3*   CHLORIDE mmol/L 105   CO2 mmol/L 28   BUN mg/dL 12   CREATININE mg/dL 0 53*   CALCIUM mg/dL 8 5           * I Have Reviewed All Lab Data Listed Above  * Additional Pertinent Lab Tests Reviewed:  [facility] Admission Reviewed    Imaging:    Imaging Reports Reviewed Today Include:   Imaging Personally Reviewed by Myself Includes:      Recent Cultures (last 7 days):           Last 24 Hours Medication List:     Current Facility-Administered Medications:  acetaminophen 650 mg Oral Q6H PRN Pippa Lombardo PA-C   albuterol 2 5 mg Nebulization Q4H PRN Nick Rosales MD   aspirin 81 mg Oral Daily Alicia Lipscomb PA-C   atorvastatin 20 mg Oral Daily With Issa Neri MD   diphenhydrAMINE 25 mg Intravenous Q6H PRN Penny Lipscomb PA-C   famotidine 20 mg Intravenous Q24H [facility] Alicia Lipscomb PA-C   furosemide 40 mg Oral BID (diuretic) Asmita Nagel PA-C   insulin lispro 1-5 Units Subcutaneous TID AC Asmita Nagel PA-C   insulin lispro 1-5 Units Subcutaneous HS Asmita Nagel PA-C   metoprolol succinate 25 mg Oral Daily Nilsa Garza MD   naloxone 0 04 mg Intravenous Q3 min PRN Penny Lipscomb PA-C   nicotine 1 patch Transdermal Daily Alicia Lipscomb PA-C   ondansetron 4 mg Intravenous Q6H PRN Penny Lipscomb PA-C   oxyCODONE-acetaminophen 1 tablet Oral Q4H PRN María Correa PA-C   oxyCODONE-acetaminophen 2 tablet Oral Q4H PRN María Correa PA-C        Today, Patient Was Seen By: Dick Vargas MD    ** Please Note: This note has been constructed using a voice recognition system   **

## 2019-03-03 NOTE — PLAN OF CARE
Problem: Prexisting or High Potential for Compromised Skin Integrity  Goal: Skin integrity is maintained or improved  Description  INTERVENTIONS:  - Identify patients at risk for skin breakdown  - Assess and monitor skin integrity  - Assess and monitor nutrition and hydration status  - Monitor labs (i e  albumin)  - Assess for incontinence   - Turn and reposition patient  - Assist with mobility/ambulation  - Relieve pressure over bony prominences  - Avoid friction and shearing  - Provide appropriate hygiene as needed including keeping skin clean and dry  - Evaluate need for skin moisturizer/barrier cream  - Collaborate with interdisciplinary team (i e  Nutrition, Rehabilitation, etc )   - Patient/family teaching  Outcome: Progressing     Problem: Potential for Falls  Goal: Patient will remain free of falls  Description  INTERVENTIONS:  - Assess patient frequently for physical needs  -  Identify cognitive and physical deficits and behaviors that affect risk of falls    -  [place] fall precautions as indicated by assessment   - Educate patient/family on patient safety including physical limitations  - Instruct patient to call for assistance with activity based on assessment  - Modify environment to reduce risk of injury  - Consider OT/PT consult to assist with strengthening/mobility  Outcome: Progressing     Problem: DISCHARGE PLANNING - CARE MANAGEMENT  Goal: Discharge to post-acute care or home with appropriate resources  Description  INTERVENTIONS:  - Conduct assessment to determine patient/family and health care team treatment goals, and need for post-acute services based on payer coverage, community resources, and patient preferences, and barriers to discharge  - Address psychosocial, clinical, and financial barriers to discharge as identified in assessment in conjunction with the patient/family and health care team  - Arrange appropriate level of post-acute services according to patient?s   needs and preference and payer coverage in collaboration with the physician and health care team  - Communicate with and update the patient/family, physician, and health care team regarding progress on the discharge plan  - Arrange appropriate transportation to post-acute venues  Outcome: Progressing

## 2019-03-03 NOTE — PROGRESS NOTES
Progress Note - General Surgery   Ladadithya Lester 63 y o  female MRN: [id number]  Unit/Bed#: -01 Encounter: [id number]    Assessment:  Status post incisional hernia repair, posterior component separation, myocutaneous flap and placement of prosthetic mesh    Plan:  Increase diet as tolerated, H&H stable  Tentatively discharged tomorrow  Subjective/Objective   Chief Complaint:  Tolerating diet and passing flatus    Subjective:  Denies chest pain or shortness of breath    Objective:     Blood pressure 155/85, pulse 77, temperature (!) 97 2 °F (36 2 °C), resp  rate 20, height 5' (1 524 m), weight 77 9 kg (171 lb 11 8 oz), last menstrual period 08/16/2014, SpO2 94 %, not currently breastfeeding  ,Body mass index is 33 54 kg/m²  Intake/Output Summary (Last 24 hours) at 3/3/2019 1030  Last data filed at 3/3/2019 0901  Gross per 24 hour   Intake 590 ml   Output 935 ml   Net -345 ml       Invasive Devices     Peripheral Intravenous Line            Peripheral IV 02/27/19 Right Antecubital 4 days          Drain            Closed/Suction Drain Midline Abdomen Bulb 10 Fr  3 days                Physical Exam:  Abdomen is soft, nondistended, appropriately tender over the midline incision  Drain with a small amount of serosanguineous drainage      Lab, Imaging and other studies:  CBC:   Lab Results   Component Value Date    WBC 5 17 03/03/2019    HGB 7 3 (L) 03/03/2019    HCT 23 5 (L) 03/03/2019    MCV 96 03/03/2019     03/03/2019    MCH 29 9 03/03/2019    MCHC 31 1 (L) 03/03/2019    RDW 14 9 03/03/2019    MPV 9 7 03/03/2019   , CMP:   Lab Results   Component Value Date    SODIUM 143 03/03/2019    K 3 3 (L) 03/03/2019     03/03/2019    CO2 28 03/03/2019    BUN 12 03/03/2019    CREATININE 0 53 (L) 03/03/2019    CALCIUM 8 5 03/03/2019    EGFR 122 03/03/2019     VTE Pharmacologic Prophylaxis: Sequential compression device (Venodyne)   VTE Mechanical Prophylaxis: sequential compression device

## 2019-03-03 NOTE — PROGRESS NOTES
General Cardiology   Progress Note   Tea Beavers 63 y o  female MRN: [id number]  Unit/Bed#: -01 Encounter: [id number]      SUBJECTIVE:   No significant events overnight  Patient feels much better  Patient denies any chest pain or shortness of breath presently  Patient is back on her oral diuretics  REVIEW OF SYSTEMS:  Constitutional:  Denies fever or chills   Eyes:  Denies change in visual acuity   HENT:  Denies nasal congestion or sore throat   Respiratory:  Denies cough or shortness of breath   Cardiovascular:  Denies chest pain or edema     :  Recent hysterectomy  Musculoskeletal:  Denies back pain or joint pain   Neurologic:  Denies headache, focal weakness or sensory changes   Endocrine:  Denies polyuria or polydipsia   Lymphatic:  Denies swollen glands   Psychiatric:  Denies depression or anxiety     OBJECTIVE:   Vitals:  Vitals:    19 0717   BP: 155/85   Pulse: 77   Resp: 20   Temp: (!) 97 2 °F (36 2 °C)   SpO2: 94%     Body mass index is 33 54 kg/m²  Systolic (83VUC), FCC:179 , Min:144 , GV     Diastolic (13LOE), QMJ:08, Min:70, Max:85      Intake/Output Summary (Last 24 hours) at 3/3/2019 1156  Last data filed at 3/3/2019 0901  Gross per 24 hour   Intake 590 ml   Output 935 ml   Net -345 ml     Weight (last 2 days)     Date/Time   Weight    19 0600   77 9 (171 74)    19 1251   77 9 (171 74)                  PHYSICAL EXAMS:  General:  Patient is not in acute distress, laying in the bed comfortably, awake, alert responding to commands  Head: Normocephalic, Atraumatic  HEENT:  Both pupils normal-size atraumatic, normocephalic, nonicteric  pallor  Neck:  JVP not raised  Trachea central  Respiratory:  Bronchovascular breathing all over the chest without any accompaniment  Cardiovascular:  Regular rate and rhythm no S3 no murmurs  GI:  Abdomen soft nontender   Liver and spleen normal size  Lymphatic:  No cervical or inguinal lymphadenopathy  Neurologic:  Patient is awake alert, responding to command, well-oriented to time and place and person moving     LABORATORY RESULTS:        CBC with diff:   Results from last 7 days   Lab Units 19  0505 19  0545 19  1517   WBC Thousand/uL 5 17 8 05 8 27   HEMOGLOBIN g/dL 7 3* 7 1* 7 4*   HEMATOCRIT % 23 5* 22 6* 23 6*   MCV fL 96 98 98   PLATELETS Thousands/uL 259 210 202   MCH pg 29 9 30 9 30 6   MCHC g/dL 31 1* 31 4 31 4   RDW % 14 9 15 3* 15 4*   MPV fL 9 7 9 4 8 9       CMP:  Results from last 7 days   Lab Units 19  0505 19  0545 19  1517   POTASSIUM mmol/L 3 3* 3 5 3 6   CHLORIDE mmol/L 105 106 106   CO2 mmol/L 28 27 28   BUN mg/dL 12 8 11   CREATININE mg/dL 0 53* 0 54* 0 62   CALCIUM mg/dL 8 5 8 2* 8 2*   EGFR ml/min/1 73sq m 122 121 116       BMP:  Results from last 7 days   Lab Units 19  0505 19  0545 19  1517   POTASSIUM mmol/L 3 3* 3 5 3 6   CHLORIDE mmol/L 105 106 106   CO2 mmol/L 28 27 28   BUN mg/dL 12 8 11   CREATININE mg/dL 0 53* 0 54* 0 62   CALCIUM mg/dL 8 5 8 2* 8 2*            Results from last 7 days   Lab Units 19  0528   MAGNESIUM mg/dL 1 2*                   Lipid Profile:   No results found for: CHOL  Lab Results   Component Value Date    HDL 38 (L) 2019    HDL 40 11/15/2018    HDL 39 (L) 2017     Lab Results   Component Value Date    LDLCALC 46 2019    LDLCALC 60 11/15/2018    LDLCALC 181 (H) 2017     Lab Results   Component Value Date    TRIG 108 2019    TRIG 186 (H) 11/15/2018    TRIG 177 (H) 2017       Cardiac testing:  Results for orders placed during the hospital encounter of 19   Echo complete with contrast if indicated    Narrative [place] [address], [address] [facility]  [phone]    Transthoracic Echocardiogram  2D, M-mode, and Color Doppler    Study date:  2019    Patient: Santo Jc  MR number: [id number]  Account number: [de-identified]  : 1960  Age: 63 years  Gender: Female  Status: Outpatient  Location: [facility]  Height: 60 in  Weight: 170 lb  BP: 158/ 90 mmHg    Indications: CHF  Diagnoses: I50 32 - Chronic diastolic (congestive) heart failure    Sonographer:  Mckeon RCS  Interpreting Physician:  Jeff Chavez MD  Primary Physician:  Chuy Villalpando DO  Referring Physician:  Jeff Chavez MD  Group:  [facility]    SUMMARY    LEFT VENTRICLE:  Systolic function was at the lower limits of normal  Ejection fraction was estimated to be 50 %  There were no regional wall motion abnormalities  Doppler parameters were consistent with abnormal left ventricular relaxation (grade 1 diastolic dysfunction)  MITRAL VALVE:  There was mild regurgitation  TRICUSPID VALVE:  There was mild regurgitation  PULMONIC VALVE:  There was mild regurgitation  HISTORY: PRIOR HISTORY: Medication-treated hyperlipidemia  CAD  Pericardial effusion  Risk factors: oral hypoglycemic-treated diabetes and a history of current cigarette use (within the last month)  PROCEDURE: The study was performed in the [address] [place] [address]  This was a routine study  The transthoracic approach was used  The study included complete 2D imaging, M-mode, and color Doppler  The heart rate was 75 bpm, at the  start of the study  Images were obtained from the parasternal, apical, subcostal, and suprasternal notch acoustic windows  Image quality was adequate  LEFT VENTRICLE: Size was normal  Systolic function was at the lower limits of normal  Ejection fraction was estimated to be 50 %  There were no regional wall motion abnormalities  Wall thickness was normal  DOPPLER: Doppler parameters were  consistent with abnormal left ventricular relaxation (grade 1 diastolic dysfunction)      RIGHT VENTRICLE: The size was normal  Systolic function was normal  Wall thickness was normal     LEFT ATRIUM: Size was normal     RIGHT ATRIUM: Size was normal     MITRAL VALVE: Valve structure was normal  There was normal leaflet separation  DOPPLER: The transmitral velocity was within the normal range  There was no evidence for stenosis  There was mild regurgitation  AORTIC VALVE: The valve was trileaflet  Leaflets exhibited normal thickness and normal cuspal separation  DOPPLER: Transaortic velocity was within the normal range  There was no evidence for stenosis  There was no regurgitation  TRICUSPID VALVE: The valve structure was normal  There was normal leaflet separation  DOPPLER: The transtricuspid velocity was within the normal range  There was no evidence for stenosis  There was mild regurgitation  PULMONIC VALVE: Leaflets exhibited normal thickness, no calcification, and normal cuspal separation  DOPPLER: The transpulmonic velocity was within the normal range  There was mild regurgitation  PERICARDIUM: There was no pericardial effusion  The pericardium was normal in appearance  AORTA: The root exhibited normal size  SYSTEMIC VEINS: IVC: The inferior vena cava was normal in size and course  Respirophasic changes were normal     SYSTEM MEASUREMENT TABLES    Apical four chamber  4 chamber Left Atrium Volume Index; Planimetry; End Systole; Apical four chamber;: 19 64 cm2  Left Ventricular Ejection Fraction; Method of Disks, Single Plane; Apical four chamber;: 53 6 %  Left Ventricular End Diastolic Volume; Method of Disks, Single Plane; Apical four chamber;: 128 3 ml  Left Ventricular End Systolic Volume; Method of Disks, Single Plane; Apical four chamber;: 59 5 ml  Right Atrium Systolic Area; Planimetry; End Systole; Apical four chamber;: 15 3 cm2  Right Ventricular Internal Diastolic Dimension; End Diastole; Apical four chamber;: 43 2 mm  TAPSE: 20 5 mm    Unspecified Scan Mode  Aortic Root Diameter; End Systole;: 29 6 mm  Gradient Pressure, Peak; Simplified Bernoulli;  Antegrade Flow; Systole;: 7 3 mm[Hg]  Gradient pressure, average; Simplified Bernoulli; Antegrade Flow; Systole;: 4 9 mm[Hg]  Left Atrium to Aortic Root Ratio;: 1 58  Left atrial diameter; End Diastole;: 46 7 mm  Cardiac Output; Teichholz; Systole;: 9 02 L/min  Heart rate; Teichholz;: 74 /min  Interventricular Septum Diastolic Thickness; Teichholz; End Diastole;: 9 3 mm  Left Ventricle Internal End Diastolic Dimension; Teichholz;: 59 9 mm  Left Ventricle Internal Systolic Dimension; Teichholz; End Systole;: 36 8 mm  Left Ventricle Mass; Mass AVCube with Teichholz; End Diastole;: 208 g  Left Ventricle Posterior Wall Diastolic Thickness; Teichholz; End Diastole;: 8 2 mm  Left Ventricular Ejection Fraction; Teichholz;: 68 %  Left Ventricular End Diastolic Volume; Teichholz;: 179 3 ml  Left Ventricular End Systolic Volume; Teichholz;: 57 4 ml  Left Ventricular Fractional Shortening;: 38 6 %  Stroke volume; Rodo Brood;: 121 9 ml  Mitral Valve Area; Area by Pressure Half-Time; Systole;: 4 49 cm2  Mitral Valve E to A Ratio; Systole;: 0 9  Pressure half time; Diastole;: 0 05 s    [address] [facility]redited [facility]    Prepared and electronically signed by    Carlos Logan MD  Signed 06-Feb-2019 10:32:29       No results found for this or any previous visit  No results found for this or any previous visit  No procedure found  No results found for this or any previous visit      Meds/Allergies   all current active meds have been reviewed  Medications Prior to Admission   Medication    amLODIPine (NORVASC) 10 mg tablet    atorvastatin (LIPITOR) 20 mg tablet    furosemide (LASIX) 40 mg tablet    metFORMIN (GLUCOPHAGE) 1000 MG tablet    metoprolol succinate (TOPROL-XL) 25 mg 24 hr tablet    sitaGLIPtin (JANUVIA) 100 mg tablet    aspirin (ECOTRIN LOW STRENGTH) 81 mg EC tablet    ipratropium (ATROVENT) 0 02 % nebulizer solution          ASSESSMENT & PLAN   Principal Problem:    Incisional hernia, without obstruction or gangrene  Active Problems:    Controlled type 2 diabetes mellitus without complication (HCC)    Uncomplicated asthma    Essential hypertension    Hyperlipidemia    Chronic diastolic HF (heart failure) (HCC)    CAD in native artery    Submucous leiomyoma of uterus    Tobacco use    Postoperative fever    Postoperative anemia    Patient overall doing well from a cardiovascular standpoint  Clinically appears euvolemic  Patient back on her outpatient regimen of medications  Post operative anemia is stable  Patient declined blood transfusion  Possible discharge soon with follow-up with her primary cardiologist Dr Gamble  Discussed with patient and hospitalist service  Yaron Christian MD  3/3/2019,11:56 AM    Portions of the record may have been created with voice recognition software   Occasional wrong word or "sound a like" substitutions may have occurred due to the inherent limitations of voice recognition software   Read the chart carefully and recognize, using context, where substitutions have occurred

## 2019-03-04 ENCOUNTER — TRANSITIONAL CARE MANAGEMENT (OUTPATIENT)
Dept: INTERNAL MEDICINE CLINIC | Facility: CLINIC | Age: 59
End: 2019-03-04

## 2019-03-04 VITALS
WEIGHT: 179.68 LBS | BODY MASS INDEX: 35.28 KG/M2 | SYSTOLIC BLOOD PRESSURE: 154 MMHG | TEMPERATURE: 97.9 F | RESPIRATION RATE: 18 BRPM | HEART RATE: 70 BPM | DIASTOLIC BLOOD PRESSURE: 90 MMHG | HEIGHT: 60 IN | OXYGEN SATURATION: 95 %

## 2019-03-04 PROBLEM — D25.9 UTERINE LEIOMYOMA: Status: RESOLVED | Noted: 2019-02-05 | Resolved: 2019-03-04

## 2019-03-04 PROBLEM — K43.2 INCISIONAL HERNIA, WITHOUT OBSTRUCTION OR GANGRENE: Status: RESOLVED | Noted: 2019-01-31 | Resolved: 2019-03-04

## 2019-03-04 PROBLEM — R50.82 POSTOPERATIVE FEVER: Status: RESOLVED | Noted: 2019-03-01 | Resolved: 2019-03-04

## 2019-03-04 PROBLEM — IMO0002 HISTORY OF COLOSTOMY: Chronic | Status: RESOLVED | Noted: 2017-09-08 | Resolved: 2019-03-04

## 2019-03-04 PROBLEM — E78.2 MIXED HYPERLIPIDEMIA: Status: ACTIVE | Noted: 2017-09-09

## 2019-03-04 PROBLEM — D25.0 SUBMUCOUS LEIOMYOMA OF UTERUS: Status: RESOLVED | Noted: 2019-02-06 | Resolved: 2019-03-04

## 2019-03-04 LAB
GLUCOSE SERPL-MCNC: 126 MG/DL (ref 65–140)
GLUCOSE SERPL-MCNC: 172 MG/DL (ref 65–140)

## 2019-03-04 PROCEDURE — 99024 POSTOP FOLLOW-UP VISIT: CPT | Performed by: SURGERY

## 2019-03-04 PROCEDURE — 82948 REAGENT STRIP/BLOOD GLUCOSE: CPT

## 2019-03-04 PROCEDURE — 99232 SBSQ HOSP IP/OBS MODERATE 35: CPT | Performed by: INTERNAL MEDICINE

## 2019-03-04 RX ORDER — HYDROCODONE BITARTRATE AND ACETAMINOPHEN 5; 325 MG/1; MG/1
1 TABLET ORAL EVERY 6 HOURS PRN
Qty: 12 TABLET | Refills: 0 | OUTPATIENT
Start: 2019-03-04

## 2019-03-04 RX ORDER — FERROUS SULFATE TAB EC 324 MG (65 MG FE EQUIVALENT) 324 (65 FE) MG
324 TABLET DELAYED RESPONSE ORAL
Qty: 60 TABLET | Refills: 0 | Status: SHIPPED | OUTPATIENT
Start: 2019-03-04 | End: 2021-01-05

## 2019-03-04 RX ORDER — METOPROLOL SUCCINATE 25 MG/1
25 TABLET, EXTENDED RELEASE ORAL DAILY
Refills: 0 | Status: CANCELLED | OUTPATIENT
Start: 2019-03-05

## 2019-03-04 RX ORDER — NICOTINE 21 MG/24HR
1 PATCH, TRANSDERMAL 24 HOURS TRANSDERMAL DAILY
Qty: 28 PATCH | Refills: 0 | Status: SHIPPED | OUTPATIENT
Start: 2019-03-05 | End: 2019-03-15 | Stop reason: CLARIF

## 2019-03-04 RX ORDER — DOCUSATE SODIUM 100 MG/1
100 CAPSULE, LIQUID FILLED ORAL 2 TIMES DAILY
Qty: 20 CAPSULE | Refills: 0 | OUTPATIENT
Start: 2019-03-04

## 2019-03-04 RX ORDER — DOCUSATE SODIUM 100 MG/1
100 CAPSULE, LIQUID FILLED ORAL 2 TIMES DAILY
Qty: 10 CAPSULE | Refills: 0 | Status: SHIPPED | OUTPATIENT
Start: 2019-03-04 | End: 2019-08-22 | Stop reason: CLARIF

## 2019-03-04 RX ORDER — FERROUS SULFATE TAB EC 324 MG (65 MG FE EQUIVALENT) 324 (65 FE) MG
324 TABLET DELAYED RESPONSE ORAL
Qty: 60 TABLET | Refills: 0 | OUTPATIENT
Start: 2019-03-04

## 2019-03-04 RX ORDER — ACETAMINOPHEN 325 MG/1
650 TABLET ORAL EVERY 6 HOURS PRN
Qty: 30 TABLET | Refills: 0 | Status: SHIPPED | OUTPATIENT
Start: 2019-03-04 | End: 2019-08-22 | Stop reason: CLARIF

## 2019-03-04 RX ORDER — FUROSEMIDE 40 MG/1
40 TABLET ORAL
Qty: 60 TABLET | Refills: 0 | Status: SHIPPED | OUTPATIENT
Start: 2019-03-04 | End: 2019-03-15 | Stop reason: CLARIF

## 2019-03-04 RX ORDER — HYDROCODONE BITARTRATE AND ACETAMINOPHEN 5; 325 MG/1; MG/1
1 TABLET ORAL EVERY 6 HOURS PRN
Qty: 3 TABLET | Refills: 0 | Status: SHIPPED | OUTPATIENT
Start: 2019-03-04 | End: 2019-03-15

## 2019-03-04 RX ADMIN — ASPIRIN 81 MG: 81 TABLET, COATED ORAL at 09:34

## 2019-03-04 RX ADMIN — METOPROLOL SUCCINATE 25 MG: 25 TABLET, EXTENDED RELEASE ORAL at 09:34

## 2019-03-04 RX ADMIN — FAMOTIDINE 20 MG: 10 INJECTION, SOLUTION INTRAVENOUS at 09:34

## 2019-03-04 RX ADMIN — OXYCODONE HYDROCHLORIDE AND ACETAMINOPHEN 1 TABLET: 5; 325 TABLET ORAL at 09:34

## 2019-03-04 RX ADMIN — FUROSEMIDE 40 MG: 40 TABLET ORAL at 09:34

## 2019-03-04 NOTE — DISCHARGE SUMMARY
Discharge Summary - Tiffani Lin 63 y o  female MRN: [id number]    Unit/Bed#: -01 Encounter: [id number]    Admission Date: 2/27/2019     Admitting Diagnosis: Incisional hernia, without obstruction or gangrene [K43 2]  Submucous leiomyoma of uterus [D25 0]    HPI:  Pt was admitted for elective repair of ventral hernia at the old ostomy site, as well as large calcified fibroid of the uterus  It was decided to do the procedures at the same time due to having mesh insertion for hernia repair and to minimize the possibility of needing to have KHUSHI/SBO in the future through a mesh  Procedures Performed:   Ventral Hernia Repair with mesh, bilateral posterior components and myocutaneous flap repair  She also had KHUSHI/BSO done by Dr Sherwin Ness  [facility] Course: pt was admitted for elective repair of ventral hernia with component separation, myocutaneous flaps bilaterally and mesh at the old ostomy site  She also has large calcifies uterine fibroids and HKUSHI/SBO by Dr Sherwin Ness was to be done at the same time  The patient tolerated both procedures without event  Please see OP Note for further details  Post op she was maintained with NGT, duran catheter that were d/c'd the following day  Pain was controlled with PCA pump  She was encouraged to use the PCA so she could tolerate ambulation  Post op Anemia was followed closely and was found to be 7 1 down from 7 4  She denied chest pain, but did have some mild  SOB  Cardiology was consulted to weigh in about her anemia  She was found to have SOB and mild hypoxia due to IV fluids and she was given a dose of IV diuretics  This was confirmed with CXR  She was restarted on her home meds including diuretics, amlopidine, Lipitor, as well as Glucophage, Toprol, Januvia, Atrovent and ASA  She did have a post op fever without Leukocytosis and IV antibiotics were maintained, as well as nebulizers, and IV antibiotics which did resolve    POD 2 she had not had a bowel movement or flatus or vaginal bleeding  PCA was d/c'd she was ambulating and encouraged to take p o  Pain meds  She was continued NPO  She was started on CLD and she was advanced as tolerated  She tolerated this diet and her pain was controlled with p o  Pain meds  She was cleared by cardiology and [facility] for medical discharge  She will go home with 3 Vicodin per her request  She was given discharge instructions and will follow up in both offices in 2 weeks  Her JAROD drain was removed and the staples remained  She will go home with family care  Significant Findings, Care, Treatment and Services Provided: CXR with vascular congestion  Bibasilar infiltrates, likely infiltrates or cardiogenic  Consult to both SLIM and Cardiology    Complications: none     Discharge Diagnosis: Incisional hernia, without obstruction or gangrene [K43 2]  Resolved   Submucous leiomyoma of uterus [D25 0]  resolved     Condition at Discharge: good     Discharge instructions/Information to patient and family:   See after visit summary for information provided to patient and family  Provisions for Follow-Up Care:  See after visit summary for information related to follow-up care and any pertinent home health orders  Disposition: See After Visit Summary for discharge disposition information  Planned Readmission: No    Discharge Statement   I spent 30 minutes discharging the patient  This time was spent on the day of discharge  I had direct contact with the patient on the day of discharge  Additional documentation is required if more than 30 minutes were spent on discharge  Discharge Medications:  See after visit summary for reconciled discharge medications provided to patient and family        Tej Ro PA-C

## 2019-03-04 NOTE — DISCHARGE INSTRUCTIONS
Asthma, Ambulatory Care   GENERAL INFORMATION:   Asthma  is a lung disease that makes breathing difficult  Chronic inflammation and reactions to triggers narrow the airways in your lungs  Asthma can become life-threatening if it is not managed  Common symptoms include the following:   · Coughing     · Wheezing     · Shortness of breath     · Chest tightness  Seek immediate care for the following symptoms:   · Severe shortness of breath    · Blue or gray lips or nails    · Skin around your neck and ribs pulls in with each breath    · Shortness of breath, even after you take your short-term medicine as directed     · Peak flow numbers in the red zone of your asthma action plan  Treatment for asthma  will depend on how severe it is  Medicine may decrease inflammation, open airways, and make it easier to breathe  Medicines may be inhaled, taken as a pill, or injected  Short-term medicines relieve your symptoms quickly  Long-term medicines are used to prevent future attacks  You may also need medicine to help control your allergies  Manage and prevent future asthma attacks:   · Follow your asthma action pan  This is a written plan that you and your healthcare provider create  It explains which medicine you need and when to change doses if necessary  It also explains how you can monitor symptoms and use a peak flow meter  The meter measures how well your lungs are working  · Manage other health conditions , such as allergies, acid reflux, and sleep apnea  · Identify and avoid triggers  These may include pets, dust mites, mold, and cockroaches  · Do not smoke and avoid others who smoke  If you smoke, it is never too late to quit  Ask your healthcare provider if you need help quitting  · Ask about a flu vaccine  The flu can make your asthma worse  You may need a yearly flu shot  Follow up with your healthcare provider as directed:   You will need to return to make sure your medicine is working and your symptoms are controlled  You may be referred to an asthma or allergy specialist  Iman Solano may be asked to keep a record of your peak flow values and bring it with you to your appointments  Write down your questions so you remember to ask them during your visits  CARE AGREEMENT:   You have the right to help plan your care  Learn about your health condition and how it may be treated  Discuss treatment options with your caregivers to decide what care you want to receive  You always have the right to refuse treatment  The above information is an  only  It is not intended as medical advice for individual conditions or treatments  Talk to your doctor, nurse or pharmacist before following any medical regimen to see if it is safe and effective for you  © 2014 [address] is for End User's use only and may not be sold, redistributed or otherwise used for commercial purposes  All illustrations and images included in CareNotes® are the copyrighted property of [employer]  or Vishal Dang  Call the Surgical office to make appointment for 2 weeks   Meds:  If you do not need strong pain medicine you may take Tylenol  Do not take acetaminophen (Tylenol) WITH  pain meds that contain acetaminophen  Take one or the other  Do not exceed more than 4000 mg of acetaminophen in 24 hours  or 3000 mg if you have liver disease  No driving until seen in the office  No driving while taking pain meds  No heavy lifting or strenuous exercise until you are cleared in the surgery office   You may shower and get incisions wet,rinse, and pat dry  Dorothey Comes will be removed in the office  Call the office if you have increased pain not relieved with pain medicine  Call the office if you have a fever,redness, the wound opens up, you have pus draining from your incision  Colace 100 mg daily to avoid constipation         Abdominal Hysterectomy   WHAT YOU SHOULD KNOW:   An abdominal hysterectomy (AH) is surgery to remove your uterus  Your uterus will be removed through an incision in your abdomen  You may need an AH if you have a tumor in your uterus or other reproductive organs  You may also need an AH if you have an infection, pain, or bleeding  AFTER YOU LEAVE:   Medicines:   · Pain medicine: You may be given medicine to take away or decrease pain  Do not wait until the pain is severe before you take your medicine  · Antinausea medicine: This medicine may be given to calm your stomach and prevent vomiting  · Blood thinners  help prevent blood clots  Blood thinners may be given before, during, and after a surgery or procedure  Blood thinners make it more likely for you to bleed or bruise  · Take your medicine as directed  Call your healthcare provider if you think your medicine is not helping or if you have side effects  Tell him if you are allergic to any medicine  Keep a list of the medicines, vitamins, and herbs you take  Include the amounts, and when and why you take them  Bring the list or the pill bottles to follow-up visits  Carry your medicine list with you in case of an emergency  Follow up with your primary healthcare provider or gynecologist as directed:  Ask how to care for your wound  You may need blood tests, x-rays, or ultrasounds at your follow-up visits  Write down your questions so you remember to ask them during your visits  Limit activity until you have fully recovered from surgery:   · Ask when it is safe for you to drive, walk up stairs, lift heavy objects, and have sex  · Ask when it is okay to exercise, and what types of exercise to do  Start slowly and do more as you get stronger  Contact your primary healthcare provider or gynecologist if:   · You have heavy vaginal bleeding that fills 1 or more sanitary pads in 1 hour  · Your wound opens  · You have a fever, and your wound is red and swollen      · You have yellow, green, or bad-smelling discharge coming from your vagina  · You feel new pain or fullness in your vagina  · You have questions or concerns about your surgery, medicine, or care  Seek care immediately or call 911 if:   · You have new or more blood from your vagina or your wound  · Your arm or leg feels warm, tender, and painful  It may look swollen and red  · You suddenly feel lightheaded and have trouble breathing  · You have chest pain  You may have more pain when you take a deep breath or cough  You may cough up blood  © 2014 [address] is for End User's use only and may not be sold, redistributed or otherwise used for commercial purposes  All illustrations and images included in CareNotes® are the copyrighted property of [employer]  or Vishal Dang  The above information is an  only  It is not intended as medical advice for individual conditions or treatments  Talk to your doctor, nurse or pharmacist before following any medical regimen to see if it is safe and effective for you  [facility]   WHAT YOU NEED TO KNOW:     DISCHARGE INSTRUCTIONS:   Call 911 for any of the following:   · You feel lightheaded, short of breath, and have chest pain  · You cough up blood  · You have trouble breathing  Seek care immediately if: Reba Gutierrez · Blood soaks through your bandage  · Your abdomen feels hard and looks bigger than usual      · Your bowel movements are black, bloody, or tarry-looking  Contact your healthcare provider if:   · You have a fever above 101° F      · You develop a skin rash, hives, or itching  · Your incision is swollen, red, or draining pus or fluid  · You have nausea, or you are vomiting  · You cannot have a bowel movement  · Your pain does not get better after taking pain medicine  · You have questions or concerns about your condition or care  Medicines:   You may need any of the following:  · Prescription pain medicine  may be given  Ask your healthcare provider how to take this medicine safely  · NSAIDs , such as ibuprofen, help decrease swelling, pain, and fever  This medicine is available with or without a doctor's order  NSAIDs can cause stomach bleeding or kidney problems in certain people  If you take blood thinner medicine, always ask your healthcare provider if NSAIDs are safe for you  Always read the medicine label and follow directions  · Take your medicine as directed  Contact your healthcare provider if you think your medicine is not helping or if you have side effects  Tell him or her if you are allergic to any medicine  Keep a list of the medicines, vitamins, and herbs you take  Include the amounts, and when and why you take them  Bring the list or the pill bottles to follow-up visits  Carry your medicine list with you in case of an emergency  Care for your wound as directed:  Carefully wash around your wound  It is okay to let soap and water run over your wound  Do not  scrub your wound  Dry the area and put on new, clean bandages as directed  Change your bandages when they get wet or dirty  Do not get in a bathtub, swimming pool, or hot tub until your healthcare provider says it is okay  Tanvir Blakelylard will be removed at your post op appointment  Self-care:   · Eat a variety of healthy foods  Healthy foods include fruits, vegetables, whole-grain breads, low-fat dairy products, beans, lean meats, and fish  Healthy foods may help you heal faster  Ask if you need to be on a special diet  · Drink liquids as directed  Liquids may prevent constipation and straining during a bowel movement  This will help prevent pressure on your incision, and another hernia from happening  Ask how much liquid to drink each day and which liquids are best for you  · Apply ice  on your incision for 15 to 20 minutes every hour or as directed   Use an ice pack, or put crushed ice in a plastic bag  Cover it with a towel  Ice helps prevent tissue damage and decreases swelling and pain  · Take deep breaths and cough  10 times each hour  This will decrease your risk for a lung infection  Take a deep breath and hold it for as long as you can  Let the air out and then cough strongly  Deep breaths help open your airway  You may be given an incentive spirometer to help you take deep breaths  Put the plastic piece in your mouth and take a slow, deep breath, then let the air out and cough  Repeat these steps 10 times every hour  Press a pillow lightly against your incision when you cough  This may decrease pain or discomfort  · Wear a binder as directed  Your healthcare provider may tell you to wear a binder over your incision  A binder is an elastic bandage that wraps around your abdomen and over your incision  It fits snug and helps decrease pain when you move or cough  Driving:  Do not drive for at least one week after surgery  Do not drive if you are taking prescription pain medication  Ask your healthcare provider when it is safe for you to drive  Activity:  Do not lift anything heavy until your healthcare provider says it is okay  This may put too much pressure on your incision and cause it to come apart  It may also increase your risk for another hernia  Do not play sports for 2 to 3 weeks  Ask your healthcare provider when you can return to work and your normal activities  Follow up with your healthcare provider as directed:  Write down your questions so you remember to ask them during your visits  © 2017 [address] Information is for End User's use only and may not be sold, redistributed or otherwise used for commercial purposes  All illustrations and images included in CareNotes® are the copyrighted property of [employer] , [employer]  or Vishal Dang  The above information is an  only   It is not intended as medical advice for individual conditions or treatments  Talk to your doctor, nurse or pharmacist before following any medical regimen to see if it is safe and effective for you  Cigarette Smoking and Your Health   AMBULATORY CARE:   Risks to your health if you smoke:  Nicotine and other chemicals found in tobacco damage every cell in your body  Even if you are a light smoker, you have an increased risk for cancer, heart disease, and lung disease  If you are pregnant or have diabetes, smoking increases your risk for complications  Benefits to your health if you stop smoking:   · You decrease respiratory symptoms such as coughing, wheezing, and shortness of breath  · You reduce your risk for cancers of the lung, mouth, throat, kidney, bladder, pancreas, stomach, and cervix  If you already have cancer, you increase the benefits of chemotherapy  You also reduce your risk for cancer returning or a second cancer from developing  · You reduce your risk for heart disease, blood clots, heart attack, and stroke  · You reduce your risk for lung infections, and diseases such as pneumonia, asthma, chronic bronchitis, and emphysema  · Your circulation improves  More oxygen can be delivered to your body  If you have diabetes, you lower your risk for complications, such as kidney, artery, and eye diseases  You also lower your risk for nerve damage  Nerve damage can lead to amputations, poor vision, and blindness  · You improve your body's ability to heal and to fight infections  Benefits to the health of others if you stop smoking:  Tobacco is harmful to nonsmokers who breathe in your secondhand smoke  The following are ways the health of others around you may improve when you stop smoking:  · You lower the risks for lung cancer and heart disease in nonsmoking adults  · If you are pregnant, you lower the risk for miscarriage, early delivery, low birth weight, and stillbirth   You also lower your baby's risk for SIDS, obesity, developmental delay, and neurobehavioral problems, such as ADHD  · If you have children, you lower their risk for ear infections, colds, pneumonia, bronchitis, and asthma  For more information and support to stop smoking:   · [employer]  Phone: 7- 179 - 281-1948  Web Address: www [employer]  Follow up with your healthcare provider as directed:  Write down your questions so you remember to ask them during your visits  © 2017 [address] Information is for End User's use only and may not be sold, redistributed or otherwise used for commercial purposes  All illustrations and images included in CareNotes® are the copyrighted property of [employer]  or [employer]  The above information is an  only  It is not intended as medical advice for individual conditions or treatments  Talk to your doctor, nurse or pharmacist before following any medical regimen to see if it is safe and effective for you  How to Stop Smoking   WHAT YOU NEED TO KNOW:   You will improve your health and the health of others around you if you stop smoking  Your risk for heart and lung disease, cancer, stroke, heart attack, and vision problems will also decrease  You can benefit from quitting no matter how long you have smoked  DISCHARGE INSTRUCTIONS:   Prepare to stop smoking:  Nicotine is a highly addictive drug found in cigarettes  Withdrawal symptoms can happen when you stop smoking and make it hard to quit  These include anxiety, depression, irritability, trouble sleeping, and increased appetite  You increase your chances of success if you prepare to quit  · Set a quit date  Blanka Delongot a date that is within the next 2 weeks  Do not pick a day that you think may be stressful or busy  Write down the day or [place] it on your calender  · Tell friends and family that you plan to quit  Explain that you may have withdrawal symptoms when you try to quit  Ask them to support you   They may be able to encourage you and help reduce your stress to make it easier for you to quit  · Make a list of your reasons for quitting  Put the list somewhere you will see it every day, such as your refrigerator  You can look at the list when you have a craving  · Remove all tobacco and nicotine products from your home, car, and workplace  Also, remove anything else that will tempt you to smoke, such as lighters, matches, or ashtrays  Clean your car, home, and places at work that smell like smoke  The smell of smoke can trigger a craving  · Identify triggers that make you want to smoke  This may include activities, feelings, or people  Also write down 1 way you can deal with each of your triggers  For example, if you want to smoke as soon as you wake up, plan another activity during this time, such as exercise  · Make a plan for how you will quit  Learn about the tools that can help you quit, such as medicine, counseling, or nicotine replacement therapy  Choose at least 2 options to help you quit  Tools to help you stop smoking:   · Counseling  from a trained healthcare provider can provide you with support and skills to quit smoking  The provider will also teach you to manage your withdrawal symptoms and cravings  You may receive counseling from one counselor, in group therapy, or through phone therapy called a quit line  · Nicotine replacement therapy (NRT)  such as nicotine patches, gum, or lozenges may help reduce your nicotine cravings  You may get these without a doctor's order  Do not use e-cigarettes or smokeless tobacco in place of cigarettes or to help you quit  They still contain nicotine  · Prescription medicines  such as nasal sprays or nicotine inhalers may help reduce your withdrawal symptoms  Other medicines may also be used to reduce your urge to smoke  Ask your healthcare provider about these medicines   You may need to start certain medicines 2 weeks before your quit date for them to work well  · Hypnosis  is a practice that helps guide you through thoughts and feelings  Hypnosis may help decrease your cravings and make you more willing to quit  · Acupuncture therapy  uses very thin needles to balance energy channels in the body  This is thought to help decrease cravings and symptoms of nicotine withdrawal      · Support groups  let you talk to others who are trying to quit or have already quit  It may be helpful to speak with others about how they quit  Manage your cravings:   · Avoid situations, people, and places that tempt you to smoke  Go to nonsmoking places, such as libraries or restaurants  Understand what tempts you and try to avoid these things  · Keep your hands busy  Hold things such as a stress ball or pen  · Put candy or toothpicks in your mouth  Keep lollipops, sugarless gum, or toothpicks with you at all times  · Do not have alcohol or caffeine  These drinks may tempt you to smoke  Drink healthy liquids such as water or juice instead  · Reward yourself when you resist your cravings  Rewards will motivate you and help you stay positive  · Do an activity that distracts you from your craving  Examples include going for a walk, exercising, or cleaning  Prevent weight gain after you quit:  You may gain a few pounds after you quit smoking  It is healthier for you to gain a few pounds than to continue to smoke  The following can help you prevent weight gain:  · Eat healthy foods  These include fruits, vegetables, whole-grain breads, low-fat dairy products, beans, lean meats, and fish  Eat healthy snacks, such as low-fat yogurt, if you get hungry between meals  · Drink water before, during, and between meals  This will make your stomach feel full and help prevent you from overeating  Ask your healthcare provider how much liquid to drink each day and which liquids are best for you  · Exercise    Take a walk or do some kind of exercise every day  Ask your healthcare provider what exercise is right for you  This may help reduce your cravings and reduce stress  For support and more information:   · [employer]oke[employer]  Phone: 1- 454 - 982-0756  Web Address: www smokefree  gov  © 2017 [address] Information is for End User's use only and may not be sold, redistributed or otherwise used for commercial purposes  All illustrations and images included in CareNotes® are the copyrighted property of [employer] , [employer]  or Vishal Dang  The above information is an  only  It is not intended as medical advice for individual conditions or treatments  Talk to your doctor, nurse or pharmacist before following any medical regimen to see if it is safe and effective for you

## 2019-03-04 NOTE — PROGRESS NOTES
Progress Note - General Surgery   Chon Martinez 63 y o  female MRN: [id number]  Unit/Bed#: -Evangelista Encounter: [id number]    Assessment/Plan  Status post incisional hernia repair, posterior component separation, mucocutaneous flap and placement of prosthetic mesh  S/p total abdominal hysterectomy/BSO  - d/c JAROD drain  -she will go home with Colace, Vicodin, and Iron supplements  -she is cleared for d/c from surgical point of view    Asthma,, PT keeps dropping O2 Sats into the 80s, management per SLIM,   D/c clearance per SLIM      Controlled type 2 diabetes mellitus without complication ([facility] [address] )    Uncomplicated asthma    Essential hypertension    Hyperlipidemia    Chronic diastolic HF (heart failure) (HCC)    CAD in native artery    Submucous leiomyoma of uterus    Tobacco use    Postoperative fever      Postoperative anemia    Chief Complaint: I am ready to go home  Can I have 3 Vicoden to go home, otherwise I will use tylenol and Aleve  Objective Directed Exam: afebrile   Sitting in chair, O2 sats now 94,  Lungs clear  COR RRR   ABD:  Soft incisional appropriate tenderness, incision is healing well  Staples are intact  JAROD drain with scant serous drainage  Extrem:  No calf tenderness    Blood pressure 125/73, pulse 81, temperature 98 6 °F (37 °C), temperature source Oral, resp  rate 16, height 5' (1 524 m), weight 81 5 kg (179 lb 10 8 oz), last menstrual period 08/16/2014, SpO2 91 %, not currently breastfeeding  ,Body mass index is 35 09 kg/m²        Intake/Output Summary (Last 24 hours) at 3/4/2019 1017  Last data filed at 3/4/2019 0600  Gross per 24 hour   Intake 240 ml   Output 339 ml   Net -99 ml       Invasive Devices     Peripheral Intravenous Line            Peripheral IV 03/03/19 Right Wrist less than 1 day          Drain            Closed/Suction Drain Midline Abdomen Bulb 10 Fr  4 days                Physical Exam:   General Appearance:    Alert and orientated x 3, cooperative, no distress   Lungs: Clear to auscultation bilaterally, respirations unlabored    Heart:    Regular rate and rhythm   Abdomen:     As above          Extremities:   Extremities normal,  no cyanosis or edema   Pulses:   2+ and symmetric all extremities, no calf tenderness   Skin:   Skin color, texture, turgor normal, no rashes or lesions   Neurologic:   CNII-XII intact, normal strength, sensation and reflexes     Throughout, affect appropriate                           Labs:   CBC with diff:   Lab Results   Component Value Date    WBC 5 17 03/03/2019    HGB 7 3 (L) 03/03/2019    HCT 23 5 (L) 03/03/2019    MCV 96 03/03/2019     03/03/2019    MCH 29 9 03/03/2019    MCHC 31 1 (L) 03/03/2019    RDW 14 9 03/03/2019    MPV 9 7 03/03/2019    NRBC 0 01/31/2019   ,   BMP/CMP:  Lab Results   Component Value Date    K 3 3 (L) 03/03/2019     03/03/2019    CO2 28 03/03/2019    BUN 12 03/03/2019    CREATININE 0 53 (L) 03/03/2019    CALCIUM 8 5 03/03/2019    AST 13 01/31/2019    ALT 10 (L) 01/31/2019    ALKPHOS 56 01/31/2019    EGFR 122 03/03/2019   ,   Lipid Panel: No results found for: CHOL,   Coags:   Lab Results   Component Value Date    PTT 24 05/01/2017    INR 0 95 01/17/2018   ,     Blood Culture:   Lab Results   Component Value Date    BLOODCX No Growth After 5 Days  05/11/2017    BLOODCX No Growth After 5 Days   05/11/2017   ,   Urinalysis:   Lab Results   Component Value Date    COLORU Dk Yellow 05/11/2017    CLARITYU Cloudy 05/11/2017    SPECGRAV 1 025 05/11/2017    PHUR 5 5 05/11/2017    LEUKOCYTESUR Negative 05/11/2017    NITRITE Positive (A) 05/11/2017    GLUCOSEU Negative 05/11/2017    KETONESU 15 (1+) (A) 05/11/2017    BILIRUBINUR Moderate (A) 05/11/2017    BLOODU Negative 05/11/2017   ,   Urine Culture:   Lab Results   Component Value Date    URINECX No Growth <1000 cfu/mL 04/30/2017   ,   Wound Culure: No results found for: WOUNDCULT      Imaging: Mri Abdomen W Wo Contrast    Result Date: 2/22/2019  Impression: Indeterminate splenic lesions again identified, likely solid although stable dating back to at least April 25, 2017  This would favor a benign etiology  Focal fat dome of the right hepatic lobe and subcentimeter right lobe T2 hyperintensity too small to characterize  Ventral abdominal wall hernia containing unobstructed loops of small bowel  2 6 x 1 8 cm well-circumscribed cystic structure left perirectal region is probably chronic in retrospect  Although its etiology is unclear, it has a simple appearance on the images in which it is visualized  Adenomatous thickening of the left adrenal gland, stable  Consider 1 year follow-up CT study to ensure stability of the above findings   Workstation performed: [id number]         Tello Mcdaniels PA-C  3/4/2019

## 2019-03-04 NOTE — PROGRESS NOTES
Progress Note - Cardiology     Nitin Katz 63 y o  female MRN: [id number]  Unit/Bed#: -01 Encounter: [id number]      Subjective:   Patient feeling much better  Patient denies chest pain, SOB, palpitations, lightheadedness  Asking for discharge  REVIEW OF SYSTEMS:  Constitutional: Denies fever or chills  Eyes: Denies eye discharge or change in visual acuity   HENT: Denies sneezing, nasal congestion or sore throat   Respiratory: Denies cough, expectoration or shortness of breath   Cardiovascular: Denies chest pain, palpitations or lower extremity swelling   : Denies dysuria, frequency, difficulty in micturition or nocturia  Musculoskeletal: Denies back pain or joint pain   Neurologic: Denies lightheadedness, syncope, headache, focal weakness or sensory changes   Endocrine: Denies polyuria or polydipsia   Lymphatic: Denies swollen glands   Psychiatric: Denies depression, anxiety or panic       Objective:   Vitals:  Vitals:    03/03/19 2320   BP: 125/73   Pulse: 81   Resp: 16   Temp: 98 6 °F (37 °C)   SpO2: 91%       Body mass index is 35 09 kg/m²  Systolic (19QAG), MENDY:869 , Min:125 , FKC:801     Diastolic (54OBZ), TBL:40, Min:73, Max:86      Intake/Output Summary (Last 24 hours) at 3/4/2019 0932  Last data filed at 3/4/2019 0600  Gross per 24 hour   Intake 240 ml   Output 339 ml   Net -99 ml     Weight (last 2 days)     Date/Time   Weight    03/04/19 0600   81 5 (179 68)    03/03/19 0600   77 9 (171 74)    03/02/19 1251   77 9 (171 74)              PHYSICAL EXAM:  General: Patient is not in acute distress  Laying comfortably in the bed  Awake, alert, responding to commands  Head: Normocephalic  Atraumatic  HEENT: Pale  Nonicteric  Neck: JVP not raised  Trachea central  No thyromegaly  Respiratory: Bilateral bronchovascular breath sounds with no crackles or rhonchi  Cardiovascular: RRR  S1 and S2 normal  No murmur, rub or gallop  GI: Abdomen soft, nontender  No guarding or rigidity   Liver and spleen not palpable  Bowel sounds present  Neurologic: Patient is awake, alert, responding to command   Moving all extremities  Integumentary:  No skin rash  Lymphatic: No cervical lymphadenopathy  Extremities: No clubbing, cyanosis or edema      LABORATORY RESULTS:      CBC with diff:   Results from last 7 days   Lab Units 03/03/19  0505 03/02/19  0545 03/01/19  1517   WBC Thousand/uL 5 17 8 05 8 27   HEMOGLOBIN g/dL 7 3* 7 1* 7 4*   HEMATOCRIT % 23 5* 22 6* 23 6*   MCV fL 96 98 98   PLATELETS Thousands/uL 259 210 202   MCH pg 29 9 30 9 30 6   MCHC g/dL 31 1* 31 4 31 4   RDW % 14 9 15 3* 15 4*   MPV fL 9 7 9 4 8 9       CMP:  Results from last 7 days   Lab Units 03/03/19  0505 03/02/19  0545 03/01/19  1517   POTASSIUM mmol/L 3 3* 3 5 3 6   CHLORIDE mmol/L 105 106 106   CO2 mmol/L 28 27 28   BUN mg/dL 12 8 11   CREATININE mg/dL 0 53* 0 54* 0 62   CALCIUM mg/dL 8 5 8 2* 8 2*   EGFR ml/min/1 73sq m 122 121 116       BMP:  Results from last 7 days   Lab Units 03/03/19  0505 03/02/19  0545 03/01/19  1517   POTASSIUM mmol/L 3 3* 3 5 3 6   CHLORIDE mmol/L 105 106 106   CO2 mmol/L 28 27 28   BUN mg/dL 12 8 11   CREATININE mg/dL 0 53* 0 54* 0 62   CALCIUM mg/dL 8 5 8 2* 8 2*              Results from last 7 days   Lab Units 02/28/19  0528   MAGNESIUM mg/dL 1 2*                         Lipid Profile:   No results found for: CHOL  Lab Results   Component Value Date    HDL 38 (L) 02/28/2019    HDL 40 11/15/2018    HDL 39 (L) 06/20/2017     Lab Results   Component Value Date    LDLCALC 46 02/28/2019    LDLCALC 60 11/15/2018    LDLCALC 181 (H) 06/20/2017     Lab Results   Component Value Date    TRIG 108 02/28/2019    TRIG 186 (H) 11/15/2018    TRIG 177 (H) 06/20/2017       Cardiac testing:   Results for orders placed during the hospital encounter of 02/05/19   Echo complete with contrast if indicated    Narrative [facility] [facility]  [phone]    Transthoracic Echocardiogram  2D, M-mode, and Color Doppler    Study date:  2019    Patient: Lillian Larios  MR number: [id number]  Account number: [de-identified]  : 1960  Age: 63 years  Gender: Female  Status: Outpatient  Location: [facility]  Height: 60 in  Weight: 170 lb  BP: 158/ 90 mmHg    Indications: CHF  Diagnoses: I50 32 - Chronic diastolic (congestive) heart failure    Sonographer:  Sinha RCS  Interpreting Physician:  Carlyn Rudolph MD  Primary Physician:  Sheila Cooper DO  Referring Physician:  Carlyn Rudolph MD  Group:  [facility]    SUMMARY    LEFT VENTRICLE:  Systolic function was at the lower limits of normal  Ejection fraction was estimated to be 50 %  There were no regional wall motion abnormalities  Doppler parameters were consistent with abnormal left ventricular relaxation (grade 1 diastolic dysfunction)  MITRAL VALVE:  There was mild regurgitation  TRICUSPID VALVE:  There was mild regurgitation  PULMONIC VALVE:  There was mild regurgitation  HISTORY: PRIOR HISTORY: Medication-treated hyperlipidemia  CAD  Pericardial effusion  Risk factors: oral hypoglycemic-treated diabetes and a history of current cigarette use (within the last month)  PROCEDURE: The study was performed in the [address] [place] [address]  This was a routine study  The transthoracic approach was used  The study included complete 2D imaging, M-mode, and color Doppler  The heart rate was 75 bpm, at the  start of the study  Images were obtained from the parasternal, apical, subcostal, and suprasternal notch acoustic windows  Image quality was adequate  LEFT VENTRICLE: Size was normal  Systolic function was at the lower limits of normal  Ejection fraction was estimated to be 50 %  There were no regional wall motion abnormalities   Wall thickness was normal  DOPPLER: Doppler parameters were  consistent with abnormal left ventricular relaxation (grade 1 diastolic dysfunction)  RIGHT VENTRICLE: The size was normal  Systolic function was normal  Wall thickness was normal     LEFT ATRIUM: Size was normal     RIGHT ATRIUM: Size was normal     MITRAL VALVE: Valve structure was normal  There was normal leaflet separation  DOPPLER: The transmitral velocity was within the normal range  There was no evidence for stenosis  There was mild regurgitation  AORTIC VALVE: The valve was trileaflet  Leaflets exhibited normal thickness and normal cuspal separation  DOPPLER: Transaortic velocity was within the normal range  There was no evidence for stenosis  There was no regurgitation  TRICUSPID VALVE: The valve structure was normal  There was normal leaflet separation  DOPPLER: The transtricuspid velocity was within the normal range  There was no evidence for stenosis  There was mild regurgitation  PULMONIC VALVE: Leaflets exhibited normal thickness, no calcification, and normal cuspal separation  DOPPLER: The transpulmonic velocity was within the normal range  There was mild regurgitation  PERICARDIUM: There was no pericardial effusion  The pericardium was normal in appearance  AORTA: The root exhibited normal size  SYSTEMIC VEINS: IVC: The inferior vena cava was normal in size and course  Respirophasic changes were normal     SYSTEM MEASUREMENT TABLES    Apical four chamber  4 chamber Left Atrium Volume Index; Planimetry; End Systole; Apical four chamber;: 19 64 cm2  Left Ventricular Ejection Fraction; Method of Disks, Single Plane; Apical four chamber;: 53 6 %  Left Ventricular End Diastolic Volume; Method of Disks, Single Plane; Apical four chamber;: 128 3 ml  Left Ventricular End Systolic Volume; Method of Disks, Single Plane; Apical four chamber;: 59 5 ml  Right Atrium Systolic Area; Planimetry; End Systole; Apical four chamber;: 15 3 cm2  Right Ventricular Internal Diastolic Dimension; End Diastole;  Apical four chamber;: 43 2 mm  TAPSE: 20 5 mm    Unspecified Scan Mode  Aortic Root Diameter; End Systole;: 29 6 mm  Gradient Pressure, Peak; Simplified Bernoulli; Antegrade Flow; Systole;: 7 3 mm[Hg]  Gradient pressure, average; Simplified Bernoulli; Antegrade Flow; Systole;: 4 9 mm[Hg]  Left Atrium to Aortic Root Ratio;: 1 58  Left atrial diameter; End Diastole;: 46 7 mm  Cardiac Output; Teichholz; Systole;: 9 02 L/min  Heart rate; Teichholz;: 74 /min  Interventricular Septum Diastolic Thickness; Teichholz; End Diastole;: 9 3 mm  Left Ventricle Internal End Diastolic Dimension; Teichholz;: 59 9 mm  Left Ventricle Internal Systolic Dimension; Teichholz; End Systole;: 36 8 mm  Left Ventricle Mass; Mass AVCube with Teichholz; End Diastole;: 208 g  Left Ventricle Posterior Wall Diastolic Thickness; Teichholz; End Diastole;: 8 2 mm  Left Ventricular Ejection Fraction; Teichholz;: 68 %  Left Ventricular End Diastolic Volume; Teichholz;: 179 3 ml  Left Ventricular End Systolic Volume; Teichholz;: 57 4 ml  Left Ventricular Fractional Shortening;: 38 6 %  Stroke volume; Teichholz; Systole;: 121 9 ml  Mitral Valve Area; Area by Pressure Half-Time; Systole;: 4 49 cm2  Mitral Valve E to A Ratio; Systole;: 0 9  Pressure half time; Diastole;: 0 05 s    [address] Accredited Echocardiography Laboratory    Prepared and electronically signed by    Karla Diaz MD  Signed 06-Feb-2019 10:32:29         Imaging: I have personally reviewed pertinent reports  Procedure: Xr Chest Pa & Lateral    Result Date: 3/2/2019  Narrative: CHEST INDICATION:   post-op fever  COMPARISON:  Chest radiographs January 31, 2019 EXAM PERFORMED/VIEWS:  XR CHEST PA & LATERAL Images: 2 FINDINGS: The heart is enlarged  Atherosclerotic changes in the aorta  Lung volumes diminished  Small bilateral pleural effusions, left side greater than right  Bibasilar infiltrates  Qi and pulmonary vessels are prominent  Osseous structures appear within normal limits for patient age       Impression: Mild vascular congestion  Bibasilar infiltrates, likely atelectasis or cardiogenic  Workstation performed: [id number]       Meds/Allergies   current meds:   Current Facility-Administered Medications   Medication Dose Route Frequency    acetaminophen (TYLENOL) tablet 650 mg  650 mg Oral Q6H PRN    albuterol inhalation solution 2 5 mg  2 5 mg Nebulization Q4H PRN    aspirin (ECOTRIN LOW STRENGTH) EC tablet 81 mg  81 mg Oral Daily    atorvastatin (LIPITOR) tablet 20 mg  20 mg Oral Daily With Dinner    diphenhydrAMINE (BENADRYL) injection 25 mg  25 mg Intravenous Q6H PRN    famotidine (PEPCID) injection 20 mg  20 mg Intravenous Q24H SCARLETT    furosemide (LASIX) tablet 40 mg  40 mg Oral BID (diuretic)    insulin lispro (HumaLOG) 100 units/mL subcutaneous injection 1-5 Units  1-5 Units Subcutaneous TID AC    insulin lispro (HumaLOG) 100 units/mL subcutaneous injection 1-5 Units  1-5 Units Subcutaneous HS    metoprolol succinate (TOPROL-XL) 24 hr tablet 25 mg  25 mg Oral Daily    naloxone (NARCAN) 0 04 mg/mL syringe 0 04 mg  0 04 mg Intravenous Q3 min PRN    nicotine (NICODERM CQ) 14 mg/24hr TD 24 hr patch 1 patch  1 patch Transdermal Daily    ondansetron (ZOFRAN) injection 4 mg  4 mg Intravenous Q6H PRN    oxyCODONE-acetaminophen (PERCOCET) 5-325 mg per tablet 1 tablet  1 tablet Oral Q4H PRN    oxyCODONE-acetaminophen (PERCOCET) 5-325 mg per tablet 2 tablet  2 tablet Oral Q4H PRN     Medications Prior to Admission   Medication    amLODIPine (NORVASC) 10 mg tablet    atorvastatin (LIPITOR) 20 mg tablet    furosemide (LASIX) 40 mg tablet    metFORMIN (GLUCOPHAGE) 1000 MG tablet    metoprolol succinate (TOPROL-XL) 25 mg 24 hr tablet    sitaGLIPtin (JANUVIA) 100 mg tablet    aspirin (ECOTRIN LOW STRENGTH) 81 mg EC tablet    ipratropium (ATROVENT) 0 02 % nebulizer solution              Assessment and Plan:  Essential hypertension - BP stable  Continue current medications    When able to, an ACE-inhibitor/ARB needs to be added due to DM and CHF    Hyperlipidemia -  continue statin and diet control    Chronic diastolic HF -  euvolemic  On home diuretic regimen  Continue beta-blocker  When able to, an ACE-inhibitor/ARB needs to be added  Low-salt diet    CAD - stable  Continue aspirin, statin, beta-blocker    Tobacco abuse - strongly counseled to stop smoking    Stable to be discharged from cardiac standpoint  Alex Frederick MD  3/4/2019,9:32 AM      Portions of the record may have been created with voice recognition software  Occasional wrong words or sound alike substitutions may have occurred due to the inherent limitations of voice recognition software  Please read the chart carefully and recognize, using context, where substitutions may have occurred

## 2019-03-04 NOTE — PROGRESS NOTES
[address] Internal Medicine Progress Note  Patient: Drew Duque 63 y o  female   MRN: [id number]  PCP: Paul Pastor DO  Unit/Bed#: -Evangelista Encounter: [id number]  Date Of Visit: 19    Assessment:    Active Problems:    Controlled type 2 diabetes mellitus without complication ([facility] [address] )    Uncomplicated asthma    Essential hypertension    Mixed hyperlipidemia    Chronic diastolic HF (heart failure) (HCC)    CAD in native artery    Tobacco use    Postoperative anemia      Plan:    · 1  Incision hernia repair with KHSUHI/BSO- POD#5  Stable  Surgery following  · 2  Postop fever- resolved  · 3  Anemia- s/p surgery  Hgb stable  Patient does not want blood transfusions  · 4  DM- on ISS  · 5  HTN- on metoprolol, stable  · 6  SOB- patient had some desaturation overnight but now 02 saturations are completely stable  Medically cleared from SLIM for dc home  Surgery notified  VTE Pharmacologic Prophylaxis:   Pharmacologic: Pharmacologic VTE Prophylaxis contraindicated due to recent surgery  Mechanical VTE Prophylaxis in Place: Yes    Patient Centered Rounds: I have performed bedside rounds with nursing staff today  Discussions with Specialists or Other Care Team Provider:     Education and Discussions with Family / Patient:     Time Spent for Care: 20 minutes  More than 50% of total time spent on counseling and coordination of care as described above  Current Length of Stay: 5 day(s)    Current Patient Status: Inpatient   Certification Statement: The patient will continue to require additional inpatient hospital stay due to Incsional hernai repair    Discharge Plan / Estimated Discharge Date: am    Code Status: Level 1 - Full Code      Subjective:   Patient seen and examined at bedside  Patient tolerating diet      Objective:     Vitals:   Temp (24hrs), Av 7 °F (37 1 °C), Min:98 6 °F (37 °C), Max:98 8 °F (37 1 °C)    Temp:  [98 6 °F (37 °C)-98 8 °F (37 1 °C)] 98 6 °F (37 °C)  HR:  [81-83] 81  Resp:  [16] 16  BP: (125-157)/(73-86) 125/73  SpO2:  [91 %-93 %] 91 %  Body mass index is 35 09 kg/m²  Input and Output Summary (last 24 hours): Intake/Output Summary (Last 24 hours) at 3/4/2019 1322  Last data filed at 3/4/2019 1101  Gross per 24 hour   Intake 240 ml   Output 689 ml   Net -449 ml       Physical Exam:     Physical Exam   Constitutional: She is oriented to person, place, and time  She appears well-developed and well-nourished  HENT:   Head: Normocephalic and atraumatic  Eyes: Pupils are equal, round, and reactive to light  EOM are normal    Neck: Normal range of motion  Neck supple  No JVD present  No tracheal deviation present  No thyromegaly present  Cardiovascular: Normal rate, regular rhythm and normal heart sounds  Exam reveals no friction rub  No murmur heard  Pulmonary/Chest: Effort normal and breath sounds normal  No stridor  No respiratory distress  She has no wheezes  Abdominal: She exhibits no distension  There is tenderness  There is no guarding  Musculoskeletal: Normal range of motion  She exhibits no edema  Neurological: She is alert and oriented to person, place, and time  Skin: Skin is warm and dry  Vitals reviewed  Additional Data:     Labs:    Results from last 7 days   Lab Units 03/03/19  0505   WBC Thousand/uL 5 17   HEMOGLOBIN g/dL 7 3*   HEMATOCRIT % 23 5*   PLATELETS Thousands/uL 259     Results from last 7 days   Lab Units 03/03/19  0505   POTASSIUM mmol/L 3 3*   CHLORIDE mmol/L 105   CO2 mmol/L 28   BUN mg/dL 12   CREATININE mg/dL 0 53*   CALCIUM mg/dL 8 5           * I Have Reviewed All Lab Data Listed Above  * Additional Pertinent Lab Tests Reviewed:  [address] Admission Reviewed    Imaging:    Imaging Reports Reviewed Today Include:   Imaging Personally Reviewed by Myself Includes:      Recent Cultures (last 7 days):           Last 24 Hours Medication List:     Current Facility-Administered Medications:  acetaminophen 650 mg Oral Q6H PRN Talya Lipscobm PA-C   albuterol 2 5 mg Nebulization Q4H PRN Teo Oates MD   aspirin 81 mg Oral Daily Alicia Lipscomb PA-C   atorvastatin 20 mg Oral Daily With Dinkatie Duncan MD   diphenhydrAMINE 25 mg Intravenous Q6H PRN Talya Lipscomb PA-C   famotidine 20 mg Intravenous Q24H [address] Alicia Lipscomb PA-C   furosemide 40 mg Oral BID (diuretic) Evens Vyas PA-C   insulin lispro 1-5 Units Subcutaneous TID AC Nilambrianna LilliamCHI   insulin lispro 1-5 Units Subcutaneous HS Evens Vyas PA-C   metoprolol succinate 25 mg Oral Daily Nilsa Garza MD   naloxone 0 04 mg Intravenous Q3 min PRN Talya Lipscomb PA-C   nicotine 1 patch Transdermal Daily Alicia Lipscomb PA-C   ondansetron 4 mg Intravenous Q6H PRN Talya Lipscomb PA-C   oxyCODONE-acetaminophen 1 tablet Oral Q4H PRN Floresitattdaily Chaidez PA-C   oxyCODONE-acetaminophen 2 tablet Oral Q4H PRN Colletta Keto, PA-C        Today, Patient Was Seen By: Eneida Rees MD    ** Please Note: This note has been constructed using a voice recognition system   **

## 2019-03-05 NOTE — UTILIZATION REVIEW
Notification of Discharge  This is a Notification of Discharge from our facility [address]  Please be advised that this patient has been discharge from our facility  Below you will find the admission and discharge date and time including the patients disposition  PRESENTATION DATE: 2/27/2019  6:59 AM  IP ADMISSION DATE: 2/27/19 1147  DISCHARGE DATE: 3/4/2019  2:46 PM  DISPOSITION: [address] Utilization Review Department  Phone: [phone]; Fax [phone]  [email or web address]  ATTENTION: Please call with any questions or concerns to [phone]  and carefully listen to the prompts so that you are directed to the right person  Send all requests for admission clinical reviews, approved or denied determinations and any other requests to fax [phone]   All voicemails are confidential

## 2019-03-11 ENCOUNTER — OFFICE VISIT (OUTPATIENT)
Dept: SURGERY | Facility: CLINIC | Age: 59
End: 2019-03-11

## 2019-03-11 VITALS
TEMPERATURE: 98.8 F | BODY MASS INDEX: 32.67 KG/M2 | HEART RATE: 86 BPM | HEIGHT: 60 IN | DIASTOLIC BLOOD PRESSURE: 80 MMHG | RESPIRATION RATE: 14 BRPM | SYSTOLIC BLOOD PRESSURE: 140 MMHG | WEIGHT: 166.4 LBS

## 2019-03-11 DIAGNOSIS — K43.2 INCISIONAL HERNIA, WITHOUT OBSTRUCTION OR GANGRENE: Primary | ICD-10-CM

## 2019-03-11 PROCEDURE — 99024 POSTOP FOLLOW-UP VISIT: CPT | Performed by: SURGERY

## 2019-03-11 PROCEDURE — 1111F DSCHRG MED/CURRENT MED MERGE: CPT | Performed by: SURGERY

## 2019-03-11 NOTE — PROGRESS NOTES
Post Operative Note    Subjective:  Patient is s/p incisional hernia repair with mesh, bilateral component separations, combination case with gynecology for hysterectomy  Patient's pain is very well controlled, required no postoperative narcotics    Tolerating diet, normal bowel function  No incisional complaints  Exam:  AAO, NAD  Abd:  Soft, nondistended, minimally tender to palpation appropriately at the surgical site  Incision is clean dry intact  There is no appreciable erythema  There is no palpable fluid collection or induration  Surgical staples removed no issue  Hernia repair is intact no palpable recurrence either at the midline or left of midline at previous colostomy site  Assessment/Plan:  Patient is status post incisional hernia repair with mesh, bilateral component separation  Patient is recovering well  Patient is to continue no heavy lifting  Will have her follow up with Dr Coy Hodgkins in 2 weeks  May follow-up sooner if any other issues

## 2019-03-15 ENCOUNTER — OFFICE VISIT (OUTPATIENT)
Dept: INTERNAL MEDICINE CLINIC | Facility: CLINIC | Age: 59
End: 2019-03-15
Payer: COMMERCIAL

## 2019-03-15 VITALS
BODY MASS INDEX: 32.75 KG/M2 | SYSTOLIC BLOOD PRESSURE: 134 MMHG | HEART RATE: 76 BPM | OXYGEN SATURATION: 98 % | DIASTOLIC BLOOD PRESSURE: 72 MMHG | WEIGHT: 166.8 LBS | HEIGHT: 60 IN

## 2019-03-15 DIAGNOSIS — K43.2 INCISIONAL HERNIA, WITHOUT OBSTRUCTION OR GANGRENE: Primary | ICD-10-CM

## 2019-03-15 DIAGNOSIS — I10 ESSENTIAL HYPERTENSION: Chronic | ICD-10-CM

## 2019-03-15 DIAGNOSIS — E11.9 CONTROLLED TYPE 2 DIABETES MELLITUS WITHOUT COMPLICATION, WITHOUT LONG-TERM CURRENT USE OF INSULIN (HCC): Chronic | ICD-10-CM

## 2019-03-15 DIAGNOSIS — D25.9 UTERINE LEIOMYOMA, UNSPECIFIED LOCATION: ICD-10-CM

## 2019-03-15 PROBLEM — Z01.818 PRE-OPERATIVE CLEARANCE: Status: RESOLVED | Noted: 2019-01-31 | Resolved: 2019-03-15

## 2019-03-15 PROCEDURE — 99495 TRANSJ CARE MGMT MOD F2F 14D: CPT | Performed by: INTERNAL MEDICINE

## 2019-03-15 NOTE — PROGRESS NOTES
INTERNAL MEDICINE TRANSITION OF CARE OFFICE VISIT  [facility] - [facility]    NAME: Virginie Pedersen  AGE: 63 y o  SEX: female  : 1960     DATE: 3/15/2019     Assessment and Plan:     Assessment  1  Incisional hernia, without obstruction or gangrene  2  Uterine leiomyoma, unspecified location  3  Controlled type 2 diabetes mellitus without complication, without long-term current use of insulin ([address] )  4  Essential hypertension    Plan    Patient's recent hospital admission was reviewed  Patient is doing well postoperatively  Patient has already followed up with the surgeon and had her staples removed  Patient's diabetes and blood pressure have been well controlled  Will repeat labs in 4 months  Call with any questions or concerns  Orders Placed This Encounter   Procedures    Hemoglobin A1C    Basic metabolic panel      Transitional Care Management Review:     Virginie Pedersen is a 63 y o  female here for TCM follow-up    During the TCM phone call patient stated:    TCM Call (since 2019)     Date and time call was made  3/5/2019  1:23 [facility] [place]  [place] reviewed    Patient was hospitial[facility] at  [facility]    Date of Admission  19    Date of discharge  19    Diagnosis   Incisional hernia    Disposition  Home    Were the patients medications reviewed and updated  No pt will bring updated med list to TCM appt  Current Symptoms  Lower abdominal pain    Lower abdominal pain severity  Moderate    Lower abdominal pain onset  Ongoing      TCM Call (since 2019)     Scheduled for follow up?   Yes    Patients specialists  Other (comment)    Other specialists names  Osmin Swann MD   /   Valentina Bautista MD    Other specialists contcat #  [phone]                                 /   [phone]    Did you obtain your prescribed medications  Yes [address]    Do you need help managing your prescriptions or medications  No    Is transportation to your appointment needed  No    I have advised the patient to call PCP with any new or worsening symptoms  Jessica Rivera LPN    Are you recieving any outpatient services  No    Are you recieving home care services  No    Interperter language line needed  No    Counseling  Patient    Counseling topics  Importance of RX compliance         HPI:     Patient presents for hospital follow-up  Patient was admitted from 02/27/2019 to 03/04/2019  She was admitted for elective repair of a ventral hernia at her old ostomy site  Also before admission the decision was made to perform total abdominal hysterectomy/SBO for her history of large calcified fibroid uterus  Patient did have some postoperative anemia as well as a postoperative fever  Patient was given some IV antibiotics as well as intravenous fluids  She was monitored closely and discharged once she was tolerating oral intake well and her pain was controlled  She was cleared by the hospitalist and Cardiology for discharge  She has been doing well since discharge  She has only been taking ibuprofen as needed for some mild discomfort at the surgical site  Patient's diabetes has been well controlled with last A1c of 5 8%  Patient denies any increased shortness of breath, leg swelling, orthopnea, paroxysmal nocturnal dyspnea  She has underlying chronic diastolic congestive heart failure  Patient's blood pressure has been well controlled  She denies any chest pain at this time  The following portions of the patient's history were reviewed and updated as appropriate: allergies, current medications, past family history, past medical history, past social history, past surgical history and problem list      Review of Systems:     Review of Systems   Constitutional: Negative for activity change, appetite change and fatigue  Respiratory: Negative for apnea, cough, chest tightness, shortness of breath and wheezing  Cardiovascular: Negative for chest pain, palpitations and leg swelling  Gastrointestinal: Positive for abdominal pain  Negative for abdominal distention, blood in stool, constipation, diarrhea, nausea and vomiting  Musculoskeletal: Negative for arthralgias, back pain, gait problem, joint swelling and myalgias  Skin: Negative for rash and wound  Neurological: Negative for dizziness, weakness, light-headedness, numbness and headaches  Psychiatric/Behavioral: Negative for behavioral problems, confusion, hallucinations, sleep disturbance and suicidal ideas  The patient is not nervous/anxious  Problem List:     Patient Active Problem List   Diagnosis    Controlled type 2 diabetes mellitus without complication ([facility] [address] [facility] )    Uncomplicated asthma    Essential hypertension    Mixed hyperlipidemia    Chronic diastolic HF (heart failure) (HCC)    Pericardial effusion    [facility] with [facility]    CAD in native artery    Tobacco use    Postoperative anemia      Objective:     /72 (BP Location: Left arm, Patient Position: Sitting, Cuff Size: Standard)   Pulse 76   Ht 5' (1 524 m)   Wt 75 7 kg (166 lb 12 8 oz)   LMP 08/16/2014 (Approximate)   SpO2 98%   BMI 32 58 kg/m²     Physical Exam   Constitutional: She is oriented to person, place, and time  She appears well-developed and well-nourished  No distress  Eyes: Conjunctivae are normal  Right eye exhibits no discharge  Left eye exhibits no discharge  No scleral icterus  Neck: Neck supple  No JVD present  No thyromegaly present  Cardiovascular: Normal rate, regular rhythm, normal heart sounds and intact distal pulses  Exam reveals no gallop and no friction rub  No murmur heard  Pulmonary/Chest: Effort normal and breath sounds normal  No respiratory distress  She has no wheezes  She has no rales  She exhibits no tenderness  Abdominal: Soft   Bowel sounds are normal  She exhibits no distension and no mass  There is no tenderness  There is no rebound and no guarding  Incision C/D/I  No erythema or warmth noted  Musculoskeletal: Normal range of motion  She exhibits no edema  Lymphadenopathy:     She has no cervical adenopathy  Neurological: She is alert and oriented to person, place, and time  Skin: Skin is warm and dry  She is not diaphoretic  Psychiatric: She has a normal mood and affect  Her behavior is normal    Vitals reviewed  Laboratory Results: I have personally reviewed the pertinent laboratory results/reports     Radiology/Other Diagnostic Testing Results: I have personally reviewed pertinent reports  Mri Abdomen W Wo Contrast    Result Date: 2/22/2019  MRI OF THE ABDOMEN (LIVER) WITH AND WITHOUT CONTRAST INDICATION:  Splenic lesion  COMPARISON: CT abdomen pelvis 7/31/2018 and 4/25/2017 TECHNIQUE:  The following pulse sequences were obtained on a 1 5 T scanner:  Coronal and axial T2 with TE of 90 and 180 respectively, axial T2 with fat saturation, axial FIESTA fat-sat, axial T1-weighted in-and-out-of phase, axial DWI/ADC, pre-contrast axial T1 with fat saturation, post-contrast dynamic axial T1 with fat saturation at 20, 70, and 180 seconds, followed by coronal and 7 minute delayed axial T1 with fat saturation  IV Contrast:  7 mL of gadobutrol injection (MULTI-DOSE) FINDINGS: LIVER:  Normal in size and contour  No diffuse loss of signal on out-of-phase images to suggest hepatic steatosis  There is a small area of signal loss in the right hepatic dome on the out of phase images compatible with focal fat  Subcentimeter T2 hyperintensity in the posterior right hepatic lobe best seen on diffusion sequence 8 image 71 too small to characterize  BILIARY TREE:  Normal   GALLBLADDER:  Concentrated bile and/or sludge noted  No discernible gallstones or wall thickening  PANCREAS:  Normal  ADRENAL GLANDS:  Adenomatous thickening of the left adrenal gland appears stable   The right adrenal gland is unremarkable  SPLEEN:  6 5 x 6 3 x 5 6 cm circumscribed mass within the spleen which measured approximately 6 6 x 6 6 x 6 2 cm when measured in a similar fashion on the April 2017 study  It has a slightly heterogeneous appearance on T1 and T2 with mixed areas of iso to low T1 signal and increased T2 signal with foci of brighter signal superiorly  There does appear to be a lacelike and central internal enhancement pattern  There is more diffuse uniform enhancement on delayed imaging with foci of decreased enhancement and/or necrosis noted superiorly  The enhancement pattern is not entirely typical of cavernous hemangioma and would favor a solid lesion  The etiology is unclear although stability would favor a benign etiology  Additionally, there is no loss of signal below that of background spleen on the ADC map  There is an additional 1 8 x 1 6 cm T2 hyperintense nodule in the inferior spleen, previously measuring about 1 9 x 1 9 cm  This demonstrates similar signal characteristics and enhancement as the larger mass above  KIDNEYS:  There is a tiny left renal cyst  ABDOMINAL CAVITY:  No lymphadenopathy or ascites  5 8 cm calcified uterine fibroid  There is a 2 6 x 1 8 cm well-circumscribed cystic structure seen in the left perirectal region inferior to the fibroid uterus which is probably chronic in retrospect  Although its etiology is unclear, it has a simple appearance on the images in which it  is visualized  BOWEL:  Small bowel loops with a left-sided ventral hernia  No evidence for obstruction  OSSEOUS STRUCTURES:  No osseous destruction  S-shaped thoracolumbar scoliosis  EXTRAHEPATIC VASCULAR STRUCTURES:  Visualized vasculature is normal  ABDOMINAL WALL:  Left paramedian ventral abdominal wall hernia containing small bowel loops  LOWER CHEST:  Unremarkable MRI appearance       Indeterminate splenic lesions again identified, likely solid although stable dating back to at least April 25, 2017   This would favor a benign etiology  Focal fat dome of the right hepatic lobe and subcentimeter right lobe T2 hyperintensity too small to characterize  Ventral abdominal wall hernia containing unobstructed loops of small bowel  2 6 x 1 8 cm well-circumscribed cystic structure left perirectal region is probably chronic in retrospect  Although its etiology is unclear, it has a simple appearance on the images in which it is visualized  Adenomatous thickening of the left adrenal gland, stable  Consider 1 year follow-up CT study to ensure stability of the above findings  Workstation performed: [id number]        Current Medications:     Outpatient Medications Prior to Visit   Medication Sig Dispense Refill    acetaminophen (TYLENOL) 325 mg tablet Take 2 tablets (650 mg total) by mouth every 6 (six) hours as needed for mild pain, headaches or fever 30 tablet 0    amLODIPine (NORVASC) 10 mg tablet TAKE 1 TABLET DAILY  90 tablet 2    aspirin (ECOTRIN LOW STRENGTH) 81 mg EC tablet Take 1 tablet (81 mg total) by mouth daily 30 tablet 11    atorvastatin (LIPITOR) 20 mg tablet TAKE 1 TABLET DAILY 30 tablet 6    docusate sodium (COLACE) 100 mg capsule Take 1 capsule (100 mg total) by mouth 2 (two) times a day 10 capsule 0    ferrous sulfate 324 (65 Fe) mg Take 1 tablet (324 mg total) by mouth 2 (two) times a day before meals 60 tablet 0    furosemide (LASIX) 40 mg tablet TAKE 1 TABLET TWICE DAILY   180 tablet 2    ipratropium (ATROVENT) 0 02 % nebulizer solution Inhale 0 5 mg as needed       metFORMIN (GLUCOPHAGE) 1000 MG tablet Take 1 tablet (1,000 mg total) by mouth 2 (two) times a day with meals 60 tablet 5    metoprolol succinate (TOPROL-XL) 25 mg 24 hr tablet Take 1 tablet (25 mg total) by mouth daily 90 tablet 3    sitaGLIPtin (JANUVIA) 100 mg tablet Take 1 tablet (100 mg total) by mouth daily 30 tablet 5    furosemide (LASIX) 40 mg tablet Take 1 tablet (40 mg total) by mouth 2 (two) times a day 60 tablet 0    HYDROcodone-acetaminophen (NORCO) 5-325 mg per tablet Take 1 tablet by mouth every 6 (six) hours as needed for pain for up to 3 dosesMax Daily Amount: 4 tablets (Patient not taking: Reported on 3/15/2019) 3 tablet 0    nicotine (NICODERM CQ) 14 mg/24hr TD 24 hr patch Place 1 patch on the skin daily (Patient not taking: Reported on 3/15/2019) 28 patch 0     No facility-administered medications prior to visit          Earnest Ca DO  [facility] [address] [place] [address]

## 2019-03-15 NOTE — PATIENT INSTRUCTIONS
Type 2 Diabetes in Adults   AMBULATORY CARE:   Type 2 diabetes  is a disease that affects how your body uses glucose (sugar)  Normally, when the blood sugar level increases, the pancreas makes more insulin  Insulin helps move sugar out of the blood so it can be used for energy  Type 2 diabetes develops because either the body cannot make enough insulin, or it cannot use the insulin correctly  After many years, your pancreas may stop making insulin  Common symptoms include the following:   · More hunger or thirst than usual     · Frequent urination     · Weight loss without trying     · Blurred vision  Call 911 if you have any of the following:   · You have any of the following signs of a stroke:      ¨ Numbness or drooping on one side of your face     ¨ Weakness in an arm or leg    ¨ Confusion or difficulty speaking    ¨ Dizziness, a severe headache, or vision loss    · You have any of the following signs of a heart attack:      ¨ Squeezing, pressure, or pain in your chest that lasts longer than 5 minutes or returns    ¨ Discomfort or pain in your back, neck, jaw, stomach, or arm     ¨ Trouble breathing    ¨ Nausea or vomiting    ¨ Lightheadedness or a sudden cold sweat, especially with chest pain or trouble breathing  Seek care immediately if:   · You have severe abdominal pain, or the pain spreads to your back  You may also be vomiting  · You have trouble staying awake or focusing  · You are shaking or sweating  · You have blurred or double vision  · Your breath has a fruity, sweet smell  · Your breathing is deep and labored, or rapid and shallow  · Your heartbeat is fast and weak  Contact your healthcare provider if:   · You are vomiting or have diarrhea  · You have an upset stomach and cannot eat the foods on your meal plan  · You feel weak or more tired than usual      · You feel dizzy, have headaches, or are easily irritated  · Your skin is red, warm, dry, or swollen  · You have a wound that does not heal      · You have numbness in your arms or legs  · You have trouble coping with your illness, or you feel anxious or depressed  · You have questions or concerns about your condition or care  Treatment for type 2 diabetes  includes keeping your blood sugar at a normal level  You must eat the right foods, and exercise regularly  You may need medicine if you cannot control your blood sugar level with nutrition and exercise  You may also need medicine to prevent heart disease, a complication of type 2 diabetes  You may  need any of the following:  · Hypoglycemic medicines or insulin  may be given to decrease the amount of sugar in your blood  · Blood pressure medicine  may be given to lower your blood pressure  Your blood pressure should be less than 140/90  · Cholesterol lowering medicine  may be given to prevent heart disease  · Antiplatelets , such as aspirin, help prevent blood clots  Take your antiplatelet medicine exactly as directed  These medicines make it more likely for you to bleed or bruise  If you are told to take aspirin, do not take acetaminophen or ibuprofen instead  · Take your medicine as directed  Contact your healthcare provider if you think your medicine is not helping or if you have side effects  Tell him or her if you are allergic to any medicine  Keep a list of the medicines, vitamins, and herbs you take  Include the amounts, and when and why you take them  Bring the list or the pill bottles to follow-up visits  Carry your medicine list with you in case of an emergency  Check your blood sugar level: You will be taught how to check a small drop of blood in a glucose monitor  You will need to check your blood sugar level at least 3 times each day if you are on insulin  Ask your healthcare provider when and how often to check during the day  If you check your blood sugar level before a meal , it should be between 80 and 130 mg/dL   If you check your blood sugar level 1 to 2 hours after a meal , it should be less than 180 mg/dL  Ask your healthcare provider if these are good goals for you  Write down your results, and show them to your healthcare provider  He may use the results to make changes to your medicine, food, and exercise schedules  If your blood sugar level is too low: Your blood sugar level is too low if it goes below 70 mg/dL  If the level is too low, eat or drink 15 grams of fast-acting carbohydrate  These are found naturally in fruits  Fast-acting carbohydrates will raise your blood sugar level quickly  Examples of 15 grams of fast-acting carbohydrate are 4 ounces (½ cup) of fruit juice or 4 ounces of regular soda  Other examples are 2 tablespoons of raisins or 3 to 4 glucose tablets  Check your blood sugar level 15 minutes later  If the level is still low (less than 100 mg/dL), eat another 15 grams of carbohydrate  When the level returns to 100 mg/dL, eat a snack or meal that contains carbohydrates  This will help prevent another drop in blood sugar  Always carefully follow your healthcare provider's instructions on how to treat low blood sugar levels  Check your feet each day for sores:  Wear shoes and socks that fit correctly  Do not trim your toenails  Ask your healthcare provider for more information about foot care  Follow your meal plan:  A dietitian will help you make a meal plan to keep your blood sugar level steady  Do not skip meals  Your blood sugar level may drop too low if you have taken diabetes medicine and do not eat  · Keep track of carbohydrates (sugar and starchy foods)  Your blood sugar level can get too high if you eat too many carbohydrates  Your dietitian will help you plan meals and snacks that have the right amount of carbohydrates  · Eat low-fat foods , such as skinless chicken and low-fat milk  · Eat less sodium (salt)    Limit high-sodium foods, such as soy sauce, potato chips, and soup  Do not add salt to food you cook  Limit your use of table salt  You should have less than 2,300 mg of sodium per day  · Eat high-fiber foods , such as vegetables, whole grain breads, and beans  · Limit alcohol  Alcohol affects your blood sugar level and can make it harder to manage your diabetes  Limit alcohol to 1 drink a day if you are a woman  Limit alcohol to 2 drinks a day if you are a man  A drink of alcohol is 12 ounces of beer, 5 ounces of wine, or 1½ ounces of liquor  Maintain a healthy weight:  Ask your healthcare provider how much you should weigh  A healthy weight can help you control your diabetes  Ask your provider to help you create a weight loss plan if you are overweight  Together you can set manageable weight loss goals  Exercise as directed:  Exercise can help keep your blood sugar level steady, decrease your risk of heart disease, and help you lose weight  Stretch before and after you exercise  Exercise for at least 150 minutes every week  Spread this amount of exercise over at least 3 days a week  Do not skip exercise more than 2 days in a row  Include muscle strengthening activities 2 to 3 days each week  Older adults should include balance training 2 to 3 times each week  Activities that help increase balance include yoga and dwayne chi  Work with your healthcare provider to create an exercise plan  · Check your blood sugar level before and after exercise  Healthcare providers may tell you to change the amount of insulin you take or food you eat  If your blood sugar level is high, check your blood or urine for ketones before you exercise  Do not exercise if your blood sugar level is high and you have ketones  · If your blood sugar level is less than 100 mg/dL, have a carbohydrate snack before you exercise  Examples are 4 to 6 crackers, ½ banana, 8 ounces (1 cup) of milk, or 4 ounces (½ cup) of juice   Drink water or liquids that do not contain sugar before, during, and after exercise  Ask your dietitian or healthcare provider which liquids you should drink when you exercise  · Do not sit for longer than 30 minutes  If you cannot walk around, at least stand up  This will help you stay active and keep your blood circulating  Do not smoke:  Nicotine and other chemicals in cigarettes and cigars can cause lung damage and make it more difficult to manage your diabetes  Ask your healthcare provider for information if you currently smoke and need help to quit  Do not use e-cigarettes or smokeless tobacco in place of cigarettes or to help you quit  They still contain nicotine  Check your blood pressure as directed:  Ask your healthcare provider what your blood pressure should be  Most adults with diabetes and high blood pressure should have a systolic blood pressure (first number) less than 140  Your diastolic blood pressure (second number) should be less than 90  Wear medical alert identification:  Wear medical alert jewelry or carry a card that says you have diabetes  Ask your healthcare provider where to get these items  Ask about vaccines: You have a higher risk for serious illness if you get the flu, pneumonia, or hepatitis  Ask your healthcare provider if you should get a flu, pneumonia, or hepatitis B vaccine, and when to get the vaccine  Follow up with your healthcare provider as directed: You may need to return to have your A1c checked every 3 months  You will need to return at least once each year to have your feet checked  You will need an eye exam once a year to check for retinopathy  You will also need urine tests every year to check for kidney problems  You may need tests to monitor for heart disease such as an EKG, stress test, blood pressure monitoring, and blood tests  Write down your questions so you remember to ask them during your visits     © 2017 [facility] Information is for End User's use only and may not be sold, redistributed or otherwise used for commercial purposes  All illustrations and images included in CareNotes® are the copyrighted property of A D A M [employer] Inc  or Vishal Dang  The above information is an  only  It is not intended as medical advice for individual conditions or treatments  Talk to your doctor, nurse or pharmacist before following any medical regimen to see if it is safe and effective for you

## 2019-03-18 ENCOUNTER — OFFICE VISIT (OUTPATIENT)
Dept: OBGYN CLINIC | Facility: CLINIC | Age: 59
End: 2019-03-18

## 2019-03-18 VITALS
WEIGHT: 165.25 LBS | DIASTOLIC BLOOD PRESSURE: 90 MMHG | SYSTOLIC BLOOD PRESSURE: 132 MMHG | BODY MASS INDEX: 32.27 KG/M2

## 2019-03-18 DIAGNOSIS — Z09 POSTOPERATIVE EXAMINATION: Primary | ICD-10-CM

## 2019-03-18 PROCEDURE — 99024 POSTOP FOLLOW-UP VISIT: CPT | Performed by: OBSTETRICS & GYNECOLOGY

## 2019-03-18 NOTE — PROGRESS NOTES
Assessment/Plan:    Postoperative examination  No vaginal bleeding  Bladder and bowel function normal     Pathology reviewed  Plan for follow up 6  Months Annual       Diagnoses and all orders for this visit:    Postoperative examination          Subjective:      Patient ID: Pieter Rubi is a 63 y o  female  Patient here for 2 week post op appointment for KHUSHI-BSO  Appetite is minimal for solid food  Upon questioning Fei Hartman is drinking plenty of fluids and supplementing her nutrition with protein shakes  Pre surgery weight was 172- she has lost almost 10lbs but she states that she is now doing better with her diet  Bowels - alternate days, getting more regular  Gynecologic Exam   The patient's pertinent negatives include no genital itching, genital lesions, genital odor, genital rash, vaginal bleeding or vaginal discharge  The pain is mild  Associated symptoms include abdominal pain  Pertinent negatives include no chills, constipation, diarrhea, fever, nausea, sore throat or vomiting  The following portions of the patient's history were reviewed and updated as appropriate:   She  has a past medical history of Acute on chronic congestive heart failure with left ventricular diastolic dysfunction ([facility] ), Asthma, Atelectasis, CHF (congestive heart failure) ([facility] ), Diabetes mellitus ([facility] ), Diverticulitis (2017), Hyperlipidemia, Hypertension, Lesion of spleen, and Pericardial effusion    She   Patient Active Problem List    Diagnosis Date Noted    Postoperative examination 03/18/2019    Postoperative anemia 03/02/2019    Tobacco use 02/28/2019    CAD in native artery 01/17/2018    Essential hypertension 09/09/2017    Mixed hyperlipidemia 09/09/2017    Diabetic nephropathy associated with type [facility] diabetes mellitus [facility] 06/21/2017    Chronic diastolic HF (heart failure) [facility] 05/16/2017    Pericardial effusion 59/47/6024    Uncomplicated asthma 84/22/7091    Controlled type 2 diabetes mellitus without complication ([facility] )      She  has a past surgical history that includes HARTMANS PROCEDURE (N/A, 2017); VAC DRESSING APPLICATION (N/A, 9300); Colostomy (2017); Abdominal surgery;  section; pr colonoscopy flx dx w/collj spec when pfrmd (N/A, 2017); Colonoscopy; Colon surgery; pr close enterostomy (N/A, 2017); pr exc skin benig <0 5 cm remainder body (N/A, 2017); Mammo stereotactic breast biopsy left (all inc) (Left, 10/4/2018); pr repair incisional hernia,reducible (N/A, 2019); and pr total abdom hysterectomy (N/A, 2019)  Her family history includes Diabetes in her mother; Heart disease in her son; Hypertension in her mother  She  reports that she has been smoking cigarettes  She started smoking about 27 years ago  She has a 5 00 pack-year smoking history  She has never used smokeless tobacco  She reports that she drinks alcohol  She reports that she does not use drugs  Current Outpatient Medications   Medication Sig Dispense Refill    acetaminophen (TYLENOL) 325 mg tablet Take 2 tablets (650 mg total) by mouth every 6 (six) hours as needed for mild pain, headaches or fever 30 tablet 0    amLODIPine (NORVASC) 10 mg tablet TAKE 1 TABLET DAILY  90 tablet 2    aspirin (ECOTRIN LOW STRENGTH) 81 mg EC tablet Take 1 tablet (81 mg total) by mouth daily 30 tablet 11    atorvastatin (LIPITOR) 20 mg tablet TAKE 1 TABLET DAILY 30 tablet 6    docusate sodium (COLACE) 100 mg capsule Take 1 capsule (100 mg total) by mouth 2 (two) times a day 10 capsule 0    ferrous sulfate 324 (65 Fe) mg Take 1 tablet (324 mg total) by mouth 2 (two) times a day before meals 60 tablet 0    furosemide (LASIX) 40 mg tablet TAKE 1 TABLET TWICE DAILY   180 tablet 2    ipratropium (ATROVENT) 0 02 % nebulizer solution Inhale 0 5 mg as needed       metFORMIN (GLUCOPHAGE) 1000 MG tablet Take 1 tablet (1,000 mg total) by mouth 2 (two) times a day with meals 60 tablet 5    metoprolol succinate (TOPROL-XL) 25 mg 24 hr tablet Take 1 tablet (25 mg total) by mouth daily 90 tablet 3    sitaGLIPtin (JANUVIA) 100 mg tablet Take 1 tablet (100 mg total) by mouth daily 30 tablet 5     No current facility-administered medications for this visit  Current Outpatient Medications on File Prior to Visit   Medication Sig    acetaminophen (TYLENOL) 325 mg tablet Take 2 tablets (650 mg total) by mouth every 6 (six) hours as needed for mild pain, headaches or fever    amLODIPine (NORVASC) 10 mg tablet TAKE 1 TABLET DAILY   aspirin (ECOTRIN LOW STRENGTH) 81 mg EC tablet Take 1 tablet (81 mg total) by mouth daily    atorvastatin (LIPITOR) 20 mg tablet TAKE 1 TABLET DAILY    docusate sodium (COLACE) 100 mg capsule Take 1 capsule (100 mg total) by mouth 2 (two) times a day    ferrous sulfate 324 (65 Fe) mg Take 1 tablet (324 mg total) by mouth 2 (two) times a day before meals    furosemide (LASIX) 40 mg tablet TAKE 1 TABLET TWICE DAILY   ipratropium (ATROVENT) 0 02 % nebulizer solution Inhale 0 5 mg as needed     metFORMIN (GLUCOPHAGE) 1000 MG tablet Take 1 tablet (1,000 mg total) by mouth 2 (two) times a day with meals    metoprolol succinate (TOPROL-XL) 25 mg 24 hr tablet Take 1 tablet (25 mg total) by mouth daily    sitaGLIPtin (JANUVIA) 100 mg tablet Take 1 tablet (100 mg total) by mouth daily     No current facility-administered medications on file prior to visit  She is allergic to ciprofloxacin       Review of Systems   Constitutional: Negative for activity change, appetite change, chills, fatigue and fever  HENT: Negative for rhinorrhea, sneezing and sore throat  Eyes: Negative for visual disturbance  Respiratory: Negative for cough, shortness of breath and wheezing  Cardiovascular: Negative for chest pain, palpitations and leg swelling  Gastrointestinal: Positive for abdominal pain  Negative for abdominal distention, constipation, diarrhea, nausea and vomiting  Genitourinary: Negative for difficulty urinating and vaginal discharge  Neurological: Negative for syncope and light-headedness  Objective:      /90 (BP Location: Left arm, Patient Position: Sitting, Cuff Size: Standard)   Wt 75 kg (165 lb 4 oz)   LMP 08/16/2014 (Approximate)   Breastfeeding? No   BMI 32 27 kg/m²          Physical Exam   Abdominal: Soft  Bowel sounds are normal  She exhibits no distension  There is no tenderness  There is no rebound and no guarding  Genitourinary: Vagina normal  No erythema, tenderness or bleeding in the vagina  No vaginal discharge found  Nursing note and vitals reviewed

## 2019-03-18 NOTE — ASSESSMENT & PLAN NOTE
No vaginal bleeding  Bladder and bowel function normal     Pathology reviewed      Plan for follow up 6  Months Annual

## 2019-03-18 NOTE — PATIENT INSTRUCTIONS
Malnutrition   WHAT YOU NEED TO KNOW:   What is malnutrition? Malnutrition occurs when you do not get enough calories or nutrients to keep you healthy  Nutrients include protein, fat, carbohydrates, vitamins, and minerals  What increases my risk of malnutrition? · Not eating the right amount or kinds of food    · Inability to digest and absorb nutrients properly    · A health condition that increases the amount of calories your body needs    · Pregnancy    · Eating disorders such as anorexia or bulimia    · Drug or alcohol abuse  What are the signs and symptoms of malnutrition? · Irritable (bad mood) and tired    · Slower growth than normal, or no growth (in children)    · Weight loss or loss of appetite    · Slow wound healing and an increase in infections    · Bone or joint pain, weak muscles, or sunken temples    · Brittle and spooned nails    · Dry, scaly skin or change in skin color    · Change of hair color, or hair loss    · Bloated abdomen and swelling in other parts of the body  How is malnutrition diagnosed? Your healthcare provider will examine you and check your height and weight  He may ask you questions about your health and the medicines that you take  He may also ask what you eat to find out if you are getting enough calories and nutrients  Your healthcare provider may also do blood tests to find out if your body is low in certain nutrients  How is malnutrition treated? Treatment depends on what caused your malnutrition  You may need medicine to treat a health problem that is causing your malnutrition  · Increased calories and nutrients  will be needed  A dietitian may help you plan larger, healthy meals  If you have trouble eating larger meals, eat small meals throughout the day  You may need to include snacks between meals  You may need to eat or drink a nutrition supplement if you have trouble eating the right kinds and amounts of food       · Vitamins and minerals  may be needed to replace vitamins and minerals your body needs  They may be given in your IV, as a shot, or as a pill  · Appetite stimulants  are medicines that help improve your appetite so you will want to eat more  When should I contact my healthcare provider? · You lose a large amount of weight within a short amount of time  · You feel depressed, confused, tired, irritable, and you do not feel like eating  · You have questions or concerns about your condition or care  When should I seek immediate care or call 911? · You have pain in your chest, back, neck, jaw, stomach, or down one or both arms  · You have trouble breathing  CARE AGREEMENT:   You have the right to help plan your care  Learn about your health condition and how it may be treated  Discuss treatment options with your caregivers to decide what care you want to receive  You always have the right to refuse treatment  The above information is an  only  It is not intended as medical advice for individual conditions or treatments  Talk to your doctor, nurse or pharmacist before following any medical regimen to see if it is safe and effective for you  © 2017 [address]  Information is for End User's use only and may not be sold, redistributed or otherwise used for commercial purposes  All illustrations and images included in CareNotes® are the copyrighted property of [employer]  or Vishal Dang

## 2019-03-28 ENCOUNTER — OFFICE VISIT (OUTPATIENT)
Dept: SURGERY | Facility: CLINIC | Age: 59
End: 2019-03-28

## 2019-03-28 VITALS
SYSTOLIC BLOOD PRESSURE: 144 MMHG | WEIGHT: 165 LBS | RESPIRATION RATE: 12 BRPM | HEART RATE: 80 BPM | BODY MASS INDEX: 32.39 KG/M2 | HEIGHT: 60 IN | TEMPERATURE: 98.6 F | DIASTOLIC BLOOD PRESSURE: 80 MMHG

## 2019-03-28 DIAGNOSIS — Z48.89 POSTOPERATIVE VISIT: Primary | ICD-10-CM

## 2019-03-28 PROCEDURE — 99024 POSTOP FOLLOW-UP VISIT: CPT | Performed by: SURGERY

## 2019-03-28 NOTE — PROGRESS NOTES
Post-Op Follow Up- General Surgery   Cecilia Gibson 63 y o  female MRN: [id number]  Unit/Bed#:  Encounter: [id number]    Assessment/Plan     Assessment:  Status post incisional hernia repair with mesh and bilateral component separation, improving  Plan:  The patient is allowed to go back to work without restrictions  She will return to my office in 3 months for follow-up  History of Present Illness     HPI:  Cecilia Gibson is a 63 y o  female who presents to my office for follow-up after incisional hernia repair and component separation  She complains of mild discomfort on the left side of the abdomen  Denies having any chills, fever, nausea, vomiting, diarrhea, constipation or urinary symptoms  Historical Information   Past Medical History:   Diagnosis Date    Acute on chronic congestive heart failure with left ventricular diastolic dysfunction (HCC)     last assessed 2017    Asthma     Atelectasis     [facility]est[facility]) [facility] )     Diabetes mellitus ([facility] )     Diverticulitis 2017    with perforation and abscess     Hyperlipidemia     Hypertension     Lesion of spleen     Pericardial effusion      Past Surgical History:   Procedure Laterality Date    ABDOMINAL SURGERY       SECTION      COLON SURGERY      COLONOSCOPY      COLOSTOMY  2017    HARTMANS PROCEDURE N/A 2017    Procedure: EXPLORATORY LAPAROTOMY; PARTIAL SMALL BOWEL RESECTION; HARTMANS PROCEDURE; OSTOMY;  Surgeon: Heladio Fry MD;  [facility];  Service:     MAMMO STEREOTACTIC BREAST BIOPSY LEFT (ALL INC) Left 10/4/2018    MA CLOSE ENTEROSTOMY N/A 2017    Procedure: OPEN COLOSTOMY REVERSAL;  Surgeon: Heladio Fry MD;  [facility];  Service: General    MA COLONOSCOPY FLX DX W/MAYELA Mccain  PFRMD N/A 2017    Procedure: COLONOSCOPY; DILATION OF OSTOMY;  Surgeon: Heladio Fry MD;  [facility];   Service: General    MA EXC SKIN BENIG <0 5 CM REMAINDER BODY N/A 11/29/2017    Procedure: SCALP MASS EXCISION X 2;  Surgeon: Ahmet Andersen MD;  [facility];  Service: [facility] N/A 2/27/2019    Procedure: INCISIONAL HERNIA REPAIR, COMPONENT SEPARATION;  Surgeon: Ahmet Andersen MD;  [facility];  Service: General    ME TOTAL ABDOM HYSTERECTOMY N/A 2/27/2019    Procedure: TOTAL ABDOMINAL HYSTERECTOMY; BSO;  Surgeon: Dipak Jauregui MD;  [facility];  Service: Gynecology    VAC DRESSING APPLICATION N/A 0/4/1751    Procedure: APPLICATION VAC DRESSING;  Surgeon: Ahmet Andersen MD;  [facility];  Service:      Social History   Social History     Substance and Sexual Activity   Alcohol Use Yes    Frequency: Monthly or less    Drinks per session: 1 or 2    Binge frequency: Never    Comment: socially      Social History     Substance and Sexual Activity   Drug Use No     Social History     Tobacco Use   Smoking Status Current Every Day Smoker    Packs/day: 0 25    Years: 20 00    Pack years: 5 00    Types: Cigarettes    Start date: 8/2/1991   Smokeless Tobacco Never Used     Family History: non-contributory    Meds/Allergies   all medications and allergies reviewed     Current Outpatient Medications:     acetaminophen (TYLENOL) 325 mg tablet, Take 2 tablets (650 mg total) by mouth every 6 (six) hours as needed for mild pain, headaches or fever, Disp: 30 tablet, Rfl: 0    amLODIPine (NORVASC) 10 mg tablet, TAKE 1 TABLET DAILY  , Disp: 90 tablet, Rfl: 2    aspirin (ECOTRIN LOW STRENGTH) 81 mg EC tablet, Take 1 tablet (81 mg total) by mouth daily, Disp: 30 tablet, Rfl: 11    atorvastatin (LIPITOR) 20 mg tablet, TAKE 1 TABLET DAILY, Disp: 30 tablet, Rfl: 6    docusate sodium (COLACE) 100 mg capsule, Take 1 capsule (100 mg total) by mouth 2 (two) times a day, Disp: 10 capsule, Rfl: 0    ferrous sulfate 324 (65 Fe) mg, Take 1 tablet (324 mg total) by mouth 2 (two) times a day before meals, Disp: 60 tablet, Rfl: 0    furosemide (LASIX) 40 mg tablet, TAKE 1 TABLET TWICE DAILY  , Disp: 180 tablet, Rfl: 2    ipratropium (ATROVENT) 0 02 % nebulizer solution, Inhale 0 5 mg as needed , Disp: , Rfl:     metFORMIN (GLUCOPHAGE) 1000 MG tablet, Take 1 tablet (1,000 mg total) by mouth 2 (two) times a day with meals, Disp: 60 tablet, Rfl: 5    metoprolol succinate (TOPROL-XL) 25 mg 24 hr tablet, Take 1 tablet (25 mg total) by mouth daily, Disp: 90 tablet, Rfl: 3    sitaGLIPtin (JANUVIA) 100 mg tablet, Take 1 tablet (100 mg total) by mouth daily, Disp: 30 tablet, Rfl: 5  Allergies   Allergen Reactions    Ciprofloxacin Itching     Starts at hands to feet then the whole entire body       Objective     Current Vitals:   Blood Pressure: 144/80 (03/28/19 0905)  Pulse: 80 (03/28/19 0905)  Temperature: 98 6 °F (37 °C) (03/28/19 0905)  Temp Source: Oral (03/28/19 0905)  Respirations: 12 (03/28/19 0905)  Height: 5' (152 4 cm) (03/28/19 0905)  Weight - Scale: 74 8 kg (165 lb) (03/28/19 0905)      Invasive Devices          None          Physical Exam:  The abdomen is soft, nondistended and nontender  Incision is well-healed without evidence of recurrence of the hernia

## 2019-04-07 DIAGNOSIS — E78.2 MIXED HYPERLIPIDEMIA: ICD-10-CM

## 2019-04-08 RX ORDER — ATORVASTATIN CALCIUM 20 MG/1
TABLET, FILM COATED ORAL
Qty: 30 TABLET | Refills: 6 | Status: SHIPPED | OUTPATIENT
Start: 2019-04-08 | End: 2019-11-03 | Stop reason: SDUPTHER

## 2019-05-01 DIAGNOSIS — E11.9 CONTROLLED TYPE 2 DIABETES MELLITUS WITHOUT COMPLICATION, WITHOUT LONG-TERM CURRENT USE OF INSULIN (HCC): Chronic | ICD-10-CM

## 2019-05-22 DIAGNOSIS — E11.9 CONTROLLED TYPE 2 DIABETES MELLITUS WITHOUT COMPLICATION, WITHOUT LONG-TERM CURRENT USE OF INSULIN (HCC): Chronic | ICD-10-CM

## 2019-05-22 RX ORDER — SITAGLIPTIN 100 MG/1
TABLET, FILM COATED ORAL
Qty: 30 TABLET | Refills: 5 | Status: SHIPPED | OUTPATIENT
Start: 2019-05-22 | End: 2019-06-10 | Stop reason: SDUPTHER

## 2019-06-10 DIAGNOSIS — E11.9 CONTROLLED TYPE 2 DIABETES MELLITUS WITHOUT COMPLICATION, WITHOUT LONG-TERM CURRENT USE OF INSULIN (HCC): Chronic | ICD-10-CM

## 2019-06-11 ENCOUNTER — TELEPHONE (OUTPATIENT)
Dept: SURGERY | Facility: CLINIC | Age: 59
End: 2019-06-11

## 2019-07-11 ENCOUNTER — OFFICE VISIT (OUTPATIENT)
Dept: SURGERY | Facility: CLINIC | Age: 59
End: 2019-07-11
Payer: COMMERCIAL

## 2019-07-11 VITALS
SYSTOLIC BLOOD PRESSURE: 146 MMHG | WEIGHT: 163 LBS | HEIGHT: 60 IN | TEMPERATURE: 98.8 F | RESPIRATION RATE: 12 BRPM | DIASTOLIC BLOOD PRESSURE: 90 MMHG | HEART RATE: 65 BPM | BODY MASS INDEX: 32 KG/M2

## 2019-07-11 DIAGNOSIS — Z87.19 HISTORY OF INCISIONAL HERNIA REPAIR: Primary | ICD-10-CM

## 2019-07-11 DIAGNOSIS — Z98.890 HISTORY OF INCISIONAL HERNIA REPAIR: Primary | ICD-10-CM

## 2019-07-11 PROCEDURE — 99213 OFFICE O/P EST LOW 20 MIN: CPT | Performed by: SURGERY

## 2019-07-11 NOTE — PROGRESS NOTES
Follow Up - General Surgery   Marilyn Hood 63 y o  female MRN: [id number]  Unit/Bed#:  Encounter: [id number]    Assessment/Plan     Assessment:  Status post incisional hernia repair with mesh and posterior component separation, improved  Plan:  The patient doing quite well, no abdominal pain, no bulge from the incision  The patient is discharged from my care and I will be glad to see her if any problem arises in the future  History of Present Illness     HPI:  Marilyn Hood is a 63 y o  female who presents to my office for follow-up  The patient is status post incisional hernia repair with mesh and posterior component separation from February of this year  The patient stated doing well, tolerating diet having regular bowel movement  She denies having any abdominal pain, fever, chills, nausea, vomiting, diarrhea, constipation or any other constitutional symptoms  Review of Systems   All other systems reviewed and are negative        Historical Information   Past Medical History:   Diagnosis Date    Acute on chronic congestive heart failure with left ventricular diastolic dysfunction (HCC)     last assessed 2017    Asthma     Atelectasis     [facility]     Diabetes mellitus ([facility] )     Diverticulitis     with perforation and abscess     Hyperlipidemia     Hypertension     Lesion of spleen     Pericardial effusion      Past Surgical History:   Procedure Laterality Date    ABDOMINAL SURGERY       SECTION      COLON SURGERY      COLONOSCOPY      COLOSTOMY  2017    HARTMANS PROCEDURE N/A 2017    Procedure: EXPLORATORY LAPAROTOMY; PARTIAL SMALL BOWEL RESECTION; HARTMANS PROCEDURE; OSTOMY;  Surgeon: Nury Hinkle MD;  [facility];  Service:     MAMMO STEREOTACTIC BREAST BIOPSY LEFT (ALL INC) Left 10/4/2018    SD CLOSE ENTEROSTOMY N/A 2017    Procedure: OPEN COLOSTOMY REVERSAL;  Surgeon: Nury Hinkle MD;  [facility]; Service: General    NC COLONOSCOPY FLX DX W/COLLJ SPEC WHEN PFRMD N/A 8/16/2017    Procedure: COLONOSCOPY; DILATION OF OSTOMY;  Surgeon: Yarely Delgado MD;  [facility]; Service: General    NC EXC SKIN BENIG <0 5 CM REMAINDER BODY N/A 11/29/2017    Procedure: SCALP MASS EXCISION X 2;  Surgeon: Yarely Delgado MD;  [facility];  Service: [address] N/A 2/27/2019    Procedure: INCISIONAL HERNIA REPAIR, COMPONENT SEPARATION;  Surgeon: Yarely Delgado MD;  [facility];  Service: General    NC TOTAL ABDOM HYSTERECTOMY N/A 2/27/2019    Procedure: TOTAL ABDOMINAL HYSTERECTOMY; BSO;  Surgeon: Pooja Fernandes MD;  [facility];  Service: Gynecology    VAC DRESSING APPLICATION N/A 8/0/1046    Procedure: [address] ;  Surgeon: Yarely Delgado MD;  [facility];  Service:      Social History   Social History     Substance and Sexual Activity   Alcohol Use Yes    Frequency: Monthly or less    Drinks per session: 1 or 2    Binge frequency: Never    Comment: socially      Social History     Substance and Sexual Activity   Drug Use No     Social History     Tobacco Use   Smoking Status Current Every Day Smoker    Packs/day: 0 25    Years: 20 00    Pack years: 5 00    Types: Cigarettes    Start date: 8/2/1991   Smokeless Tobacco Never Used     Family History: non-contributory    Meds/Allergies   all medications and allergies reviewed     Current Outpatient Medications:     acetaminophen (TYLENOL) 325 mg tablet, Take 2 tablets (650 mg total) by mouth every 6 (six) hours as needed for mild pain, headaches or fever, Disp: 30 tablet, Rfl: 0    amLODIPine (NORVASC) 10 mg tablet, TAKE 1 TABLET DAILY  , Disp: 90 tablet, Rfl: 2    aspirin (ECOTRIN LOW STRENGTH) 81 mg EC tablet, Take 1 tablet (81 mg total) by mouth daily, Disp: 30 tablet, Rfl: 11    atorvastatin (LIPITOR) 20 mg tablet, TAKE 1 TABLET DAILY, Disp: 30 tablet, Rfl: 6    docusate sodium (COLACE) 100 mg capsule, Take 1 capsule (100 mg total) by mouth 2 (two) times a day, Disp: 10 capsule, Rfl: 0    ferrous sulfate 324 (65 Fe) mg, Take 1 tablet (324 mg total) by mouth 2 (two) times a day before meals, Disp: 60 tablet, Rfl: 0    furosemide (LASIX) 40 mg tablet, TAKE 1 TABLET TWICE DAILY  , Disp: 180 tablet, Rfl: 2    ipratropium (ATROVENT) 0 02 % nebulizer solution, Inhale 0 5 mg as needed , Disp: , Rfl:     metFORMIN (GLUCOPHAGE) 1000 MG tablet, TAKE 1 TABLET BY MOUTH TWICE A DAY WITH MEALS, Disp: 60 tablet, Rfl: 5    metoprolol succinate (TOPROL-XL) 25 mg 24 hr tablet, Take 1 tablet (25 mg total) by mouth daily, Disp: 90 tablet, Rfl: 3    sitaGLIPtin (JANUVIA) 100 mg tablet, Take 1 tablet (100 mg total) by mouth daily, Disp: 90 tablet, Rfl: 2  Allergies   Allergen Reactions    Ciprofloxacin Itching     Starts at hands to feet then the whole entire body       Objective     Current Vitals:   Blood Pressure: 146/90 (07/11/19 1043)  Pulse: 65 (07/11/19 1043)  Temperature: 98 8 °F (37 1 °C) (07/11/19 1043)  Temp Source: Oral (07/11/19 1043)  Respirations: 12 (07/11/19 1043)  Height: 5' (152 4 cm) (07/11/19 1043)  Weight - Scale: 73 9 kg (163 lb) (07/11/19 1043)      Invasive Devices     None                 Physical Exam   Constitutional: She is oriented to person, place, and time  She appears well-developed and well-nourished  No distress  HENT:   Head: Normocephalic  Mouth/Throat: No oropharyngeal exudate  Eyes: Pupils are equal, round, and reactive to light  No scleral icterus  Neck: Normal range of motion  Neck supple  Cardiovascular: Normal rate and regular rhythm  No murmur heard  Pulmonary/Chest: Effort normal and breath sounds normal  No respiratory distress  Abdominal:   Abdomen is soft, nondistended and nontender    Midline incision is well-healed without evidence of recurrence of the hernia even after flexing the abdominal wall muscles and in the erect position  There is no evidence of visceromegaly or mass palpable  Musculoskeletal: She exhibits no edema or deformity  Lymphadenopathy:     She has no cervical adenopathy  Neurological: She is alert and oriented to person, place, and time  No cranial nerve deficit  Skin: No rash noted  No erythema  Psychiatric: She has a normal mood and affect  Her behavior is normal    Nursing note and vitals reviewed

## 2019-08-14 ENCOUNTER — OFFICE VISIT (OUTPATIENT)
Dept: CARDIOLOGY CLINIC | Facility: CLINIC | Age: 59
End: 2019-08-14
Payer: COMMERCIAL

## 2019-08-14 VITALS
WEIGHT: 167 LBS | OXYGEN SATURATION: 90 % | HEART RATE: 71 BPM | DIASTOLIC BLOOD PRESSURE: 98 MMHG | HEIGHT: 60 IN | BODY MASS INDEX: 32.79 KG/M2 | SYSTOLIC BLOOD PRESSURE: 148 MMHG

## 2019-08-14 DIAGNOSIS — I25.10 CAD IN NATIVE ARTERY: Primary | ICD-10-CM

## 2019-08-14 DIAGNOSIS — E78.2 MIXED HYPERLIPIDEMIA: ICD-10-CM

## 2019-08-14 DIAGNOSIS — I10 ESSENTIAL HYPERTENSION: ICD-10-CM

## 2019-08-14 DIAGNOSIS — I50.32 CHRONIC DIASTOLIC HF (HEART FAILURE) (HCC): ICD-10-CM

## 2019-08-14 PROCEDURE — 99214 OFFICE O/P EST MOD 30 MIN: CPT | Performed by: INTERNAL MEDICINE

## 2019-08-14 RX ORDER — METOPROLOL SUCCINATE 50 MG/1
50 TABLET, EXTENDED RELEASE ORAL DAILY
Qty: 30 TABLET | Refills: 5 | Status: SHIPPED | OUTPATIENT
Start: 2019-08-14 | End: 2020-04-15

## 2019-08-14 NOTE — PROGRESS NOTES
[facility] [address] [address]  Cardiology Follow Up    [facility]  1960  [id number]      1  CAD in native artery     2  Chronic diastolic HF (heart failure) ([facility] )     3  Essential hypertension     4  Mixed hyperlipidemia         Chief Complaint   Patient presents with    Follow-up       Interval History:   Patient used to see Christine Yuen  She has history of hypertension and the moderate RCA disease by cardiac catheterization  Patient also has history of smoking  Patient denies any chest pain  No shortness of breath or leg edema  No history of orthopnea PND  She states that she has been compliant with all her present medications      Patient Active Problem List   Diagnosis    Controlled type 2 diabetes mellitus without complication ([facility] )    Uncomplicated asthma    Essential hypertension    Mixed hyperlipidemia    Chronic diastolic HF (heart failure) (HCC)    Pericardial effusion    Diabetic nephropathy associated with type 2 diabetes mellitus ([facility]    CAD in native artery    Tobacco use    Postoperative anemia    Postoperative examination     Past Medical History:   Diagnosis Date    Acute on chronic congestive heart failure with left ventricular diastolic dysfunction (HCC)     last assessed 5/23/2017    Asthma     Atelectasis     CHF (congestive heart failure) ([facility]     Diabetes mellitus ([facility] )     Diverticulitis 2017    with perforation and abscess     Hyperlipidemia     Hypertension     Lesion of spleen     Pericardial effusion      Social History     Socioeconomic History    Marital status:      Spouse name: Not on file    Number of children: Not on file    Years of education: Not on file    Highest education level: Not on file   Occupational History    Not on file   Social Needs    Financial resource strain: Not on file    Food insecurity:     Worry: Not on file     Inability: Not on file   [employer] needs:      Medical: Not on file     Non-medical: Not on file   Tobacco Use    Smoking status: Current Every Day Smoker     Packs/day: 0 25     Years: 20 00     Pack years: 5 00     Types: Cigarettes     Start date: 1991    Smokeless tobacco: Never Used   Substance and Sexual Activity    Alcohol use: Yes     Frequency: Monthly or less     Drinks per session: 1 or 2     Binge frequency: Never     Comment: socially     Drug use: No    Sexual activity: Yes     Partners: Male     Birth control/protection: Post-menopausal   Lifestyle    Physical activity:     Days per week: 7 days     Minutes per session: 20 min    Stress: Not at all   Relationships    Social connections:     Talks on phone: Not on file     Gets together: Not on file     Attends Christianity service: Not on file     Active member of club or organization: Not on file     Attends meetings of clubs or organizations: Not on file     Relationship status: Not on file    Intimate partner violence:     Fear of current or ex partner: Not on file     Emotionally abused: Not on file     Physically abused: Not on file     Forced sexual activity: Not on file   Other Topics Concern    Not on file   Social History Narrative    Not on file      Family History   Problem Relation Age of Onset    Hypertension Mother     Diabetes Mother     Heart disease Son      Past Surgical History:   Procedure Laterality Date    ABDOMINAL SURGERY       SECTION      COLON SURGERY      COLONOSCOPY      COLOSTOMY  2017    HARTMANS PROCEDURE N/A 2017    Procedure: EXPLORATORY LAPAROTOMY; PARTIAL SMALL BOWEL RESECTION; HARTMANS PROCEDURE; OSTOMY;  Surgeon: Enedelia Peñaloza MD;  [facility];  Service:     MAMMO STEREOTACTIC BREAST BIOPSY LEFT (ALL INC) Left 10/4/2018    NJ CLOSE ENTEROSTOMY N/A 2017    Procedure: OPEN COLOSTOMY REVERSAL;  Surgeon: Enedelia Peñaloza MD;  [facility];  Service: General    NJ COLONOSCOPY FLX DX W/COLLJ Giovanni Cardenas [facility] WHEN PFRMD N/A 8/16/2017    Procedure: COLONOSCOPY; DILATION OF OSTOMY;  Surgeon: Lennox Hillock, MD;  Location: [facility]; Service: General    CO EXC SKIN BENIG <0 5 CM REMAINDER BODY N/A 11/29/2017    Procedure: SCALP MASS EXCISION X 2;  Surgeon: Lennox Hillock, MD;  [facility];  Service: [address] [place] N/A 2/27/2019    Procedure: INCISIONAL HERNIA REPAIR, COMPONENT SEPARATION;  Surgeon: Lennox Hillock, MD;  [facility];  Service: General    CO TOTAL ABDOM HYSTERECTOMY N/A 2/27/2019    Procedure: TOTAL ABDOMINAL HYSTERECTOMY; BSO;  Surgeon: Oscar Cotter MD;  [facility];  Service: Gynecology    VAC DRESSING APPLICATION N/A 0/6/1070    Procedure: APPLICATION VAC DRESSING;  Surgeon: Lennox Hillock, MD;  [facility];  Service:        Current Outpatient Medications:     acetaminophen (TYLENOL) 325 mg tablet, Take 2 tablets (650 mg total) by mouth every 6 (six) hours as needed for mild pain, headaches or fever, Disp: 30 tablet, Rfl: 0    amLODIPine (NORVASC) 10 mg tablet, TAKE 1 TABLET DAILY  , Disp: 90 tablet, Rfl: 2    aspirin (ECOTRIN LOW STRENGTH) 81 mg EC tablet, Take 1 tablet (81 mg total) by mouth daily, Disp: 30 tablet, Rfl: 11    atorvastatin (LIPITOR) 20 mg tablet, TAKE 1 TABLET DAILY, Disp: 30 tablet, Rfl: 6    docusate sodium (COLACE) 100 mg capsule, Take 1 capsule (100 mg total) by mouth 2 (two) times a day, Disp: 10 capsule, Rfl: 0    ferrous sulfate 324 (65 Fe) mg, Take 1 tablet (324 mg total) by mouth 2 (two) times a day before meals, Disp: 60 tablet, Rfl: 0    furosemide (LASIX) 40 mg tablet, TAKE 1 TABLET TWICE DAILY  , Disp: 180 tablet, Rfl: 2    ipratropium (ATROVENT) 0 02 % nebulizer solution, Inhale 0 5 mg as needed , Disp: , Rfl:     metFORMIN (GLUCOPHAGE) 1000 MG tablet, TAKE 1 TABLET BY MOUTH TWICE A DAY WITH MEALS, Disp: 60 tablet, Rfl: 5    metoprolol succinate (TOPROL-XL) 25 mg 24 hr tablet, Take 1 tablet (25 mg total) by mouth daily, Disp: 90 tablet, Rfl: 3    sitaGLIPtin (JANUVIA) 100 mg tablet, Take 1 tablet (100 mg total) by mouth daily, Disp: 90 tablet, Rfl: 2  Allergies   Allergen Reactions    Ciprofloxacin Itching     Starts at hands to feet then the whole entire body       Labs:  No visits with results within 2 Month(s) from this visit  Latest known visit with results is:   Admission on 02/27/2019, Discharged on 03/04/2019   Component Date Value    POC Glucose 02/27/2019 105     Case Report 02/27/2019                      Value:Surgical Pathology Report                         Case: [id number]                                   Authorizing Provider:  Essence Delgado MD   Collected:           02/27/2019 0499              Ordering Location:     [address] [place] [address] Received:            02/27/2019 1354                                     Operating Room                                                               Pathologist:           Mason Montez MD                                                                Specimen:    Uterus w/Bilateral Ovaries and Fallopian Tubes                                             Final Diagnosis 02/27/2019                      Value: This result contains rich text formatting which cannot be displayed here   Note 02/27/2019                      Value: This result contains rich text formatting which cannot be displayed here   Additional Information 02/27/2019                      Value: This result contains rich text formatting which cannot be displayed here  [place] Description 02/27/2019                      Value: This result contains rich text formatting which cannot be displayed here   Clinical Information 02/27/2019                      Value:Submucous leiomyoma of uterus  Incisional hernia, without obstruction or gangrene       POC Glucose 02/27/2019 195*    POC Glucose 02/27/2019 206*    POC Glucose 02/27/2019 225*    WBC 02/28/2019 8 77     RBC 02/28/2019 2 91*    Hemoglobin 02/28/2019 8 9*    Hematocrit 02/28/2019 27 8*    MCV 02/28/2019 96     MCH 02/28/2019 30 6     MCHC 02/28/2019 32 0     RDW 02/28/2019 15 2*    Platelets 60/86/2243 232     MPV 02/28/2019 9 5     Magnesium 02/28/2019 1 2*    Phosphorus 02/28/2019 3 4     Sodium 02/28/2019 140     Potassium 02/28/2019 3 6     Chloride 02/28/2019 106     CO2 02/28/2019 25     ANION GAP 02/28/2019 9     BUN 02/28/2019 15     Creatinine 02/28/2019 0 68     Glucose 02/28/2019 169*    Calcium 02/28/2019 7 4*    eGFR 02/28/2019 112     Cholesterol 02/28/2019 106     Triglycerides 02/28/2019 108     HDL, Direct 02/28/2019 38*    LDL Calculated 02/28/2019 46     Non-HDL-Chol (CHOL-HDL) 02/28/2019 68     POC Glucose 02/28/2019 182*    POC Glucose 02/28/2019 204*    POC Glucose 02/28/2019 166*    POC Glucose 03/01/2019 163*    POC Glucose 03/01/2019 153*    WBC 03/01/2019 8 25     RBC 03/01/2019 2 64*    Hemoglobin 03/01/2019 8 0*    Hematocrit 03/01/2019 26 2*    MCV 03/01/2019 99*    MCH 03/01/2019 30 3     MCHC 03/01/2019 30 5*    RDW 03/01/2019 15 6*    Platelets 36/15/2381 183     MPV 03/01/2019 10 1     Sodium 03/01/2019 141     Potassium 03/01/2019 3 8     Chloride 03/01/2019 108     CO2 03/01/2019 25     ANION GAP 03/01/2019 8     BUN 03/01/2019 12     Creatinine 03/01/2019 0 58*    Glucose 03/01/2019 152*    Calcium 03/01/2019 8 2*    eGFR 03/01/2019 118     POC Glucose 03/01/2019 173*    WBC 03/01/2019 8 27     RBC 03/01/2019 2 42*    Hemoglobin 03/01/2019 7 4*    Hematocrit 03/01/2019 23 6*    MCV 03/01/2019 98     MCH 03/01/2019 30 6     MCHC 03/01/2019 31 4     RDW 03/01/2019 15 4*    Platelets 05/01/8676 202     MPV 03/01/2019 8 9     Sodium 03/01/2019 141     Potassium 03/01/2019 3 6     Chloride 03/01/2019 106     CO2 03/01/2019 28     ANION GAP 03/01/2019 7     BUN 03/01/2019 11     Creatinine 03/01/2019 0 62     Glucose 03/01/2019 166*    Calcium 03/01/2019 8 2*    eGFR 03/01/2019 116     POC Glucose 03/01/2019 173*    POC Glucose 03/01/2019 188*    WBC 03/02/2019 8 05     RBC 03/02/2019 2 30*    Hemoglobin 03/02/2019 7 1*    Hematocrit 03/02/2019 22 6*    MCV 03/02/2019 98     MCH 03/02/2019 30 9     MCHC 03/02/2019 31 4     RDW 03/02/2019 15 3*    Platelets 96/60/5974 210     MPV 03/02/2019 9 4     Sodium 03/02/2019 140     Potassium 03/02/2019 3 5     Chloride 03/02/2019 106     CO2 03/02/2019 27     ANION GAP 03/02/2019 7     BUN 03/02/2019 8     Creatinine 03/02/2019 0 54*    Glucose 03/02/2019 164*    Calcium 03/02/2019 8 2*    eGFR 03/02/2019 121     POC Glucose 03/02/2019 174*    POC Glucose 03/02/2019 171*    POC Glucose 03/02/2019 141*    POC Glucose 03/02/2019 205*    WBC 03/03/2019 5 17     RBC 03/03/2019 2 44*    Hemoglobin 03/03/2019 7 3*    Hematocrit 03/03/2019 23 5*    MCV 03/03/2019 96     MCH 03/03/2019 29 9     MCHC 03/03/2019 31 1*    RDW 03/03/2019 14 9     Platelets 55/50/2393 259     MPV 03/03/2019 9 7     Sodium 03/03/2019 143     Potassium 03/03/2019 3 3*    Chloride 03/03/2019 105     CO2 03/03/2019 28     ANION GAP 03/03/2019 10     BUN 03/03/2019 12     Creatinine 03/03/2019 0 53*    Glucose 03/03/2019 149*    Calcium 03/03/2019 8 5     eGFR 03/03/2019 122     POC Glucose 03/03/2019 157*    POC Glucose 03/03/2019 145*    POC Glucose 03/03/2019 133     POC Glucose 03/03/2019 128     POC Glucose 03/04/2019 126     POC Glucose 03/04/2019 172*     Imaging: No results found      Review of Systems:  Review of Systems     REVIEW OF SYSTEMS:  Constitutional:  Denies fever or chills   Eyes:  Denies change in visual acuity   HENT:  Denies nasal congestion or sore throat   Respiratory:  Denies cough or shortness of breath   Cardiovascular:  Denies chest pain or edema   GI:  Denies abdominal pain, nausea, vomiting, bloody stools or diarrhea :  Denies dysuria, frequency, difficulty in micturition and nocturia  Musculoskeletal:  Denies back pain or joint pain   Neurologic:  Denies headache, focal weakness or sensory changes   Endocrine:  Denies polyuria or polydipsia   Lymphatic:  Denies swollen glands   Psychiatric:  Denies depression or anxiety     Physical Exam:    /98   Pulse 71   Ht 5' (1 524 m)   Wt 75 8 kg (167 lb)   LMP 08/16/2014 (Approximate)   SpO2 90%   BMI 32 61 kg/m²     Physical Exam   PHYSICAL EXAM:  General:  Patient is not in acute distress   Head: Normocephalic, Atraumatic  HEENT:  Both pupils normal-size atraumatic, normocephalic, nonicteric  Neck:  JVP not raised  Trachea central  No carotid bruit  Respiratory:  normal breath sounds no crackles  no rhonchi  Cardiovascular:  Regular rate and rhythm no S3 no murmurs  GI:  Abdomen soft nontender  No organomegaly  Lymphatic:  No cervical or inguinal lymphadenopathy  Neurologic:  Patient is awake alert, oriented   Grossly nonfocal    Extremities reveal no edema      Discussion/Summary:  Patient with multiple medical problems who seems to be doing reasonably well from cardiac standpoint  Previous studies reviewed with patient  Medications reviewed and possible side effects discussed  concepts of cardiovascular disease , signs and symptoms of heart disease  Dietary and risk factor modification reinforced  All questions answered  Safety measures reviewed  Patient advised to report any problems prompting medical attention  Will increase the dosage of metoprolol to get better blood pressure control  Patient is strongly counseled to quit smoking to prevent future cardiovascular events  Patient also counseled about salt restriction and compliance with medications  Previous cardiovascular studies reviewed  Follow-up in 6 months or earlier as needed  Follow-up with primary care physician  Patient is agreeable with the plan of care

## 2019-08-20 ENCOUNTER — TELEPHONE (OUTPATIENT)
Dept: INTERNAL MEDICINE CLINIC | Facility: CLINIC | Age: 59
End: 2019-08-20

## 2019-08-22 ENCOUNTER — OFFICE VISIT (OUTPATIENT)
Dept: INTERNAL MEDICINE CLINIC | Facility: CLINIC | Age: 59
End: 2019-08-22
Payer: COMMERCIAL

## 2019-08-22 ENCOUNTER — TELEPHONE (OUTPATIENT)
Dept: INTERNAL MEDICINE CLINIC | Facility: CLINIC | Age: 59
End: 2019-08-22

## 2019-08-22 ENCOUNTER — APPOINTMENT (OUTPATIENT)
Dept: LAB | Facility: CLINIC | Age: 59
End: 2019-08-22
Payer: COMMERCIAL

## 2019-08-22 VITALS
OXYGEN SATURATION: 93 % | HEART RATE: 65 BPM | WEIGHT: 164.8 LBS | TEMPERATURE: 97 F | SYSTOLIC BLOOD PRESSURE: 124 MMHG | HEIGHT: 60 IN | DIASTOLIC BLOOD PRESSURE: 80 MMHG | BODY MASS INDEX: 32.35 KG/M2

## 2019-08-22 DIAGNOSIS — E11.9 CONTROLLED TYPE 2 DIABETES MELLITUS WITHOUT COMPLICATION, WITHOUT LONG-TERM CURRENT USE OF INSULIN (HCC): Chronic | ICD-10-CM

## 2019-08-22 DIAGNOSIS — E11.9 CONTROLLED TYPE 2 DIABETES MELLITUS WITHOUT COMPLICATION, WITHOUT LONG-TERM CURRENT USE OF INSULIN (HCC): Primary | Chronic | ICD-10-CM

## 2019-08-22 DIAGNOSIS — I10 ESSENTIAL HYPERTENSION: Chronic | ICD-10-CM

## 2019-08-22 DIAGNOSIS — I50.32 CHRONIC DIASTOLIC HF (HEART FAILURE) (HCC): Chronic | ICD-10-CM

## 2019-08-22 DIAGNOSIS — Z12.39 SCREENING FOR BREAST CANCER: ICD-10-CM

## 2019-08-22 DIAGNOSIS — I25.10 CAD IN NATIVE ARTERY: Chronic | ICD-10-CM

## 2019-08-22 DIAGNOSIS — E11.21 DIABETIC NEPHROPATHY ASSOCIATED WITH TYPE 2 DIABETES MELLITUS (HCC): Chronic | ICD-10-CM

## 2019-08-22 LAB
ANION GAP SERPL CALCULATED.3IONS-SCNC: 3 MMOL/L (ref 4–13)
BUN SERPL-MCNC: 12 MG/DL (ref 5–25)
CALCIUM SERPL-MCNC: 8.9 MG/DL (ref 8.3–10.1)
CHLORIDE SERPL-SCNC: 103 MMOL/L (ref 100–108)
CO2 SERPL-SCNC: 30 MMOL/L (ref 21–32)
CREAT SERPL-MCNC: 0.62 MG/DL (ref 0.6–1.3)
EST. AVERAGE GLUCOSE BLD GHB EST-MCNC: 97 MG/DL
GFR SERPL CREATININE-BSD FRML MDRD: 115 ML/MIN/1.73SQ M
GLUCOSE P FAST SERPL-MCNC: 77 MG/DL (ref 65–99)
HBA1C MFR BLD: 5 % (ref 4.2–6.3)
POTASSIUM SERPL-SCNC: 3.9 MMOL/L (ref 3.5–5.3)
SODIUM SERPL-SCNC: 136 MMOL/L (ref 136–145)

## 2019-08-22 PROCEDURE — 4004F PT TOBACCO SCREEN RCVD TLK: CPT | Performed by: INTERNAL MEDICINE

## 2019-08-22 PROCEDURE — 99214 OFFICE O/P EST MOD 30 MIN: CPT | Performed by: INTERNAL MEDICINE

## 2019-08-22 PROCEDURE — 36415 COLL VENOUS BLD VENIPUNCTURE: CPT

## 2019-08-22 PROCEDURE — 3066F NEPHROPATHY DOC TX: CPT | Performed by: INTERNAL MEDICINE

## 2019-08-22 PROCEDURE — 3008F BODY MASS INDEX DOCD: CPT | Performed by: INTERNAL MEDICINE

## 2019-08-22 PROCEDURE — 83036 HEMOGLOBIN GLYCOSYLATED A1C: CPT

## 2019-08-22 PROCEDURE — 80048 BASIC METABOLIC PNL TOTAL CA: CPT

## 2019-08-22 PROCEDURE — 3074F SYST BP LT 130 MM HG: CPT | Performed by: INTERNAL MEDICINE

## 2019-08-22 NOTE — PATIENT INSTRUCTIONS

## 2019-08-22 NOTE — PROGRESS NOTES
INTERNAL MEDICINE FOLLOW-UP OFFICE VISIT  [facility] [address] [place] [address]    NAME: Geeta Sargent  AGE: 63 y o  SEX: female  : 1960     DATE: 2019     Assessment and Plan:     1  Controlled type 2 diabetes mellitus without complication, without long-term current use of insulin ([facility] )  2  Diabetic nephropathy associated with type 2 diabetes mellitus ([facility] )    Check UTD lab work  Will continue metformin and januvia  Continue to work on diet/exercise  Will call with results of testing  Body mass index is 32 46 kg/m²  Discussed the patient's BMI with her  The BMI is above average  BMI counseling and education was provided to the patient  Nutrition recommendations include reducing portion sizes, decreasing overall calorie intake, 3-5 servings of fruits/vegetables daily, moderation in carbohydrate intake and increasing intake of lean protein  3  Essential hypertension  4  CAD in native artery  5  Chronic diastolic HF (heart failure) (HCC)    Blood pressure is controlled with recent changes from cardiologist  No recent cardiac symptoms  She appears euvolemic  Monitor weight at home  Discussed heart healthy diet recommendations  6  Screening for breast cancer  - Mammo screening bilateral w 3d & cad; Future    Return in about 4 months (around 2019) for Follow-up  Chief Complaint:     Chief Complaint   Patient presents with    Follow-up     4 month    Nasal Congestion     runny and sneezing    Cough     dry/tickle and little mucus      History of Present Illness:     Patient presents for follow-up  Did not get labs done for diabetes  States blood sugars have been stable at home  She will get labs done today  She denies any cardiac symptoms  BP was recently high at cardiologist and toprol-XL was increased  Her BP is improved today  She denies any shortness of breath or weight gain  No recent episodes of CHF  She just started with cold symptoms past 2 days      The following portions of the patient's history were reviewed and updated as appropriate: allergies, current medications, past family history, past medical history, past social history, past surgical history and problem list      Review of Systems:     Review of Systems   Constitutional: Negative for activity change, appetite change and fatigue  HENT: Positive for congestion and rhinorrhea  Respiratory: Positive for cough  Negative for apnea, chest tightness, shortness of breath and wheezing  Cardiovascular: Negative for chest pain, palpitations and leg swelling  Gastrointestinal: Negative for abdominal distention, abdominal pain, blood in stool, constipation, diarrhea, nausea and vomiting  Musculoskeletal: Negative for arthralgias, back pain, gait problem, joint swelling and myalgias  Neurological: Negative for dizziness, weakness, light-headedness, numbness and headaches  Psychiatric/Behavioral: Negative for behavioral problems, confusion, hallucinations, sleep disturbance and suicidal ideas  The patient is not nervous/anxious  Problem List:     Patient Active Problem List   Diagnosis    Controlled type 2 diabetes mellitus without complication ([facility] )    Uncomplicated asthma    Essential hypertension    Mixed hyperlipidemia    [facility]    Pericardial effusion    Diabetic nephropathy associated with type 2 diabetes mellitus ([facility] )    CAD in native artery    Tobacco use    Postoperative anemia    Postoperative examination      Objective:     /80 (BP Location: Left arm, Patient Position: Sitting, Cuff Size: Standard)   Pulse 65   Temp (!) 97 °F (36 1 °C) (Tympanic) Comment: w/ aspirin  Ht 4' 11 75" (1 518 m)   Wt 74 8 kg (164 lb 12 8 oz)   LMP 08/16/2014 (Approximate)   SpO2 93%   BMI 32 46 kg/m²     Physical Exam   Constitutional: She is oriented to person, place, and time  She appears well-developed and well-nourished  No distress     Obesity Eyes: Conjunctivae are normal  Right eye exhibits no discharge  Left eye exhibits no discharge  No scleral icterus  Neck: Neck supple  No JVD present  No thyromegaly present  Cardiovascular: Normal rate, regular rhythm and normal heart sounds  Pulses are no weak pulses  No murmur heard  Pulses:       Dorsalis pedis pulses are 2+ on the right side, and 2+ on the left side  Posterior tibial pulses are 2+ on the right side, and 2+ on the left side  Pulmonary/Chest: Effort normal and breath sounds normal  No respiratory distress  She has no wheezes  She has no rales  She exhibits no tenderness  Abdominal: Soft  Bowel sounds are normal  She exhibits no distension  There is no tenderness  Musculoskeletal: She exhibits no edema  Feet:   Right Foot:   Skin Integrity: Positive for dry skin  Negative for ulcer, skin breakdown, erythema, warmth or callus  Left Foot:   Skin Integrity: Positive for dry skin  Negative for ulcer, skin breakdown, erythema, warmth or callus  Lymphadenopathy:     She has no cervical adenopathy  Neurological: She is alert and oriented to person, place, and time  Skin: Skin is warm and dry  She is not diaphoretic  Psychiatric: She has a normal mood and affect  Her behavior is normal    Vitals reviewed  Diabetic Foot Exam  Patient's shoes and socks removed  Right Foot/Ankle   Right Foot Inspection  Skin Exam: skin normal, skin intact and dry skin no warmth, no callus, no erythema, no maceration, no abnormal color, no pre-ulcer, no ulcer and no callus                          Toe Exam: ROM and strength within normal limits  Sensory     Proprioception: intact   Monofilament testing: intact  Vascular  Capillary refills: < 3 seconds  The right DP pulse is 2+  The right PT pulse is 2+       Left Foot/Ankle  Left Foot Inspection  Skin Exam: skin normal, skin intact and dry skinno warmth, no erythema, no maceration, normal color, no pre-ulcer, no ulcer and no callus Toe Exam: ROM and strength within normal limits                   Sensory     Proprioception: intact  Monofilament: intact  Vascular  Capillary refills: < 3 seconds  The left DP pulse is 2+  The left PT pulse is 2+  Assign Risk Category:  No deformity present; No loss of protective sensation;  No weak pulses       Risk: 0    Kevin Hanna [facility] [address]

## 2019-08-22 NOTE — TELEPHONE ENCOUNTER
----- Message from Jocelyne Da Silva DO sent at 8/22/2019  2:40 PM EDT -----  Call patient and let her know that her diabetes is excellent with A1C of 5 0%   Kidney function and electrolytes were normal

## 2019-09-12 DIAGNOSIS — I10 ESSENTIAL HYPERTENSION: ICD-10-CM

## 2019-09-20 DIAGNOSIS — I10 ESSENTIAL HYPERTENSION: ICD-10-CM

## 2019-09-23 RX ORDER — AMLODIPINE BESYLATE 10 MG/1
10 TABLET ORAL DAILY
Qty: 90 TABLET | Refills: 2 | Status: SHIPPED | OUTPATIENT
Start: 2019-09-23 | End: 2020-09-15

## 2019-11-03 DIAGNOSIS — E78.2 MIXED HYPERLIPIDEMIA: ICD-10-CM

## 2019-11-03 RX ORDER — ATORVASTATIN CALCIUM 20 MG/1
TABLET, FILM COATED ORAL
Qty: 90 TABLET | Refills: 2 | Status: SHIPPED | OUTPATIENT
Start: 2019-11-03 | End: 2020-10-03

## 2019-11-26 RX ORDER — AMLODIPINE BESYLATE 10 MG/1
TABLET ORAL
Qty: 90 TABLET | Refills: 2 | OUTPATIENT
Start: 2019-11-26

## 2020-01-07 ENCOUNTER — TELEPHONE (OUTPATIENT)
Dept: INTERNAL MEDICINE CLINIC | Facility: CLINIC | Age: 60
End: 2020-01-07

## 2020-01-07 DIAGNOSIS — E11.9 CONTROLLED TYPE 2 DIABETES MELLITUS WITHOUT COMPLICATION, WITHOUT LONG-TERM CURRENT USE OF INSULIN (HCC): Primary | Chronic | ICD-10-CM

## 2020-01-07 NOTE — TELEPHONE ENCOUNTER
Patient states that her insurance will no longer cover sitaGLIPtin (JANUVIA) 100 mg tablet     She is out of the medication and would like to know if there is something else that can be prescribed   Please advise patient at [phone]

## 2020-02-02 DIAGNOSIS — E11.9 CONTROLLED TYPE 2 DIABETES MELLITUS WITHOUT COMPLICATION, WITHOUT LONG-TERM CURRENT USE OF INSULIN (HCC): Chronic | ICD-10-CM

## 2020-02-11 DIAGNOSIS — I10 ESSENTIAL HYPERTENSION: ICD-10-CM

## 2020-02-11 RX ORDER — PROPYLENE GLYCOL/PEG 400 0.3 %-0.4%
DROPS OPHTHALMIC (EYE)
Qty: 90 TABLET | Refills: 3 | Status: SHIPPED | OUTPATIENT
Start: 2020-02-11 | End: 2021-01-22 | Stop reason: HOSPADM

## 2020-03-30 ENCOUNTER — TELEPHONE (OUTPATIENT)
Dept: INTERNAL MEDICINE CLINIC | Facility: CLINIC | Age: 60
End: 2020-03-30

## 2020-03-30 NOTE — TELEPHONE ENCOUNTER
Pt would like Dr Ricky Wood to do a letter for her job- showing underlying ailments  Don't know if she has My Chart or not- if not- then please mail the letter to her home address     Her address was verified    Call her on her mobile p#

## 2020-04-15 DIAGNOSIS — I10 ESSENTIAL HYPERTENSION: ICD-10-CM

## 2020-04-15 DIAGNOSIS — I25.10 CAD IN NATIVE ARTERY: ICD-10-CM

## 2020-04-15 RX ORDER — METOPROLOL SUCCINATE 50 MG/1
TABLET, EXTENDED RELEASE ORAL
Qty: 90 TABLET | Refills: 1 | Status: SHIPPED | OUTPATIENT
Start: 2020-04-15 | End: 2020-10-17

## 2020-05-13 ENCOUNTER — TELEPHONE (OUTPATIENT)
Dept: INTERNAL MEDICINE CLINIC | Facility: CLINIC | Age: 60
End: 2020-05-13

## 2020-05-20 ENCOUNTER — TELEPHONE (OUTPATIENT)
Dept: INTERNAL MEDICINE CLINIC | Facility: CLINIC | Age: 60
End: 2020-05-20

## 2020-05-21 ENCOUNTER — TELEPHONE (OUTPATIENT)
Dept: INTERNAL MEDICINE CLINIC | Facility: CLINIC | Age: 60
End: 2020-05-21

## 2020-05-21 ENCOUNTER — APPOINTMENT (OUTPATIENT)
Dept: LAB | Facility: CLINIC | Age: 60
End: 2020-05-21
Payer: COMMERCIAL

## 2020-05-21 ENCOUNTER — OFFICE VISIT (OUTPATIENT)
Dept: INTERNAL MEDICINE CLINIC | Facility: CLINIC | Age: 60
End: 2020-05-21
Payer: COMMERCIAL

## 2020-05-21 VITALS
HEART RATE: 83 BPM | BODY MASS INDEX: 33.38 KG/M2 | TEMPERATURE: 98.6 F | HEIGHT: 60 IN | OXYGEN SATURATION: 94 % | WEIGHT: 170 LBS | DIASTOLIC BLOOD PRESSURE: 72 MMHG | SYSTOLIC BLOOD PRESSURE: 126 MMHG

## 2020-05-21 DIAGNOSIS — I25.10 CAD IN NATIVE ARTERY: Chronic | ICD-10-CM

## 2020-05-21 DIAGNOSIS — I10 ESSENTIAL HYPERTENSION: Chronic | ICD-10-CM

## 2020-05-21 DIAGNOSIS — I50.32 CHRONIC DIASTOLIC CONGESTIVE HEART FAILURE (HCC): ICD-10-CM

## 2020-05-21 DIAGNOSIS — Z11.4 SCREENING FOR HIV (HUMAN IMMUNODEFICIENCY VIRUS): ICD-10-CM

## 2020-05-21 DIAGNOSIS — E11.9 CONTROLLED TYPE 2 DIABETES MELLITUS WITHOUT COMPLICATION, WITHOUT LONG-TERM CURRENT USE OF INSULIN (HCC): Chronic | ICD-10-CM

## 2020-05-21 DIAGNOSIS — E11.9 CONTROLLED TYPE 2 DIABETES MELLITUS WITHOUT COMPLICATION, WITHOUT LONG-TERM CURRENT USE OF INSULIN (HCC): Primary | Chronic | ICD-10-CM

## 2020-05-21 LAB
ALBUMIN SERPL BCP-MCNC: 4.1 G/DL (ref 3.5–5)
ALP SERPL-CCNC: 57 U/L (ref 46–116)
ALT SERPL W P-5'-P-CCNC: 19 U/L (ref 12–78)
ANION GAP SERPL CALCULATED.3IONS-SCNC: 5 MMOL/L (ref 4–13)
AST SERPL W P-5'-P-CCNC: 8 U/L (ref 5–45)
BILIRUB SERPL-MCNC: 0.76 MG/DL (ref 0.2–1)
BUN SERPL-MCNC: 13 MG/DL (ref 5–25)
CALCIUM SERPL-MCNC: 9.3 MG/DL (ref 8.3–10.1)
CHLORIDE SERPL-SCNC: 106 MMOL/L (ref 100–108)
CHOLEST SERPL-MCNC: 137 MG/DL (ref 50–200)
CO2 SERPL-SCNC: 27 MMOL/L (ref 21–32)
CREAT SERPL-MCNC: 0.62 MG/DL (ref 0.6–1.3)
CREAT UR-MCNC: 89.6 MG/DL
EST. AVERAGE GLUCOSE BLD GHB EST-MCNC: 100 MG/DL
GFR SERPL CREATININE-BSD FRML MDRD: 114 ML/MIN/1.73SQ M
GLUCOSE P FAST SERPL-MCNC: 87 MG/DL (ref 65–99)
HBA1C MFR BLD: 5.1 %
HDLC SERPL-MCNC: 54 MG/DL
LDLC SERPL CALC-MCNC: 75 MG/DL (ref 0–100)
MICROALBUMIN UR-MCNC: 112 MG/L (ref 0–20)
MICROALBUMIN/CREAT 24H UR: 125 MG/G CREATININE (ref 0–30)
NONHDLC SERPL-MCNC: 83 MG/DL
POTASSIUM SERPL-SCNC: 4.4 MMOL/L (ref 3.5–5.3)
PROT SERPL-MCNC: 8.1 G/DL (ref 6.4–8.2)
SODIUM SERPL-SCNC: 138 MMOL/L (ref 136–145)
TRIGL SERPL-MCNC: 38 MG/DL

## 2020-05-21 PROCEDURE — 3060F POS MICROALBUMINURIA REV: CPT | Performed by: INTERNAL MEDICINE

## 2020-05-21 PROCEDURE — 82043 UR ALBUMIN QUANTITATIVE: CPT

## 2020-05-21 PROCEDURE — 36415 COLL VENOUS BLD VENIPUNCTURE: CPT

## 2020-05-21 PROCEDURE — 3078F DIAST BP <80 MM HG: CPT | Performed by: INTERNAL MEDICINE

## 2020-05-21 PROCEDURE — 4004F PT TOBACCO SCREEN RCVD TLK: CPT | Performed by: INTERNAL MEDICINE

## 2020-05-21 PROCEDURE — 99214 OFFICE O/P EST MOD 30 MIN: CPT | Performed by: INTERNAL MEDICINE

## 2020-05-21 PROCEDURE — 80061 LIPID PANEL: CPT

## 2020-05-21 PROCEDURE — 80053 COMPREHEN METABOLIC PANEL: CPT

## 2020-05-21 PROCEDURE — 3066F NEPHROPATHY DOC TX: CPT | Performed by: INTERNAL MEDICINE

## 2020-05-21 PROCEDURE — 3074F SYST BP LT 130 MM HG: CPT | Performed by: INTERNAL MEDICINE

## 2020-05-21 PROCEDURE — 82570 ASSAY OF URINE CREATININE: CPT

## 2020-05-21 PROCEDURE — 3008F BODY MASS INDEX DOCD: CPT | Performed by: INTERNAL MEDICINE

## 2020-05-21 PROCEDURE — 3044F HG A1C LEVEL LT 7.0%: CPT | Performed by: INTERNAL MEDICINE

## 2020-05-21 PROCEDURE — 87389 HIV-1 AG W/HIV-1&-2 AB AG IA: CPT

## 2020-05-21 PROCEDURE — 83036 HEMOGLOBIN GLYCOSYLATED A1C: CPT

## 2020-05-21 RX ORDER — FUROSEMIDE 40 MG/1
40 TABLET ORAL 2 TIMES DAILY
Qty: 180 TABLET | Refills: 3 | Status: SHIPPED | OUTPATIENT
Start: 2020-05-21 | End: 2021-01-22 | Stop reason: HOSPADM

## 2020-05-22 LAB — HIV 1+2 AB+HIV1 P24 AG SERPL QL IA: NORMAL

## 2020-06-19 ENCOUNTER — TELEPHONE (OUTPATIENT)
Dept: INTERNAL MEDICINE CLINIC | Facility: CLINIC | Age: 60
End: 2020-06-19

## 2020-06-22 ENCOUNTER — OFFICE VISIT (OUTPATIENT)
Dept: INTERNAL MEDICINE CLINIC | Facility: CLINIC | Age: 60
End: 2020-06-22
Payer: COMMERCIAL

## 2020-06-22 VITALS
OXYGEN SATURATION: 94 % | WEIGHT: 170.8 LBS | DIASTOLIC BLOOD PRESSURE: 82 MMHG | TEMPERATURE: 98.5 F | SYSTOLIC BLOOD PRESSURE: 122 MMHG | BODY MASS INDEX: 33.53 KG/M2 | HEIGHT: 60 IN | HEART RATE: 60 BPM

## 2020-06-22 DIAGNOSIS — L30.9 HAND DERMATITIS: Primary | ICD-10-CM

## 2020-06-22 PROCEDURE — 3044F HG A1C LEVEL LT 7.0%: CPT | Performed by: INTERNAL MEDICINE

## 2020-06-22 PROCEDURE — 3008F BODY MASS INDEX DOCD: CPT | Performed by: INTERNAL MEDICINE

## 2020-06-22 PROCEDURE — 3066F NEPHROPATHY DOC TX: CPT | Performed by: INTERNAL MEDICINE

## 2020-06-22 PROCEDURE — 3074F SYST BP LT 130 MM HG: CPT | Performed by: INTERNAL MEDICINE

## 2020-06-22 PROCEDURE — 4004F PT TOBACCO SCREEN RCVD TLK: CPT | Performed by: INTERNAL MEDICINE

## 2020-06-22 PROCEDURE — 3079F DIAST BP 80-89 MM HG: CPT | Performed by: INTERNAL MEDICINE

## 2020-06-22 PROCEDURE — 3060F POS MICROALBUMINURIA REV: CPT | Performed by: INTERNAL MEDICINE

## 2020-06-22 PROCEDURE — 99213 OFFICE O/P EST LOW 20 MIN: CPT | Performed by: INTERNAL MEDICINE

## 2020-09-15 DIAGNOSIS — I10 ESSENTIAL HYPERTENSION: ICD-10-CM

## 2020-09-15 RX ORDER — AMLODIPINE BESYLATE 10 MG/1
TABLET ORAL
Qty: 90 TABLET | Refills: 2 | Status: SHIPPED | OUTPATIENT
Start: 2020-09-15 | End: 2021-06-21

## 2020-10-03 DIAGNOSIS — E78.2 MIXED HYPERLIPIDEMIA: ICD-10-CM

## 2020-10-03 RX ORDER — ATORVASTATIN CALCIUM 20 MG/1
TABLET, FILM COATED ORAL
Qty: 90 TABLET | Refills: 1 | Status: SHIPPED | OUTPATIENT
Start: 2020-10-03 | End: 2021-05-01

## 2020-10-17 DIAGNOSIS — I10 ESSENTIAL HYPERTENSION: ICD-10-CM

## 2020-10-17 DIAGNOSIS — I25.10 CAD IN NATIVE ARTERY: ICD-10-CM

## 2020-10-17 RX ORDER — METOPROLOL SUCCINATE 50 MG/1
TABLET, EXTENDED RELEASE ORAL
Qty: 90 TABLET | Refills: 1 | Status: SHIPPED | OUTPATIENT
Start: 2020-10-17 | End: 2021-02-04

## 2020-10-19 ENCOUNTER — TELEPHONE (OUTPATIENT)
Dept: OBGYN CLINIC | Facility: CLINIC | Age: 60
End: 2020-10-19

## 2020-10-20 ENCOUNTER — TELEPHONE (OUTPATIENT)
Dept: INTERNAL MEDICINE CLINIC | Facility: CLINIC | Age: 60
End: 2020-10-20

## 2020-12-07 ENCOUNTER — HOSPITAL ENCOUNTER (OUTPATIENT)
Dept: MAMMOGRAPHY | Facility: CLINIC | Age: 60
Discharge: HOME | End: 2020-12-07
Payer: COMMERCIAL

## 2020-12-07 VITALS — BODY MASS INDEX: 32.39 KG/M2 | HEIGHT: 60 IN | WEIGHT: 165 LBS

## 2020-12-07 DIAGNOSIS — Z12.39 SCREENING FOR BREAST CANCER: ICD-10-CM

## 2020-12-07 PROCEDURE — 77063 BREAST TOMOSYNTHESIS BI: CPT

## 2020-12-07 PROCEDURE — 77067 SCR MAMMO BI INCL CAD: CPT

## 2020-12-08 ENCOUNTER — TELEPHONE (OUTPATIENT)
Dept: INTERNAL MEDICINE CLINIC | Facility: CLINIC | Age: 60
End: 2020-12-08

## 2020-12-15 LAB
LEFT EYE DIABETIC RETINOPATHY: NORMAL
RIGHT EYE DIABETIC RETINOPATHY: NORMAL

## 2020-12-29 ENCOUNTER — OFFICE VISIT (OUTPATIENT)
Dept: INTERNAL MEDICINE CLINIC | Facility: CLINIC | Age: 60
End: 2020-12-29
Payer: COMMERCIAL

## 2020-12-29 VITALS
SYSTOLIC BLOOD PRESSURE: 124 MMHG | BODY MASS INDEX: 32.77 KG/M2 | OXYGEN SATURATION: 94 % | DIASTOLIC BLOOD PRESSURE: 76 MMHG | TEMPERATURE: 97.8 F | WEIGHT: 167.8 LBS | HEART RATE: 73 BPM

## 2020-12-29 DIAGNOSIS — R42 VERTIGO: Primary | ICD-10-CM

## 2020-12-29 PROCEDURE — 99213 OFFICE O/P EST LOW 20 MIN: CPT | Performed by: NURSE PRACTITIONER

## 2020-12-29 RX ORDER — MECLIZINE HYDROCHLORIDE 25 MG/1
25 TABLET ORAL 3 TIMES DAILY PRN
Qty: 30 TABLET | Refills: 0 | Status: SHIPPED | OUTPATIENT
Start: 2020-12-29 | End: 2021-01-05

## 2021-01-04 ENCOUNTER — TELEPHONE (OUTPATIENT)
Dept: INTERNAL MEDICINE CLINIC | Facility: CLINIC | Age: 61
End: 2021-01-04

## 2021-01-04 NOTE — TELEPHONE ENCOUNTER
SAW JORGE 12/29-PATIENT FEELS LIKE THE VERTIGO IS WORSE, PATIENT IS IN TEARS BC SHE IS SO DIZZY-SITTING IN THE CHAIR SHE IS GOOD--THE MOMENT SHE GETS UP SHE IS SEVERELY DIZZY-    DOES NOT THINK THE meclizine (ANTIVERT) 25 mg tablet IS WORKING, SHE FINDS WHEN SHE TAKES IT IT MAKES THINGS WORSE-HAS NOT TAKEN IT IN 2 DAYS    SCHEDULED APPT W/ YOU FOR TOMORROW-BUT IF THERE IS ANYTHING YOU SUGGEST IN THE MEAN TIME SHE WOULD APPRECIATE

## 2021-01-05 ENCOUNTER — APPOINTMENT (OUTPATIENT)
Dept: LAB | Facility: CLINIC | Age: 61
End: 2021-01-05
Payer: COMMERCIAL

## 2021-01-05 ENCOUNTER — TELEPHONE (OUTPATIENT)
Dept: INTERNAL MEDICINE CLINIC | Facility: CLINIC | Age: 61
End: 2021-01-05

## 2021-01-05 ENCOUNTER — OFFICE VISIT (OUTPATIENT)
Dept: INTERNAL MEDICINE CLINIC | Facility: CLINIC | Age: 61
End: 2021-01-05
Payer: COMMERCIAL

## 2021-01-05 VITALS
WEIGHT: 166.6 LBS | SYSTOLIC BLOOD PRESSURE: 120 MMHG | DIASTOLIC BLOOD PRESSURE: 82 MMHG | HEART RATE: 79 BPM | TEMPERATURE: 98.3 F | OXYGEN SATURATION: 97 % | BODY MASS INDEX: 33.59 KG/M2 | HEIGHT: 59 IN

## 2021-01-05 DIAGNOSIS — I50.32 CHRONIC DIASTOLIC HF (HEART FAILURE) (HCC): Chronic | ICD-10-CM

## 2021-01-05 DIAGNOSIS — R42 DIZZINESS: ICD-10-CM

## 2021-01-05 DIAGNOSIS — E78.2 MIXED HYPERLIPIDEMIA: ICD-10-CM

## 2021-01-05 DIAGNOSIS — E11.9 CONTROLLED TYPE 2 DIABETES MELLITUS WITHOUT COMPLICATION, WITHOUT LONG-TERM CURRENT USE OF INSULIN (HCC): Primary | Chronic | ICD-10-CM

## 2021-01-05 DIAGNOSIS — E11.21 DIABETIC NEPHROPATHY ASSOCIATED WITH TYPE 2 DIABETES MELLITUS (HCC): Chronic | ICD-10-CM

## 2021-01-05 DIAGNOSIS — E11.9 CONTROLLED TYPE 2 DIABETES MELLITUS WITHOUT COMPLICATION, WITHOUT LONG-TERM CURRENT USE OF INSULIN (HCC): Chronic | ICD-10-CM

## 2021-01-05 DIAGNOSIS — I25.10 CAD IN NATIVE ARTERY: Chronic | ICD-10-CM

## 2021-01-05 PROBLEM — Z09 POSTOPERATIVE EXAMINATION: Status: RESOLVED | Noted: 2019-03-18 | Resolved: 2021-01-05

## 2021-01-05 LAB
ANION GAP SERPL CALCULATED.3IONS-SCNC: 2 MMOL/L (ref 4–13)
BUN SERPL-MCNC: 16 MG/DL (ref 5–25)
CALCIUM SERPL-MCNC: 9.8 MG/DL (ref 8.3–10.1)
CHLORIDE SERPL-SCNC: 107 MMOL/L (ref 100–108)
CHOLEST SERPL-MCNC: 164 MG/DL (ref 50–200)
CO2 SERPL-SCNC: 30 MMOL/L (ref 21–32)
CREAT SERPL-MCNC: 0.64 MG/DL (ref 0.6–1.3)
CREAT UR-MCNC: 176 MG/DL
ERYTHROCYTE [DISTWIDTH] IN BLOOD BY AUTOMATED COUNT: 15.3 % (ref 11.6–15.1)
EST. AVERAGE GLUCOSE BLD GHB EST-MCNC: 105 MG/DL
GFR SERPL CREATININE-BSD FRML MDRD: 113 ML/MIN/1.73SQ M
GLUCOSE SERPL-MCNC: 101 MG/DL (ref 65–140)
HBA1C MFR BLD: 5.3 %
HCT VFR BLD AUTO: 39.6 % (ref 34.8–46.1)
HDLC SERPL-MCNC: 59 MG/DL
HGB BLD-MCNC: 12 G/DL (ref 11.5–15.4)
LDLC SERPL CALC-MCNC: 89 MG/DL (ref 0–100)
MCH RBC QN AUTO: 27.8 PG (ref 26.8–34.3)
MCHC RBC AUTO-ENTMCNC: 30.3 G/DL (ref 31.4–37.4)
MCV RBC AUTO: 92 FL (ref 82–98)
MICROALBUMIN UR-MCNC: 448 MG/L (ref 0–20)
MICROALBUMIN/CREAT 24H UR: 255 MG/G CREATININE (ref 0–30)
NONHDLC SERPL-MCNC: 105 MG/DL
PLATELET # BLD AUTO: 364 THOUSANDS/UL (ref 149–390)
PMV BLD AUTO: 9.6 FL (ref 8.9–12.7)
POTASSIUM SERPL-SCNC: 4.2 MMOL/L (ref 3.5–5.3)
RBC # BLD AUTO: 4.31 MILLION/UL (ref 3.81–5.12)
SODIUM SERPL-SCNC: 139 MMOL/L (ref 136–145)
TRIGL SERPL-MCNC: 80 MG/DL
VIT B12 SERPL-MCNC: 605 PG/ML (ref 100–900)
WBC # BLD AUTO: 7.43 THOUSAND/UL (ref 4.31–10.16)

## 2021-01-05 PROCEDURE — 82607 VITAMIN B-12: CPT

## 2021-01-05 PROCEDURE — 3060F POS MICROALBUMINURIA REV: CPT | Performed by: NURSE PRACTITIONER

## 2021-01-05 PROCEDURE — 80048 BASIC METABOLIC PNL TOTAL CA: CPT

## 2021-01-05 PROCEDURE — 3044F HG A1C LEVEL LT 7.0%: CPT | Performed by: NURSE PRACTITIONER

## 2021-01-05 PROCEDURE — 82570 ASSAY OF URINE CREATININE: CPT

## 2021-01-05 PROCEDURE — 3725F SCREEN DEPRESSION PERFORMED: CPT | Performed by: INTERNAL MEDICINE

## 2021-01-05 PROCEDURE — 3066F NEPHROPATHY DOC TX: CPT | Performed by: NURSE PRACTITIONER

## 2021-01-05 PROCEDURE — 36415 COLL VENOUS BLD VENIPUNCTURE: CPT

## 2021-01-05 PROCEDURE — 80061 LIPID PANEL: CPT

## 2021-01-05 PROCEDURE — 82043 UR ALBUMIN QUANTITATIVE: CPT

## 2021-01-05 PROCEDURE — 83036 HEMOGLOBIN GLYCOSYLATED A1C: CPT

## 2021-01-05 PROCEDURE — 85027 COMPLETE CBC AUTOMATED: CPT

## 2021-01-05 PROCEDURE — 99214 OFFICE O/P EST MOD 30 MIN: CPT | Performed by: INTERNAL MEDICINE

## 2021-01-05 RX ORDER — CLONAZEPAM 0.5 MG/1
0.5 TABLET ORAL 2 TIMES DAILY PRN
Qty: 10 TABLET | Refills: 0 | Status: SHIPPED | OUTPATIENT
Start: 2021-01-05 | End: 2021-02-04

## 2021-01-05 NOTE — PATIENT INSTRUCTIONS
Dizziness   WHAT YOU NEED TO KNOW:   Dizziness is a feeling of being off balance or unsteady  Common causes of dizziness are an inner ear fluid imbalance or a lack of oxygen in your blood  Dizziness may be acute (lasts 3 days or less) or chronic (lasts longer than 3 days)  You may have dizzy spells that last from seconds to a few hours  DISCHARGE INSTRUCTIONS:   Return to the emergency department if:   · You have a headache and a stiff neck  · You have shaking chills and a fever  · You vomit over and over with no relief  · Your vomit or bowel movements are red or black  · You have pain in your chest, back, or abdomen  · You have numbness, especially in your face, arms, or legs  · You have trouble moving your arms or legs  · You are confused  Contact your healthcare provider if:   · You have a fever  · Your symptoms do not get better with treatment  · You have questions or concerns about your condition or care  Manage your symptoms:   · Do not drive  or operate heavy machinery when you are dizzy  · Get up slowly  from sitting or lying down  · Drink plenty of liquids  Liquids help prevent dehydration  Ask how much liquid to drink each day and which liquids are best for you  Follow up with your healthcare provider as directed:  Write down your questions so you remember to ask them during your visits  © Copyright [address] Information is for End User's use only and may not be sold, redistributed or otherwise used for commercial purposes  All illustrations and images included in CareNotes® are the copyrighted property of A D A M , Inc  or Formerly named [facility] Britany Villatoro   The above information is an  only  It is not intended as medical advice for individual conditions or treatments  Talk to your doctor, nurse or pharmacist before following any medical regimen to see if it is safe and effective for you

## 2021-01-05 NOTE — TELEPHONE ENCOUNTER
----- Message from Juan Collado DO sent at 1/5/2021  2:47 PM EST -----  Labs look great  A1c was 5 3%   Vitamin B12 level was normal  Cholesterol was normal

## 2021-01-05 NOTE — PROGRESS NOTES
Triciamom[facility]uel[facility]    NAME: Clementine Espinal  AGE: 62 y o  SEX: female  : 1960     DATE: 2021     Assessment and Plan:     1  Controlled type 2 diabetes mellitus without complication, without long-term current use of insulin ([facility] [address] )  2  Diabetic nephropathy associated with type 2 diabetes mellitus ([facility] [address] )    Check UTD labs  Continue medications as prescribed  Has been on metformin years  Will check Vitamin B12 level  Most recent A1c was 5 1 % on 2020      - Hemoglobin A1C; Future  - Microalbumin / creatinine urine ratio; Future  - Lipid panel; Future  - CBC; Future  - Basic metabolic panel; Future  - Vitamin B12; Future    3  Dizziness    Patient reassured  Does not seem to be experiencing true vertigo  Discussed rest, hydration, and controlling anxiety  Short course of clonazepam if needed  Stop meclizine  [place]MP [place]  PDMP Review       Value Time User    PDMP Reviewed  Yes 2021 10:30 AM Travis Acevedo, DO         - Vitamin B12; Future  - clonazePAM (KlonoPIN) 0 5 mg tablet; Take 1 tablet (0 5 mg total) by mouth 2 (two) times a day as needed for anxiety for up to 5 days  Dispense: 10 tablet; Refill: 0    4  Mixed hyperlipidemia    Lab Results   Component Value Date    LDLCALC 75 2020      Continue atorvastatin as prescribed  - Lipid panel; Future    5  Chronic diastolic HF (heart failure) (HCC)    Stable without evidence of exacerbation/fluid overload  Does not require furosemide at this time  Avoid excess salt  Watch weight  6  CAD in native artery    Stable w/o angina  On appropriate cardiovascular regimen  BMI Counseling: Body mass index is 33 37 kg/m²  The BMI is above normal  Nutrition recommendations include decreasing portion sizes, encouraging healthy choices of fruits and vegetables, limiting drinks that contain sugar, moderation in carbohydrate intake and increasing intake of lean protein   Exercise recommendations include exercising 3-5 times per week  Return in about 6 months (around 7/5/2021) for Follow-up  Chief Complaint:     Chief Complaint   Patient presents with    Follow-up      History of Present Illness:     Some increased episodes of dizziness, feeling off balance  She had taken some time off work and then went back and her nerves were acting up a little she thinks  No ear infection symptoms  No true vertigo  Denies room spinning around her  No falls  No weakness  Denies numbness/tingling  Chronically on metformin due to neuropathy  No increased SOB  No weight gain  Does have underlying diastolic CHF  No recent admissions for exacerbation  Also history of CAD  Has been stable on medications  No recent nitro use  Denies hypoglycemic episodes  Overdue for labs to check A1c  Most recent A1c was 5 1 % on 5/21/2020  Review of Systems:     Review of Systems   Constitutional: Negative for activity change, appetite change and fatigue  Respiratory: Negative for apnea, cough, chest tightness, shortness of breath and wheezing  Cardiovascular: Negative for chest pain, palpitations and leg swelling  Gastrointestinal: Negative for abdominal distention, abdominal pain, blood in stool, constipation, diarrhea, nausea and vomiting  Neurological: Positive for dizziness  Negative for weakness, numbness and headaches  Psychiatric/Behavioral: Negative for behavioral problems, confusion, hallucinations, sleep disturbance and suicidal ideas  The patient is not nervous/anxious  Objective:     /82 (BP Location: Left arm, Patient Position: Sitting, Cuff Size: Standard)   Pulse 79   Temp 98 3 °F (36 8 °C) (Temporal) Comment: w/ aspirin'  Ht 4' 11 25" (1 505 m)   Wt 75 6 kg (166 lb 9 6 oz)   LMP 08/16/2014 (Approximate)   SpO2 97%   BMI 33 37 kg/m²     Physical Exam  Vitals signs reviewed  Constitutional:       General: She is not in acute distress       Appearance: She is well-developed  She is not diaphoretic  HENT:      Right Ear: Tympanic membrane, ear canal and external ear normal  There is no impacted cerumen  Left Ear: Tympanic membrane, ear canal and external ear normal  There is no impacted cerumen  Eyes:      General: No scleral icterus  Right eye: No discharge  Left eye: No discharge  Conjunctiva/sclera: Conjunctivae normal    Neck:      Musculoskeletal: Neck supple  Thyroid: No thyromegaly  Vascular: No JVD  Cardiovascular:      Rate and Rhythm: Normal rate and regular rhythm  Pulses: no weak pulses          Dorsalis pedis pulses are 2+ on the right side and 2+ on the left side  Posterior tibial pulses are 2+ on the right side and 2+ on the left side  Heart sounds: Normal heart sounds  No murmur  Pulmonary:      Effort: Pulmonary effort is normal  No respiratory distress  Breath sounds: Normal breath sounds  No wheezing or rales  Abdominal:      General: Bowel sounds are normal  There is no distension  Palpations: Abdomen is soft  Tenderness: There is no abdominal tenderness  Musculoskeletal:      Right lower leg: No edema  Left lower leg: No edema  Feet:      Right foot:      Skin integrity: No ulcer, skin breakdown, erythema, warmth, callus or dry skin  Left foot:      Skin integrity: No ulcer, skin breakdown, erythema, warmth, callus or dry skin  Lymphadenopathy:      Cervical: No cervical adenopathy  Skin:     General: Skin is warm and dry  Neurological:      Mental Status: She is alert  Sensory: No sensory deficit  Motor: No weakness  Gait: Gait normal    Psychiatric:         Mood and Affect: Mood normal          Behavior: Behavior normal      Diabetic Foot Exam  Patient's shoes and socks removed  Right Foot/Ankle   Right Foot Inspection  Skin Exam: skin normal and skin intact no dry skin, no warmth, no callus, no erythema, no maceration, no abnormal color, no pre-ulcer, no ulcer and no callus                          Toe Exam: ROM and strength within normal limits  Sensory     Proprioception: intact   Monofilament testing: intact  Vascular  Capillary refills: < 3 seconds  The right DP pulse is 2+  The right PT pulse is 2+  Left Foot/Ankle  Left Foot Inspection  Skin Exam: skin normal and skin intactno dry skin, no warmth, no erythema, no maceration, normal color, no pre-ulcer, no ulcer and no callus                         Toe Exam: ROM and strength within normal limits                   Sensory     Proprioception: intact  Monofilament: intact  Vascular  Capillary refills: < 3 seconds  The left DP pulse is 2+  The left PT pulse is 2+  Assign Risk Category:  No deformity present; No loss of protective sensation;  No weak pulses       Risk: 0         Last Found, DO  MEDICAL [address]

## 2021-01-12 ENCOUNTER — OFFICE VISIT (OUTPATIENT)
Dept: INTERNAL MEDICINE CLINIC | Facility: CLINIC | Age: 61
End: 2021-01-12
Payer: COMMERCIAL

## 2021-01-12 VITALS
HEART RATE: 76 BPM | SYSTOLIC BLOOD PRESSURE: 122 MMHG | OXYGEN SATURATION: 93 % | TEMPERATURE: 98.5 F | DIASTOLIC BLOOD PRESSURE: 76 MMHG

## 2021-01-12 DIAGNOSIS — M54.32 BACK PAIN WITH LEFT-SIDED SCIATICA: Primary | ICD-10-CM

## 2021-01-12 PROCEDURE — 4004F PT TOBACCO SCREEN RCVD TLK: CPT | Performed by: NURSE PRACTITIONER

## 2021-01-12 PROCEDURE — 99213 OFFICE O/P EST LOW 20 MIN: CPT | Performed by: NURSE PRACTITIONER

## 2021-01-12 RX ORDER — CYCLOBENZAPRINE HCL 10 MG
10 TABLET ORAL
Qty: 10 TABLET | Refills: 0 | Status: SHIPPED | OUTPATIENT
Start: 2021-01-12 | End: 2021-02-04

## 2021-01-12 NOTE — PATIENT INSTRUCTIONS
Sciatica   WHAT YOU NEED TO KNOW:   What is sciatica? Sciatica is a condition that causes pain along your sciatic nerve  The sciatic nerve runs from your spine through both sides of your buttocks  It then runs down the back of your thigh, into your lower leg and foot  Any place along your sciatic nerve may be compressed, inflamed, irritated, or stretched and cause symptoms  What causes sciatica? Sciatica may be related to certain activities, poor posture, and physical or psychological stress  Any of the following may cause or increase your risk of sciatica:  · Disc problems:  A slipped disc (soft cushion in between the bones of the spine) is the most common cause of sciatica  The disc may press on the sciatic nerve  One bone in your spine may slip over another, or you may have narrowing of the spinal column  · Muscle injury: This may happen after you twist or lift a heavy object  Swelling from sprained or irritated muscles in the buttocks, thighs, or legs press on the sciatic nerve  · Obesity or pregnancy:  Extra weight increases pressure on your back and legs  · Trauma:  Direct blows on the buttocks, thighs, or legs, car accidents, or falls may injure the sciatic nerve  · Diseases of the spine:  Arthritis, osteoporosis, cancer, or infection of the spine may also affect the sciatic nerve  What are the signs and symptoms of sciatica? The symptoms of sciatic may be short-term or long-term:  · Pain that goes from the lower back into your buttocks and down the back of your thigh    · Numbness or tingling in your buttocks and legs    · Muscle weakness, difficulty moving or controlling your leg or foot    · Leg pain that increases with standing, sitting, or squatting    How is sciatica diagnosed? Your healthcare provider will ask about other health conditions you may have  He may ask you about your job, history of back pain, diseases, or surgeries you have had   He will examine you and move your legs to see what increases pain  You may also need any of the following:  · X-rays: This is a picture of the bones and tissues in your back, hip, thigh, or leg  This test may show other problems, such as fractures (broken bones)  · CT scan: This test is also called a CAT scan  An x-ray machine uses a computer to take pictures of your hips, thighs, and legs  The pictures may show your sciatic nerve, muscles, and blood vessels  You may be given a dye before the pictures are taken to help healthcare providers see the pictures better  Tell the healthcare provider if you have ever had an allergic reaction to contrast dye  · MRI:  This scan uses powerful magnets and a computer to take pictures of your hips, thighs, and legs  An MRI may show damaged nerves, muscles, bones, and blood vessels  You may be given dye to help the pictures show up better  Tell the healthcare provider if you have ever had an allergic reaction to contrast dye  Do not enter the MRI room with anything metal  Metal can cause serious injury  Tell the healthcare provider if you have any metal in or on your body  · An electromyography (EMG)  test measures the electrical activity of your muscles at rest and with movement  · Nerve conduction tests: These tests check how surface nerves and related muscles respond to stimulation  Electrodes with wires or tiny needles are placed on certain areas, such as the buttocks and legs  How is sciatica treated? · NSAIDs:  These medicines decrease swelling and pain  NSAIDs are available without a doctor's order  Ask your healthcare provider which medicine is right for you  Ask how much to take and when to take it  Take as directed  NSAIDs can cause stomach bleeding or kidney problems if not taken correctly  · Acetaminophen: This medicine decreases pain  Acetaminophen is available without a doctor's order  Ask how much to take and how often to take it  Follow directions   Acetaminophen can cause liver damage if not taken correctly  · Muscle relaxers  help decrease pain and muscle spasms  · Epidural steroid medicine: This may include both an anesthetic (numbing medicine) and a steroid, which may decrease swelling and relieve pain  It is given as a shot close to the spine in the area where you have pain  · Chemonucleolysis: This is an injection given into the damaged disc to soften or shrink the disc  · Surgery: This may be done to correct problems such as a damaged disc, or a tumor in your spine  It may be done to decrease the pressure on the sciatic nerve  Healthcare providers may also release the muscle that may be pressing into your sciatic nerve  How can I help manage sciatica? · Ultrasound therapy: This is a machine that uses sound waves to decrease pain  Topical medicines may be added to help decrease pain and inflammation  · Physical therapy:  A physical therapist teaches you exercises to help improve movement and strength, and to decrease pain  An occupational therapist teaches you skills to help with your daily activities  · Assistive devices: You may need to wear back support, such as a back brace  You may need crutches, a cane, or a walker to decrease stress on your lower back and leg muscles  Ask your healthcare provider for more information about assistive devices and how to use them correctly  How can sciatica be prevented? · Avoid pressure on your back and legs:  Do not  lift heavy objects, or stand or sit for long periods of time  · Lift objects safely:  Keep your back straight and bend your knees when you  an object  Do not bend or twist your back when you lift  · Maintain a healthy weight:  Ask your healthcare provider how much you should weigh  Ask him to help you create a weight loss plan if you are overweight  · Exercise:  Ask your healthcare provider about the best stretching, warmup, and exercise plan for you      What are the risks of sciatica? An epidural steroid injection can lead to pain disorders or paralysis if it is placed incorrectly  It may also cause headaches, leg pain, and blockage of blood flow to the spinal cord  Surgery may cause you to bleed or get an infection  If not treated, your muscles and nerves may become damaged permanently  You may have decreased strength  You may not be able to move your leg or control when you urinate or have bowel movements  When should I contact my healthcare provider? · You have pain in your lower back at night or when resting  · You have pain in your lower back with numbness below the knee  · You have weakness in one leg only  · You have questions or concerns about your condition or care  When should I seek immediate care or call 911? · You have trouble holding back your urine or bowel movements  · You have weakness in both legs  · You have numbness in your groin or buttocks  CARE AGREEMENT:   You have the right to help plan your care  Learn about your health condition and how it may be treated  Discuss treatment options with your healthcare providers to decide what care you want to receive  You always have the right to refuse treatment  The above information is an  only  It is not intended as medical advice for individual conditions or treatments  Talk to your doctor, nurse or pharmacist before following any medical regimen to see if it is safe and effective for you  © Copyright [address] Information is for End User's use only and may not be sold, redistributed or otherwise used for commercial purposes   All illustrations and images included in CareNotes® are the copyrighted property of [employer]  or [address] [place] [address]

## 2021-01-13 NOTE — PROGRESS NOTES
INTERNAL MEDICINE FOLLOW-UP OFFICE VISIT  [facility] [address] [place] [address]    NAME: Johnnie Buckley  AGE: 62 y o  SEX: female    DATE OF ENCOUNTER: 1/12/2021   Assessment and Plan:   Patient advised to continue with aleve, Aspercreme and warm compresses to back and left thigh area  Will add flexeril HS  This appears to be an exacerbation of her low back pain with some left sided sciatica  Problem List Items Addressed This Visit     None      Visit Diagnoses     Back pain with left-sided sciatica    -  Primary    Relevant Medications    cyclobenzaprine (FLEXERIL) 10 mg tablet          Return for Next scheduled follow up  Counseling:     · Medication Side Effects - Adverse side effects of medications were reviewed with the patient/guardian today: Yes  · Counseling was given regarding: Risks and benefits of tx options, Intructions for management, Patient and family education, Importance of tx compliance, Risk factor reductions and Impressions  · Barriers to treatment include: No identified barriers      Chief Complaint:     Chief Complaint   Patient presents with    Pain     pain in the upper left leg, little swelling, pain in the lower back region too    Dizziness     not as bad as it was  History of Present Illness:     Patient has complaints of low back pains and pain in left upper outer thigh  States the thigh has almost a numb feeling to it  Patient denies any fall, trauma, or injury  She has had back pain in the past  States she would take aleve, use icy hot heat patches and Aspercreme rub and the pain would be relieved  This has not helped this time  Patient does state that she has no pains while at work  She does sit in a lumbar support desk chair while at work  States the pains get bad in the evenings  States this pain is worse than any of her prior abdominal surgeries   She has tried Bengay gel on the thigh but states this did not help and she did not like the smell  Bought aleve but did not take because she didn't want to mask the pain prior to the appt  Also states she is still dizzy  Was seen one week ago and given klonopin  Patient states she did not take this because she read side effects and was not happy with the potential side effects  Does not want to become addicted to any medications  She is still driving herself to and from work in the city  States she thinks this could be related to hydration too, has not been drinking enough  Water  States she bought Pedialyte and plans to increase fluid intake over the next few days  The following portions of the patient's history were reviewed and updated as appropriate: allergies, current medications, past family history, past medical history, past social history, past surgical history and problem list      Review of Systems:     Review of Systems   Constitutional: Negative for chills, fatigue and fever  Genitourinary: Negative for dysuria  Musculoskeletal: Positive for arthralgias (left thigh/hip), back pain and myalgias  Skin: Negative for color change and rash  Neurological: Positive for dizziness  Negative for weakness, light-headedness and headaches  Psychiatric/Behavioral: Negative for sleep disturbance  The patient is nervous/anxious  Problem List:     Patient Active Problem List   Diagnosis    Controlled type 2 diabetes mellitus without complication ([facility] [address] )    Uncomplicated asthma    Essential hypertension    Mixed hyperlipidemia    Chronic diastolic HF (heart failure) (HCC)    Pericardial effusion    Diabetic nephropathy associated with type 2 diabetes mellitus ([facility] [address] )    CAD in native artery    Tobacco use    Postoperative anemia        Objective:     /76 (BP Location: Left arm, Patient Position: Sitting, Cuff Size: Large)   Pulse 76   Temp 98 5 °F (36 9 °C) (Temporal) Comment: no nsaids  LMP 08/16/2014 (Approximate)   SpO2 93%     Physical Exam  Vitals signs reviewed  Constitutional:       General: She is not in acute distress  Appearance: Normal appearance  She is well-developed and well-groomed  She is obese  She is not ill-appearing  HENT:      Head: Normocephalic and atraumatic  Musculoskeletal:      Left hip: She exhibits tenderness  She exhibits normal range of motion, normal strength and no swelling  Lumbar back: She exhibits tenderness  She exhibits normal range of motion and no edema  Legs:    Skin:     General: Skin is warm and dry  Capillary Refill: Capillary refill takes less than 2 seconds  Findings: No rash  Neurological:      Mental Status: She is alert and oriented to person, place, and time  Motor: Motor function is intact  Psychiatric:         Attention and Perception: Attention normal          Mood and Affect: Mood normal          Speech: Speech normal          Behavior: Behavior normal  Behavior is cooperative  Thought Content: Thought content normal          Pertinent Laboratory/Diagnostic Studies:    Laboratory Results: I have personally reviewed the pertinent laboratory results/reports   Radiology/Other Diagnostic Testing Results: I have personally reviewed pertinent reports         Current Medications:     Current Outpatient Medications   Medication Sig Dispense Refill    amLODIPine (NORVASC) 10 mg tablet TAKE 1 TABLET BY MOUTH EVERY DAY 90 tablet 2    atorvastatin (LIPITOR) 20 mg tablet TAKE 1 TABLET DAILY 90 tablet 1    [facility] ASPIRIN LOW STRENGTH 81 MG EC tablet TAKE 1 TABLET BY MOUTH EVERY DAY 90 tablet 3    ipratropium (ATROVENT) 0 02 % nebulizer solution Inhale 0 5 mg as needed       linaGLIPtin (TRADJENTA) 5 MG TABS Take 5 mg by mouth daily 90 tablet 3    metFORMIN (GLUCOPHAGE) 1000 MG tablet Take 1 tablet (1,000 mg total) by mouth 2 (two) times a day with meals 180 tablet 3    metoprolol succinate (TOPROL-XL) 50 mg 24 hr tablet TAKE 1 TABLET BY MOUTH EVERY DAY 90 tablet 1    clonazePAM (KlonoPIN) 0 5 mg tablet Take 1 tablet (0 5 mg total) by mouth 2 (two) times a day as needed for anxiety for up to 5 days 10 tablet 0    cyclobenzaprine (FLEXERIL) 10 mg tablet Take 1 tablet (10 mg total) by mouth daily at bedtime 10 tablet 0    furosemide (LASIX) 40 mg tablet Take 1 tablet (40 mg total) by mouth 2 (two) times a day (Patient not taking: Reported on 1/12/2021) 180 tablet 3     No current facility-administered medications for this visit  Patient Instructions   Sciatica   WHAT YOU NEED TO KNOW:   What is sciatica? Sciatica is a condition that causes pain along your sciatic nerve  The sciatic nerve runs from your spine through both sides of your buttocks  It then runs down the back of your thigh, into your lower leg and foot  Any place along your sciatic nerve may be compressed, inflamed, irritated, or stretched and cause symptoms  What causes sciatica? Sciatica may be related to certain activities, poor posture, and physical or psychological stress  Any of the following may cause or increase your risk of sciatica:  · Disc problems:  A slipped disc (soft cushion in between the bones of the spine) is the most common cause of sciatica  The disc may press on the sciatic nerve  One bone in your spine may slip over another, or you may have narrowing of the spinal column  · Muscle injury: This may happen after you twist or lift a heavy object  Swelling from sprained or irritated muscles in the buttocks, thighs, or legs press on the sciatic nerve  · Obesity or pregnancy:  Extra weight increases pressure on your back and legs  · Trauma:  Direct blows on the buttocks, thighs, or legs, car accidents, or falls may injure the sciatic nerve  · Diseases of the spine:  Arthritis, osteoporosis, cancer, or infection of the spine may also affect the sciatic nerve  What are the signs and symptoms of sciatica?   The symptoms of sciatic may be short-term or long-term:  · Pain that goes from the lower back into your buttocks and down the back of your thigh    · Numbness or tingling in your buttocks and legs    · Muscle weakness, difficulty moving or controlling your leg or foot    · Leg pain that increases with standing, sitting, or squatting    How is sciatica diagnosed? Your healthcare provider will ask about other health conditions you may have  He may ask you about your job, history of back pain, diseases, or surgeries you have had  He will examine you and move your legs to see what increases pain  You may also need any of the following:  · X-rays: This is a picture of the bones and tissues in your back, hip, thigh, or leg  This test may show other problems, such as fractures (broken bones)  · CT scan: This test is also called a CAT scan  An x-ray machine uses a computer to take pictures of your hips, thighs, and legs  The pictures may show your sciatic nerve, muscles, and blood vessels  You may be given a dye before the pictures are taken to help healthcare providers see the pictures better  Tell the healthcare provider if you have ever had an allergic reaction to contrast dye  · MRI:  This scan uses powerful magnets and a computer to take pictures of your hips, thighs, and legs  An MRI may show damaged nerves, muscles, bones, and blood vessels  You may be given dye to help the pictures show up better  Tell the healthcare provider if you have ever had an allergic reaction to contrast dye  Do not enter the MRI room with anything metal  Metal can cause serious injury  Tell the healthcare provider if you have any metal in or on your body  · An electromyography (EMG)  test measures the electrical activity of your muscles at rest and with movement  · Nerve conduction tests: These tests check how surface nerves and related muscles respond to stimulation  Electrodes with wires or tiny needles are placed on certain areas, such as the buttocks and legs  How is sciatica treated?    · NSAIDs:  These medicines decrease swelling and pain  NSAIDs are available without a doctor's order  Ask your healthcare provider which medicine is right for you  Ask how much to take and when to take it  Take as directed  NSAIDs can cause stomach bleeding or kidney problems if not taken correctly  · Acetaminophen: This medicine decreases pain  Acetaminophen is available without a doctor's order  Ask how much to take and how often to take it  Follow directions  Acetaminophen can cause liver damage if not taken correctly  · Muscle relaxers  help decrease pain and muscle spasms  · Epidural steroid medicine: This may include both an anesthetic (numbing medicine) and a steroid, which may decrease swelling and relieve pain  It is given as a shot close to the spine in the area where you have pain  · Chemonucleolysis: This is an injection given into the damaged disc to soften or shrink the disc  · Surgery: This may be done to correct problems such as a damaged disc, or a tumor in your spine  It may be done to decrease the pressure on the sciatic nerve  Healthcare providers may also release the muscle that may be pressing into your sciatic nerve  How can I help manage sciatica? · Ultrasound therapy: This is a machine that uses sound waves to decrease pain  Topical medicines may be added to help decrease pain and inflammation  · Physical therapy:  A physical therapist teaches you exercises to help improve movement and strength, and to decrease pain  An occupational therapist teaches you skills to help with your daily activities  · Assistive devices: You may need to wear back support, such as a back brace  You may need crutches, a cane, or a walker to decrease stress on your lower back and leg muscles  Ask your healthcare provider for more information about assistive devices and how to use them correctly  How can sciatica be prevented?    · Avoid pressure on your back and legs:  Do not  lift heavy objects, or stand or sit for long periods of time  · Lift objects safely:  Keep your back straight and bend your knees when you  an object  Do not bend or twist your back when you lift  · Maintain a healthy weight:  Ask your healthcare provider how much you should weigh  Ask him to help you create a weight loss plan if you are overweight  · Exercise:  Ask your healthcare provider about the best stretching, warmup, and exercise plan for you  What are the risks of sciatica? An epidural steroid injection can lead to pain disorders or paralysis if it is placed incorrectly  It may also cause headaches, leg pain, and blockage of blood flow to the spinal cord  Surgery may cause you to bleed or get an infection  If not treated, your muscles and nerves may become damaged permanently  You may have decreased strength  You may not be able to move your leg or control when you urinate or have bowel movements  When should I contact my healthcare provider? · You have pain in your lower back at night or when resting  · You have pain in your lower back with numbness below the knee  · You have weakness in one leg only  · You have questions or concerns about your condition or care  When should I seek immediate care or call 911? · You have trouble holding back your urine or bowel movements  · You have weakness in both legs  · You have numbness in your groin or buttocks  CARE AGREEMENT:   You have the right to help plan your care  Learn about your health condition and how it may be treated  Discuss treatment options with your healthcare providers to decide what care you want to receive  You always have the right to refuse treatment  The above information is an  only  It is not intended as medical advice for individual conditions or treatments  Talk to your doctor, nurse or pharmacist before following any medical regimen to see if it is safe and effective for you    © Copyright [employer] [address] [place] [address] Information is for [employer] use only and may not be sold, redistributed or otherwise used for commercial purposes   All illustrations and images included in CareNotes® are the copyrighted property of [employer]  or [address] [place] [address]

## 2021-01-16 ENCOUNTER — APPOINTMENT (EMERGENCY)
Dept: CT IMAGING | Facility: HOSPITAL | Age: 61
End: 2021-01-16
Payer: COMMERCIAL

## 2021-01-16 ENCOUNTER — HOSPITAL ENCOUNTER (EMERGENCY)
Facility: HOSPITAL | Age: 61
Discharge: HOME | End: 2021-01-16
Attending: EMERGENCY MEDICINE | Admitting: EMERGENCY MEDICINE
Payer: COMMERCIAL

## 2021-01-16 VITALS
RESPIRATION RATE: 21 BRPM | TEMPERATURE: 98.1 F | DIASTOLIC BLOOD PRESSURE: 90 MMHG | HEART RATE: 79 BPM | OXYGEN SATURATION: 98 % | SYSTOLIC BLOOD PRESSURE: 167 MMHG

## 2021-01-16 DIAGNOSIS — M54.50 LOW BACK PAIN: ICD-10-CM

## 2021-01-16 DIAGNOSIS — M79.89 SOFT TISSUE MASS: ICD-10-CM

## 2021-01-16 DIAGNOSIS — R20.0 PERINEAL NUMBNESS: ICD-10-CM

## 2021-01-16 DIAGNOSIS — R59.9 ADENOPATHY: ICD-10-CM

## 2021-01-16 DIAGNOSIS — R32 URINARY INCONTINENCE: ICD-10-CM

## 2021-01-16 DIAGNOSIS — R42 DIZZINESS: Primary | ICD-10-CM

## 2021-01-16 LAB
ANION GAP SERPL CALCULATED.3IONS-SCNC: 7 MMOL/L (ref 4–13)
ATRIAL RATE: 73 BPM
ATRIAL RATE: 74 BPM
BASOPHILS # BLD AUTO: 0.06 THOUSANDS/ΜL (ref 0–0.1)
BASOPHILS NFR BLD AUTO: 1 % (ref 0–1)
BILIRUB UR QL STRIP: NEGATIVE
BUN SERPL-MCNC: 16 MG/DL (ref 5–25)
CALCIUM SERPL-MCNC: 9.7 MG/DL (ref 8.3–10.1)
CHLORIDE SERPL-SCNC: 100 MMOL/L (ref 100–108)
CLARITY UR: CLEAR
CO2 SERPL-SCNC: 29 MMOL/L (ref 21–32)
COLOR UR: YELLOW
CREAT SERPL-MCNC: 0.66 MG/DL (ref 0.6–1.3)
EOSINOPHIL # BLD AUTO: 0.15 THOUSAND/ΜL (ref 0–0.61)
EOSINOPHIL NFR BLD AUTO: 2 % (ref 0–6)
ERYTHROCYTE [DISTWIDTH] IN BLOOD BY AUTOMATED COUNT: 15.4 % (ref 11.6–15.1)
GFR SERPL CREATININE-BSD FRML MDRD: 112 ML/MIN/1.73SQ M
GLUCOSE SERPL-MCNC: 142 MG/DL (ref 65–140)
GLUCOSE UR STRIP-MCNC: NEGATIVE MG/DL
HCT VFR BLD AUTO: 36 % (ref 34.8–46.1)
HGB BLD-MCNC: 11.2 G/DL (ref 11.5–15.4)
HGB UR QL STRIP.AUTO: NEGATIVE
IMM GRANULOCYTES # BLD AUTO: 0.04 THOUSAND/UL (ref 0–0.2)
IMM GRANULOCYTES NFR BLD AUTO: 1 % (ref 0–2)
KETONES UR STRIP-MCNC: NEGATIVE MG/DL
LEUKOCYTE ESTERASE UR QL STRIP: NEGATIVE
LYMPHOCYTES # BLD AUTO: 1.67 THOUSANDS/ΜL (ref 0.6–4.47)
LYMPHOCYTES NFR BLD AUTO: 24 % (ref 14–44)
MCH RBC QN AUTO: 27.8 PG (ref 26.8–34.3)
MCHC RBC AUTO-ENTMCNC: 31.1 G/DL (ref 31.4–37.4)
MCV RBC AUTO: 89 FL (ref 82–98)
MONOCYTES # BLD AUTO: 0.49 THOUSAND/ΜL (ref 0.17–1.22)
MONOCYTES NFR BLD AUTO: 7 % (ref 4–12)
NEUTROPHILS # BLD AUTO: 4.56 THOUSANDS/ΜL (ref 1.85–7.62)
NEUTS SEG NFR BLD AUTO: 65 % (ref 43–75)
NITRITE UR QL STRIP: NEGATIVE
NRBC BLD AUTO-RTO: 0 /100 WBCS
P AXIS: 59 DEGREES
P AXIS: 66 DEGREES
PH UR STRIP.AUTO: 6.5 [PH]
PLATELET # BLD AUTO: 346 THOUSANDS/UL (ref 149–390)
PMV BLD AUTO: 9.7 FL (ref 8.9–12.7)
POTASSIUM SERPL-SCNC: 4 MMOL/L (ref 3.5–5.3)
PR INTERVAL: 218 MS
PR INTERVAL: 276 MS
PROT UR STRIP-MCNC: NEGATIVE MG/DL
QRS AXIS: -6 DEGREES
QRS AXIS: 6 DEGREES
QRSD INTERVAL: 94 MS
QRSD INTERVAL: 94 MS
QT INTERVAL: 378 MS
QT INTERVAL: 416 MS
QTC INTERVAL: 416 MS
QTC INTERVAL: 461 MS
RBC # BLD AUTO: 4.03 MILLION/UL (ref 3.81–5.12)
SODIUM SERPL-SCNC: 136 MMOL/L (ref 136–145)
SP GR UR STRIP.AUTO: <=1.005 (ref 1–1.03)
T WAVE AXIS: 137 DEGREES
T WAVE AXIS: 220 DEGREES
UROBILINOGEN UR QL STRIP.AUTO: 0.2 E.U./DL
VENTRICULAR RATE: 73 BPM
VENTRICULAR RATE: 74 BPM
WBC # BLD AUTO: 6.97 THOUSAND/UL (ref 4.31–10.16)

## 2021-01-16 PROCEDURE — 99284 EMERGENCY DEPT VISIT MOD MDM: CPT

## 2021-01-16 PROCEDURE — 99285 EMERGENCY DEPT VISIT HI MDM: CPT | Performed by: PHYSICIAN ASSISTANT

## 2021-01-16 PROCEDURE — 81003 URINALYSIS AUTO W/O SCOPE: CPT | Performed by: PHYSICIAN ASSISTANT

## 2021-01-16 PROCEDURE — 70496 CT ANGIOGRAPHY HEAD: CPT

## 2021-01-16 PROCEDURE — 80048 BASIC METABOLIC PNL TOTAL CA: CPT | Performed by: PHYSICIAN ASSISTANT

## 2021-01-16 PROCEDURE — 85025 COMPLETE CBC W/AUTO DIFF WBC: CPT | Performed by: PHYSICIAN ASSISTANT

## 2021-01-16 PROCEDURE — 93010 ELECTROCARDIOGRAM REPORT: CPT | Performed by: INTERNAL MEDICINE

## 2021-01-16 PROCEDURE — 93005 ELECTROCARDIOGRAM TRACING: CPT

## 2021-01-16 PROCEDURE — 96374 THER/PROPH/DIAG INJ IV PUSH: CPT

## 2021-01-16 PROCEDURE — 70498 CT ANGIOGRAPHY NECK: CPT

## 2021-01-16 PROCEDURE — 36415 COLL VENOUS BLD VENIPUNCTURE: CPT | Performed by: PHYSICIAN ASSISTANT

## 2021-01-16 PROCEDURE — G1004 CDSM NDSC: HCPCS

## 2021-01-16 RX ORDER — KETOROLAC TROMETHAMINE 30 MG/ML
30 INJECTION, SOLUTION INTRAMUSCULAR; INTRAVENOUS ONCE
Status: COMPLETED | OUTPATIENT
Start: 2021-01-16 | End: 2021-01-16

## 2021-01-16 RX ORDER — MECLIZINE HYDROCHLORIDE 25 MG/1
25 TABLET ORAL ONCE
Status: COMPLETED | OUTPATIENT
Start: 2021-01-16 | End: 2021-01-16

## 2021-01-16 RX ADMIN — IOHEXOL 100 ML: 350 INJECTION, SOLUTION INTRAVENOUS at 06:32

## 2021-01-16 RX ADMIN — MECLIZINE HYDROCHLORIDE 25 MG: 25 TABLET ORAL at 06:01

## 2021-01-16 RX ADMIN — KETOROLAC TROMETHAMINE 30 MG: 30 INJECTION, SOLUTION INTRAMUSCULAR at 05:59

## 2021-01-16 NOTE — ED PROVIDER NOTES
History  Chief Complaint   Patient presents with    Dizziness     pt c/o dizziness x 3 weeks and saw pcp, was dx with vertigo but not given medications  pt states still having dizziness with walking  pt also c/o lower back pain that radiates into L leg x 1 week  pt denies any injuiry to back    Back Pain     42-year-old female with past medical history significant for type 2 diabetes mellitus with diabetic nephropathy, hypertension, hyperlipidemia, CHF, CAD, tobacco use, asthma who presents to the emergency department for complaint of dizziness and back pain  Patient reports dizziness ongoing for approximately 3 weeks  She initially saw her PCP on 12/29/2020, who prescribed Antivert  She followed up with her PCP a few days later, as there was no change in symptoms with medication  She was prescribed a short course of Klonopin but reports she never took this after reading side effect labile  She describes dizziness as feeling off balance, denies any spinning sensation, worse when she is walking around or standing, resolves with sitting or lying down  She denies any near syncope or syncope, headache, confusion, difficulty with word finding, slurred speech, memory difficulty, blurred or double vision, neck pain or stiffness, photosensitivity, nausea or vomiting, increased weakness or fatigue  She denies any recent fall or head trauma  Further denies any chest pain, palpitations, or shortness of breath  She also reports left-sided low back pain ongoing for approximately 4 days, described as sudden onset, worse than backaches in the past, sharp pain with numbness that radiates down lateral thigh, constant  She endorses a few episodes of urinating, at which point she felt like she had stopped urinating but looked down and was still urinating and could not feel it  She also endorses a heavy, numb sensation in the vaginorectal area    She denies any bowel incontinence or retention, or any other urinary symptoms  She has been ambulating without difficulty and denies any weakness of the legs  There is no history of spinal disc disease, spinal stenosis, or spinal surgery  She has been taking Aleve, muscle relaxer, and using lidocaine patch and Aspercreme with minimal relief  She denies any recent fall or heavy lifting  Prior to Admission Medications   Prescriptions Last Dose Informant Patient Reported? Taking?    [employer] ASPIRIN LOW STRENGTH 81 MG EC tablet  Self No Yes   Sig: TAKE 1 TABLET BY MOUTH EVERY DAY   amLODIPine (NORVASC) 10 mg tablet  Self No Yes   Sig: TAKE 1 TABLET BY MOUTH EVERY DAY   atorvastatin (LIPITOR) 20 mg tablet  Self No No   Sig: TAKE 1 TABLET DAILY   clonazePAM (KlonoPIN) 0 5 mg tablet   No No   Sig: Take 1 tablet (0 5 mg total) by mouth 2 (two) times a day as needed for anxiety for up to 5 days   cyclobenzaprine (FLEXERIL) 10 mg tablet   No Yes   Sig: Take 1 tablet (10 mg total) by mouth daily at bedtime   furosemide (LASIX) 40 mg tablet Not Taking at Unknown time Self No No   Sig: Take 1 tablet (40 mg total) by mouth 2 (two) times a day   Patient not taking: Reported on 1/12/2021   ipratropium (ATROVENT) 0 02 % nebulizer solution  Self Yes Yes   Sig: Inhale 0 5 mg as needed    linaGLIPtin (TRADJENTA) 5 MG TABS  Self No Yes   Sig: Take 5 mg by mouth daily   metFORMIN (GLUCOPHAGE) 1000 MG tablet  Self No Yes   Sig: Take 1 tablet (1,000 mg total) by mouth 2 (two) times a day with meals   metoprolol succinate (TOPROL-XL) 50 mg 24 hr tablet  Self No Yes   Sig: TAKE 1 TABLET BY MOUTH EVERY DAY      Facility-Administered Medications: None       Past Medical History:   Diagnosis Date    Acute on chronic congestive heart failure with left ventricular diastolic dysfunction (HCC)     last assessed 5/23/2017    Asthma     Atelectasis     [facility]     Diabetes mellitus [facility]     Diverticulitis 2017    with perforation and abscess     Hyperlipidemia     Hypertension     Lesion of spleen     Pericardial effusion        Past Surgical History:   Procedure Laterality Date    ABDOMINAL SURGERY      BREAST BIOPSY Left 10/04/2018     SECTION      COLON SURGERY      COLONOSCOPY      COLOSTOMY  2017    HARTMANS PROCEDURE N/A 2017    Procedure: EXPLORATORY LAPAROTOMY; PARTIAL SMALL BOWEL RESECTION; HARTMANS PROCEDURE; OSTOMY;  Surgeon: Isa Siemens, MD;  [facility];  Service:     HYSTERECTOMY  2018    IR BIOPSY LYMPH NODE  2021    IR LUMBAR PUNCTURE  2021    MAMMO STEREOTACTIC BREAST BIOPSY LEFT (ALL INC) Left 10/4/2018    OOPHORECTOMY Bilateral     IL CLOSE ENTEROSTOMY N/A 2017    Procedure: OPEN COLOSTOMY REVERSAL;  Surgeon: Isa Siemens, MD;  [facility];  Service: General    IL COLONOSCOPY FLX DX W/COLLJ [address] PFRMD N/A 2017    Procedure: COLONOSCOPY; DILATION OF OSTOMY;  Surgeon: Isa Siemens, MD;  [facility];   Service: General    IL EXC SKIN BENIG <0 5 CM REMAINDER BODY N/A 2017    Procedure: SCALP MASS EXCISION X 2;  Surgeon: Isa Siemens, MD;  [facility];  Service: [facility] N/A 2019    Procedure: INCISIONAL HERNIA REPAIR, COMPONENT SEPARATION;  Surgeon: Isa Siemens, MD;  [facility];  Service: General    IL TOTAL ABDOM HYSTERECTOMY N/A 2019    Procedure: TOTAL ABDOMINAL HYSTERECTOMY; BSO;  Surgeon: Myrtle Hung MD;  [facility];  Service: Gynecology    VAC DRESSING APPLICATION N/A     Procedure: Robert Victoria;  Surgeon: Isa Siemens, MD;  [facility];  Service:        Family History   Problem Relation Age of Onset    Hypertension Mother     Diabetes Mother     No Known Problems Sister     No Known Problems Daughter     Heart disease Son     No Known Problems Maternal Aunt     No Known Problems Maternal Aunt     No Known Problems Maternal Aunt     No Known Problems Maternal Aunt     No Known Problems Paternal Aunt     Breast cancer Neg Hx      I have reviewed and agree with the history as documented  E-Cigarette/Vaping    E-Cigarette Use Never User      E-Cigarette/Vaping Substances    Nicotine No     THC No     CBD No     Flavoring No     Other No     Unknown No      Social History     Tobacco Use    Smoking status: Current Every Day Smoker     Packs/day: 0 25     Years: 20 00     Pack years: 5 00     Types: Cigarettes     Start date: 8/2/1991    Smokeless tobacco: Never Used   Substance Use Topics    Alcohol use: Not Currently     Frequency: Monthly or less     Drinks per session: 1 or 2     Binge frequency: Never     Comment: socially     Drug use: No       Review of Systems   Constitutional: Negative for appetite change, chills, fatigue, fever and unexpected weight change  HENT: Negative for congestion, dental problem, ear pain, facial swelling, postnasal drip, rhinorrhea, sinus pressure and sinus pain  Eyes: Negative for photophobia and visual disturbance  Respiratory: Negative for chest tightness and shortness of breath  Cardiovascular: Negative for chest pain and palpitations  Gastrointestinal: Negative for abdominal distention, abdominal pain, constipation, nausea and vomiting  Genitourinary: Negative for decreased urine volume, difficulty urinating, dysuria, flank pain, frequency, hematuria and urgency  Urinary incontinence    Musculoskeletal: Positive for back pain  Negative for gait problem  Skin: Negative for color change and rash  Neurological: Positive for dizziness, light-headedness and numbness  Negative for tremors, seizures, syncope, facial asymmetry, speech difficulty, weakness and headaches  Hematological: Negative for adenopathy  All other systems reviewed and are negative  Physical Exam  Physical Exam  Vitals signs reviewed  Constitutional:       General: She is awake   She is not in acute distress  Appearance: Normal appearance  She is well-developed  She is not toxic-appearing  HENT:      Head: Normocephalic and atraumatic  Mouth/Throat:      Lips: Pink  Mouth: Mucous membranes are moist       Pharynx: Oropharynx is clear  Uvula midline  Eyes:      Extraocular Movements: Extraocular movements intact  Conjunctiva/sclera: Conjunctivae normal       Pupils: Pupils are equal, round, and reactive to light  Neck:      Musculoskeletal: Normal range of motion and neck supple  Cardiovascular:      Rate and Rhythm: Normal rate and regular rhythm  Pulses: Normal pulses  Pulmonary:      Effort: Pulmonary effort is normal       Breath sounds: Normal breath sounds  Musculoskeletal: Normal range of motion  Skin:     General: Skin is warm  Capillary Refill: Capillary refill takes less than 2 seconds  Findings: No erythema, lesion or rash  Neurological:      Mental Status: She is alert and oriented to person, place, and time  GCS: GCS eye subscore is 4  GCS verbal subscore is 5  GCS motor subscore is 6  Cranial Nerves: Cranial nerves are intact  Sensory: Sensation is intact  Motor: Motor function is intact  Coordination: Coordination is intact  Gait: Gait is intact           Vital Signs  ED Triage Vitals   Temperature Pulse Respirations Blood Pressure SpO2   01/16/21 0453 01/16/21 0444 01/16/21 0444 01/16/21 0444 01/16/21 0444   98 1 °F (36 7 °C) 76 18 147/70 96 %      Temp Source Heart Rate Source Patient Position - Orthostatic VS BP Location FiO2 (%)   01/16/21 0453 01/16/21 0444 01/16/21 0444 01/16/21 0444 --   Oral Monitor Sitting Left arm       Pain Score       01/16/21 0444       8           Vitals:    01/16/21 0444 01/16/21 0530 01/16/21 0600 01/16/21 0715   BP: 147/70 170/76 165/82 167/90   Pulse: 76 75 69 79   Patient Position - Orthostatic VS: Sitting Sitting Lying Sitting         Visual Acuity      ED Medications  Medications   ketorolac (TORADOL) injection 30 mg (30 mg Intravenous Given 1/16/21 8669)   meclizine (ANTIVERT) tablet 25 mg (25 mg Oral Given 1/16/21 0601)   iohexol (OMNIPAQUE) 350 MG/ML injection (SINGLE-DOSE) 100 mL (100 mL Intravenous Given 1/16/21 2035)       Diagnostic Studies  Results Reviewed     Procedure Component Value Units Date/Time    UA w Reflex to Microscopic w Reflex to Culture [[id number]] Collected: 01/16/21 0647    Lab Status: Final result Specimen: Urine, Other Updated: 01/16/21 0652     Color, UA Yellow     Clarity, UA Clear     Specific Gravity, UA <=1 005     pH, UA 6 5     Leukocytes, UA Negative     Nitrite, UA Negative     Protein, UA Negative mg/dl      Glucose, UA Negative mg/dl      Ketones, UA Negative mg/dl      Urobilinogen, UA 0 2 E U /dl      Bilirubin, UA Negative     Blood, UA Negative    Basic metabolic panel [[id number]]  (Abnormal) Collected: 01/16/21 0606    Lab Status: Final result Specimen: Blood from Arm, Right Updated: 01/16/21 7625     Sodium 136 mmol/L      Potassium 4 0 mmol/L      Chloride 100 mmol/L      CO2 29 mmol/L      ANION GAP 7 mmol/L      BUN 16 mg/dL      Creatinine 0 66 mg/dL      Glucose 142 mg/dL      Calcium 9 7 mg/dL      eGFR 112 ml/min/1 73sq m     Narrative:      Jennifer guidelines for Chronic Kidney Disease (CKD):     Stage 1 with normal or high GFR (GFR > 90 mL/min/1 73 square meters)    Stage 2 Mild CKD (GFR = 60-89 mL/min/1 73 square meters)    Stage 3A Moderate CKD (GFR = 45-59 mL/min/1 73 square meters)    Stage 3B Moderate CKD (GFR = 30-44 mL/min/1 73 square meters)    Stage 4 Severe CKD (GFR = 15-29 mL/min/1 73 square meters)    Stage 5 End Stage CKD (GFR <15 mL/min/1 73 square meters)  Note: GFR calculation is accurate only with a steady state creatinine    CBC and differential [[id number]]  (Abnormal) Collected: 01/16/21 0606    Lab Status: Final result Specimen: Blood from Arm, Right Updated: 01/16/21 0620     WBC 6 97 Thousand/uL      RBC 4 03 Million/uL      Hemoglobin 11 2 g/dL      Hematocrit 36 0 %      MCV 89 fL      MCH 27 8 pg      MCHC 31 1 g/dL      RDW 15 4 %      MPV 9 7 fL      Platelets 376 Thousands/uL      nRBC 0 /100 WBCs      Neutrophils Relative 65 %      Immat GRANS % 1 %      Lymphocytes Relative 24 %      Monocytes Relative 7 %      Eosinophils Relative 2 %      Basophils Relative 1 %      Neutrophils Absolute 4 56 Thousands/µL      Immature Grans Absolute 0 04 Thousand/uL      Lymphocytes Absolute 1 67 Thousands/µL      Monocytes Absolute 0 49 Thousand/µL      Eosinophils Absolute 0 15 Thousand/µL      Basophils Absolute 0 06 Thousands/µL                  CTA head and neck with and without contrast   Final Result by Brock Aguilera DO (01/16 6774)   1  Isodense soft tissue masses in the left occipital lobe and left parietal lobe with surrounding vasogenic edema  Although the findings may represent primary CNS neoplasm, the presence of mediastinal, hilar, paratracheal and cervical adenopathy,    suggests metastatic disease  Recommend follow-up MRI of the brain with gadolinium and/or neurosurgical evaluation  Recommend follow-up CT chest abdomen pelvis for metastatic workup  2   Moderate atherosclerotic changes of the proximal internal carotid arteries bilaterally, right side greater than left resulting in moderate stenosis of the proximal cervical right internal carotid artery of approximately 75%  3   Moderate atherosclerotic disease of the supraclinoid segments of the internal carotid arteries bilaterally, resulting in mild stenosis bilaterally  No evidence of aneurysm, vascular malformation or vascular cut off  No evidence of large vessel    central occlusive disease involving the [place] of [place]  4   Mild cerebral atrophy with chronic small vessel ischemic white matter disease     5   Mediastinal, hilar, paratracheal, paraesophageal, supraclavicular and cervical adenopathy, pathologic  6   Enlarged palatine tonsils and lingual tonsil  The oropharynx is not well visualized due to beam hardening artifact from dental hardware  Consider follow-up MRI of the soft tissues of the neck versus ENT consultation with direct laryngoscopy  I personally discussed this study with Laila Douglas on 1/16/2021 at 6:37 AM                Workstation performed: [id number]                    Procedures  Procedures         ED Course  ED Course as of Jan 21 0002   Sat Jan 16, 2021   0451 EKG shows sinus rhythm with first-degree AV block, rate 66, no acute ST segment elevation or depression, T-wave inversions present in leads I, II, aVL, AVF, V4 through V6, , QRS duration 94, MA interval 218, no arrhythmia      0738 Discussed CT findings in detail with patient  Patient made aware that findings are highly concerning for metastatic disease of unknown origin at this point  It is my medical advice that the patient be transferred to [facility] for a higher level of care, to include (but not limited to) neurosurgical consultation, neurology consultation, hematology  Patient is refusing transfer and admission, stating she needs to go home "to get my things in order " She states she needs to fill out papers related to work pension  She denies any SI/HI or plan  She states she will have her daughter drive her to [facility] later today for further evaluation after completing her obligations  We discussed the possibility of her condition worsening, to include (but not limited to) brain swelling, seizure, intracranial hemorrhage, CVA, PE, cauda equina, permanent urinary incontinence, permanent disability, and death  Patient would still like to leave the emergency department AMA                                                 MDM  Number of Diagnoses or Management Options  Adenopathy:   Dizziness:   Low back pain:   Perineal numbness:   Soft tissue mass:   Urinary incontinence:   Diagnosis management comments: On exam, well-appearing female, no acute distress, nontoxic appearance, vitals unremarkable, awake alert oriented, neuro exam as above, remainder of exam unremarkable as above  Nonspecific dizziness  Normal neuro exam   Seen ambulating independently to room without difficulty  Not currently dizzy sitting down at time of my assessment  Consider vertiginous syndrome, anemia, dehydration, electrolyte abnormality, intracranial mass, hydrocephalus, carotid stenosis, CVA  Will obtain CTA head neck and labs  Will check EKG for any arrhythmia, heart block, ischemic changes and keep on cont cardiac monitoring     Acute onset LBP with +leg numbness, +perineal decreased sensation on exam, +right leg weakness on exam, +urinary incontinence, good rectal tone  High suspicion for cauda equina  Feel patient needs emergent MRI, which will require transfer to [facility], as MRI unavailable on weekends at this campus  Amount and/or Complexity of Data Reviewed  Clinical lab tests: ordered and reviewed  Tests in the radiology section of CPT®: ordered and reviewed  Tests in the medicine section of CPT®: ordered and reviewed  Discussion of test results with the performing providers: yes  Decide to obtain previous medical records or to obtain history from someone other than the patient: yes  Obtain history from someone other than the patient: yes  Review and summarize past medical records: yes  Discuss the patient with other providers: yes  Independent visualization of images, tracings, or specimens: yes    Patient Progress  Patient progress: stable (See ED course note for dispo and plan  In setting of findings suspicious for metastatic disease, patient's low back pain is very concerning for pain/symptoms related to mets in the region of the abdomen, retroperitoneum, and spine  This could include nerve compression by mass causing cauda equina, as well as other nerve impingement       I reviewed and discussed all lab and imaging findings with the patient at bedside  I discussed emergency department return parameters  Patient should return to the emergency department as soon as is allowable  I answered any and all questions the patient had regarding emergency department course of evaluation and treatment  The patient verbalized understanding of and agreement with plan   )      Disposition  Final diagnoses:   Dizziness   Low back pain   Urinary incontinence   Perineal numbness   Soft tissue mass   Adenopathy     Time reflects when diagnosis was documented in both MDM as applicable and the Disposition within this note     Time User Action Codes Description Comment    1/16/2021  7:35 AM Jonel Hilda Add [R42] Dizziness     1/16/2021  7:35 AM Jonel Hilda Add [M54 5] Low back pain     1/16/2021  7:36 AM Jonel Hilda Add [R32] Urinary incontinence     1/16/2021  7:36 AM Jonel Hilda Add [R20 0] Perineal numbness     1/16/2021  7:37 AM Jonel Hilda Add [M79 89] Soft tissue mass     1/16/2021  7:37 AM Jonel Hilda Add [R59 1] Adenopathy       ED Disposition     ED Disposition Condition Date/Time Comment    [facility] Jan 16, 2021  7:35 AM Date: 1/16/2021  Patient: Ellen Sample  Admitted: 1/16/2021  5:06 AM  Attending Provider: No att  providers found    Ellen Sample or her authorized caregiver has made the decision for the patient to leave the emergency department against the advice o f her attending physician  She or her authorized caregiver has been informed and understands the inherent risks, including stroke, brain bleed, seizure, paralysis, permanent urinary or bowel incontinence, pulmonary embolism, permanent disability, aguila th  She or her authorized caregiver has decided to accept the responsibility for this decision  Ellen Sample and all necessary parties have been advised that she may return for further evaluation or treatment  Her condition at time of discharge was  stable    Ellen Sample had current vital signs as follows:  /82 (BP Location: Right arm)   Pulse 69   Temp 98 1 °F (36 7 °C) (Oral)   Resp (!) 24   LMP 08/16/2014 (Approximate)         Follow-up Information     Follow up With Specialties Details Why Contact Info Additional Information    [address] [facility] Emergency Department Emergency Medicine Go today  [address] [facility] [id number] [facility] Emergency Department, [address], [place], [address]          Discharge Medication List as of 1/16/2021  7:38 AM      CONTINUE these medications which have NOT CHANGED    Details   amLODIPine (NORVASC) 10 mg tablet TAKE 1 TABLET BY MOUTH EVERY DAY, Normal      atorvastatin (LIPITOR) 20 mg tablet TAKE 1 TABLET DAILY, Normal      clonazePAM (KlonoPIN) 0 5 mg tablet Take 1 tablet (0 5 mg total) by mouth 2 (two) times a day as needed for anxiety for up to 5 days, Starting Tue 1/5/2021, Until Sun 1/10/2021, Normal      CVS ASPIRIN LOW STRENGTH 81 MG EC tablet TAKE 1 TABLET BY MOUTH EVERY DAY, Normal      cyclobenzaprine (FLEXERIL) 10 mg tablet Take 1 tablet (10 mg total) by mouth daily at bedtime, Starting Tue 1/12/2021, Normal      furosemide (LASIX) 40 mg tablet Take 1 tablet (40 mg total) by mouth 2 (two) times a day, Starting Thu 5/21/2020, Normal      ipratropium (ATROVENT) 0 02 % nebulizer solution Inhale 0 5 mg as needed , Historical Med      linaGLIPtin (TRADJENTA) 5 MG TABS Take 5 mg by mouth daily, Starting Tue 1/7/2020, Normal      metFORMIN (GLUCOPHAGE) 1000 MG tablet Take 1 tablet (1,000 mg total) by mouth 2 (two) times a day with meals, Starting Thu 5/21/2020, Normal      metoprolol succinate (TOPROL-XL) 50 mg 24 hr tablet TAKE 1 TABLET BY MOUTH EVERY DAY, Normal           No discharge procedures on file      PDMP Review       Value Time User    PDMP Reviewed  Yes 1/5/2021 10:30 AM Patti Borrero DO          ED Provider  Electronically Signed by           Erlinda Pineda PA-C  01/21/21 0002

## 2021-01-17 ENCOUNTER — APPOINTMENT (EMERGENCY)
Dept: RADIOLOGY | Facility: HOSPITAL | Age: 61
DRG: 054 | End: 2021-01-17
Payer: COMMERCIAL

## 2021-01-17 ENCOUNTER — HOSPITAL ENCOUNTER (INPATIENT)
Facility: HOSPITAL | Age: 61
LOS: 5 days | Discharge: HOME | DRG: 054 | End: 2021-01-22
Attending: EMERGENCY MEDICINE | Admitting: FAMILY MEDICINE
Payer: COMMERCIAL

## 2021-01-17 DIAGNOSIS — M54.32 SCIATICA OF LEFT SIDE: ICD-10-CM

## 2021-01-17 DIAGNOSIS — R59.1 LYMPHADENOPATHY: ICD-10-CM

## 2021-01-17 DIAGNOSIS — R42 DIZZINESS: ICD-10-CM

## 2021-01-17 DIAGNOSIS — M54.50 LOW BACK PAIN: ICD-10-CM

## 2021-01-17 DIAGNOSIS — D49.6 BRAIN TUMOR (HCC): ICD-10-CM

## 2021-01-17 DIAGNOSIS — C71.9 BRAIN CANCER (HCC): Primary | ICD-10-CM

## 2021-01-17 DIAGNOSIS — R90.0 INTRACRANIAL MASS: ICD-10-CM

## 2021-01-17 LAB — GLUCOSE SERPL-MCNC: 153 MG/DL (ref 65–140)

## 2021-01-17 PROCEDURE — 71260 CT THORAX DX C+: CPT

## 2021-01-17 PROCEDURE — 82948 REAGENT STRIP/BLOOD GLUCOSE: CPT

## 2021-01-17 PROCEDURE — 99285 EMERGENCY DEPT VISIT HI MDM: CPT

## 2021-01-17 PROCEDURE — 74177 CT ABD & PELVIS W/CONTRAST: CPT

## 2021-01-17 PROCEDURE — 99223 1ST HOSP IP/OBS HIGH 75: CPT | Performed by: FAMILY MEDICINE

## 2021-01-17 PROCEDURE — 94640 AIRWAY INHALATION TREATMENT: CPT

## 2021-01-17 PROCEDURE — 99285 EMERGENCY DEPT VISIT HI MDM: CPT | Performed by: EMERGENCY MEDICINE

## 2021-01-17 PROCEDURE — 94760 N-INVAS EAR/PLS OXIMETRY 1: CPT

## 2021-01-17 PROCEDURE — G1004 CDSM NDSC: HCPCS

## 2021-01-17 PROCEDURE — 73552 X-RAY EXAM OF FEMUR 2/>: CPT

## 2021-01-17 RX ORDER — INSULIN GLARGINE 100 [IU]/ML
8 INJECTION, SOLUTION SUBCUTANEOUS
Status: DISCONTINUED | OUTPATIENT
Start: 2021-01-17 | End: 2021-01-18

## 2021-01-17 RX ORDER — AMLODIPINE BESYLATE 10 MG/1
10 TABLET ORAL DAILY
Status: DISCONTINUED | OUTPATIENT
Start: 2021-01-18 | End: 2021-01-22 | Stop reason: HOSPADM

## 2021-01-17 RX ORDER — DEXAMETHASONE SODIUM PHOSPHATE 4 MG/ML
4 INJECTION, SOLUTION INTRA-ARTICULAR; INTRALESIONAL; INTRAMUSCULAR; INTRAVENOUS; SOFT TISSUE EVERY 6 HOURS SCHEDULED
Status: DISCONTINUED | OUTPATIENT
Start: 2021-01-17 | End: 2021-01-17

## 2021-01-17 RX ORDER — ACETAMINOPHEN 325 MG/1
650 TABLET ORAL EVERY 6 HOURS PRN
Status: DISCONTINUED | OUTPATIENT
Start: 2021-01-17 | End: 2021-01-18

## 2021-01-17 RX ORDER — ATORVASTATIN CALCIUM 20 MG/1
20 TABLET, FILM COATED ORAL DAILY
Status: DISCONTINUED | OUTPATIENT
Start: 2021-01-18 | End: 2021-01-22 | Stop reason: HOSPADM

## 2021-01-17 RX ORDER — METOPROLOL SUCCINATE 50 MG/1
50 TABLET, EXTENDED RELEASE ORAL DAILY
Status: DISCONTINUED | OUTPATIENT
Start: 2021-01-18 | End: 2021-01-22 | Stop reason: HOSPADM

## 2021-01-17 RX ORDER — ONDANSETRON 2 MG/ML
4 INJECTION INTRAMUSCULAR; INTRAVENOUS EVERY 6 HOURS PRN
Status: DISCONTINUED | OUTPATIENT
Start: 2021-01-17 | End: 2021-01-22 | Stop reason: HOSPADM

## 2021-01-17 RX ORDER — DOCUSATE SODIUM 100 MG/1
100 CAPSULE, LIQUID FILLED ORAL 2 TIMES DAILY
Status: DISCONTINUED | OUTPATIENT
Start: 2021-01-17 | End: 2021-01-22 | Stop reason: HOSPADM

## 2021-01-17 RX ORDER — CYCLOBENZAPRINE HCL 10 MG
10 TABLET ORAL
Status: DISCONTINUED | OUTPATIENT
Start: 2021-01-17 | End: 2021-01-22 | Stop reason: HOSPADM

## 2021-01-17 RX ORDER — ALBUTEROL SULFATE 2.5 MG/3ML
2.5 SOLUTION RESPIRATORY (INHALATION) EVERY 4 HOURS PRN
Status: DISCONTINUED | OUTPATIENT
Start: 2021-01-17 | End: 2021-01-22 | Stop reason: HOSPADM

## 2021-01-17 RX ADMIN — CYCLOBENZAPRINE HYDROCHLORIDE 10 MG: 10 TABLET ORAL at 22:06

## 2021-01-17 RX ADMIN — INSULIN GLARGINE 8 UNITS: 100 INJECTION, SOLUTION SUBCUTANEOUS at 22:06

## 2021-01-17 RX ADMIN — IPRATROPIUM BROMIDE 0.5 MG: 0.5 SOLUTION RESPIRATORY (INHALATION) at 19:26

## 2021-01-17 RX ADMIN — IOHEXOL 100 ML: 350 INJECTION, SOLUTION INTRAVENOUS at 14:52

## 2021-01-17 RX ADMIN — INSULIN LISPRO 1 UNITS: 100 INJECTION, SOLUTION INTRAVENOUS; SUBCUTANEOUS at 22:06

## 2021-01-17 NOTE — PROGRESS NOTES
Progress Note - Rafiq Mercer 1960, 62 y o  female MRN: [id number]    Unit/Bed#: [facility] 923-01 Encounter: [id number]    Primary Care Provider: Parker Grissom DO   Date and time admitted to hospital: 1/17/2021  1:40 PM        * Intracranial mass  Assessment & Plan  · Patient gives history of 4 weeks of dizziness  Patient was initially diagnosed with vertigo as an outpatient  Patient continued to have persistent dizziness and he was seen in the emergency room yesterday noted to have intracranial mass  · Patient came back to the hospital today after leaving the hospital yesterday AMA and will be admitted to the hospital   · Consult Neurosurgery-discussed with neurosurgery over  [facility] text  · Frequent neuro checks  · Needs MRI  · Dexamethasone 4 mg q 6      Tobacco use  Assessment & Plan  · Cessation advised    CAD in native artery  Assessment & Plan  · Patient with known history of type 2 diabetes mellitus and is maintained on oral hypoglycemic agents  · Hold oral agents while inpatient  Continue with the insulin sliding scan  · Monitor blood sugar    Chronic diastolic HF (heart failure) (HCC)  Assessment & Plan  Wt Readings from Last 3 Encounters:   01/17/21 75 kg (165 lb 5 5 oz)   01/05/21 75 6 kg (166 lb 9 6 oz)   12/29/20 76 1 kg (167 lb 12 8 oz)     Patient appears to be euvolemic on examination        Mixed hyperlipidemia  Assessment & Plan  · Continue statin    Essential hypertension  Assessment & Plan  · Blood pressure acceptable  · Continue with the outpatient medication    Controlled type 2 diabetes mellitus without complication [facility])  Assessment & Plan  Lab Results   Component Value Date    HGBA1C 5 3 01/05/2021       No results for input(s): POCGLU in the last 72 hours      Blood Sugar Average: Last 72 hrs:        VTE Prophylaxis: Hold anticoagulation given intracranial mass  / sequential compression device   Code Status: full code  POLST: POLST form is not discussed and not completed at this time  Discussion with family:  Daughter in the room    Anticipated Length of Stay:  Patient will be admitted on an Inpatient basis with an anticipated length of stay of  > 2 midnights  Justification for Hospital Stay:     Total Time for Visit, including Counseling / Coordination of Care: 70 minutes  Greater than 50% of this total time spent on direct patient counseling and coordination of care  Chief Complaint:   Intracranial mass    History of Present Illness:    Jack Hernandez is a 62 y o  female who presents with intracranial mass  Patient initially presented to [facility] yesterday for persistent dizziness  Patient reported that the dizziness has been going on for the past 4 weeks and was seen by the PCP diagnosed her with vertigo  Since the patient had persistent dizziness she went to the emergency room patient had a CT scan and noted to have intracranial mass x2 concerning for metastatic disease patient was recommended admission but she signed out AMA since patient had some personal affairs to be in order   Patient denies any nausea vomiting diarrhea  No neurological symptoms  Patient started with back pain and also pain in the right leg recently      Review of Systems:    Review of Systems   Constitutional: Negative  HENT: Negative  Eyes: Negative  Respiratory: Negative  Cardiovascular: Negative  Gastrointestinal: Negative  Endocrine: Negative  Genitourinary: Negative  Musculoskeletal: Positive for back pain  Right leg pain   Neurological: Positive for dizziness  Negative for headaches  Hematological: Negative  Psychiatric/Behavioral: Negative          Past Medical and Surgical History:     Past Medical History:   Diagnosis Date    Acute on chronic congestive heart failure with left ventricular diastolic dysfunction ([facility] [address] )     last assessed 5/23/2017    Asthma     Atelectasis     CHF (congestive heart failure) ([facility] [address] [facility]     Diabetes mellitus [address] [address]     Diverticulitis     with perforation and abscess     Hyperlipidemia     Hypertension     Lesion of spleen     Pericardial effusion        Past Surgical History:   Procedure Laterality Date    ABDOMINAL SURGERY      BREAST BIOPSY Left 10/04/2018     SECTION      COLON SURGERY      COLONOSCOPY      COLOSTOMY  2017    HARTMANS PROCEDURE N/A 2017    Procedure: EXPLORATORY LAPAROTOMY; PARTIAL SMALL BOWEL RESECTION; HARTMANS PROCEDURE; OSTOMY;  Surgeon: Tej Andujar MD;  Location: [facility];  Service:     HYSTERECTOMY  2018    MAMMO STEREOTACTIC BREAST BIOPSY LEFT (ALL INC) Left 10/4/2018    OOPHORECTOMY Bilateral 2018    [place] CLOSE ENTEROSTOMY N/A 2017    Procedure: OPEN COLOSTOMY REVERSAL;  Surgeon: Tej Andujar MD;  [facility];  Service: General    [place] COLONOSCOPY FLX DX W/COLLJ SPEC WHEN PFRMD N/A 2017    Procedure: COLONOSCOPY; DILATION OF OSTOMY;  Surgeon: Tej Andujar MD;  Location: [facility]; Service: General    [place] EXC SKIN BENIG <0 5 CM REMAINDER BODY N/A 2017    Procedure: SCALP MASS EXCISION X 2;  Surgeon: Tej Andujar MD;  [facility];  Service: [address] [facility] N/A 2019    Procedure: INCISIONAL HERNIA REPAIR, COMPONENT SEPARATION;  Surgeon: Tej Andujar MD;  [facility];  Service: General    [place] TOTAL ABDOM HYSTERECTOMY N/A 2019    Procedure: TOTAL ABDOMINAL HYSTERECTOMY; BSO;  Surgeon: Sahra Nj MD;  [facility];  Service: Gynecology    VAC DRESSING APPLICATION N/A     Procedure: APPLICATION VAC DRESSING;  Surgeon: Tej Andujar MD;  [facility];  Service:        Meds/Allergies:    Prior to Admission medications    Medication Sig Start Date End Date Taking?  Authorizing Provider   amLODIPine (NORVASC) 10 mg tablet TAKE 1 TABLET BY MOUTH EVERY DAY 9/15/20  Yes Travis Shireentein, DO   atorvastatin (LIPITOR) 20 mg tablet TAKE 1 TABLET DAILY 10/3/20  Yes Janie Acevedo,    [facility] ASPIRIN LOW STRENGTH 81 MG EC tablet TAKE 1 TABLET BY MOUTH EVERY DAY 2/11/20  Yes Shira Flores PA-C   cyclobenzaprine (FLEXERIL) 10 mg tablet Take 1 tablet (10 mg total) by mouth daily at bedtime 1/12/21  Yes ROSA Simon   ipratropium (ATROVENT) 0 02 % nebulizer solution Inhale 0 5 mg as needed    Yes Historical Provider, MD   linaGLIPtin (TRADJENTA) 5 MG TABS Take 5 mg by mouth daily 1/7/20  Yes Travis Acevedo DO   metFORMIN (GLUCOPHAGE) 1000 MG tablet Take 1 tablet (1,000 mg total) by mouth 2 (two) times a day with meals 5/21/20  Yes Travis Acevedo DO   metoprolol succinate (TOPROL-XL) 50 mg 24 hr tablet TAKE 1 TABLET BY MOUTH EVERY DAY 10/17/20  Yes Nilsa Garza MD   clonazePAM (KlonoPIN) 0 5 mg tablet Take 1 tablet (0 5 mg total) by mouth 2 (two) times a day as needed for anxiety for up to 5 days 1/5/21 1/10/21  Travis Acevedo DO   furosemide (LASIX) 40 mg tablet Take 1 tablet (40 mg total) by mouth 2 (two) times a day  Patient not taking: Reported on 1/12/2021 5/21/20   Royal Ochoa DO     I have reviewed home medications with patient personally  Allergies:    Allergies   Allergen Reactions    Ciprofloxacin Itching     Starts at hands to feet then the whole entire body       Social History:     Marital Status:    Occupation:  Patient Pre-hospital Living Situation:  Lives at home with family  Patient Pre-hospital Level of Mobility:   Patient Pre-hospital Diet Restrictions:   Substance Use History:   Social History     Substance and Sexual Activity   Alcohol Use Not Currently    Frequency: Monthly or less    Drinks per session: 1 or 2    Binge frequency: Never    Comment: socially      Social History     Tobacco Use   Smoking Status Current Every Day Smoker    Packs/day: 0 25    Years: 20 00    Pack years: 5 00    Types: Cigarettes    Start date: 8/2/1991   Smokeless Tobacco Never Used     Social History Substance and Sexual Activity   Drug Use No       Family History:    Family History   Problem Relation Age of Onset    Hypertension Mother    [employer] Diabetes Mother     No Known Problems Sister     No Known Problems Daughter     Heart disease Son     No Known Problems Maternal Aunt     No Known Problems Maternal Aunt     No Known Problems Maternal Aunt     No Known Problems Maternal Aunt     No Known Problems Paternal Aunt     Breast cancer Neg Hx        Physical Exam:     Vitals:   Blood Pressure: 151/79 (01/17/21 1740)  Pulse: 71 (01/17/21 1740)  Temperature: 97 6 °F (36 4 °C) (01/17/21 1740)  Temp Source: Axillary (01/17/21 1740)  Respirations: 16 (01/17/21 1740)  Height: 5' (152 4 cm) (01/17/21 1740)  Weight - Scale: 75 kg (165 lb 5 5 oz) (01/17/21 1740)  SpO2: 97 % (01/17/21 1740)    Physical Exam  Constitutional:       General: She is not in acute distress  Appearance: Normal appearance  HENT:      Head: Normocephalic and atraumatic  Nose: Nose normal       Mouth/Throat:      Mouth: Mucous membranes are moist    Eyes:      General:         Right eye: No discharge  Neck:      Musculoskeletal: Normal range of motion  Cardiovascular:      Rate and Rhythm: Normal rate and regular rhythm  Pulses: Normal pulses  Pulmonary:      Effort: Pulmonary effort is normal    Abdominal:      General: Abdomen is flat  Musculoskeletal: Normal range of motion  Skin:     General: Skin is warm  Neurological:      General: No focal deficit present  Mental Status: She is alert  Additional Data:     Lab Results: I have personally reviewed pertinent reports        Results from last 7 days   Lab Units 01/16/21  0606   WBC Thousand/uL 6 97   HEMOGLOBIN g/dL 11 2*   HEMATOCRIT % 36 0   PLATELETS Thousands/uL 346   NEUTROS PCT % 65   LYMPHS PCT % 24   MONOS PCT % 7   EOS PCT % 2     Results from last 7 days   Lab Units 01/16/21  0606   SODIUM mmol/L 136   POTASSIUM mmol/L 4 0   CHLORIDE mmol/L 100   CO2 mmol/L 29   BUN mg/dL 16   CREATININE mg/dL 0 66   ANION GAP mmol/L 7   CALCIUM mg/dL 9 7   GLUCOSE RANDOM mg/dL 142*                       Imaging: I have personally reviewed pertinent reports  CT chest abdomen pelvis w contrast   Final Result by Eran Mario MD (01/17 8416)      1  Mediastinal and right hilar lymphadenopathy  2   Chronic splenic mass, slowly enlarging since 2017  This is almost certainly benign and the differential diagnosis includes hamartoma, hemorrhagic cyst, littoral cell angioma or lymphangioma  3   No evidence of primary malignancy in the chest, abdomen or pelvis  Workstation performed: [id number]         XR femur 2 views LEFT    (Results Pending)       EKG, Pathology, and Other Studies Reviewed on Admission:   · EKG:     Allscripts / Epic Records Reviewed: Yes     ** Please Note: This note has been constructed using a voice recognition system   **

## 2021-01-17 NOTE — ASSESSMENT & PLAN NOTE
Lab Results   Component Value Date    HGBA1C 5 3 01/05/2021       No results for input(s): POCGLU in the last 72 hours      Blood Sugar Average: Last 72 hrs:

## 2021-01-17 NOTE — ED ATTENDING ATTESTATION
1/17/2021  IBrandon MD, saw and evaluated the patient  I have discussed the patient with the resident/non-physician practitioner and agree with the resident's/non-physician practitioner's findings, Plan of Care, and MDM as documented in the resident's/non-physician practitioner's note, except where noted  All available labs and Radiology studies were reviewed  I was present for key portions of any procedure(s) performed by the resident/non-physician practitioner and I was immediately available to provide assistance  At this point I agree with the current assessment done in the Emergency Department  I have conducted an independent evaluation of this patient a history and physical is as follows:    ED Course     Patient presents for evaluation after being diagnosis to brain masses yesterday that were presumed to be metastatic  Patient was going to be admitted but she had started to take care of at home and signed out AMA  Patient returns for further workup  Patient does report having dizziness when ambulatory  No additional complaints  Exam: AAOx3, NAD, RRR, CTA, S/NT/ND, no motor/sensory deficits  A/P:  Brain mass  Will plan to CT chest/abdomen/pelvis to evaluate for primary malignancy and will plan to admit for further workup      Critical Care Time  Procedures

## 2021-01-17 NOTE — ASSESSMENT & PLAN NOTE
· Patient gives history of 4 weeks of dizziness  Patient was initially diagnosed with vertigo as an outpatient  Patient continued to have persistent dizziness and he was seen in the emergency room yesterday noted to have intracranial mass     · Patient came back to the hospital today after leaving the hospital yesterday AMA and will be admitted to the hospital   · Consult Neurosurgery-discussed with neurosurgery over  [facility] text  · Frequent neuro checks  · Needs MRI-will obtain MRI with and without contrast  · Dexamethasone 4 mg q 6

## 2021-01-17 NOTE — ASSESSMENT & PLAN NOTE
· Patient with known history of type 2 diabetes mellitus and is maintained on oral hypoglycemic agents  · Hold oral agents while inpatient  Continue with the insulin sliding scan      · Monitor blood sugar

## 2021-01-17 NOTE — ASSESSMENT & PLAN NOTE
Wt Readings from Last 3 Encounters:   01/17/21 75 kg (165 lb 5 5 oz)   01/05/21 75 6 kg (166 lb 9 6 oz)   12/29/20 76 1 kg (167 lb 12 8 oz)     Patient appears to be euvolemic on examination

## 2021-01-17 NOTE — ED NOTES
Patient returned from [facility] and [facility], RN  01/17/21 0563

## 2021-01-18 ENCOUNTER — APPOINTMENT (INPATIENT)
Dept: RADIOLOGY | Facility: HOSPITAL | Age: 61
DRG: 054 | End: 2021-01-18
Payer: COMMERCIAL

## 2021-01-18 PROBLEM — M54.32 SCIATICA OF LEFT SIDE: Status: ACTIVE | Noted: 2021-01-18

## 2021-01-18 PROBLEM — G93.6 CEREBRAL EDEMA (HCC): Status: ACTIVE | Noted: 2021-01-18

## 2021-01-18 PROBLEM — M54.16 LUMBAR BACK PAIN WITH RADICULOPATHY AFFECTING LEFT LOWER EXTREMITY: Status: ACTIVE | Noted: 2021-01-18

## 2021-01-18 LAB
GLUCOSE SERPL-MCNC: 105 MG/DL (ref 65–140)
GLUCOSE SERPL-MCNC: 132 MG/DL (ref 65–140)
GLUCOSE SERPL-MCNC: 159 MG/DL (ref 65–140)
GLUCOSE SERPL-MCNC: 91 MG/DL (ref 65–140)

## 2021-01-18 PROCEDURE — 99223 1ST HOSP IP/OBS HIGH 75: CPT | Performed by: NEUROLOGICAL SURGERY

## 2021-01-18 PROCEDURE — 70553 MRI BRAIN STEM W/O & W/DYE: CPT

## 2021-01-18 PROCEDURE — G1004 CDSM NDSC: HCPCS

## 2021-01-18 PROCEDURE — 82948 REAGENT STRIP/BLOOD GLUCOSE: CPT

## 2021-01-18 PROCEDURE — 99232 SBSQ HOSP IP/OBS MODERATE 35: CPT | Performed by: PHYSICIAN ASSISTANT

## 2021-01-18 PROCEDURE — A9585 GADOBUTROL INJECTION: HCPCS | Performed by: FAMILY MEDICINE

## 2021-01-18 RX ORDER — LIDOCAINE 50 MG/G
2 PATCH TOPICAL DAILY
Status: DISCONTINUED | OUTPATIENT
Start: 2021-01-18 | End: 2021-01-22 | Stop reason: HOSPADM

## 2021-01-18 RX ORDER — OXYCODONE HYDROCHLORIDE 5 MG/1
5 TABLET ORAL EVERY 4 HOURS PRN
Status: DISCONTINUED | OUTPATIENT
Start: 2021-01-18 | End: 2021-01-18

## 2021-01-18 RX ORDER — MUSCLE RUB CREAM 100; 150 MG/G; MG/G
CREAM TOPICAL 4 TIMES DAILY PRN
Status: DISCONTINUED | OUTPATIENT
Start: 2021-01-18 | End: 2021-01-22 | Stop reason: HOSPADM

## 2021-01-18 RX ORDER — OXYCODONE HYDROCHLORIDE 10 MG/1
10 TABLET ORAL EVERY 4 HOURS PRN
Status: DISCONTINUED | OUTPATIENT
Start: 2021-01-18 | End: 2021-01-18

## 2021-01-18 RX ORDER — LANOLIN ALCOHOL/MO/W.PET/CERES
3 CREAM (GRAM) TOPICAL
Status: DISCONTINUED | OUTPATIENT
Start: 2021-01-18 | End: 2021-01-22 | Stop reason: HOSPADM

## 2021-01-18 RX ORDER — ACETAMINOPHEN 325 MG/1
975 TABLET ORAL EVERY 8 HOURS PRN
Status: DISCONTINUED | OUTPATIENT
Start: 2021-01-18 | End: 2021-01-22 | Stop reason: HOSPADM

## 2021-01-18 RX ORDER — INSULIN GLARGINE 100 [IU]/ML
5 INJECTION, SOLUTION SUBCUTANEOUS
Status: DISCONTINUED | OUTPATIENT
Start: 2021-01-18 | End: 2021-01-22 | Stop reason: HOSPADM

## 2021-01-18 RX ORDER — GABAPENTIN 100 MG/1
100 CAPSULE ORAL
Status: DISCONTINUED | OUTPATIENT
Start: 2021-01-18 | End: 2021-01-22 | Stop reason: HOSPADM

## 2021-01-18 RX ADMIN — DOCUSATE SODIUM 100 MG: 100 CAPSULE ORAL at 08:32

## 2021-01-18 RX ADMIN — MELATONIN 3 MG: at 23:50

## 2021-01-18 RX ADMIN — AMLODIPINE BESYLATE 10 MG: 10 TABLET ORAL at 08:32

## 2021-01-18 RX ADMIN — ACETAMINOPHEN 975 MG: 325 TABLET, FILM COATED ORAL at 03:23

## 2021-01-18 RX ADMIN — GADOBUTROL 7 ML: 604.72 INJECTION INTRAVENOUS at 04:20

## 2021-01-18 RX ADMIN — INSULIN LISPRO 1 UNITS: 100 INJECTION, SOLUTION INTRAVENOUS; SUBCUTANEOUS at 22:00

## 2021-01-18 RX ADMIN — ACETAMINOPHEN 975 MG: 325 TABLET, FILM COATED ORAL at 21:46

## 2021-01-18 RX ADMIN — METOPROLOL SUCCINATE 50 MG: 50 TABLET, EXTENDED RELEASE ORAL at 08:32

## 2021-01-18 RX ADMIN — INSULIN GLARGINE 5 UNITS: 100 INJECTION, SOLUTION SUBCUTANEOUS at 22:05

## 2021-01-18 RX ADMIN — CYCLOBENZAPRINE HYDROCHLORIDE 10 MG: 10 TABLET ORAL at 22:06

## 2021-01-18 RX ADMIN — GABAPENTIN 100 MG: 100 CAPSULE ORAL at 18:01

## 2021-01-18 RX ADMIN — LIDOCAINE 2 PATCH: 50 PATCH TOPICAL at 03:22

## 2021-01-18 RX ADMIN — DOCUSATE SODIUM 100 MG: 100 CAPSULE ORAL at 18:02

## 2021-01-18 RX ADMIN — ATORVASTATIN CALCIUM 20 MG: 20 TABLET, FILM COATED ORAL at 08:32

## 2021-01-18 NOTE — ASSESSMENT & PLAN NOTE
Wt Readings from Last 3 Encounters:   01/17/21 75 kg (165 lb 5 5 oz)   01/05/21 75 6 kg (166 lb 9 6 oz)   12/29/20 76 1 kg (167 lb 12 8 oz)     Patient appears to be euvolemic on examination  Does not take Lasix as outpatient  Echo 1 year ago showed an EF of 49% grade 1 diastolic dysfunction

## 2021-01-18 NOTE — ASSESSMENT & PLAN NOTE
Left parasaggital and right convex brain mass  · P/w 4 weeks dizziness/off balance    Imaging:  · MRI brain with without contrast 1/18/21:  Peripherally enhancing 2 1 x 2 1 x 1 5 cm mass in the parasagittal left parietal parenchyma with associated vasogenic edema and focal mass effect  Second right frontal vertex mass approximately 5 mm homogeneously enhancing  Diffuse enhancement of the internal auditory canal bilaterally which may be related to patchy meningeal enhancement  · CTA head and neck with without 1/16/2021:  Left parietal mass with vasogenic edema  Mediastinal, hilar, paratracheal and cervical adenopathy  Varying degrees of arthrosclerotic disease  Enlarged palatine tonsils and lingual tonsil  · CT chest abdomen pelvis with 1/17/21:  Medial style and right hilar adenopathy  No evidence of primary malignancy  Chronic splenic mass  Plan:  · Continue monitor neurological exam and symptoms  · Imaging results reviewed with patient  MRI imaging printed and provided to patient  Discussed differential diagnosis to include metastatic disease more likely over primary CNS etiology  · Discussed need for tissue sample for pathology  Pulmonary consult has been placed to see if pathology is able to be obtained from lymphadenopathy  · If unable to obtain pathology from lymph nodes, discussed possibility for brain biopsy  · Discussed meningeal enhancement and concern that abnormal cell may be in the cerebral spinal fluid  For this a lumbar puncture was recommended  Patient declined and she does not feel she would be able to tolerate this and concerned with risks given her significant scoliosis deformity  · Discussed use of decadron to assist in symptoms control by decreasing edema once biopsy completed and ideally LP  · Discussed anticipated need for radiation to brain masses - possibly SRS  · We will continue to follow  All questions were answered to her satisfaction     · Call with any questions/concerns

## 2021-01-18 NOTE — CONSULTS
Consult- Michelle Velasquez 1960, 62 y o  female MRN: [id number]    Unit/Bed#: [facility] 923-01 Encounter: [id number]    Primary Care Provider: Aleksander Starks DO   Date and time admitted to hospital: 1/17/2021  1:40 PM      Inpatient consult to Neurosurgery  Consult performed by: Brianna Nicholas PA-C  Consult ordered by: Taty Negrete MD          * Intracranial mass  Assessment & Plan  Left parasaggital and right convex brain mass  · P/w 4 weeks dizziness/off balance    Imaging:  · MRI brain with without contrast 1/18/21:  Peripherally enhancing 2 1 x 2 1 x 1 5 cm mass in the parasagittal left parietal parenchyma with associated vasogenic edema and focal mass effect  Second right frontal vertex mass approximately 5 mm homogeneously enhancing  Diffuse enhancement of the internal auditory canal bilaterally which may be related to patchy meningeal enhancement  · CTA head and neck with without 1/16/2021:  Left parietal mass with vasogenic edema  Mediastinal, hilar, paratracheal and cervical adenopathy  Varying degrees of arthrosclerotic disease  Enlarged palatine tonsils and lingual tonsil  · CT chest abdomen pelvis with 1/17/21:  Medial style and right hilar adenopathy  No evidence of primary malignancy  Chronic splenic mass  Plan:  · Continue monitor neurological exam and symptoms  · Imaging results reviewed with patient  MRI imaging printed and provided to patient  Discussed differential diagnosis to include metastatic disease more likely over primary CNS etiology  · Discussed need for tissue sample for pathology  Pulmonary consult has been placed to see if pathology is able to be obtained from lymphadenopathy  · If unable to obtain pathology from lymph nodes, discussed possibility for brain biopsy  · Discussed meningeal enhancement and concern that abnormal cell may be in the cerebral spinal fluid  For this a lumbar puncture was recommended    Patient declined and she does not feel she would be able to tolerate this and concerned with risks given her significant scoliosis deformity  · Discussed use of decadron to assist in symptoms control by decreasing edema once biopsy completed and ideally LP  · Discussed anticipated need for radiation to brain masses - possibly SRS  · We will continue to follow  All questions were answered to her satisfaction  · Call with any questions/concerns  Cerebral edema [facility])  Assessment & Plan  See plan as above  Plan decadron after biopsy +/- LP    Lumbar back pain with radiculopathy affecting left lower extremity  Assessment & Plan  Chronic scoliosis  · P/w LBP with anterolateral left thigh pain and intermittent numbness only at night  · Admits to 1 5 week saddle numbness - abnl sensation with wiping  · No gait/discoordination complaints  Imagine:   · CT CAP - S-shaped scoliosis  No noted masses  Plan:   · Continue to monitor symptoms  · Pain control per primary team  Recommend prn opioid to be available at night  · Discussed consideration for additional MRI spine imaging  Essential hypertension  Assessment & Plan  Goal normotensive  Continue medications as per primary team    Controlled type 2 diabetes mellitus without complication [facility])  Assessment & Plan  Lab Results   Component Value Date    HGBA1C 5 3 01/05/2021       Recent Labs     01/17/21  2053 01/18/21  0754 01/18/21  1127   POCGLU 153* 91 132       Blood Sugar Average: Last 72 hrs:  (P) 170 [id number]     Medically management per primary team          History of Present Illness     HPI: Michelle Velasquez is a 62 y o  female with PMH including diverticulosis status post colon resection with colostomy followed by reversal, fibroid status total abdominal hysterectomy, hypertension, hyperlipidemia, asthma, congestive heart failure, diabetes with A1c 5 3 who presents with complaint of dizziness x4 weeks    Patient states approximately four weeks ago she had sudden onset of dizziness for which she was seen by her PCP  Given persistent symptoms she had two additional follow-up visit  She was prescribed a medication but did not take secondary to concerns for side effects  Patient with longstanding S shaped scoliosis and intermittent back pain  Reason she has noted significant back pain only at night with an anterior lateral radiculopathy of the left thigh and intermittent associated numbness  She was prescribed muscle relaxer which she states did provide restful sleep but no pain relief  Does admit to saddle numbness over the last 1 5 weeks  She denies any gait dysfunction or urinary/bowel incontinence  She denies any dysfunction of the fine motor function in her hands  Denies any weakness of her extremities  Denies any headache vision or speech difficulties  Denies any change in activity or weight loss  All other review of systems are negative  Of note, patient has extensive surgical history including Dexter's procedure May 2017 during which four benign reactive nodes were removed  There was no evidence of dysplasia  She underwent breast mass biopsy October 2018 which was negative for malignancy  Last mammogram December 2020 which without any abnormal findings  No family history of breast cancer  She underwent a total abdominal hysterectomy related to fibroids in February 2019 for which pathology was negative for malignancy  Patient denies any known history of cancer  Does admit to a maternal aunt with cancer of unknown primary  Review of Systems   Constitutional: Negative for activity change, fatigue, fever and unexpected weight change  HENT: Negative for trouble swallowing and voice change  Eyes: Negative for photophobia and visual disturbance  Respiratory: Negative for cough and shortness of breath  Cardiovascular: Negative for chest pain and leg swelling  Gastrointestinal: Negative for abdominal pain, nausea and vomiting     Genitourinary: Negative for decreased urine volume and difficulty urinating  Musculoskeletal: Positive for back pain  Negative for gait problem and neck pain  Skin: Negative for pallor, rash and wound  Neurological: Positive for dizziness and numbness  Negative for tremors, seizures, speech difficulty, weakness, light-headedness and headaches  Psychiatric/Behavioral: Negative for agitation and confusion  Historical Information   Past Medical History:   Diagnosis Date    Acute on chronic congestive heart failure with left ventricular diastolic dysfunction (HCC)     last assessed 2017    Asthma     Atelectasis     CHF [facility]congestive heart failure) ([facility]     Diabetes mellitus ([facility] )     Diverticulitis 2017    with perforation and abscess     Hyperlipidemia     Hypertension     Lesion of spleen     Pericardial effusion      Past Surgical History:   Procedure Laterality Date    ABDOMINAL SURGERY      BREAST BIOPSY Left 10/04/2018     SECTION      COLON SURGERY      COLONOSCOPY      COLOSTOMY  2017    HARTMANS PROCEDURE N/A 2017    Procedure: EXPLORATORY LAPAROTOMY; PARTIAL SMALL BOWEL RESECTION; HARTMANS PROCEDURE; OSTOMY;  Surgeon: Nuvia Espinosa MD;  [facility];  Service:     HYSTERECTOMY  2018    MAMMO STEREOTACTIC BREAST BIOPSY LEFT (ALL INC) Left 10/4/2018    OOPHORECTOMY Bilateral 2018    MT CLOSE ENTEROSTOMY N/A 2017    Procedure: OPEN COLOSTOMY REVERSAL;  Surgeon: Nuvia Espinosa MD;  [facility];  Service: General    MT COLONOSCOPY FLX DX W/COLLJ Adán 1978 PFRMD N/A 2017    Procedure: COLONOSCOPY; DILATION OF OSTOMY;  Surgeon: Nuvia Espinosa MD;  [facility];   Service: General    MT EXC SKIN BENIG <0 5 CM REMAINDER BODY N/A 2017    Procedure: SCALP MASS EXCISION X 2;  Surgeon: Nuvia Espinosa MD;  [facility];  Service: General    [id number] [place] [address] N/A 2019    Procedure: [address], COMPONENT SEPARATION;  Surgeon: Louise Dangelo MD;  [facility];  Service: General    UT TOTAL ABDOM HYSTERECTOMY N/A 2/27/2019    Procedure: TOTAL ABDOMINAL HYSTERECTOMY; BSO;  Surgeon: Melissa Garcia MD;  [facility];  Service: Gynecology    VAC DRESSING APPLICATION N/A 2/7/9113    Procedure: APPLICATION VAC DRESSING;  Surgeon: Louise Dangelo MD;  [facility];  Service:      Social History     Substance and Sexual Activity   Alcohol Use Not Currently    Frequency: Monthly or less    Drinks per session: 1 or 2    Binge frequency: Never    Comment: socially      Social History     Substance and Sexual Activity   Drug Use No     Social History     Tobacco Use   Smoking Status Current Every Day Smoker    Packs/day: 0 25    Years: 20 00    Pack years: 5 00    Types: Cigarettes    Start date: 8/2/1991   Smokeless Tobacco Never Used     Family History   Problem Relation Age of Onset    Hypertension Mother     Diabetes Mother     No Known Problems Sister     No Known Problems Daughter     Heart disease Son     No Known Problems Maternal Aunt     No Known Problems Maternal Aunt     No Known Problems Maternal Aunt     No Known Problems Maternal Aunt     No Known Problems Paternal Aunt     Breast cancer Neg Hx        Meds/Allergies   all current active meds have been reviewed, current meds:   Current Facility-Administered Medications   Medication Dose Route Frequency    acetaminophen (TYLENOL) tablet 975 mg  975 mg Oral Q8H PRN    albuterol inhalation solution 2 5 mg  2 5 mg Nebulization Q4H PRN    amLODIPine (NORVASC) tablet 10 mg  10 mg Oral Daily    atorvastatin (LIPITOR) tablet 20 mg  20 mg Oral Daily    cyclobenzaprine (FLEXERIL) tablet 10 mg  10 mg Oral HS    docusate sodium (COLACE) capsule 100 mg  100 mg Oral BID    gabapentin (NEURONTIN) capsule 100 mg  100 mg Oral After Dinner    insulin glargine (LANTUS) subcutaneous injection 5 Units 0 05 mL  5 Units Subcutaneous HS    insulin lispro (HumaLOG) 100 units/mL subcutaneous injection 1-5 Units  1-5 Units Subcutaneous TID AC    insulin lispro (HumaLOG) 100 units/mL subcutaneous injection 1-5 Units  1-5 Units Subcutaneous HS    lidocaine (LIDODERM) 5 % patch 2 patch  2 patch Topical Daily    menthol-methyl salicylate (BENGAY) 65-53 % cream   Apply externally 4x Daily PRN    metoprolol succinate (TOPROL-XL) 24 hr tablet 50 mg  50 mg Oral Daily    nicotine (NICODERM CQ) 7 mg/24hr TD 24 hr patch 1 patch  1 patch Transdermal Daily    ondansetron (ZOFRAN) injection 4 mg  4 mg Intravenous Q6H PRN    and PTA meds:   Prior to Admission Medications   Prescriptions Last Dose Informant Patient Reported? Taking?    [employer] ASPIRIN LOW STRENGTH 81 MG EC tablet 1/17/2021 at Unknown time Self No Yes   Sig: TAKE 1 TABLET BY MOUTH EVERY DAY   amLODIPine (NORVASC) 10 mg tablet 1/17/2021 at Unknown time Self No Yes   Sig: TAKE 1 TABLET BY MOUTH EVERY DAY   atorvastatin (LIPITOR) 20 mg tablet 1/17/2021 at Unknown time Self No Yes   Sig: TAKE 1 TABLET DAILY   clonazePAM (KlonoPIN) 0 5 mg tablet   No No   Sig: Take 1 tablet (0 5 mg total) by mouth 2 (two) times a day as needed for anxiety for up to 5 days   cyclobenzaprine (FLEXERIL) 10 mg tablet 1/17/2021 at Unknown time  No Yes   Sig: Take 1 tablet (10 mg total) by mouth daily at bedtime   furosemide (LASIX) 40 mg tablet Not Taking at Unknown time Self No No   Sig: Take 1 tablet (40 mg total) by mouth 2 (two) times a day   Patient not taking: Reported on 1/12/2021   ipratropium (ATROVENT) 0 02 % nebulizer solution 1/17/2021 at Unknown time Self Yes Yes   Sig: Inhale 0 5 mg as needed    linaGLIPtin (TRADJENTA) 5 MG TABS 1/17/2021 at Unknown time Self No Yes   Sig: Take 5 mg by mouth daily   metFORMIN (GLUCOPHAGE) 1000 MG tablet 1/17/2021 at Unknown time Self No Yes   Sig: Take 1 tablet (1,000 mg total) by mouth 2 (two) times a day with meals   metoprolol succinate (TOPROL-XL) 50 mg 24 hr tablet 1/17/2021 at Unknown time Self No Yes   Sig: TAKE 1 TABLET BY MOUTH EVERY DAY      Facility-Administered Medications: None     Allergies   Allergen Reactions    Ciprofloxacin Itching     Starts at hands to feet then the whole entire body       Objective   I/O       01/16 0701 - 01/17 0700 01/17 0701 - 01/18 0700 01/18 0701 - 01/19 0700    P  O    500    Total Intake(mL/kg)   500 (6 7)    Urine (mL/kg/hr)  1000     Total Output  1000     Net  -1000 +500           Unmeasured Urine Occurrence  1 x 3 x    Unmeasured Stool Occurrence   1 x          Physical Exam  Vitals signs reviewed  Constitutional:       General: She is not in acute distress  Appearance: Normal appearance  She is well-developed  She is not ill-appearing  HENT:      Head: Normocephalic and atraumatic  Nose: Nose normal       Mouth/Throat:      Mouth: Mucous membranes are moist    Eyes:      General: No scleral icterus  Right eye: No discharge  Left eye: No discharge  Extraocular Movements: Extraocular movements intact and EOM normal       Conjunctiva/sclera: Conjunctivae normal       Pupils: Pupils are equal, round, and reactive to light  Neck:      Musculoskeletal: Normal range of motion  No muscular tenderness  Cardiovascular:      Rate and Rhythm: Normal rate  Pulmonary:      Effort: Pulmonary effort is normal  No respiratory distress  Abdominal:      General: There is no distension  Musculoskeletal:         General: Deformity (scoliosis) present  No tenderness  Skin:     General: Skin is warm and dry  Findings: No rash  Neurological:      Mental Status: She is alert and oriented to person, place, and time  Coordination: Finger-Nose-Finger Test normal       Deep Tendon Reflexes: Strength normal       Reflex Scores:       Bicep reflexes are 2+ on the right side and 2+ on the left side  Brachioradialis reflexes are 2+ on the right side and 2+ on the left side         Patellar reflexes are 2+ on the right side and 2+ on the left side  Psychiatric:         Mood and Affect: Mood normal          Speech: Speech normal          Behavior: Behavior normal          Thought Content: Thought content normal          Judgment: Judgment normal        Neurologic Exam     Mental Status   Oriented to person, place, and time  Follows 3 step commands  Attention: normal  Concentration: normal    Speech: speech is normal   Level of consciousness: alert  Knowledge: good  Able to perform simple calculations  Normal comprehension  Cranial Nerves     CN III, IV, VI   Pupils are equal, round, and reactive to light  Extraocular motions are normal    Nystagmus: none   Upgaze: normal  Conjugate gaze: present    CN V   Facial sensation intact  CN VII   Facial expression full, symmetric  CN VIII   Hearing: intact (finger rub)    CN XI   Right trapezius strength: normal  Left trapezius strength: normal    CN XII   Tongue: not atrophic  Fasciculations: absent  Tongue deviation: none    Motor Exam   Muscle bulk: normal  Overall muscle tone: normal  Right arm pronator drift: absent  Left arm pronator drift: absent    Strength   Strength 5/5 throughout  Sensory Exam   Light touch normal      Gait, Coordination, and Reflexes     Coordination   Finger to nose coordination: normal    Tremor   Resting tremor: absent  Intention tremor: absent  Action tremor: absent    Reflexes   Right brachioradialis: 2+  Left brachioradialis: 2+  Right biceps: 2+  Left biceps: 2+  Right patellar: 2+  Left patellar: 2+  Right Anthony: present  Left Anthony: present  Right ankle clonus: absent  Left ankle clonus: absent        Vitals:Blood pressure 141/77, pulse 64, temperature 97 8 °F (36 6 °C), resp  rate 16, height 5' (1 524 m), weight 75 kg (165 lb 5 5 oz), last menstrual period 08/16/2014, SpO2 98 %, not currently breastfeeding  ,Body mass index is 32 29 kg/m²       Lab Results:   Results from last 7 days   Lab Units 01/16/21  0606   WBC Thousand/uL 6 97   HEMOGLOBIN g/dL 11 2*   HEMATOCRIT % 36 0   PLATELETS Thousands/uL 346   NEUTROS PCT % 65   MONOS PCT % 7     Results from last 7 days   Lab Units 01/16/21  0606   POTASSIUM mmol/L 4 0   CHLORIDE mmol/L 100   CO2 mmol/L 29   BUN mg/dL 16   CREATININE mg/dL 0 66   CALCIUM mg/dL 9 7                 No results found for: TROPONINT  ABG:No results found for: PHART, FMG3MBO, PO2ART, YEV1QQY, O9SDATQU, BEART, SOURCE    Imaging Studies: I have personally reviewed pertinent reports  and I have personally reviewed pertinent films in PACS    Mri Brain W Wo Contrast    Result Date: 1/18/2021  Impression: Metastatic disease, with 2 Parenchymal lesions, 2 1 and 5 mm in the greatest linear dimension  There is diffuse enhancement of the internal auditory canals bilaterally, which warrants lumbar puncture to exclude leptomeningeal tumor  The study was marked in EPIC for significant notification  Workstation performed: [id number]     Ct Chest Abdomen Pelvis W Contrast    Result Date: 1/17/2021  Impression: 1  Mediastinal and right hilar lymphadenopathy  2   Chronic splenic mass, slowly enlarging since 2017  This is almost certainly benign and the differential diagnosis includes hamartoma, hemorrhagic cyst, littoral cell angioma or lymphangioma  3   No evidence of primary malignancy in the chest, abdomen or pelvis  Workstation performed: [id number]       EKG, Pathology, and Other Studies: none    VTE Prophylaxis: Sequential compression device (Venodyne)     Code Status: Level 1 - Full Code  Advance Directive and Living Will:      Power of :    POLST:      Counseling / Coordination of Care  I spent 1 hour with the patient

## 2021-01-18 NOTE — H&P
History and physical- Abhijeet Martinez 1960, 62 y o  female MRN: [id number]     Unit/Bed#: [facility] 923-01 Encounter: [id number]     Primary Care Provider: Az Price DO   Date and time admitted to hospital: 1/17/2021  1:40 PM           * Intracranial mass  Assessment & Plan  · Patient gives history of 4 weeks of dizziness  Patient was initially diagnosed with vertigo as an outpatient  Patient continued to have persistent dizziness and he was seen in the emergency room yesterday noted to have intracranial mass  · Patient came back to the hospital today after leaving the hospital yesterday AMA and will be admitted to the hospital   · Consult Neurosurgery-discussed with neurosurgery over  [facility] text  · Frequent neuro checks  · Needs MRI  Discussed with neurosurgery  Hold off on dexamethasone      Tobacco use  Assessment & Plan  · Cessation advised     CAD in native artery  Assessment & Plan  · Patient with known history of type 2 diabetes mellitus and is maintained on oral hypoglycemic agents  · Hold oral agents while inpatient  Continue with the insulin sliding scan      · Monitor blood sugar     Chronic diastolic HF (heart failure) (HCC)  Assessment & Plan      Wt Readings from Last 3 Encounters:   01/17/21 75 kg (165 lb 5 5 oz)   01/05/21 75 6 kg (166 lb 9 6 oz)   12/29/20 76 1 kg (167 lb 12 8 oz)      Patient appears to be euvolemic on examination           Mixed hyperlipidemia  Assessment & Plan  · Continue statin     Essential hypertension  Assessment & Plan  · Blood pressure acceptable  · Continue with the outpatient medication     Controlled type 2 diabetes mellitus without complication [facility])  Assessment & Plan        Lab Results   Component Value Date     HGBA1C 5 3 01/05/2021         No results for input(s): POCGLU in the last 72 hours      Blood Sugar Average: Last 72 hrs:           VTE Prophylaxis: Hold anticoagulation given intracranial mass  / sequential compression device   Code Status: full code  POLST: POLST form is not discussed and not completed at this time  Discussion with family:  Daughter in the room     Anticipated Length of Stay:  Patient will be admitted on an Inpatient basis with an anticipated length of stay of  > 2 midnights  Justification for Hospital Stay:      Total Time for Visit, including Counseling / Coordination of Care: 70 minutes  Greater than 50% of this total time spent on direct patient counseling and coordination of care      Chief Complaint:   Intracranial mass     History of Present Illness:     Alexander Arredondo is a 62 y o  female who presents with intracranial mass  Patient initially presented to [facility] yesterday for persistent dizziness  Patient reported that the dizziness has been going on for the past 4 weeks and was seen by the PCP diagnosed her with vertigo  Since the patient had persistent dizziness she went to the emergency room patient had a CT scan and noted to have intracranial mass x2 concerning for metastatic disease patient was recommended admission but she signed out AMA since patient had some personal affairs to be in order   Patient denies any nausea vomiting diarrhea  No neurological symptoms  Patient started with back pain and also pain in the right leg recently        Review of Systems:     Review of Systems   Constitutional: Negative  HENT: Negative  Eyes: Negative  Respiratory: Negative  Cardiovascular: Negative  Gastrointestinal: Negative  Endocrine: Negative  Genitourinary: Negative  Musculoskeletal: Positive for back pain  Right leg pain   Neurological: Positive for dizziness  Negative for headaches  Hematological: Negative      Psychiatric/Behavioral: Negative           Past Medical and Surgical History:      Medical History        Past Medical History:   Diagnosis Date    Acute on chronic congestive heart failure with left ventricular diastolic dysfunction (HCC)       last assessed 2017    Asthma      Atelectasis      CHF [facility]congestive heart failure) ([facility])      Diabetes mellitus ([facility] )      Diverticulitis 2017     with perforation and abscess     Hyperlipidemia      Hypertension      Lesion of spleen      Pericardial effusion             Surgical History         Past Surgical History:   Procedure Laterality Date    ABDOMINAL SURGERY        BREAST BIOPSY Left 10/04/2018     SECTION        COLON SURGERY        COLONOSCOPY        COLOSTOMY   2017    HARTMANS PROCEDURE N/A 2017     Procedure: EXPLORATORY LAPAROTOMY; PARTIAL SMALL BOWEL RESECTION; HARTMANS PROCEDURE; OSTOMY;  Surgeon: Andrew Alvares MD;  Location[facility];  Service:     HYSTERECTOMY   2018    MAMMO STEREOTACTIC BREAST BIOPSY LEFT (ALL INC) Left 10/4/2018    OOPHORECTOMY Bilateral 2018    FL CLOSE ENTEROSTOMY N/A 2017     Procedure: OPEN COLOSTOMY REVERSAL;  Surgeon: Andrew Alvares MD;  [facility];  Service: General    FL COLONOSCOPY FLX DX W/COLLJ SPEC WHEN PFRMD N/A 2017     Procedure: COLONOSCOPY; DILATION OF OSTOMY;  Surgeon: Andrew Alvares MD;  Location: [facility];   Service: General    FL EXC SKIN BENIG <0 5 CM REMAINDER BODY N/A 2017     Procedure: SCALP MASS EXCISION X 2;  Surgeon: Andrew Alvares MD;  [facility];  Service: General    FL REPAIR INCISIONAL HERNIA,REDUCIBLE N/A 2019     Procedure: [address], COMPONENT SEPARATION;  Surgeon: Andrew Alvares MD;  [facility];  Service: General    FL TOTAL ABDOM HYSTERECTOMY N/A 2019     Procedure: TOTAL ABDOMINAL HYSTERECTOMY; BSO;  Surgeon: Latrice Miller MD;  [facility];  Service: Gynecology    VAC DRESSING APPLICATION N/A 4149     Procedure: APPLICATION VAC DRESSING;  Surgeon: Andrew Alvares MD;  [facility];  Service:            Meds/Allergies:             Prior to Admission medications    Medication Sig Start Date End Date Taking? Authorizing Provider   amLODIPine (NORVASC) 10 mg tablet TAKE 1 TABLET BY MOUTH EVERY DAY 9/15/20   Yes Travis Acevedo DO   atorvastatin (LIPITOR) 20 mg tablet TAKE 1 TABLET DAILY 10/3/20   Yes Travis Acevedo DO   [facility] ASPIRIN LOW STRENGTH 81 MG EC tablet TAKE 1 TABLET BY MOUTH EVERY DAY 2/11/20   Yes Shira Flores PA-C   cyclobenzaprine (FLEXERIL) 10 mg tablet Take 1 tablet (10 mg total) by mouth daily at bedtime 1/12/21   Yes ROSA Matute   ipratropium (ATROVENT) 0 02 % nebulizer solution Inhale 0 5 mg as needed      Yes Historical Provider, MD   linaGLIPtin (TRADJENTA) 5 MG TABS Take 5 mg by mouth daily 1/7/20   Yes Travis Acevedo DO   metFORMIN (GLUCOPHAGE) 1000 MG tablet Take 1 tablet (1,000 mg total) by mouth 2 (two) times a day with meals 5/21/20   Yes Travis Acevedo DO   metoprolol succinate (TOPROL-XL) 50 mg 24 hr tablet TAKE 1 TABLET BY MOUTH EVERY DAY 10/17/20   Yes Johnnie Patino MD   clonazePAM (KlonoPIN) 0 5 mg tablet Take 1 tablet (0 5 mg total) by mouth 2 (two) times a day as needed for anxiety for up to 5 days 1/5/21 1/10/21   Travis Acevedo DO   furosemide (LASIX) 40 mg tablet Take 1 tablet (40 mg total) by mouth 2 (two) times a day  Patient not taking: Reported on 1/12/2021 5/21/20     Travis Acevedo DO      I have reviewed home medications with patient personally      Allergies:          Allergies   Allergen Reactions    Ciprofloxacin Itching       Starts at hands to feet then the whole entire body         Social History:     Marital Status:    Occupation:  Patient Pre-hospital Living Situation:  Lives at home with family  Patient Pre-hospital Level of Mobility:   Patient Pre-hospital Diet Restrictions:   Substance Use History:   Social History           Substance and Sexual Activity   Alcohol Use Not Currently    Frequency: Monthly or less    Drinks per session: 1 or 2    Binge frequency: Never     Comment: socially       Social History     Tobacco Use   Smoking Status Current Every Day Smoker    Packs/day: 0 25    Years: 20 00    Pack years: 5 00    Types: Cigarettes    Start date: 8/2/1991   Smokeless Tobacco Never Used      Social History          Substance and Sexual Activity   Drug Use No         Family History:           Family History   Problem Relation Age of Onset    Hypertension Mother      Diabetes Mother      No Known Problems Sister      No Known Problems Daughter      Heart disease Son      No Known Problems Maternal Aunt      No Known Problems Maternal Aunt      No Known Problems Maternal Aunt      No Known Problems Maternal Aunt      No Known Problems Paternal Aunt      Breast cancer Neg Hx           Physical Exam:      Vitals:   Blood Pressure: 151/79 (01/17/21 1740)  Pulse: 71 (01/17/21 1740)  Temperature: 97 6 °F (36 4 °C) (01/17/21 1740)  Temp Source: Axillary (01/17/21 1740)  Respirations: 16 (01/17/21 1740)  Height: 5' (152 4 cm) (01/17/21 1740)  Weight - Scale: 75 kg (165 lb 5 5 oz) (01/17/21 1740)  SpO2: 97 % (01/17/21 1740)     Physical Exam  Constitutional:       General: She is not in acute distress  Appearance: Normal appearance  HENT:      Head: Normocephalic and atraumatic  Nose: Nose normal       Mouth/Throat:      Mouth: Mucous membranes are moist    Eyes:      General:         Right eye: No discharge  Neck:      Musculoskeletal: Normal range of motion  Cardiovascular:      Rate and Rhythm: Normal rate and regular rhythm  Pulses: Normal pulses  Pulmonary:      [place]: Pulmonary effort is normal    Abdominal:      General: Abdomen is flat  Musculoskeletal: Normal range of motion  Skin:     General: Skin is warm  Neurological:      General: No focal deficit present        Mental Status: She is alert                 Additional Data:      Lab Results: I have personally reviewed pertinent reports             Results from last 7 days   Lab Units 01/16/21  0606   WBC Thousand/uL 6 97 HEMOGLOBIN g/dL 11 2*   HEMATOCRIT % 36 0   PLATELETS Thousands/uL 346   NEUTROS PCT % 65   LYMPHS PCT % 24   MONOS PCT % 7   EOS PCT % 2           Results from last 7 days   Lab Units 01/16/21  0606   SODIUM mmol/L 136   POTASSIUM mmol/L 4 0   CHLORIDE mmol/L 100   CO2 mmol/L 29   BUN mg/dL 16   CREATININE mg/dL 0 66   ANION GAP mmol/L 7   CALCIUM mg/dL 9 7   GLUCOSE RANDOM mg/dL 142*                         Imaging: I have personally reviewed pertinent reports        CT chest abdomen pelvis w contrast   Final Result by Doni Guillen MD (01/17 1729)       1  Mediastinal and right hilar lymphadenopathy  2   Chronic splenic mass, slowly enlarging since 2017  This is almost certainly benign and the differential diagnosis includes hamartoma, hemorrhagic cyst, littoral cell angioma or lymphangioma  3   No evidence of primary malignancy in the chest, abdomen or pelvis                Workstation performed: [id number]           XR femur 2 views LEFT    (Results Pending)         EKG, Pathology, and Other Studies Reviewed on Admission:   · EKG:      Allscripts / Epic Records Reviewed: Yes      ** Please Note: This note has been constructed using a voice recognition system   **

## 2021-01-18 NOTE — ASSESSMENT & PLAN NOTE
· Patient gives history of 4 weeks of dizziness  Patient was initially diagnosed with vertigo as an outpatient  Patient continued to have persistent dizziness and he was seen in the emergency room yesterday noted to have intracranial mass  · Patient came back to the hospital after leaving the hospital AMA and will be admitted to the hospital   · Consult Neurosurgery  · Frequent neuro checks  · MRI brain  - Metastatic disease, with 2 Parenchymal lesions, 2 1 and 5 mm in the greatest linear dimension  There is diffuse enhancement of the internal auditory canals bilaterally, which warrants lumbar puncture to exclude leptomeningeal tumor  · CT C/A/P with no significant primary source noted

## 2021-01-18 NOTE — CASE MANAGEMENT
LOS: 1  Not a bundle  Readmission risk: Green  Met with pt and discussed role of CM  Pt lives with her dtr and 2 grandchildren (25 and 15 yo) in a bi-level home with 3 steps at entrance  Pt is independent in ADLs  No DME or prior HHC  [facility]ference for [facility] [place], [place]  No MH/D&A tx hx  PCP- Henny Cavazos DO ([phone])  No POA  Main contact: [place] ([phone])  Family will transport upon d/c     CM reviewed d/c planning process including the following: identifying help at home, patient preference for d/c planning needs, Discharge Lounge, [facility] Meds to Bed program, availability of treatment team to discuss questions or concerns patient and/or family may have regarding understanding medications and recognizing signs and symptoms once discharged  CM also encouraged patient to follow up with all recommended appointments after discharge  Patient advised of importance for patient and family to participate in managing patients medical well being  Patient/caregiver received discharge checklist  Content reviewed  Patient/caregiver encouraged to participate in discharge plan of care prior to discharge home

## 2021-01-18 NOTE — ASSESSMENT & PLAN NOTE
Lab Results   Component Value Date    HGBA1C 5 3 01/05/2021       Recent Labs     01/17/21 2053 01/18/21  0754 01/18/21  1127   POCGLU 153* 91 132       Blood Sugar Average: Last 72 hrs:  (P) 449 [id number]     Medically management per primary team

## 2021-01-18 NOTE — RESPIRATORY THERAPY NOTE
RT Protocol Note  Khadar Lawson 62 y o  female MRN: [id number]  [facility] Encounter: [id number]    Assessment    Principal Problem:    Intracranial mass  Active Problems:    Controlled type 2 diabetes mellitus without complication (HCC)    Essential hypertension    Mixed hyperlipidemia    Chronic diastolic HF (heart failure) ([facility])    CAD in native artery    Tobacco use      Home Pulmonary Medications:  Albuterol PRN       Past Medical History:   Diagnosis Date    Acute on chronic congestive heart failure with left ventricular diastolic dysfunction ([facility] )     last assessed 5/23/2017    Asthma     Atelectasis     CHF (congestive heart failure) ([facility] )     Diabetes mellitus ([facility] )     Diverticulitis 2017    with perforation and abscess     Hyperlipidemia     Hypertension     Lesion of spleen     Pericardial effusion      Social History     Socioeconomic History    Marital status:      Spouse name: None    Number of children: None    Years of education: None    Highest education level: None   Occupational History    None   Social Needs    Financial resource strain: None    Food insecurity     Worry: None     Inability: None    Transportation needs     Medical: None     Non-medical: None   Tobacco Use    Smoking status: Current Every Day Smoker     Packs/day: 0 25     Years: 20 00     Pack years: 5 00     Types: Cigarettes     Start date: 8/2/1991    Smokeless tobacco: Never Used   Substance and Sexual Activity    Alcohol use: Not Currently     Frequency: Monthly or less     Drinks per session: 1 or 2     Binge frequency: Never     Comment: socially     Drug use: No    Sexual activity: Yes     Partners: Male     Birth control/protection: Surgical   Lifestyle    Physical activity     Days per week: 0 days     Minutes per session: 0 min    Stress: Not at all   Relationships    Social connections     Talks on phone: None     Gets together: None     Attends Sabianism service: None Active member of club or organization: None     Attends meetings of clubs or organizations: None     Relationship status: None    Intimate partner violence     Fear of current or ex partner: None     Emotionally abused: None     Physically abused: None     Forced sexual activity: None   Other Topics Concern    None   Social History Narrative    None       Subjective         Objective    Physical Exam:   Assessment Type: (P) Assess only  Cough: (P) None    Vitals:  Blood pressure 151/79, pulse (P) 73, temperature 97 6 °F (36 4 °C), temperature source Axillary, resp  rate 16, height 5' (1 524 m), weight 75 kg (165 lb 5 5 oz), last menstrual period 08/16/2014, SpO2 (P) 95 %, not currently breastfeeding  Imaging and other studies: I have personally reviewed pertinent reports  Plan    Respiratory Plan: (P) Home Bronchodilator Patient pathway        Resp Comments: (P) Pt  evaluated for respiratory protocol  BS=well aerated with rales on the left  Pt  in no distress on rma  SpO2=95%  Pt  uses albuterol at home on an infrequent basis and that has been ordered PRN for her  Atrovent D/C per protocol  No indication for further respiratory intervention at this time  Will continue to monitor and titrate care accordingly

## 2021-01-18 NOTE — ASSESSMENT & PLAN NOTE
Lab Results   Component Value Date    HGBA1C 5 3 01/05/2021       Recent Labs     01/17/21 2053 01/18/21  0754 01/18/21  1127   POCGLU 153* 91 132       Blood Sugar Average: Last 72 hrs:  (P) 322 [id number]   Oral medications on hold  Continue Lantus and correctional insulin

## 2021-01-18 NOTE — ASSESSMENT & PLAN NOTE
Chronic scoliosis  · P/w LBP with anterolateral left thigh pain and intermittent numbness only at night  · Admits to 1 5 week saddle numbness - abnl sensation with wiping  · No gait/discoordination complaints  Imagine:   · CT CAP - S-shaped scoliosis  No noted masses  Plan:   · Continue to monitor symptoms  · Pain control per primary team  Recommend prn opioid to be available at night  · Discussed consideration for additional MRI spine imaging

## 2021-01-18 NOTE — ED PROVIDER NOTES
Final Diagnosis:  1  Brain cancer ([address] )    2  Dizziness    3  Low back pain         Chief Complaint   Patient presents with    Dizziness     3 weeks of dizziness, lower back pain radiates to laeft thigh, seen at [address] and left AMA plan was to get transferred to this hospital       ASSESSMENT + PLAN:   - Nursing note reviewed  1  Brain cancer  -Will w/u further with CT CAP, X-ray L femur  -Offered pain control  -After CT, will admit patient for further management of patient's cancer  -CT CAP negative aside from lymphadenopathy    2  Back pain  -Will evaluate with CT  -Offered analgesia, but patient declined  Final Dispo   Patient was reassessed  Patient was updated with information regarding the tests/imaging done today  I answered all of the patient's and/or family's questions  Patient and/or family vocalizes understanding  After discussion and given the test results, the patient will be admitted to the hospital  Patient and/or family are agreeable to the plan   They will be admitted to [facility] who will further manage their care in the hospital       Medications   cyclobenzaprine (FLEXERIL) tablet 10 mg (10 mg Oral Given 1/17/21 2206)   amLODIPine (NORVASC) tablet 10 mg (has no administration in time range)   atorvastatin (LIPITOR) tablet 20 mg (has no administration in time range)   metoprolol succinate (TOPROL-XL) 24 hr tablet 50 mg (has no administration in time range)   docusate sodium (COLACE) capsule 100 mg (100 mg Oral Refused 1/17/21 1832)   ondansetron (ZOFRAN) injection 4 mg (has no administration in time range)   nicotine (NICODERM CQ) 7 mg/24hr TD 24 hr patch 1 patch (has no administration in time range)   insulin lispro (HumaLOG) 100 units/mL subcutaneous injection 1-5 Units (0 Units Subcutaneous Hold 1/17/21 1953)   insulin lispro (HumaLOG) 100 units/mL subcutaneous injection 1-5 Units (1 Units Subcutaneous Given 1/17/21 2206)   insulin glargine (LANTUS) subcutaneous injection 8 Units 0 08 mL (8 Units Subcutaneous Given 1/17/21 2206)   albuterol inhalation solution 2 5 mg (has no administration in time range)   lidocaine (LIDODERM) 5 % patch 2 patch (2 patches Topical Medication Applied 1/18/21 0322)   acetaminophen (TYLENOL) tablet 975 mg (975 mg Oral Given 1/18/21 0323)   iohexol (OMNIPAQUE) 350 MG/ML injection (MULTI-DOSE) 100 mL (100 mL Intravenous Given 1/17/21 1452)   Gadobutrol injection (SINGLE-DOSE) SOLN 7 mL (7 mL Intravenous Given 1/18/21 0420)     Time reflects when diagnosis was documented in both MDM as applicable and the Disposition within this note     Time User Action Codes Description Comment    1/17/2021  4:10 PM Ady Loyola Add [C71 9] Brain cancer ([facility] [address] )     1/17/2021  4:10 PM Param Kennedy Add [R42] Dizziness     1/17/2021  4:10 PM Param Kennedy Add [M54 5] Low back pain       ED Disposition     ED Disposition Condition Date/Time Comment    Admit Stable Sun Jan 17, 2021  4:10 PM Case was discussed with AMY and the patient's admission status was agreed to be Admission Status: inpatient status to the service of Dr Martin Rubinstein   Follow-up Information    None       Current Discharge Medication List      CONTINUE these medications which have NOT CHANGED    Details   amLODIPine (NORVASC) 10 mg tablet TAKE 1 TABLET BY MOUTH EVERY DAY  Qty: 90 tablet, Refills: 2    Comments: DX Code Needed      Associated Diagnoses: Essential hypertension      atorvastatin (LIPITOR) 20 mg tablet TAKE 1 TABLET DAILY  Qty: 90 tablet, Refills: 1    Associated Diagnoses: Mixed hyperlipidemia      [facility] ASPIRIN LOW STRENGTH 81 MG EC tablet TAKE 1 TABLET BY MOUTH EVERY DAY  Qty: 90 tablet, Refills: 3    Associated Diagnoses: Essential hypertension      cyclobenzaprine (FLEXERIL) 10 mg tablet Take 1 tablet (10 mg total) by mouth daily at bedtime  Qty: 10 tablet, Refills: 0    Associated Diagnoses: Back pain with left-sided sciatica      ipratropium (ATROVENT) 0 02 % nebulizer solution Inhale 0 5 mg as needed linaGLIPtin (TRADJENTA) 5 MG TABS Take 5 mg by mouth daily  Qty: 90 tablet, Refills: 3    Associated Diagnoses: Controlled type 2 diabetes mellitus without complication, without long-term current use of insulin ([facility])      metFORMIN (GLUCOPHAGE) 1000 MG tablet Take 1 tablet (1,000 mg total) by mouth 2 (two) times a day with meals  Qty: 180 tablet, Refills: 3    Associated Diagnoses: Controlled type 2 diabetes mellitus without complication, without long-term current use of insulin ([facility])      metoprolol succinate (TOPROL-XL) 50 mg 24 hr tablet TAKE 1 TABLET BY MOUTH EVERY DAY  Qty: 90 tablet, Refills: 1    Associated Diagnoses: CAD in native artery; Essential hypertension      clonazePAM (KlonoPIN) 0 5 mg tablet Take 1 tablet (0 5 mg total) by mouth 2 (two) times a day as needed for anxiety for up to 5 days  Qty: 10 tablet, Refills: 0    Associated Diagnoses: Dizziness      furosemide (LASIX) 40 mg tablet Take 1 tablet (40 mg total) by mouth 2 (two) times a day  Qty: 180 tablet, Refills: 3    Associated Diagnoses: Chronic diastolic congestive heart failure (HCC)           No discharge procedures on file  Prior to Admission Medications   Prescriptions Last Dose Informant Patient Reported? Taking?    [employer] ASPIRIN LOW STRENGTH 81 MG EC tablet 1/17/2021 at Unknown time Self No Yes   Sig: TAKE 1 TABLET BY MOUTH EVERY DAY   amLODIPine (NORVASC) 10 mg tablet 1/17/2021 at Unknown time Self No Yes   Sig: TAKE 1 TABLET BY MOUTH EVERY DAY   atorvastatin (LIPITOR) 20 mg tablet 1/17/2021 at Unknown time Self No Yes   Sig: TAKE 1 TABLET DAILY   clonazePAM (KlonoPIN) 0 5 mg tablet   No No   Sig: Take 1 tablet (0 5 mg total) by mouth 2 (two) times a day as needed for anxiety for up to 5 days   cyclobenzaprine (FLEXERIL) 10 mg tablet 1/17/2021 at Unknown time  No Yes   Sig: Take 1 tablet (10 mg total) by mouth daily at bedtime   furosemide (LASIX) 40 mg tablet Not Taking at Unknown time Self No No   Sig: Take 1 tablet (40 mg total) by mouth 2 (two) times a day   Patient not taking: Reported on 1/12/2021   ipratropium (ATROVENT) 0 02 % nebulizer solution 1/17/2021 at Unknown time Self Yes Yes   Sig: Inhale 0 5 mg as needed    linaGLIPtin (TRADJENTA) 5 MG TABS 1/17/2021 at Unknown time Self No Yes   Sig: Take 5 mg by mouth daily   metFORMIN (GLUCOPHAGE) 1000 MG tablet 1/17/2021 at Unknown time Self No Yes   Sig: Take 1 tablet (1,000 mg total) by mouth 2 (two) times a day with meals   metoprolol succinate (TOPROL-XL) 50 mg 24 hr tablet 1/17/2021 at Unknown time Self No Yes   Sig: TAKE 1 TABLET BY MOUTH EVERY DAY      Facility-Administered Medications: None       History of Present Illness: This is a 62 y o  female presenting today for evaluation of brain mass  She was diagnosed with brain masses yesterday in the ED and left the ED after the diagnosis  She said she had some things to take care of prior to admission  Also told was going to be transferred to  so she figured she would just drive here (have her daughter bring her)  She has had 3 weeks of dizziness which she was seeing her PCP for  No real other symptoms other than some back pain that is worse at night  Hx of smoking  Last colonoscopy relatively normal a few years ago  No fever, chills, nausea, vomiting, diarrhea  - No language barrier    - History obtained from patient and chart and daughter   - There are no limitations to the history obtained  - Previous charting was reviewed  Some data reviewed included below for ease of access whether or not it is relevant to this patient encounter  Past Medical, Past Surgical History:    has a past medical history of Acute on chronic congestive heart failure with left ventricular diastolic dysfunction ([address] ), Asthma, Atelectasis, CHF (congestive heart failure) ([address] ), Diabetes mellitus ([address] ), Diverticulitis (2017), Hyperlipidemia, Hypertension, Lesion of spleen, and Pericardial effusion     has a past surgical history that includes HARTMANS PROCEDURE (N/A, 2017); VAC DRESSING APPLICATION (N/A, 2382); Colostomy (2017); Abdominal surgery;  section; pr colonoscopy flx dx w/collj spec when pfrmd (N/A, 2017); Colonoscopy; Colon surgery; pr close enterostomy (N/A, 2017); pr exc skin benig <0 5 cm remainder body (N/A, 2017); Mammo stereotactic breast biopsy left (all inc) (Left, 10/4/2018); pr repair incisional hernia,reducible (N/A, 2019); pr total abdom hysterectomy (N/A, 2019); Hysterectomy (); Oophorectomy (Bilateral, ); and Breast biopsy (Left, 10/04/2018)  Allergies: Allergies   Allergen Reactions    Ciprofloxacin Itching     Starts at hands to feet then the whole entire body       Social and Family History:     Social History     Substance and Sexual Activity   Alcohol Use Not Currently    Frequency: Monthly or less    Drinks per session: 1 or 2    Binge frequency: Never    Comment: socially      Social History     Tobacco Use   Smoking Status Current Every Day Smoker    Packs/day: 0 25    Years: 20 00    Pack years: 5 00    Types: Cigarettes    Start date: 1991   Smokeless Tobacco Never Used     Social History     Substance and Sexual Activity   Drug Use No       Review of Systems:   Review of Systems   Constitutional: Negative for chills and fever  HENT: Negative for ear pain and sore throat  Eyes: Negative for pain and visual disturbance  Respiratory: Negative for cough and shortness of breath  Cardiovascular: Negative for chest pain and palpitations  Gastrointestinal: Negative for abdominal pain and vomiting  Genitourinary: Negative for dysuria and hematuria  Musculoskeletal: Positive for back pain  Negative for arthralgias  Skin: Negative for color change and rash  Neurological: Positive for dizziness  Negative for seizures and syncope  All other systems reviewed and are negative         Physical Examination     Vitals:    21 1740 21 1926 01/17/21 1947 01/17/21 2213   BP: 151/79   117/57   BP Location: Left arm      Pulse: 71  73 64   Resp: 16   18   Temp: 97 6 °F (36 4 °C)   97 8 °F (36 6 °C)   TempSrc: Axillary      SpO2: 97% 95% 95% 97%   Weight: 75 kg (165 lb 5 5 oz)      Height: 5' (1 524 m)        Vitals reviewed by me  Physical Exam  Vitals signs and nursing note reviewed  Constitutional:       Appearance: She is well-developed  She is not ill-appearing  HENT:      Head: Normocephalic and atraumatic  Eyes:      Conjunctiva/sclera: Conjunctivae normal       Pupils: Pupils are equal, round, and reactive to light  Neck:      Musculoskeletal: Normal range of motion and neck supple  Cardiovascular:      Rate and Rhythm: Normal rate and regular rhythm  Pulmonary:      Effort: Pulmonary effort is normal       Breath sounds: Normal breath sounds  Abdominal:      General: Bowel sounds are normal  There is no distension  Palpations: Abdomen is soft  Tenderness: There is no abdominal tenderness  Musculoskeletal: Normal range of motion  Skin:     General: Skin is warm and dry  Neurological:      Mental Status: She is alert and oriented to person, place, and time  GCS: GCS eye subscore is 4  GCS verbal subscore is 5  GCS motor subscore is 6  Cranial Nerves: No cranial nerve deficit  Sensory: No sensory deficit  Coordination: Coordination normal       Comments: Dizzy when sitting up                              CT chest abdomen pelvis w contrast   Final Result      1  Mediastinal and right hilar lymphadenopathy  2   Chronic splenic mass, slowly enlarging since 2017  This is almost certainly benign and the differential diagnosis includes hamartoma, hemorrhagic cyst, littoral cell angioma or lymphangioma  3   No evidence of primary malignancy in the chest, abdomen or pelvis              Workstation performed: [id number]         XR femur 2 views LEFT    (Results Pending)   MRI brain w wo contrast (Results Pending)     Orders Placed This Encounter   Procedures    XR femur 2 views LEFT    CT chest abdomen pelvis w contrast    MRI brain w wo contrast    Diet Mark/CHO Controlled; Consistent Carbohydrate Diet Level 2 (5 carb servings/75 grams CHO/meal)    Vital Signs per unit routine    Up and OOB as tolerated    Ambulate patient    Insulin Subcutaneous Notify Physician    Insulin Subcutaneous Instruction    Hypoglycemia Protocol    Fingerstick Glucose (POCT)    Level 1-Full Code: all life saving measures are indicated    Inpatient consult to Neurosurgery    Respiratory Protocol    Inpatient Admission       Labs:   Labs Reviewed - No data to display    Imaging:   Cta Head And Neck With And Without Contrast    Result Date: 1/16/2021  Narrative: CTA NECK AND BRAIN WITH AND WITHOUT CONTRAST INDICATION: Dizziness, non-specific;  dizziness dizziness for 3 weeks  Diagnosed with vertigo  Low back pain radiating to left leg  Denies back injury  COMPARISON:   None  TECHNIQUE:  Routine CT imaging of the Brain without contrast   Post contrast imaging was performed after administration of iodinated contrast through the neck and brain  Post contrast axial 0 625 mm images timed to opacify the arterial system  3D rendering was performed on an independent workstation  MIP reconstructions performed  Coronal reconstructions were performed of the noncontrast portion of the brain  Radiation dose length product (DLP) for this visit:  1102 02 mGy/cm  This examination, like all CT scans performed in the [facility], was performed utilizing techniques to minimize radiation dose exposure, including the use of iterative  reconstruction and automated exposure control  IV Contrast:  100 mL of iohexol (OMNIPAQUE)  IMAGE QUALITY:   Diagnostic FINDINGS: NONCONTRAST BRAIN PARENCHYMA:  No acute intraparenchymal hemorrhage or extra-axial fluid collections  No acute infarctions   Decreased attenuation in the periventricular regions and centrum semiovale bilaterally, consistent with chronic small vessel ischemic white matter disease  There is a 2 6 x 1 6 cm vague isodense soft tissue mass in the left occipital lobe (series 2, image 20)  There is a large amount of surrounding vasogenic edema  There is minimal peripheral enhancement (series 4, image 57)  There is a 3 2 x 1 2 cm vague isodense soft tissue mass in the posterior medial left parietal lobe (series 2, image 24)  Large amount of surrounding vasogenic edema  There is minimal peripheral enhancement (series 4, image 43)  Bilateral internal carotid artery and vertebral artery calcifications  VENTRICLES AND EXTRA-AXIAL SPACES:  Ventricles and cerebral sulci are mildly dilated  VISUALIZED ORBITS AND PARANASAL SINUSES:  Unremarkable  CERVICAL VASCULATURE AORTIC ARCH AND GREAT VESSELS:  Mild atherosclerotic disease of the arch, proximal great vessels and visualized subclavian vessels  No significant stenosis  Bovine configuration of the arch  RIGHT VERTEBRAL ARTERY CERVICAL SEGMENT:  Normal origin  The vessel is normal in caliber throughout the neck  LEFT VERTEBRAL ARTERY CERVICAL SEGMENT:  Normal origin  The vessel is normal in caliber throughout the neck  RIGHT EXTRACRANIAL CAROTID SEGMENT:  There is moderate calcified and noncalcified atherosclerotic plaque formation throughout the distal common carotid artery, extending into the carotid bulb and proximal cervical internal carotid artery  Areas of ulceration are present along the plaque  This results in a moderate short segment stenosis of approximately 75%  Distally, the cervical internal carotid artery is normal  LEFT EXTRACRANIAL CAROTID SEGMENT:  There is mild calcified and noncalcified atherosclerotic plaque formation in the distal common carotid artery, extending into the carotid bulb and proximal cervical internal carotid artery  No flow-limiting stenosis   NASCET criteria was used to determine the degree of internal carotid artery diameter stenosis  INTRACRANIAL VASCULATURE INTERNAL CAROTID ARTERIES:  Normal enhancement of the intracranial portions of the internal carotid arteries  Moderate atherosclerotic changes in the distal cavernous and supraclinoid internal carotid arteries bilaterally, resulting in mild stenosis of the supraclinoid internal carotid arteries bilaterally  Normal ophthalmic artery origins  Normal ICA terminus  ANTERIOR CIRCULATION:  Symmetric A1 segments and anterior cerebral arteries with normal enhancement  Normal anterior communicating artery  MIDDLE CEREBRAL ARTERY CIRCULATION:  M1 segment and middle cerebral artery branches demonstrate normal enhancement bilaterally  DISTAL VERTEBRAL ARTERIES:  The intracranial segment of the right vertebral artery is hypoplastic but patent beyond the right posterior, inferior cerebellar artery  The intracranial segment of the left vertebral artery demonstrates calcified atherosclerotic plaque formation  No flow-limiting stenosis  [place] Posterior inferior cerebellar artery origins are normal  Normal vertebral basilar junction  BASILAR ARTERY:  Basilar artery is normal in caliber  Normal superior cerebellar arteries  POSTERIOR CEREBRAL ARTERIES: Both posterior cerebral arteries arises from the basilar tip  Both arteries demonstrate normal enhancement  Normal posterior communicating arteries  DURAL VENOUS SINUSES:  Normal  NON VASCULAR ANATOMY BONY STRUCTURES: Mild degenerative changes of the cervical spine  No acute osseous abnormality  SOFT TISSUES OF THE NECK:  The palatine tonsils are enlarged bilaterally, right side greater than left  There is enlargement of the lingual tonsil  There are calcifications within the palatine tonsils bilaterally, consistent with concretions from prior  infection  There is beam hardening artifact limiting evaluation of the oropharynx   There are prominent lymph nodes along the anterior and posterior cervical carline chains bilaterally, left side greater than right  THORACIC INLET: There are enlarged subcarinal, paratracheal and right hilar lymph nodes  There are enlarged left sided paraesophageal lymph nodes, extending to the thoracic inlet  Enlarged bilateral supraclavicular lymph nodes extending along the carotid arteries bilaterally, left side larger than right  Several left-sided lymph nodes are necrotic  Impression: 1  Isodense soft tissue masses in the left occipital lobe and left parietal lobe with surrounding vasogenic edema  Although the findings may represent primary CNS neoplasm, the presence of mediastinal, hilar, paratracheal and cervical adenopathy, suggests metastatic disease  Recommend follow-up MRI of the brain with gadolinium and/or neurosurgical evaluation  Recommend follow-up CT chest abdomen pelvis for metastatic workup  2   Moderate atherosclerotic changes of the proximal internal carotid arteries bilaterally, right side greater than left resulting in moderate stenosis of the proximal cervical right internal carotid artery of approximately 75%  3   Moderate atherosclerotic disease of the supraclinoid segments of the internal carotid arteries bilaterally, resulting in mild stenosis bilaterally  No evidence of aneurysm, vascular malformation or vascular cut off  No evidence of large vessel central occlusive disease involving the [place]  4   Mild cerebral atrophy with chronic small vessel ischemic white matter disease  5   Mediastinal, hilar, paratracheal, paraesophageal, supraclavicular and cervical adenopathy, pathologic  6   Enlarged palatine tonsils and lingual tonsil  The oropharynx is not well visualized due to beam hardening artifact from dental hardware  Consider follow-up MRI of the soft tissues of the neck versus ENT consultation with direct laryngoscopy    I personally discussed this study with Felipa Richter on 1/16/2021 at 6:37 AM  Workstation performed: [id number] Ct Chest Abdomen Pelvis W Contrast    Result Date: 1/17/2021  Narrative: CT CHEST, ABDOMEN AND PELVIS WITH IV CONTRAST INDICATION:   cancer w/u  Brain metastases, and cervical, hilar and mediastinal lymphadenopathy demonstrated on CTA of the head and neck from yesterday  History of sigmoid resection for perforated diverticulitis in 2017  Chronic splenic mass  COMPARISON:  CTA of the head and neck from January 16, 2021  MRI of the abdomen from February 21, 2019  CTs of the abdomen and pelvis from July 31, 2018 and May 1, 2017  TECHNIQUE: CT examination of the chest, abdomen and pelvis was performed  Axial, sagittal, and coronal 2D reformatted images were created from the source data and submitted for interpretation  Radiation dose length product (DLP) for this visit:  766 9 mGy-cm   This examination, like all CT scans performed in the [facility], was performed utilizing techniques to minimize radiation dose exposure, including the use of iterative reconstruction and automated exposure control  IV Contrast:  100 mL of iohexol (OMNIPAQUE) Enteric Contrast: Enteric contrast was not administered  FINDINGS: CHEST LUNGS:  7 mm tubular branching soft tissue nodule in the base of the right lower lobe, most consistent with endobronchial mucoid impaction  Lungs otherwise clear  PLEURA:  Unremarkable  HEART/GREAT VESSELS:  Heart and thoracic aorta unremarkable  Dilated central pulmonary arteries with main pulmonary artery diameter of 3 4 cm  MEDIASTINUM AND DAVID: Several enlarged lymph nodes including a 1 5 cm subcarinal node, 1 4 cm pretracheal node, 1 6 cm right hilar node and 1 5 cm left superior mediastinal node, adjacent to the left brachiocephalic vein  Esophagus unremarkable  Trachea and main stem bronchi normal  CHEST WALL AND LOWER NECK:   Unremarkable  ABDOMEN LIVER/BILIARY TREE:  8 mm cyst in the dome of the right lobe, unchanged since 7/31/2018  No new liver lesions    Bile ducts normal in caliber  GALLBLADDER:  Contracted  Otherwise unremarkable  SPLEEN:  7 7 x 6 8 cm hypoenhancing splenic mass, cyst slowly increased in size since CTs dating back to 4/25/2017 (at which time it measured 6 5 x 6 8 cm)  PANCREAS:  Unremarkable  ADRENAL GLANDS:  Hyperplastic left adrenal gland, in retrospect, unchanged since 2017  Normal right adrenal gland  KIDNEYS/URETERS:  Unremarkable  No hydronephrosis  STOMACH AND BOWEL:  Stomach unremarkable  Enteroenteric anastomosis in the right midabdomen  Colorectal anastomosis  Small intestine and colon otherwise unremarkable  APPENDIX:  Normal  ABDOMINOPELVIC CAVITY: No lymphadenopathy or mass  No ascites  No extraluminal gas  VESSELS:  Atherosclerotic changes are present  No evidence of aneurysm  PELVIS REPRODUCTIVE ORGANS:  Post hysterectomy  URINARY BLADDER:  Unremarkable  ABDOMINAL WALL/INGUINAL REGIONS:  Unremarkable  OSSEOUS STRUCTURES:  Scoliosis  Degenerative disease in the thoracic and lumbar spine  No evidence of recent fracture or destructive lesion  Impression: 1  Mediastinal and right hilar lymphadenopathy  2   Chronic splenic mass, slowly enlarging since 2017  This is almost certainly benign and the differential diagnosis includes hamartoma, hemorrhagic cyst, littoral cell angioma or lymphangioma  3   No evidence of primary malignancy in the chest, abdomen or pelvis  Workstation performed: [id number]        Final Diagnosis:  1  Brain cancer ([facility] [address] )    2  Dizziness    3  Low back pain        Code Status: Level 1 - Full Code    Portions of the record may have been created with voice recognition software  Occasional wrong word or "sound a like" substitutions may have occurred due to the inherent limitations of voice recognition software  Read the chart carefully and recognize, using context, where substitutions have occurred      Electronically signed by:  Bobby Chacon, PGY 2, MD Jah Akbar MD  01/18/21 [id number]

## 2021-01-18 NOTE — RESTORATIVE TECHNICIAN NOTE
Restorative Specialist Mobility Note        Patient currently refusing mobility  Stated she had been ambulating in room and is currently tired  Will continue to follow

## 2021-01-18 NOTE — PROGRESS NOTES
Progress Note - Clementine Espinal 1960, 62 y o  female MRN: [id number]    Unit/Bed#: [facility] 923-01 Encounter: [id number]    Primary Care Provider: Eduin Chavarria DO   Date and time admitted to hospital: 1/17/2021  1:40 PM        * Intracranial mass  Assessment & Plan  · Patient gives history of 4 weeks of dizziness  Patient was initially diagnosed with vertigo as an outpatient  Patient continued to have persistent dizziness and he was seen in the emergency room yesterday noted to have intracranial mass  · Patient came back to the hospital after leaving the hospital AMA and will be admitted to the hospital   · Consult Neurosurgery  · Frequent neuro checks  · MRI brain  - Metastatic disease, with 2 Parenchymal lesions, 2 1 and 5 mm in the greatest linear dimension  There is diffuse enhancement of the internal auditory canals bilaterally, which warrants lumbar puncture to exclude leptomeningeal tumor  · CT C/A/P with no significant primary source noted         Sciatica of left side  Assessment & Plan  · C/O left sided sciatic pain mostly at night  · No destructive lesions noted on CT scan  · Patient does not want to use narcotics if possible  · Trial gabapentin 100 mg after dinner  · Continue Tylenol and lidocaine patches    Controlled type 2 diabetes mellitus without complication [facility])  Assessment & Plan  Lab Results   Component Value Date    HGBA1C 5 3 01/05/2021       Recent Labs     01/17/21  2053 01/18/21  0754 01/18/21  1127   POCGLU 153* 91 132       Blood Sugar Average: Last 72 hrs:  (P) 028 [id number]   Oral medications on hold  Continue Lantus and correctional insulin    Chronic diastolic HF (heart failure) (HCC)  Assessment & Plan  Wt Readings from Last 3 Encounters:   01/17/21 75 kg (165 lb 5 5 oz)   01/05/21 75 6 kg (166 lb 9 6 oz)   12/29/20 76 1 kg (167 lb 12 8 oz)     Patient appears to be euvolemic on examination  Does not take Lasix as outpatient  Echo 1 year ago showed an EF of 50% grade 1 diastolic dysfunction      Essential hypertension  Assessment & Plan  · Blood pressure acceptable  · Continue with the outpatient medication    CAD in native artery  Assessment & Plan  · Patient with known history of type 2 diabetes mellitus and is maintained on oral hypoglycemic agents  · Hold oral agents while inpatient  Continue with the insulin sliding scan  · Monitor blood sugar    Tobacco use  Assessment & Plan  · Cessation advised    Mixed hyperlipidemia  Assessment & Plan  · Continue statin      VTE Pharmacologic Prophylaxis:   Pharmacologic: AC on hold for possible procedure  Mechanical VTE Prophylaxis in Place: No    Patient Centered Rounds: I have performed bedside rounds with nursing staff today  Discussions with Specialists or Other Care Team Provider: case management    Education and Discussions with Family / Patient: patient, declined call to daughter today - will call daughter tomorrow to review plan    Time Spent for Care: 30 minutes  More than 50% of total time spent on counseling and coordination of care as described above  Current Length of Stay: 1 day(s)    Current Patient Status: Inpatient   Certification Statement: The patient will continue to require additional inpatient hospital stay due to Neurosurgery work up    Discharge Plan: await clinical course    Code Status: Level 1 - Full Code      Subjective:   Complaining of left-sided sciatica pain  Patient states she mostly has the pain at night  Pain starts around dinner time and then starts to resolve in the morning  Patient states that she start to with back discomfort and then it radiates down the left leg, but stays above the knee  Patient states that she has been having this pain for about the last week now  Patient also with complaints of dizziness when ambulating for longer distances      Objective:     Vitals:   Temp (24hrs), Av 9 °F (36 6 °C), Min:97 6 °F (36 4 °C), Max:98 3 °F (36 8 °C)    Temp:  [97 6 °F (36 4 °C)-98 3 °F (36 8 °C)] 97 8 °F (36 6 °C)  HR:  [64-85] 64  Resp:  [16-20] 16  BP: (117-182)/(57-84) 141/77  SpO2:  [95 %-99 %] 98 %  Body mass index is 32 29 kg/m²  Input and Output Summary (last 24 hours): Intake/Output Summary (Last 24 hours) at 1/18/2021 1202  Last data filed at 1/18/2021 1048  Gross per 24 hour   Intake 240 ml   Output 1000 ml   Net -760 ml       Physical Exam:     Physical Exam  Constitutional:       Appearance: Normal appearance  Cardiovascular:      Rate and Rhythm: Normal rate and regular rhythm  Heart sounds: No murmur  Pulmonary:      Effort: Pulmonary effort is normal       Breath sounds: Normal breath sounds  Abdominal:      General: Bowel sounds are normal  There is no distension  Palpations: Abdomen is soft  Tenderness: There is no abdominal tenderness  Skin:     General: Skin is warm and dry  Neurological:      General: No focal deficit present  Mental Status: She is alert and oriented to person, place, and time  Psychiatric:         Mood and Affect: Mood normal            Additional Data:     Labs:    Results from last 7 days   Lab Units 01/16/21  0606   WBC Thousand/uL 6 97   HEMOGLOBIN g/dL 11 2*   HEMATOCRIT % 36 0   PLATELETS Thousands/uL 346   NEUTROS PCT % 65   LYMPHS PCT % 24   MONOS PCT % 7   EOS PCT % 2     Results from last 7 days   Lab Units 01/16/21  0606   SODIUM mmol/L 136   POTASSIUM mmol/L 4 0   CHLORIDE mmol/L 100   CO2 mmol/L 29   BUN mg/dL 16   CREATININE mg/dL 0 66   ANION GAP mmol/L 7   CALCIUM mg/dL 9 7   GLUCOSE RANDOM mg/dL 142*         Results from last 7 days   Lab Units 01/18/21  1127 01/18/21  0754 01/17/21  2053   POC GLUCOSE mg/dl 132 91 153*                   * I Have Reviewed All Lab Data Listed Above  * Additional Pertinent Lab Tests Reviewed:  All Labs Within Last 24 Hours Reviewed    Imaging:    Imaging Reports Reviewed Today Include: MRI brain, CT C/A/P  Imaging Personally Reviewed by Myself Includes: none    Recent Cultures (last 7 days):           Last 24 Hours Medication List:   Current Facility-Administered Medications   Medication Dose Route Frequency Provider Last Rate    acetaminophen  975 mg Oral Q8H PRN Ana Lopez PA-C      albuterol  2 5 mg Nebulization Q4H PRN Lizz Perez MD      amLODIPine  10 mg Oral Daily Lizz Perez MD      atorvastatin  20 mg Oral Daily Lizz Perez MD      cyclobenzaprine  10 mg Oral HS Lizz Perez MD      docusate sodium  100 mg Oral BID Lizz Perez MD      gabapentin  100 mg Oral After [employer] [place]      insulin glargine  5 Units Subcutaneous HS Winifred Lucas PA-C      insulin lispro  1-5 Units Subcutaneous TID [facility] Lizz Perez MD      insulin lispro  1-5 Units Subcutaneous HS Lizz Perez MD      lidocaine  2 patch Topical Daily Ana Lopez PA-C      menthol-methyl salicylate   Apply externally 4x Daily PRN Winifred Lucas PA-C      metoprolol succinate  50 mg Oral Daily Lizz Perez MD      nicotine  1 patch Transdermal Daily Lizz Perez MD      ondansetron  4 mg Intravenous Q6H PRN Lizz Perez MD          Today, Patient Was Seen By: Winifred Lucas PA-C    ** Please Note: Dictation voice to text software may have been used in the creation of this document   **

## 2021-01-18 NOTE — PLAN OF CARE
Problem: Potential for Falls  Goal: Patient will remain free of falls  Description: INTERVENTIONS:  - Assess patient frequently for physical needs  -  Identify cognitive and physical deficits and behaviors that affect risk of falls    -  [place] fall precautions as indicated by assessment   - Educate patient/family on patient safety including physical limitations  - Instruct patient to call for assistance with activity based on assessment  - Modify environment to reduce risk of injury  - Consider OT/PT consult to assist with strengthening/mobility  Outcome: Progressing     Problem: PAIN - ADULT  Goal: Verbalizes/displays adequate comfort level or baseline comfort level  Description: Interventions:  - Encourage patient to monitor pain and request assistance  - Assess pain using appropriate pain scale  - Administer analgesics based on type and severity of pain and evaluate response  - Implement non-pharmacological measures as appropriate and evaluate response  - Consider cultural and social influences on pain and pain management  - Notify physician/advanced practitioner if interventions unsuccessful or patient reports new pain  Outcome: Progressing     Problem: INFECTION - ADULT  Goal: Absence or prevention of progression during hospitalization  Description: INTERVENTIONS:  - Assess and monitor for signs and symptoms of infection  - Monitor lab/diagnostic results  - Monitor all insertion sites, i e  indwelling lines, tubes, and drains  - Monitor endotracheal if appropriate and nasal secretions for changes in amount and color  - [place] appropriate cooling/warming therapies per order  - Administer medications as ordered  - Instruct and encourage patient and family to use good hand hygiene technique  - Identify and instruct in appropriate isolation precautions for identified infection/condition  Outcome: Progressing  Goal: Absence of fever/infection during neutropenic period  Description: INTERVENTIONS:  - Monitor WBC    Outcome: Progressing     Problem: SAFETY ADULT  Goal: Patient will remain free of falls  Description: INTERVENTIONS:  - Assess patient frequently for physical needs  -  Identify cognitive and physical deficits and behaviors that affect risk of falls    -  [place] fall precautions as indicated by assessment   - Educate patient/family on patient safety including physical limitations  - Instruct patient to call for assistance with activity based on assessment  - Modify environment to reduce risk of injury  - Consider OT/PT consult to assist with strengthening/mobility  Outcome: Progressing  Goal: Maintain or return to baseline ADL function  Description: INTERVENTIONS:  -  Assess patient's ability to carry out ADLs; assess patient's baseline for ADL function and identify physical deficits which impact ability to perform ADLs (bathing, care of mouth/teeth, toileting, grooming, dressing, etc )  - Assess/evaluate cause of self-care deficits   - Assess range of motion  - Assess patient's mobility; develop plan if impaired  - Assess patient's need for assistive devices and provide as appropriate  - Encourage maximum independence but intervene and supervise when necessary  - Involve family in performance of ADLs  - Assess for home care needs following discharge   - Consider OT consult to assist with ADL evaluation and planning for discharge  - Provide patient education as appropriate  Outcome: Progressing  Goal: Maintain or return mobility status to optimal level  Description: INTERVENTIONS:  - Assess patient's baseline mobility status (ambulation, transfers, stairs, etc )    - Identify cognitive and physical deficits and behaviors that affect mobility  - Identify mobility aids required to assist with transfers and/or ambulation (gait belt, sit-to-stand, lift, walker, cane, etc )  - [place] fall precautions as indicated by assessment  - Record patient progress and toleration of activity level on Mobility SBAR; progress patient to next Phase/Stage  - Instruct patient to call for assistance with activity based on assessment  - Consider rehabilitation consult to assist with strengthening/weightbearing, etc   Outcome: Progressing     Problem: DISCHARGE PLANNING  Goal: Discharge to home or other facility with appropriate resources  Description: INTERVENTIONS:  - Identify barriers to discharge w/patient and caregiver  - Arrange for needed discharge resources and transportation as appropriate  - Identify discharge learning needs (meds, wound care, etc )  - Arrange for interpretive services to assist at discharge as needed  - Refer to Case Management Department for coordinating discharge planning if the patient needs post-hospital services based on physician/advanced practitioner order or complex needs related to functional status, cognitive ability, or social support system  Outcome: Progressing     Problem: Knowledge Deficit  Goal: Patient/family/caregiver demonstrates understanding of disease process, treatment plan, medications, and discharge instructions  Description: Complete learning assessment and assess knowledge base    Interventions:  - Provide teaching at level of understanding  - Provide teaching via preferred learning methods  Outcome: Progressing

## 2021-01-18 NOTE — UTILIZATION REVIEW
Notification of Inpatient Admission/Inpatient Authorization Request   This is a Notification of Inpatient Admission for [address]  Be advised that this patient was admitted to our facility under Inpatient Status  Contact Aldair Jackson at [phone] for additional admission information  [facility]  DEDICATED -[phone]  Patient Name:   Vinny Webber   YOB: 1960       [address]   [address]:   [place] Alejandre  Tax ID: [id number]  NPI: [id number] Attending Provider/NPI:  Phone:  Address: Jean Thomas [[id number]]  [phone]  Same as Facility   Place of Service Code: 24 Place of Service Name:  [address] [place] [address]   Start Date: 1/17/21 1611 Discharge Date & Time: No discharge date for patient encounter  Type of Admission: Inpatient Status Discharge Disposition (if discharged): Home/Self Care   Patient Diagnoses: Low back pain [M54 5]  Dizziness [R42]  Brain cancer ([facility] [address] ) [C71 9]  Dizzy [R42]     Orders: Admission Orders (From admission, onward)     Ordered        01/17/21 1610  Inpatient Admission  Once                    Assigned Utilization Review Contact: Aldair Jackson  Utilization ,   Network Utilization Review Department  Phone: [phone]; Fax [phone]   Email: [email or web address]   ATTENTION PAYERS: Please call the assigned Utilization  directly with any questions or concerns ALL voicemails in the department are confidential  Send all requests for admission clinical reviews, approved or denied determinations and any other requests to dedicated fax number belonging to the campus where the patient is receiving treatment

## 2021-01-18 NOTE — ASSESSMENT & PLAN NOTE
· C/O left sided sciatic pain mostly at night  · No destructive lesions noted on CT scan  · Patient does not want to use narcotics if possible  · Trial gabapentin 100 mg after dinner  · Continue Tylenol and lidocaine patches

## 2021-01-19 ENCOUNTER — APPOINTMENT (INPATIENT)
Dept: RADIOLOGY | Facility: HOSPITAL | Age: 61
DRG: 054 | End: 2021-01-19
Payer: COMMERCIAL

## 2021-01-19 LAB
GLUCOSE SERPL-MCNC: 146 MG/DL (ref 65–140)
GLUCOSE SERPL-MCNC: 215 MG/DL (ref 65–140)
GLUCOSE SERPL-MCNC: 97 MG/DL (ref 65–140)

## 2021-01-19 PROCEDURE — 99232 SBSQ HOSP IP/OBS MODERATE 35: CPT | Performed by: PHYSICIAN ASSISTANT

## 2021-01-19 PROCEDURE — A9585 GADOBUTROL INJECTION: HCPCS | Performed by: NEUROLOGICAL SURGERY

## 2021-01-19 PROCEDURE — 94760 N-INVAS EAR/PLS OXIMETRY 1: CPT

## 2021-01-19 PROCEDURE — G1004 CDSM NDSC: HCPCS

## 2021-01-19 PROCEDURE — 82948 REAGENT STRIP/BLOOD GLUCOSE: CPT

## 2021-01-19 PROCEDURE — 99222 1ST HOSP IP/OBS MODERATE 55: CPT | Performed by: INTERNAL MEDICINE

## 2021-01-19 PROCEDURE — 72156 MRI NECK SPINE W/O & W/DYE: CPT

## 2021-01-19 PROCEDURE — NC001 PR NO CHARGE: Performed by: NURSE PRACTITIONER

## 2021-01-19 PROCEDURE — 99232 SBSQ HOSP IP/OBS MODERATE 35: CPT | Performed by: NEUROLOGICAL SURGERY

## 2021-01-19 PROCEDURE — 72158 MRI LUMBAR SPINE W/O & W/DYE: CPT

## 2021-01-19 PROCEDURE — 72157 MRI CHEST SPINE W/O & W/DYE: CPT

## 2021-01-19 RX ORDER — LORAZEPAM 0.5 MG/1
0.5 TABLET ORAL ONCE AS NEEDED
Status: DISCONTINUED | OUTPATIENT
Start: 2021-01-19 | End: 2021-01-22 | Stop reason: HOSPADM

## 2021-01-19 RX ORDER — LORAZEPAM 0.5 MG/1
0.5 TABLET ORAL ONCE AS NEEDED
Status: COMPLETED | OUTPATIENT
Start: 2021-01-19 | End: 2021-01-19

## 2021-01-19 RX ADMIN — DOCUSATE SODIUM 100 MG: 100 CAPSULE ORAL at 17:48

## 2021-01-19 RX ADMIN — GABAPENTIN 100 MG: 100 CAPSULE ORAL at 17:49

## 2021-01-19 RX ADMIN — METOPROLOL SUCCINATE 50 MG: 50 TABLET, EXTENDED RELEASE ORAL at 09:27

## 2021-01-19 RX ADMIN — CYCLOBENZAPRINE HYDROCHLORIDE 10 MG: 10 TABLET ORAL at 21:50

## 2021-01-19 RX ADMIN — MELATONIN 3 MG: at 21:50

## 2021-01-19 RX ADMIN — ACETAMINOPHEN 975 MG: 325 TABLET, FILM COATED ORAL at 09:27

## 2021-01-19 RX ADMIN — GADOBUTROL 7 ML: 604.72 INJECTION INTRAVENOUS at 21:12

## 2021-01-19 RX ADMIN — ATORVASTATIN CALCIUM 20 MG: 20 TABLET, FILM COATED ORAL at 09:27

## 2021-01-19 RX ADMIN — INSULIN LISPRO 2 UNITS: 100 INJECTION, SOLUTION INTRAVENOUS; SUBCUTANEOUS at 12:20

## 2021-01-19 RX ADMIN — LORAZEPAM 0.5 MG: 0.5 TABLET ORAL at 03:40

## 2021-01-19 RX ADMIN — INSULIN GLARGINE 5 UNITS: 100 INJECTION, SOLUTION SUBCUTANEOUS at 21:50

## 2021-01-19 RX ADMIN — AMLODIPINE BESYLATE 10 MG: 10 TABLET ORAL at 09:27

## 2021-01-19 RX ADMIN — DOCUSATE SODIUM 100 MG: 100 CAPSULE ORAL at 09:27

## 2021-01-19 NOTE — PLAN OF CARE
Problem: Potential for Falls  Goal: Patient will remain free of falls  Description: INTERVENTIONS:  - Assess patient frequently for physical needs  -  Identify cognitive and physical deficits and behaviors that affect risk of falls    -  [place] fall precautions as indicated by assessment   - Educate patient/family on patient safety including physical limitations  - Instruct patient to call for assistance with activity based on assessment  - Modify environment to reduce risk of injury  - Consider OT/PT consult to assist with strengthening/mobility  Outcome: Progressing     Problem: PAIN - ADULT  Goal: Verbalizes/displays adequate comfort level or baseline comfort level  Description: Interventions:  - Encourage patient to monitor pain and request assistance  - Assess pain using appropriate pain scale  - Administer analgesics based on type and severity of pain and evaluate response  - Implement non-pharmacological measures as appropriate and evaluate response  - Consider cultural and social influences on pain and pain management  - Notify physician/advanced practitioner if interventions unsuccessful or patient reports new pain  Outcome: Progressing     Problem: INFECTION - ADULT  Goal: Absence or prevention of progression during hospitalization  Description: INTERVENTIONS:  - Assess and monitor for signs and symptoms of infection  - Monitor lab/diagnostic results  - Monitor all insertion sites, i e  indwelling lines, tubes, and drains  - Monitor endotracheal if appropriate and nasal secretions for changes in amount and color  - [place] appropriate cooling/warming therapies per order  - Administer medications as ordered  - Instruct and encourage patient and family to use good hand hygiene technique  - Identify and instruct in appropriate isolation precautions for identified infection/condition  Outcome: Progressing  Goal: Absence of fever/infection during neutropenic period  Description: INTERVENTIONS:  - Monitor WBC    Outcome: Progressing     Problem: SAFETY ADULT  Goal: Patient will remain free of falls  Description: INTERVENTIONS:  - Assess patient frequently for physical needs  -  Identify cognitive and physical deficits and behaviors that affect risk of falls    -  [place] fall precautions as indicated by assessment   - Educate patient/family on patient safety including physical limitations  - Instruct patient to call for assistance with activity based on assessment  - Modify environment to reduce risk of injury  - Consider OT/PT consult to assist with strengthening/mobility  Outcome: Progressing  Goal: Maintain or return to baseline ADL function  Description: INTERVENTIONS:  -  Assess patient's ability to carry out ADLs; assess patient's baseline for ADL function and identify physical deficits which impact ability to perform ADLs (bathing, care of mouth/teeth, toileting, grooming, dressing, etc )  - Assess/evaluate cause of self-care deficits   - Assess range of motion  - Assess patient's mobility; develop plan if impaired  - Assess patient's need for assistive devices and provide as appropriate  - Encourage maximum independence but intervene and supervise when necessary  - Involve family in performance of ADLs  - Assess for home care needs following discharge   - Consider OT consult to assist with ADL evaluation and planning for discharge  - Provide patient education as appropriate  Outcome: Progressing  Goal: Maintain or return mobility status to optimal level  Description: INTERVENTIONS:  - Assess patient's baseline mobility status (ambulation, transfers, stairs, etc )    - Identify cognitive and physical deficits and behaviors that affect mobility  - Identify mobility aids required to assist with transfers and/or ambulation (gait belt, sit-to-stand, lift, walker, cane, etc )  - [place] fall precautions as indicated by assessment  - Record patient progress and toleration of activity level on Mobility SBAR; progress patient to next Phase/Stage  - Instruct patient to call for assistance with activity based on assessment  - Consider rehabilitation consult to assist with strengthening/weightbearing, etc   Outcome: Progressing     Problem: DISCHARGE PLANNING  Goal: Discharge to home or other facility with appropriate resources  Description: INTERVENTIONS:  - Identify barriers to discharge w/patient and caregiver  - Arrange for needed discharge resources and transportation as appropriate  - Identify discharge learning needs (meds, wound care, etc )  - Arrange for interpretive services to assist at discharge as needed  - Refer to Case Management Department for coordinating discharge planning if the patient needs post-hospital services based on physician/advanced practitioner order or complex needs related to functional status, cognitive ability, or social support system  Outcome: Progressing     Problem: Knowledge Deficit  Goal: Patient/family/caregiver demonstrates understanding of disease process, treatment plan, medications, and discharge instructions  Description: Complete learning assessment and assess knowledge base    Interventions:  - Provide teaching at level of understanding  - Provide teaching via preferred learning methods  Outcome: Progressing

## 2021-01-19 NOTE — PROGRESS NOTES
Progress Note - Amaris Eddy 1960, 62 y o  female MRN: [id number]    Unit/Bed#: [facility] 923-01 Encounter: [id number]    Primary Care Provider: Rojelio Sender, DO   Date and time admitted to hospital: 1/17/2021  1:40 PM        * Intracranial mass  Assessment & Plan  Left parasaggital and right convex brain mass  · P/w 4 weeks dizziness/off balance    Imaging:  · MRI brain with without contrast 1/18/21:  Peripherally enhancing 2 1 x 2 1 x 1 5 cm mass in the parasagittal left parietal parenchyma with associated vasogenic edema and focal mass effect  Second right frontal vertex mass approximately 5 mm homogeneously enhancing  Diffuse enhancement of the internal auditory canal bilaterally which may be related to patchy meningeal enhancement  · CTA head and neck with without 1/16/2021:  Left parietal mass with vasogenic edema  Mediastinal, hilar, paratracheal and cervical adenopathy  Varying degrees of arthrosclerotic disease  Enlarged palatine tonsils and lingual tonsil  · CT chest abdomen pelvis with 1/17/21:  Medial style and right hilar adenopathy  No evidence of primary malignancy  Chronic splenic mass  Plan:  · Continue monitor neurological exam and symptoms  · Imaging results reviewed with patient  · Discussed need for tissue sample for pathology  Pulmonary consult has been placed to see if pathology is able to be obtained from lymphadenopathy - pending  · If unable to obtain pathology from lymph nodes, discussed possibility for brain biopsy  · Discussed meningeal enhancement and concern that abnormal cell may be in the cerebral spinal fluid  For this a lumbar puncture was recommended  Patient willing to complete with sedation/image guidance  CSF orders reviewed with primary team    · Discussed use of decadron to assist in symptoms control by decreasing edema once biopsy completed and ideally LP  · Discussed anticipated need for radiation to brain masses   Given concern for leptomeningeal disease, may require WBRT  · We will continue to follow and review imaging once completed  All questions were answered to her satisfaction  · Call with any questions/concerns  Cerebral edema [facility])  Assessment & Plan  See plan as above  Plan decadron after biopsy +/- LP    Lumbar back pain with radiculopathy affecting left lower extremity  Assessment & Plan  Chronic scoliosis  · P/w LBP with anterolateral left thigh pain and intermittent numbness only at night  · Admits to 1 5 week saddle numbness - abnl sensation with wiping  · No gait/discoordination complaints  Imagine:   · CT CAP - S-shaped scoliosis  No noted masses  Plan:   · Continue to monitor symptoms  · Pain control per primary team  Recommend prn opioid to be available at night  · MRI thoracic/lumbar w/wo ordered and pending  Will need medication if completed at night 2/2 pain level  Essential hypertension  Assessment & Plan  Goal normotensive  Continue medications as per primary team    Controlled type 2 diabetes mellitus without complication [facility])  Assessment & Plan  Lab Results   Component Value Date    HGBA1C 5 3 01/05/2021       Recent Labs     01/18/21  1127 01/18/21  1732 01/18/21  2120 01/19/21  1102   POCGLU 132 105 159* 215*       Blood Sugar Average: Last 72 hrs:  (P) 142 5     Medically management per primary team        Subjective/Objective   Chief Complaint:  Follow-up brain mass    Subjective:  Patient offers no new complaints  She continued with low back and left thigh pain overnight but resolved during the day  Noted to have some soreness in her back earlier today which resolved with Tylenol  Denies any current pain  Denies any radicular pain, numbness, tingling and or weakness of her extremities  Denies any gait dysfunction other than secondary to associated dizziness  Denies any headache vision or speech changes  Continues with slight abnormal sensation with wiping    No incontinence  Objective:  Sitting up on edge of bed  NAD  I/O       01/17 0701 - 01/18 0700 01/18 0701 - 01/19 0700 01/19 0701 - 01/20 0700    P  O   820 480    Total Intake(mL/kg)  820 (10 9) 480 (6 4)    Urine (mL/kg/hr) 1000      Total Output 1000      Net -1000 +820 +480           Unmeasured Urine Occurrence 1 x 13 x     Unmeasured Stool Occurrence  1 x           Invasive Devices     Peripheral Intravenous Line            Peripheral IV 01/17/21 Left Antecubital 1 day                Physical Exam:  Vitals: Blood pressure 159/91, pulse 76, temperature 98 4 °F (36 9 °C), resp  rate 18, height 5' (1 524 m), weight 75 kg (165 lb 5 5 oz), last menstrual period 08/16/2014, SpO2 97 %, not currently breastfeeding  ,Body mass index is 32 29 kg/m²  General appearance: alert, appears stated age, cooperative and no distress  Head: Normocephalic, without obvious abnormality, atraumatic  Eyes: EOMI, PERRL  Neck: supple, symmetrical, trachea midline   Lungs: non labored breathing  Heart: regular heart rate  Neurologic:   Mental status: Alert, oriented, thought content appropriate  Cranial nerves: grossly intact (Cranial nerves II-XII)  Sensory: normal to LT x4  Motor: moving all extremities without focal weakness, 5/5  Reflexes: no clonus b/l   Coordination: finger to nose normal bilaterally, no drift bilaterally    Lab Results:  Results from last 7 days   Lab Units 01/16/21  0606   WBC Thousand/uL 6 97   HEMOGLOBIN g/dL 11 2*   HEMATOCRIT % 36 0   PLATELETS Thousands/uL 346   NEUTROS PCT % 65   MONOS PCT % 7     Results from last 7 days   Lab Units 01/16/21  0606   POTASSIUM mmol/L 4 0   CHLORIDE mmol/L 100   CO2 mmol/L 29   BUN mg/dL 16   CREATININE mg/dL 0 66   CALCIUM mg/dL 9 7                 No results found for: TROPONINT  ABG:No results found for: PHART, KKE7BZL, PO2ART, NBI3DWX, X5MAGRQS, BEART, SOURCE    Imaging Studies: I have personally reviewed pertinent reports     and I have personally reviewed pertinent films in PACS    Xr Femur 2 Views Left    Result Date: 1/18/2021  Impression: No acute osseous abnormality  Workstation performed: [id number]     Mri Brain W Wo Contrast    Result Date: 1/18/2021  Impression: Metastatic disease, with 2 Parenchymal lesions, 2 1 and 5 mm in the greatest linear dimension  There is diffuse enhancement of the internal auditory canals bilaterally, which warrants lumbar puncture to exclude leptomeningeal tumor  The study was marked in EPIC for significant notification  Workstation performed: [id number]     Ct Chest Abdomen Pelvis W Contrast    Result Date: 1/17/2021  Impression: 1  Mediastinal and right hilar lymphadenopathy  2   Chronic splenic mass, slowly enlarging since 2017  This is almost certainly benign and the differential diagnosis includes hamartoma, hemorrhagic cyst, littoral cell angioma or lymphangioma  3   No evidence of primary malignancy in the chest, abdomen or pelvis  Workstation performed: [id number]       EKG, Pathology, and Other Studies: I have personally reviewed pertinent reports        VTE Pharmacologic Prophylaxis: none currently ordered    VTE Mechanical Prophylaxis: sequential compression device

## 2021-01-19 NOTE — ASSESSMENT & PLAN NOTE
Wt Readings from Last 3 Encounters:   01/17/21 75 kg (165 lb 5 5 oz)   01/05/21 75 6 kg (166 lb 9 6 oz)   12/29/20 76 1 kg (167 lb 12 8 oz)     Patient appears to be euvolemic on examination  Does not take Lasix as outpatient  Echo 1 year ago showed an EF of 53% grade 1 diastolic dysfunction

## 2021-01-19 NOTE — UTILIZATION REVIEW
Continued Stay Review    Date: 1/19/2021                        Current Patient Class: Inpatient   Current Level of Care:  Med Surg     HPI:60 y o  female initially admitted on 1/17/2021  With a complaint of an intracranial mass and dizziness  CT scan showed intracranial mass x 2  Concerning for metastatic disease  Pt with persistent dizziness,  Back pain and R leg pain  Assessment/Plan: 1/19 - Pt with left - sided sciatica pain, mostly at night, which radiates down the left leg  Additionally she has complaint of dizziness when ambulating longer distances  Neurosurgery 1/18 - Neurological exam - MRI Lumbar and thoracic spines - Pulmonary consult - There is a large lymph node immediately anterior to the trachea ( may be able to biopsy) If unable may need open excision brain biopsy left parasaggittal region  Possibel lumbar puncture under fluro  Continue decadron to assist in symptoms by decreasing edema once biopsy  compleated and ideally LP  Interventional radiology - 1/19 - Pt with a new intracranial mass Pt nervous about a lumbar puncture - givne a history of scoliosis and epidural during childbirth 40 yrs ago   PShe requests sedation -  Plan for procedure on 1/20      Pertinent Labs/Diagnostic Results:       Results from last 7 days   Lab Units 01/16/21  0606   WBC Thousand/uL 6 97   HEMOGLOBIN g/dL 11 2*   HEMATOCRIT % 36 0   PLATELETS Thousands/uL 346   NEUTROS ABS Thousands/µL 4 56     Results from last 7 days   Lab Units 01/16/21  0606   SODIUM mmol/L 136   POTASSIUM mmol/L 4 0   CHLORIDE mmol/L 100   CO2 mmol/L 29   ANION GAP mmol/L 7   BUN mg/dL 16   CREATININE mg/dL 0 66   EGFR ml/min/1 73sq m 112   CALCIUM mg/dL 9 7     Results from last 7 days   Lab Units 01/18/21  2120 01/18/21  1732 01/18/21  1127 01/18/21  0754 01/17/21  2053   POC GLUCOSE mg/dl 159* 105 132 91 153*     Results from last 7 days   Lab Units 01/16/21  0606   GLUCOSE RANDOM mg/dL 142*     Results from last 7 days   Lab Units 01/16/21  0647   CLARITY UA  Clear   COLOR UA  Yellow   SPEC GRAV UA  <=1 005   PH UA  6 5   GLUCOSE UA mg/dl Negative   KETONES UA mg/dl Negative   BLOOD UA  Negative   PROTEIN UA mg/dl Negative   NITRITE UA  Negative   BILIRUBIN UA  Negative   UROBILINOGEN UA E U /dl 0 2   LEUKOCYTES UA  Negative     MRI Brain 1/18 - Metastatic disease, with 2 Parenchymal lesions, 2 1 and 5 mm in the greatest linear dimension  There is diffuse enhancement of the internal auditory canals bilaterally, which warrants lumbar puncture to exclude leptomeningeal tumor  CT CAP - 1/17 - 1   Mediastinal and right hilar lymphadenopathy  2   Chronic splenic mass, slowly enlarging since 2017   This is almost certainly benign and the differential diagnosis includes hamartoma, hemorrhagic cyst, littoral cell angioma or lymphangioma  3   No evidence of primary malignancy in the chest, abdomen or pelvis       MRI Lumbar & Thoracic Spine  On 1/19 - pending        Vital Signs: 1/19 @ 0730 - 98 4-68-/79 - 98% RA sat     Date/Time  Temp  Pulse  Resp  BP  MAP (mmHg)  SpO2  O2 Device  Patient Position - Orthostatic VS   01/19/21 0745            97 %  None (Room air)     01/18/21 22:29:25  98 5 °F (36 9 °C)  62  20  140/75  97  97 %       01/18/21 15:31:09  98 °F (36 7 °C)    16  142/76  98  98 %       01/18/21 06:55:19  97 8 °F (36 6 °C)    16  141/77  98  98 %       01/17/21 22:13:25  97 8 °F (36 6 °C)  64  18  117/57  77  97 %       01/17/21 2100              None (Room air)     01/17/21 1947    73        95 %       01/17/21 1926            95 %       01/17/21 1824              None (Room air)     01/17/21 17:40:39  97 6 °F (36 4 °C)  71  16  151/79  103  97 %  None (Room air)  Lying   01/17/21 1630    72  20  157/71  102  99 %  None (Room air)  Lying   01/17/21 1549    69  20  144/76    97 %  None (Room air)     01/17/21 1505    74  20  169/77    97 %  None (Room air)  Sitting 01/17/21 1430    76  20  182/83Abnormal   119  98 %  None (Room air)  Sitting             Medications:   Scheduled Medications:  amLODIPine, 10 mg, Oral, Daily  atorvastatin, 20 mg, Oral, Daily  cyclobenzaprine, 10 mg, Oral, HS  docusate sodium, 100 mg, Oral, BID  gabapentin, 100 mg, Oral, After Dinner  insulin glargine, 5 Units, Subcutaneous, HS  insulin lispro, 1-5 Units, Subcutaneous, TID AC  insulin lispro, 1-5 Units, Subcutaneous, HS  lidocaine, 2 patch, Topical, Daily  melatonin, 3 mg, Oral, HS  metoprolol succinate, 50 mg, Oral, Daily  nicotine, 1 patch, Transdermal, Daily      Continuous IV Infusions:     PRN Meds:  acetaminophen, 975 mg, Oral, Q8H PRN - 1/18 x 2 - 1/19 x 1   albuterol, 2 5 mg, Nebulization, Q4H PRN  menthol-methyl salicylate, , Apply externally, 4x Daily PRN  ondansetron, 4 mg, Intravenous, Q6H PRN  Ativan 0 5 mg po prn - 1/19 x 1     Nursing Orders - VS  - up & OOB as tolerated - ambulate q shift     Discharge Plan: D     Network Utilization Review Department  ATTENTION: Please call with any questions or concerns to [phone] and carefully listen to the prompts so that you are directed to the right person  All voicemails are confidential   Mat Bicker all requests for admission clinical reviews, approved or denied determinations and any other requests to dedicated fax number below belonging to the campus where the patient is receiving treatment   List of dedicated fax numbers for the Facilities:  [address] DENIALS (Administrative/Medical Necessity) [phone]   [address] (Maternity/NICU/Pediatrics) [phone]   [address] [facility] [address] Bernadette Sanchez 8721 (  Chela Shanks "Iliana" 103) [address] [facility] [phone]     [address] [address] [place] [address] [address]ia [address] 1481 [phone]   [place] [address] [phone]

## 2021-01-19 NOTE — ASSESSMENT & PLAN NOTE
Chronic scoliosis  · P/w LBP with anterolateral left thigh pain and intermittent numbness only at night  · Admits to 1 5 week saddle numbness - abnl sensation with wiping  · No gait/discoordination complaints  Imagine:   · CT CAP - S-shaped scoliosis  No noted masses  Plan:   · Continue to monitor symptoms  · Pain control per primary team  Recommend prn opioid to be available at night  · MRI thoracic/lumbar w/wo ordered and pending  Will need medication if completed at night 2/2 pain level

## 2021-01-19 NOTE — ASSESSMENT & PLAN NOTE
Lab Results   Component Value Date    HGBA1C 5 3 01/05/2021       Recent Labs     01/18/21  1127 01/18/21  1732 01/18/21  2120 01/19/21  1102   POCGLU 132 105 159* 215*       Blood Sugar Average: Last 72 hrs:  (P) 142 5   Oral medications on hold  Continue Lantus and correctional insulin

## 2021-01-19 NOTE — ASSESSMENT & PLAN NOTE
· C/O left sided sciatic pain mostly at night  Odd presentation as pain resolves during the day     · No destructive lesions noted on CT scan  · Patient does not want to use narcotics if possible  · Trial gabapentin 100 mg after dinner  · Continue Tylenol and lidocaine patches  · MRI T and L spine ordered

## 2021-01-19 NOTE — ASSESSMENT & PLAN NOTE
· Patient gives history of 4 weeks of dizziness  Patient was initially diagnosed with vertigo as an outpatient  Patient continued to have persistent dizziness and he was seen in the emergency room yesterday noted to have intracranial mass  · Patient came back to the hospital after leaving the hospital AMA and will be admitted to the hospital   · Consult Neurosurgery  · Frequent neuro checks  · MRI brain  - Metastatic disease, with 2 Parenchymal lesions, 2 1 and 5 mm in the greatest linear dimension  There is diffuse enhancement of the internal auditory canals bilaterally, which warrants lumbar puncture to exclude leptomeningeal tumor  · CT C/A/P with no significant primary source noted     · Plan is for LP - IR will perform under sedation at patient request on 1/20  · Pulmonary consulted - will see if the chest lymph node is amenable to biopsy, if not will need brain biopsy  · Start steroids after LP and biopsy

## 2021-01-19 NOTE — ASSESSMENT & PLAN NOTE
Left parasaggital and right convex brain mass  · P/w 4 weeks dizziness/off balance    Imaging:  · MRI brain with without contrast 1/18/21:  Peripherally enhancing 2 1 x 2 1 x 1 5 cm mass in the parasagittal left parietal parenchyma with associated vasogenic edema and focal mass effect  Second right frontal vertex mass approximately 5 mm homogeneously enhancing  Diffuse enhancement of the internal auditory canal bilaterally which may be related to patchy meningeal enhancement  · CTA head and neck with without 1/16/2021:  Left parietal mass with vasogenic edema  Mediastinal, hilar, paratracheal and cervical adenopathy  Varying degrees of arthrosclerotic disease  Enlarged palatine tonsils and lingual tonsil  · CT chest abdomen pelvis with 1/17/21:  Medial style and right hilar adenopathy  No evidence of primary malignancy  Chronic splenic mass  Plan:  · Continue monitor neurological exam and symptoms  · Imaging results reviewed with patient  · Discussed need for tissue sample for pathology  Pulmonary consult has been placed to see if pathology is able to be obtained from lymphadenopathy - pending  · If unable to obtain pathology from lymph nodes, discussed possibility for brain biopsy  · Discussed meningeal enhancement and concern that abnormal cell may be in the cerebral spinal fluid  For this a lumbar puncture was recommended  Patient willing to complete with sedation/image guidance  CSF orders reviewed with primary team    · Discussed use of decadron to assist in symptoms control by decreasing edema once biopsy completed and ideally LP  · Discussed anticipated need for radiation to brain masses  Given concern for leptomeningeal disease, may require WBRT  · We will continue to follow and review imaging once completed  All questions were answered to her satisfaction  · Call with any questions/concerns

## 2021-01-19 NOTE — TELEMEDICINE
Patient has new Intracrania mass with Left parasaggital and right convex brain mass  Discussed with SLIM, patient extremely nervous about lumbar puncture given history of scoliosis and epidural during childbirth 40 years ago  Patient has requested sedation, she understands that this means she will not be completely asleep, but relaxed  Labs are within appropriate range, currently not on any AP/AC's, NPO after MN  Neurosurgery and SLIM has placed labs for analysis, opening and closing pressures to be obtained       [address] [place] [address]

## 2021-01-19 NOTE — CONSULTS
Consult Note - Pulmonary   Jenifer Garcia 62 y o  female MRN: [id number]  Unit/Bed#: [facility] 923-01 Encounter: [id number]      Reason for consultation: evaluate for mediastinal lymph node biopsy    Requesting physician: Rick Moreno PA-C    Assessment:    1  Mediastinal lymphadenopathy- metastatic disease versus primary malignancy such as lymphoma  Less likely infectious  2  Brain masses- presumed metastasis  No primary lesion identified  There is significant uptake in bilateral auditory canals and therefore may be the source of malignancy  3  Chronic tobacco abuse-smokes 1 pack every couple days for about 20-30 years    Plan:    · The mediastinal lymph nodes are amenable to EBUS however there is also significant supraclavicular and cervical lymphadenopathy on imaging  · Would recommend needle or core biopsy of 1 of the cervical lymph nodes if able to by interventional radiology  · If there is no accessible target for biopsy by Interventional Radiology, will proceed with EBUS and mediastinal lymph node biopsy  I explained the risks to the patient and she is agreeable to the plan    Case was and to be discussed with Dr Kandice Obrien  History of Present Illness   HPI:  Jenifer Garcia is a 62 y o  female with a past medical history of hypertension and well controlled diabetes who presented for evaluation of dizziness  The patient reports having dizziness over the past several weeks  Initially was diagnosed as vertigo however it continued  The patient recently went to the ED and was found to have to brain masses suspicious for metastatic disease  She was recommended transfer to [facility] to undergo evaluation  At that time she signed out against medical advice however she reports this was in order to get some finances in order before coming to hospital   She then returned to the hospital to complete the workup  She denies any recent fevers, chills, night sweats, or weight loss    Patient smokes a pack of cigarettes every couple days for about the past 20-30 years  She denies any drugs or alcohol abuse  She worked in [place] as an [address] for 29 years and recently retired about 1 week ago  She lives with her daughter at home  Labs and vitals were reviewed and essentially are all relatively normal   CTA of the head and neck shows bilateral lymphadenopathy in the neck  Significant uptake and bilateral auditory canals  And 2 brain masses  MRI of the brain also captured both masses  CT of the chest abdomen and pelvis did not reveal a primary lesion but did no significant mediastinal lymphadenopathy  Review of Systems   Constitutional: Negative for chills and fever  HENT: Negative for congestion, postnasal drip and rhinorrhea  Eyes: Negative for itching  Respiratory: Negative for cough, shortness of breath, wheezing and stridor  Cardiovascular: Negative for chest pain, palpitations and leg swelling  Gastrointestinal: Negative for abdominal distention, abdominal pain, nausea and vomiting  Genitourinary: Negative for dysuria and flank pain  Musculoskeletal: Negative for arthralgias and myalgias  Skin: Negative for color change  Neurological: Positive for dizziness  Negative for light-headedness and headaches  Psychiatric/Behavioral: Negative            Historical Information   Past Medical History:   Diagnosis Date    Acute on chronic congestive heart failure with left ventricular diastolic dysfunction (HCC)     last assessed 2017    Asthma     Atelectasis     [facility]     Diabetes mellitus [facility] [facility]     Diverticulitis 2017    with perforation and abscess     Hyperlipidemia     Hypertension     Lesion of spleen     Pericardial effusion      Past Surgical History:   Procedure Laterality Date    ABDOMINAL SURGERY      BREAST BIOPSY Left 10/04/2018     SECTION      COLON SURGERY      COLONOSCOPY      COLOSTOMY  2017    Mariia Blanco PROCEDURE N/A 5/1/2017    Procedure: EXPLORATORY LAPAROTOMY; PARTIAL SMALL BOWEL RESECTION; HARTMANS PROCEDURE; OSTOMY;  Surgeon: Alejandra Beaulieu MD;  [facility];  Service:     HYSTERECTOMY  2018    MAMMO STEREOTACTIC BREAST BIOPSY LEFT (ALL INC) Left 10/4/2018    OOPHORECTOMY Bilateral 2018    NM CLOSE ENTEROSTOMY N/A 9/8/2017    Procedure: OPEN COLOSTOMY REVERSAL;  Surgeon: Alejandra Beaulieu MD;  [facility];  Service: General    NM COLONOSCOPY FLX DX W/COLLJ SPEC WHEN PFRMD N/A 8/16/2017    Procedure: COLONOSCOPY; DILATION OF OSTOMY;  Surgeon: Alejandra Beaulieu MD;  Location: [facility]; Service: General    NM EXC SKIN BENIG <0 5 CM REMAINDER BODY N/A 11/29/2017    Procedure: SCALP MASS EXCISION X 2;  Surgeon: Alejandra Beaulieu MD;  [facility];  Service: [facility] N/A 2/27/2019    Procedure: INCISIONAL HERNIA REPAIR, COMPONENT SEPARATION;  Surgeon: Alejandra Beaulieu MD;  [facility];  Service: General    NM TOTAL ABDOM HYSTERECTOMY N/A 2/27/2019    Procedure: TOTAL ABDOMINAL HYSTERECTOMY; BSO;  Surgeon: Jonas Sandy MD;  [facility];  Service: Gynecology    VAC DRESSING APPLICATION N/A 6/6/5910    Procedure: Shelly Heimlich;  Surgeon: Alejandra Beaulieu MD;  [facility];  Service:      Family History   Problem Relation Age of Onset    Hypertension Mother     Diabetes Mother     No Known Problems Sister     No Known Problems Daughter     Heart disease Son     No Known Problems Maternal Aunt     No Known Problems Maternal Aunt     No Known Problems Maternal Aunt     No Known Problems Maternal Aunt     No Known Problems Paternal Aunt     Breast cancer Neg Hx        Occupational History:  Worked in [employer] for 29 years recently retired    Social History:  Smoked 1 pack every couple days for about 20-30 years, denies drugs or alcohol abuse    Lives with daughter    Meds/Allergies   Current Facility-Administered Medications   Medication Dose Route Frequency    acetaminophen (TYLENOL) tablet 975 mg  975 mg Oral Q8H PRN    albuterol inhalation solution 2 5 mg  2 5 mg Nebulization Q4H PRN    amLODIPine (NORVASC) tablet 10 mg  10 mg Oral Daily    atorvastatin (LIPITOR) tablet 20 mg  20 mg Oral Daily    cyclobenzaprine (FLEXERIL) tablet 10 mg  10 mg Oral HS    docusate sodium (COLACE) capsule 100 mg  100 mg Oral BID    gabapentin (NEURONTIN) capsule 100 mg  100 mg Oral After Dinner    insulin glargine (LANTUS) subcutaneous injection 5 Units 0 05 mL  5 Units Subcutaneous HS    insulin lispro (HumaLOG) 100 units/mL subcutaneous injection 1-5 Units  1-5 Units Subcutaneous TID AC    insulin lispro (HumaLOG) 100 units/mL subcutaneous injection 1-5 Units  1-5 Units Subcutaneous HS    lidocaine (LIDODERM) 5 % patch 2 patch  2 patch Topical Daily    melatonin tablet 3 mg  3 mg Oral HS    menthol-methyl salicylate (BENGAY) 21-87 % cream   Apply externally 4x Daily PRN    metoprolol succinate (TOPROL-XL) 24 hr tablet 50 mg  50 mg Oral Daily    nicotine (NICODERM CQ) 7 mg/24hr TD 24 hr patch 1 patch  1 patch Transdermal Daily    ondansetron (ZOFRAN) injection 4 mg  4 mg Intravenous Q6H PRN     Medications Prior to Admission   Medication    amLODIPine (NORVASC) 10 mg tablet    atorvastatin (LIPITOR) 20 mg tablet    [facility] ASPIRIN LOW STRENGTH 81 MG EC tablet    cyclobenzaprine (FLEXERIL) 10 mg tablet    ipratropium (ATROVENT) 0 02 % nebulizer solution    linaGLIPtin (TRADJENTA) 5 MG TABS    metFORMIN (GLUCOPHAGE) 1000 MG tablet    metoprolol succinate (TOPROL-XL) 50 mg 24 hr tablet    clonazePAM (KlonoPIN) 0 5 mg tablet    furosemide (LASIX) 40 mg tablet     Allergies   Allergen Reactions    Ciprofloxacin Itching     Starts at hands to feet then the whole entire body       Vitals:    01/19/21 0730 01/19/21 0745 01/19/21 0925 01/19/21 0927   BP: 155/79  159/91    Pulse: 68   76   Resp: 18 Temp: 98 4 °F (36 9 °C)      TempSrc:       SpO2: 98% 97%     Weight:       Height:           Physical Exam  Constitutional:       General: She is not in acute distress  Appearance: She is not diaphoretic  HENT:      Head: Normocephalic and atraumatic  Nose: Nose normal       Mouth/Throat:      Pharynx: No oropharyngeal exudate  Eyes:      General: No scleral icterus  Conjunctiva/sclera: Conjunctivae normal       Pupils: Pupils are equal, round, and reactive to light  Neck:      Musculoskeletal: Normal range of motion and neck supple  Thyroid: No thyromegaly  Vascular: No JVD  Trachea: No tracheal deviation  Comments: Left cervical and submandibular lymphadenopathy  Right supraclavicular lymph node palpable  Cardiovascular:      Rate and Rhythm: Normal rate and regular rhythm  Heart sounds: Normal heart sounds  No murmur  No friction rub  No gallop  Pulmonary:      Effort: Pulmonary effort is normal  No respiratory distress  Breath sounds: Normal breath sounds  No stridor  No wheezing or rales  Abdominal:      General: Bowel sounds are normal  There is no distension  Palpations: Abdomen is soft  Tenderness: There is no abdominal tenderness  There is no guarding or rebound  Musculoskeletal: Normal range of motion  General: No deformity  Lymphadenopathy:      Cervical: No cervical adenopathy  Skin:     General: Skin is warm  Findings: No erythema or rash  Neurological:      Mental Status: She is alert and oriented to person, place, and time  Cranial Nerves: No cranial nerve deficit  Sensory: No sensory deficit  Labs: I have personally reviewed pertinent lab results    Results from last 7 days   Lab Units 01/16/21  0606   WBC Thousand/uL 6 97   HEMOGLOBIN g/dL 11 2*   HEMATOCRIT % 36 0   PLATELETS Thousands/uL 346   NEUTROS PCT % 65   MONOS PCT % 7      Results from last 7 days   Lab Units 01/16/21  0606   POTASSIUM mmol/L 4 0   CHLORIDE mmol/L 100   CO2 mmol/L 29   BUN mg/dL 16   CREATININE mg/dL 0 66   CALCIUM mg/dL 9 7                      No results found for: TROPONINI    Imaging and other studies: I have personally reviewed pertinent reports  and I have personally reviewed pertinent films in PACS  CT chest, abdomen, pelvis- significant mediastinal lymphadenopathy    Pulmonary function testing: none on file    EKG, Pathology, and Other Studies: I have personally reviewed pertinent reports     and I have personally reviewed pertinent films in PACS      Code Status: Level 1 - Full Vashti Syed MD  Pulmonary & Critical Care Fellow, [facility]

## 2021-01-19 NOTE — ASSESSMENT & PLAN NOTE
Lab Results   Component Value Date    HGBA1C 5 3 01/05/2021       Recent Labs     01/18/21  1127 01/18/21  1732 01/18/21  2120 01/19/21  1102   POCGLU 132 105 159* 215*       Blood Sugar Average: Last 72 hrs:  (P) 142 5     Medically management per primary team

## 2021-01-19 NOTE — PROGRESS NOTES
Progress Note - Vinny Webber 1960, 62 y o  female MRN: [id number]    Unit/Bed#: [facility] 923-01 Encounter: [id number]    Primary Care Provider: Ranjit Suarez DO   Date and time admitted to hospital: 1/17/2021  1:40 PM        * Intracranial mass  Assessment & Plan  · Patient gives history of 4 weeks of dizziness  Patient was initially diagnosed with vertigo as an outpatient  Patient continued to have persistent dizziness and he was seen in the emergency room yesterday noted to have intracranial mass  · Patient came back to the hospital after leaving the hospital AMA and will be admitted to the hospital   · Consult Neurosurgery  · Frequent neuro checks  · MRI brain  - Metastatic disease, with 2 Parenchymal lesions, 2 1 and 5 mm in the greatest linear dimension  There is diffuse enhancement of the internal auditory canals bilaterally, which warrants lumbar puncture to exclude leptomeningeal tumor  · CT C/A/P with no significant primary source noted  · Plan is for LP - IR will perform under sedation at patient request on 1/20  · Pulmonary consulted - will see if the chest lymph node is amenable to biopsy, if not will need brain biopsy  · Start steroids after LP and biopsy      Sciatica of left side  Assessment & Plan  · C/O left sided sciatic pain mostly at night  Odd presentation as pain resolves during the day     · No destructive lesions noted on CT scan  · Patient does not want to use narcotics if possible  · Trial gabapentin 100 mg after dinner  · Continue Tylenol and lidocaine patches  · MRI T and L spine ordered    Controlled type 2 diabetes mellitus without complication [facility])  Assessment & Plan  Lab Results   Component Value Date    HGBA1C 5 3 01/05/2021       Recent Labs     01/18/21  1127 01/18/21  1732 01/18/21  2120 01/19/21  1102   POCGLU 132 105 159* 215*       Blood Sugar Average: Last 72 hrs:  (P) 142 5   Oral medications on hold  Continue Lantus and correctional insulin    Chronic diastolic HF (heart failure) ([facility] [address] )  Assessment & Plan  Wt Readings from Last 3 Encounters:   21 75 kg (165 lb 5 5 oz)   21 75 6 kg (166 lb 9 6 oz)   20 76 1 kg (167 lb 12 8 oz)     Patient appears to be euvolemic on examination  Does not take Lasix as outpatient  Echo 1 year ago showed an EF of 49% grade 1 diastolic dysfunction      Essential hypertension  Assessment & Plan  · Blood pressure acceptable  · Continue with the outpatient medication    CAD in native artery  Assessment & Plan  · Patient with known history of type 2 diabetes mellitus and is maintained on oral hypoglycemic agents  · Hold oral agents while inpatient  Continue with the insulin sliding scan  · Monitor blood sugar    Tobacco use  Assessment & Plan  · Cessation advised    Mixed hyperlipidemia  Assessment & Plan  · Continue statin      VTE Pharmacologic Prophylaxis:   Pharmacologic: on hold for upcoming procedures  Mechanical VTE Prophylaxis in Place: No    Patient Centered Rounds: I have performed bedside rounds with nursing staff today  Discussions with Specialists or Other Care Team Provider: Neurosurgery, IR    Education and Discussions with Family / Patient: patient    Time Spent for Care: 30 minutes  More than 50% of total time spent on counseling and coordination of care as described above  Current Length of Stay: 2 day(s)    Current Patient Status: Inpatient   Certification Statement: The patient will continue to require additional inpatient hospital stay due to see above    Discharge Plan: Home when workup complete    Code Status: Level 1 - Full Code      Subjective:   Patient states she had her left leg pain again last night    Notes some fullness of her right ear    Objective:     Vitals:   Temp (24hrs), Av 3 °F (36 8 °C), Min:98 °F (36 7 °C), Max:98 5 °F (36 9 °C)    Temp:  [98 °F (36 7 °C)-98 5 °F (36 9 °C)] 98 4 °F (36 9 °C)  HR:  [62-76] 76  Resp:  [16-20] 20  BP: (140-159)/(75-91) 156/87  SpO2:  [97 %-99 %] 99 %  Body mass index is 32 29 kg/m²  Input and Output Summary (last 24 hours): Intake/Output Summary (Last 24 hours) at 1/19/2021 1513  Last data filed at 1/19/2021 0927  Gross per 24 hour   Intake 800 ml   Output    Net 800 ml       Physical Exam:     Physical Exam  Constitutional:       Appearance: Normal appearance  Cardiovascular:      Rate and Rhythm: Normal rate and regular rhythm  Heart sounds: No murmur  Pulmonary:      Effort: Pulmonary effort is normal       Breath sounds: Normal breath sounds  Abdominal:      General: Bowel sounds are normal  There is no distension  Palpations: Abdomen is soft  Tenderness: There is no abdominal tenderness  Skin:     General: Skin is warm and dry  Neurological:      General: No focal deficit present  Mental Status: She is alert and oriented to person, place, and time  Psychiatric:         Mood and Affect: Mood normal          Additional Data:     Labs:    Results from last 7 days   Lab Units 01/16/21  0606   WBC Thousand/uL 6 97   HEMOGLOBIN g/dL 11 2*   HEMATOCRIT % 36 0   PLATELETS Thousands/uL 346   NEUTROS PCT % 65   LYMPHS PCT % 24   MONOS PCT % 7   EOS PCT % 2     Results from last 7 days   Lab Units 01/16/21  0606   SODIUM mmol/L 136   POTASSIUM mmol/L 4 0   CHLORIDE mmol/L 100   CO2 mmol/L 29   BUN mg/dL 16   CREATININE mg/dL 0 66   ANION GAP mmol/L 7   CALCIUM mg/dL 9 7   GLUCOSE RANDOM mg/dL 142*         Results from last 7 days   Lab Units 01/19/21  1102 01/18/21  2120 01/18/21  1732 01/18/21  1127 01/18/21  0754 01/17/21  2053   POC GLUCOSE mg/dl 215* 159* 105 132 91 153*                   * I Have Reviewed All Lab Data Listed Above  * Additional Pertinent Lab Tests Reviewed:  All Labs Within Last 24 Hours Reviewed    Imaging:    Imaging Reports Reviewed Today Include: none  Imaging Personally Reviewed by Myself Includes:  none    Recent Cultures (last 7 days):           Last 24 Hours Medication List:   Current Facility-Administered Medications   Medication Dose Route Frequency Provider Last Rate    acetaminophen  975 mg Oral Q8H PRN Winsome Jimenez PA-C      albuterol  2 5 mg Nebulization Q4H PRN Tyler Medina MD      amLODIPine  10 mg Oral Daily Tyler Medina MD      atorvastatin  20 mg Oral Daily Tyler Medina MD      cyclobenzaprine  10 mg Oral HS Tyler Medina MD      docusate sodium  100 mg Oral BID Tyler Medina MD      gabapentin  100 mg Oral After [employer]      insulin glargine  5 Units Subcutaneous HS Kun Mcleod PA-C      insulin lispro  1-5 Units Subcutaneous TID [facility] Tyler Medina MD      insulin lispro  1-5 Units Subcutaneous HS Tyler Medina MD      lidocaine  2 patch Topical Daily Radha Patricio PA-C      LORazepam  0 5 mg Oral Once PRN Kun Mcleod PA-C      melatonin  3 mg Oral HS Cristopher Swartz PA-C      menthol-methyl salicylate   Apply externally 4x Daily PRN Kun Mcleod PA-C      metoprolol succinate  50 mg Oral Daily Tyler Medina MD      nicotine  1 patch Transdermal Daily Tyler Medina MD      ondansetron  4 mg Intravenous Q6H PRN Tyler Medina MD          Today, Patient Was Seen By: Kun Mcleod PA-C    ** Please Note: Dictation voice to text software may have been used in the creation of this document   **

## 2021-01-20 ENCOUNTER — APPOINTMENT (INPATIENT)
Dept: RADIOLOGY | Facility: HOSPITAL | Age: 61
DRG: 054 | End: 2021-01-20
Payer: COMMERCIAL

## 2021-01-20 LAB
APPEARANCE CSF: CLEAR
GLUCOSE CSF-MCNC: 46 MG/DL (ref 50–80)
GLUCOSE SERPL-MCNC: 100 MG/DL (ref 65–140)
GLUCOSE SERPL-MCNC: 113 MG/DL (ref 65–140)
GLUCOSE SERPL-MCNC: 125 MG/DL (ref 65–140)
GLUCOSE SERPL-MCNC: 137 MG/DL (ref 65–140)
GLUCOSE SERPL-MCNC: 77 MG/DL (ref 65–140)
GRAM STN SPEC: NORMAL
INR PPP: 0.99 (ref 0.84–1.19)
PROT CSF-MCNC: 186 MG/DL (ref 15–45)
PROTHROMBIN TIME: 13.1 SECONDS (ref 11.6–14.5)
RBC # CSF MANUAL: 8 UL (ref 0–10)
TOTAL CELLS COUNTED BLD: NO
TUBE # CSF: 4
WBC # CSF AUTO: 75 /UL (ref 0–5)

## 2021-01-20 PROCEDURE — 88185 FLOWCYTOMETRY/TC ADD-ON: CPT

## 2021-01-20 PROCEDURE — 88184 FLOWCYTOMETRY/ TC 1 MARKER: CPT | Performed by: PHYSICIAN ASSISTANT

## 2021-01-20 PROCEDURE — 99232 SBSQ HOSP IP/OBS MODERATE 35: CPT | Performed by: PHYSICIAN ASSISTANT

## 2021-01-20 PROCEDURE — 88108 CYTOPATH CONCENTRATE TECH: CPT | Performed by: PATHOLOGY

## 2021-01-20 PROCEDURE — 62328 DX LMBR SPI PNXR W/FLUOR/CT: CPT | Performed by: RADIOLOGY

## 2021-01-20 PROCEDURE — 94760 N-INVAS EAR/PLS OXIMETRY 1: CPT

## 2021-01-20 PROCEDURE — 07D13ZX EXTRACTION OF RIGHT NECK LYMPHATIC, PERCUTANEOUS APPROACH, DIAGNOSTIC: ICD-10-PCS | Performed by: RADIOLOGY

## 2021-01-20 PROCEDURE — 62284 INJECTION FOR MYELOGRAM: CPT

## 2021-01-20 PROCEDURE — 99152 MOD SED SAME PHYS/QHP 5/>YRS: CPT

## 2021-01-20 PROCEDURE — 99152 MOD SED SAME PHYS/QHP 5/>YRS: CPT | Performed by: RADIOLOGY

## 2021-01-20 PROCEDURE — 10005 FNA BX W/US GDN 1ST LES: CPT | Performed by: RADIOLOGY

## 2021-01-20 PROCEDURE — 82948 REAGENT STRIP/BLOOD GLUCOSE: CPT

## 2021-01-20 PROCEDURE — 88173 CYTOPATH EVAL FNA REPORT: CPT | Performed by: PATHOLOGY

## 2021-01-20 PROCEDURE — 89051 BODY FLUID CELL COUNT: CPT | Performed by: PHYSICIAN ASSISTANT

## 2021-01-20 PROCEDURE — 89050 BODY FLUID CELL COUNT: CPT | Performed by: PHYSICIAN ASSISTANT

## 2021-01-20 PROCEDURE — 88305 TISSUE EXAM BY PATHOLOGIST: CPT | Performed by: PATHOLOGY

## 2021-01-20 PROCEDURE — 009U3ZX DRAINAGE OF SPINAL CANAL, PERCUTANEOUS APPROACH, DIAGNOSTIC: ICD-10-PCS | Performed by: RADIOLOGY

## 2021-01-20 PROCEDURE — 88341 IMHCHEM/IMCYTCHM EA ADD ANTB: CPT | Performed by: PATHOLOGY

## 2021-01-20 PROCEDURE — 88342 IMHCHEM/IMCYTCHM 1ST ANTB: CPT | Performed by: PATHOLOGY

## 2021-01-20 PROCEDURE — NC001 PR NO CHARGE: Performed by: PHYSICIAN ASSISTANT

## 2021-01-20 PROCEDURE — 87070 CULTURE OTHR SPECIMN AEROBIC: CPT | Performed by: PHYSICIAN ASSISTANT

## 2021-01-20 PROCEDURE — 85610 PROTHROMBIN TIME: CPT | Performed by: PHYSICIAN ASSISTANT

## 2021-01-20 PROCEDURE — NC001 PR NO CHARGE: Performed by: RADIOLOGY

## 2021-01-20 PROCEDURE — 82945 GLUCOSE OTHER FLUID: CPT | Performed by: PHYSICIAN ASSISTANT

## 2021-01-20 PROCEDURE — 99232 SBSQ HOSP IP/OBS MODERATE 35: CPT | Performed by: NEUROLOGICAL SURGERY

## 2021-01-20 PROCEDURE — 38505 NEEDLE BIOPSY LYMPH NODES: CPT

## 2021-01-20 PROCEDURE — 84157 ASSAY OF PROTEIN OTHER: CPT | Performed by: PHYSICIAN ASSISTANT

## 2021-01-20 RX ORDER — MIDAZOLAM HYDROCHLORIDE 2 MG/2ML
INJECTION, SOLUTION INTRAMUSCULAR; INTRAVENOUS CODE/TRAUMA/SEDATION MEDICATION
Status: COMPLETED | OUTPATIENT
Start: 2021-01-20 | End: 2021-01-20

## 2021-01-20 RX ORDER — FENTANYL CITRATE 50 UG/ML
INJECTION, SOLUTION INTRAMUSCULAR; INTRAVENOUS CODE/TRAUMA/SEDATION MEDICATION
Status: COMPLETED | OUTPATIENT
Start: 2021-01-20 | End: 2021-01-20

## 2021-01-20 RX ADMIN — FENTANYL CITRATE 50 MCG: 50 INJECTION INTRAMUSCULAR; INTRAVENOUS at 14:12

## 2021-01-20 RX ADMIN — INSULIN GLARGINE 5 UNITS: 100 INJECTION, SOLUTION SUBCUTANEOUS at 23:04

## 2021-01-20 RX ADMIN — GABAPENTIN 100 MG: 100 CAPSULE ORAL at 17:50

## 2021-01-20 RX ADMIN — DOCUSATE SODIUM 100 MG: 100 CAPSULE ORAL at 17:50

## 2021-01-20 RX ADMIN — CYCLOBENZAPRINE HYDROCHLORIDE 10 MG: 10 TABLET ORAL at 23:04

## 2021-01-20 RX ADMIN — FENTANYL CITRATE 50 MCG: 50 INJECTION INTRAMUSCULAR; INTRAVENOUS at 14:09

## 2021-01-20 RX ADMIN — MIDAZOLAM 1 MG: 1 INJECTION INTRAMUSCULAR; INTRAVENOUS at 14:12

## 2021-01-20 RX ADMIN — MELATONIN 3 MG: at 23:04

## 2021-01-20 RX ADMIN — MIDAZOLAM 1 MG: 1 INJECTION INTRAMUSCULAR; INTRAVENOUS at 14:09

## 2021-01-20 RX ADMIN — MIDAZOLAM 0.5 MG: 1 INJECTION INTRAMUSCULAR; INTRAVENOUS at 14:43

## 2021-01-20 RX ADMIN — FENTANYL CITRATE 25 MCG: 50 INJECTION INTRAMUSCULAR; INTRAVENOUS at 14:43

## 2021-01-20 RX ADMIN — MIDAZOLAM 1 MG: 1 INJECTION INTRAMUSCULAR; INTRAVENOUS at 14:17

## 2021-01-20 RX ADMIN — ACETAMINOPHEN 975 MG: 325 TABLET, FILM COATED ORAL at 17:50

## 2021-01-20 RX ADMIN — MIDAZOLAM 0.5 MG: 1 INJECTION INTRAMUSCULAR; INTRAVENOUS at 14:45

## 2021-01-20 RX ADMIN — MIDAZOLAM 0.5 MG: 1 INJECTION INTRAMUSCULAR; INTRAVENOUS at 14:38

## 2021-01-20 RX ADMIN — FENTANYL CITRATE 25 MCG: 50 INJECTION INTRAMUSCULAR; INTRAVENOUS at 14:38

## 2021-01-20 RX ADMIN — MIDAZOLAM 0.5 MG: 1 INJECTION INTRAMUSCULAR; INTRAVENOUS at 14:57

## 2021-01-20 RX ADMIN — FENTANYL CITRATE 25 MCG: 50 INJECTION INTRAMUSCULAR; INTRAVENOUS at 14:57

## 2021-01-20 NOTE — ASSESSMENT & PLAN NOTE
· C/O left sided sciatic pain mostly at night  Odd presentation as pain resolves during the day   Now suspect metastatic disease given findings on MRIs  · No destructive lesions noted on CT scan  · Patient does not want to use narcotics if possible  · Continue gabapentin 100 mg after dinner  · Continue Tylenol and lidocaine patches

## 2021-01-20 NOTE — TELEMEDICINE
INTERPROFESSIONAL (PHONE) CONSULTATION - Interventional Radiology  Michelle Velasquez 62 y o  female MRN: [id number]  Unit/Bed#: [facility] 923-01 Encounter: [id number]    IR has been consulted to evaluate the patient, determine the appropriate procedure, and whether or not a procedure can and should be performed regarding the care of Michelle Velasquez  We were consulted by internal medicine concerning cervical lymphadenopathy, concern for malignancy, and to possibly perform a cervical lymph node biopsy if medically appropriate for the patient  IP Consult to IR  Consult performed by: Guru Love PA-C  Consult ordered by: Dawn Figueroa PA-C        01/20/21      Assessment/Recommendation:     62year old female presenting with left parasaggital and right convex brain mass, also with mediastinal, hilar, paratracheal, and cervical adenopathy    - will plan for LP and cervical biopsy today with sedation  - please keep npo  - INR 0 99      Total time spent in review of data, discussion with requesting provider and rendering advice was 25 minutes       Patient or appropriate family member was verbally informed by internal medicine of this consultative service on their behalf to provide more timely access to specialty care in lieu of an in person consultation  They were informed it is a billable service unless the it was determined an in person follow up was medically necessary by me within the next 14 days at which time only the in person consult would be billed  Verbal consent was obtained  Thank you for allowing Interventional Radiology to participate in the care of Michelle Velasquez  Please don't hesitate to call or TigerText us with any questions       Guru Love PA-C

## 2021-01-20 NOTE — ASSESSMENT & PLAN NOTE
Left parasaggital and right convex brain mass  · P/w 4 weeks dizziness/off balance    Imaging:  · MRI brain with without contrast 1/18/21:  Peripherally enhancing 2 1 x 2 1 x 1 5 cm mass in the parasagittal left parietal parenchyma with associated vasogenic edema and focal mass effect  Second right frontal vertex mass approximately 5 mm homogeneously enhancing  Diffuse enhancement of the internal auditory canal bilaterally which may be related to patchy meningeal enhancement  · CTA head and neck with without 1/16/2021:  Left parietal mass with vasogenic edema  Mediastinal, hilar, paratracheal and cervical adenopathy  Varying degrees of arthrosclerotic disease  Enlarged palatine tonsils and lingual tonsil  · CT chest abdomen pelvis with 1/17/21:  Mediastinal and right hilar adenopathy  No evidence of primary malignancy  Chronic splenic mass  Plan:  · Continue monitor neurological exam and symptoms  · Discussed need for tissue sample for pathology  Pulmonary / IR consult has been placed to see if pathology is able to be obtained from lymphadenopathy - pending  · If unable to obtain pathology from lymph nodes, discussed possibility for brain biopsy vs bronch  · Discussed meningeal enhancement and that MRI imaging does demonstrate CSF spread of tumor  Patient willing to complete LP with sedation/image guidance  CSF orders reviewed with primary team    · Discussed use of decadron to assist in symptoms control by decreasing edema once biopsy completed and ideally LP  · Discussed anticipated need for radiation to brain masses  Given concern for leptomeningeal disease, may require WBRT  Neurosurgery will continue to follow  Call with any questions/concerns

## 2021-01-20 NOTE — PROGRESS NOTES
Patient arrived to IR for lumbar puncture and cervical lymph node biopsy    The procedure and risks were discussed with the patient  All questions were answered  Informed consent was obtained

## 2021-01-20 NOTE — ASSESSMENT & PLAN NOTE
· Patient gives history of 4 weeks of dizziness  Patient was initially diagnosed with vertigo as an outpatient  Patient continued to have persistent dizziness and he was seen in the emergency room yesterday noted to have intracranial mass  · Patient came back to the hospital after leaving the hospital AMA and will be admitted to the hospital   · Consult Neurosurgery  · Frequent neuro checks  · MRI brain  - Metastatic disease, with 2 Parenchymal lesions, 2 1 and 5 mm in the greatest linear dimension  There is diffuse enhancement of the internal auditory canals bilaterally, which warrants lumbar puncture to exclude leptomeningeal tumor  · CT C/A/P with no significant primary source noted  · MRI C/T/L spine - metastatic disease  · Plan is for LP and lymph node biopsy today - IR will perform under sedation   · D/W Dr Sylwia Moy today, he would like to see some of her prelim path, hopefully tomorrow, before starting steroids   Then hopefully can go home by Friday  · Will also likely need radiation at some point

## 2021-01-20 NOTE — ASSESSMENT & PLAN NOTE
Lab Results   Component Value Date    HGBA1C 5 3 01/05/2021       Recent Labs     01/19/21  1708 01/19/21  2122 01/20/21  0723 01/20/21  1112   POCGLU 97 146* 113 100       Blood Sugar Average: Last 72 hrs:  (P) 224 [id number]   Oral medications on hold  Continue Lantus and correctional insulin

## 2021-01-20 NOTE — DISCHARGE INSTRUCTIONS
Lumbar Puncture     WHAT YOU NEED TO KNOW:   Lumbar puncture (LP) is a procedure in which a needle is inserted in your back and into your spinal canal  This is usually done to collect cerebrospinal fluid (CSF) to check for an infection, inflammation, bleeding, or other conditions that affect the brain  CSF is a clear, protective fluid that flows around the brain and inside the spinal canal  LP may also be done to remove CSF to reduce pressure in the brain  DISCHARGE INSTRUCTIONS:     Follow up with your healthcare provider as directed: Write down your questions so you remember to ask them during your visits  Post-lumbar puncture headache: You may develop a headache during the first few hours after your LP that may last for several days  The headache may be mild to severe and may get worse when you sit or stand  The following may help ease a post-lumbar puncture headache:  · Drink plenty of liquids: You should drink more liquid than usual after your LP  Ask how much liquid is right for you  Caffeine may be used to treat a headache  Drinks, such as coffee, tea, or some sodas, have caffeine  Ask a Do not drink alcohol  · Lie down: If you have a headache after your lumbar puncture, it may be helpful to lie down and rest   · You may have a slight soreness over the LP area  This is normal   · Remove the band aid or dressing in 24 hours  · Contact Interventional Radiology imediately  at [phone] Kyle[place] PATIENTS: Contact Interventional Radiology at [phone]) Elisabeth Lowry PATIENTS: Contact Interventional Radiology at [phone]) if any of the following occur:  · You have a severe headache that does not get better after you lie down  · Persistent nausea or vomiting   · You have a fever  · You have a stiff neck or have trouble thinking clearly  · Your legs, feet, or other parts below the waist feel numb, tingly, or weak     · You have bleeding or a discharge coming from the area where the needle was put into your back  · You have severe pain in your back or neck  POST BIOPSY    Care after your procedure:    1  Limit your activities for 24 hours after your biopsy  2  No driving day of biopsy  3  Return to your normal diet  Small sips of flat soda will help with mild nausea  4  Remove band-aid or dressing 24 hours after procedure  Contact Interventional Radiology at [phone] [place] PATIENTS: Contact Interventional Radiology at [phone]) Eleanor Morris PATIENTS: Contact Interventional Radiology at [phone]) if:    1  Difficulty breathing, nausea or vomiting  2  Chills or fever above 101 degrees F      3  Pain at biopsy site not relieved by medication  4  Develop any redness, swelling, heat, unusual drainage, heavy bruising or bleeding from biopsy site

## 2021-01-20 NOTE — SEDATION DOCUMENTATION
Lumbar puncture done by Dr Martins Fossa  9 mL clear CSF collected and sent to lab  Patient tolerated well and VSS

## 2021-01-20 NOTE — ASSESSMENT & PLAN NOTE
Lab Results   Component Value Date    HGBA1C 5 3 01/05/2021       Recent Labs     01/19/21  1102 01/19/21  1708 01/19/21  2122 01/20/21  0723   POCGLU 215* 97 146* 113       Blood Sugar Average: Last 72 hrs:  (P) 133 6     Medically management per primary team

## 2021-01-20 NOTE — BRIEF OP NOTE (RAD/CATH)
INTERVENTIONAL RADIOLOGY PROCEDURE NOTE    Date: 1/20/2021    Procedure: lumbar puncture and aspiration biopsy of cervical lymph node    Preoperative diagnosis:   1  Brain cancer ([facility] [address] )    2  Dizziness    3  Low back pain    4  Lymphadenopathy    5  [facility] ([facility] )         Postoperative diagnosis: Same  Surgeon: Bebe Patterson DO     Assistant: None  No qualified resident was available  Blood loss: minimal    Specimens:9 cc csf and 6 27g aspiration biopsies    Findings: adequate    Complications: None immediate      Anesthesia: conscious sedation

## 2021-01-20 NOTE — PROGRESS NOTES
Progress Note - Abhijeet Martinez 1960, 62 y o  female MRN: [id number]    Unit/Bed#: [facility] 923-01 Encounter: [id number]    Primary Care Provider: Az Price DO   Date and time admitted to hospital: 1/17/2021  1:40 PM        * Intracranial mass  Assessment & Plan  · Patient gives history of 4 weeks of dizziness  Patient was initially diagnosed with vertigo as an outpatient  Patient continued to have persistent dizziness and he was seen in the emergency room yesterday noted to have intracranial mass  · Patient came back to the hospital after leaving the hospital AMA and will be admitted to the hospital   · Consult Neurosurgery  · Frequent neuro checks  · MRI brain  - Metastatic disease, with 2 Parenchymal lesions, 2 1 and 5 mm in the greatest linear dimension  There is diffuse enhancement of the internal auditory canals bilaterally, which warrants lumbar puncture to exclude leptomeningeal tumor  · CT C/A/P with no significant primary source noted  · MRI C/T/L spine - metastatic disease  · Plan is for LP and lymph node biopsy today - IR will perform under sedation   · D/W Dr Thelma Kennedy today, he would like to see some of her prelim path, hopefully tomorrow, before starting steroids  Then hopefully can go home by Friday  · Will also likely need radiation at some point       Sciatica of left side  Assessment & Plan  · C/O left sided sciatic pain mostly at night  Odd presentation as pain resolves during the day   Now suspect metastatic disease given findings on MRIs  · No destructive lesions noted on CT scan  · Patient does not want to use narcotics if possible  · Continue gabapentin 100 mg after dinner  · Continue Tylenol and lidocaine patches    Controlled type 2 diabetes mellitus without complication [facility])  Assessment & Plan  Lab Results   Component Value Date    HGBA1C 5 3 01/05/2021       Recent Labs     01/19/21  1708 01/19/21  2122 01/20/21  0723 01/20/21  1112   POCGLU 97 146* 113 100       Blood Sugar Average: Last 72 hrs:  (P) 555 [id number]   Oral medications on hold  Continue Lantus and correctional insulin    Chronic diastolic HF (heart failure) (HCC)  Assessment & Plan  Wt Readings from Last 3 Encounters:   21 75 kg (165 lb 5 5 oz)   21 75 6 kg (166 lb 9 6 oz)   20 76 1 kg (167 lb 12 8 oz)     Patient appears to be euvolemic on examination  Does not take Lasix as outpatient  Echo 1 year ago showed an EF of 41% grade 1 diastolic dysfunction      Essential hypertension  Assessment & Plan  · Blood pressure acceptable  · Continue with the outpatient medication    CAD in native artery  Assessment & Plan  · Patient with known history of type 2 diabetes mellitus and is maintained on oral hypoglycemic agents  · Hold oral agents while inpatient  Continue with the insulin sliding scan  · Monitor blood sugar    Tobacco use  Assessment & Plan  · Cessation advised    Mixed hyperlipidemia  Assessment & Plan  · Continue statin      VTE Pharmacologic Prophylaxis:   Pharmacologic: On hold for possible procedures  Mechanical VTE Prophylaxis in Place: No    Patient Centered Rounds: I have performed bedside rounds with nursing staff today  Discussions with Specialists or Other Care Team Provider: Dr Alex Angeles     Education and Discussions with Family / Patient: patient    Time Spent for Care: 30 minutes  More than 50% of total time spent on counseling and coordination of care as described above  Current Length of Stay: 3 day(s)    Current Patient Status: Inpatient   Certification Statement: The patient will continue to require additional inpatient hospital stay due to see above    Discharge Plan: Hopefully in next 48 hours per Neurosurgery    Code Status: Level 1 - Full Code      Subjective:   Leg pain was not as severe last evening  Tearful about MRI findings       Objective:     Vitals:   Temp (24hrs), Av 4 °F (36 9 °C), Min:98 4 °F (36 9 °C), Max:98 4 °F (36 9 °C)    Temp:  [98 4 °F (36 9 °C)] 98 4 °F (36 9 °C)  HR:  [64-81] 76  Resp:  [15-22] 18  BP: (124-203)/(71-98) 146/89  SpO2:  [95 %-99 %] 96 %  Body mass index is 32 29 kg/m²  Input and Output Summary (last 24 hours): Intake/Output Summary (Last 24 hours) at 1/20/2021 1439  Last data filed at 1/20/2021 0818  Gross per 24 hour   Intake 320 ml   Output    Net 320 ml       Physical Exam:     Physical Exam  Constitutional:       Appearance: Normal appearance  Cardiovascular:      Rate and Rhythm: Normal rate and regular rhythm  Heart sounds: No murmur  Pulmonary:      Effort: Pulmonary effort is normal       Breath sounds: Normal breath sounds  Abdominal:      General: Bowel sounds are normal  There is no distension  Palpations: Abdomen is soft  Tenderness: There is no abdominal tenderness  Skin:     General: Skin is warm and dry  Neurological:      General: No focal deficit present  Mental Status: She is alert and oriented to person, place, and time  Psychiatric:         Mood and Affect: Mood normal          Additional Data:     Labs:    Results from last 7 days   Lab Units 01/16/21  0606   WBC Thousand/uL 6 97   HEMOGLOBIN g/dL 11 2*   HEMATOCRIT % 36 0   PLATELETS Thousands/uL 346   NEUTROS PCT % 65   LYMPHS PCT % 24   MONOS PCT % 7   EOS PCT % 2     Results from last 7 days   Lab Units 01/16/21  0606   SODIUM mmol/L 136   POTASSIUM mmol/L 4 0   CHLORIDE mmol/L 100   CO2 mmol/L 29   BUN mg/dL 16   CREATININE mg/dL 0 66   ANION GAP mmol/L 7   CALCIUM mg/dL 9 7   GLUCOSE RANDOM mg/dL 142*     Results from last 7 days   Lab Units 01/20/21  1007   INR  0 99     Results from last 7 days   Lab Units 01/20/21  1112 01/20/21  0723 01/19/21  2122 01/19/21  1708 01/19/21  1102 01/19/21  0756 01/18/21 2120 01/18/21  1732 01/18/21  1127 01/18/21  0754 01/17/21  2053   POC GLUCOSE mg/dl 100 113 146* 97 215* 125 159* 105 132 91 153*                   * I Have Reviewed All Lab Data Listed Above    * Additional Pertinent Lab Tests Reviewed: All Labs Within Last 24 Hours Reviewed    Imaging:    Imaging Reports Reviewed Today Include: MRI C/T/L spine  Imaging Personally Reviewed by Myself Includes:  none    Recent Cultures (last 7 days):           Last 24 Hours Medication List:   Current Facility-Administered Medications   Medication Dose Route Frequency Provider Last Rate    acetaminophen  975 mg Oral Q8H PRN Abhilash Collins PA-C      albuterol  2 5 mg Nebulization Q4H PRN Liane Solis MD      amLODIPine  10 mg Oral Daily Liane Solis MD      atorvastatin  20 mg Oral Daily Liane Solis MD      cyclobenzaprine  10 mg Oral HS Liane Solis MD      docusate sodium  100 mg Oral BID Liane Solis MD      fentanyl citrate (PF)   Intravenous Code/Trauma/Sedation Med Tom Inman DO      gabapentin  100 mg Oral After [employer]      insulin glargine  5 Units Subcutaneous HS Farzana Hyatt PA-C      insulin lispro  1-5 Units Subcutaneous TID [facility] Liane Solis MD      insulin lispro  1-5 Units Subcutaneous HS Liane Solis MD      lidocaine  2 patch Topical Daily Radha Patricio PA-C      LORazepam  0 5 mg Oral Once PRN Farzana Hyatt PA-C      melatonin  3 mg Oral HS Marcelo Banks PA-C      menthol-methyl salicylate   Apply externally 4x Daily PRN Farzana Hyatt PA-C      metoprolol succinate  50 mg Oral Daily Liane Solis MD      midazolam    Code/Trauma/Sedation Med Tom Inman DO      nicotine  1 patch Transdermal Daily Liane Solis MD      ondansetron  4 mg Intravenous Q6H PRN Liane Solis MD          Today, Patient Was Seen By: Farzana Hyatt PA-C    ** Please Note: Dictation voice to text software may have been used in the creation of this document   **

## 2021-01-20 NOTE — PROGRESS NOTES
Progress Note - Baljit Reece 1960, 62 y o  female MRN: [id number]    Unit/Bed#: [facility] 923-01 Encounter: [id number]    Primary Care Provider: Juan Collado DO   Date and time admitted to hospital: 1/17/2021  1:40 PM        * Intracranial mass  Assessment & Plan  Left parasaggital and right convex brain mass  · P/w 4 weeks dizziness/off balance    Imaging:  · MRI brain with without contrast 1/18/21:  Peripherally enhancing 2 1 x 2 1 x 1 5 cm mass in the parasagittal left parietal parenchyma with associated vasogenic edema and focal mass effect  Second right frontal vertex mass approximately 5 mm homogeneously enhancing  Diffuse enhancement of the internal auditory canal bilaterally which may be related to patchy meningeal enhancement  · CTA head and neck with without 1/16/2021:  Left parietal mass with vasogenic edema  Mediastinal, hilar, paratracheal and cervical adenopathy  Varying degrees of arthrosclerotic disease  Enlarged palatine tonsils and lingual tonsil  · CT chest abdomen pelvis with 1/17/21:  Mediastinal and right hilar adenopathy  No evidence of primary malignancy  Chronic splenic mass  Plan:  · Continue monitor neurological exam and symptoms  · Discussed need for tissue sample for pathology  Pulmonary / IR consult has been placed to see if pathology is able to be obtained from lymphadenopathy - pending  · If unable to obtain pathology from lymph nodes, discussed possibility for brain biopsy vs bronch  · Discussed meningeal enhancement and that MRI imaging does demonstrate CSF spread of tumor  Patient willing to complete LP with sedation/image guidance  CSF orders reviewed with primary team    · Discussed use of decadron to assist in symptoms control by decreasing edema once biopsy completed and ideally LP  · Discussed anticipated need for radiation to brain masses  Given concern for leptomeningeal disease, may require WBRT  Neurosurgery will continue to follow    Call with any questions/concerns  Lumbar back pain with radiculopathy affecting left lower extremity  Assessment & Plan  Chronic scoliosis  · P/w LBP with anterolateral left thigh pain and intermittent numbness only at night  · Admits to 1 5 week saddle numbness - abnl sensation with wiping  · No gait/discoordination complaints  Imagine:   · CT CAP - S-shaped scoliosis  No noted masses  · MRI cervical / thoracic / lumbar spine wo w contrast 1/19/2021:  Enhancement noted throughout the spine consistent with CSF spread of tumor  · Lumbar spine with L4-5 severe face degenerative changes resulting in anterolisthesis and uncovering the posterior disc margin  Moderate left and mild right foraminal narrowing identified with mild central stenosis  Plan:   · Continue to monitor symptoms  · Pain control per primary team  Recommend prn opioid to be available at night  · Ongoing discussion with attending regarding if anything can be done given imaging results for pain relief in the setting of CSF spread of tumor    Cerebral edema [facility])  Assessment & Plan  See plan as above  Plan decadron after biopsy +/- LP    Sciatica of left side  Assessment & Plan  See plan above    Essential hypertension  Assessment & Plan  Goal normotensive  Continue medications as per primary team    Controlled type 2 diabetes mellitus without complication [facility])  Assessment & Plan  Lab Results   Component Value Date    HGBA1C 5 3 01/05/2021       Recent Labs     01/19/21  1102 01/19/21  1708 01/19/21  2122 01/20/21  0723   POCGLU 215* 97 146* 113       Blood Sugar Average: Last 72 hrs:  (P) 133 6     Medically management per primary team        Subjective/Objective   Chief Complaint: follow up intracranial mass    Subjective:  Patient complains of some dizziness with ambulation  Continues to have back pain with left thigh pain that is now happening during the day which is unusual for her    Lengthy discussion regarding her wishes, she states she has lived a long and full life, has come to terms with her diagnosis and is ready to go when her time comes  Objective:  Sitting in bed, NAD, watching TV    I/O       01/18 0701 - 01/19 0700 01/19 0701 - 01/20 0700 01/20 0701 - 01/21 0700    P  O  820 1040     Total Intake(mL/kg) 820 (10 9) 1040 (13 9)     Urine (mL/kg/hr)       Total Output       Net +820 +1040            Unmeasured Urine Occurrence 13 x 7 x     Unmeasured Stool Occurrence 1 x            Invasive Devices     Peripheral Intravenous Line            Peripheral IV 01/19/21 Right Forearm less than 1 day                Physical Exam:  Vitals: Blood pressure 150/82, pulse 64, temperature 98 4 °F (36 9 °C), resp  rate 18, height 5' (1 524 m), weight 75 kg (165 lb 5 5 oz), last menstrual period 08/16/2014, SpO2 98 %, not currently breastfeeding  ,Body mass index is 32 29 kg/m²        General appearance: alert, appears stated age, cooperative and no distress  Head: Normocephalic, without obvious abnormality, atraumatic  Eyes: conjugate gaze  Neck: supple, symmetrical, trachea midline  Lungs: non labored breathing  Heart: regular heart rate  Neurologic:   Mental status: Alert, oriented x3, follows commands, thought content appropriate  Cranial nerves: grossly intact (Cranial nerves II-XII)  Sensory: normal to LT  Motor: moving all extremities without focal weakness, strength 5/5 throughout  Reflexes:  No nascimento or clonus noted  Coordination: finger to nose normal bilaterally, no drift bilaterally      Lab Results:  Results from last 7 days   Lab Units 01/16/21  0606   WBC Thousand/uL 6 97   HEMOGLOBIN g/dL 11 2*   HEMATOCRIT % 36 0   PLATELETS Thousands/uL 346   NEUTROS PCT % 65   MONOS PCT % 7     Results from last 7 days   Lab Units 01/16/21  0606   POTASSIUM mmol/L 4 0   CHLORIDE mmol/L 100   CO2 mmol/L 29   BUN mg/dL 16   CREATININE mg/dL 0 66   CALCIUM mg/dL 9 7             Results from last 7 days   Lab Units 01/20/21  1007   INR  0 99 Imaging Studies: I have personally reviewed pertinent reports  and I have personally reviewed pertinent films in PACS    Xr Femur 2 Views Left    Result Date: 1/18/2021  Impression: No acute osseous abnormality  Workstation performed: [id number]     Mri Brain W Wo Contrast    Result Date: 1/18/2021  Impression: Metastatic disease, with 2 Parenchymal lesions, 2 1 and 5 mm in the greatest linear dimension  There is diffuse enhancement of the internal auditory canals bilaterally, which warrants lumbar puncture to exclude leptomeningeal tumor  The study was marked in EPIC for significant notification  Workstation performed: [id number]     Mri Cervical Spine W Wo Contrast    Result Date: 1/20/2021  Impression: Enhancement along the surface of the cervical and thoracic spinal cord compatible with CSF spread of tumor  Enhancement is seen at C3-4, and T2-3  Workstation performed: [id number]     Mri Thoracic Spine W Wo Contrast    Result Date: 1/20/2021  Impression: Enhancement along the nerve roots of the filum terminale and distal spinal cord seen best on series 25 images 8 through 11 compatible with CSF spread of tumor  Dextroscoliosis apex T8  No significant central or foraminal narrowing  Workstation performed: [id number]     Mri Lumbar Spine W Wo Contrast    Result Date: 1/20/2021  Impression: Innumerable foci of nodular enhancement along the filum terminale and distal spinal cord compatible CSF spread of tumor  Degenerative changes as described  Workstation performed: [id number]     Ct Chest Abdomen Pelvis W Contrast    Result Date: 1/17/2021  Impression: 1  Mediastinal and right hilar lymphadenopathy  2   Chronic splenic mass, slowly enlarging since 2017  This is almost certainly benign and the differential diagnosis includes hamartoma, hemorrhagic cyst, littoral cell angioma or lymphangioma  3   No evidence of primary malignancy in the chest, abdomen or pelvis   Workstation performed: [id number] EKG, Pathology, and Other Studies: I have personally reviewed pertinent reports        VTE Pharmacologic Prophylaxis: none ordered    VTE Mechanical Prophylaxis: sequential compression device

## 2021-01-20 NOTE — ASSESSMENT & PLAN NOTE
Chronic scoliosis  · P/w LBP with anterolateral left thigh pain and intermittent numbness only at night  · Admits to 1 5 week saddle numbness - abnl sensation with wiping  · No gait/discoordination complaints  Imagine:   · CT CAP - S-shaped scoliosis  No noted masses  · MRI cervical / thoracic / lumbar spine wo w contrast 1/19/2021:  Enhancement noted throughout the spine consistent with CSF spread of tumor  · Lumbar spine with L4-5 severe face degenerative changes resulting in anterolisthesis and uncovering the posterior disc margin  Moderate left and mild right foraminal narrowing identified with mild central stenosis  Plan:   · Continue to monitor symptoms  · Pain control per primary team  Recommend prn opioid to be available at night    · Ongoing discussion with attending regarding if anything can be done given imaging results for pain relief in the setting of CSF spread of tumor

## 2021-01-20 NOTE — SEDATION DOCUMENTATION
Cervical lymph node biopsy done by Dr Glo Huggins  Patient tolerated well and VSS  Report given to [facility], RN and patient to return to room

## 2021-01-21 LAB
BLASTS CSF MANUAL: 0 %
GLUCOSE SERPL-MCNC: 113 MG/DL (ref 65–140)
GLUCOSE SERPL-MCNC: 119 MG/DL (ref 65–140)
GLUCOSE SERPL-MCNC: 171 MG/DL (ref 65–140)
GLUCOSE SERPL-MCNC: 232 MG/DL (ref 65–140)
LYMPHOCYTES NFR CSF MANUAL: 100 %
PATHOLOGIST INTERPRETATION: NORMAL
PATHOLOGY REVIEW: YES
SCAN RESULT: NORMAL
TOTAL CELLS COUNTED SPEC: 100

## 2021-01-21 PROCEDURE — 82948 REAGENT STRIP/BLOOD GLUCOSE: CPT

## 2021-01-21 PROCEDURE — 99232 SBSQ HOSP IP/OBS MODERATE 35: CPT | Performed by: NEUROLOGICAL SURGERY

## 2021-01-21 PROCEDURE — 99233 SBSQ HOSP IP/OBS HIGH 50: CPT | Performed by: FAMILY MEDICINE

## 2021-01-21 PROCEDURE — 94760 N-INVAS EAR/PLS OXIMETRY 1: CPT

## 2021-01-21 RX ORDER — DEXAMETHASONE 2 MG/1
2 TABLET ORAL EVERY 6 HOURS SCHEDULED
Status: DISCONTINUED | OUTPATIENT
Start: 2021-01-21 | End: 2021-01-22 | Stop reason: HOSPADM

## 2021-01-21 RX ADMIN — CYCLOBENZAPRINE HYDROCHLORIDE 10 MG: 10 TABLET ORAL at 22:21

## 2021-01-21 RX ADMIN — ATORVASTATIN CALCIUM 20 MG: 20 TABLET, FILM COATED ORAL at 10:06

## 2021-01-21 RX ADMIN — METOPROLOL SUCCINATE 50 MG: 50 TABLET, EXTENDED RELEASE ORAL at 10:02

## 2021-01-21 RX ADMIN — GABAPENTIN 100 MG: 100 CAPSULE ORAL at 18:25

## 2021-01-21 RX ADMIN — INSULIN LISPRO 1 UNITS: 100 INJECTION, SOLUTION INTRAVENOUS; SUBCUTANEOUS at 22:22

## 2021-01-21 RX ADMIN — ACETAMINOPHEN 975 MG: 325 TABLET, FILM COATED ORAL at 07:53

## 2021-01-21 RX ADMIN — INSULIN LISPRO 2 UNITS: 100 INJECTION, SOLUTION INTRAVENOUS; SUBCUTANEOUS at 11:49

## 2021-01-21 RX ADMIN — DOCUSATE SODIUM 100 MG: 100 CAPSULE ORAL at 18:22

## 2021-01-21 RX ADMIN — DEXAMETHASONE 2 MG: 2 TABLET ORAL at 18:22

## 2021-01-21 RX ADMIN — MELATONIN 3 MG: at 22:22

## 2021-01-21 RX ADMIN — AMLODIPINE BESYLATE 10 MG: 10 TABLET ORAL at 10:03

## 2021-01-21 RX ADMIN — DOCUSATE SODIUM 100 MG: 100 CAPSULE ORAL at 10:06

## 2021-01-21 RX ADMIN — INSULIN GLARGINE 5 UNITS: 100 INJECTION, SOLUTION SUBCUTANEOUS at 22:21

## 2021-01-21 NOTE — PROGRESS NOTES
Progress Note - Janet Cos 1960, 62 y o  female MRN: [id number]    Unit/Bed#: [facility] 923-01 Encounter: [id number]    Primary Care Provider: Je Moore DO   Date and time admitted to hospital: 1/17/2021  1:40 PM        * Intracranial mass  Assessment & Plan  Left parasaggital and right convex brain mass  · P/w 4 weeks dizziness/off balance    Imaging:  · MRI brain with without contrast 1/18/21:  Peripherally enhancing 2 1 x 2 1 x 1 5 cm mass in the parasagittal left parietal parenchyma with associated vasogenic edema and focal mass effect  Second right frontal vertex mass approximately 5 mm homogeneously enhancing  Diffuse enhancement of the internal auditory canal bilaterally which may be related to patchy meningeal enhancement  · CTA head and neck with without 1/16/2021:  Left parietal mass with vasogenic edema  Mediastinal, hilar, paratracheal and cervical adenopathy  Varying degrees of arthrosclerotic disease  Enlarged palatine tonsils and lingual tonsil  · CT chest abdomen pelvis with 1/17/21:  Mediastinal and right hilar adenopathy  No evidence of primary malignancy  Chronic splenic mass  Plan:  · Continue monitor neurological exam and symptoms  · Discussed awaiting pathology to determine treatment options/plan  · Discussed meningeal enhancement and that 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  · Decadron started at 2mg PO Q6H given diabetes  Can plan slow wean after 3 days  2Q6, 2Q8, 2Q12, to off  Will defer outpatient decadron management to oncology team to takes over her care  · Discussed anticipated need for radiation to brain masses  Given concern for leptomeningeal disease, may require WBRT  · Discussed possible consideration for IT chemo pending pathology  · Case to be reviewed and will follow through [address] [place] [address]  Discussed case with neuro oncology nurse navigator  · No neurosurgical follow-up indicated at this junction   Will be happy to see patient as needed    Neurosurgery will sign-off  Call with any questions/concerns  Sciatica of left side  Assessment & Plan  See plan above  Essential hypertension  Assessment & Plan  Goal normotensive  Continue medications as per primary team    Controlled type 2 diabetes mellitus without complication [facility])  Assessment & Plan  Lab Results   Component Value Date    HGBA1C 5 3 01/05/2021       Recent Labs     01/20/21  1639 01/20/21  2048 01/21/21  0800 01/21/21  1131   POCGLU 77 137 113 232*       Blood Sugar Average: Last 72 hrs:  (P) 131 [id number]     Medically management per primary team        Subjective/Objective   Chief Complaint:  I am doing okay    Subjective:  Patient continues with dizziness  She noted change in hearing right ear described as echoing  Denies any ringing  Denies any vision or speech changes  Patient noted less back and thigh pain at night but noted some mild pain rated 4-5/10 up during the early morning hours  This pain completely resolved with the use of Tylenol 975 mg  She denies any new weakness or sensory changes  Continues with abnormal saddle sensation without any worsening  No incontinence  Objective: sitting up in chair  NAD  I/O       01/19 0701 - 01/20 0700 01/20 0701 - 01/21 0700 01/21 0701 - 01/22 0700    P  O  1040 420 360    I V  (mL/kg)  250 (3 3)     Total Intake(mL/kg) 1040 (13 9) 670 (8 9) 360 (4 8)    Net +1040 +670 +360           Unmeasured Urine Occurrence 7 x 6 x           Invasive Devices     Peripheral Intravenous Line            Peripheral IV 01/19/21 Right Forearm 1 day                Physical Exam:  Vitals: Blood pressure 137/75, pulse 73, temperature 97 7 °F (36 5 °C), resp  rate 18, height 5' (1 524 m), weight 75 kg (165 lb 5 5 oz), last menstrual period 08/16/2014, SpO2 97 %, not currently breastfeeding  ,Body mass index is 32 29 kg/m²      General appearance: alert, appears stated age, cooperative and no distress  Head: Normocephalic, without obvious abnormality, atraumatic  Eyes: EOMI, PERRL  Neck: supple, symmetrical, trachea midline   Lungs: non labored breathing  Heart: regular heart rate  Neurologic:   Mental status: Alert, oriented, thought content appropriate  Cranial nerves: grossly intact (Cranial nerves II-XII)  Sensory: normal to LT  Motor: moving all extremities without focal weakness   Reflexes: 2+ and symmetric  Coordination: finger to nose normal bilaterally, no drift bilaterally    Lab Results:  Results from last 7 days   Lab Units 01/16/21  0606   WBC Thousand/uL 6 97   HEMOGLOBIN g/dL 11 2*   HEMATOCRIT % 36 0   PLATELETS Thousands/uL 346   NEUTROS PCT % 65   MONOS PCT % 7     Results from last 7 days   Lab Units 01/16/21  0606   POTASSIUM mmol/L 4 0   CHLORIDE mmol/L 100   CO2 mmol/L 29   BUN mg/dL 16   CREATININE mg/dL 0 66   CALCIUM mg/dL 9 7             Results from last 7 days   Lab Units 01/20/21  1007   INR  0 99     No results found for: TROPONINT  ABG:No results found for: PHART, ZRN8OBE, PO2ART, FXW4MBG, S9NORZKI, BEART, SOURCE    Imaging Studies: I have personally reviewed pertinent reports  and I have personally reviewed pertinent films in PACS    Mri Brain W Wo Contrast    Result Date: 1/18/2021  Impression: Metastatic disease, with 2 Parenchymal lesions, 2 1 and 5 mm in the greatest linear dimension  There is diffuse enhancement of the internal auditory canals bilaterally, which warrants lumbar puncture to exclude leptomeningeal tumor  The study was marked in EPIC for significant notification  Workstation performed: [id number]     Mri Cervical Spine W Wo Contrast    Result Date: 1/20/2021  Impression: Enhancement along the surface of the cervical and thoracic spinal cord compatible with CSF spread of tumor  Enhancement is seen at C3-4, and T2-3   Workstation performed: [id number]     Mri Thoracic Spine W Wo Contrast    Result Date: 1/20/2021  Impression: Enhancement along the nerve roots of the filum terminale and distal spinal cord seen best on series 25 images 8 through 11 compatible with CSF spread of tumor  Dextroscoliosis apex T8  No significant central or foraminal narrowing  Workstation performed: [id number]     Mri Lumbar Spine W Wo Contrast    Result Date: 1/20/2021  Impression: Innumerable foci of nodular enhancement along the filum terminale and distal spinal cord compatible CSF spread of tumor  Degenerative changes as described  Workstation performed: [id number]     Ct Chest Abdomen Pelvis W Contrast    Result Date: 1/17/2021  Impression: 1  Mediastinal and right hilar lymphadenopathy  2   Chronic splenic mass, slowly enlarging since 2017  This is almost certainly benign and the differential diagnosis includes hamartoma, hemorrhagic cyst, littoral cell angioma or lymphangioma  3   No evidence of primary malignancy in the chest, abdomen or pelvis  Workstation performed: [id number]     EKG, Pathology, and Other Studies: I have personally reviewed pertinent reports        VTE Pharmacologic Prophylaxis: okay to start from nsx standpoint    VTE Mechanical Prophylaxis: sequential compression device

## 2021-01-21 NOTE — UTILIZATION REVIEW
Continued Stay Review    Date: 1/21/21                          Current Patient Class: IP  Current Level of Care: MS    HPI:60 y o  female initially admitted on 1/17 with  intracranial mass and dizziness    Assessment/Plan:   Pt has IR procedure 1/20 for LP and aspiration biopsy of cervical lymph node  Awaiting results  Will look at prelliminary path reports and may start steroids 1/21  She will probably need RT as part of treatment plan  Sugars are fairly well controlled  Intermittent hypertension        Pertinent Labs/Diagnostic Results:       Results from last 7 days   Lab Units 01/16/21  0606   WBC Thousand/uL 6 97   HEMOGLOBIN g/dL 11 2*   HEMATOCRIT % 36 0   PLATELETS Thousands/uL 346   NEUTROS ABS Thousands/µL 4 56         Results from last 7 days   Lab Units 01/16/21  0606   SODIUM mmol/L 136   POTASSIUM mmol/L 4 0   CHLORIDE mmol/L 100   CO2 mmol/L 29   ANION GAP mmol/L 7   BUN mg/dL 16   CREATININE mg/dL 0 66   EGFR ml/min/1 73sq m 112   CALCIUM mg/dL 9 7         Results from last 7 days   Lab Units 01/21/21  1131 01/21/21  0800 01/20/21  2048 01/20/21  1639 01/20/21  1112 01/20/21  0723 01/19/21  2122 01/19/21  1708 01/19/21  1102 01/19/21  0756 01/18/21  2120 01/18/21  1732   POC GLUCOSE mg/dl 232* 113 137 77 100 113 146* 97 215* 125 159* 105     Results from last 7 days   Lab Units 01/16/21  0606   GLUCOSE RANDOM mg/dL 142*     Results from last 7 days   Lab Units 01/20/21  1007   PROTIME seconds 13 1   INR  0 99     Results from last 7 days   Lab Units 01/16/21  0647   CLARITY UA  Clear   COLOR UA  Yellow   SPEC GRAV UA  <=1 005   PH UA  6 5   GLUCOSE UA mg/dl Negative   KETONES UA mg/dl Negative   BLOOD UA  Negative   PROTEIN UA mg/dl Negative   NITRITE UA  Negative   BILIRUBIN UA  Negative   UROBILINOGEN UA E U /dl 0 2   LEUKOCYTES UA  Negative     Results from last 7 days   Lab Units 01/20/21  1423   GRAM STAIN RESULT  4+ Mononuclear Cells  Rare Polys  No bacteria seen     Results from last 7 days   Lab Units 01/20/21  1424 01/20/21  1423   APPEARANCE CSF   --  Clear   TUBE NUM CSF   --  4   WBC CSF /uL  --  75*   XANTHOCHROMIA   --  No   LYMPHS % (CSF) %  --  100   GLUCOSE CSF mg/dL  --  46*   PROTEIN CSF mg/dL  --  186*   RBC CSF uL  --  8   CSF CULTURE  No growth  --      Vital Signs:   01/21/21 1058            97 %  None (Room air)   01/21/21 08:02:17  97 7 °F (36 5 °C)    18  174/94Abnormal   121       01/20/21 22:07:34  98 5 °F (36 9 °C)  73  20  150/78  102  97 %     01/20/21 15:52:41  98 3 °F (36 8 °C)  65  16  148/84  105  99 %     01/20/21 1504    68  18  138/71  97  96 %  None (Room air)   01/20/21 1459    70  18  138/73  99  96 %  None (Room air)   01/20/21 1454    69  20  148/77  103  96 %  None (Room air)   01/20/21 1449    68  18  132/74  93  94 %  None (Room air)   01/20/21 1444    69  18  139/71  99  95 %  None (Room air)   01/20/21 1439    70  17  156/87  115  97 %  None (Room air)   01/20/21 1434    76  16  146/89  112  96 %  None (Room air)   01/20/21 1429    74  18  134/77  101  97 %  None (Room air)   01/20/21 1424    73  16  124/71  92  97 %  None (Room air)   01/20/21 1419    73  18  148/74  104  96 %  None (Room air)   01/20/21 1414    72  15  151/75  103  95 %  None (Room air)   01/20/21 1409    77  22  203/94Abnormal   136  99 %  None (Room air)   01/20/21 1407    81  18  200/98Abnormal   140  96 %  None (Room air)   01/20/21 0818            98 %  None (Room air)   01/20/21 07:25:58  98 4 °F (36 9 °C)    18  150/82  105       01/19/21 22:16:38  98 4 °F (36 9 °C)  64  20  153/86  108  99 %     01/19/21 15:03:11  98 4 °F (36 9 °C)  68  20  156/87  110  99 %     01/19/21 0927    76             01/19/21 09:25:47        159/91  114       01/19/21 0745            97 %  None (Room      Medications:   Scheduled Medications:  amLODIPine, 10 mg, Oral, Daily  atorvastatin, 20 mg, Oral, Daily  cyclobenzaprine, 10 mg, Oral, HS  docusate sodium, 100 mg, Oral, BID  gabapentin, 100 mg, Oral, After Dinner  insulin glargine, 5 Units, Subcutaneous, HS  insulin lispro, 1-5 Units, Subcutaneous, TID AC  insulin lispro, 1-5 Units, Subcutaneous, HS  lidocaine, 2 patch, Topical, Daily  melatonin, 3 mg, Oral, HS  metoprolol succinate, 50 mg, Oral, Daily  nicotine, 1 patch, Transdermal, Daily      Continuous IV Infusions:     PRN Meds:  acetaminophen, 975 mg, Oral, Q8H PRN - x 1 1/20, 1/21  albuterol, 2 5 mg, Nebulization, Q4H PRN  LORazepam, 0 5 mg, Oral, Once PRN  menthol-methyl salicylate, , Apply externally, 4x Daily PRN  ondansetron, 4 mg, Intravenous, Q6H PRN    Discharge Plan: home     Network Utilization Review Department  ATTENTION: Please call with any questions or concerns to [phone] and carefully listen to the prompts so that you are directed to the right person  All voicemails are confidential   Debra Barrientos all requests for admission clinical reviews, approved or denied determinations and any other requests to dedicated fax number below belonging to the campus where the patient is receiving treatment   List of dedicated fax numbers for the Facilities:  [address] DENIALS (Administrative/Medical Necessity) [phone]   [address] (Maternity/NICU/Pediatrics) [phone]   [address] Bernadette Sanchez 2299 (  C[facility] Garcia [facility] 103) [address] [facility] [address] Nadia Sing[place] 1481 [address] Macie Iverson) [address] [place] [address] 951 [phone]

## 2021-01-21 NOTE — PLAN OF CARE
Problem: Potential for Falls  Goal: Patient will remain free of falls  Description: INTERVENTIONS:  - Assess patient frequently for physical needs  -  Identify cognitive and physical deficits and behaviors that affect risk of falls    -  [place] fall precautions as indicated by assessment   - Educate patient/family on patient safety including physical limitations  - Instruct patient to call for assistance with activity based on assessment  - Modify environment to reduce risk of injury  - Consider OT/PT consult to assist with strengthening/mobility  Outcome: Progressing     Problem: PAIN - ADULT  Goal: Verbalizes/displays adequate comfort level or baseline comfort level  Description: Interventions:  - Encourage patient to monitor pain and request assistance  - Assess pain using appropriate pain scale  - Administer analgesics based on type and severity of pain and evaluate response  - Implement non-pharmacological measures as appropriate and evaluate response  - Consider cultural and social influences on pain and pain management  - Notify physician/advanced practitioner if interventions unsuccessful or patient reports new pain  Outcome: Progressing     Problem: INFECTION - ADULT  Goal: Absence or prevention of progression during hospitalization  Description: INTERVENTIONS:  - Assess and monitor for signs and symptoms of infection  - Monitor lab/diagnostic results  - Monitor all insertion sites, i e  indwelling lines, tubes, and drains  - Monitor endotracheal if appropriate and nasal secretions for changes in amount and color  - [place] appropriate cooling/warming therapies per order  - Administer medications as ordered  - Instruct and encourage patient and family to use good hand hygiene technique  - Identify and instruct in appropriate isolation precautions for identified infection/condition  Outcome: Progressing  Goal: Absence of fever/infection during neutropenic period  Description: INTERVENTIONS:  - Monitor WBC    Outcome: Progressing     Problem: SAFETY ADULT  Goal: Patient will remain free of falls  Description: INTERVENTIONS:  - Assess patient frequently for physical needs  -  Identify cognitive and physical deficits and behaviors that affect risk of falls    -  [place] fall precautions as indicated by assessment   - Educate patient/family on patient safety including physical limitations  - Instruct patient to call for assistance with activity based on assessment  - Modify environment to reduce risk of injury  - Consider OT/PT consult to assist with strengthening/mobility  Outcome: Progressing  Goal: Maintain or return to baseline ADL function  Description: INTERVENTIONS:  -  Assess patient's ability to carry out ADLs; assess patient's baseline for ADL function and identify physical deficits which impact ability to perform ADLs (bathing, care of mouth/teeth, toileting, grooming, dressing, etc )  - Assess/evaluate cause of self-care deficits   - Assess range of motion  - Assess patient's mobility; develop plan if impaired  - Assess patient's need for assistive devices and provide as appropriate  - Encourage maximum independence but intervene and supervise when necessary  - Involve family in performance of ADLs  - Assess for home care needs following discharge   - Consider OT consult to assist with ADL evaluation and planning for discharge  - Provide patient education as appropriate  Outcome: Progressing  Goal: Maintain or return mobility status to optimal level  Description: INTERVENTIONS:  - Assess patient's baseline mobility status (ambulation, transfers, stairs, etc )    - Identify cognitive and physical deficits and behaviors that affect mobility  - Identify mobility aids required to assist with transfers and/or ambulation (gait belt, sit-to-stand, lift, walker, cane, etc )  - [place] fall precautions as indicated by assessment  - Record patient progress and toleration of activity level on Mobility SBAR; progress patient to next Phase/Stage  - Instruct patient to call for assistance with activity based on assessment  - Consider rehabilitation consult to assist with strengthening/weightbearing, etc   Outcome: Progressing     Problem: DISCHARGE PLANNING  Goal: Discharge to home or other facility with appropriate resources  Description: INTERVENTIONS:  - Identify barriers to discharge w/patient and caregiver  - Arrange for needed discharge resources and transportation as appropriate  - Identify discharge learning needs (meds, wound care, etc )  - Arrange for interpretive services to assist at discharge as needed  - Refer to Case Management Department for coordinating discharge planning if the patient needs post-hospital services based on physician/advanced practitioner order or complex needs related to functional status, cognitive ability, or social support system  Outcome: Progressing     Problem: Knowledge Deficit  Goal: Patient/family/caregiver demonstrates understanding of disease process, treatment plan, medications, and discharge instructions  Description: Complete learning assessment and assess knowledge base    Interventions:  - Provide teaching at level of understanding  - Provide teaching via preferred learning methods  Outcome: Progressing

## 2021-01-21 NOTE — PROGRESS NOTES
Progress Note - Cristian Dominique 1960, 62 y o  female MRN: [id number]    Unit/Bed#: [facility] 923-01 Encounter: [id number]    Primary Care Provider: Lenny Elizondo DO   Date and time admitted to hospital: 1/17/2021  1:40 PM        * Intracranial mass  Assessment & Plan  · Patient gives history of 4 weeks of dizziness  Patient was initially diagnosed with vertigo as an outpatient  Patient continued to have persistent dizziness and he was seen in the emergency room yesterday noted to have intracranial mass  · Patient came back to the hospital after leaving the hospital AMA and will be admitted to the hospital   · Consult Neurosurgery  · Frequent neuro checks  · MRI brain  - Metastatic disease, with 2 Parenchymal lesions, 2 1 and 5 mm in the greatest linear dimension  There is diffuse enhancement of the internal auditory canals bilaterally, which warrants lumbar puncture to exclude leptomeningeal tumor  · CT C/A/P with no significant primary source noted  · MRI C/T/L spine - metastatic disease  · Plan is for LP and lymph node biopsy today - IR will perform under sedation   · Pending Pathology, will need to start steroid by neurosurgery  Hopefully discharge by Friday  · Will also likely need radiation at some point       Sciatica of left side  Assessment & Plan  · C/O left sided sciatic pain mostly at night  Odd presentation as pain resolves during the day  Now suspect metastatic disease given findings on MRIs  · No destructive lesions noted on CT scan  · Patient does not want to use narcotics if possible  · Continue gabapentin 100 mg after dinner  · Continue Tylenol and lidocaine patches    CAD in native artery  Assessment & Plan  · Patient with known history of type 2 diabetes mellitus and is maintained on oral hypoglycemic agents  · Hold oral agents while inpatient  Continue with the insulin sliding scan      · Monitor blood sugar    Chronic diastolic HF (heart failure) (HCC)  Assessment & Plan  Wt Readings from Last 3 Encounters:   21 75 kg (165 lb 5 5 oz)   21 75 6 kg (166 lb 9 6 oz)   20 76 1 kg (167 lb 12 8 oz)     Patient appears to be euvolemic on examination  Does not take Lasix as outpatient  Echo 1 year ago showed an EF of 77% grade 1 diastolic dysfunction      Essential hypertension  Assessment & Plan  · Blood pressure acceptable  · Continue with the outpatient medication    Controlled type 2 diabetes mellitus without complication [facility])  Assessment & Plan  Lab Results   Component Value Date    HGBA1C 5 3 2021       Recent Labs     21  1112 21  1639 21  2048 21  0800   POCGLU 100 77 137 113       Blood Sugar Average: Last 72 hrs:  (P) 918 [id number]   Oral medications on hold  Continue Lantus and correctional insulin         VTE Pharmacologic Prophylaxis:   Pharmacologic: Pharmacologic VTE Prophylaxis contraindicated due to intracranial mass  Mechanical VTE Prophylaxis in Place: Yes    Patient Centered Rounds: I have performed bedside rounds with nursing staff today  Discussions with Specialists or Other Care Team Provider: neurosurgery    Education and Discussions with Family / Patient: patient at bedside     Current Length of Stay: 4 day(s)    Current Patient Status: Inpatient   Certification Statement: The patient will continue to require additional inpatient hospital stay due to pending pathology result  Discharge Plan: discharge home when medically clear     Code Status: Level 1 - Full Code      Subjective:   Patient examined at bedside  Patient denies any headache, nausea, vomiting, blurry vision  Patient is ambulating in the room without any difficulty      Objective:     Vitals:   Temp (24hrs), Av 2 °F (36 8 °C), Min:97 7 °F (36 5 °C), Max:98 5 °F (36 9 °C)    Temp:  [97 7 °F (36 5 °C)-98 5 °F (36 9 °C)] 97 7 °F (36 5 °C)  HR:  [65-81] 73  Resp:  [15-22] 18  BP: (124-203)/(71-98) 174/94  SpO2:  [94 %-99 %] 97 %  Body mass index is 32 29 kg/m²  Input and Output Summary (last 24 hours): Intake/Output Summary (Last 24 hours) at 1/21/2021 1122  Last data filed at 1/21/2021 0524  Gross per 24 hour   Intake 670 ml   Output    Net 670 ml       Physical Exam:     Physical Exam  Constitutional:       Appearance: She is well-developed  HENT:      Head: Normocephalic and atraumatic  Neck:      Musculoskeletal: Normal range of motion  Pulmonary:      Effort: Pulmonary effort is normal  No respiratory distress  Breath sounds: Normal breath sounds  Skin:     General: Skin is warm and dry             Labs:    Results from last 7 days   Lab Units 01/16/21  0606   WBC Thousand/uL 6 97   HEMOGLOBIN g/dL 11 2*   HEMATOCRIT % 36 0   PLATELETS Thousands/uL 346   NEUTROS PCT % 65   LYMPHS PCT % 24   MONOS PCT % 7   EOS PCT % 2     Results from last 7 days   Lab Units 01/16/21  0606   SODIUM mmol/L 136   POTASSIUM mmol/L 4 0   CHLORIDE mmol/L 100   CO2 mmol/L 29   BUN mg/dL 16   CREATININE mg/dL 0 66   ANION GAP mmol/L 7   CALCIUM mg/dL 9 7   GLUCOSE RANDOM mg/dL 142*     Results from last 7 days   Lab Units 01/20/21  1007   INR  0 99     Results from last 7 days   Lab Units 01/21/21  0800 01/20/21  2048 01/20/21  1639 01/20/21  1112 01/20/21  0723 01/19/21  2122 01/19/21  1708 01/19/21  1102 01/19/21  0756 01/18/21  2120 01/18/21  1732 01/18/21  1127   POC GLUCOSE mg/dl 113 137 77 100 113 146* 97 215* 125 159* 105 132                  Recent Cultures (last 7 days):     Results from last 7 days   Lab Units 01/20/21  1423   GRAM STAIN RESULT  4+ Mononuclear Cells  Rare Polys  No bacteria seen       Last 24 Hours Medication List:   Current Facility-Administered Medications   Medication Dose Route Frequency Provider Last Rate    acetaminophen  975 mg Oral Q8H PRN [place], PA-C      albuterol  2 5 mg Nebulization Q4H PRN Debby Rosado MD      amLODIPine  10 mg Oral Daily Debby Rosado MD      atorvastatin  20 mg Oral Daily Beth Garcia Addison Lindquist MD      cyclobenzaprine  10 mg Oral HS Sawyer Richards MD      docusate sodium  100 mg Oral BID Sawyer Richards MD      gabapentin  100 mg Oral After [employer] [place]      insulin glargine  5 Units Subcutaneous HS Conor Pfeiffer PA-C      insulin lispro  1-5 Units Subcutaneous TID [facility] Sawyer Richards MD      insulin lispro  1-5 Units Subcutaneous HS Sawyer Richards MD      lidocaine  2 patch Topical Daily Radha Patricio PA-C      LORazepam  0 5 mg Oral Once PRN Conor Pfieffer PA-C      melatonin  3 mg Oral HS Codi Cunningham PA-C      menthol-methyl salicylate   Apply externally 4x Daily PRN Conor Pfeiffer PA-C      metoprolol succinate  50 mg Oral Daily Sawyer Richards MD      nicotine  1 patch Transdermal Daily Sawyer Richards MD      ondansetron  4 mg Intravenous Q6H PRN Sawyer Richards MD          Today, Patient Was Seen By: Familia Renee MD    ** Please Note: Dictation voice to text software may have been used in the creation of this document   **

## 2021-01-21 NOTE — ASSESSMENT & PLAN NOTE
Lab Results   Component Value Date    HGBA1C 5 3 01/05/2021       Recent Labs     01/20/21  1112 01/20/21  1639 01/20/21  2048 01/21/21  0800   POCGLU 100 77 137 113       Blood Sugar Average: Last 72 hrs:  (P) 802 [id number]   Oral medications on hold  Continue Lantus and correctional insulin

## 2021-01-21 NOTE — ASSESSMENT & PLAN NOTE
Left parasaggital and right convex brain mass  · P/w 4 weeks dizziness/off balance    Imaging:  · MRI brain with without contrast 1/18/21:  Peripherally enhancing 2 1 x 2 1 x 1 5 cm mass in the parasagittal left parietal parenchyma with associated vasogenic edema and focal mass effect  Second right frontal vertex mass approximately 5 mm homogeneously enhancing  Diffuse enhancement of the internal auditory canal bilaterally which may be related to patchy meningeal enhancement  · CTA head and neck with without 1/16/2021:  Left parietal mass with vasogenic edema  Mediastinal, hilar, paratracheal and cervical adenopathy  Varying degrees of arthrosclerotic disease  Enlarged palatine tonsils and lingual tonsil  · CT chest abdomen pelvis with 1/17/21:  Mediastinal and right hilar adenopathy  No evidence of primary malignancy  Chronic splenic mass  Plan:  · Continue monitor neurological exam and symptoms  · Discussed awaiting pathology to determine treatment options/plan  · Discussed meningeal enhancement and that 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  · Decadron started at 2mg PO Q6H given diabetes  Can plan slow wean after 3 days  2Q6, 2Q8, 2Q12, to off  Will defer outpatient decadron management to oncology team to takes over her care  · Discussed anticipated need for radiation to brain masses  Given concern for leptomeningeal disease, may require WBRT  · Discussed possible consideration for IT chemo pending pathology  · Case to be reviewed and will follow through [address] [place] [address]  Discussed case with neuro oncology nurse navigator  · No neurosurgical follow-up indicated at this junction  Will be happy to see patient as needed    Neurosurgery will sign-off  Call with any questions/concerns

## 2021-01-21 NOTE — ASSESSMENT & PLAN NOTE
Wt Readings from Last 3 Encounters:   01/17/21 75 kg (165 lb 5 5 oz)   01/05/21 75 6 kg (166 lb 9 6 oz)   12/29/20 76 1 kg (167 lb 12 8 oz)     Patient appears to be euvolemic on examination  Does not take Lasix as outpatient  Echo 1 year ago showed an EF of 94% grade 1 diastolic dysfunction

## 2021-01-21 NOTE — ASSESSMENT & PLAN NOTE
· Patient gives history of 4 weeks of dizziness  Patient was initially diagnosed with vertigo as an outpatient  Patient continued to have persistent dizziness and he was seen in the emergency room yesterday noted to have intracranial mass  · Patient came back to the hospital after leaving the hospital AMA and will be admitted to the hospital   · Consult Neurosurgery  · Frequent neuro checks  · MRI brain  - Metastatic disease, with 2 Parenchymal lesions, 2 1 and 5 mm in the greatest linear dimension  There is diffuse enhancement of the internal auditory canals bilaterally, which warrants lumbar puncture to exclude leptomeningeal tumor  · CT C/A/P with no significant primary source noted  · MRI C/T/L spine - metastatic disease  · Plan is for LP and lymph node biopsy today - IR will perform under sedation   · Pending Pathology, will need to start steroid by neurosurgery   Hopefully discharge by Friday  · Will also likely need radiation at some point

## 2021-01-21 NOTE — ASSESSMENT & PLAN NOTE
Lab Results   Component Value Date    HGBA1C 5 3 01/05/2021       Recent Labs     01/20/21  1639 01/20/21  2048 01/21/21  0800 01/21/21  1131   POCGLU 77 137 113 232*       Blood Sugar Average: Last 72 hrs:  (P) 131 [id number]     Medically management per primary team

## 2021-01-22 VITALS
RESPIRATION RATE: 18 BRPM | BODY MASS INDEX: 32.46 KG/M2 | WEIGHT: 165.34 LBS | OXYGEN SATURATION: 95 % | HEIGHT: 60 IN | TEMPERATURE: 98.3 F | HEART RATE: 73 BPM | SYSTOLIC BLOOD PRESSURE: 136 MMHG | DIASTOLIC BLOOD PRESSURE: 72 MMHG

## 2021-01-22 LAB
ANION GAP SERPL CALCULATED.3IONS-SCNC: 2 MMOL/L (ref 4–13)
BUN SERPL-MCNC: 17 MG/DL (ref 5–25)
CALCIUM SERPL-MCNC: 9.9 MG/DL (ref 8.3–10.1)
CHLORIDE SERPL-SCNC: 109 MMOL/L (ref 100–108)
CO2 SERPL-SCNC: 27 MMOL/L (ref 21–32)
CREAT SERPL-MCNC: 0.49 MG/DL (ref 0.6–1.3)
ERYTHROCYTE [DISTWIDTH] IN BLOOD BY AUTOMATED COUNT: 15.1 % (ref 11.6–15.1)
GFR SERPL CREATININE-BSD FRML MDRD: 123 ML/MIN/1.73SQ M
GLUCOSE SERPL-MCNC: 139 MG/DL (ref 65–140)
GLUCOSE SERPL-MCNC: 156 MG/DL (ref 65–140)
GLUCOSE SERPL-MCNC: 181 MG/DL (ref 65–140)
GLUCOSE SERPL-MCNC: 237 MG/DL (ref 65–140)
HCT VFR BLD AUTO: 38.2 % (ref 34.8–46.1)
HGB BLD-MCNC: 11.6 G/DL (ref 11.5–15.4)
MCH RBC QN AUTO: 27.1 PG (ref 26.8–34.3)
MCHC RBC AUTO-ENTMCNC: 30.4 G/DL (ref 31.4–37.4)
MCV RBC AUTO: 89 FL (ref 82–98)
PLATELET # BLD AUTO: 320 THOUSANDS/UL (ref 149–390)
PMV BLD AUTO: 9.5 FL (ref 8.9–12.7)
POTASSIUM SERPL-SCNC: 4.3 MMOL/L (ref 3.5–5.3)
RBC # BLD AUTO: 4.28 MILLION/UL (ref 3.81–5.12)
SODIUM SERPL-SCNC: 138 MMOL/L (ref 136–145)
WBC # BLD AUTO: 5.47 THOUSAND/UL (ref 4.31–10.16)

## 2021-01-22 PROCEDURE — 99239 HOSP IP/OBS DSCHRG MGMT >30: CPT | Performed by: FAMILY MEDICINE

## 2021-01-22 PROCEDURE — 82948 REAGENT STRIP/BLOOD GLUCOSE: CPT

## 2021-01-22 PROCEDURE — 85027 COMPLETE CBC AUTOMATED: CPT | Performed by: FAMILY MEDICINE

## 2021-01-22 PROCEDURE — 80048 BASIC METABOLIC PNL TOTAL CA: CPT | Performed by: FAMILY MEDICINE

## 2021-01-22 RX ORDER — LIDOCAINE 50 MG/G
2 PATCH TOPICAL DAILY
Qty: 30 PATCH | Refills: 0 | Status: SHIPPED | OUTPATIENT
Start: 2021-01-23 | End: 2021-02-24 | Stop reason: HOSPADM

## 2021-01-22 RX ORDER — DEXAMETHASONE 2 MG/1
2 TABLET ORAL 2 TIMES DAILY WITH MEALS
Qty: 6 TABLET | Refills: 0 | Status: SHIPPED | OUTPATIENT
Start: 2021-01-30 | End: 2021-01-29 | Stop reason: CLARIF

## 2021-01-22 RX ORDER — DEXAMETHASONE 2 MG/1
2 TABLET ORAL EVERY 6 HOURS SCHEDULED
Qty: 12 TABLET | Refills: 0 | Status: SHIPPED | OUTPATIENT
Start: 2021-01-22 | End: 2021-01-25

## 2021-01-22 RX ORDER — DEXAMETHASONE 2 MG/1
2 TABLET ORAL EVERY 8 HOURS
Qty: 9 TABLET | Refills: 0 | Status: SHIPPED | OUTPATIENT
Start: 2021-01-26 | End: 2021-01-29

## 2021-01-22 RX ORDER — GABAPENTIN 100 MG/1
100 CAPSULE ORAL
Qty: 30 CAPSULE | Refills: 0 | Status: SHIPPED | OUTPATIENT
Start: 2021-01-22 | End: 2021-01-28 | Stop reason: ALTCHOICE

## 2021-01-22 RX ADMIN — DEXAMETHASONE 2 MG: 2 TABLET ORAL at 12:31

## 2021-01-22 RX ADMIN — INSULIN LISPRO 2 UNITS: 100 INJECTION, SOLUTION INTRAVENOUS; SUBCUTANEOUS at 12:32

## 2021-01-22 RX ADMIN — DEXAMETHASONE 2 MG: 2 TABLET ORAL at 06:28

## 2021-01-22 RX ADMIN — DEXAMETHASONE 2 MG: 2 TABLET ORAL at 00:38

## 2021-01-22 RX ADMIN — ATORVASTATIN CALCIUM 20 MG: 20 TABLET, FILM COATED ORAL at 09:31

## 2021-01-22 RX ADMIN — GABAPENTIN 100 MG: 100 CAPSULE ORAL at 17:13

## 2021-01-22 RX ADMIN — ACETAMINOPHEN 975 MG: 325 TABLET, FILM COATED ORAL at 09:39

## 2021-01-22 RX ADMIN — DEXAMETHASONE 2 MG: 2 TABLET ORAL at 17:13

## 2021-01-22 RX ADMIN — METOPROLOL SUCCINATE 50 MG: 50 TABLET, EXTENDED RELEASE ORAL at 09:31

## 2021-01-22 RX ADMIN — INSULIN LISPRO 1 UNITS: 100 INJECTION, SOLUTION INTRAVENOUS; SUBCUTANEOUS at 09:32

## 2021-01-22 RX ADMIN — DOCUSATE SODIUM 100 MG: 100 CAPSULE ORAL at 09:31

## 2021-01-22 RX ADMIN — AMLODIPINE BESYLATE 10 MG: 10 TABLET ORAL at 09:31

## 2021-01-22 NOTE — ASSESSMENT & PLAN NOTE
· Patient gives history of 4 weeks of dizziness  Patient was initially diagnosed with vertigo as an outpatient  Patient continued to have persistent dizziness and he was seen in the emergency room yesterday noted to have intracranial mass  · Patient came back to the hospital after leaving the hospital AMA and will be admitted to the hospital   · MRI brain  - Metastatic disease, with 2 Parenchymal lesions, 2 1 and 5 mm in the greatest linear dimension  There is diffuse enhancement of the internal auditory canals bilaterally, which warrants lumbar puncture to exclude leptomeningeal tumor  · CT C/A/P with no significant primary source noted  · MRI C/T/L spine - metastatic disease  · Neurosurgery and IR consult appreciated  · S/p IR guided LP and lymph node biopsy 1/20  · Pending Pathology  · Started on tapering dose of decadron by neurosurgery; slow wean after 3 days  2mg Q6h x 3 days, then 2mg Q8h x 3 days, then 2mg Q12 hours x 3 days, to off  · Patient clear for discharge, pending pathology report  Patient to follow up with Hem/onc OP

## 2021-01-22 NOTE — ASSESSMENT & PLAN NOTE
Lab Results   Component Value Date    HGBA1C 5 3 01/05/2021       Recent Labs     01/21/21  1131 01/21/21  1645 01/21/21  2102 01/22/21  0756   POCGLU 232* 119 171* 181*       Blood Sugar Average: Last 72 hrs:  (P) 996 [id number]   Oral medications on hold  Continue Lantus and correctional insulin  On discharge, resume home meds

## 2021-01-22 NOTE — DISCHARGE SUMMARY
Discharge- Liane Spaulding 1960, 62 y o  female MRN: [id number]    Unit/Bed#: [place] 923-01 Encounter: [id number]    Primary Care Provider: Cristobal Nguyen DO   Date and time admitted to hospital: 1/17/2021  1:40 PM        * Intracranial mass  Assessment & Plan  · Patient gives history of 4 weeks of dizziness  Patient was initially diagnosed with vertigo as an outpatient  Patient continued to have persistent dizziness and he was seen in the emergency room yesterday noted to have intracranial mass  · Patient came back to the hospital after leaving the hospital AMA and will be admitted to the hospital   · MRI brain  - Metastatic disease, with 2 Parenchymal lesions, 2 1 and 5 mm in the greatest linear dimension  There is diffuse enhancement of the internal auditory canals bilaterally, which warrants lumbar puncture to exclude leptomeningeal tumor  · CT C/A/P with no significant primary source noted  · MRI C/T/L spine - metastatic disease  · Neurosurgery and IR consult appreciated  · S/p IR guided LP and lymph node biopsy 1/20  · Pending Pathology  · Started on tapering dose of decadron by neurosurgery; slow wean after 3 days  2mg Q6h x 3 days, then 2mg Q8h x 3 days, then 2mg Q12 hours x 3 days, to off  · Patient clear for discharge, pending pathology report  Patient to follow up with Hem/onc OP  Sciatica of left side  Assessment & Plan  · C/O left sided sciatic pain mostly at night  Odd presentation as pain resolves during the day  Now suspect metastatic disease given findings on MRIs  · No destructive lesions noted on CT scan  · Patient does not want to use narcotics if possible  · Continue gabapentin 100 mg after dinner  · Started on decadron tapering dose    CAD in native artery  Assessment & Plan  · Patient with known history of type 2 diabetes mellitus and is maintained on oral hypoglycemic agents  · Hold oral agents while inpatient  Continue with the insulin sliding scan      · Monitor blood sugar    Chronic diastolic HF (heart failure) (HCC)  Assessment & Plan  Wt Readings from Last 3 Encounters:   01/17/21 75 kg (165 lb 5 5 oz)   01/05/21 75 6 kg (166 lb 9 6 oz)   12/29/20 76 1 kg (167 lb 12 8 oz)     Patient appears to be euvolemic on examination  Does not take Lasix as outpatient  Echo 1 year ago showed an EF of 31% grade 1 diastolic dysfunction      Essential hypertension  Assessment & Plan  · Blood pressure acceptable  · Continue with the outpatient medication    Controlled type 2 diabetes mellitus without complication [facility])  Assessment & Plan  Lab Results   Component Value Date    HGBA1C 5 3 01/05/2021       Recent Labs     01/21/21  1131 01/21/21  1645 01/21/21  2102 01/22/21  0756   POCGLU 232* 119 171* 181*       Blood Sugar Average: Last 72 hrs:  (P) 982 [id number]   Oral medications on hold  Continue Lantus and correctional insulin  On discharge, resume home meds             Discharging Physician / Practitioner: Hailey Umana MD  PCP: Juan Collado DO  Admission Date:   Admission Orders (From admission, onward)     Ordered        01/17/21 1610  Inpatient Admission  Once                   Discharge Date: 01/22/21    Resolved Problems  Date Reviewed: 1/22/2021    None          Consultations During Hospital Stay:  · Neurosurgery  · IR    Procedures Performed:   · IR lumbar puncture 1/20/2021  · IR lymph node biopsy 1/20/2021    Significant Findings / Test Results:   MRI Brain: IMPRESSION:     Metastatic disease, with 2 Parenchymal lesions, 2 1 and 5 mm in the greatest linear dimension      There is diffuse enhancement of the internal auditory canals bilaterally, which warrants lumbar puncture to exclude leptomeningeal tumor  MRI Thoracic spine: IMPRESSION:     Enhancement along the nerve roots of the filum terminale and distal spinal cord seen best on series 25 images 8 through 11 compatible with CSF spread of tumor      Dextroscoliosis apex T8    No significant central or foraminal narrowing  MRI Lumbar spine: IMPRESSION:     Innumerable foci of nodular enhancement along the filum terminale and distal spinal cord compatible CSF spread of tumor      Degenerative changes as described        MRI Cervical Spine: IMPRESSION:     Enhancement along the surface of the cervical and thoracic spinal cord compatible with CSF spread of tumor  Enhancement is seen at C3-4, and T2-3        Incidental Findings:   · none     Test Results Pending at Discharge (will require follow up): · Pathology report      Outpatient Tests Requested:  · F/u with hem/onc    Complications:  none    Reason for Admission: headache    Hospital Course:     Ellen Pryor is a 62 y o  female patient who originally presented to the hospital on 1/17/2021 due to persistent dizziness and headache and was found to have intracranial mass in [facility]   Patient left AMA from ER however subsequently returned to [facility] for further workup  Patient was admitted for neurosurgery evaluation  Patient underwent IR guided lumbar puncture and lymph node biopsy  Neurosurgery started her on Decadron  Patient's symptoms significantly improved  Patient now is medically cleared to be discharged with follow-up with Hematology and Oncology for pending pathology report and further treatment plan  Please see above list of diagnoses and related plan for additional information  Condition at Discharge: good     Discharge Day Visit / Exam:     Subjective:  She denies any headache, nausea, vomiting, blurry vision or dizziness  No ambulatory dysfunction      Vitals: Blood Pressure: (!) 138/111 (01/22/21 0707)  Pulse: 73 (01/20/21 2207)  Temperature: 98 1 °F (36 7 °C) (01/22/21 0707)  Temp Source: Axillary (01/17/21 1740)  Respirations: 18 (01/22/21 0707)  Height: 5' (152 4 cm) (01/17/21 1740)  Weight - Scale: 75 kg (165 lb 5 5 oz) (01/17/21 1740)  SpO2: 95 % (01/21/21 2104)  Exam:   Physical Exam  Constitutional: Appearance: She is well-developed  HENT:      Head: Normocephalic and atraumatic  Neck:      Musculoskeletal: Normal range of motion  Pulmonary:      Effort: Pulmonary effort is normal  No respiratory distress  Breath sounds: Normal breath sounds  Skin:     General: Skin is warm and dry  Discharge instructions/Information to patient and family:   See after visit summary for information provided to patient and family  Provisions for Follow-Up Care:  See after visit summary for information related to follow-up care and any pertinent home health orders  Disposition:     Home    ·     Planned Readmission: no     Discharge Statement:  I spent 45 minutes discharging the patient  This time was spent on the day of discharge  I had direct contact with the patient on the day of discharge  Greater than 50% of the total time was spent examining patient, answering all patient questions, arranging and discussing plan of care with patient as well as directly providing post-discharge instructions  Additional time then spent on discharge activities  Discharge Medications:  See after visit summary for reconciled discharge medications provided to patient and family        ** Please Note: This note has been constructed using a voice recognition system **

## 2021-01-22 NOTE — ASSESSMENT & PLAN NOTE
Wt Readings from Last 3 Encounters:   01/17/21 75 kg (165 lb 5 5 oz)   01/05/21 75 6 kg (166 lb 9 6 oz)   12/29/20 76 1 kg (167 lb 12 8 oz)     Patient appears to be euvolemic on examination  Does not take Lasix as outpatient  Echo 1 year ago showed an EF of 91% grade 1 diastolic dysfunction

## 2021-01-22 NOTE — ASSESSMENT & PLAN NOTE
· C/O left sided sciatic pain mostly at night  Odd presentation as pain resolves during the day   Now suspect metastatic disease given findings on MRIs  · No destructive lesions noted on CT scan  · Patient does not want to use narcotics if possible  · Continue gabapentin 100 mg after dinner  · Started on decadron tapering dose

## 2021-01-23 ENCOUNTER — TELEPHONE (OUTPATIENT)
Dept: OTHER | Facility: OTHER | Age: 61
End: 2021-01-23

## 2021-01-23 LAB — BACTERIA CSF CULT: NO GROWTH

## 2021-01-24 ENCOUNTER — HOSPITAL ENCOUNTER (EMERGENCY)
Facility: HOSPITAL | Age: 61
Discharge: HOME | End: 2021-01-24
Attending: EMERGENCY MEDICINE
Payer: COMMERCIAL

## 2021-01-24 VITALS
HEART RATE: 79 BPM | DIASTOLIC BLOOD PRESSURE: 95 MMHG | TEMPERATURE: 98 F | OXYGEN SATURATION: 98 % | RESPIRATION RATE: 18 BRPM | SYSTOLIC BLOOD PRESSURE: 197 MMHG | BODY MASS INDEX: 32.29 KG/M2 | WEIGHT: 165.34 LBS

## 2021-01-24 DIAGNOSIS — M54.32 SCIATICA OF LEFT SIDE: Primary | ICD-10-CM

## 2021-01-24 PROCEDURE — 99283 EMERGENCY DEPT VISIT LOW MDM: CPT

## 2021-01-24 PROCEDURE — 99284 EMERGENCY DEPT VISIT MOD MDM: CPT | Performed by: PHYSICIAN ASSISTANT

## 2021-01-24 RX ORDER — IBUPROFEN 400 MG/1
800 TABLET ORAL ONCE
Status: COMPLETED | OUTPATIENT
Start: 2021-01-24 | End: 2021-01-24

## 2021-01-24 RX ORDER — IBUPROFEN 800 MG/1
800 TABLET ORAL 3 TIMES DAILY
Qty: 21 TABLET | Refills: 0 | Status: SHIPPED | OUTPATIENT
Start: 2021-01-24 | End: 2021-02-04

## 2021-01-24 RX ORDER — OXYCODONE HYDROCHLORIDE 5 MG/1
5 TABLET ORAL EVERY 6 HOURS PRN
Qty: 12 TABLET | Refills: 0 | Status: SHIPPED | OUTPATIENT
Start: 2021-01-24 | End: 2021-01-28 | Stop reason: ALTCHOICE

## 2021-01-24 RX ORDER — CYCLOBENZAPRINE HCL 10 MG
10 TABLET ORAL ONCE
Status: COMPLETED | OUTPATIENT
Start: 2021-01-24 | End: 2021-01-24

## 2021-01-24 RX ORDER — CYCLOBENZAPRINE HCL 10 MG
10 TABLET ORAL
Qty: 20 TABLET | Refills: 0 | Status: SHIPPED | OUTPATIENT
Start: 2021-01-24 | End: 2021-01-29 | Stop reason: CLARIF

## 2021-01-24 RX ORDER — ACETAMINOPHEN 325 MG/1
975 TABLET ORAL ONCE
Status: COMPLETED | OUTPATIENT
Start: 2021-01-24 | End: 2021-01-24

## 2021-01-24 RX ADMIN — CYCLOBENZAPRINE HYDROCHLORIDE 10 MG: 10 TABLET, FILM COATED ORAL at 16:18

## 2021-01-24 RX ADMIN — ACETAMINOPHEN 975 MG: 325 TABLET, FILM COATED ORAL at 16:18

## 2021-01-24 RX ADMIN — IBUPROFEN 800 MG: 400 TABLET ORAL at 16:18

## 2021-01-24 NOTE — ED PROVIDER NOTES
History  Chief Complaint   Patient presents with    Back Pain     Pt reports lower back pain that radiates down her left leg  64yo female with a history of type 2 diabetes, congestive heart failure, and newly diagnosed intracranial mass presenting for evaluation of low back pain that has been ongoing for the past 1-2 weeks  Patient was recently admitted from 1/17/21-1/22/21 after a brain tumor was found on a CT scan  Patient underwent full workup including neurosurgical evaluation, lymph node biopsy, and MRI of brain, cervical/thoracic/lumbar spine  Per review of chart, primary source of cancer is unclear and pathology results are still pending  MRI of her lumbar spine on 1/19/21 revealed "Innumerable foci of nodular enhancement along the filum terminale and distal spinal cord compatible CSF spread of tumor " Patient was having low back pain radiating down left leg while admitted  This was thought to be secondary to sciatica  She was treated with Flexeril QHS, PRN Tylenol, and lidoderm patches  Patient states her pain was generally controlled while she was admitted but has been worsening since discharge  Patient states she was up all night last night due to severe unrelenting pain  She has been taking Tylenol 975mg Q8 and lidoderm patches  Patient was prescribed gabapentin but has not used this due to fear of side effects  Patient denies any new symptoms since discharge  She denies fevers, chills, abdominal pain, dysuria, hematuria, incontinence, saddle anesthesia  No recent trauma or falls  History provided by:  Patient and medical records   used: No    Back Pain  Location:  Lumbar spine  Quality:  Shooting  Radiates to:  L thigh  Pain severity:  Severe  Onset quality:  Gradual  Duration:  2 weeks  Timing:  Constant  Progression:  Worsening  Chronicity:  New  Context: not falling    Relieved by:  Nothing  Worsened by:   Movement  Ineffective treatments:  OTC medications  Associated symptoms: leg pain    Associated symptoms: no abdominal pain, no abdominal swelling, no bladder incontinence, no bowel incontinence, no chest pain, no dysuria, no fever, no headaches, no paresthesias, no pelvic pain, no perianal numbness, no tingling and no weakness    Risk factors: hx of cancer        Prior to Admission Medications   Prescriptions Last Dose Informant Patient Reported? Taking? amLODIPine (NORVASC) 10 mg tablet  Self No No   Sig: TAKE 1 TABLET BY MOUTH EVERY DAY   atorvastatin (LIPITOR) 20 mg tablet  Self No No   Sig: TAKE 1 TABLET DAILY   clonazePAM (KlonoPIN) 0 5 mg tablet   No No   Sig: Take 1 tablet (0 5 mg total) by mouth 2 (two) times a day as needed for anxiety for up to 5 days   cyclobenzaprine (FLEXERIL) 10 mg tablet   No No   Sig: Take 1 tablet (10 mg total) by mouth daily at bedtime   dexamethasone (DECADRON) 2 mg tablet   No No   Sig: Take 1 tablet (2 mg total) by mouth every 6 (six) hours for 3 days slow wean after 3 days   2mg Q6h x 3 days, then 2mg Q8h x 3 days, then 2mg Q12 hours x 3 days, to off    dexamethasone (DECADRON) 2 mg tablet   No No   Sig: Take 1 tablet (2 mg total) by mouth every 8 (eight) hours for 3 days   dexamethasone (DECADRON) 2 mg tablet   No No   Sig: Take 1 tablet (2 mg total) by mouth 2 (two) times a day with meals for 3 days   gabapentin (NEURONTIN) 100 mg capsule   No No   Sig: Take 1 capsule (100 mg total) by mouth daily after dinner   ipratropium (ATROVENT) 0 02 % nebulizer solution  Self Yes No   Sig: Inhale 0 5 mg as needed    lidocaine (LIDODERM) 5 %   No No   Sig: Apply 2 patches topically daily Remove & Discard patch within 12 hours or as directed by MD   linaGLIPtin (TRADJENTA) 5 MG TABS  Self No No   Sig: Take 5 mg by mouth daily   metFORMIN (GLUCOPHAGE) 1000 MG tablet  Self No No   Sig: Take 1 tablet (1,000 mg total) by mouth 2 (two) times a day with meals   metoprolol succinate (TOPROL-XL) 50 mg 24 hr tablet  Self No No   Sig: TAKE 1 TABLET BY MOUTH EVERY DAY      Facility-Administered Medications: None       Past Medical History:   Diagnosis Date    Acute on chronic congestive heart failure with left ventricular diastolic dysfunction (HCC)     last assessed 2017    Asthma     Atelectasis     [facility]     Diabetes mellitus [facility]     Diverticulitis 2017    with perforation and abscess     Hyperlipidemia     Hypertension     Lesion of spleen     Pericardial effusion        Past Surgical History:   Procedure Laterality Date    ABDOMINAL SURGERY      BREAST BIOPSY Left 10/04/2018     SECTION      COLON SURGERY      COLONOSCOPY      COLOSTOMY  2017    HARTMANS PROCEDURE N/A 2017    Procedure: EXPLORATORY LAPAROTOMY; PARTIAL SMALL BOWEL RESECTION; HARTMANS PROCEDURE; OSTOMY;  Surgeon: Serjio Tucker MD;  [facility];  Service:     HYSTERECTOMY  2018    IR BIOPSY LYMPH NODE  2021    IR LUMBAR PUNCTURE  2021    MAMMO STEREOTACTIC BREAST BIOPSY LEFT (ALL INC) Left 10/4/2018    OOPHORECTOMY Bilateral 2018    DC CLOSE ENTEROSTOMY N/A 2017    Procedure: OPEN COLOSTOMY REVERSAL;  Surgeon: Serjio Tucker MD;  [facility];  Service: General    DC COLONOSCOPY FLX DX W/COLLJ Soevie 1977 PFRMD N/A 2017    Procedure: COLONOSCOPY; DILATION OF OSTOMY;  Surgeon: Serjio Tucker MD;  [facility];   Service: General    DC EXC SKIN BENIG <0 5 CM REMAINDER BODY N/A 2017    Procedure: SCALP MASS EXCISION X 2;  Surgeon: Serjio Tucker MD;  [facility];  Service: [address] [place] [address] N/A 2019    Procedure: INCISIONAL HERNIA REPAIR, COMPONENT SEPARATION;  Surgeon: Serjio Tucker MD;  [facility];  Service: General    DC TOTAL ABDOM HYSTERECTOMY N/A 2019    Procedure: TOTAL ABDOMINAL HYSTERECTOMY; BSO;  Surgeon: Will Choudhury MD;  [facility];  Service: Gynecology    [address] [place] [address] N/A 5/1/2017    Procedure: APPLICATION VAC DRESSING;  Surgeon: Lucius Paulson MD;  [facility];  Service:        Family History   Problem Relation Age of Onset    Hypertension Mother     Diabetes Mother     No Known Problems Sister     No Known Problems Daughter     Heart disease Son     No Known Problems Maternal Aunt     No Known Problems Maternal Aunt     No Known Problems Maternal Aunt     No Known Problems Maternal Aunt     No Known Problems Paternal Aunt     Breast cancer Neg Hx      I have reviewed and agree with the history as documented  E-Cigarette/Vaping    E-Cigarette Use Never User      E-Cigarette/Vaping Substances    Nicotine No     THC No     CBD No     Flavoring No     Other No     Unknown No      Social History     Tobacco Use    Smoking status: Current Every Day Smoker     Packs/day: 0 25     Years: 20 00     Pack years: 5 00     Types: Cigarettes     Start date: 8/2/1991    Smokeless tobacco: Never Used   Substance Use Topics    Alcohol use: Not Currently     Frequency: Monthly or less     Drinks per session: 1 or 2     Binge frequency: Never     Comment: socially     Drug use: No       Review of Systems   Constitutional: Negative for chills and fever  Eyes: Negative for discharge and redness  Respiratory: Negative for shortness of breath and stridor  Cardiovascular: Negative for chest pain and palpitations  Gastrointestinal: Negative for abdominal pain, bowel incontinence and vomiting  Genitourinary: Negative for bladder incontinence, difficulty urinating, dysuria and pelvic pain  Musculoskeletal: Positive for back pain  Negative for neck pain  Skin: Negative for color change and rash  Neurological: Negative for tingling, weakness, headaches and paresthesias  Psychiatric/Behavioral: Negative for confusion  The patient is not nervous/anxious  All other systems reviewed and are negative        Physical Exam  Physical Exam  Vitals signs and nursing note reviewed  Constitutional:       General: She is not in acute distress  Appearance: She is well-developed  She is not diaphoretic  Comments: Non-toxic appearing    HENT:      Head: Normocephalic and atraumatic  Right Ear: External ear normal       Left Ear: External ear normal       Nose: Nose normal    Eyes:      General: No scleral icterus  Right eye: No discharge  Left eye: No discharge  Conjunctiva/sclera: Conjunctivae normal    Neck:      Musculoskeletal: Normal range of motion and neck supple  Cardiovascular:      Rate and Rhythm: Normal rate and regular rhythm  Heart sounds: Normal heart sounds  No murmur  Pulmonary:      Effort: Pulmonary effort is normal  No respiratory distress  Breath sounds: Normal breath sounds  No stridor  No wheezing or rales  Abdominal:      General: Bowel sounds are normal  There is no distension  Palpations: Abdomen is soft  Tenderness: There is no abdominal tenderness  There is no guarding  Musculoskeletal: Normal range of motion  General: No deformity  Comments: No tenderness to palpation of lumbar spine  Multiple lidoderm patches in place  No overlying skin changes  Lymphadenopathy:      Cervical: No cervical adenopathy  Skin:     General: Skin is warm and dry  Neurological:      General: No focal deficit present  Mental Status: She is alert  She is not disoriented  GCS: GCS eye subscore is 4  GCS verbal subscore is 5  GCS motor subscore is 6     Psychiatric:         Mood and Affect: Mood normal          Behavior: Behavior normal          Vital Signs  ED Triage Vitals   Temperature Pulse Respirations Blood Pressure SpO2   01/24/21 1549 01/24/21 1549 01/24/21 1549 01/24/21 1549 01/24/21 1549   98 °F (36 7 °C) 79 18 (!) 197/95 98 %      Temp Source Heart Rate Source Patient Position - Orthostatic VS BP Location FiO2 (%)   01/24/21 1549 01/24/21 1549 -- 01/24/21 1549 --   Oral Monitor  Right arm       Pain Score       01/24/21 1618       7           Vitals:    01/24/21 1549   BP: (!) 197/95   Pulse: 79         Visual Acuity      ED Medications  Medications   acetaminophen (TYLENOL) tablet 975 mg (975 mg Oral Given 1/24/21 1618)   ibuprofen (MOTRIN) tablet 800 mg (800 mg Oral Given 1/24/21 1618)   cyclobenzaprine (FLEXERIL) tablet 10 mg (10 mg Oral Given 1/24/21 1618)       Diagnostic Studies  Results Reviewed     None                 No orders to display              Procedures  Procedures         ED Course                             SBIRT 20yo+      Most Recent Value   SBIRT (22 yo +)   In order to provide better care to our patients, we are screening all of our patients for alcohol and drug use  Would it be okay to ask you these screening questions? Yes Filed at: 01/24/2021 1550   Initial Alcohol Screen: US AUDIT-C    1  How often do you have a drink containing alcohol?  0 Filed at: 01/24/2021 1550   2  How many drinks containing alcohol do you have on a typical day you are drinking? 0 Filed at: 01/24/2021 1550   3a  Male UNDER 65: How often do you have five or more drinks on one occasion? 0 Filed at: 01/24/2021 1550   3b  FEMALE Any Age, or MALE 65+: How often do you have 4 or more drinks on one occassion? 0 Filed at: 01/24/2021 1550   Audit-C Score  0 Filed at: 01/24/2021 1550   ANTHONY: How many times in the past year have you    Used an illegal drug or used a prescription medication for non-medical reasons? Never Filed at: 01/24/2021 1550                    MDM  Number of Diagnoses or Management Options  Sciatica of left side: new and does not require workup  Diagnosis management comments: 50-year-old female with recently diagnosed brain tumors awaiting pathology presenting for evaluation of low back pain radiating down her left thigh  Patient was recently admitted to the hospital and underwent MRI lumbar spine 4 days ago which revealed evidence of CSF spread of tumor  Degenerative changes also seen  Patient was treated with Tylenol, Lidoderm patches, and Flexeril  She had controlled pain in the hospital but states since discharge patient has been unable to sleep due to severe pain  She was not discharged with a script for Flexeril which she felt was helping her the most   She has no new symptoms since discharge  No trauma or falls  No red flags in history including no fevers, saddle anesthesia, incontinence  Exam is unremarkable with no lumbar spine tenderness  Do not feel repeat imaging is warranted at this time as she had an MRI of her lumbar spine less than 1 week ago and has no new symptoms  Will treat symptomatically  Script provided for Flexeril and ibuprofen  Patient also given script for oxycodone for breakthrough pain  She has a PCP appointment scheduled for tomorrow  ED return precautions discussed  Patient expressed understanding and is agreeable to plan  Patient discharged in stable condition  Amount and/or Complexity of Data Reviewed  Tests in the radiology section of CPT®: reviewed  Review and summarize past medical records: yes    Risk of Complications, Morbidity, and/or Mortality  Presenting problems: moderate  Diagnostic procedures: low  Management options: low    Patient Progress  Patient progress: stable      Disposition  Final diagnoses:   Sciatica of left side     Time reflects when diagnosis was documented in both MDM as applicable and the Disposition within this note     Time User Action Codes Description Comment    1/24/2021  4:20 PM [address] [place], [place] [address] [M54 [id number]] Sciatica of left side       ED Disposition     ED Disposition Condition Date/Time Comment    Discharge Stable Sun Jan 24, 2021  4:20 PM Amaris Eddy discharge to home/self care              Follow-up Information     Follow up With Specialties Details Why Contact Info Additional [address] [facility],  Internal Medicine Schedule an appointment as soon as possible for a visit [address] [address] Emergency Department Emergency Medicine  If symptoms worsen [address] Emergency Department, [address] [facility], [place], [place], [address]          Discharge Medication List as of 1/24/2021  4:24 PM      START taking these medications    Details   !! cyclobenzaprine (FLEXERIL) 10 mg tablet Take 1 tablet (10 mg total) by mouth daily at bedtime, Starting Sun 1/24/2021, Normal      ibuprofen (MOTRIN) 800 mg tablet Take 1 tablet (800 mg total) by mouth 3 (three) times a day, Starting Sun 1/24/2021, Normal      oxyCODONE (ROXICODONE) 5 mg immediate release tablet Take 1 tablet (5 mg total) by mouth every 6 (six) hours as needed for severe pain for up to 3 daysMax Daily Amount: 20 mg, Starting Sun 1/24/2021, Until Wed 1/27/2021, Normal       !! - Potential duplicate medications found  Please discuss with provider        CONTINUE these medications which have NOT CHANGED    Details   amLODIPine (NORVASC) 10 mg tablet TAKE 1 TABLET BY MOUTH EVERY DAY, Normal      atorvastatin (LIPITOR) 20 mg tablet TAKE 1 TABLET DAILY, Normal      !! cyclobenzaprine (FLEXERIL) 10 mg tablet Take 1 tablet (10 mg total) by mouth daily at bedtime, Starting Tue 1/12/2021, Normal      ipratropium (ATROVENT) 0 02 % nebulizer solution Inhale 0 5 mg as needed , Historical Med      linaGLIPtin (TRADJENTA) 5 MG TABS Take 5 mg by mouth daily, Starting Tue 1/7/2020, Normal      metFORMIN (GLUCOPHAGE) 1000 MG tablet Take 1 tablet (1,000 mg total) by mouth 2 (two) times a day with meals, Starting u 5/21/2020, Normal      metoprolol succinate (TOPROL-XL) 50 mg 24 hr tablet TAKE 1 TABLET BY MOUTH EVERY DAY, Normal      clonazePAM (KlonoPIN) 0 5 mg tablet Take 1 tablet (0 5 mg total) by mouth 2 (two) times a day as needed for anxiety for up to 5 days, Starting Tue 1/5/2021, Until Sun 1/10/2021, Normal      !! dexamethasone (DECADRON) 2 mg tablet Take 1 tablet (2 mg total) by mouth every 6 (six) hours for 3 days slow wean after 3 days  2mg Q6h x 3 days, then 2mg Q8h x 3 days, then 2mg Q12 hours x 3 days, to off , Starting Fri 1/22/2021, Until Mon 1/25/2021, Normal      !! dexamethasone (DECADRON) 2 mg tablet Take 1 tablet (2 mg total) by mouth every 8 (eight) hours for 3 days, Starting Tue 1/26/2021, Until Fri 1/29/2021, Normal      !! dexamethasone (DECADRON) 2 mg tablet Take 1 tablet (2 mg total) by mouth 2 (two) times a day with meals for 3 days, Starting Sat 1/30/2021, Until Tue 2/2/2021, Normal      gabapentin (NEURONTIN) 100 mg capsule Take 1 capsule (100 mg total) by mouth daily after dinner, Starting Fri 1/22/2021, Normal      lidocaine (LIDODERM) 5 % Apply 2 patches topically daily Remove & Discard patch within 12 hours or as directed by MD, Starting Sat 1/23/2021, Normal       !! - Potential duplicate medications found  Please discuss with provider  No discharge procedures on file      PDMP Review       Value Time User    PDMP Reviewed  Yes 1/5/2021 10:30 AM Krista Kellogg DO          ED Provider  Electronically Signed by           Kim Roberts PA-C  01/24/21 5221

## 2021-01-24 NOTE — DISCHARGE INSTRUCTIONS
Continue taking Tylenol 975mg every 8 hours and using your lidocaine patches  Start taking ibuprofen and cyclobenzaprine as prescribed  Take oxycodone only as needed for severe breakthrough pain  Go to your appointment tomorrow with your family doctor  Return to ER with any worsening symptoms

## 2021-01-25 ENCOUNTER — TELEPHONE (OUTPATIENT)
Dept: INTERNAL MEDICINE CLINIC | Facility: CLINIC | Age: 61
End: 2021-01-25

## 2021-01-25 ENCOUNTER — TELEPHONE (OUTPATIENT)
Dept: SURGICAL ONCOLOGY | Facility: CLINIC | Age: 61
End: 2021-01-25

## 2021-01-25 ENCOUNTER — TRANSITIONAL CARE MANAGEMENT (OUTPATIENT)
Dept: INTERNAL MEDICINE CLINIC | Facility: CLINIC | Age: 61
End: 2021-01-25

## 2021-01-25 DIAGNOSIS — E11.9 CONTROLLED TYPE 2 DIABETES MELLITUS WITHOUT COMPLICATION, WITHOUT LONG-TERM CURRENT USE OF INSULIN (HCC): Chronic | ICD-10-CM

## 2021-01-25 RX ORDER — LINAGLIPTIN 5 MG/1
TABLET, FILM COATED ORAL
Qty: 90 TABLET | Refills: 3 | Status: SHIPPED | OUTPATIENT
Start: 2021-01-25

## 2021-01-25 NOTE — TELEPHONE ENCOUNTER
TCM APPT-  [facility]  Admitted Sunday 0-17- discharged Friday 01/22  Pt was in there for dizziness and they found out the pt had tumor in her brain  Pt would like to see Dr Garcia Silence      There is no episode  in the patient chart    Please call patient today at [phone]

## 2021-01-25 NOTE — TELEPHONE ENCOUNTER
New Patient Encounter    New Patient Intake Form   Patient Details:  Ellen Sample  1960  [id number]    Background Information:  [address] starts by opening a telephone encounter and gathering the following information   Who is calling to schedule? If not self, relationship to patient? self   Referring Provider Hospital discharge   What is the diagnosis? IR Bx done for mediastianal lyphadenopathy   Is this diagnosis confirmed? Yes   When was the diagnosis? bx done 1/20/21   Is there a confirmed diagnosis from a biopsy/tissue reviewed by pathology? Not back yet   Were outside slides requested? NA   Is patient aware of diagnosis? no   Is there a personal history and what kind? Is there a family history and what kind? Reason for visit? New Diagnosis   Have you had any imaging or labs done? If so: when, where? yes  [facility]   Are records in [facility]? yes   If patient has a prior history of breast cancer were old records obtained? NA   Was the patient told to bring a disk? no   Does the patient smoke or Vape? no   If yes, how many packs or cartridges per day? Scheduling Information:   Preferred [place]:  [place]     Are there any dates/time the patient cannot be seen? Miscellaneous: Path not back yet    Pt calling to schedule with oncology   After completing the above information, please route to Financial Counselor and the appropriate Nurse Navigator for review

## 2021-01-26 NOTE — PROGRESS NOTES
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 - see Note  Satisfactory for evaluation  Electronically signed by Juan J Ha MD on 1/26/2021 at 12:19 PM   Comments: This is an appended report  These results have been appended to a previously preliminary verified report  Preliminary Diagnosis   A, B & C  Lymph node, cervical (ThinPrep, smear and cell block preparations)  Conclusive Evidence of Malignancy of uncertain histotype - see Note  Satisfactory for evaluation  Preliminary result electronically signed by Juan J Ha MD on 1/25/2021 at  3:53 PM   Note    Note to preliminary report:  Routine Pap-stained & Diff-Quick-stained cytologic preparations reveal plentiful groups of poorly differentiated malignant epithelioid cells, indicating the presence of non-small cell carcinoma  Intradepartmental consultation concurs with the diagnosis of non-small cell carcinoma  Histologic sections of cell block cellular material reveal a minute group of ~ 50 tumor cells  Attempts are made to determine tumor cell histotype by immunohistochemical stains and will be described in the final report  Note to final report:  Attempts at immunohistochemical stains to more specifically determine tumor cell histotype are unsuccessful, inasmuch as insufficient cellular material remained in the block    Additional, more extensive tissue sampling submitted in formalin is suggested for tumor cell histotype determination       MRI Spine 1/20/21  Enhancement along the surface of the cervical and thoracic spinal cord compatible with CSF spread of tumor  Enhancement is seen at C3-4, and T2-3  CT TAP 1/17/21:  1  Mediastinal and right hilar lymphadenopathy  2   Chronic splenic mass, slowly enlarging since 2017  This is almost certainly benign and the differential diagnosis includes hamartoma, hemorrhagic cyst, littoral cell angioma or lymphangioma  3   No evidence of primary malignancy in the chest, abdomen or pelvis  MRI Brain 1/18/21:   Metastatic disease, with 2 Parenchymal lesions, 2 1 and 5 mm in the greatest linear dimension  There is diffuse enhancement of the internal auditory canals bilaterally, which warrants lumbar puncture to exclude leptomeningeal tumor  SUBJECTIVE      ECOG Initial Visit 0   ECOG This Visit 0   Pain Score Initial Visit 0   Pain Score This Visit 0   Personal Cancer History None   Family Cancer History Not Fully Aware   Tobacco Use History Yes; 20 years <1/2ppd   Alcohol Use History Denies       Ms Vinny Webber is here for a new consult visit  She self-referred  She was recently diagnosed with metastatic cancer of likely non-small cell histology  In review of her chart and talking with her, her story began when she was hospitalized at [facility] on January 17th with persistent dizziness and a headache  During her hospitalization, her MRI found 2 parenchymal lesions at 2 1 cm and 0 5 cm in the left parietal region  There was diffuse enhancement of the internal auditory canals bilaterally which was concerning for leptomeningeal tumor  Her MRI of the spine also showed there was enhancement of nerve roots that is being read as compatible with CSF spread of the tumor  Additionally, a CT scan of her chest abdomen and pelvis showed mediastinal and right hilar lymphadenopathy and a chronic splenic mass    As per the radiologist, there is no evidence of primary malignancy in the chest abdomen or pelvis  She got an IR biopsy of her malignant appearing lymph node  The pathology resulted as squamous cell carcinoma, poorly differentiated  She is here with her daughter  She told me her presenting illness story  She tells me that she has some dizziness  She denies diarrhea, constipation, nausea/vomiting  She denies ambulatory dysfunction  She denies falls  She tells me that she is eating the same  I have reviewed the relevant past medical, surgical, social and family history  I have also reviewed allergies and medications for this patient  Review of Systems  Review of Systems   All other systems reviewed and are negative  OBJECTIVE     Physical Exam  Vitals:    01/28/21 0921   BP: 158/98   BP Location: Left arm   Pulse: 84   Resp: 18   Temp: 98 7 °F (37 1 °C)   TempSrc: Tympanic Core   SpO2: 97%   Weight: 74 8 kg (165 lb)       Physical Exam  Constitutional:       General: She is not in acute distress  Appearance: She is not ill-appearing, toxic-appearing or diaphoretic  HENT:      Head: Normocephalic and atraumatic  Eyes:      Extraocular Movements: Extraocular movements intact  Pupils: Pupils are equal, round, and reactive to light  Neck:      Musculoskeletal: Normal range of motion and neck supple  Cardiovascular:      Rate and Rhythm: Normal rate  Pulmonary:      Effort: Pulmonary effort is normal  No respiratory distress  Abdominal:      General: Bowel sounds are normal  There is no distension  Palpations: Abdomen is soft  Musculoskeletal: Normal range of motion  General: No swelling  Skin:     General: Skin is dry  Neurological:      General: No focal deficit present  Mental Status: She is alert and oriented to person, place, and time        Gait: Gait normal           Imaging  Relevant imaging reviewed in chart    Labs  Relevant labs reviewed in chart   ASSESSMENT & PLAN      Diagnosis ICD-10-CM Associated Orders   1  Squamous cell carcinoma of unknown origin  C44 92 Ambulatory referral to Radiation Oncology   2  Intracranial mass  R90 0 Ambulatory referral to Radiation Oncology           Metastatic SCC, Unknown Origin   Oncology history updated accordingly this visit   Goals of care/patient communication  o I discussed with them the clinical course leading up to their cancer diagnosis  I reviewed relevant office notes, imaging reports and pathology result as well    o I told them that this is a case of incurable, Stage IV/Metastatic disease and what this means  I told them that the goal of anti-cancer therapy is to provide best QoL and PFS/OS as shown in clinical trials  o I told them that there are signs of poor prognosis with her cancer, as there is MRI evidence of CSF cancer spread, even if the LP cytology was negative    o I told them that I recommend Rad Onc visit to consider RT options for palliative reasons  o She is scheduled next week to undergo EBUS with Dr Dejan Portillo  o I will see her on 2/10 at [facility] to discuss those pathology results and finalize a treatment plan  o She is agreeable and thankful  I gave a work letter for daughter, who is a primary caregiver for the patient  o Extensive emotional support was provided        All questions were answered to the patient's satisfaction during this encounter  They appreciated and thanked me for spending time with them  The patient knows the contact information for our office and know to reach out for any relevant concerns related to this encounter  For all other listed problems and medical diagnosis in his chart - they are managed by PCP and/or other specialists, which patient acknowledges  Bri Ramirez MD  Hematology & Medical Oncology

## 2021-01-27 ENCOUNTER — DOCUMENTATION (OUTPATIENT)
Dept: NEUROSURGERY | Facility: CLINIC | Age: 61
End: 2021-01-27

## 2021-01-27 NOTE — PROGRESS NOTES
Called patient to notify her of the discussion and the upcoming appointments  Patient did not answer  Left a voice message requesting for a call back  Provided my direct line

## 2021-01-27 NOTE — PROGRESS NOTES
Called patient and notified her of the recommendations and upcoming appointments  Patient accepted appointments  She reports how her hearing loss is worsening and it started while she was in the hospital  Patient reports the providers in the hospital told her the dexamethasone will help  Informed patient this RN will send a message to Dr Jefe Loya and he could address at tomorrow's appointment  Patient was appreciative

## 2021-01-27 NOTE — PROGRESS NOTES
Patient was presented at the neuro oncology case review this morning  Dr Elsa Hicks, Dr Roger Benavides, Dr Jim Chand, and Dr Wilfrido Villasenor were present for the discussion  Dr Elsa Hicks did not recommend a craniotomy for resection due to the risks involved  To get more tissue, Dr Elsa Hicks said he could offer a brain biopsy  Or an EBUS with Dr Maribel Juárez group  He said which ever one we could arrange the quickest      Called the thoracic surgery office and spoke with Ledy Noriega, surgery coordinator  Explained the situation  Scheduled patient to meet with Dr Jatin Abreu on 2/2/21 at 11 am at the [facility] office and for a tentative EBUS on 2/4/21  Message sent to Dr Robert Laureano, hem onc, for an update  Also routing message to Dr Jatin Abreu

## 2021-01-28 ENCOUNTER — CONSULT (OUTPATIENT)
Dept: HEMATOLOGY ONCOLOGY | Facility: CLINIC | Age: 61
End: 2021-01-28
Payer: COMMERCIAL

## 2021-01-28 VITALS
HEART RATE: 84 BPM | OXYGEN SATURATION: 97 % | SYSTOLIC BLOOD PRESSURE: 158 MMHG | TEMPERATURE: 98.7 F | BODY MASS INDEX: 32.22 KG/M2 | WEIGHT: 165 LBS | RESPIRATION RATE: 18 BRPM | DIASTOLIC BLOOD PRESSURE: 98 MMHG

## 2021-01-28 DIAGNOSIS — C44.92 SQUAMOUS CELL CARCINOMA OF UNKNOWN ORIGIN: Primary | ICD-10-CM

## 2021-01-28 DIAGNOSIS — R90.0 INTRACRANIAL MASS: ICD-10-CM

## 2021-01-28 PROCEDURE — 99245 OFF/OP CONSLTJ NEW/EST HI 55: CPT | Performed by: INTERNAL MEDICINE

## 2021-01-28 NOTE — LETTER
To Whomever It May Concern:    Ms  Alexanderforrest Arredondo and Gaston Zavala were here for an office visit  If any questions, please contact my office        Dr Ivania Gilbert  1/28/2021

## 2021-01-29 ENCOUNTER — OFFICE VISIT (OUTPATIENT)
Dept: INTERNAL MEDICINE CLINIC | Facility: CLINIC | Age: 61
End: 2021-01-29
Payer: COMMERCIAL

## 2021-01-29 VITALS
DIASTOLIC BLOOD PRESSURE: 84 MMHG | TEMPERATURE: 97.9 F | HEIGHT: 60 IN | BODY MASS INDEX: 32.24 KG/M2 | OXYGEN SATURATION: 98 % | WEIGHT: 164.2 LBS | SYSTOLIC BLOOD PRESSURE: 126 MMHG | HEART RATE: 73 BPM

## 2021-01-29 DIAGNOSIS — G93.6 CEREBRAL EDEMA (HCC): ICD-10-CM

## 2021-01-29 DIAGNOSIS — C44.92 SQUAMOUS CELL CARCINOMA OF UNKNOWN ORIGIN: Primary | ICD-10-CM

## 2021-01-29 PROBLEM — R90.0 INTRACRANIAL MASS: Status: RESOLVED | Noted: 2021-01-17 | Resolved: 2021-01-29

## 2021-01-29 PROBLEM — I31.39 PERICARDIAL EFFUSION: Status: RESOLVED | Noted: 2017-05-16 | Resolved: 2021-01-29

## 2021-01-29 PROBLEM — I31.3 PERICARDIAL EFFUSION: Status: RESOLVED | Noted: 2017-05-16 | Resolved: 2021-01-29

## 2021-01-29 PROCEDURE — 3008F BODY MASS INDEX DOCD: CPT | Performed by: NURSE PRACTITIONER

## 2021-01-29 PROCEDURE — 99495 TRANSJ CARE MGMT MOD F2F 14D: CPT | Performed by: INTERNAL MEDICINE

## 2021-01-29 RX ORDER — ASPIRIN 81 MG/1
81 TABLET, CHEWABLE ORAL DAILY
COMMUNITY
End: 2021-02-08

## 2021-01-29 NOTE — PATIENT INSTRUCTIONS
Brain Metastasis   AMBULATORY CARE:   Brain metastasis  is cancer that has spread within your brain or spreads from your body to your brain  Some examples are lung, breast, skin, and colon cancer  Common symptoms include the following:   · Headaches that get worse or keep coming back     · Seizures     · Problems walking, speaking, seeing, or thinking     · Changes in behavior or personality     · Nausea and vomiting     · Weakness or tiredness     · Swelling in your body    Call 911 for any of the following:   · Your leg or arm feels warm, tender, and painful  It may be swollen and red  · You feel lightheaded, short of breath, and have chest pain  · You cough up blood  Seek care immediately if:   · You have new problems walking or moving one side of your body  · You have new or worsening headaches or body swelling  · You have a seizure  Contact your healthcare provider if:   · You have questions or concerns about your condition or care  Treatment for brain metastasis:  You may choose treatments that help you maintain your normal activities for as long as possible  Other choices provide palliative (comfort) care to ease your symptoms  Ask your healthcare provider for more information about palliative care  Radiation and surgery may continue if you are receiving chemotherapy, radiation, or other treatment for cancer elsewhere in your body  You may also need any of the following:  · Steroid medicine  helps reduce swelling in the head and body  · Anticonvulsant medicine  helps decrease or stop seizures  · Blood thinners  help prevent blood clots  Blood thinners make it more likely for you to bleed or bruise  · Whole brain radiation therapy (WBRT)  is used to help treat brain tumors and to prevent new tumors from forming  WBRT can help you maintain your normal daily activities during treatment  · Surgery  may be used if you have a single tumor   During surgery such as craniotomy, healthcare providers open your skull and remove the tumor  Surgery can quickly relieve vision loss or other problems if a tumor is affecting an area of the brain that controls vision, hearing, or movement  · Radiosurgery  targets cancer cells without harming healthy brain tissue  You may need radiosurgery if you have more than one tumor or if you cannot have open surgery, such as craniotomy  Manage your brain metastasis:   · Prevent infections  Cancer treatments make it easier to get infections  Wash your hands often with soap and water  Carry germ-killing gel with you in case you do not have access to soap and water  Try to avoid people who have a cold or the flu  · Treat pain  Tell your healthcare provider if you are in pain  If you need pain medicine, learn how, when, and how often to take it  Do not wait until the pain is severe before you take your medicine  Tell healthcare providers if your pain does not decrease  · Stay safe  Cancer treatments can make you dizzy or sleepy  Prevent falls by calling someone when you get out of bed or if you need help  Ask your healthcare provider if it is safe for you to drive  · Seek support from healthcare providers  The disease can change the way you act, think, and feel  Your memory, concentration, and ability to learn may decline  You may act without thinking or become more emotional  Talk with your healthcare provider about these changes and about continuing care, treatments, and home services  Go to all follow-up appointments  Follow up with your healthcare provider as directed: Your healthcare provider may suggest tests such as an MRI or PET scan every 3 months  These tests help check for new or returning tumors  Work with your healthcare providers to create a follow-up care plan that is right for you     For support and more information:   · [facility] [address], [place] [address]  Phone: 3- [phone] - [phone]   Web Address: [employer] at  [address] [place] [address]  2595 Information is for End User's use only and may not be sold, redistributed or otherwise used for commercial purposes  All illustrations and images included in CareNotes® are the copyrighted property of A D A M , Inc  or [facility] Britany Villatoro   The above information is an  only  It is not intended as medical advice for individual conditions or treatments  Talk to your doctor, nurse or pharmacist before following any medical regimen to see if it is safe and effective for you

## 2021-01-29 NOTE — PROGRESS NOTES
INTERNAL MEDICINE TRANSITION OF CARE OFFICE VISIT  [facility] - [facility]    NAME: Johnnie Buckley  AGE: 62 y o  SEX: female  : 1960     DATE: 2021     Assessment and Plan:     1  Squamous cell carcinoma of unknown origin  2  Cerebral edema [facility])    Hospital records were reviewed  Recent heme/onc consult was reviewed  Most of the appointment was spent providing emotional support  We had a very pleasant discussion and she is in good spirits with great family support  Will be developing a treatment plan shortly with our oncology team  She can call me with any questions or concerns  Her other chronic medical conditions are stable  Transitional Care Management Review:     Johnnie Buckley is a 62 y o  female here for TCM follow-up    During the TCM phone call patient stated:    TCM Call (since 2020)     Date and time call was made  2021 10:09 AM    Hospital care reviewed  Records reviewed    Patient was hospitialized at  [facility]        Date of Admission  21    Date of discharge  21    Diagnosis  Intracranial mass    Disposition  Home    Were the patients medications reviewed and updated  -- (Comment)  dexamathasone,gabapentin,lidocaine    Current Symptoms  Dizziness    Dizziness severity  Moderate      TCM Call (since 2020)     Scheduled for follow up?   Yes (Comment)  2021 at 115pm w/ nj2    Did you obtain your prescribed medications  Yes (Comment)  dexamathasone,gabapentin,lidocaine    Do you need help managing your prescriptions or medications  No    Is transportation to your appointment needed  No    I have advised the patient to call PCP with any new or worsening symptoms  [facility]; Family members    [facility]; Family    The type of support provided  Emotional; Physical; Financial    Do you have social support  Yes, as much as I need    Are you recieving any outpatient services  No    Are you recieving home care services  No    Are you using any community resources  No    Current waiver services  No    Have you fallen in the last 12 months  No    Interperter language line needed  No    Counseling  Patient    Counseling topics  Importance of RX compliance         HPI:     Min Sims went to ED on 1/16 with worsening dizziness that had been going on for about a month  Ultimately found to have an intracranial mass and was admitted to [facility]  Patient was seen by Olivia Mccarty neurosurgery and was having back pain/sciatica as well  CT C/A/P (1/17): 1  Mediastinal and right hilar lymphadenopathy  2   Chronic splenic mass, slowly enlarging since 2017  This is almost certainly benign and the differential diagnosis includes hamartoma, hemorrhagic cyst, littoral cell angioma or lymphangioma  3   No evidence of primary malignancy in the chest, abdomen or pelvis  MRI Brain (1/18): Metastatic disease, with 2 Parenchymal lesions, 2 1 and 5 mm in the greatest linear dimension  There is diffuse enhancement of the internal auditory canals bilaterally, which warrants lumbar puncture to exclude leptomeningeal tumor  MRI thoracic spine (1/19): Enhancement along the nerve roots of the filum terminale and distal spinal cord seen best on series 25 images 8 through 11 compatible with CSF spread of tumor  MRI lumbar spine (1/19): Innumerable foci of nodular enhancement along the filum terminale and distal spinal cord compatible CSF spread of tumor  MRI cervical spine (1/19): Enhancement along the surface of the cervical and thoracic spinal cord compatible with CSF spread of tumor  Enhancement is seen at C3-4, and T2-3  Had a lumbar puncture where cytology showed atypical cellular changes  CSF culture was negative      Also she had an FNA of cervical lymph node which showed conclusive evidence of malignancy - malignant epithelioid neoplasm, 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  She was ultimately started on a tapering dose of decadron and discharged  She saw Dr Manuela Palmer of heme/onc yesterday  He informed patient and daughter of his concerns with the disease and how there was no cure  He explained the poor prognosis associated with her disease  He recommended referral to S[facility] Newell  Patient also has appt with thoracic surgery on 2/2/2021  Follow-up with heme/onc on 2/10/2021  Other medical history includes type 2 diabetes, asthma, HTN, HLD, chronic diastolic CHF, diabetic nephropathy, CAD  Patient is in very good spirits and is realistic about what to expect  She has good family support  She cries sometimes, but she is not going to get depressed over this and is happy with the life she has lived  The following portions of the patient's history were reviewed and updated as appropriate: allergies, current medications, past family history, past medical history, past social history, past surgical history and problem list      Review of Systems:     Review of Systems   Constitutional: Negative for activity change, appetite change and fatigue  Respiratory: Negative for apnea, cough, chest tightness, shortness of breath and wheezing  Cardiovascular: Negative for chest pain, palpitations and leg swelling  Gastrointestinal: Negative for abdominal distention, abdominal pain, blood in stool, constipation, diarrhea, nausea and vomiting  Musculoskeletal: Positive for back pain and gait problem  Negative for arthralgias  Neurological: Positive for dizziness  Negative for weakness, numbness and headaches  Psychiatric/Behavioral: Negative for behavioral problems, confusion, hallucinations, sleep disturbance and suicidal ideas  The patient is not nervous/anxious         Problem List:     Patient Active Problem List   Diagnosis    Controlled type 2 diabetes mellitus without complication ([facility] [address] )    Uncomplicated asthma    Essential hypertension    Mixed hyperlipidemia    Chronic diastolic HF (heart failure) (HCC)    Pericardial effusion    Diabetic nephropathy associated with type 2 diabetes mellitus ([address] )    CAD in native artery    Tobacco use    Postoperative anemia    Intracranial mass    Sciatica of left side    Lumbar back pain with radiculopathy affecting left lower extremity    Cerebral edema (HCC)    Squamous cell carcinoma of unknown origin      Objective:     /84 (BP Location: Left arm, Patient Position: Sitting, Cuff Size: Standard)   Pulse 73   Temp 97 9 °F (36 6 °C) (Temporal) Comment: w/ tylenol  Ht 5' (1 524 m)   Wt 74 5 kg (164 lb 3 2 oz)   LMP 08/16/2014 (Approximate)   SpO2 98%   BMI 32 07 kg/m²     Physical Exam  Vitals signs reviewed  Constitutional:       General: She is not in acute distress  Appearance: She is well-developed  She is not diaphoretic  Neck:      Musculoskeletal: Neck supple  Thyroid: No thyromegaly  Vascular: No JVD  Cardiovascular:      Rate and Rhythm: Normal rate and regular rhythm  Heart sounds: Normal heart sounds  No murmur  Pulmonary:      Effort: Pulmonary effort is normal  No respiratory distress  Breath sounds: Normal breath sounds  No wheezing or rales  Abdominal:      General: Bowel sounds are normal  There is no distension  Palpations: Abdomen is soft  Tenderness: There is no abdominal tenderness  Musculoskeletal:      Right lower leg: No edema  Left lower leg: No edema  Lymphadenopathy:      Cervical: No cervical adenopathy  Skin:     General: Skin is warm and dry  Neurological:      Mental Status: She is alert  Psychiatric:         Mood and Affect: Mood normal          Behavior: Behavior normal         Laboratory Results: I have personally reviewed the pertinent laboratory results/reports     Radiology/Other Diagnostic Testing Results: I have personally reviewed pertinent reports  No results found       Current Medications: Outpatient Medications Prior to Visit   Medication Sig Dispense Refill    amLODIPine (NORVASC) 10 mg tablet TAKE 1 TABLET BY MOUTH EVERY DAY 90 tablet 2    atorvastatin (LIPITOR) 20 mg tablet TAKE 1 TABLET DAILY 90 tablet 1    clonazePAM (KlonoPIN) 0 5 mg tablet Take 1 tablet (0 5 mg total) by mouth 2 (two) times a day as needed for anxiety for up to 5 days 10 tablet 0    cyclobenzaprine (FLEXERIL) 10 mg tablet Take 1 tablet (10 mg total) by mouth daily at bedtime 10 tablet 0    cyclobenzaprine (FLEXERIL) 10 mg tablet Take 1 tablet (10 mg total) by mouth daily at bedtime 20 tablet 0    dexamethasone (DECADRON) 2 mg tablet Take 1 tablet (2 mg total) by mouth every 8 (eight) hours for 3 days 9 tablet 0    [START ON 1/30/2021] dexamethasone (DECADRON) 2 mg tablet Take 1 tablet (2 mg total) by mouth 2 (two) times a day with meals for 3 days 6 tablet 0    ibuprofen (MOTRIN) 800 mg tablet Take 1 tablet (800 mg total) by mouth 3 (three) times a day 21 tablet 0    ipratropium (ATROVENT) 0 02 % nebulizer solution Inhale 0 5 mg as needed       lidocaine (LIDODERM) 5 % Apply 2 patches topically daily Remove & Discard patch within 12 hours or as directed by MD 30 patch 0    metFORMIN (GLUCOPHAGE) 1000 MG tablet Take 1 tablet (1,000 mg total) by mouth 2 (two) times a day with meals 180 tablet 3    metoprolol succinate (TOPROL-XL) 50 mg 24 hr tablet TAKE 1 TABLET BY MOUTH EVERY DAY 90 tablet 1    Tradjenta 5 MG TABS TAKE 1 TABLET BY MOUTH EVERY DAY 90 tablet 3     No facility-administered medications prior to visit          Kirk Wilkerson DO  [facility]

## 2021-02-01 ENCOUNTER — TELEPHONE (OUTPATIENT)
Dept: HEMATOLOGY ONCOLOGY | Facility: CLINIC | Age: 61
End: 2021-02-01

## 2021-02-01 NOTE — TELEPHONE ENCOUNTER
Patient has appt tomorrow at 11 and would like to reschedule  Please call patient back on [phone]

## 2021-02-02 ENCOUNTER — TELEPHONE (OUTPATIENT)
Dept: CARDIAC SURGERY | Facility: CLINIC | Age: 61
End: 2021-02-02

## 2021-02-02 NOTE — TELEPHONE ENCOUNTER
I left a message for patient to call us back to answer screening questions for her upcoming apt with Dr Darshan Russo on Thursday

## 2021-02-02 NOTE — UTILIZATION REVIEW
Notification of Discharge  This is a Notification of Discharge from our facility [address]  Please be advised that this patient has been discharge from our facility  Below you will find the admission and discharge date and time including the patients disposition  PRESENTATION DATE: 1/17/2021  1:40 PM  OBS ADMISSION DATE:   IP ADMISSION DATE: 1/17/21 1611   DISCHARGE DATE: 1/22/2021  5:46 PM  DISPOSITION: Home/Self Care Home/Self Care   Admission Orders listed below:  Admission Orders (From admission, onward)     Ordered        01/17/21 1610  Inpatient Admission  Once                   Please contact the UR Department if additional information is required to close this patient's authorization/case  [address] Utilization Review Department  Main: [phone] x carefully listen to the prompts  All voicemails are confidential   [email or web address]  Send all requests for admission clinical reviews, approved or denied determinations and any other requests to dedicated fax number below belonging to the campus where the patient is receiving treatment   List of dedicated fax numbers:  [address] DENIALS (Administrative/Medical Necessity) [phone]   [address] (Maternity/NICU/Pediatrics) [phone]   [facility] [phone]   Esme Hazel [phone]   Eric Bassett [phone]   [address] [phone]   [facility]  [phone]   [address] [facility]  [address] [facility] [address] [facility] [phone]

## 2021-02-04 ENCOUNTER — CONSULT (OUTPATIENT)
Dept: PALLIATIVE MEDICINE | Facility: CLINIC | Age: 61
End: 2021-02-04
Payer: COMMERCIAL

## 2021-02-04 ENCOUNTER — OFFICE VISIT (OUTPATIENT)
Dept: CARDIAC SURGERY | Facility: CLINIC | Age: 61
End: 2021-02-04
Payer: COMMERCIAL

## 2021-02-04 VITALS
OXYGEN SATURATION: 98 % | DIASTOLIC BLOOD PRESSURE: 78 MMHG | TEMPERATURE: 98.3 F | RESPIRATION RATE: 16 BRPM | WEIGHT: 162.26 LBS | HEART RATE: 75 BPM | SYSTOLIC BLOOD PRESSURE: 158 MMHG | BODY MASS INDEX: 31.86 KG/M2 | HEIGHT: 60 IN

## 2021-02-04 VITALS
RESPIRATION RATE: 16 BRPM | OXYGEN SATURATION: 97 % | TEMPERATURE: 98 F | BODY MASS INDEX: 31.86 KG/M2 | DIASTOLIC BLOOD PRESSURE: 80 MMHG | WEIGHT: 162.26 LBS | HEIGHT: 60 IN | HEART RATE: 75 BPM | SYSTOLIC BLOOD PRESSURE: 140 MMHG

## 2021-02-04 DIAGNOSIS — G89.3 CANCER-RELATED PAIN: Primary | ICD-10-CM

## 2021-02-04 DIAGNOSIS — C44.92 SQUAMOUS CELL CARCINOMA OF UNKNOWN ORIGIN: ICD-10-CM

## 2021-02-04 DIAGNOSIS — R42 VERTIGO: ICD-10-CM

## 2021-02-04 DIAGNOSIS — R59.0 MEDIASTINAL LYMPHADENOPATHY: Primary | ICD-10-CM

## 2021-02-04 PROCEDURE — 99214 OFFICE O/P EST MOD 30 MIN: CPT | Performed by: FAMILY MEDICINE

## 2021-02-04 PROCEDURE — 99205 OFFICE O/P NEW HI 60 MIN: CPT | Performed by: THORACIC SURGERY (CARDIOTHORACIC VASCULAR SURGERY)

## 2021-02-04 PROCEDURE — 1111F DSCHRG MED/CURRENT MED MERGE: CPT | Performed by: THORACIC SURGERY (CARDIOTHORACIC VASCULAR SURGERY)

## 2021-02-04 RX ORDER — OXYCODONE HYDROCHLORIDE 5 MG/1
5 TABLET ORAL EVERY 4 HOURS PRN
Qty: 15 TABLET | Refills: 0 | Status: SHIPPED | OUTPATIENT
Start: 2021-02-04 | End: 2021-02-24 | Stop reason: HOSPADM

## 2021-02-04 RX ORDER — MELOXICAM 15 MG/1
15 TABLET ORAL
Qty: 30 TABLET | Refills: 0 | Status: SHIPPED | OUTPATIENT
Start: 2021-02-04 | End: 2021-02-24 | Stop reason: HOSPADM

## 2021-02-04 RX ORDER — CEFAZOLIN SODIUM 2 G/50ML
2000 SOLUTION INTRAVENOUS ONCE
Status: CANCELLED | OUTPATIENT
Start: 2021-02-04 | End: 2021-02-04

## 2021-02-04 RX ORDER — LORATADINE 10 MG/1
10 TABLET ORAL
Qty: 30 TABLET | Refills: 0 | Status: SHIPPED | OUTPATIENT
Start: 2021-02-04 | End: 2021-02-24 | Stop reason: HOSPADM

## 2021-02-04 RX ORDER — DIAZEPAM 2 MG/1
2 TABLET ORAL EVERY 6 HOURS PRN
Qty: 10 TABLET | Refills: 0 | Status: SHIPPED | OUTPATIENT
Start: 2021-02-04 | End: 2021-03-17

## 2021-02-04 RX ORDER — GABAPENTIN 100 MG/1
100-200 CAPSULE ORAL
Qty: 60 CAPSULE | Refills: 0 | Status: SHIPPED | OUTPATIENT
Start: 2021-02-04 | End: 2021-02-24 | Stop reason: HOSPADM

## 2021-02-04 NOTE — LETTER
February 4, 2021     Sugar Trujillo DO  [address] [facility] [address]    Patient: Alejo Bui   YOB: 1960   Date of Visit: 2/4/2021       Dear Dr Donna Brown:    Thank you for referring Erenest Base to me for evaluation  Below are my notes for this consultation  If you have questions, please do not hesitate to call me  I look forward to following your patient along with you  Sincerely,        Felipe Barton MD        CC: MD Loraine Suero MD Montgomery Burrow, MD Ronaldo Holly, MD  2/4/2021 11:18 AM  Incomplete  Thoracic Consult  Assessment/Plan:    Squamous cell carcinoma of unknown origin  Ms Justo Arvizu   Is a very pleasant 55-year-old female who presents to my office with mediastinal lymphadenopathy  She was recently diagnosed with metastatic squamous cell carcinoma but with unknown origin      today in the office, we discussed the procedure moving forward  I explained to her that my plan would be to perform a mediastinoscopy for her mediastinal lymph nodes  I explained to her that I could perform an EBUS for this but since she has previously undergone an FNA of the lymph node and that did not provide us with enough information, I would recommend mediastinoscopy in order to get adequate size samples of tissue to get as much information as possible regarding this cancer  She understands and consent was obtained in the office today  We will proceed on 2/9/21  Additionally, I have placed referral to palliative care as the patient was in a significant amount of discomfort in my office today  Fortunately, they were  Able to accommodate her today and they will be seeing her later this morning      Valeria Shirley MD  Thoracic Surgery  (Available on [employer])  Office: [phone]         Diagnoses and all orders for this visit:    Mediastinal lymphadenopathy  -     Case request operating room: MEDIASTINOSCOPY, flexible bronchoscopy; Standing  -     Case request operating room: MEDIASTINOSCOPY, flexible bronchoscopy    Squamous cell carcinoma of unknown origin  -     Ambulatory referral to Palliative Care; Future    Other orders  -     Diet NPO; Sips with meds; Standing  -     Nursing communication Please give pre-op Carbohydrate drink to patient 2-4 hours prior to surgery; Standing  -     Void on call to OR; Standing  -     Insert peripheral IV; Standing  -     Place sequential compression device; Standing  -     ceFAZolin (ANCEF) IVPB (premix in dextrose) 2,000 mg 50 mL          Thoracic History   Diagnosis:    Procedures/Surgeries:    Pathology:    Adjuvant Therapy:       Subjective:    Patient ID: Alejo Bui is a 62 y o  female  HPI  Ms Justo Arvizu   Is a very pleasant 52-year-old female who presents to my office with mediastinal lymphadenopathy  She was recently diagnosed with metastatic squamous cell carcinoma but with unknown origin  On her imaging, she has 2 lesions  In her brain  Additionally, she has CSF spread of her tumor  A cervical lymph node was biopsied which demonstrated squamous cell carcinoma but they were unable to identify the source  She was referred to thoracic surgery for biopsy of her mediastinal lymph nodes  Today in the office, she states that she is having significant discomfort and pain, mostly of her left lower extremity  Additionally, she is experiencing some hearing loss that is associated with the brain lesion  She denies any fevers or chills  She does report some weight loss  She denies a cough, chest pain or shortness of breath  She  Reports insomnia      The following portions of the patient's history were reviewed and updated as appropriate: allergies, current medications, past family history, past medical history, past social history, past surgical history and problem list     Past Medical History:   Diagnosis Date    Acute on chronic congestive heart failure with left ventricular diastolic dysfunction ([facility] )     last assessed 2017    Asthma     Atelectasis     CHF [facility]congestive [facility] failure) [facility]     [facility]     Diverticulitis     with perforation and abscess     Hyperlipidemia     Hypertension     Lesion of spleen     Pericardial effusion       Past Surgical History:   Procedure Laterality Date    ABDOMINAL SURGERY      BREAST BIOPSY Left 10/04/2018     SECTION      COLON SURGERY      COLONOSCOPY      COLOSTOMY  2017    HARTMANS PROCEDURE N/A 2017    Procedure: EXPLORATORY LAPAROTOMY; PARTIAL SMALL BOWEL RESECTION; HARTMANS PROCEDURE; OSTOMY;  Surgeon: Bonnie Tovar MD;  [facility];  Service:     HYSTERECTOMY  2018    IR BIOPSY LYMPH NODE  2021    IR LUMBAR PUNCTURE  2021    MAMMO STEREOTACTIC BREAST BIOPSY LEFT (ALL INC) Left 10/4/2018    OOPHORECTOMY Bilateral 2018    MT CLOSE ENTEROSTOMY N/A 2017    Procedure: OPEN COLOSTOMY REVERSAL;  Surgeon: Bonnie Tovar MD;  [facility];  Service: General    MT COLONOSCOPY FLX DX W/COLLJ Soevie 1978 PFRMD N/A 2017    Procedure: COLONOSCOPY; DILATION OF OSTOMY;  Surgeon: Bonnie Tovar MD;  [facility];   Service: General    MT EXC SKIN BENIG <0 5 CM REMAINDER BODY N/A 2017    Procedure: SCALP MASS EXCISION X 2;  Surgeon: Bonnie Tovar MD;  [facility];  Service: [address] [place] [address] N/A 2019    Procedure: INCISIONAL HERNIA REPAIR, COMPONENT SEPARATION;  Surgeon: Bonnie Tovar MD;  [facility];  Service: General    MT TOTAL ABDOM HYSTERECTOMY N/A 2019    Procedure: TOTAL ABDOMINAL HYSTERECTOMY; BSO;  Surgeon: Celio Saavedra MD;  [facility];  Service: Gynecology    VAC DRESSING APPLICATION N/A 5/3/3038    Procedure: Kaity Crane;  Surgeon: Bonnie Tovar MD;  [facility];  Service:       Family History   Problem Relation Age of Onset    Hypertension Mother     Diabetes Mother     No Known Problems Sister     No Known Problems Daughter     Heart disease Son     No Known Problems Maternal Aunt     Lung cancer Maternal Aunt     No Known Problems Maternal Aunt     No Known Problems Maternal Aunt     No Known Problems Paternal Aunt     Breast cancer Neg Hx       Social History     Socioeconomic History    Marital status:      Spouse name: Not on file    Number of children: Not on file    Years of education: Not on file    Highest education level: Not on file   Occupational History    Not on file   Social Needs    Financial resource strain: Not on file    Food insecurity     Worry: Not on file     Inability: Not on file   [employer] needs     Medical: Not on file     Non-medical: Not on file   Tobacco Use    Smoking status: Former Smoker     Packs/day: 0 25     Years: 20 00     Pack years: 5 00     Types: Cigarettes     Start date: 1991     Quit date: 2021     Years since quittin 0    Smokeless tobacco: Never Used   Substance and Sexual Activity    Alcohol use: Not Currently     Frequency: Monthly or less     Drinks per session: 1 or 2     Binge frequency: Never     Comment: socially     Drug use: No    Sexual activity: Yes     Partners: Male     Birth control/protection: Surgical   Lifestyle    Physical activity     Days per week: 0 days     Minutes per session: 0 min    Stress: Not at all   Relationships    Social connections     Talks on phone: Not on file     Gets together: Not on file     Attends [employer] service: Not on file     Active member of club or organization: Not on file     Attends meetings of clubs or organizations: Not on file     Relationship status: Not on file    Intimate partner violence     Fear of current or ex partner: Not on file     Emotionally abused: Not on file     Physically abused: Not on file     Forced sexual activity: Not on file   Other Topics Concern    Not on file Social History Narrative    Not on file      Review of Systems   Constitutional: Positive for fatigue  Negative for chills, fever and unexpected weight change  HENT: Positive for hearing loss  Negative for facial swelling, trouble swallowing and voice change  Eyes: Negative  Negative for visual disturbance  Respiratory: Negative for cough, shortness of breath and stridor  Cardiovascular: Negative for chest pain  Gastrointestinal: Negative  Endocrine: Negative  Genitourinary: Negative  Musculoskeletal: Positive for myalgias  Skin: Negative  Neurological: Positive for numbness  Negative for dizziness, light-headedness and headaches  Hematological: Negative for adenopathy  Psychiatric/Behavioral: Negative  Objective:   Physical Exam  Vitals signs and nursing note reviewed  Constitutional:       General: She is not in acute distress  Appearance: She is well-developed and normal weight  She is not diaphoretic  Comments: In a wheelchair   HENT:      Head: Normocephalic and atraumatic  Nose: Nose normal       Mouth/Throat:      Mouth: Mucous membranes are moist       Pharynx: Oropharynx is clear  Eyes:      General: No scleral icterus  Pupils: Pupils are equal, round, and reactive to light  Neck:      Musculoskeletal: Normal range of motion  Trachea: No tracheal deviation  Cardiovascular:      Rate and Rhythm: Normal rate and regular rhythm  Heart sounds: Normal heart sounds  No murmur  Pulmonary:      Effort: Pulmonary effort is normal  No respiratory distress  Breath sounds: Normal breath sounds  No stridor  No wheezing or rales  Chest:      Chest wall: No tenderness  Abdominal:      General: Bowel sounds are normal  There is no distension  Palpations: Abdomen is soft  Tenderness: There is no abdominal tenderness  There is no rebound  Musculoskeletal: Normal range of motion     Lymphadenopathy:      Cervical: No cervical adenopathy  Skin:     General: Skin is warm and dry  Coloration: Skin is not pale  Findings: No erythema or rash  Neurological:      Mental Status: She is alert and oriented to person, place, and time  Psychiatric:         Behavior: Behavior normal          Thought Content: Thought content normal          Judgment: Judgment normal      /78 Comment: did not take bp meds today  Pulse 75   Temp 98 3 °F (36 8 °C) (Temporal)   Resp 16   Ht 5' (1 524 m)   Wt 73 6 kg (162 lb 4 1 oz)   LMP 08/16/2014 (Approximate)   SpO2 98%   BMI 31 69 kg/m²     No Chest XR results available for this patient  No CT Chest results available for this patient  Ct Chest Abdomen Pelvis W Contrast    Result Date: 1/17/2021  Narrative CT CHEST, ABDOMEN AND PELVIS WITH IV CONTRAST INDICATION:   cancer w/u  Brain metastases, and cervical, hilar and mediastinal lymphadenopathy demonstrated on CTA of the head and neck from yesterday  History of sigmoid resection for perforated diverticulitis in 2017  Chronic splenic mass  COMPARISON:  CTA of the head and neck from January 16, 2021  MRI of the abdomen from February 21, 2019  CTs of the abdomen and pelvis from July 31, 2018 and May 1, 2017  TECHNIQUE: CT examination of the chest, abdomen and pelvis was performed  Axial, sagittal, and coronal 2D reformatted images were created from the source data and submitted for interpretation  Radiation dose length product (DLP) for this visit:  766 9 mGy-cm   This examination, like all CT scans performed in the [facility], was performed utilizing techniques to minimize radiation dose exposure, including the use of iterative reconstruction and automated exposure control  IV Contrast:  100 mL of iohexol (OMNIPAQUE) Enteric Contrast: Enteric contrast was not administered   FINDINGS: CHEST LUNGS:  7 mm tubular branching soft tissue nodule in the base of the right lower lobe, most consistent with endobronchial mucoid impaction  Lungs otherwise clear  PLEURA:  Unremarkable  HEART/GREAT VESSELS:  Heart and thoracic aorta unremarkable  Dilated central pulmonary arteries with main pulmonary artery diameter of 3 4 cm  MEDIASTINUM AND DAVID: Several enlarged lymph nodes including a 1 5 cm subcarinal node, 1 4 cm pretracheal node, 1 6 cm right hilar node and 1 5 cm left superior mediastinal node, adjacent to the left brachiocephalic vein  Esophagus unremarkable  Trachea and main stem bronchi normal  CHEST WALL AND LOWER NECK:   Unremarkable  ABDOMEN LIVER/BILIARY TREE:  8 mm cyst in the dome of the right lobe, unchanged since 7/31/2018  No new liver lesions  Bile ducts normal in caliber  GALLBLADDER:  Contracted  Otherwise unremarkable  SPLEEN:  7 7 x 6 8 cm hypoenhancing splenic mass, cyst slowly increased in size since CTs dating back to 4/25/2017 (at which time it measured 6 5 x 6 8 cm)  PANCREAS:  Unremarkable  ADRENAL GLANDS:  Hyperplastic left adrenal gland, in retrospect, unchanged since 2017  Normal right adrenal gland  KIDNEYS/URETERS:  Unremarkable  No hydronephrosis  STOMACH AND BOWEL:  Stomach unremarkable  Enteroenteric anastomosis in the right midabdomen  Colorectal anastomosis  Small intestine and colon otherwise unremarkable  APPENDIX:  Normal  ABDOMINOPELVIC CAVITY: No lymphadenopathy or mass  No ascites  No extraluminal gas  VESSELS:  Atherosclerotic changes are present  No evidence of aneurysm  PELVIS REPRODUCTIVE ORGANS:  Post hysterectomy  URINARY BLADDER:  Unremarkable  ABDOMINAL WALL/INGUINAL REGIONS:  Unremarkable  OSSEOUS STRUCTURES:  Scoliosis  Degenerative disease in the thoracic and lumbar spine  No evidence of recent fracture or destructive lesion  Impression 1  Mediastinal and right hilar lymphadenopathy  2   Chronic splenic mass, slowly enlarging since 2017    This is almost certainly benign and the differential diagnosis includes hamartoma, hemorrhagic cyst, littoral cell angioma or lymphangioma  3   No evidence of primary malignancy in the chest, abdomen or pelvis  Workstation performed: [id number]      No NM PET CT results available for this patient  No Barium Swallow results available for this patient

## 2021-02-04 NOTE — LETTER
February 4, 2021     Patient: Rafiq Mercer   YOB: 1960   Date of Visit: 2/4/2021       To Whom it May Concern:    Please excuse Jn Soni from work today as she was present in the office during her mother's doctor's appointment  Her presence was needed to coordinate her mother's care as her diagnosis is complex  If you have any questions or concerns, please don't hesitate to call           Sincerely,          Ankit Mayers MD        CC: No Recipients

## 2021-02-04 NOTE — ASSESSMENT & PLAN NOTE
Ms Baldomero Bobby   Is a very pleasant 60-year-old female who presents to my office with mediastinal lymphadenopathy  She was recently diagnosed with metastatic squamous cell carcinoma but with unknown origin      today in the office, we discussed the procedure moving forward  I explained to her that my plan would be to perform a mediastinoscopy for her mediastinal lymph nodes  I explained to her that I could perform an EBUS for this but since she has previously undergone an FNA of the lymph node and that did not provide us with enough information, I would recommend mediastinoscopy in order to get adequate size samples of tissue to get as much information as possible regarding this cancer  She understands and consent was obtained in the office today  We will proceed on 2/9/21  Additionally, I have placed referral to palliative care as the patient was in a significant amount of discomfort in my office today  Fortunately, they were  Able to accommodate her today and they will be seeing her later this morning      Keith Streeter MD  Thoracic Surgery  (Available on [employer])  Office: [phone]

## 2021-02-04 NOTE — PROGRESS NOTES
Thoracic Consult  Assessment/Plan:    Squamous cell carcinoma of unknown origin  Ms Zulay Simons   Is a very pleasant 64-year-old female who presents to my office with mediastinal lymphadenopathy  She was recently diagnosed with metastatic squamous cell carcinoma but with unknown origin      today in the office, we discussed the procedure moving forward  I explained to her that my plan would be to perform a mediastinoscopy for her mediastinal lymph nodes  I explained to her that I could perform an EBUS for this but since she has previously undergone an FNA of the lymph node and that did not provide us with enough information, I would recommend mediastinoscopy in order to get adequate size samples of tissue to get as much information as possible regarding this cancer  She understands and consent was obtained in the office today  We will proceed on 2/9/21  Additionally, I have placed referral to palliative care as the patient was in a significant amount of discomfort in my office today  Fortunately, they were  Able to accommodate her today and they will be seeing her later this morning  Doloris Meckel, MD  Thoracic Surgery  (Available on [employer])  Office: [phone]         Diagnoses and all orders for this visit:    Mediastinal lymphadenopathy  -     Case request operating room: MEDIASTINOSCOPY, flexible bronchoscopy; Standing  -     Case request operating room: MEDIASTINOSCOPY, flexible bronchoscopy    Squamous cell carcinoma of unknown origin  -     Ambulatory referral to Palliative Care; Future    Other orders  -     Diet NPO; Sips with meds; Standing  -     Nursing communication Please give pre-op Carbohydrate drink to patient 2-4 hours prior to surgery; Standing  -     Void on call to OR; Standing  -     Insert peripheral IV;  Standing  -     Place sequential compression device; Standing  -     ceFAZolin (ANCEF) IVPB (premix in dextrose) 2,000 mg 50 mL          Thoracic History   Diagnosis: Procedures/Surgeries:    Pathology:    Adjuvant Therapy:       Subjective:    Patient ID: He Silva is a 62 y o  female  HPI  Ms  [id number] [address]   Is a very pleasant 60-year-old female who presents to my office with mediastinal lymphadenopathy  She was recently diagnosed with metastatic squamous cell carcinoma but with unknown origin  On her imaging, she has 2 lesions  In her brain  Additionally, she has CSF spread of her tumor  A cervical lymph node was biopsied which demonstrated squamous cell carcinoma but they were unable to identify the source  She was referred to thoracic surgery for biopsy of her mediastinal lymph nodes  Today in the office, she states that she is having significant discomfort and pain, mostly of her left lower extremity  Additionally, she is experiencing some hearing loss that is associated with the brain lesion  She denies any fevers or chills  She does report some weight loss  She denies a cough, chest pain or shortness of breath  She  Reports insomnia      The following portions of the patient's history were reviewed and updated as appropriate: allergies, current medications, past family history, past medical history, past social history, past surgical history and problem list     Past Medical History:   Diagnosis Date    Acute on chronic congestive heart failure with left ventricular diastolic dysfunction ([facility] [address] )     last assessed 2017    Asthma     Atelectasis     CHF (congestive heart failure) ([facility] [address] [facility]     Diabetes mellitus ([facility] [address] )     Diverticulitis 2017    with perforation and abscess     Hyperlipidemia     Hypertension     Lesion of spleen     Pericardial effusion       Past Surgical History:   Procedure Laterality Date    ABDOMINAL SURGERY      BREAST BIOPSY Left 10/04/2018     SECTION      COLON SURGERY      COLONOSCOPY      COLOSTOMY  2017    HARTMANS PROCEDURE N/A 2017    Procedure: EXPLORATORY LAPAROTOMY; PARTIAL SMALL BOWEL RESECTION; HARTMANS PROCEDURE; OSTOMY;  Surgeon: Oni Reddy MD;  [facility];  Service:     HYSTERECTOMY  2018    IR BIOPSY LYMPH NODE  1/20/2021    IR LUMBAR PUNCTURE  1/20/2021    MAMMO STEREOTACTIC BREAST BIOPSY LEFT (ALL INC) Left 10/4/2018    OOPHORECTOMY Bilateral 2018    TX CLOSE ENTEROSTOMY N/A 9/8/2017    Procedure: OPEN COLOSTOMY REVERSAL;  Surgeon: Oni Reddy MD;  [facility];  Service: General    TX COLONOSCOPY FLX DX W/COLLJ [address] PFRMD N/A 8/16/2017    Procedure: COLONOSCOPY; DILATION OF OSTOMY;  Surgeon: Oni Reddy MD;  [facility];   Service: General    TX EXC SKIN BENIG <0 5 CM REMAINDER BODY N/A 11/29/2017    Procedure: SCALP MASS EXCISION X 2;  Surgeon: Oni Reddy MD;  [facility];  Service: General    [id number] [facility] N/A 2/27/2019    Procedure: INCISIONAL HERNIA REPAIR, COMPONENT SEPARATION;  Surgeon: Oni Reddy MD;  [facility];  Service: General    TX TOTAL ABDOM HYSTERECTOMY N/A 2/27/2019    Procedure: TOTAL ABDOMINAL HYSTERECTOMY; BSO;  Surgeon: Reza Ovalles MD;  [facility];  Service: Gynecology    VAC DRESSING APPLICATION N/A 4/3/1919    Procedure: Aleksandar Hennessy;  Surgeon: Oni Reddy MD;  [facility];  Service:       Family History   Problem Relation Age of Onset    Hypertension Mother     Diabetes Mother     No Known Problems Sister     No Known Problems Daughter     Heart disease Son     No Known Problems Maternal Aunt     Lung cancer Maternal Aunt     No Known Problems Maternal Aunt     No Known Problems Maternal Aunt     No Known Problems Paternal Aunt     Breast cancer Neg Hx       Social History     Socioeconomic History    Marital status:      Spouse name: Not on file    Number of children: Not on file    Years of education: Not on file    Highest education level: Not on file   Occupational History    Not on file Social Needs    Financial resource strain: Not on file    Food insecurity     Worry: Not on file     Inability: Not on file    Transportation needs     Medical: Not on file     Non-medical: Not on file   Tobacco Use    Smoking status: Former Smoker     Packs/day: 0 25     Years: 20 00     Pack years: 5 00     Types: Cigarettes     Start date: 1991     Quit date: 2021     Years since quittin 0    Smokeless tobacco: Never Used   Substance and Sexual Activity    Alcohol use: Not Currently     Frequency: Monthly or less     Drinks per session: 1 or 2     Binge frequency: Never     Comment: socially     Drug use: No    Sexual activity: Yes     Partners: Male     Birth control/protection: Surgical   Lifestyle    Physical activity     Days per week: 0 days     Minutes per session: 0 min    Stress: Not at all   Relationships    Social connections     Talks on phone: Not on file     Gets together: Not on file     Attends Holiness service: Not on file     Active member of club or organization: Not on file     Attends meetings of clubs or organizations: Not on file     Relationship status: Not on file    Intimate partner violence     Fear of current or ex partner: Not on file     Emotionally abused: Not on file     Physically abused: Not on file     Forced sexual activity: Not on file   Other Topics Concern    Not on file   Social History Narrative    Not on file      Review of Systems   Constitutional: Positive for fatigue  Negative for chills, fever and unexpected weight change  HENT: Positive for hearing loss  Negative for facial swelling, trouble swallowing and voice change  Eyes: Negative  Negative for visual disturbance  Respiratory: Negative for cough, shortness of breath and stridor  Cardiovascular: Negative for chest pain  Gastrointestinal: Negative  Endocrine: Negative  Genitourinary: Negative  Musculoskeletal: Positive for myalgias  Skin: Negative  Neurological: Positive for numbness  Negative for dizziness, light-headedness and headaches  Hematological: Negative for adenopathy  Psychiatric/Behavioral: Negative  Objective:   Physical Exam  Vitals signs and nursing note reviewed  Constitutional:       General: She is not in acute distress  Appearance: She is well-developed and normal weight  She is not diaphoretic  Comments: In a wheelchair   HENT:      Head: Normocephalic and atraumatic  Nose: Nose normal       Mouth/Throat:      Mouth: Mucous membranes are moist       Pharynx: Oropharynx is clear  Eyes:      General: No scleral icterus  Pupils: Pupils are equal, round, and reactive to light  Neck:      Musculoskeletal: Normal range of motion  Trachea: No tracheal deviation  Cardiovascular:      Rate and Rhythm: Normal rate and regular rhythm  Heart sounds: Normal heart sounds  No murmur  Pulmonary:      Effort: Pulmonary effort is normal  No respiratory distress  Breath sounds: Normal breath sounds  No stridor  No wheezing or rales  Chest:      Chest wall: No tenderness  Abdominal:      General: Bowel sounds are normal  There is no distension  Palpations: Abdomen is soft  Tenderness: There is no abdominal tenderness  There is no rebound  Musculoskeletal: Normal range of motion  Lymphadenopathy:      Cervical: No cervical adenopathy  Skin:     General: Skin is warm and dry  Coloration: Skin is not pale  Findings: No erythema or rash  Neurological:      Mental Status: She is alert and oriented to person, place, and time  Psychiatric:         Behavior: Behavior normal          Thought Content:  Thought content normal          Judgment: Judgment normal      /78 Comment: did not take bp meds today  Pulse 75   Temp 98 3 °F (36 8 °C) (Temporal)   Resp 16   Ht 5' (1 524 m)   Wt 73 6 kg (162 lb 4 1 oz)   LMP 08/16/2014 (Approximate)   SpO2 98%   BMI 31 69 kg/m² No Chest XR results available for this patient  No CT Chest results available for this patient  Ct Chest Abdomen Pelvis W Contrast    Result Date: 1/17/2021  Narrative CT CHEST, ABDOMEN AND PELVIS WITH IV CONTRAST INDICATION:   cancer w/u  Brain metastases, and cervical, hilar and mediastinal lymphadenopathy demonstrated on CTA of the head and neck from yesterday  History of sigmoid resection for perforated diverticulitis in 2017  Chronic splenic mass  COMPARISON:  CTA of the head and neck from January 16, 2021  MRI of the abdomen from February 21, 2019  CTs of the abdomen and pelvis from July 31, 2018 and May 1, 2017  TECHNIQUE: CT examination of the chest, abdomen and pelvis was performed  Axial, sagittal, and coronal 2D reformatted images were created from the source data and submitted for interpretation  Radiation dose length product (DLP) for this visit:  766 9 mGy-cm   This examination, like all CT scans performed in the [facility], was performed utilizing techniques to minimize radiation dose exposure, including the use of iterative reconstruction and automated exposure control  IV Contrast:  100 mL of iohexol (OMNIPAQUE) Enteric Contrast: Enteric contrast was not administered  FINDINGS: CHEST LUNGS:  7 mm tubular branching soft tissue nodule in the base of the right lower lobe, most consistent with endobronchial mucoid impaction  Lungs otherwise clear  PLEURA:  Unremarkable  HEART/GREAT VESSELS:  Heart and thoracic aorta unremarkable  Dilated central pulmonary arteries with main pulmonary artery diameter of 3 4 cm  MEDIASTINUM AND DAVID: Several enlarged lymph nodes including a 1 5 cm subcarinal node, 1 4 cm pretracheal node, 1 6 cm right hilar node and 1 5 cm left superior mediastinal node, adjacent to the left brachiocephalic vein  Esophagus unremarkable  Trachea and main stem bronchi normal  CHEST WALL AND LOWER NECK:   Unremarkable   ABDOMEN LIVER/BILIARY TREE:  8 mm cyst in the dome of the right lobe, unchanged since 7/31/2018  No new liver lesions  Bile ducts normal in caliber  GALLBLADDER:  Contracted  Otherwise unremarkable  SPLEEN:  7 7 x 6 8 cm hypoenhancing splenic mass, cyst slowly increased in size since CTs dating back to 4/25/2017 (at which time it measured 6 5 x 6 8 cm)  PANCREAS:  Unremarkable  ADRENAL GLANDS:  Hyperplastic left adrenal gland, in retrospect, unchanged since 2017  Normal right adrenal gland  KIDNEYS/URETERS:  Unremarkable  No hydronephrosis  STOMACH AND BOWEL:  Stomach unremarkable  Enteroenteric anastomosis in the right midabdomen  Colorectal anastomosis  Small intestine and colon otherwise unremarkable  APPENDIX:  Normal  ABDOMINOPELVIC CAVITY: No lymphadenopathy or mass  No ascites  No extraluminal gas  VESSELS:  Atherosclerotic changes are present  No evidence of aneurysm  PELVIS REPRODUCTIVE ORGANS:  Post hysterectomy  URINARY BLADDER:  Unremarkable  ABDOMINAL WALL/INGUINAL REGIONS:  Unremarkable  OSSEOUS STRUCTURES:  Scoliosis  Degenerative disease in the thoracic and lumbar spine  No evidence of recent fracture or destructive lesion  Impression 1  Mediastinal and right hilar lymphadenopathy  2   Chronic splenic mass, slowly enlarging since 2017  This is almost certainly benign and the differential diagnosis includes hamartoma, hemorrhagic cyst, littoral cell angioma or lymphangioma  3   No evidence of primary malignancy in the chest, abdomen or pelvis  Workstation performed: [id number]      No NM PET CT results available for this patient  No Barium Swallow results available for this patient

## 2021-02-04 NOTE — PROGRESS NOTES
Outpatient Consultation - Palliative and Supportive Care   Mary Kate Asher 62 y o  female [id number]    Assessment & Plan  Problem List Items Addressed This Visit     Squamous cell carcinoma of unknown origin    Relevant Medications    meloxicam (MOBIC) 15 mg tablet    gabapentin (NEURONTIN) 100 mg capsule    loratadine (CLARITIN) 10 mg tablet    oxyCODONE (ROXICODONE) 5 mg immediate release tablet      Other Visit Diagnoses     Cancer-related pain    -  Primary    Relevant Medications    meloxicam (MOBIC) 15 mg tablet    gabapentin (NEURONTIN) 100 mg capsule    loratadine (CLARITIN) 10 mg tablet    oxyCODONE (ROXICODONE) 5 mg immediate release tablet    Vertigo        Relevant Medications    diazepam (VALIUM) 2 mg tablet        - Counseling on health screening and disease prevention, COVID-19 specific (CPT V65 49)    Medications adjusted this encounter:  Requested Prescriptions     Signed Prescriptions Disp Refills    meloxicam (MOBIC) 15 mg tablet 30 tablet 0     Sig: Take 1 tablet (15 mg total) by mouth daily with dinner To reduce inflammation at night    gabapentin (NEURONTIN) 100 mg capsule 60 capsule 0     Sig: Take 1-2 capsules (100-200 mg total) by mouth daily at bedtime To reduce nerve pain    loratadine (CLARITIN) 10 mg tablet 30 tablet 0     Sig: Take 1 tablet (10 mg total) by mouth daily at bedtime To reduce nighttime allergy inflammation    diazepam (VALIUM) 2 mg tablet 10 tablet 0     Sig: Take 1 tablet (2 mg total) by mouth every 6 (six) hours as needed for anxiety (dizziness)    oxyCODONE (ROXICODONE) 5 mg immediate release tablet 15 tablet 0     Sig: Take 1 tablet (5 mg total) by mouth every 4 (four) hours as needed (severe cancer pain)Max Daily Amount: 30 mg     No orders of the defined types were placed in this encounter      Medications Discontinued During This Encounter   Medication Reason    ibuprofen (MOTRIN) 800 mg tablet     cyclobenzaprine (FLEXERIL) 10 mg tablet     clonazePAM (KlonoPIN) 0 5 mg tablet     metoprolol succinate (TOPROL-XL) 50 mg 24 hr tablet        PPS: 50      Alexander Arredondo and her daughter were seen today for symptoms and planning cares related to above illnesses  Above orders were sent electronically, or provided in hardcopy in clinic  I have reviewed the patient's controlled substance dispensing history in the Prescription Drug Monitoring Program in compliance with the [facility] regulations before prescribing any controlled substances  We appreciate the referral, and wish for them to continue to follow with us  If there are questions or concerns, please contact us through our clinic/answering service 24 hours a day, seven days a week  Iman Roblero MD  [facility]          Visit Information    Accompanied By: Family member    Source of History: Self, Family member    History Limitations: None    Contacts: daughter Bryan Grant ("mariely calle") Haley Moe -     History of Present Illness      Alexander Arredondo is a 62 y o  female who presents as a referral from Dr Gasper Urban of Thoracic Surgery for primary palliative diagnosis of 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, 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  Consultation is requested for: symptom management, emotional support in the setting of serious illness  This lady presents with family as a same-day referral from Dr Gasper Urban  There is a concern for drop mets, given the CSF findings and the clinical scenario of Having unilateral hearing loss and laterlizing paresthesias  [employer] Unfortunately, cyto was equivocal on tissue type / origin, and so we do not have a formal diagnosis to guide our options selection    Dr Gasper Urban has been asked to consider invasive testing for path collection; a mediastinoscopy will be attempted, per discussion with Dr Gasper Urban, the family, and the Radiology team   It is felt that -- after the positive result on CSF, the large lymph nodes in the chest are most high-yield, and most easy to access  Today, we discussed her symptoms, as noted above, and she is willing to try non-opioid strategies to help improve her pain  Daughter convinces her that opioids may be needed to help more severe symptoms, and we agreed to send a small supply, but hold this in reserve  Additionally, time was spent in brief life review  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[employer], and she is an avid learner with a sharp mind  She recounts to me today a story of how 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' so that they could perform genuinely in the movie 'The Taking of [address]'  As re: goals, she trusts her  to make decisions in case of emergency  She confirms a desire to continue treatment and investigations to prolong her life, improve function, and consider options  Pertinent Palliative Care Domains    Physical Symptoms:ambulates with difficulty    Psychological Symptoms:no anxiety, good insight    Social Aspects: support system includes daughter as noted    Spiritual Aspects: as noted above; very proud of her work for 35+years for [email or web address]      Historical Data  Past Medical History:   Diagnosis Date    Acute on chronic congestive heart failure with left ventricular diastolic dysfunction (HCC)     last assessed 2017    Asthma     Atelectasis     [facility] [address] [facility]     Diabetes mellitus [facility] [address] [facility]     Diverticulitis 2017    with perforation and abscess     Hyperlipidemia     Hypertension     Lesion of spleen     Pericardial effusion      Past Surgical History:   Procedure Laterality Date    ABDOMINAL SURGERY      BREAST BIOPSY Left 10/04/2018     SECTION      COLON SURGERY      COLONOSCOPY      COLOSTOMY  2017    HARTMANS PROCEDURE N/A 2017    Procedure: EXPLORATORY LAPAROTOMY; PARTIAL SMALL BOWEL RESECTION; HARTMANS PROCEDURE; OSTOMY;  Surgeon: Grady Jj, MD;  [facility];  Service:     HYSTERECTOMY  2018    IR BIOPSY LYMPH NODE  2021    IR LUMBAR PUNCTURE  2021    MAMMO STEREOTACTIC BREAST BIOPSY LEFT (ALL INC) Left 10/4/2018    OOPHORECTOMY Bilateral 2018    ND CLOSE ENTEROSTOMY N/A 2017    Procedure: OPEN COLOSTOMY REVERSAL;  Surgeon: Oni Reddy MD;  [facility];  Service: General    ND COLONOSCOPY FLX DX W/COLLJ SPEC WHEN PFRMD N/A 2017    Procedure: COLONOSCOPY; DILATION OF OSTOMY;  Surgeon: Oni Reddy MD;  [facility];   Service: General    ND EXC SKIN BENIG <0 5 CM REMAINDER BODY N/A 2017    Procedure: SCALP MASS EXCISION X 2;  Surgeon: Oni Reddy MD;  [facility];  Service: General    [address] [place] [address] N/A 2019    Procedure: INCISIONAL HERNIA REPAIR, COMPONENT SEPARATION;  Surgeon: Oni Reddy MD;  [facility];  Service: General    ND TOTAL ABDOM HYSTERECTOMY N/A 2019    Procedure: TOTAL ABDOMINAL HYSTERECTOMY; BSO;  Surgeon: Reza Ovalles MD;  [facility];  Service: Gynecology    VAC DRESSING APPLICATION N/A 3/3/2531    Procedure: Aleksandar Hennessy;  Surgeon: Oni Reddy MD;  [facility];  Service:      Social History     Socioeconomic History    Marital status:      Spouse name: Not on file    Number of children: Not on file    Years of education: Not on file    Highest education level: Not on file   Occupational History    Not on file   Social Needs    Financial resource strain: Not on file    Food insecurity     Worry: Not on file     Inability: Not on file    Transportation needs     Medical: Not on file     Non-medical: Not on file   Tobacco Use    Smoking status: Former Smoker     Packs/day: 0 25     Years: 20 00     Pack years: 5 00     Types: Cigarettes     Start date: 1991     Quit date: 2021     Years since quittin 0    Smokeless tobacco: Never Used Substance and Sexual Activity    Alcohol use: Not Currently     Frequency: Monthly or less     Drinks per session: 1 or 2     Binge frequency: Never     Comment: socially     Drug use: No    Sexual activity: Yes     Partners: Male     Birth control/protection: Surgical   Lifestyle    Physical activity     Days per week: 0 days     Minutes per session: 0 min    Stress: Not at all   Relationships    Social connections     Talks on phone: Not on file     Gets together: Not on file     Attends Jew service: Not on file     Active member of club or organization: Not on file     Attends meetings of clubs or organizations: Not on file     Relationship status: Not on file    Intimate partner violence     Fear of current or ex partner: Not on file     Emotionally abused: Not on file     Physically abused: Not on file     Forced sexual activity: Not on file   Other Topics Concern    Not on file   Social History Narrative    Not on file     Family History   Problem Relation Age of Onset    Hypertension Mother     Diabetes Mother     No Known Problems Sister     No Known Problems Daughter     Heart disease Son     No Known Problems Maternal Aunt     Lung cancer Maternal Aunt     No Known Problems Maternal Aunt     No Known Problems Maternal Aunt     No Known Problems Paternal Aunt     Breast cancer Neg Hx      Allergies   Allergen Reactions    Ciprofloxacin Itching     Starts at hands to feet then the whole entire body     Current Outpatient Medications   Medication Sig Dispense Refill    amLODIPine (NORVASC) 10 mg tablet TAKE 1 TABLET BY MOUTH EVERY DAY 90 tablet 2    atorvastatin (LIPITOR) 20 mg tablet TAKE 1 TABLET DAILY 90 tablet 1    lidocaine (LIDODERM) 5 % Apply 2 patches topically daily Remove & Discard patch within 12 hours or as directed by MD 30 patch 0    metFORMIN (GLUCOPHAGE) 1000 MG tablet Take 1 tablet (1,000 mg total) by mouth 2 (two) times a day with meals 180 tablet 3    Tradjenta 5 MG TABS TAKE 1 TABLET BY MOUTH EVERY DAY 90 tablet 3    dexamethasone (DECADRON) 2 mg tablet Take 1 tablet (2 mg total) by mouth 2 (two) times a day with meals 40 tablet 0    diazepam (VALIUM) 2 mg tablet Take 1 tablet (2 mg total) by mouth every 6 (six) hours as needed for anxiety (dizziness) 10 tablet 0    gabapentin (NEURONTIN) 100 mg capsule Take 1-2 capsules (100-200 mg total) by mouth daily at bedtime To reduce nerve pain 60 capsule 0    ipratropium (ATROVENT) 0 02 % nebulizer solution Inhale 0 5 mg as needed       loratadine (CLARITIN) 10 mg tablet Take 1 tablet (10 mg total) by mouth daily at bedtime To reduce nighttime allergy inflammation 30 tablet 0    meloxicam (MOBIC) 15 mg tablet Take 1 tablet (15 mg total) by mouth daily with dinner To reduce inflammation at night 30 tablet 0    ondansetron (ZOFRAN) 8 mg tablet Take 1 tablet (8 mg total) by mouth every 8 (eight) hours as needed for nausea or vomiting 30 tablet 0    oxyCODONE (ROXICODONE) 5 mg immediate release tablet Take 1 tablet (5 mg total) by mouth every 4 (four) hours as needed (severe cancer pain)Max Daily Amount: 30 mg 15 tablet 0    pantoprazole (PROTONIX) 20 mg tablet Take 1 tablet (20 mg total) by mouth daily 30 tablet 0     No current facility-administered medications for this visit  Review of Systems   Constitution: Positive for decreased appetite and weight loss  Negative for weight gain  HENT: Negative for hoarse voice and nosebleeds  Eyes: Negative for vision loss in left eye and vision loss in right eye  Cardiovascular: Negative for chest pain and dyspnea on exertion  Respiratory: Negative for cough and shortness of breath  Endocrine: Negative for polydipsia, polyphagia and polyuria  Skin: Negative for flushing and itching  Musculoskeletal: Positive for muscle weakness  Negative for falls and joint swelling  Gastrointestinal: Positive for nausea   Negative for anorexia, jaundice and vomiting  Genitourinary: Negative for frequency  Neurological: Positive for numbness and paresthesias  Negative for dizziness and seizures  Psychiatric/Behavioral: Negative for depression and memory loss  The patient is not nervous/anxious  Vital Signs    /80 (BP Location: Left arm, Patient Position: Sitting, Cuff Size: Standard)   Pulse 75   Temp 98 °F (36 7 °C) (Temporal)   Resp 16   Ht 5' (1 524 m)   Wt 73 6 kg (162 lb 4 1 oz)   LMP 08/16/2014 (Approximate)   SpO2 97%   BMI 31 69 kg/m²     Physical Exam and Objective Data  Physical Exam  Constitutional:       General: She is not in acute distress  Appearance: She is not ill-appearing, toxic-appearing or diaphoretic  HENT:      Head: Normocephalic and atraumatic  Right Ear: External ear normal       Left Ear: External ear normal       Mouth/Throat:      Comments: Mask not removed today  Eyes:      General:         Right eye: No discharge  Left eye: No discharge  Conjunctiva/sclera: Conjunctivae normal       Pupils: Pupils are equal, round, and reactive to light  Neck:      Trachea: No tracheal deviation  Cardiovascular:      Rate and Rhythm: Normal rate and regular rhythm  Pulmonary:      Effort: Pulmonary effort is normal  No respiratory distress  Breath sounds: No stridor  Abdominal:      General: There is no distension  Palpations: Abdomen is soft  Comments: Scaphoid   Skin:     General: Skin is warm and dry  Coloration: Skin is not pale  Findings: No erythema or rash  Neurological:      Mental Status: She is alert and oriented to person, place, and time  Cranial Nerves: Cranial nerve deficit (new, unilateral hearing impairment, R side) present  Comments: Seated in w/c; strenght and coordination not formally tested   Psychiatric:         Mood and Affect: Mood normal          Behavior: Behavior normal          Thought Content:  Thought content normal  Judgment: Judgment normal            Radiology and Laboratory:  I personally reviewed and interpreted the following results: cyto of CSF as noted above; GFR not acceptable for morphine    60+ minutes was spent face to face with Eugenia Venegas and her family with greater than 50% of the time spent in counseling or coordination of care including discussions of etiology of diagnosis, diagnostic results, impression, and recommendations, risk factors and risk reduction of disease  Additional time was also spent in discussing coronavirus vaccine indications, availability, and logistical challenges  All of the patient's questions were answered during this discussion

## 2021-02-05 ENCOUNTER — CLINICAL SUPPORT (OUTPATIENT)
Dept: RADIATION ONCOLOGY | Facility: CLINIC | Age: 61
End: 2021-02-05
Attending: RADIOLOGY

## 2021-02-05 ENCOUNTER — RADIATION ONCOLOGY CONSULT (OUTPATIENT)
Dept: RADIATION ONCOLOGY | Facility: CLINIC | Age: 61
End: 2021-02-05
Attending: RADIOLOGY

## 2021-02-05 ENCOUNTER — APPOINTMENT (OUTPATIENT)
Dept: RADIATION ONCOLOGY | Facility: CLINIC | Age: 61
End: 2021-02-05
Attending: RADIOLOGY
Payer: COMMERCIAL

## 2021-02-05 VITALS
BODY MASS INDEX: 31.8 KG/M2 | HEIGHT: 60 IN | WEIGHT: 162 LBS | SYSTOLIC BLOOD PRESSURE: 162 MMHG | TEMPERATURE: 97.4 F | HEART RATE: 69 BPM | RESPIRATION RATE: 18 BRPM | DIASTOLIC BLOOD PRESSURE: 80 MMHG

## 2021-02-05 DIAGNOSIS — C80.1 SECONDARY MALIGNANCY OF BRAIN AND SPINAL CORD WITH UNKNOWN PRIMARY SITE (HCC): Primary | ICD-10-CM

## 2021-02-05 DIAGNOSIS — C44.92 SQUAMOUS CELL CARCINOMA OF UNKNOWN ORIGIN: ICD-10-CM

## 2021-02-05 DIAGNOSIS — C79.31 SECONDARY MALIGNANCY OF BRAIN AND SPINAL CORD WITH UNKNOWN PRIMARY SITE (HCC): Primary | ICD-10-CM

## 2021-02-05 DIAGNOSIS — R90.0 INTRACRANIAL MASS: ICD-10-CM

## 2021-02-05 DIAGNOSIS — M54.16 LUMBAR BACK PAIN WITH RADICULOPATHY AFFECTING LEFT LOWER EXTREMITY: Primary | ICD-10-CM

## 2021-02-05 DIAGNOSIS — C79.49 SECONDARY MALIGNANCY OF BRAIN AND SPINAL CORD WITH UNKNOWN PRIMARY SITE (HCC): Primary | ICD-10-CM

## 2021-02-05 DIAGNOSIS — C79.31 BRAIN METASTASES (HCC): Primary | ICD-10-CM

## 2021-02-05 PROCEDURE — 77290 THER RAD SIMULAJ FIELD CPLX: CPT | Performed by: RADIOLOGY

## 2021-02-05 PROCEDURE — 99211 OFF/OP EST MAY X REQ PHY/QHP: CPT | Performed by: RADIOLOGY

## 2021-02-05 PROCEDURE — 77334 RADIATION TREATMENT AID(S): CPT | Performed by: RADIOLOGY

## 2021-02-05 RX ORDER — PANTOPRAZOLE SODIUM 20 MG/1
20 TABLET, DELAYED RELEASE ORAL DAILY
Qty: 30 TABLET | Refills: 0 | Status: SHIPPED | OUTPATIENT
Start: 2021-02-05

## 2021-02-05 RX ORDER — DEXAMETHASONE 2 MG/1
2 TABLET ORAL 2 TIMES DAILY WITH MEALS
Qty: 40 TABLET | Refills: 0 | Status: SHIPPED | OUTPATIENT
Start: 2021-02-05 | End: 2021-02-24 | Stop reason: HOSPADM

## 2021-02-05 RX ORDER — ONDANSETRON HYDROCHLORIDE 8 MG/1
8 TABLET, FILM COATED ORAL EVERY 8 HOURS PRN
Qty: 30 TABLET | Refills: 0 | Status: SHIPPED | OUTPATIENT
Start: 2021-02-05

## 2021-02-05 NOTE — PROGRESS NOTES
Sharee Velarde 1960 is a 62 y o  female    Oncology History Overview Note   1/16/21 To [facility] ED with dizziness x 4 weeks  1/17/21 Admitted to [facility] 1/17-1/22 1/16/21 CTA head and neck  1  Isodense soft tissue masses in the left occipital lobe and left parietal lobe with surrounding vasogenic edema  Although the findings may represent primary CNS neoplasm, the presence of mediastinal, hilar, paratracheal and cervical adenopathy,   suggests metastatic disease  Recommend follow-up MRI of the brain with gadolinium and/or neurosurgical evaluation  Recommend follow-up CT chest abdomen pelvis for metastatic workup  2   Moderate atherosclerotic changes of the proximal internal carotid arteries bilaterally, right side greater than left resulting in moderate stenosis of the proximal cervical right internal carotid artery of approximately 75%  3   Moderate atherosclerotic disease of the supraclinoid segments of the internal carotid arteries bilaterally, resulting in mild stenosis bilaterally  No evidence of aneurysm, vascular malformation or vascular cut off  No evidence of large vessel   central occlusive disease involving the [place]  4   Mild cerebral atrophy with chronic small vessel ischemic white matter disease  5   Mediastinal, hilar, paratracheal, paraesophageal, supraclavicular and cervical adenopathy, pathologic  6   Enlarged palatine tonsils and lingual tonsil  The oropharynx is not well visualized due to beam hardening artifact from dental hardware  Consider follow-up MRI of the soft tissues of the neck versus ENT consultation with direct laryngoscopy  1/17/21 CT chest, abdomen and pelvis  1  Mediastinal and right hilar lymphadenopathy  2   Chronic splenic mass, slowly enlarging since 2017  This is almost certainly benign and the differential diagnosis includes hamartoma, hemorrhagic cyst, littoral cell angioma or lymphangioma    3   No evidence of primary malignancy in the chest, abdomen or pelvis  1/18/21 MRI brain  Metastatic disease, with 2 Parenchymal lesions, 2 1 and 5 mm in the greatest linear dimension  There is diffuse enhancement of the internal auditory canals bilaterally, which warrants lumbar puncture to exclude leptomeningeal tumor  MRI cervical spine  Enhancement along the surface of the cervical and thoracic spinal cord compatible with CSF spread of tumor  Enhancement is seen at C3-4, and T2-3  MRI thoracic spine  Enhancement along the nerve roots of the filum terminale and distal spinal cord seen best on series 25 images 8 through 11 compatible with CSF spread of tumor  Dextroscoliosis apex T8  No significant central or foraminal narrowing  MRI lumbar spine  Innumerable foci of nodular enhancement along the filum terminale and distal spinal cord compatible CSF spread of tumor  1/18/21 Nathalia Sellers PA-C Neurosurgery  ·need  tissue sample for pathology  1/20/21 Lumbar puncture CSF  Atypical cellular changes seen  1/20/21 Cervical lymph node  Conclusive Evidence of Malignancy  Malignant epithelioid neoplasm, 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     1/21/21 Nathalia Sellers PA-C  ·Discussed anticipated need for radiation to brain masses  Given concern for leptomeningeal disease, may require WBRT      1/27/21 presented at the neuro oncology case review  Dr Noah Banda did not recommend a craniotomy for resection due to the risks involved  To get more tissue, Dr Noah Banda said he could offer a brain biopsy  Or an EBUS with Dr Juventino Fitzgerald group   He said which ever one we could arrange the quickest      1/28/21 Joann Mcconnell MD  recommend Rad Onc visit to consider RT options for palliative reasons  scheduled next week to undergo EBUS with Dr Mohsen Corona    2/4/21 Magdy Glass MD consult for EBUS  2/4/20 Lenore Caldwell MD consult  2/9/20 Mediastinoscopy  2/10/20 Joann Mcconnell MD    Pain always at night  [id number]  Losing motor skills in left left-weakness is progressing  Right leg okay   Pain in center back  Pain gets to a 10+  Right sided hearing loss mostly; left slightly decreased     Squamous cell carcinoma of unknown origin   1/28/2021 Initial Diagnosis    Squamous cell carcinoma of unknown origin    Final Diagnosis   A, B & C  Lymph node, cervical (ThinPrep, smear and cell block preparations)  Conclusive Evidence of Malignancy  Malignant epithelioid neoplasm, 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 - see Note  Satisfactory for evaluation  Electronically signed by Angelica Cervantes MD on 1/26/2021 at 12:19 PM   Comments: This is an appended report  These results have been appended to a previously preliminary verified report  Preliminary Diagnosis   A, B & C  Lymph node, cervical (ThinPrep, smear and cell block preparations)  Conclusive Evidence of Malignancy of uncertain histotype - see Note  Satisfactory for evaluation  Preliminary result electronically signed by Angelica Cervantes MD on 1/25/2021 at  3:53 PM   Note    Note to preliminary report:  Routine Pap-stained & Diff-Quick-stained cytologic preparations reveal plentiful groups of poorly differentiated malignant epithelioid cells, indicating the presence of non-small cell carcinoma  Intradepartmental consultation concurs with the diagnosis of non-small cell carcinoma  Histologic sections of cell block cellular material reveal a minute group of ~ 50 tumor cells  Attempts are made to determine tumor cell histotype by immunohistochemical stains and will be described in the final report  Note to final report:  Attempts at immunohistochemical stains to more specifically determine tumor cell histotype are unsuccessful, inasmuch as insufficient cellular material remained in the block    Additional, more extensive tissue sampling submitted in formalin is suggested for tumor cell histotype determination MRI Spine 1/20/21  Enhancement along the surface of the cervical and thoracic spinal cord compatible with CSF spread of tumor  Enhancement is seen at C3-4, and T2-3  CT TAP 1/17/21:  1  Mediastinal and right hilar lymphadenopathy  2   Chronic splenic mass, slowly enlarging since 2017  This is almost certainly benign and the differential diagnosis includes hamartoma, hemorrhagic cyst, littoral cell angioma or lymphangioma  3   No evidence of primary malignancy in the chest, abdomen or pelvis  MRI Brain 1/18/21:   Metastatic disease, with 2 Parenchymal lesions, 2 1 and 5 mm in the greatest linear dimension  There is diffuse enhancement of the internal auditory canals bilaterally, which warrants lumbar puncture to exclude leptomeningeal tumor           Clinical Trial: no    Health Maintenance   Topic Date Due    Pneumococcal Vaccine: Pediatrics (0 to 5 Years) and At-Risk Patients (6 to 59 Years) (1 of 3 - PCV13) 03/19/1967    Annual Physical  03/19/1979    DTaP,Tdap,and Td Vaccines (1 - Tdap) 03/19/1982    Influenza Vaccine (1) 09/01/2020    HEMOGLOBIN A1C  07/05/2021    MAMMOGRAM  12/07/2021    Depression Screening PHQ  01/05/2022    BMI: Followup Plan  01/05/2022    Diabetic Foot Exam  01/05/2022    URINE MICROALBUMIN  01/05/2022    BMI: Adult  02/05/2022    DM Eye Exam  12/15/2022    Cervical Cancer Screening  02/05/2024    Colorectal Cancer Screening  08/16/2027    HIV Screening  Completed    Hepatitis C Screening  Completed    HIB Vaccine  Aged Out    Hepatitis B Vaccine  Aged Out    IPV Vaccine  Aged Out    Hepatitis A Vaccine  Aged Out    Meningococcal ACWY Vaccine  Aged Out    HPV Vaccine  Aged Out       Past Medical History:   Diagnosis Date    Acute on chronic congestive heart failure with left ventricular diastolic dysfunction ([address] )     last assessed 5/23/2017    Asthma     Atelectasis     CHF (congestive heart failure) ([address] )     Diabetes mellitus ([address] )     Diverticulitis     with perforation and abscess     Hyperlipidemia     Hypertension     Lesion of spleen     Pericardial effusion        Past Surgical History:   Procedure Laterality Date    ABDOMINAL SURGERY      BREAST BIOPSY Left 10/04/2018     SECTION      COLON SURGERY      COLONOSCOPY      COLOSTOMY  2017    HARTMANS PROCEDURE N/A 2017    Procedure: EXPLORATORY LAPAROTOMY; PARTIAL SMALL BOWEL RESECTION; HARTMANS PROCEDURE; OSTOMY;  Surgeon: Maryjane Leon MD;  [facility];  Service:     HYSTERECTOMY  2018    IR BIOPSY LYMPH NODE  2021    IR LUMBAR PUNCTURE  2021    MAMMO STEREOTACTIC BREAST BIOPSY LEFT (ALL INC) Left 10/4/2018    OOPHORECTOMY Bilateral 2018    ND CLOSE ENTEROSTOMY N/A 2017    Procedure: OPEN COLOSTOMY REVERSAL;  Surgeon: Maryjane Leon MD;  [facility];  Service: General    ND COLONOSCOPY FLX DX W/COLLJ [address] PFRMD N/A 2017    Procedure: COLONOSCOPY; DILATION OF OSTOMY;  Surgeon: Maryjane Leon MD;  [facility];   Service: General    ND EXC SKIN BENIG <0 5 CM REMAINDER BODY N/A 2017    Procedure: SCALP MASS EXCISION X 2;  Surgeon: Maryjane Leon MD;  [facility];  Service: [facility] N/A 2019    Procedure: INCISIONAL HERNIA REPAIR, COMPONENT SEPARATION;  Surgeon: Maryjane Leon MD;  [facility];  Service: General    ND TOTAL ABDOM HYSTERECTOMY N/A 2019    Procedure: TOTAL ABDOMINAL HYSTERECTOMY; BSO;  Surgeon: German Barajas MD;  [facility];  Service: Gynecology    VAC DRESSING APPLICATION N/A 1933    Procedure: Magdy Livings;  Surgeon: Maryjane Leon MD;  [facility];  Service:        Family History   Problem Relation Age of Onset    Hypertension Mother     Diabetes Mother     No Known Problems Sister     No Known Problems Daughter     Heart disease Son     No Known Problems Maternal Aunt     Lung cancer Maternal Aunt     No Known Problems Maternal Aunt     No Known Problems Maternal Aunt     No Known Problems Paternal Aunt     Breast cancer Neg Hx        Social History     Tobacco Use    Smoking status: Former Smoker     Packs/day: 0 25     Years: 20 00     Pack years: 5 00     Types: Cigarettes     Start date: 1991     Quit date: 2021     Years since quittin 0    Smokeless tobacco: Never Used   Substance Use Topics    Alcohol use: Not Currently     Frequency: Monthly or less     Drinks per session: 1 or 2     Binge frequency: Never     Comment: socially     Drug use: No          Current Outpatient Medications:     amLODIPine (NORVASC) 10 mg tablet, TAKE 1 TABLET BY MOUTH EVERY DAY, Disp: 90 tablet, Rfl: 2    atorvastatin (LIPITOR) 20 mg tablet, TAKE 1 TABLET DAILY, Disp: 90 tablet, Rfl: 1    diazepam (VALIUM) 2 mg tablet, Take 1 tablet (2 mg total) by mouth every 6 (six) hours as needed for anxiety (dizziness), Disp: 10 tablet, Rfl: 0    gabapentin (NEURONTIN) 100 mg capsule, Take 1-2 capsules (100-200 mg total) by mouth daily at bedtime To reduce nerve pain, Disp: 60 capsule, Rfl: 0    lidocaine (LIDODERM) 5 %, Apply 2 patches topically daily Remove & Discard patch within 12 hours or as directed by MD, Disp: 30 patch, Rfl: 0    loratadine (CLARITIN) 10 mg tablet, Take 1 tablet (10 mg total) by mouth daily at bedtime To reduce nighttime allergy inflammation, Disp: 30 tablet, Rfl: 0    meloxicam (MOBIC) 15 mg tablet, Take 1 tablet (15 mg total) by mouth daily with dinner To reduce inflammation at night, Disp: 30 tablet, Rfl: 0    metFORMIN (GLUCOPHAGE) 1000 MG tablet, Take 1 tablet (1,000 mg total) by mouth 2 (two) times a day with meals, Disp: 180 tablet, Rfl: 3    oxyCODONE (ROXICODONE) 5 mg immediate release tablet, Take 1 tablet (5 mg total) by mouth every 4 (four) hours as needed (severe cancer pain)Max Daily Amount: 30 mg, Disp: 15 tablet, Rfl: 0   Tradjenta 5 MG TABS, TAKE 1 TABLET BY MOUTH EVERY DAY, Disp: 90 tablet, Rfl: 3    aspirin 81 mg chewable tablet, Chew 81 mg daily, Disp: , Rfl:     ipratropium (ATROVENT) 0 02 % nebulizer solution, Inhale 0 5 mg as needed , Disp: , Rfl:     Allergies   Allergen Reactions    Ciprofloxacin Itching     Starts at hands to feet then the whole entire body        Review of Systems:  Review of Systems   Constitutional: Positive for activity change (decreased)  HENT: Positive for hearing loss  Eyes: Negative  Respiratory: Negative  Cardiovascular: Negative  Gastrointestinal: Negative  Endocrine: Negative  Genitourinary: Negative  Musculoskeletal: Negative  Skin: Negative  Neurological: Positive for dizziness (more balance now) and weakness (left leg)  Psychiatric/Behavioral: Negative          Vitals:    02/05/21 0944   BP: 162/80   Pulse: 69   Resp: 18   Temp: (!) 97 4 °F (36 3 °C)   Weight: 73 5 kg (162 lb)   Height: 5' (1 524 m)   oxygen 97%  Pain Score: 0-No pain        Teaching done

## 2021-02-05 NOTE — QUICK NOTE
Late entry after the biopsy report is available    Lip node biopsy showed evidence of malignancy likely non-small cell carcinoma of uncertain  Type

## 2021-02-05 NOTE — PROGRESS NOTES
Consultation - Radiation Oncology      MUKESH:[id number] : 1960  Encounter: [id number]  Patient Information: [address]  Chief Complaint   Patient presents with   [address] [facility]     Cancer Staging  Stage IV         History of Present Illness   Rosana Payan is a 62y o  year old female who presented to [facility] ED with 4 weeks of persistent dizziness  Admitted to [facility] -21 CTA head and neck demonstrated soft tissue masses in the left occipital lobe and left parietal lobe with surrounding vasogenic edema  This was felt to represent metastatic disease given mediastinal, hilar, paratracheal and cervical adenopathy on CT imaging as below  21 CT chest, abdomen and pelvis demonstrated mediastinal and right hilar lymphadenopathy  No evidence of primary malignancy in the chest, abdomen or pelvis  21 MRI brain demonstrated metastatic disease, with 2 Parenchymal lesions, 2 1 and 5 mm in the greatest linear dimension  There is diffuse enhancement of the internal auditory canals bilaterally, which warrants lumbar puncture to exclude leptomeningeal tumor  21 MRI cervical spine  Enhancement along the surface of the cervical and thoracic spinal cord compatible with CSF spread of tumor  Enhancement is seen at C3-4, and T2-3     21 MRI thoracic spine  Enhancement along the nerve roots of the filum terminale and distal spinal cord seen best on series 25 images 8 through 11 compatible with CSF spread of tumor  21 MRI lumbar spine  Innumerable foci of nodular enhancement along the filum terminale and distal spinal cord compatible CSF spread of tumor  21 Lumbar puncture CSF  Atypical cellular changes seen  21 Cervical lymph node  Conclusive Evidence of Malignancy    Malignant epithelioid neoplasm, 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     21 Mandie Gilbert PA-C  Discussed anticipated need for radiation to brain masses  Given concern for leptomeningeal disease, may require WBRT      1/28/21 Jw Michel MD  Rad Onc visit to consider RT options for palliative reasons  scheduled next week to undergo EBUS with Dr Jeff Tatum to obtain more tissue for evaluation  The patient complains of progressive left proximal leg weakness and parasthesias  She has saddle distribution of sensation of "heaviness" but denies outright numbness or incontinence  She has pain in her sacral area as well as her left thigh  Pain in separate areas and do not radiate  Pain is well-controlled with oxycodone  She follows with palliative care  She notes that pain is 10/10 severity at worst, but during day and currently on pain medications she feels minimal pain  She denies any other areas of pain  She denies any new areas of numbness or weakness  The patient states that dizziness has improved, but she has progressive hearing loss  She has lost most hearing in right ear and slight loss in the left  She denies headaches, vision changes, swallow or speech issues  Of note, the patient states that she was on about 6mg of steroids a day since discharge from hospital, but ran out of steroids about 1-1 5 weeks ago  She notes significant decline in left leg strength since that time  Upcoming appointments:  2/4/21 Elmer Goetz MD consult for EBUS  2/4/20 Lidia Pardo MD consult  2/9/20 Mediastinoscopy  2/10/20 Jw Michel MD    Historical Information   Oncology History Overview Note        Squamous cell carcinoma of unknown origin   1/28/2021 Initial Diagnosis    Squamous cell carcinoma of unknown origin    Final Diagnosis   A, B & C  Lymph node, cervical (ThinPrep, smear and cell block preparations)  Conclusive Evidence of Malignancy  Malignant epithelioid neoplasm, 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 - see Note  Satisfactory for evaluation     Electronically signed by Bright Gallardo MD on 1/26/2021 at 12:19 PM   Comments: This is an appended report  These results have been appended to a previously preliminary verified report  Preliminary Diagnosis   A, B & C  Lymph node, cervical (ThinPrep, smear and cell block preparations)  Conclusive Evidence of Malignancy of uncertain histotype - see Note  Satisfactory for evaluation  Preliminary result electronically signed by Bright Gallardo MD on 1/25/2021 at  3:53 PM   Note    Note to preliminary report:  Routine Pap-stained & Diff-Quick-stained cytologic preparations reveal plentiful groups of poorly differentiated malignant epithelioid cells, indicating the presence of non-small cell carcinoma  Intradepartmental consultation concurs with the diagnosis of non-small cell carcinoma  Histologic sections of cell block cellular material reveal a minute group of ~ 50 tumor cells  Attempts are made to determine tumor cell histotype by immunohistochemical stains and will be described in the final report  Note to final report:  Attempts at immunohistochemical stains to more specifically determine tumor cell histotype are unsuccessful, inasmuch as insufficient cellular material remained in the block  Additional, more extensive tissue sampling submitted in formalin is suggested for tumor cell histotype determination       MRI Spine 1/20/21  Enhancement along the surface of the cervical and thoracic spinal cord compatible with CSF spread of tumor  Enhancement is seen at C3-4, and T2-3  CT TAP 1/17/21:  1  Mediastinal and right hilar lymphadenopathy  2   Chronic splenic mass, slowly enlarging since 2017  This is almost certainly benign and the differential diagnosis includes hamartoma, hemorrhagic cyst, littoral cell angioma or lymphangioma  3   No evidence of primary malignancy in the chest, abdomen or pelvis      MRI Brain 1/18/21:   Metastatic disease, with 2 Parenchymal lesions, 2 1 and 5 mm in the greatest linear dimension  There is diffuse enhancement of the internal auditory canals bilaterally, which warrants lumbar puncture to exclude leptomeningeal tumor  Past Medical History:   Diagnosis Date    Acute on chronic congestive heart failure with left ventricular diastolic dysfunction (HCC)     last assessed 2017    Asthma     Atelectasis     [facility]     Diabetes mellitus [facility]     Diverticulitis 2017    with perforation and abscess     Hyperlipidemia     Hypertension     Lesion of spleen     Pericardial effusion      Past Surgical History:   Procedure Laterality Date    ABDOMINAL SURGERY      BREAST BIOPSY Left 10/04/2018     SECTION      COLON SURGERY      COLONOSCOPY      COLOSTOMY  2017    HARTMANS PROCEDURE N/A 2017    Procedure: EXPLORATORY LAPAROTOMY; PARTIAL SMALL BOWEL RESECTION; HARTMANS PROCEDURE; OSTOMY;  Surgeon: Francisco Sheth MD;  [facility];  Service:     HYSTERECTOMY  2018    IR BIOPSY LYMPH NODE  2021    IR LUMBAR PUNCTURE  2021    MAMMO STEREOTACTIC BREAST BIOPSY LEFT (ALL INC) Left 10/4/2018    OOPHORECTOMY Bilateral 2018    ND CLOSE ENTEROSTOMY N/A 2017    Procedure: OPEN COLOSTOMY REVERSAL;  Surgeon: Francisco Sheth MD;  [facility];  Service: General    ND COLONOSCOPY FLX DX W/COLLJ Soevie 1978 PFRMD N/A 2017    Procedure: COLONOSCOPY; DILATION OF OSTOMY;  Surgeon: Francisco Sheth MD;  Location: [facility];   Service: General    ND EXC SKIN BENIG <0 5 CM REMAINDER BODY N/A 2017    Procedure: SCALP MASS EXCISION X 2;  Surgeon: Francisco Sheth MD;  [facility];  Service: [facility] N/A 2019    Procedure: INCISIONAL HERNIA REPAIR, COMPONENT SEPARATION;  Surgeon: Francisco Sheth MD;  [facility];  Service: General    ND TOTAL ABDOM HYSTERECTOMY N/A 2019    Procedure: TOTAL ABDOMINAL HYSTERECTOMY; BSO;  Surgeon: Kellen Carmen MD;  [facility];  Service: Gynecology    VAC DRESSING APPLICATION N/A 2558    Procedure: Joshua Alegria;  Surgeon: Wilfredo Washington MD;  [facility];  Service:        Family History   Problem Relation Age of Onset    Hypertension Mother     Diabetes Mother     No Known Problems Sister     No Known Problems Daughter     Heart disease Son     No Known Problems Maternal Aunt     Lung cancer Maternal Aunt     No Known Problems Maternal Aunt     No Known Problems Maternal Aunt     No Known Problems Paternal Aunt     Breast cancer Neg Hx        Social History   Social History     Substance and Sexual Activity   Alcohol Use Not Currently    Frequency: Monthly or less    Drinks per session: 1 or 2    Binge frequency: Never    Comment: socially      Social History     Substance and Sexual Activity   Drug Use No     Social History     Tobacco Use   Smoking Status Former Smoker    Packs/day: 0 25    Years: 20 00    Pack years: 5 00    Types: Cigarettes    Start date: 1991   Homer [place]lower Quit date: 2021    Years since quittin 0   Smokeless Tobacco Never Used         Meds/Allergies     Current Outpatient Medications:     amLODIPine (NORVASC) 10 mg tablet, TAKE 1 TABLET BY MOUTH EVERY DAY, Disp: 90 tablet, Rfl: 2    atorvastatin (LIPITOR) 20 mg tablet, TAKE 1 TABLET DAILY, Disp: 90 tablet, Rfl: 1    diazepam (VALIUM) 2 mg tablet, Take 1 tablet (2 mg total) by mouth every 6 (six) hours as needed for anxiety (dizziness), Disp: 10 tablet, Rfl: 0    gabapentin (NEURONTIN) 100 mg capsule, Take 1-2 capsules (100-200 mg total) by mouth daily at bedtime To reduce nerve pain, Disp: 60 capsule, Rfl: 0    lidocaine (LIDODERM) 5 %, Apply 2 patches topically daily Remove & Discard patch within 12 hours or as directed by MD, Disp: 30 patch, Rfl: 0    loratadine (CLARITIN) 10 mg tablet, Take 1 tablet (10 mg total) by mouth daily at bedtime To reduce nighttime allergy inflammation, Disp: 30 tablet, Rfl: 0    meloxicam (MOBIC) 15 mg tablet, Take 1 tablet (15 mg total) by mouth daily with dinner To reduce inflammation at night, Disp: 30 tablet, Rfl: 0    metFORMIN (GLUCOPHAGE) 1000 MG tablet, Take 1 tablet (1,000 mg total) by mouth 2 (two) times a day with meals, Disp: 180 tablet, Rfl: 3    oxyCODONE (ROXICODONE) 5 mg immediate release tablet, Take 1 tablet (5 mg total) by mouth every 4 (four) hours as needed (severe cancer pain)Max Daily Amount: 30 mg, Disp: 15 tablet, Rfl: 0    Tradjenta 5 MG TABS, TAKE 1 TABLET BY MOUTH EVERY DAY, Disp: 90 tablet, Rfl: 3    aspirin 81 mg chewable tablet, Chew 81 mg daily, Disp: , Rfl:     dexamethasone (DECADRON) 2 mg tablet, Take 1 tablet (2 mg total) by mouth 2 (two) times a day with meals, Disp: 40 tablet, Rfl: 0    ipratropium (ATROVENT) 0 02 % nebulizer solution, Inhale 0 5 mg as needed , Disp: , Rfl:     ondansetron (ZOFRAN) 8 mg tablet, Take 1 tablet (8 mg total) by mouth every 8 (eight) hours as needed for nausea or vomiting, Disp: 30 tablet, Rfl: 0    pantoprazole (PROTONIX) 20 mg tablet, Take 1 tablet (20 mg total) by mouth daily, Disp: 30 tablet, Rfl: 0  Allergies   Allergen Reactions    Ciprofloxacin Itching     Starts at hands to feet then the whole entire body         Review of Systems   Constitutional: Positive for activity change (decreased)  HENT: Positive for hearing loss  Eyes: Negative  Respiratory: Negative  Cardiovascular: Negative  Gastrointestinal: Negative  Endocrine: Negative  Genitourinary: Negative  Musculoskeletal: Negative  Skin: Negative  Neurological: Positive for dizziness (more balance now) and weakness (left leg)  Psychiatric/Behavioral: Negative         OBJECTIVE:   /80   Pulse 69   Temp (!) 97 4 °F (36 3 °C)   Resp 18   Ht 5' (1 524 m)   Wt 73 5 kg (162 lb)   LMP 08/16/2014 (Approximate)   BMI 31 64 kg/m²   Performance Status: Karnofsky: 80 - Normal activity with effort; some signs or symptoms of disease    Physical Exam  Vitals signs and nursing note reviewed  Constitutional:       General: She is not in acute distress  Appearance: She is well-developed  Eyes:      General: No visual field deficit or scleral icterus  Cardiovascular:      Rate and Rhythm: Normal rate and regular rhythm  Heart sounds: No murmur  Pulmonary:      Effort: Pulmonary effort is normal  No respiratory distress  Breath sounds: No stridor  No wheezing, rhonchi or rales  Abdominal:      General: There is no distension  Palpations: Abdomen is soft  Tenderness: There is no abdominal tenderness  Musculoskeletal:         General: No tenderness  Right lower leg: No edema  Left lower leg: No edema  Comments: No spinal tenderness to percussion   Lymphadenopathy:      Cervical: No cervical adenopathy  Upper Body:      Right upper body: No supraclavicular adenopathy  Left upper body: No supraclavicular adenopathy  Neurological:      Mental Status: She is alert and oriented to person, place, and time  Cranial Nerves: Cranial nerve deficit (right side decreased hearing  Otherwise intact) present  No facial asymmetry  Sensory: Sensory deficit present  Motor: Weakness present  No abnormal muscle tone  Gait: Gait abnormal (limp favoring left side)  Comments: Speech is fluent  Able to hold discussion and obey complex commands without difficulty  She has decreased sensation light touch left thigh, otherwise intact    3/5 strength proximal LLE, 5/5 UE bilaterally, LLE distally, and RLE both proximal and distal          RESULTS  Lab Results    Chemistry        Component Value Date/Time    K 4 3 01/22/2021 0520     (H) 01/22/2021 0520    CO2 27 01/22/2021 0520    BUN 17 01/22/2021 0520    CREATININE 0 49 (L) 01/22/2021 0520        Component Value Date/Time    CALCIUM 9 9 01/22/2021 0520    ALKPHOS 57 05/21/2020 1237    AST 8 05/21/2020 1237    ALT 19 05/21/2020 1237          Lab Results   Component Value Date    WBC 5 47 01/22/2021    HGB 11 6 01/22/2021    HCT 38 2 01/22/2021    MCV 89 01/22/2021     01/22/2021         Imaging Studies  Cta Head And Neck With And Without Contrast    Result Date: 1/16/2021  Narrative: CTA NECK AND BRAIN WITH AND WITHOUT CONTRAST INDICATION: Dizziness, non-specific;  dizziness dizziness for 3 weeks  Diagnosed with vertigo  Low back pain radiating to left leg  Denies back injury  COMPARISON:   None  TECHNIQUE:  Routine CT imaging of the Brain without contrast   Post contrast imaging was performed after administration of iodinated contrast through the neck and brain  Post contrast axial 0 625 mm images timed to opacify the arterial system  3D rendering was performed on an independent workstation  MIP reconstructions performed  Coronal reconstructions were performed of the noncontrast portion of the brain  Radiation dose length product (DLP) for this visit:  1102 02 mGy/cm  This examination, like all CT scans performed in the [facility], was performed utilizing techniques to minimize radiation dose exposure, including the use of iterative  reconstruction and automated exposure control  IV Contrast:  100 mL of iohexol (OMNIPAQUE)  IMAGE QUALITY:   Diagnostic FINDINGS: NONCONTRAST BRAIN PARENCHYMA:  No acute intraparenchymal hemorrhage or extra-axial fluid collections  No acute infarctions  Decreased attenuation in the periventricular regions and centrum semiovale bilaterally, consistent with chronic small vessel ischemic white matter disease  There is a 2 6 x 1 6 cm vague isodense soft tissue mass in the left occipital lobe (series 2, image 20)  There is a large amount of surrounding vasogenic edema  There is minimal peripheral enhancement (series 4, image 57)    There is a 3 2 x 1 2 cm vague isodense soft tissue mass in the posterior medial left parietal lobe (series 2, image 24)  Large amount of surrounding vasogenic edema  There is minimal peripheral enhancement (series 4, image 43)  Bilateral internal carotid artery and vertebral artery calcifications  VENTRICLES AND EXTRA-AXIAL SPACES:  Ventricles and cerebral sulci are mildly dilated  VISUALIZED ORBITS AND PARANASAL SINUSES:  Unremarkable  CERVICAL VASCULATURE AORTIC ARCH AND GREAT VESSELS:  Mild atherosclerotic disease of the arch, proximal great vessels and visualized subclavian vessels  No significant stenosis  Bovine configuration of the arch  RIGHT VERTEBRAL ARTERY CERVICAL SEGMENT:  Normal origin  The vessel is normal in caliber throughout the neck  LEFT VERTEBRAL ARTERY CERVICAL SEGMENT:  Normal origin  The vessel is normal in caliber throughout the neck  RIGHT EXTRACRANIAL CAROTID SEGMENT:  There is moderate calcified and noncalcified atherosclerotic plaque formation throughout the distal common carotid artery, extending into the carotid bulb and proximal cervical internal carotid artery  Areas of ulceration are present along the plaque  This results in a moderate short segment stenosis of approximately 75%  Distally, the cervical internal carotid artery is normal  LEFT EXTRACRANIAL CAROTID SEGMENT:  There is mild calcified and noncalcified atherosclerotic plaque formation in the distal common carotid artery, extending into the carotid bulb and proximal cervical internal carotid artery  No flow-limiting stenosis  NASCET criteria was used to determine the degree of internal carotid artery diameter stenosis  INTRACRANIAL VASCULATURE INTERNAL CAROTID ARTERIES:  Normal enhancement of the intracranial portions of the internal carotid arteries  Moderate atherosclerotic changes in the distal cavernous and supraclinoid internal carotid arteries bilaterally, resulting in mild stenosis of the supraclinoid internal carotid arteries bilaterally  Normal ophthalmic artery origins  Normal ICA terminus   ANTERIOR CIRCULATION:  Symmetric A1 segments and anterior cerebral arteries with normal enhancement  Normal anterior communicating artery  MIDDLE CEREBRAL ARTERY CIRCULATION:  M1 segment and middle cerebral artery branches demonstrate normal enhancement bilaterally  DISTAL VERTEBRAL ARTERIES:  The intracranial segment of the right vertebral artery is hypoplastic but patent beyond the right posterior, inferior cerebellar artery  The intracranial segment of the left vertebral artery demonstrates calcified atherosclerotic plaque formation  No flow-limiting stenosis  Hosea Guise Posterior inferior cerebellar artery origins are normal  Normal vertebral basilar junction  BASILAR ARTERY:  Basilar artery is normal in caliber  Normal superior cerebellar arteries  POSTERIOR CEREBRAL ARTERIES: Both posterior cerebral arteries arises from the basilar tip  Both arteries demonstrate normal enhancement  Normal posterior communicating arteries  DURAL VENOUS SINUSES:  Normal  NON VASCULAR ANATOMY BONY STRUCTURES: Mild degenerative changes of the cervical spine  No acute osseous abnormality  SOFT TISSUES OF THE NECK:  The palatine tonsils are enlarged bilaterally, right side greater than left  There is enlargement of the lingual tonsil  There are calcifications within the palatine tonsils bilaterally, consistent with concretions from prior  infection  There is beam hardening artifact limiting evaluation of the oropharynx  There are prominent lymph nodes along the anterior and posterior cervical carline chains bilaterally, left side greater than right  THORACIC INLET: There are enlarged subcarinal, paratracheal and right hilar lymph nodes  There are enlarged left sided paraesophageal lymph nodes, extending to the thoracic inlet  Enlarged bilateral supraclavicular lymph nodes extending along the carotid arteries bilaterally, left side larger than right  Several left-sided lymph nodes are necrotic  Impression: 1    Isodense soft tissue masses in the left occipital lobe and left parietal lobe with surrounding vasogenic edema  Although the findings may represent primary CNS neoplasm, the presence of mediastinal, hilar, paratracheal and cervical adenopathy, suggests metastatic disease  Recommend follow-up MRI of the brain with gadolinium and/or neurosurgical evaluation  Recommend follow-up CT chest abdomen pelvis for metastatic workup  2   Moderate atherosclerotic changes of the proximal internal carotid arteries bilaterally, right side greater than left resulting in moderate stenosis of the proximal cervical right internal carotid artery of approximately 75%  3   Moderate atherosclerotic disease of the supraclinoid segments of the internal carotid arteries bilaterally, resulting in mild stenosis bilaterally  No evidence of aneurysm, vascular malformation or vascular cut off  No evidence of large vessel central occlusive disease involving the [place]  4   Mild cerebral atrophy with chronic small vessel ischemic white matter disease  5   Mediastinal, hilar, paratracheal, paraesophageal, supraclavicular and cervical adenopathy, pathologic  6   Enlarged palatine tonsils and lingual tonsil  The oropharynx is not well visualized due to beam hardening artifact from dental hardware  Consider follow-up MRI of the soft tissues of the neck versus ENT consultation with direct laryngoscopy  I personally discussed this study with Beatris Mccabe on 1/16/2021 at 6:37 AM  Workstation performed: [id number]       Mri Brain W Wo Contrast    Result Date: 1/18/2021  Narrative: MRI BRAIN WITH AND WITHOUT CONTRAST INDICATION: Intracranial mass on the CT  COMPARISON:  CT/CTA 1/16/2021 TECHNIQUE: Sagittal T1, axial T2, axial FLAIR, axial T1, axial [place], axial diffusion  Sagittal, axial T1 postcontrast   Axial bravo postcontrast with coronal reconstructions    IV Contrast:  7 mL of Gadobutrol injection (SINGLE-DOSE)  IMAGE QUALITY:   Intermittent motion related artifact FINDINGS: BRAIN PARENCHYMA:  Peripherally enhancing 21 x 21 x 15 mm mass in the parasagittal left parietal parenchyma  The vasogenic edema, local mass effect, minor punctate blood products  There is a 2nd right frontal vertex mass approximately 5 mm, more homogeneously enhancing without mass effect  Findings consistent with metastatic disease  Finally, there is diffuse enhancement of the internal auditory canals bilaterally 11:29-32  Although these could represent bilaterally there are tumor, there is Concern for CSF spread of tumor  Lumbar puncture suggested  No leptomeningeal or pachymeningeal enhancement the elsewhere  Mild degree of small vessel disease  Minor nonspecific punctate hemosiderin along the posterior body of the left lateral ventricle 6:56  VENTRICLES:  Normal for the patient's age  SELLA AND PITUITARY GLAND:  Normal  ORBITS:  Normal  PARANASAL SINUSES:  Trace maxillary mucosal thickening VASCULATURE:  Evaluation of the major intracranial vasculature demonstrates appropriate flow voids  CALVARIUM AND SKULL BASE:  Normal  EXTRACRANIAL SOFT TISSUES:  A 12 mm soft tissue nodule at the posterior vertex right parietal scalp     Impression: Metastatic disease, with 2 Parenchymal lesions, 2 1 and 5 mm in the greatest linear dimension  There is diffuse enhancement of the internal auditory canals bilaterally, which warrants lumbar puncture to exclude leptomeningeal tumor  The study was marked in EPIC for significant notification  Workstation performed: [id number]     Mri Cervical Spine W Wo Contrast    Result Date: 1/20/2021  Narrative: MRI CERVICAL SPINE WITH AND WITHOUT CONTRAST INDICATION: spinal mass   COMPARISON:  None  TECHNIQUE:  Sagittal T1, sagittal T2, and sagittal inversion recovery  Sagittal postcontrast T1  IV Contrast:  7 mL of Gadobutrol injection (SINGLE-DOSE) IMAGE QUALITY:  Diagnostic  FINDINGS: ALIGNMENT:  Straightening of the cervical lordosis   MARROW SIGNAL: Normal marrow signal is identified within the visualized bony structures  No discrete marrow lesion  CERVICAL AND VISUALIZED UPPER THORACIC CORD:  Normal signal within the visualized cord  PREVERTEBRAL AND PARASPINAL SOFT TISSUES:  Normal  VISUALIZED POSTERIOR FOSSA:  The visualized posterior fossa demonstrates no abnormal signal  CERVICAL DISC SPACES:  Disc heights are maintained  No central or foraminal narrowing  POSTCONTRAST IMAGING:  Abnormal postcontrast enhancement identified along the surface of the spinal cord at C3-4 anteriorly (series 24 image 10) T2-3 along the surface of the dorsal cord series 24 image 10 compatible with CSF spread of tumor  Enhancement along the inferior aspect of cerebellum also noted  Impression: Enhancement along the surface of the cervical and thoracic spinal cord compatible with CSF spread of tumor  Enhancement is seen at C3-4, and T2-3  Workstation performed: [id number]     Mri Thoracic Spine W Wo Contrast    Result Date: 1/20/2021  Narrative: MRI THORACIC SPINE WITH AND WITHOUT CONTRAST INDICATION: rule out spinal mass  COMPARISON:  None  TECHNIQUE:  Sagittal T1, sagittal T2, sagittal inversion recovery, axial T2,  axial 2D MERGE  Sagittal and axial T1 postcontrast  IV Contrast:  7 mL of Gadobutrol injection (SINGLE-DOSE) IMAGE QUALITY:  Diagnostic  FINDINGS: ALIGNMENT:  Dextroscoliosis apex T8  MARROW SIGNAL:  Normal marrow signal is identified within the visualized bony structures  No discrete marrow lesion  THORACIC CORD:  Normal signal within the thoracic cord  PREVERTEBRAL AND PARASPINAL SOFT TISSUES:   Normal  THORACIC DEGENERATIVE CHANGE:  No disc herniation, canal stenosis or foraminal narrowing  No degenerative changes  POSTCONTRAST:  Nodular enhancement identified lower thoracic and upper lumbar spine seen best on sagittal series 25 images 8-11 compatible with CSF spread of tumor       Impression: Enhancement along the nerve roots of the filum terminale and distal spinal cord seen best on series 25 images 8 through 11 compatible with CSF spread of tumor  Dextroscoliosis apex T8  No significant central or foraminal narrowing  Workstation performed: [id number]     Mri Lumbar Spine W Wo Contrast    Result Date: 1/20/2021  Narrative: MRI LUMBAR SPINE WITH AND WITHOUT CONTRAST INDICATION: rule out spinal mass  COMPARISON:  None  TECHNIQUE:  Sagittal T1, sagittal T2, sagittal inversion recovery, axial T1 and axial T2, coronal T2  Sagittal and axial T1 postcontrast  IV Contrast:  7 mL of Gadobutrol injection (SINGLE-DOSE) IMAGE QUALITY:  Diagnostic FINDINGS: VERTEBRAL BODIES:  There are 5 lumbar type vertebral bodies  Levoscoliosis apex L2  Slight anterolisthesis L4 on L5  Normal marrow signal is identified within the visualized bony structures  No discrete marrow lesion  SACRUM:  Normal signal within the sacrum  No evidence of insufficiency or stress fracture  DISTAL CORD AND CONUS:  Please see below  PARASPINAL SOFT TISSUES:  Paraspinal soft tissues are unremarkable  LOWER THORACIC DISC SPACES:  Normal disc height and signal   No disc herniation, canal stenosis or foraminal narrowing  LUMBAR DISC SPACES: L1-L2:  Normal  L2-L3:  Normal  L3-L4:  Mild facet hypertrophy  No significant central or foraminal narrowing  L4-L5:  Severe facet degenerative change results in anterolisthesis and uncovering the posterior disc margin  Moderate left and mild right foraminal narrowing identified with mild central stenosis  L5-S1:  Mild diffuse annular bulge without significant central or foraminal narrowing  POSTCONTRAST IMAGING:  Nodular enhancement along the nerve roots of the filum terminale as well as along the distal spinal cord compatible with CSF spread of tumor  Impression: Innumerable foci of nodular enhancement along the filum terminale and distal spinal cord compatible CSF spread of tumor  Degenerative changes as described   Workstation performed: [id number]     Ct Chest Abdomen Pelvis W Contrast    Result Date: 1/17/2021  Narrative: CT CHEST, ABDOMEN AND PELVIS WITH IV CONTRAST INDICATION:   cancer w/u  Brain metastases, and cervical, hilar and mediastinal lymphadenopathy demonstrated on CTA of the head and neck from yesterday  History of sigmoid resection for perforated diverticulitis in 2017  Chronic splenic mass  COMPARISON:  CTA of the head and neck from January 16, 2021  MRI of the abdomen from February 21, 2019  CTs of the abdomen and pelvis from July 31, 2018 and May 1, 2017  TECHNIQUE: CT examination of the chest, abdomen and pelvis was performed  Axial, sagittal, and coronal 2D reformatted images were created from the source data and submitted for interpretation  Radiation dose length product (DLP) for this visit:  766 9 mGy-cm   This examination, like all CT scans performed in the [facility], was performed utilizing techniques to minimize radiation dose exposure, including the use of iterative reconstruction and automated exposure control  IV Contrast:  100 mL of iohexol (OMNIPAQUE) Enteric Contrast: Enteric contrast was not administered  FINDINGS: CHEST LUNGS:  7 mm tubular branching soft tissue nodule in the base of the right lower lobe, most consistent with endobronchial mucoid impaction  Lungs otherwise clear  PLEURA:  Unremarkable  HEART/GREAT VESSELS:  Heart and thoracic aorta unremarkable  Dilated central pulmonary arteries with main pulmonary artery diameter of 3 4 cm  MEDIASTINUM AND DAVID: Several enlarged lymph nodes including a 1 5 cm subcarinal node, 1 4 cm pretracheal node, 1 6 cm right hilar node and 1 5 cm left superior mediastinal node, adjacent to the left brachiocephalic vein  Esophagus unremarkable  Trachea and main stem bronchi normal  CHEST WALL AND LOWER NECK:   Unremarkable  ABDOMEN LIVER/BILIARY TREE:  8 mm cyst in the dome of the right lobe, unchanged since 7/31/2018  No new liver lesions    Bile ducts normal in caliber  GALLBLADDER:  Contracted  Otherwise unremarkable  SPLEEN:  7 7 x 6 8 cm hypoenhancing splenic mass, cyst slowly increased in size since CTs dating back to 4/25/2017 (at which time it measured 6 5 x 6 8 cm)  PANCREAS:  Unremarkable  ADRENAL GLANDS:  Hyperplastic left adrenal gland, in retrospect, unchanged since 2017  Normal right adrenal gland  KIDNEYS/URETERS:  Unremarkable  No hydronephrosis  STOMACH AND BOWEL:  Stomach unremarkable  Enteroenteric anastomosis in the right midabdomen  Colorectal anastomosis  Small intestine and colon otherwise unremarkable  APPENDIX:  Normal  ABDOMINOPELVIC CAVITY: No lymphadenopathy or mass  No ascites  No extraluminal gas  VESSELS:  Atherosclerotic changes are present  No evidence of aneurysm  PELVIS REPRODUCTIVE ORGANS:  Post hysterectomy  URINARY BLADDER:  Unremarkable  ABDOMINAL WALL/INGUINAL REGIONS:  Unremarkable  OSSEOUS STRUCTURES:  Scoliosis  Degenerative disease in the thoracic and lumbar spine  No evidence of recent fracture or destructive lesion  Impression: 1  Mediastinal and right hilar lymphadenopathy  2   Chronic splenic mass, slowly enlarging since 2017  This is almost certainly benign and the differential diagnosis includes hamartoma, hemorrhagic cyst, littoral cell angioma or lymphangioma  3   No evidence of primary malignancy in the chest, abdomen or pelvis  Workstation performed: [id number]     Ir Lumbar Puncture    Result Date: 1/20/2021  Narrative: LUMBAR PUNCTURE UNDER FLUOROSCOPY 1/20/2021 History: Brain masses Contrast: None Fluoroscopy time:  5 minutes Number of images: 1 Radiation dose: 12 mGy Conscious sedation time: 15 minutes Procedure: The patient was identified verbally, and by wrist band   " Time out" was performed  Informed consent was obtained  Following obtaining informed consent, the lower back was prepped and draped in usual sterile fashion  Lidocaine was given as local anesthesia   Using fluoroscopic guidance, A 20 gauge needle was placed over the L5-S1 interspace  Clear CSF was returned  This was sampled in multiple tubes, and sent for laboratory analysis as requested  Findings: CSF appear grossly clear  Impression: Impression: Successful lumbar puncture under fluoroscopic guidance  Workstation performed: [id number]         Pathology:Collected:  2/27/2019 08:52 Status:  Final result   Visible to patient:  Yes ([address]) Dx:  Submucous leiomyoma of uterus; Incisi    Component    Case Report   Surgical Pathology Report                         Case: [id number]                                    Authorizing Provider: Madhu Nielson MD   Collected:           02/27/2019 0852               Ordering Location:     [facility] Received:            02/27/2019 1354                                      Operating Room                                                                Pathologist:           Aria Davenport MD                                                                 Specimen:    Uterus w/Bilateral Ovaries and Fallopian Tubes                                             Final Diagnosis   A  Uterus, cervix, bilateral ovaries and fallopian tubes (hysterectomy and bilateral salpingo-oophorectomy):     - Uterus: Leiomyomata, inactive endometrium and fibrous serosal adhesions  - Cervix: Nabothian cyst formation       - Ovaries: Physiologic changes, portion of adhesed fallopian tube and fibrous surface adhesions      - Fallopian tubes: Paratubal cyst formation     - No malignancy identified  Electronically signed by Aria Davenport MD on 3/4/2019 at 4671                 ASSESSMENT  1  Brain metastases [facility])  Radiation Simulation Treatment   2  Squamous cell carcinoma of unknown origin  Ambulatory referral to Radiation Oncology   3   Intracranial mass  Ambulatory referral to Radiation Oncology     Cancer Staging  Stage IV      PLAN/DISCUSSION  Orders Placed This Encounter   Procedures    Radiation Simulation Treatment        Petr Guy is a 62y o  year old female who was recently diagnosed with stage IV carcinoma of unknown primary  Work-up in search of primary and genetic analysis of mutations for targeted systemic therapy continues with EBUS scheduled on 2/9/21  I reviewed imaging personally with Dr Puja Giles of Radiology  She has evidence of diffuse leptomeningeal disease with several sold brain metastases, but also enhancement of the auditory nerves bilaterally  She also has studding tumor and enhancement of the conus, filum terminale, cauda equina  Her symptoms correlate with disease in these areas  The patient understands that her prognosis is poor  She is hoping to slow progression of symptoms and improve her pain control with radiation  She understands treatment is not curative  I offered the patient palliative whole brain radiation and palliative radiation to L1-sacrum in an effort to improve symptoms and slow progression of disease in these areas  We would plan 30Gy in 10 fractions for the brain and 30-32 5Gy in 10-13 fractions for the spinal cord/cauda equina lesions depending on normal tissue tolerance  I explained that durability of treatment is a concern as she has CSF spread and thus after treatment she is at risk of reseeding and progression in these areas in short time period  However, given her current significant and progressive symptoms I recommended pursuing treatment at this time as systemic therapy is still pending and many agents have variable penetration to the CSF  The rationale and possible acute and late side effects and toxicities were discussed with the patient and her granddaughter at length  Side effects included, but were not limited to: fatigue, alopecia, scalp irritation and hyperpigmentation, diarrhea, nausea/vomiting, headaches, cerebral edema, radiation necrosis, kidney injury, or bone marrow depression    They were given the chance to ask questions, that were answered to their satisfaction  She has had progression of neurologic symptoms after stopping steroids  I will restart and 4mg Decadron per day and increase as needed  Hopefully, will be able to taper after treatment has been initiated  Start GI prophylaxis  Will provide ondansetron prescription to use as needed as she will have 2 nausea centers radiated  CT simulation today  Plan to start Wednesday as patient has EBUS scheduled at [facility] on Tuesday and hopes to settle some work issues in [place] sometime that week, possibly Monday  Gerhardt Laughter, MD  2/5/2021,12:09 PM      Portions of the record may have been created with voice recognition software  Occasional wrong word or "sound a like" substitutions may have occurred due to the inherent limitations of voice recognition software  Read the chart carefully and recognize, using context, where substitutions have occurred

## 2021-02-09 ENCOUNTER — TELEPHONE (OUTPATIENT)
Dept: HEMATOLOGY ONCOLOGY | Facility: CLINIC | Age: 61
End: 2021-02-09

## 2021-02-09 ENCOUNTER — TELEPHONE (OUTPATIENT)
Dept: CARDIAC SURGERY | Facility: CLINIC | Age: 61
End: 2021-02-09

## 2021-02-09 PROCEDURE — 77295 3-D RADIOTHERAPY PLAN: CPT | Performed by: RADIOLOGY

## 2021-02-09 PROCEDURE — 77300 RADIATION THERAPY DOSE PLAN: CPT | Performed by: RADIOLOGY

## 2021-02-09 PROCEDURE — 77334 RADIATION TREATMENT AID(S): CPT | Performed by: RADIOLOGY

## 2021-02-10 ENCOUNTER — APPOINTMENT (OUTPATIENT)
Dept: RADIATION ONCOLOGY | Facility: CLINIC | Age: 61
End: 2021-02-10
Attending: RADIOLOGY
Payer: COMMERCIAL

## 2021-02-10 ENCOUNTER — OFFICE VISIT (OUTPATIENT)
Dept: HEMATOLOGY ONCOLOGY | Facility: CLINIC | Age: 61
End: 2021-02-10
Payer: COMMERCIAL

## 2021-02-10 VITALS
HEIGHT: 60 IN | RESPIRATION RATE: 20 BRPM | SYSTOLIC BLOOD PRESSURE: 178 MMHG | WEIGHT: 161 LBS | BODY MASS INDEX: 31.61 KG/M2 | TEMPERATURE: 97.5 F | HEART RATE: 73 BPM | DIASTOLIC BLOOD PRESSURE: 88 MMHG | OXYGEN SATURATION: 96 %

## 2021-02-10 DIAGNOSIS — Z78.9 NEED FOR FOLLOW-UP BY SOCIAL WORKER: Primary | ICD-10-CM

## 2021-02-10 DIAGNOSIS — C79.49 LEPTOMENINGITIS, CARCINOMATOUS (HCC): ICD-10-CM

## 2021-02-10 DIAGNOSIS — C44.92 SQUAMOUS CELL CARCINOMA OF UNKNOWN ORIGIN: Primary | ICD-10-CM

## 2021-02-10 DIAGNOSIS — C80.1 LEPTOMENINGITIS, CARCINOMATOUS (HCC): ICD-10-CM

## 2021-02-10 PROCEDURE — 77387 GUIDANCE FOR RADJ TX DLVR: CPT | Performed by: RADIOLOGY

## 2021-02-10 PROCEDURE — 77417 THER RADIOLOGY PORT IMAGE(S): CPT | Performed by: RADIOLOGY

## 2021-02-10 PROCEDURE — 77331 SPECIAL RADIATION DOSIMETRY: CPT | Performed by: RADIOLOGY

## 2021-02-10 PROCEDURE — 99214 OFFICE O/P EST MOD 30 MIN: CPT | Performed by: INTERNAL MEDICINE

## 2021-02-10 PROCEDURE — 77280 THER RAD SIMULAJ FIELD SMPL: CPT | Performed by: RADIOLOGY

## 2021-02-10 PROCEDURE — 77412 RADIATION TX DELIVERY LVL 3: CPT | Performed by: RADIOLOGY

## 2021-02-10 NOTE — PROGRESS NOTES
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      1/28/2021 Initial Diagnosis    Squamous cell carcinoma of unknown origin    MRI Spine 1/20/21  Enhancement along the surface of the cervical and thoracic spinal cord compatible with CSF spread of tumor  Enhancement is seen at C3-4, and T2-3  CT TAP 1/17/21:  1  Mediastinal and right hilar lymphadenopathy  2   Chronic splenic mass, slowly enlarging since 2017  This is almost certainly benign and the differential diagnosis includes hamartoma, hemorrhagic cyst, littoral cell angioma or lymphangioma  3   No evidence of primary malignancy in the chest, abdomen or pelvis  MRI Brain 1/18/21:   Metastatic disease, with 2 Parenchymal lesions, 2 1 and 5 mm in the greatest linear dimension  There is diffuse enhancement of the internal auditory canals bilaterally, which warrants lumbar puncture to exclude leptomeningeal tumor  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 [facility])   2/5/2021 Initial Diagnosis    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 ([facility] [address] )           SUBJECTIVE      ECOG Initial Visit 0   ECOG This Visit 2   Pain Score Initial Visit 0   Pain Score This Visit 0   Personal Cancer History None   Family Cancer History Not Fully Aware   Tobacco Use History Yes; 20 years <1/2ppd   Alcohol Use History Denies     Initial HPI  Ms Rosy Israel is here for a new consult visit  She self-referred  She was recently diagnosed with metastatic cancer of likely non-small cell histology      In review of her chart and talking with her, her story began when she was hospitalized at [facility] on January 17th with persistent dizziness and a headache  During her hospitalization, her MRI found 2 parenchymal lesions at 2 1 cm and 0 5 cm in the left parietal region  There was diffuse enhancement of the internal auditory canals bilaterally which was concerning for leptomeningeal tumor  Her MRI of the spine also showed there was enhancement of nerve roots that is being read as compatible with CSF spread of the tumor  Additionally, a CT scan of her chest abdomen and pelvis showed mediastinal and right hilar lymphadenopathy and a chronic splenic mass  As per the radiologist, there is no evidence of primary malignancy in the chest abdomen or pelvis  She got an IR biopsy of her malignant appearing lymph node  The pathology resulted as squamous cell carcinoma, poorly differentiated  This Visit:    She is here with her granddaughter who is 25  She says that since she saw me last time she has noticed a change in her ambulatory function  She says that she has noticed that her left lower extremity is weaker than before and that she is not able to lift up her leg is much above the knee  That is what she has to rely on a 4 wheel walker and a wheelchair for ambulation  She is making arrangements to shift her bedroom from for 2nd floor to assist with ambulatory issues in her house  She continues to have pain in the a right ear  She denies any headaches nausea vomiting abdominal pain chest pain  I have reviewed the relevant past medical, surgical, social and family history  I have also reviewed allergies and medications for this patient  Review of Systems  Review of Systems   All other systems reviewed and are negative          OBJECTIVE     Physical Exam  Vitals:    02/10/21 0832   BP: (!) 178/88   BP Location: Left arm   Patient Position: Sitting   Cuff Size: Adult   Pulse: 73   Resp: 20   Temp: 97 5 °F (36 4 °C)   TempSrc: Temporal   SpO2: 96%   Weight: 73 kg (161 lb)   Height: 5' (1 524 m)       Physical Exam  Constitutional:       General: She is not in acute distress  Appearance: She is not ill-appearing, toxic-appearing or diaphoretic  HENT:      Head: Normocephalic and atraumatic  Eyes:      Extraocular Movements: Extraocular movements intact  Pupils: Pupils are equal, round, and reactive to light  Neck:      Musculoskeletal: Normal range of motion and neck supple  Cardiovascular:      Rate and Rhythm: Normal rate  Pulmonary:      Effort: Pulmonary effort is normal  No respiratory distress  Musculoskeletal:      Right lower leg: No edema  Left lower leg: No edema  Skin:     General: Skin is dry  Neurological:      Mental Status: She is alert and oriented to person, place, and time  Cranial Nerves: Cranial nerve deficit present  Motor: Weakness present  Gait: Gait abnormal           Imaging  Relevant imaging reviewed in chart    Labs  Relevant labs reviewed in chart   ASSESSMENT & PLAN      Diagnosis ICD-10-CM Associated Orders   1  Squamous cell carcinoma of unknown origin  C44 92    2  Leptomeningitis, carcinomatous ([facility] [address] )  C79 49     C80 1          Metastatic SCC, Unknown Origin  Leptomeningeal carcinomatosis   Oncology history updated accordingly this visit   Goals of care/patient communication  o I discussed with them the clinical course leading up to their cancer diagnosis  I reviewed relevant office notes, imaging reports and pathology result as well    o I told them that this is a case of incurable, Stage IV/Metastatic disease and what this means  I told them that the goal of anti-cancer therapy is to provide best QoL and PFS/OS as shown in clinical trials     o I told them that there are signs of poor prognosis with her cancer, as there is MRI evidence of CSF cancer spread, even if the LP cytology was negative    o I have reviewed the Radiation Oncology note plan for radiation therapy to assist with her neurological complaints  o Biopsy that was scheduled for 2 days ago was rescheduled for this Friday because of weather     o I have sent a foundation testing as well from her original cervical lymph node biopsy  I am awaiting the pathology report from her procedure on Friday so I can understand better her histology to cure her therapy to assist    o I have told her that irrespective the diagnosis from the tissue, given that she has leptomeningeal carcinomatosis, this the poor prognosis with few months of life expectancy  She appreciates honesty and candor with her diagnosis and prognosis  o I will see her back on 23rd February 8 at 11:40 a m  I told her that I will call her before this visit if I have results from biopsy select should the treatment plan for her      All questions were answered to the patient's satisfaction during this encounter  They appreciated and thanked me for spending time with them  The patient knows the contact information for our office and know to reach out for any relevant concerns related to this encounter  For all other listed problems and medical diagnosis in his chart - they are managed by PCP and/or other specialists, which patient acknowledges  Bri Olmos MD  Hematology & Medical Oncology

## 2021-02-11 ENCOUNTER — TRANSITIONAL CARE MANAGEMENT (OUTPATIENT)
Dept: INTERNAL MEDICINE CLINIC | Facility: CLINIC | Age: 61
End: 2021-02-11

## 2021-02-11 ENCOUNTER — APPOINTMENT (OUTPATIENT)
Dept: RADIATION ONCOLOGY | Facility: CLINIC | Age: 61
End: 2021-02-11
Attending: RADIOLOGY
Payer: COMMERCIAL

## 2021-02-11 ENCOUNTER — ANESTHESIA EVENT (INPATIENT)
Dept: PERIOP | Facility: HOSPITAL | Age: 61
DRG: 940 | End: 2021-02-11
Payer: COMMERCIAL

## 2021-02-11 ENCOUNTER — HOSPITAL ENCOUNTER (INPATIENT)
Facility: HOSPITAL | Age: 61
LOS: 13 days | Discharge: HOME HEALTH | DRG: 940 | End: 2021-02-24
Attending: INTERNAL MEDICINE | Admitting: INTERNAL MEDICINE
Payer: COMMERCIAL

## 2021-02-11 ENCOUNTER — HOSPITAL ENCOUNTER (OUTPATIENT)
Facility: HOSPITAL | Age: 61
Setting detail: OBSERVATION
End: 2021-02-11
Attending: EMERGENCY MEDICINE | Admitting: FAMILY MEDICINE
Payer: COMMERCIAL

## 2021-02-11 ENCOUNTER — APPOINTMENT (EMERGENCY)
Dept: RADIOLOGY | Facility: HOSPITAL | Age: 61
End: 2021-02-11
Payer: COMMERCIAL

## 2021-02-11 VITALS
HEART RATE: 75 BPM | OXYGEN SATURATION: 98 % | BODY MASS INDEX: 31.81 KG/M2 | TEMPERATURE: 97.7 F | WEIGHT: 162.04 LBS | RESPIRATION RATE: 20 BRPM | SYSTOLIC BLOOD PRESSURE: 194 MMHG | DIASTOLIC BLOOD PRESSURE: 86 MMHG | HEIGHT: 60 IN

## 2021-02-11 DIAGNOSIS — C44.92 SQUAMOUS CELL CARCINOMA OF UNKNOWN ORIGIN: ICD-10-CM

## 2021-02-11 DIAGNOSIS — C34.90 MALIGNANT NEOPLASM OF BRONCHUS AND LUNG (HCC): ICD-10-CM

## 2021-02-11 DIAGNOSIS — C80.1 LEPTOMENINGITIS, CARCINOMATOUS (HCC): ICD-10-CM

## 2021-02-11 DIAGNOSIS — C79.31 SECONDARY MALIGNANCY OF BRAIN AND SPINAL CORD WITH UNKNOWN PRIMARY SITE (HCC): ICD-10-CM

## 2021-02-11 DIAGNOSIS — C80.1 SECONDARY MALIGNANCY OF BRAIN AND SPINAL CORD WITH UNKNOWN PRIMARY SITE (HCC): ICD-10-CM

## 2021-02-11 DIAGNOSIS — G89.3 CANCER RELATED PAIN: ICD-10-CM

## 2021-02-11 DIAGNOSIS — G89.3 CANCER ASSOCIATED PAIN: Primary | ICD-10-CM

## 2021-02-11 DIAGNOSIS — C79.49 SECONDARY MALIGNANCY OF BRAIN AND SPINAL CORD WITH UNKNOWN PRIMARY SITE (HCC): ICD-10-CM

## 2021-02-11 DIAGNOSIS — M79.605 LEFT LEG PAIN: ICD-10-CM

## 2021-02-11 DIAGNOSIS — M54.32 SCIATICA OF LEFT SIDE: ICD-10-CM

## 2021-02-11 DIAGNOSIS — C79.49 LEPTOMENINGITIS, CARCINOMATOUS (HCC): ICD-10-CM

## 2021-02-11 DIAGNOSIS — R59.0 MEDIASTINAL LYMPHADENOPATHY: ICD-10-CM

## 2021-02-11 DIAGNOSIS — G89.3 CANCER ASSOCIATED PAIN: ICD-10-CM

## 2021-02-11 DIAGNOSIS — C78.1 MEDIASTINAL METASTASIS (HCC): Primary | ICD-10-CM

## 2021-02-11 DIAGNOSIS — M54.16 LUMBAR BACK PAIN WITH RADICULOPATHY AFFECTING LEFT LOWER EXTREMITY: ICD-10-CM

## 2021-02-11 PROBLEM — IMO0002 METASTATIC SQUAMOUS CELL CARCINOMA: Status: ACTIVE | Noted: 2021-01-28

## 2021-02-11 PROBLEM — E66.9 OBESITY (BMI 30.0-34.9): Status: ACTIVE | Noted: 2021-02-11

## 2021-02-11 PROBLEM — C79.9 METASTATIC SQUAMOUS CELL CARCINOMA (HCC): Status: ACTIVE | Noted: 2021-01-28

## 2021-02-11 PROBLEM — D64.9 POSTOPERATIVE ANEMIA: Status: RESOLVED | Noted: 2019-03-02 | Resolved: 2021-02-11

## 2021-02-11 LAB
ALBUMIN SERPL BCP-MCNC: 3.5 G/DL (ref 3.5–5)
ALP SERPL-CCNC: 48 U/L (ref 46–116)
ALT SERPL W P-5'-P-CCNC: 17 U/L (ref 12–78)
ANION GAP SERPL CALCULATED.3IONS-SCNC: 9 MMOL/L (ref 4–13)
AST SERPL W P-5'-P-CCNC: 11 U/L (ref 5–45)
BASOPHILS # BLD AUTO: 0.03 THOUSANDS/ΜL (ref 0–0.1)
BASOPHILS NFR BLD AUTO: 0 % (ref 0–1)
BILIRUB SERPL-MCNC: 0.9 MG/DL (ref 0.2–1)
BUN SERPL-MCNC: 20 MG/DL (ref 5–25)
CALCIUM SERPL-MCNC: 9.3 MG/DL (ref 8.3–10.1)
CHLORIDE SERPL-SCNC: 101 MMOL/L (ref 100–108)
CO2 SERPL-SCNC: 29 MMOL/L (ref 21–32)
CREAT SERPL-MCNC: 0.57 MG/DL (ref 0.6–1.3)
EOSINOPHIL # BLD AUTO: 0.1 THOUSAND/ΜL (ref 0–0.61)
EOSINOPHIL NFR BLD AUTO: 1 % (ref 0–6)
ERYTHROCYTE [DISTWIDTH] IN BLOOD BY AUTOMATED COUNT: 16.9 % (ref 11.6–15.1)
GFR SERPL CREATININE-BSD FRML MDRD: 117 ML/MIN/1.73SQ M
GLUCOSE SERPL-MCNC: 131 MG/DL (ref 65–140)
GLUCOSE SERPL-MCNC: 132 MG/DL (ref 65–140)
GLUCOSE SERPL-MCNC: 189 MG/DL (ref 65–140)
GLUCOSE SERPL-MCNC: 220 MG/DL (ref 65–140)
GLUCOSE SERPL-MCNC: 236 MG/DL (ref 65–140)
HCT VFR BLD AUTO: 36.6 % (ref 34.8–46.1)
HGB BLD-MCNC: 11.5 G/DL (ref 11.5–15.4)
IMM GRANULOCYTES # BLD AUTO: 0.03 THOUSAND/UL (ref 0–0.2)
IMM GRANULOCYTES NFR BLD AUTO: 0 % (ref 0–2)
LYMPHOCYTES # BLD AUTO: 1.56 THOUSANDS/ΜL (ref 0.6–4.47)
LYMPHOCYTES NFR BLD AUTO: 20 % (ref 14–44)
MCH RBC QN AUTO: 28.1 PG (ref 26.8–34.3)
MCHC RBC AUTO-ENTMCNC: 31.4 G/DL (ref 31.4–37.4)
MCV RBC AUTO: 90 FL (ref 82–98)
MONOCYTES # BLD AUTO: 0.63 THOUSAND/ΜL (ref 0.17–1.22)
MONOCYTES NFR BLD AUTO: 8 % (ref 4–12)
NEUTROPHILS # BLD AUTO: 5.61 THOUSANDS/ΜL (ref 1.85–7.62)
NEUTS SEG NFR BLD AUTO: 71 % (ref 43–75)
NRBC BLD AUTO-RTO: 0 /100 WBCS
PLATELET # BLD AUTO: 299 THOUSANDS/UL (ref 149–390)
PMV BLD AUTO: 8.7 FL (ref 8.9–12.7)
POTASSIUM SERPL-SCNC: 3.6 MMOL/L (ref 3.5–5.3)
PROT SERPL-MCNC: 7.2 G/DL (ref 6.4–8.2)
RBC # BLD AUTO: 4.09 MILLION/UL (ref 3.81–5.12)
SODIUM SERPL-SCNC: 139 MMOL/L (ref 136–145)
WBC # BLD AUTO: 7.96 THOUSAND/UL (ref 4.31–10.16)

## 2021-02-11 PROCEDURE — 82948 REAGENT STRIP/BLOOD GLUCOSE: CPT

## 2021-02-11 PROCEDURE — 31622 DX BRONCHOSCOPE/WASH: CPT | Performed by: THORACIC SURGERY (CARDIOTHORACIC VASCULAR SURGERY)

## 2021-02-11 PROCEDURE — 39402 MEDIASTINOSCPY W/LMPH NOD BX: CPT | Performed by: PHYSICIAN ASSISTANT

## 2021-02-11 PROCEDURE — 99284 EMERGENCY DEPT VISIT MOD MDM: CPT

## 2021-02-11 PROCEDURE — 99219 PR INITIAL OBSERVATION CARE/DAY 50 MINUTES: CPT | Performed by: INTERNAL MEDICINE

## 2021-02-11 PROCEDURE — 73552 X-RAY EXAM OF FEMUR 2/>: CPT

## 2021-02-11 PROCEDURE — 31622 DX BRONCHOSCOPE/WASH: CPT | Performed by: PHYSICIAN ASSISTANT

## 2021-02-11 PROCEDURE — 99285 EMERGENCY DEPT VISIT HI MDM: CPT | Performed by: EMERGENCY MEDICINE

## 2021-02-11 PROCEDURE — 99223 1ST HOSP IP/OBS HIGH 75: CPT | Performed by: FAMILY MEDICINE

## 2021-02-11 PROCEDURE — 96376 TX/PRO/DX INJ SAME DRUG ADON: CPT

## 2021-02-11 PROCEDURE — NC001 PR NO CHARGE: Performed by: THORACIC SURGERY (CARDIOTHORACIC VASCULAR SURGERY)

## 2021-02-11 PROCEDURE — 80053 COMPREHEN METABOLIC PANEL: CPT | Performed by: PHYSICIAN ASSISTANT

## 2021-02-11 PROCEDURE — 85025 COMPLETE CBC W/AUTO DIFF WBC: CPT | Performed by: PHYSICIAN ASSISTANT

## 2021-02-11 PROCEDURE — 36415 COLL VENOUS BLD VENIPUNCTURE: CPT | Performed by: PHYSICIAN ASSISTANT

## 2021-02-11 PROCEDURE — 96374 THER/PROPH/DIAG INJ IV PUSH: CPT

## 2021-02-11 PROCEDURE — 99223 1ST HOSP IP/OBS HIGH 75: CPT | Performed by: INTERNAL MEDICINE

## 2021-02-11 PROCEDURE — 96375 TX/PRO/DX INJ NEW DRUG ADDON: CPT

## 2021-02-11 RX ORDER — HYDROMORPHONE HCL/PF 1 MG/ML
1 SYRINGE (ML) INJECTION
Status: DISCONTINUED | OUTPATIENT
Start: 2021-02-11 | End: 2021-02-11

## 2021-02-11 RX ORDER — DEXAMETHASONE 4 MG/1
4 TABLET ORAL 2 TIMES DAILY WITH MEALS
Status: DISCONTINUED | OUTPATIENT
Start: 2021-02-11 | End: 2021-02-15

## 2021-02-11 RX ORDER — HYDROMORPHONE HCL/PF 1 MG/ML
0.5 SYRINGE (ML) INJECTION ONCE AS NEEDED
Status: COMPLETED | OUTPATIENT
Start: 2021-02-11 | End: 2021-02-11

## 2021-02-11 RX ORDER — DEXAMETHASONE 4 MG/1
4 TABLET ORAL 2 TIMES DAILY WITH MEALS
Status: DISCONTINUED | OUTPATIENT
Start: 2021-02-11 | End: 2021-02-11 | Stop reason: HOSPADM

## 2021-02-11 RX ORDER — HYDROMORPHONE HCL/PF 1 MG/ML
1 SYRINGE (ML) INJECTION
Status: DISCONTINUED | OUTPATIENT
Start: 2021-02-11 | End: 2021-02-11 | Stop reason: HOSPADM

## 2021-02-11 RX ORDER — ACETAMINOPHEN 325 MG/1
650 TABLET ORAL EVERY 6 HOURS PRN
Status: DISCONTINUED | OUTPATIENT
Start: 2021-02-11 | End: 2021-02-11

## 2021-02-11 RX ORDER — DEXAMETHASONE 2 MG/1
2 TABLET ORAL 2 TIMES DAILY WITH MEALS
Status: DISCONTINUED | OUTPATIENT
Start: 2021-02-11 | End: 2021-02-11

## 2021-02-11 RX ORDER — ONDANSETRON 2 MG/ML
4 INJECTION INTRAMUSCULAR; INTRAVENOUS EVERY 6 HOURS PRN
Status: DISCONTINUED | OUTPATIENT
Start: 2021-02-11 | End: 2021-02-11 | Stop reason: HOSPADM

## 2021-02-11 RX ORDER — ACETAMINOPHEN 325 MG/1
975 TABLET ORAL ONCE
Status: COMPLETED | OUTPATIENT
Start: 2021-02-11 | End: 2021-02-11

## 2021-02-11 RX ORDER — LIDOCAINE 50 MG/G
2 PATCH TOPICAL DAILY
Status: DISCONTINUED | OUTPATIENT
Start: 2021-02-12 | End: 2021-02-24 | Stop reason: HOSPADM

## 2021-02-11 RX ORDER — PANTOPRAZOLE SODIUM 20 MG/1
20 TABLET, DELAYED RELEASE ORAL
Status: CANCELLED | OUTPATIENT
Start: 2021-02-12

## 2021-02-11 RX ORDER — ACETAMINOPHEN 325 MG/1
975 TABLET ORAL EVERY 8 HOURS SCHEDULED
Status: CANCELLED | OUTPATIENT
Start: 2021-02-11

## 2021-02-11 RX ORDER — LORAZEPAM 2 MG/ML
2 INJECTION INTRAMUSCULAR
Status: DISCONTINUED | OUTPATIENT
Start: 2021-02-11 | End: 2021-02-24 | Stop reason: HOSPADM

## 2021-02-11 RX ORDER — LORATADINE 10 MG/1
10 TABLET ORAL
Status: DISCONTINUED | OUTPATIENT
Start: 2021-02-11 | End: 2021-02-21

## 2021-02-11 RX ORDER — ONDANSETRON 2 MG/ML
4 INJECTION INTRAMUSCULAR; INTRAVENOUS EVERY 6 HOURS PRN
Status: DISCONTINUED | OUTPATIENT
Start: 2021-02-11 | End: 2021-02-17

## 2021-02-11 RX ORDER — HYDROMORPHONE HCL/PF 1 MG/ML
0.5 SYRINGE (ML) INJECTION ONCE
Status: COMPLETED | OUTPATIENT
Start: 2021-02-11 | End: 2021-02-11

## 2021-02-11 RX ORDER — ONDANSETRON 2 MG/ML
4 INJECTION INTRAMUSCULAR; INTRAVENOUS ONCE AS NEEDED
Status: COMPLETED | OUTPATIENT
Start: 2021-02-11 | End: 2021-02-11

## 2021-02-11 RX ORDER — HYDROMORPHONE HCL/PF 1 MG/ML
0.5 SYRINGE (ML) INJECTION EVERY 2 HOUR PRN
Status: DISCONTINUED | OUTPATIENT
Start: 2021-02-11 | End: 2021-02-24 | Stop reason: HOSPADM

## 2021-02-11 RX ORDER — GABAPENTIN 100 MG/1
100 CAPSULE ORAL 2 TIMES DAILY
Status: CANCELLED | OUTPATIENT
Start: 2021-02-11

## 2021-02-11 RX ORDER — MELOXICAM 7.5 MG/1
15 TABLET ORAL
Status: DISCONTINUED | OUTPATIENT
Start: 2021-02-11 | End: 2021-02-11

## 2021-02-11 RX ORDER — ATORVASTATIN CALCIUM 20 MG/1
20 TABLET, FILM COATED ORAL DAILY
Status: DISCONTINUED | OUTPATIENT
Start: 2021-02-12 | End: 2021-02-24 | Stop reason: HOSPADM

## 2021-02-11 RX ORDER — OXYCODONE HYDROCHLORIDE 5 MG/1
5 TABLET ORAL EVERY 4 HOURS PRN
Status: CANCELLED | OUTPATIENT
Start: 2021-02-11

## 2021-02-11 RX ORDER — OXYCODONE HYDROCHLORIDE 5 MG/1
5 TABLET ORAL
Status: DISCONTINUED | OUTPATIENT
Start: 2021-02-11 | End: 2021-02-15

## 2021-02-11 RX ORDER — HYDROMORPHONE HCL/PF 1 MG/ML
0.5 SYRINGE (ML) INJECTION
Status: DISCONTINUED | OUTPATIENT
Start: 2021-02-11 | End: 2021-02-11 | Stop reason: HOSPADM

## 2021-02-11 RX ORDER — ACETAMINOPHEN 325 MG/1
975 TABLET ORAL EVERY 8 HOURS SCHEDULED
Status: DISCONTINUED | OUTPATIENT
Start: 2021-02-11 | End: 2021-02-24 | Stop reason: HOSPADM

## 2021-02-11 RX ORDER — ACETAMINOPHEN 325 MG/1
650 TABLET ORAL EVERY 6 HOURS PRN
Status: CANCELLED | OUTPATIENT
Start: 2021-02-11

## 2021-02-11 RX ORDER — GABAPENTIN 100 MG/1
100 CAPSULE ORAL 2 TIMES DAILY
Status: DISCONTINUED | OUTPATIENT
Start: 2021-02-11 | End: 2021-02-24 | Stop reason: HOSPADM

## 2021-02-11 RX ORDER — MAGNESIUM HYDROXIDE/ALUMINUM HYDROXICE/SIMETHICONE 120; 1200; 1200 MG/30ML; MG/30ML; MG/30ML
30 SUSPENSION ORAL EVERY 6 HOURS PRN
Status: CANCELLED | OUTPATIENT
Start: 2021-02-11

## 2021-02-11 RX ORDER — LIDOCAINE 50 MG/G
2 PATCH TOPICAL DAILY
Status: CANCELLED | OUTPATIENT
Start: 2021-02-12

## 2021-02-11 RX ORDER — PANTOPRAZOLE SODIUM 20 MG/1
20 TABLET, DELAYED RELEASE ORAL
Status: DISCONTINUED | OUTPATIENT
Start: 2021-02-11 | End: 2021-02-11 | Stop reason: HOSPADM

## 2021-02-11 RX ORDER — OXYCODONE HYDROCHLORIDE 10 MG/1
10 TABLET ORAL
Status: DISCONTINUED | OUTPATIENT
Start: 2021-02-11 | End: 2021-02-15

## 2021-02-11 RX ORDER — METHOCARBAMOL 500 MG/1
500 TABLET, FILM COATED ORAL ONCE
Status: COMPLETED | OUTPATIENT
Start: 2021-02-11 | End: 2021-02-11

## 2021-02-11 RX ORDER — ACETAMINOPHEN 325 MG/1
975 TABLET ORAL EVERY 8 HOURS SCHEDULED
Status: DISCONTINUED | OUTPATIENT
Start: 2021-02-11 | End: 2021-02-11 | Stop reason: HOSPADM

## 2021-02-11 RX ORDER — ATORVASTATIN CALCIUM 20 MG/1
20 TABLET, FILM COATED ORAL DAILY
Status: CANCELLED | OUTPATIENT
Start: 2021-02-12

## 2021-02-11 RX ORDER — ACETAMINOPHEN 325 MG/1
650 TABLET ORAL EVERY 6 HOURS PRN
Status: DISCONTINUED | OUTPATIENT
Start: 2021-02-11 | End: 2021-02-11 | Stop reason: HOSPADM

## 2021-02-11 RX ORDER — DIAZEPAM 2 MG/1
2 TABLET ORAL EVERY 6 HOURS PRN
Status: CANCELLED | OUTPATIENT
Start: 2021-02-11

## 2021-02-11 RX ORDER — DEXAMETHASONE 4 MG/1
4 TABLET ORAL 2 TIMES DAILY WITH MEALS
Status: CANCELLED | OUTPATIENT
Start: 2021-02-11

## 2021-02-11 RX ORDER — HYDROMORPHONE HCL/PF 1 MG/ML
0.5 SYRINGE (ML) INJECTION
Status: CANCELLED | OUTPATIENT
Start: 2021-02-11

## 2021-02-11 RX ORDER — GABAPENTIN 100 MG/1
100 CAPSULE ORAL 2 TIMES DAILY
Status: DISCONTINUED | OUTPATIENT
Start: 2021-02-11 | End: 2021-02-11 | Stop reason: HOSPADM

## 2021-02-11 RX ORDER — OXYCODONE HYDROCHLORIDE 5 MG/1
5 TABLET ORAL EVERY 4 HOURS PRN
Status: DISCONTINUED | OUTPATIENT
Start: 2021-02-11 | End: 2021-02-11 | Stop reason: HOSPADM

## 2021-02-11 RX ORDER — OXYCODONE HYDROCHLORIDE 5 MG/1
5 TABLET ORAL EVERY 4 HOURS PRN
Status: DISCONTINUED | OUTPATIENT
Start: 2021-02-11 | End: 2021-02-11

## 2021-02-11 RX ORDER — ATORVASTATIN CALCIUM 20 MG/1
20 TABLET, FILM COATED ORAL DAILY
Status: DISCONTINUED | OUTPATIENT
Start: 2021-02-11 | End: 2021-02-11 | Stop reason: HOSPADM

## 2021-02-11 RX ORDER — LORATADINE 10 MG/1
10 TABLET ORAL
Status: DISCONTINUED | OUTPATIENT
Start: 2021-02-11 | End: 2021-02-11 | Stop reason: HOSPADM

## 2021-02-11 RX ORDER — MAGNESIUM HYDROXIDE/ALUMINUM HYDROXICE/SIMETHICONE 120; 1200; 1200 MG/30ML; MG/30ML; MG/30ML
30 SUSPENSION ORAL EVERY 6 HOURS PRN
Status: DISCONTINUED | OUTPATIENT
Start: 2021-02-11 | End: 2021-02-11 | Stop reason: HOSPADM

## 2021-02-11 RX ORDER — CEFAZOLIN SODIUM 2 G/50ML
2000 SOLUTION INTRAVENOUS
Status: COMPLETED | OUTPATIENT
Start: 2021-02-12 | End: 2021-02-12

## 2021-02-11 RX ORDER — MAGNESIUM HYDROXIDE/ALUMINUM HYDROXICE/SIMETHICONE 120; 1200; 1200 MG/30ML; MG/30ML; MG/30ML
30 SUSPENSION ORAL EVERY 6 HOURS PRN
Status: DISCONTINUED | OUTPATIENT
Start: 2021-02-11 | End: 2021-02-24 | Stop reason: HOSPADM

## 2021-02-11 RX ORDER — ACETAMINOPHEN 325 MG/1
650 TABLET ORAL EVERY 6 HOURS PRN
Status: DISCONTINUED | OUTPATIENT
Start: 2021-02-11 | End: 2021-02-24 | Stop reason: HOSPADM

## 2021-02-11 RX ORDER — LIDOCAINE 50 MG/G
2 PATCH TOPICAL DAILY
Status: DISCONTINUED | OUTPATIENT
Start: 2021-02-11 | End: 2021-02-11 | Stop reason: HOSPADM

## 2021-02-11 RX ORDER — KETOROLAC TROMETHAMINE 30 MG/ML
15 INJECTION, SOLUTION INTRAMUSCULAR; INTRAVENOUS ONCE
Status: COMPLETED | OUTPATIENT
Start: 2021-02-11 | End: 2021-02-11

## 2021-02-11 RX ORDER — DIAZEPAM 2 MG/1
2 TABLET ORAL EVERY 6 HOURS PRN
Status: DISCONTINUED | OUTPATIENT
Start: 2021-02-11 | End: 2021-02-24 | Stop reason: HOSPADM

## 2021-02-11 RX ORDER — AMLODIPINE BESYLATE 10 MG/1
10 TABLET ORAL DAILY
Status: CANCELLED | OUTPATIENT
Start: 2021-02-12

## 2021-02-11 RX ORDER — LORATADINE 10 MG/1
10 TABLET ORAL
Status: CANCELLED | OUTPATIENT
Start: 2021-02-11

## 2021-02-11 RX ORDER — PANTOPRAZOLE SODIUM 20 MG/1
20 TABLET, DELAYED RELEASE ORAL
Status: DISCONTINUED | OUTPATIENT
Start: 2021-02-12 | End: 2021-02-24 | Stop reason: HOSPADM

## 2021-02-11 RX ORDER — AMLODIPINE BESYLATE 10 MG/1
10 TABLET ORAL DAILY
Status: DISCONTINUED | OUTPATIENT
Start: 2021-02-11 | End: 2021-02-11 | Stop reason: HOSPADM

## 2021-02-11 RX ORDER — DIAZEPAM 2 MG/1
2 TABLET ORAL EVERY 6 HOURS PRN
Status: DISCONTINUED | OUTPATIENT
Start: 2021-02-11 | End: 2021-02-11 | Stop reason: HOSPADM

## 2021-02-11 RX ORDER — ONDANSETRON 2 MG/ML
4 INJECTION INTRAMUSCULAR; INTRAVENOUS EVERY 6 HOURS PRN
Status: CANCELLED | OUTPATIENT
Start: 2021-02-11

## 2021-02-11 RX ORDER — AMLODIPINE BESYLATE 10 MG/1
10 TABLET ORAL DAILY
Status: DISCONTINUED | OUTPATIENT
Start: 2021-02-12 | End: 2021-02-24 | Stop reason: HOSPADM

## 2021-02-11 RX ORDER — HYDROMORPHONE HCL/PF 1 MG/ML
1 SYRINGE (ML) INJECTION
Status: CANCELLED | OUTPATIENT
Start: 2021-02-11

## 2021-02-11 RX ORDER — HYDROMORPHONE HCL/PF 1 MG/ML
0.5 SYRINGE (ML) INJECTION
Status: DISCONTINUED | OUTPATIENT
Start: 2021-02-11 | End: 2021-02-11

## 2021-02-11 RX ADMIN — LIDOCAINE 5% 2 PATCH: 700 PATCH TOPICAL at 09:57

## 2021-02-11 RX ADMIN — HYDROMORPHONE HYDROCHLORIDE 0.5 MG: 1 INJECTION, SOLUTION INTRAMUSCULAR; INTRAVENOUS; SUBCUTANEOUS at 08:13

## 2021-02-11 RX ADMIN — PANTOPRAZOLE SODIUM 20 MG: 20 TABLET, DELAYED RELEASE ORAL at 09:56

## 2021-02-11 RX ADMIN — ACETAMINOPHEN 975 MG: 325 TABLET ORAL at 09:56

## 2021-02-11 RX ADMIN — OXYCODONE HYDROCHLORIDE 10 MG: 10 TABLET ORAL at 21:14

## 2021-02-11 RX ADMIN — ONDANSETRON 4 MG: 2 INJECTION INTRAMUSCULAR; INTRAVENOUS at 09:55

## 2021-02-11 RX ADMIN — ONDANSETRON 4 MG: 2 INJECTION INTRAMUSCULAR; INTRAVENOUS at 03:24

## 2021-02-11 RX ADMIN — AMLODIPINE BESYLATE 10 MG: 10 TABLET ORAL at 09:56

## 2021-02-11 RX ADMIN — ATORVASTATIN CALCIUM 20 MG: 20 TABLET, FILM COATED ORAL at 09:56

## 2021-02-11 RX ADMIN — INSULIN LISPRO 2 UNITS: 100 INJECTION, SOLUTION INTRAVENOUS; SUBCUTANEOUS at 21:16

## 2021-02-11 RX ADMIN — ACETAMINOPHEN 975 MG: 325 TABLET, FILM COATED ORAL at 21:16

## 2021-02-11 RX ADMIN — HYDROMORPHONE HYDROCHLORIDE 0.5 MG: 1 INJECTION, SOLUTION INTRAMUSCULAR; INTRAVENOUS; SUBCUTANEOUS at 04:00

## 2021-02-11 RX ADMIN — GABAPENTIN 100 MG: 100 CAPSULE ORAL at 18:00

## 2021-02-11 RX ADMIN — KETOROLAC TROMETHAMINE 15 MG: 30 INJECTION, SOLUTION INTRAMUSCULAR at 05:13

## 2021-02-11 RX ADMIN — ACETAMINOPHEN 975 MG: 325 TABLET ORAL at 05:14

## 2021-02-11 RX ADMIN — DEXAMETHASONE 4 MG: 4 TABLET ORAL at 16:39

## 2021-02-11 RX ADMIN — GABAPENTIN 100 MG: 100 CAPSULE ORAL at 09:56

## 2021-02-11 RX ADMIN — HYDROMORPHONE HYDROCHLORIDE 0.5 MG: 1 INJECTION, SOLUTION INTRAMUSCULAR; INTRAVENOUS; SUBCUTANEOUS at 03:23

## 2021-02-11 RX ADMIN — INSULIN LISPRO 1 UNITS: 100 INJECTION, SOLUTION INTRAVENOUS; SUBCUTANEOUS at 17:59

## 2021-02-11 RX ADMIN — OXYCODONE HYDROCHLORIDE 10 MG: 10 TABLET ORAL at 16:39

## 2021-02-11 RX ADMIN — OXYCODONE HYDROCHLORIDE 5 MG: 5 TABLET ORAL at 14:05

## 2021-02-11 RX ADMIN — METHOCARBAMOL 500 MG: 500 TABLET ORAL at 05:14

## 2021-02-11 RX ADMIN — ENOXAPARIN SODIUM 40 MG: 40 INJECTION SUBCUTANEOUS at 09:57

## 2021-02-11 NOTE — DISCHARGE SUMMARY
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 [facility]) [C79 31, C79 49, C80 1]  Lumbar back pain with radiculopathy affecting left lower extremity [M54 16]    HPI: Alejo Bui is a 62 y o  female who presents with past medical history of diabetes, hypertension recently diagnosed of 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, mediastinum, unknown primary underwent radiation therapy yesterday to the brain and to the spine and since this morning patient is been having severe lower back pain and left thigh pain  She had this pain in the past as well which was controlled with oral pain medication including gabapentin and as needed oxycodone and Tylenol  Patient states oxycodone 5 mg did not help her much, she tried to pills and she start taking it as there was no relief from pain  She also currently takes dexamethasone 2 mg b i d   Patient rates pain 10/10 in severity, no radiation, intermittent, comes and goes, denies any tingling or numbness  Patient also complains of left back pain  Patient states her leg pain is more intense than the lower back pain  Denies any headache, dizziness, blurry vision, nausea, vomiting, chest pain, shortness of breath  Denies any bladder or bowel incontinence  Denies any weakness of the left lower leg       Procedures Performed: No orders of the defined types were placed in this encounter        Summary of Hospital Course:  Nickie Sommers was admitted for pain control due to severe left thigh pain, lower back pain, she was started on pain medications including Dilaudid IV, consult palliative care team for pain control  Medications were adjusted by Dr Celi Cash  Patient was scheduled for mediastinoscopy on 02/12/2021 by Dr Serene Lopez, thoracic surgeon  Patient is being transferred to [facility] for the procedure and for pain control and to continue to receive further evaluation and appropriate treatment for her metastatic cancer  Discussed with the patient, she is agreeable for the transfer  Significant Findings, Care, Treatment and Services Provided:  Pain medication    Complications:  None    Discharge Diagnosis:  Severe intractable left lower extremity pain, back pain  Leptomeningitis, carcinomatous (HCC) [C79 49, C80 1]  Cancer associated pain [G89 3]  Left leg pain [M79 605]  Sciatica of left side [M54 32]  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 [facility]) [C79 31, C79 49, C80 1]  Lumbar back pain with radiculopathy affecting left lower extremity [M54 16]        Resolved Problems  Date Reviewed: 2/11/2021    None          Condition at Discharge: fair         Discharge instructions/Information to patient and family:   See after visit summary for information provided to patient and family  Provisions for Follow-Up Care:  See after visit summary for information related to follow-up care and any pertinent home health orders        PCP: Patti Borrero DO    Disposition: Transfer to [facility]    Planned Readmission: General appearance:  Patient in distress due to  Eyes:  Pupils equal reacting to light  ENT:  Moist oral mucous membranes  CVS:  S1-S2 heard, regular rate and rhythm, no pedal edema  Chest:  Bilateral air entry present, clear to auscultation  Abdomen:  Soft, nontender, bowel sounds present  CNS:  No focal neurological deficits  Genitourinary: deferred  Skin:  No acute rash   psychiatric:  No psychosis  Musculoskeletal:  No joint deformities   Future Encounters 2/12/2021 11:00 AM Pend Preadm    BE OP Room, OR POOL     Case [id number] 2/12/2021 11:00 AM (105 min)  [facility]    1  BRONCHOSCOPY FLEXIBLE    2  [address] Dimple Madera MD    BE MAIN OR           2/15/2021 12:15 PM (30 min) [facility]    RADIATION DAILY TREATMENT    MO Rad Onc [[facility]]    MO RADONC RESOURCE           2/16/2021  9:20 AM (40 min) [facility]    VIRTUAL VISIT PG    TASHIA FERNANDEZH [Practice-Hos]    Ankur Sultana MD           2/16/2021 11:00 AM (30 min) [facility]    RADIATION DAILY TREATMENT    MO Rad Onc [[facility]]     [facility] RESOURCE          View all              Discharge Statement   I spent 35 minutes discharging the patient  This time was spent on the day of discharge  I had direct contact with the patient on the day of discharge  Additional documentation is required if more than 30 minutes were spent on discharge  Discharge Medications:  See after visit summary for reconciled discharge medications provided to patient and family

## 2021-02-11 NOTE — QUICK NOTE
Consult received full consult to follow  Spoke to bedside nurse  Will provide with liberal PRNs as ordered  Additionally will increase decadron to 4mg BID  Will d/c meloxicam in light of increasing steroid dosing  I recommend reaching out to patient's oncology team in a timely fashion to ensure no further workup is needed for patient's escalating pain symptoms

## 2021-02-11 NOTE — ED NOTES
Patient transported to [address] [place] [address], [address] [place] [address]  02/11/21 8650

## 2021-02-11 NOTE — ASSESSMENT & PLAN NOTE
Lab Results   Component Value Date    HGBA1C 5 3 01/05/2021       No results for input(s): POCGLU in the last 72 hours      Blood Sugar Average: Last 72 hrs:   hold oral hypoglycemics, give sliding scale insulin

## 2021-02-11 NOTE — SOCIAL WORK
LSW appreciates the opportunity to provide patient/family with inpatient emotional support and guidance while they continue to receive medical attention from a Palliative provider  LSW completed an assessment of need which was completed with     Family dynamics:   Relationship status:     Duration of relationship:     Name of significant other:   Children and Ages: 3 children (1  from heart complications) Patient has 6 grandchildren    Pets:   Other important family information:    Living situation: Daughter and grandchildren reside with her      Patient's primary caregiver: Daughter, Jaleesa Cormier and her 25 yr old granddaughter assist    Environmental concerns or barriers:  None reported, patient stated that she has been able to navigate her home and ADLs     history:    Employment history: Retired [employer], shortly after retiring, she was diagnosed with cancer    Disability:    Spirituality/ Mandaeism:  Patient reports that she is thankful to God that she was able to retire before getting cancer  Patient reported that she doesn't have to worry about taking time off from work or completing paperwork for disability from work, she can focus on what she needs  Patient reports that everyone dies and she recognizes that  Patient reported that her's is cancer  Patient reports that something that has been important to her is that she has taken each of her grandchildren to [facility] for their first visit  Patient reported that she has one more left who is 1  She reported that she told her daughter that she has to make sure the 3year old goes to [facility] "on me"  Patient stressed that is very important to her  Cultural information:     Mental Health and/or Drug and Alcohol history: denies  Patient reports that she will not allow her cancer to make her depressed  Patient reported that she cried when she heart about the cancer but states that it is normal  LSW also validated this emotional response  Advanced Directives:      Patient's Interests: Family is very important to her  Patient reports that her goal is to be pain free and possibly maintain level of independence for as long as she can  Patient's strengths, social supports, and resources: Patient reports good support system from her family  Patient reported that she stated that she told her cousin she was "rickety" and a cane appeared on her doorstep  Reported that she mentioned to her daughter/granddaughter that she was rickety in the shower, they bought a shower chair for her  Patient/family current level of coping: Patient expressing appropriate coping responses, taking things day by day and is attempting to look at things in her life and be grateful for them  For example, grateful that she was able to retire before cancer  Grateful that she was able to take her kids/grandkids to [facility], thankful for family  Patient/family concerns and areas of need: Patient did report that she may need a w/c at some point for distances but didn't identify anything currently  Patient states that she knows that things will change and she may need things/supports as things change  *All questions may not be answered due to constraints    Follow-up discussions may need to occur

## 2021-02-11 NOTE — ASSESSMENT & PLAN NOTE
Blood pressure is elevated in the emergency department likely secondary to severe pain, restart amlodipine

## 2021-02-11 NOTE — PLAN OF CARE
Problem: Potential for Falls  Goal: Patient will remain free of falls  Description: INTERVENTIONS:  - Assess patient frequently for physical needs  -  Identify cognitive and physical deficits and behaviors that affect risk of falls  -  [place] fall precautions as indicated by assessment   - Educate patient/family on patient safety including physical limitations  - Instruct patient to call for assistance with activity based on assessment  - Modify environment to reduce risk of injury  - Consider OT/PT consult to assist with strengthening/mobility  Outcome: Progressing     Problem: PAIN - ADULT  Goal: Verbalizes/displays adequate comfort level or baseline comfort level  Description: Interventions:  - Encourage patient to monitor pain and request assistance  - Assess pain using appropriate pain scale  - Administer analgesics based on type and severity of pain and evaluate response  - Implement non-pharmacological measures as appropriate and evaluate response  - Consider cultural and social influences on pain and pain management  - Notify physician/advanced practitioner if interventions unsuccessful or patient reports new pain  Outcome: Progressing     Problem: SAFETY ADULT  Goal: Patient will remain free of falls  Description: INTERVENTIONS:  - Assess patient frequently for physical needs  -  Identify cognitive and physical deficits and behaviors that affect risk of falls    -  [place] fall precautions as indicated by assessment   - Educate patient/family on patient safety including physical limitations  - Instruct patient to call for assistance with activity based on assessment  - Modify environment to reduce risk of injury  - Consider OT/PT consult to assist with strengthening/mobility  Outcome: Progressing  Goal: Maintain or return to baseline ADL function  Description: INTERVENTIONS:  -  Assess patient's ability to carry out ADLs; assess patient's baseline for ADL function and identify physical deficits which impact ability to perform ADLs (bathing, care of mouth/teeth, toileting, grooming, dressing, etc )  - Assess/evaluate cause of self-care deficits   - Assess range of motion  - Assess patient's mobility; develop plan if impaired  - Assess patient's need for assistive devices and provide as appropriate  - Encourage maximum independence but intervene and supervise when necessary  - Involve family in performance of ADLs  - Assess for home care needs following discharge   - Consider OT consult to assist with ADL evaluation and planning for discharge  - Provide patient education as appropriate  Outcome: Progressing  Goal: Maintain or return mobility status to optimal level  Description: INTERVENTIONS:  - Assess patient's baseline mobility status (ambulation, transfers, stairs, etc )    - Identify cognitive and physical deficits and behaviors that affect mobility  - Identify mobility aids required to assist with transfers and/or ambulation (gait belt, sit-to-stand, lift, walker, cane, etc )  - [place] fall precautions as indicated by assessment  - Record patient progress and toleration of activity level on Mobility SBAR; progress patient to next Phase/Stage  - Instruct patient to call for assistance with activity based on assessment  - Consider rehabilitation consult to assist with strengthening/weightbearing, etc   Outcome: Progressing     Problem: DISCHARGE PLANNING  Goal: Discharge to home or other facility with appropriate resources  Description: INTERVENTIONS:  - Identify barriers to discharge w/patient and caregiver  - Arrange for needed discharge resources and transportation as appropriate  - Identify discharge learning needs (meds, wound care, etc )  - Arrange for interpretive services to assist at discharge as needed  - Refer to Case Management Department for coordinating discharge planning if the patient needs post-hospital services based on physician/advanced practitioner order or complex needs related to functional status, cognitive ability, or social support system  Outcome: Progressing     Problem: Knowledge Deficit  Goal: Patient/family/caregiver demonstrates understanding of disease process, treatment plan, medications, and discharge instructions  Description: Complete learning assessment and assess knowledge base    Interventions:  - Provide teaching at level of understanding  - Provide teaching via preferred learning methods  Outcome: Progressing

## 2021-02-11 NOTE — ASSESSMENT & PLAN NOTE
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

## 2021-02-11 NOTE — H&P
History & Physical - [facility] Internal Medicine  Patient: Khadar Lawson 62 y o  female MRN: [id number]  Unit/Bed#: [facility] 927-01 Encounter: [id number]  Primary Care Provider: Last Moran DO  Date & Time of Admission: 2/11/2021  3:21 PM        Assessment & Plan:    * Cancer related pain  Assessment & Plan  Initially presented to the [facility] w/ intractable back and LLE pain in the setting of metastatic cancer with spinal/brain/mediastinal involvement - XR femur negative for osseous abnormality  Pain managed by palliative care prior to transfer   Continue current analgesic regimen w/ constipation prophylaxis  See plan for metastatic cancer below   PT/OT to assist in ambulation  Supportive care otherwise    Metastatic cancer of brain and spinal cord with unknown primary site  Assessment & Plan  Also with mediastinal involvement and right hilar lymphadenopathy  Plan for mediastinoscopy with thoracic surgery tomorrow - NPO after midnight  FNA of cervical lymph node last month w/ evidence of non-small cell carcinoma of uncertain histotype  MRIs of C/T/L spine consistent with multiregional CSF spread of tumor - s/p lumbar puncture last month w/ CSF cytology without conclusive evidence of malignancy, but rather, atypical cellular changes of unknown significance  CT of head and MRI brain last month also noted left occipital/parietal lesions w/ vasogenic edema - on Decadron  Appreciate oncology evaluation  Supportive care     Diabetes mellitus type 2  Assessment & Plan  Lab Results   Component Value Date    HGBA1C 5 3 01/05/2021     Hold oral hypoglycemics while hospitalized  On SSI coverage per Accu-Cheks  Anticipate blood sugar fluctuation while on Decadron  Carbohydrate restricted diet    Essential hypertension  Assessment & Plan  Optimize pain control  Low-sodium diet  Continue Norvasc    Hyperlipidemia  Assessment & Plan  C/w Lipitor      DVT Prophylaxis:  Lovenox    Code Status:  Full code    Discussion with:  Patient at bedside    Anticipated Length of Stay:  Patient will be admitted on an Inpatient basis with an anticipated length of stay of greater than 2 midnights  Justification for Hospital Stay:  Intractable cancer-related pain on intravenous analgesics, along with scheduled mediastinoscopy tomorrow, in the setting of metastatic cancer  Total Time for Visit, including Counseling / Coordination of Care: 72 minutes  Greater than 50% of this total time spent on direct patient counseling and coordination of care  Chief Complaint:  Intractable pain      History of Present Illness:    Benton Mckoy is a 62 y o  female who presents as a transfer from the [facility] earlier this morning with complaints of intractable back and left leg pain  And XR of the left femur was unremarkable  Her past medical history significant for metastatic cancer of unknown origin with involvement diffusely throughout the spine, along with brain, and mediastinum  She was already scheduled for a mediastinoscopy per thoracic surgery tomorrow  She has already been evaluated in the outpatient setting by medical and radiation oncology  Prior to transfer, she was evaluated by palliative care earlier today regarding her analgesia regimen  At the time my encounter post-arrival, she does complain of waxing/waning left thigh discomfort and generalized weakness/fatigue  She is currently requiring a walker for ambulation  Overall, she remains in positive relatively pleasant spirits  No other acute complaints at this time  Review of Systems:    Review of Systems - A thorough 12 point review systems was conducted  Pertinent positives and negatives are mentioned in the history of present illness        Past Medical and Surgical History:     Past Medical History:   Diagnosis Date    Acute on chronic congestive heart failure with left ventricular diastolic dysfunction ([facility] [address] )     last assessed 5/23/2017    Asthma     Atelectasis  CHF [facility]congestive heart failure) [facility] [address] [facility]     Diabetes mellitus ([facility] [address] [facility]     Diverticulitis 2017    with perforation and abscess     Hyperlipidemia     Hypertension     Lesion of spleen     Pericardial effusion        Past Surgical History:   Procedure Laterality Date    ABDOMINAL SURGERY      BREAST BIOPSY Left 10/04/2018     SECTION      COLON SURGERY      COLONOSCOPY      COLOSTOMY  2017    HARTMANS PROCEDURE N/A 2017    Procedure: EXPLORATORY LAPAROTOMY; PARTIAL SMALL BOWEL RESECTION; HARTMANS PROCEDURE; OSTOMY;  Surgeon: Serjio Tucker MD;  [facility];  Service:     HYSTERECTOMY  2018    IR BIOPSY LYMPH NODE  2021    IR LUMBAR PUNCTURE  2021    MAMMO STEREOTACTIC BREAST BIOPSY LEFT (ALL INC) Left 10/4/2018    OOPHORECTOMY Bilateral 2018    AK CLOSE ENTEROSTOMY N/A 2017    Procedure: OPEN COLOSTOMY REVERSAL;  Surgeon: Serjio Tucker MD;  [facility];  Service: General    AK COLONOSCOPY FLX DX W/COLLJ [address] PFRMD N/A 2017    Procedure: COLONOSCOPY; DILATION OF OSTOMY;  Surgeon: Serjio Tucker MD;  Location: [facility]; Service: General    AK EXC SKIN BENIG <0 5 CM REMAINDER BODY N/A 2017    Procedure: SCALP MASS EXCISION X 2;  Surgeon: Serjio Tucker MD;  [facility];  Service: [facility] N/A 2019    Procedure: INCISIONAL HERNIA REPAIR, COMPONENT SEPARATION;  Surgeon: Serjio Tucker MD;  [facility];  Service: General    AK TOTAL ABDOM HYSTERECTOMY N/A 2019    Procedure: TOTAL ABDOMINAL HYSTERECTOMY; BSO;  Surgeon: Will Choudhury MD;  [facility];  Service: Gynecology    VAC DRESSING APPLICATION N/A     Procedure: APPLICATION VAC DRESSING;  Surgeon: Serjio Tucker MD;  [facility];  Service:          Medications & Allergies:    Prior to Admission medications    Medication Sig Start Date End Date Taking?  Authorizing Provider amLODIPine (NORVASC) 10 mg tablet TAKE 1 TABLET BY MOUTH EVERY DAY 9/15/20   Travis Acevedo DO   atorvastatin (LIPITOR) 20 mg tablet TAKE 1 TABLET DAILY 10/3/20   Travis Acevedo DO   dexamethasone (DECADRON) 2 mg tablet Take 1 tablet (2 mg total) by mouth 2 (two) times a day with meals 2/5/21   Kalli Anthony MD   diazepam (VALIUM) 2 mg tablet Take 1 tablet (2 mg total) by mouth every 6 (six) hours as needed for anxiety (dizziness) 2/4/21   Viry Gonzalez MD   gabapentin (NEURONTIN) 100 mg capsule Take 1-2 capsules (100-200 mg total) by mouth daily at bedtime To reduce nerve pain  Patient not taking: Reported on 2/11/2021 2/4/21   Viry Gonzalez MD   ipratropium (ATROVENT) 0 02 % nebulizer solution Inhale 0 5 mg as needed     Historical Provider, MD   lidocaine (LIDODERM) 5 % Apply 2 patches topically daily Remove & Discard patch within 12 hours or as directed by MD  Patient not taking: Reported on 2/11/2021 1/23/21   Nelida Joaquin MD   loratadine (CLARITIN) 10 mg tablet Take 1 tablet (10 mg total) by mouth daily at bedtime To reduce nighttime allergy inflammation  Patient not taking: Reported on 2/11/2021 2/4/21   Viry Gonzalez MD   meloxicam (MOBIC) 15 mg tablet Take 1 tablet (15 mg total) by mouth daily with dinner To reduce inflammation at night  Patient not taking: Reported on 2/11/2021 2/4/21   Viry Gonzalez MD   metFORMIN (GLUCOPHAGE) 1000 MG tablet Take 1 tablet (1,000 mg total) by mouth 2 (two) times a day with meals 5/21/20   Travis Acevedo DO   ondansetron (ZOFRAN) 8 mg tablet Take 1 tablet (8 mg total) by mouth every 8 (eight) hours as needed for nausea or vomiting 2/5/21   Kalli Anthony MD   oxyCODONE (ROXICODONE) 5 mg immediate release tablet Take 1 tablet (5 mg total) by mouth every 4 (four) hours as needed (severe cancer pain)Max Daily Amount: 30 mg 2/4/21   Viry Gonzalez MD   pantoprazole (PROTONIX) 20 mg tablet Take 1 tablet (20 mg total) by mouth daily 2/5/21   Kalli Anthony MD Tradjenta 5 MG TABS TAKE 1 TABLET BY MOUTH EVERY DAY 21   Travis Acevedo,          Allergies: Allergies   Allergen Reactions    Ciprofloxacin Itching     Starts at hands to feet then the whole entire body         Social History:    Substance Use History:   Social History     Substance and Sexual Activity   Alcohol Use Not Currently    Frequency: Monthly or less    Drinks per session: 1 or 2    Binge frequency: Never    Comment: socially      Social History     Tobacco Use   Smoking Status Former Smoker    Packs/day: 0 25    Years: 20 00    Pack years: 5 00    Types: Cigarettes    Start date: 1991   Tracy Olivas Quit date: 2021    Years since quittin 0   Smokeless Tobacco Never Used     Social History     Substance and Sexual Activity   Drug Use No         Family History:    Per medical chart, significant for hypertension and diabetes in mother, heart disease in son, and lung cancer in maternal aunt        Physical Exam:     GENERAL:  Well-developed/nourished - waxing/waning distress due to pain  HEAD:  Normocephalic - atraumatic  EYES: PERRL - EOMI   MOUTH:  Mucosa moist  NECK:  Supple - full range of motion  CARDIAC:  Rate controlled - S1/S2 positive  PULMONARY:  Clear to auscultation - nonlabored respirations  ABDOMEN:  Soft - nontender/nondistended - active bowel sounds  MUSCULOSKELETAL:  Motor strength/range of motion deconditioned - radiating left proximal thigh tenderness  NEUROLOGIC:  Alert/oriented at baseline  SKIN:  Chronic wrinkles/blemishes   PSYCHIATRIC:  Mood/affect stable      Additional Data:     Labs & Recent Cultures:    Results from last 7 days   Lab Units 21  0748   WBC Thousand/uL 7 96   HEMOGLOBIN g/dL 11 5   HEMATOCRIT % 36 6   PLATELETS Thousands/uL 299   NEUTROS PCT % 71   LYMPHS PCT % 20   MONOS PCT % 8   EOS PCT % 1     Results from last 7 days   Lab Units 21  0748   SODIUM mmol/L 139   POTASSIUM mmol/L 3 6   CHLORIDE mmol/L 101   CO2 mmol/L 29   BUN mg/dL 20   CREATININE mg/dL 0 57*   ANION GAP mmol/L 9   CALCIUM mg/dL 9 3   ALBUMIN g/dL 3 5   TOTAL BILIRUBIN mg/dL 0 90   ALK PHOS U/L 48   ALT U/L 17   AST U/L 11   GLUCOSE RANDOM mg/dL 131         Results from last 7 days   Lab Units 02/11/21  1050 02/11/21  0856   POC GLUCOSE mg/dl 220* 132           Imaging:     Xr Femur 2 Views Left    Result Date: 2/11/2021  Narrative: LEFT FEMUR INDICATION:   pain (bony mets)  COMPARISON:  1/17/2021 VIEWS:  XR FEMUR 2 VW LEFT FINDINGS: There is no acute fracture or dislocation  No significant degenerative changes  No lytic or blastic osseous lesion  There are atherosclerotic calcifications  Soft tissues are otherwise unremarkable  Impression: No acute osseous abnormality  Workstation performed: [id number]             ** Please Note: This note is constructed using a voice recognition dictation system  An occasional wrong word/phrase or sound-a-like substitution may have been picked up by dictation device due to the inherent limitations of voice recognition software  Read the chart carefully and recognize, using reasonable context, where substitutions may have occurred  **

## 2021-02-11 NOTE — H&P
H&P- Hammad Miller 1960, 62 y o  female MRN: [id number]    Unit/Bed#: ED 29 Encounter: [id number]    Primary Care Provider: Sukhjinder Vitale DO   Date and time admitted to hospital: 2/11/2021  2:44 AM        * Lumbar back pain with radiculopathy affecting left lower extremity  Assessment & Plan  Cancer pain  Patient has severe, intractable back pain and left thigh pain, 10/10 in severity, as per the patient the pain is more than 10, partially relieved with IV pain medication, she states she tried Tylenol and oxycodone at home which did not give any relief  Will start on IV Dilaudid as needed and oxycodone for moderate pain, will keep the patient under observation  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  CT scan of the abdomen and chest, pelvis suggestive of mediastinal and right hilar lymphadenopathy and a chronic splenic mass  So far no evidence of primary tumor  Biopsy of the lymph node done by intervention Radiology resulted as, cell carcinoma, poorly differentiated  She follows up with Oncology in the community, started with radiation therapy yesterday to the brain and to the spine    Will check with Oncology and Radiation Oncology to see if patient would need further radiation therapy as she is currently admitted for observation for severe pain    Chronic diastolic HF (heart failure) (HCC)  Assessment & Plan  Wt Readings from Last 3 Encounters:   02/11/21 73 5 kg (162 lb 0 6 oz)   02/10/21 73 kg (161 lb)   02/05/21 73 5 kg (162 lb)         Currently compensated    Essential hypertension  Assessment & Plan  Blood pressure is elevated in the emergency department likely secondary to severe pain, restart amlodipine  Uncomplicated asthma  Assessment & Plan  Continue inhaler    Controlled type 2 diabetes mellitus without complication [facility])  Assessment & Plan  Lab Results   Component Value Date    HGBA1C 5 3 01/05/2021       No results for input(s): POCGLU in the last 72 hours  Blood Sugar Average: Last 72 hrs:   hold oral hypoglycemics, give sliding scale insulin          VTE Prophylaxis: Enoxaparin (Lovenox)  / sequential compression device   Code Status:  Full code per patient wishes  POLST: There is no POLST form on file for this patient (pre-hospital)      Anticipated Length of Stay:  Patient will be admitted on an Observation basis with an anticipated length of stay of  less than 2 midnights  Justification for Hospital Stay:  Intractable left lower extremity pain      Chief Complaint:   Left lower back pain and left thigh pain    History of Present Illness:    Abhijeet Martinez is a 62 y o  female who presents with past medical history of diabetes, hypertension recently diagnosed of 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, mediastinum, unknown primary underwent radiation therapy yesterday to the brain and to the spine and since this morning patient is been having severe lower back pain and left thigh pain  She had this pain in the past as well which was controlled with oral pain medication including gabapentin and as needed oxycodone and Tylenol  Patient states oxycodone 5 mg did not help her much, she tried to pills and she start taking it as there was no relief from pain  She also currently takes dexamethasone 2 mg b i d   Patient rates pain 10/10 in severity, no radiation, intermittent, comes and goes, denies any tingling or numbness  Patient also complains of left back pain  Patient states her leg pain is more intense than the lower back pain  Denies any headache, dizziness, blurry vision, nausea, vomiting, chest pain, shortness of breath    Denies any bladder or bowel incontinence  Denies any weakness of the left lower leg       Review of Systems:    More than 10 system review of systems was performed, pertinent positive and negative findings mentioned as per history present illness  Past Medical and Surgical History:     Past Medical History:   Diagnosis Date    Acute on chronic congestive heart failure with left ventricular diastolic dysfunction (HCC)     last assessed 2017    Asthma     Atelectasis     [facility]est[facility]     [facility]     Diverticulitis 2017    with perforation and abscess     Hyperlipidemia     Hypertension     Lesion of spleen     Pericardial effusion        Past Surgical History:   Procedure Laterality Date    ABDOMINAL SURGERY      BREAST BIOPSY Left 10/04/2018     SECTION      COLON SURGERY      COLONOSCOPY      COLOSTOMY  2017    HARTMANS PROCEDURE N/A 2017    Procedure: EXPLORATORY LAPAROTOMY; PARTIAL SMALL BOWEL RESECTION; HARTMANS PROCEDURE; OSTOMY;  Surgeon: Steff Tristan MD;  [facility];  Service:     HYSTERECTOMY  2018    IR BIOPSY LYMPH NODE  2021    IR LUMBAR PUNCTURE  2021    MAMMO STEREOTACTIC BREAST BIOPSY LEFT (ALL INC) Left 10/4/2018    OOPHORECTOMY Bilateral     AK CLOSE ENTEROSTOMY N/A 2017    Procedure: OPEN COLOSTOMY REVERSAL;  Surgeon: Steff Tristan MD;  [facility];  Service: General    AK COLONOSCOPY FLX DX W/COLLJ [address] PFRMD N/A 2017    Procedure: COLONOSCOPY; DILATION OF OSTOMY;  Surgeon: Steff Tristan MD;  Location: [facility];   Service: General    AK EXC SKIN BENIG <0 5 CM REMAINDER BODY N/A 2017    Procedure: SCALP MASS EXCISION X 2;  Surgeon: Steff Tristan MD;  [facility];  Service: [facility] N/A 2019    Procedure: INCISIONAL HERNIA REPAIR, COMPONENT SEPARATION;  Surgeon: Steff Tristan MD;  [facility]; Service: General    NM TOTAL ABDOM HYSTERECTOMY N/A 2/27/2019    Procedure: TOTAL ABDOMINAL HYSTERECTOMY; BSO;  Surgeon: Addie Carranza MD;  [facility];  Service: Gynecology    VAC DRESSING APPLICATION N/A 2/2/0604    Procedure: APPLICATION VAC DRESSING;  Surgeon: Tigre Reyes MD;  [facility];  Service:        Meds/Allergies:    Prior to Admission medications    Medication Sig Start Date End Date Taking?  Authorizing Provider   amLODIPine (NORVASC) 10 mg tablet TAKE 1 TABLET BY MOUTH EVERY DAY 9/15/20  Yes Vira Acevedo DO   atorvastatin (LIPITOR) 20 mg tablet TAKE 1 TABLET DAILY 10/3/20  Yes Vira Acevedo DO   dexamethasone (DECADRON) 2 mg tablet Take 1 tablet (2 mg total) by mouth 2 (two) times a day with meals 2/5/21  Yes Obdulio Wilder MD   diazepam (VALIUM) 2 mg tablet Take 1 tablet (2 mg total) by mouth every 6 (six) hours as needed for anxiety (dizziness) 2/4/21  Yes Karime Mccormick MD   metFORMIN (GLUCOPHAGE) 1000 MG tablet Take 1 tablet (1,000 mg total) by mouth 2 (two) times a day with meals 5/21/20  Yes Travis Acevedo DO   oxyCODONE (ROXICODONE) 5 mg immediate release tablet Take 1 tablet (5 mg total) by mouth every 4 (four) hours as needed (severe cancer pain)Max Daily Amount: 30 mg 2/4/21  Yes Karime Mccormick MD   Tradjenta 5 MG TABS TAKE 1 TABLET BY MOUTH EVERY DAY 1/25/21  Yes Travis Acevedo DO   gabapentin (NEURONTIN) 100 mg capsule Take 1-2 capsules (100-200 mg total) by mouth daily at bedtime To reduce nerve pain  Patient not taking: Reported on 2/11/2021 2/4/21   Karime Mccormick MD   ipratropium (ATROVENT) 0 02 % nebulizer solution Inhale 0 5 mg as needed     Historical Provider, MD   lidocaine (LIDODERM) 5 % Apply 2 patches topically daily Remove & Discard patch within 12 hours or as directed by MD  Patient not taking: Reported on 2/11/2021 1/23/21   Martine Mabry MD   loratadine (CLARITIN) 10 mg tablet Take 1 tablet (10 mg total) by mouth daily at bedtime To reduce nighttime allergy inflammation  Patient not taking: Reported on 2021   Foster Guardado MD   meloxicam [facility] 15 mg tablet Take 1 tablet (15 mg total) by mouth daily with dinner To reduce inflammation at night  Patient not taking: Reported on 2021   Foster Guardado MD   ondansetron [facility] 8 mg tablet Take 1 tablet (8 mg total) by mouth every 8 (eight) hours as needed for nausea or vomiting 21   Vaishnavi Ellison MD   pantoprazole (PROTONIX) 20 mg tablet Take 1 tablet (20 mg total) by mouth daily 21   Vaishnavi Ellison MD     I have reviewed home medications with patient personally  Allergies:    Allergies   Allergen Reactions    Ciprofloxacin Itching     Starts at hands to feet then the whole entire body       Social History:       Social History     Substance and Sexual Activity   Alcohol Use Not Currently    Frequency: Monthly or less    Drinks per session: 1 or 2    Binge frequency: Never    Comment: socially      Social History     Tobacco Use   Smoking Status Former Smoker    Packs/day: 0 25    Years: 20 00    Pack years: 5 00    Types: Cigarettes    Start date: 1991   Juan Ward Quit date: 2021    Years since quittin 0   Smokeless Tobacco Never Used     Social History     Substance and Sexual Activity   Drug Use No       Family History:    non-contributory    Physical Exam:     Vitals:   Blood Pressure: 162/77 (21)  Pulse: 76 (21)  Temperature: 98 7 °F (37 1 °C) (21)  Temp Source: Oral (21)  Respirations: 18 (21)  Height: 5' (152 4 cm) (21)  Weight - Scale: 73 5 kg (162 lb 0 6 oz) (21)  SpO2: 95 % (21)    Physical Exam    (  General appearance:  Patient in distress due to  Eyes:  Pupils equal reacting to light  ENT:  Moist oral mucous membranes  CVS:  S1-S2 heard, regular rate and rhythm, no pedal edema  Chest:  Bilateral air entry present, clear to auscultation  Abdomen:  Soft, nontender, bowel sounds present  CNS:  No focal neurological deficits  Genitourinary: deferred  Skin:  No acute rash   psychiatric:  No psychosis  Musculoskeletal:  No joint deformities   Additional Data:     Lab Results: I have personally reviewed pertinent reports  Results from last 7 days   Lab Units 02/11/21  0748   WBC Thousand/uL 7 96   HEMOGLOBIN g/dL 11 5   HEMATOCRIT % 36 6   PLATELETS Thousands/uL 299   NEUTROS PCT % 71   LYMPHS PCT % 20   MONOS PCT % 8   EOS PCT % 1                           Imaging:    XR femur 2 views LEFT    (Results Pending)       Allscripts / Epic Records Reviewed: Yes     ** Please Note: This note has been constructed using a voice recognition system   **

## 2021-02-11 NOTE — TRANSPORTATION MEDICAL NECESSITY
Section I - General Information    Name of Patient: Ellen Pryor                 : 1960    Medicare #: [id number]  Transport Date: 21 (PCS is valid for round trips on this date and for all repetitive trips in the 60-day range as noted below )  Origin: Alyssia Hernandez: Marco A Dave  Is the pt's stay covered under Medicare Part A (PPS/DRG)   []     Closest appropriate facility? If no, why is transport to more distant facility required? Yes  If hospice pt, is this transport related to pt's terminal illness? No       Section II - Medical Necessity Questionnaire  Ambulance transportation is medically necessary only if other means of transport are contraindicated or would be potentially harmful to the patient  To meet this requirement, the patient must either be "bed confined" or suffer from a condition such that transport by means other than ambulance is contraindicated by the patient's condition  The following questions must be answered by the medical professional signing below for this form to be valid:    1)  Describe the MEDICAL CONDITION (physical and/or mental) of this patient AT [address] [place] [address] that requires the patient to be transported in an ambulance and why transport by other means is contraindicated by the patient's condition: lumbar radiculopathy affecting the left lower extremity    2) Is the patient "bed confined" as defined below? Yes  To be "be confined" the patient must satisfy all three of the following conditions: (1) unable to get up from bed without Assistance; AND (2) unable to ambulate; AND (3) unable to sit in a chair or wheelchair  3) Can this patient safely be transported by car or wheelchair van (i e , seated during transport without a medical attendant or monitoring)?    No    4) In addition to completing questions 1-3 above, please check any of the following conditions that apply*:   *Note: supporting documentation for any boxes checked must be maintained in the patient's medical records  If hosp-hosp transfer, describe services needed at 2nd facility not available at 1st facility? Moderate/severe pain on movement   Unable to sit in a chair or wheelchair due to decubitus ulcers or other wounds       Section III - Signature of Physician or Healthcare Professional  I certify that the above information is true and correct based on my evaluation of this patient, and represent that the patient requires transport by ambulance and that other forms of transport are contraindicated  I understand that this information will be used by the Centers for Medicare and Medicaid Services (CMS) to support the determination of medical necessity for ambulance services, and I represent that I have personal knowledge of the patient's condition at time of transport  []  If this box is checked, I also certify that the patient is physically or mentally incapable of signing the ambulance service's claim and that the institution with which I am affiliated has furnished care, services, or assistance to the patient  My signature below is made on behalf of the patient pursuant to 42 CFR §424 36(b)(4)  In accordance with 42 CFR §424 37, the specific reason(s) that the patient is physically or mentally incapable of signing the claim form is as follows:       Signature of Physician* or Healthcare Professional______________________________________________________________  Signature Date 02/11/21 (For scheduled repetitive transports, this form is not valid for transports performed more than 60 days after this date)    Printed Name & Credentials of Physician or Healthcare Professional (MD, DO, RN, etc )________________________________  *Form must be signed by patient's attending physician for scheduled, repetitive transports   For non-repetitive, unscheduled ambulance transports, if unable to obtain the signature of the attending physician, any of the following may sign (choose appropriate option below)  [] Physician Assistant []  Clinical Nurse Specialist [x]  Registered Nurse  []  Nurse Practitioner  [x] Discharge Planner

## 2021-02-11 NOTE — ASSESSMENT & PLAN NOTE
Wt Readings from Last 3 Encounters:   02/11/21 73 5 kg (162 lb 0 6 oz)   02/10/21 73 kg (161 lb)   02/05/21 73 5 kg (162 lb)         Currently compensated

## 2021-02-11 NOTE — ED PROVIDER NOTES
History  Chief Complaint   Patient presents with    Leg Pain     pt c/o exacerbation of chronic leg pain; pt states hx brain tumors and cancer in spinal fluid      52-year-old female with known metastatic squamous cell carcinoma presents with worsening right leg pain  Patient has had this pain ongoing since the diagnosis of her cancer however typically it is more sciatic in nature per the notes  Patient states the pain is similar to those prior episodes that today is on the anterior surface and only involves the left upper leg, not involving the hip or the knee  Patient describes this as severe aching pain that has been intractable to her at home medications and has caused her to have no sleep  Patient is undergoing active radiation therapy that per the medical record is palliative in nature  Patient denies any loss of bowel or bladder  Patient has persistent but increasing weakness in her left leg  Patient denies any numbness including no saddle anesthesia  Impression and plan:  Left leg pain with a broad differential   Patient had negative imaging of the femur approximately 1 month ago, will repeat plain film imaging the patient's pain may be secondary to her known spinal metastases  Will treat patient symptomatically, I have offered the patient admission for discussion with palliative without pain control and further management  Will reassess  History provided by:  Patient      Prior to Admission Medications   Prescriptions Last Dose Informant Patient Reported? Taking?    Tradjenta 5 MG TABS  Self No No   Sig: TAKE 1 TABLET BY MOUTH EVERY DAY   amLODIPine (NORVASC) 10 mg tablet  Self No No   Sig: TAKE 1 TABLET BY MOUTH EVERY DAY   atorvastatin (LIPITOR) 20 mg tablet  Self No No   Sig: TAKE 1 TABLET DAILY   dexamethasone (DECADRON) 2 mg tablet  Self No No   Sig: Take 1 tablet (2 mg total) by mouth 2 (two) times a day with meals   diazepam (VALIUM) 2 mg tablet  Self No No   Sig: Take 1 tablet (2 mg total) by mouth every 6 (six) hours as needed for anxiety (dizziness)   gabapentin (NEURONTIN) 100 mg capsule  Self No No   Sig: Take 1-2 capsules (100-200 mg total) by mouth daily at bedtime To reduce nerve pain   ipratropium (ATROVENT) 0 02 % nebulizer solution  Self Yes No   Sig: Inhale 0 5 mg as needed    lidocaine (LIDODERM) 5 %  Self No No   Sig: Apply 2 patches topically daily Remove & Discard patch within 12 hours or as directed by MD   loratadine (CLARITIN) 10 mg tablet  Self No No   Sig: Take 1 tablet (10 mg total) by mouth daily at bedtime To reduce nighttime allergy inflammation   meloxicam (MOBIC) 15 mg tablet  Self No No   Sig: Take 1 tablet (15 mg total) by mouth daily with dinner To reduce inflammation at night   metFORMIN (GLUCOPHAGE) 1000 MG tablet  Self No No   Sig: Take 1 tablet (1,000 mg total) by mouth 2 (two) times a day with meals   ondansetron (ZOFRAN) 8 mg tablet  Self No No   Sig: Take 1 tablet (8 mg total) by mouth every 8 (eight) hours as needed for nausea or vomiting   oxyCODONE (ROXICODONE) 5 mg immediate release tablet  Self No No   Sig: Take 1 tablet (5 mg total) by mouth every 4 (four) hours as needed (severe cancer pain)Max Daily Amount: 30 mg   pantoprazole (PROTONIX) 20 mg tablet  Self No No   Sig: Take 1 tablet (20 mg total) by mouth daily      Facility-Administered Medications: None       Past Medical History:   Diagnosis Date    Acute on chronic congestive heart failure with left ventricular diastolic dysfunction (HCC)     last assessed 2017    Asthma     Atelectasis     [facility]     [facility]     Diverticulitis 2017    with perforation and abscess     Hyperlipidemia     Hypertension     Lesion of spleen     Pericardial effusion        Past Surgical History:   Procedure Laterality Date    ABDOMINAL SURGERY      BREAST BIOPSY Left 10/04/2018     SECTION      COLON SURGERY      COLONOSCOPY      COLOSTOMY  05/2017    HARTMANS PROCEDURE N/A 5/1/2017    Procedure: EXPLORATORY LAPAROTOMY; PARTIAL SMALL BOWEL RESECTION; HARTMANS PROCEDURE; OSTOMY;  Surgeon: Kiya Li MD;  [facility];  Service:     HYSTERECTOMY  2018    IR BIOPSY LYMPH NODE  1/20/2021    IR LUMBAR PUNCTURE  1/20/2021    MAMMO STEREOTACTIC BREAST BIOPSY LEFT (ALL INC) Left 10/4/2018    OOPHORECTOMY Bilateral 2018    MD CLOSE ENTEROSTOMY N/A 9/8/2017    Procedure: OPEN COLOSTOMY REVERSAL;  Surgeon: Kiya Li MD;  [facility];  Service: General    MD COLONOSCOPY FLX DX W/COLLJ [address] PFRMD N/A 8/16/2017    Procedure: COLONOSCOPY; DILATION OF OSTOMY;  Surgeon: Kiya Li MD;  [facility]; Service: General    MD EXC SKIN BENIG <0 5 CM REMAINDER BODY N/A 11/29/2017    Procedure: SCALP MASS EXCISION X 2;  Surgeon: Kiya Li MD;  [facility];  Service: General    [address] [place] [address] N/A 2/27/2019    Procedure: INCISIONAL HERNIA REPAIR, COMPONENT SEPARATION;  Surgeon: Kiya Li MD;  [facility];  Service: General    MD TOTAL ABDOM HYSTERECTOMY N/A 2/27/2019    Procedure: TOTAL ABDOMINAL HYSTERECTOMY; BSO;  Surgeon: Pennie Rojas MD;  [facility];  Service: Gynecology    VAC DRESSING APPLICATION N/A 5/0/2454    Procedure: [employer];  Surgeon: Kiya Li MD;  [facility];  Service:        Family History   Problem Relation Age of Onset    Hypertension Mother     Diabetes Mother     No Known Problems Sister     No Known Problems Daughter     Heart disease Son     No Known Problems Maternal Aunt     Lung cancer Maternal Aunt     No Known Problems Maternal Aunt     No Known Problems Maternal Aunt     No Known Problems Paternal Aunt     Breast cancer Neg Hx      I have reviewed and agree with the history as documented      E-Cigarette/Vaping    E-Cigarette Use Never User      E-Cigarette/Vaping Substances    Nicotine No     THC No     CBD No     Flavoring No     Other No     Unknown No      Social History     Tobacco Use    Smoking status: Former Smoker     Packs/day: 0 25     Years: 20 00     Pack years: 5 00     Types: Cigarettes     Start date: 1991     Quit date: 2021     Years since quittin 0    Smokeless tobacco: Never Used   Substance Use Topics    Alcohol use: Not Currently     Frequency: Monthly or less     Drinks per session: 1 or 2     Binge frequency: Never     Comment: socially     Drug use: No       Review of Systems   Constitutional: Positive for unexpected weight change  Eyes: Negative for visual disturbance  Genitourinary: Negative for flank pain  Musculoskeletal: Negative for back pain and neck pain  Neurological: Positive for weakness  Negative for numbness and headaches  All other systems reviewed and are negative  Physical Exam  Physical Exam  Vitals signs reviewed  HENT:      Head: Normocephalic and atraumatic  Eyes:      Pupils: Pupils are equal, round, and reactive to light  Neck:      Musculoskeletal: Neck supple  Cardiovascular:      Rate and Rhythm: Normal rate  Pulmonary:      Effort: Pulmonary effort is normal    Abdominal:      General: There is no distension  Musculoskeletal:         General: No tenderness, deformity or signs of injury  Comments: No discrete tenderness over the area of the patient's pain  Skin:     General: Skin is warm and dry  Neurological:      Mental Status: She is alert  Sensory: No sensory deficit  Motor: Weakness present  Comments: Weakness of the left leg proximally and distally compared to the left though she is able to maintain some capability is gravity, this transitory  Decreased dorsiflexion and plantar flexion of the left toe compared to the right  Sensory is intact in all dermatomes of the foot  2+ DP pulse with appropriate capillary refill           Vital Signs  ED Triage Vitals   Temperature Pulse Respirations Blood Pressure SpO2   02/11/21 0303 02/11/21 0252 02/11/21 0252 02/11/21 0252 02/11/21 0252   98 7 °F (37 1 °C) 84 18 (!) 190/88 99 %      Temp Source Heart Rate Source Patient Position - Orthostatic VS BP Location FiO2 (%)   02/11/21 0303 02/11/21 0252 02/11/21 0252 02/11/21 0252 --   Oral Monitor Sitting Right arm       Pain Score       02/11/21 0323       Worst Possible Pain           Vitals:    02/11/21 0252 02/11/21 0419   BP: (!) 190/88 (!) 184/82   Pulse: 84 77   Patient Position - Orthostatic VS: Sitting Lying         Visual Acuity      ED Medications  Medications   HYDROmorphone (DILAUDID) injection 0 5 mg (has no administration in time range)   methocarbamol (ROBAXIN) tablet 500 mg (has no administration in time range)   acetaminophen (TYLENOL) tablet 975 mg (has no administration in time range)   ketorolac (TORADOL) injection 15 mg (has no administration in time range)   HYDROmorphone (DILAUDID) injection 0 5 mg (0 5 mg Intravenous Given 2/11/21 0400)   HYDROmorphone (DILAUDID) injection 0 5 mg (0 5 mg Intravenous Given 2/11/21 0323)   ondansetron (ZOFRAN) injection 4 mg (4 mg Intravenous Given 2/11/21 0324)       Diagnostic Studies  Results Reviewed     None                 XR femur 2 views LEFT    (Results Pending)              Procedures  Procedures         ED Course  ED Course as of Feb 11 0453   Thu Feb 11, 2021   5031 Pain regimen per palliative care note  meloxicam (MOBIC) 15 mg tablet   gabapentin (NEURONTIN) 100 mg capsule  loratadine (CLARITIN) 10 mg tablet  oxyCODONE (ROXICODONE) 5 mg immediate release tablet                                    SBIRT 22yo+      Most Recent Value   SBIRT (22 yo +)   In order to provide better care to our patients, we are screening all of our patients for alcohol and drug use  Would it be okay to ask you these screening questions? Yes Filed at: 02/11/2021 0258   Initial Alcohol Screen: US AUDIT-C    1   How often do you have a drink containing alcohol? 1 Filed at: 02/11/2021 0258   2  How many drinks containing alcohol do you have on a typical day you are drinking? 0 Filed at: 02/11/2021 0258   3b  FEMALE Any Age, or MALE 65+: How often do you have 4 or more drinks on one occassion? 0 Filed at: 02/11/2021 0258   Audit-C Score  1 Filed at: 02/11/2021 7815   ANTHONY: How many times in the past year have you    Used an illegal drug or used a prescription medication for non-medical reasons? Never Filed at: 02/11/2021 0258                    MDM    Disposition  Final diagnoses:   Cancer associated pain   Left leg pain     Time reflects when diagnosis was documented in both MDM as applicable and the Disposition within this note     Time User Action Codes Description Comment    2/11/2021  4:01 AM Rheba Concho Add [G89 3] Cancer associated pain     2/11/2021  4:01 AM Rheba Concho Add [M79 605] Left leg pain       ED Disposition     ED Disposition Condition Date/Time Comment    Admit Stable u Feb 11, 2021  4:01 AM Case was discussed with AMY and the patient's admission status was agreed to be Admission Status: observation status to the service of Dr Paulie Mcconnell   Follow-up Information    None         Patient's Medications   Discharge Prescriptions    No medications on file     No discharge procedures on file      PDMP Review       Value Time User    PDMP Reviewed  Yes 2/4/2021 11:48 AM Ophelia Snell MD          ED Provider  Electronically Signed by           Anjel Lucia MD  02/11/21 5256

## 2021-02-11 NOTE — ASSESSMENT & PLAN NOTE
Initially presented to the [facility] w/ intractable back and LLE pain in the setting of metastatic cancer with spinal/brain/mediastinal involvement - XR femur negative for osseous abnormality  Pain managed by palliative care prior to transfer  - palliative radiation per radiation oncology  Continue current analgesic regimen w/ constipation prophylaxis  See plan for metastatic cancer below   PT/OT to assist in ambulation  Supportive care otherwise

## 2021-02-11 NOTE — ASSESSMENT & PLAN NOTE
Cancer pain  Patient has severe, intractable back pain and left thigh pain, 10/10 in severity, as per the patient the pain is more than 10, partially relieved with IV pain medication, she states she tried Tylenol and oxycodone at home which did not give any relief  Will start on IV Dilaudid as needed and oxycodone for moderate pain, will keep the patient under observation

## 2021-02-11 NOTE — CONSULTS
Consultation - Palliative and Supportive Care   Johnnie Buckley 62 y o  female [id number]    Patient Active Problem List   Diagnosis    Diabetes mellitus type 2    Uncomplicated asthma    Essential hypertension    Hyperlipidemia    [facility] failure) [facility]    Diabetic nephropathy associated with type 2 diabetes mellitus ([facility] )    CAD in native artery    Tobacco use    Postoperative anemia    Sciatica of left side    Lumbar back pain with radiculopathy affecting left lower extremity    Cerebral edema (HCC)    Metastatic cancer of brain and spinal cord with unknown primary site    Leptomeningitis, carcinomatous ([facility] )    Cancer related pain      Plan:  1  Symptom management - seizure rescue meds are added, and opioids are liberalized upon arrival to [facility] with incrased pain after ride  - oxyIR 5-10mg PO q3hrs PRN mod-severe pain   - hydromorphone 0 5mg IV q2hrs PRN breakthrough pain or inability to PO   - lorazepam 2mg IV q10min PRN seizure   - defer steroid dosing at this time to our consultant colleagues   - could consider XRT after diagnostics, Rad/Onc consult tomorrow to guide approach to symptomatic radiculopaties  2  Goals - full cares, no limits set at this time   - Pt is hopeful and assertive; she would like any and all options for treatment presented to her   - ALL options for treatment depend on accurate tissue diagnosis, which has not yet been achieved  - Pt agreeable to mediastinoscopy tomorrow with Dr Signa Jeans as scheduled  Pls maintain NPO after MN  Code Status: FULL - Level 1   Decisional apparatus:  Patient is competent on my exam today    If competence is lost, patient's substitute decision maker would default to , who may be reached thru daughter Latrell Sabas by [id number] [facility] [id number]  - [phone]   Advance Directive / Living Will / POLST:  None on file     I have reviewed the patient's controlled substance dispensing history in the Prescription Drug Monitoring Program in compliance with the [employer] regulations before prescribing any controlled substances  We appreciate the invitation to be involved in this patient's care  We will continue to follow  Please do not hesitate to reach our on call provider through our clinic answering service at  should you have acute symptom control concerns  Jesse Starks MD  Palliative and Supportive Care  Clinic/Answering Service: [phone]  You can find me on [employer]! 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:      "  Sharee Velarde is a 62 y o  female who presents as a referral from Dr Jennifer Gonzales of Thoracic Surgery for primary palliative diagnosis of 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, 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  Consultation is requested for: symptom management, emotional support in the setting of serious illness        This lady presents with family as a same-day referral from Dr Jennifer Gonzales  There is a concern for drop mets, given the CSF findings and the clinical scenario of Having unilateral hearing loss and laterlizing paresthesias  Stephanie Ma Unfortunately, cyto was equivocal on tissue type / origin, and so we do not have a formal diagnosis to guide our options selection    Dr Jennifer Gonzales has been asked to consider invasive testing for path collection; a mediastinoscopy will be attempted, per discussion with Dr Shaye Parrish, the family, and the Radiology team   It is felt that -- after the positive result on CSF, the large lymph nodes in the chest are most high-yield, and most easy to access        Today, we discussed her symptoms, as noted above, and she is willing to try non-opioid strategies to help improve her pain  Daughter convinces her that opioids may be needed to help more severe symptoms, and we agreed to send a small supply, but hold this in reserve  Additionally, time was spent in brief life review  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[employer], and she is an avid learner with a sharp mind  She recounts to me today a story of how 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' so that they could perform genuinely in the movie '[place] [address]'       As re: goals, she trusts her  to make decisions in case of emergency  She confirms a desire to continue treatment and investigations to prolong her life, improve function, and consider options "      Importantly, she has met with our SW teammate Ms Sailaja Quinteros, and she has a dream of taking her youngest grandchild to [facility], personally, before she dies  This stay, she was unable to get adequate pain control at home, and so presented to [facility] for management  An XR of affected femur showed no bony abnormalities; her previous MRI L-spine suggests that she may more likely have a severe neuropathy or radiculopathy related to drop mets or perineural compression and/or invasion by her afrementioned tumor(s)  For immediate pain relief, we are using opioids, and steroids have already been started and increased at [facility]  Xfer of care to [facility] was undertaken to coordinate and facilitate with Dr Brigido Torres surgical schedule  CSF results were so sparse as to be unable to identify primary tissue type for genetic sequencing, testing, or typing    As such, Dr Shaye Parrish has agreed to pursue mediastinoscopy with LN bx for tissue sampling, and this can only be done in her operating schedule at [facility] tomorrow,   Review of Systems   Constitution: Negative for decreased appetite, weight gain and weight loss  HENT: Negative for hoarse voice and nosebleeds  Eyes: Negative for vision loss in left eye and vision loss in right eye  Cardiovascular: Negative for chest pain and dyspnea on exertion  Respiratory: Negative for cough and shortness of breath  Endocrine: Negative for polydipsia, polyphagia and polyuria  Skin: Negative for flushing and itching  Musculoskeletal: Positive for back pain, muscle cramps, muscle weakness, myalgias and neck pain  Negative for falls  Gastrointestinal: Negative for anorexia, jaundice, nausea and vomiting  Genitourinary: Negative for frequency  Neurological: Negative for dizziness  Psychiatric/Behavioral: Negative for depression and memory loss  The patient is not nervous/anxious          Past Medical History:   Diagnosis Date    Acute on chronic congestive heart failure with left ventricular diastolic dysfunction (HCC)     last assessed 2017    Asthma     Atelectasis     [facility]est[facility]) [facility]     Diabetes mellitus ([facility]     Diverticulitis 2017    with perforation and abscess     Hyperlipidemia     Hypertension     Lesion of spleen     Pericardial effusion      Past Surgical History:   Procedure Laterality Date    ABDOMINAL SURGERY      BREAST BIOPSY Left 10/04/2018     SECTION      COLON SURGERY      COLONOSCOPY      COLOSTOMY  2017    HARTMANS PROCEDURE N/A 2017    Procedure: EXPLORATORY LAPAROTOMY; PARTIAL SMALL BOWEL RESECTION; HARTMANS PROCEDURE; OSTOMY;  Surgeon: Michael Jamison MD;  Location: [facility] OR;  Service:     HYSTERECTOMY      IR BIOPSY LYMPH NODE  2021    IR LUMBAR PUNCTURE  2021    MAMMO STEREOTACTIC BREAST BIOPSY LEFT (ALL INC) Left 10/4/2018    OOPHORECTOMY Bilateral 2018    AK CLOSE ENTEROSTOMY N/A 2017    Procedure: OPEN COLOSTOMY REVERSAL;  Surgeon: Alejandra Beaulieu MD;  [facility];  Service: General    AK COLONOSCOPY FLX DX W/COLLJ SPEC WHEN PFRMD N/A 2017    Procedure: COLONOSCOPY; DILATION OF OSTOMY;  Surgeon: Alejandra Beaulieu MD;  Location: [facility];   Service: General    AK EXC SKIN BENIG <0 5 CM REMAINDER BODY N/A 2017    Procedure: SCALP MASS EXCISION X 2;  Surgeon: Alejandra Beaulieu MD;  [facility];  Service: General    [id number] [facility] N/A 2019    Procedure: INCISIONAL HERNIA REPAIR, COMPONENT SEPARATION;  Surgeon: Alejandra Beaulieu MD;  [facility];  Service: General    AK TOTAL ABDOM HYSTERECTOMY N/A 2019    Procedure: TOTAL ABDOMINAL HYSTERECTOMY; BSO;  Surgeon: Jonas Sandy MD;  [facility];  Service: Gynecology    VAC DRESSING APPLICATION N/A 5216    Procedure: Shelly Heimlich;  Surgeon: Alejandra Beaulieu MD;  [facility];  Service:      Social History     Socioeconomic History    Marital status:      Spouse name: Not on file    Number of children: Not on file    Years of education: Not on file    Highest education level: Not on file   Occupational History    Not on file   Social Needs    Financial resource strain: Not on file    Food insecurity     Worry: Not on file     Inability: Not on file    Transportation needs     Medical: Not on file     Non-medical: Not on file   Tobacco Use    Smoking status: Former Smoker     Packs/day: 0 25     Years: 20 00     Pack years: 5 00     Types: Cigarettes     Start date: 1991     Quit date: 2021     Years since quittin 0    Smokeless tobacco: Never Used   Substance and Sexual Activity    Alcohol use: Not Currently     Frequency: Monthly or less     Drinks per session: 1 or 2     Binge frequency: Never     Comment: socially     Drug use: No    Sexual activity: Yes Partners: Male     Birth control/protection: Surgical   Lifestyle    Physical activity     Days per week: 0 days     Minutes per session: 0 min    Stress: Not at all   Relationships    Social connections     Talks on phone: Not on file     Gets together: Not on file     Attends Buddhist service: Not on file     Active member of club or organization: Not on file     Attends meetings of clubs or organizations: Not on file     Relationship status: Not on file    Intimate partner violence     Fear of current or ex partner: Not on file     Emotionally abused: Not on file     Physically abused: Not on file     Forced sexual activity: Not on file   Other Topics Concern    Not on file   Social History Narrative    Not on file     Family History   Problem Relation Age of Onset    Hypertension Mother     Diabetes Mother     No Known Problems Sister     No Known Problems Daughter     Heart disease Son     No Known Problems Maternal Aunt     Lung cancer Maternal Aunt     No Known Problems Maternal Aunt     No Known Problems Maternal Aunt     No Known Problems Paternal Aunt     Breast cancer Neg Hx        MEDICATIONS / ALLERGIES:    all current active meds have been reviewed and current meds:   Current Facility-Administered Medications   Medication Dose Route Frequency    acetaminophen (TYLENOL) tablet 650 mg  650 mg Oral Q6H PRN    acetaminophen (TYLENOL) tablet 975 mg  975 mg Oral Q8H [address]    aluminum-magnesium hydroxide-simethicone (MYLANTA) oral suspension 30 mL  30 mL Oral Q6H PRN    [START ON 2/12/2021] amLODIPine (NORVASC) tablet 10 mg  10 mg Oral Daily    [START ON 2/12/2021] atorvastatin (LIPITOR) tablet 20 mg  20 mg Oral Daily    dexamethasone (DECADRON) tablet 4 mg  4 mg Oral BID With Meals    diazepam (VALIUM) tablet 2 mg  2 mg Oral Q6H PRN    [START ON 2/12/2021] enoxaparin (LOVENOX) subcutaneous injection 40 mg  40 mg Subcutaneous Daily    gabapentin (NEURONTIN) capsule 100 mg  100 mg Oral BID    HYDROmorphone (DILAUDID) injection 0 5 mg  0 5 mg Intravenous Q2H PRN    insulin lispro (HumaLOG) 100 units/mL subcutaneous injection 1-5 Units  1-5 Units Subcutaneous HS    insulin lispro (HumaLOG) 100 units/mL subcutaneous injection 1-5 Units  1-5 Units Subcutaneous TID AC    [START ON 2/12/2021] lidocaine (LIDODERM) 5 % patch 2 patch  2 patch Topical Daily    loratadine (CLARITIN) tablet 10 mg  10 mg Oral HS    LORazepam (ATIVAN) injection 2 mg  2 mg Intravenous Q10 Min PRN    ondansetron (ZOFRAN) injection 4 mg  4 mg Intravenous Q6H PRN    oxyCODONE (ROXICODONE) immediate release tablet 10 mg  10 mg Oral Q3H PRN    oxyCODONE (ROXICODONE) IR tablet 5 mg  5 mg Oral Q3H PRN    [START ON 2/12/2021] pantoprazole (PROTONIX) EC tablet 20 mg  20 mg Oral Early Morning       Allergies   Allergen Reactions    Ciprofloxacin Itching     Starts at hands to feet then the whole entire body       OBJECTIVE:    Physical Exam  Physical Exam  Constitutional:       General: She is not in acute distress  Appearance: She is not ill-appearing, toxic-appearing or diaphoretic  HENT:      Head: Normocephalic and atraumatic  Right Ear: External ear normal       Left Ear: External ear normal       Mouth/Throat:      Comments: Mask not removed today  Eyes:      General:         Right eye: No discharge  Left eye: No discharge  Conjunctiva/sclera: Conjunctivae normal       Pupils: Pupils are equal, round, and reactive to light  Neck:      Trachea: No tracheal deviation  Cardiovascular:      Rate and Rhythm: Normal rate and regular rhythm  Pulmonary:      [place]: Pulmonary effort is normal  No respiratory distress  Breath sounds: No stridor  Abdominal:      General: There is no distension  Palpations: Abdomen is soft  Comments: Scaphoid   Skin:     General: Skin is warm and dry  Coloration: Skin is not pale  Findings: No erythema or rash     Neurological:      General: No focal deficit present  Mental Status: She is alert and oriented to person, place, and time  Mental status is at baseline  Cranial Nerves: No cranial nerve deficit  Gait: Gait abnormal       Comments: Tremulous on standing   Psychiatric:         Mood and Affect: Mood normal          Behavior: Behavior normal          Thought Content: Thought content normal          Judgment: Judgment normal          Lab Results:   I have personally reviewed pertinent labs  , CBC:   Lab Results   Component Value Date    WBC 7 96 02/11/2021    HGB 11 5 02/11/2021    HCT 36 6 02/11/2021    MCV 90 02/11/2021     02/11/2021    MCH 28 1 02/11/2021    MCHC 31 4 02/11/2021    RDW 16 9 (H) 02/11/2021    MPV 8 7 (L) 02/11/2021    NRBC 0 02/11/2021   , CMP:   Lab Results   Component Value Date    SODIUM 139 02/11/2021    K 3 6 02/11/2021     02/11/2021    CO2 29 02/11/2021    BUN 20 02/11/2021    CREATININE 0 57 (L) 02/11/2021    CALCIUM 9 3 02/11/2021    AST 11 02/11/2021    ALT 17 02/11/2021    ALKPHOS 48 02/11/2021    EGFR 117 02/11/2021   , BMP:  Lab Results   Component Value Date    SODIUM 139 02/11/2021    K 3 6 02/11/2021     02/11/2021    CO2 29 02/11/2021    BUN 20 02/11/2021    CREATININE 0 57 (L) 02/11/2021    GLUC 131 02/11/2021    CALCIUM 9 3 02/11/2021    AGAP 9 02/11/2021    EGFR 117 02/11/2021   , PT/PTT:No results found for: PT, PTT     GFR appropriate for any opioid as needed  Imaging Studies: reviewed, as noted above  EKG, Pathology, and Other Studies: none new; reviewed path/cyto as noted above    Counseling / Coordination of Care    Total floor / unit time spent today 90+ minutes  Greater than 50% of total time was spent with the patient and / or family counseling and / or coordination of care   A description of the counseling / coordination of care: chrat review; symptom pursuit; adjustment of parenteral controlled substances for advanced pain and symptoms, and review of the patient's controlled substance dispensing history in the Prescription Drug Monitoring Program in compliance with the [facility] regulations before prescribing said controlled substances

## 2021-02-11 NOTE — CASE MANAGEMENT
LOS 1 DAY  PATIENT CLASS IS OBV  PATIENT IS  A READMISSION, last admission was 1/17  PATIENT IS NOT A BUNDLE  CM met with patient at bedside to complete CM open and discuss discharge planning  Patient lives in [place] [facility] [place] with her daughter and grandchildren  Patient is independent with bathing and dressing and mod I with mobility  She does endorse poor to fair endurance with activity  She has a shower chair and quad cane that are privately purchased  She feels she may need a wheelchair for distance mobility  She has never been in rehab and she has never had a HHC in the past  She is agreeable to [facility] or OP therapy if recommended  She uses [facility] in [place] for rx  Burgess White is her PCP  She is currently getting radiation treatments  She is a former smoker and denies ETOH and drug abuse  She is retired and was a EMS call  in [facility]  She still drives  Her Daughter Sharri Horn will help with transportation home  CM reviewed discharge planning process including the following: identifying caregivers at home, preference for d/c planning needs, availability of treatment team to discuss questions or concerns patient and/or family may have regarding diagnosis, plan of care, old or new medications and discharge planning   CM will continue to follow for care coordination and update assessment as appropriate  Patient is a 61 yo female admitted with LLE leg pain, radiculopathy IV pain management, Palliative care cx ( pt known to them) femur xray, PT/OT REC PENDING  PT AGREES TO HHC/ OP therapy  NO PREFERENCE  NO REFERRALS SENT  Pt is Independent PTA, daughter will transport home

## 2021-02-11 NOTE — CONSULTS
Consultation - Thoracic Surgery   Abhijeet Martinez 62 y o  female MRN: [id number]  Unit/Bed#: [facility] 927-01 Encounter: [id number]    Assessment/Plan     Assessment/Plan:  63yF w/ metastatic squamous cell carcinoma and mediastinal lymphadenopathy    - Will perform bronchoscopy and mediastinoscopy in OR tomorrow  - NPO past midnight  Type and screen  - Consent has been done prior (2/5/21)      History of Present Illness   HPI:  Abhijeet Martinez is a 62 y o  female who was previously seen in the office by Dr Keke Castillo for metastatic squamous cell cancer of unknown origin  She is known to have 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  She has mediastinal lymphadenopathy for which she was referred for a biopsy for adequate tissue sample  She was scheduled for mediastinoscopy on 2/12 however presented to [facility] with severe and worsening left thigh pain  She was then transferred to [facility]  Inpatient consult to Thoracic Surgery     Date/Time 2/11/2021 5:09 PM     Performed by  Lupillo Pineda MD     Authorized by Iza Lo MD              Review of Systems   Constitutional: Positive for fatigue  Negative for fever  Eyes: Negative for photophobia and visual disturbance  Respiratory: Negative for chest tightness and shortness of breath  Cardiovascular: Negative for chest pain and palpitations  Gastrointestinal: Negative for abdominal pain and nausea  Genitourinary: Negative for dysuria and flank pain  Musculoskeletal: Positive for gait problem  Negative for back pain, neck pain and neck stiffness  Severe left thigh pain   Skin: Negative for rash and wound  Allergic/Immunologic: Negative for environmental allergies and food allergies  Neurological: Positive for dizziness and light-headedness  Negative for headaches  All other systems reviewed and are negative        Historical Information   Past Medical History:   Diagnosis Date    Acute on chronic congestive heart failure with left ventricular diastolic dysfunction (HCC)     last assessed 2017    Asthma     Atelectasis     [facility] [address] [facility]     Diabetes mellitus [facility] [address] [facility]     Diverticulitis 2017    with perforation and abscess     Hyperlipidemia     Hypertension     Lesion of spleen     Pericardial effusion      Past Surgical History:   Procedure Laterality Date    ABDOMINAL SURGERY      BREAST BIOPSY Left 10/04/2018     SECTION      COLON SURGERY      COLONOSCOPY      COLOSTOMY  2017    HARTMANS PROCEDURE N/A 2017    Procedure: EXPLORATORY LAPAROTOMY; PARTIAL SMALL BOWEL RESECTION; HARTMANS PROCEDURE; OSTOMY;  Surgeon: Wilfredo Washington MD;  [facility];  Service:     HYSTERECTOMY  2018    IR BIOPSY LYMPH NODE  2021    IR LUMBAR PUNCTURE  2021    MAMMO STEREOTACTIC BREAST BIOPSY LEFT (ALL INC) Left 10/4/2018    OOPHORECTOMY Bilateral 2018    PA CLOSE ENTEROSTOMY N/A 2017    Procedure: OPEN COLOSTOMY REVERSAL;  Surgeon: Wilfredo Washington MD;  [facility];  Service: General    PA COLONOSCOPY FLX DX W/COLLJ [address] PFRMD N/A 2017    Procedure: COLONOSCOPY; DILATION OF OSTOMY;  Surgeon: Wilfredo Washington MD;  [facility];   Service: General    PA EXC SKIN BENIG <0 5 CM REMAINDER BODY N/A 2017    Procedure: SCALP MASS EXCISION X 2;  Surgeon: Wilfredo Washington MD;  [facility];  Service: [address] [place] [address] N/A 2019    Procedure: INCISIONAL HERNIA REPAIR, COMPONENT SEPARATION;  Surgeon: Wilfredo Washington MD;  [facility];  Service: General    PA TOTAL ABDOM HYSTERECTOMY N/A 2019    Procedure: TOTAL ABDOMINAL HYSTERECTOMY; BSO;  Surgeon: Kellen Carmen MD;  [facility];  Service: Gynecology    VAC DRESSING APPLICATION N/A 4174    Procedure: APPLICATION VAC DRESSING;  Surgeon: Wilfredo Washington MD;  [facility];  Service:      Social History   Social History Substance and Sexual Activity   Alcohol Use Not Currently    Frequency: Monthly or less    Drinks per session: 1 or 2    Binge frequency: Never    Comment: socially      Social History     Substance and Sexual Activity   Drug Use No     E-Cigarette/Vaping    E-Cigarette Use Never User      E-Cigarette/Vaping Substances    Nicotine No     THC No     CBD No     Flavoring No     Other No     Unknown No      Social History     Tobacco Use   Smoking Status Former Smoker    Packs/day: 0 25    Years: 20 00    Pack years: 5 00    Types: Cigarettes    Start date: 1991   [place] Quit date: 2021    Years since quittin 0   Smokeless Tobacco Never Used     Family History:   Family History   Problem Relation Age of Onset    Hypertension Mother     Diabetes Mother     No Known Problems Sister     No Known Problems Daughter     Heart disease Son     No Known Problems Maternal Aunt     Lung cancer Maternal Aunt     No Known Problems Maternal Aunt     No Known Problems Maternal Aunt     No Known Problems Paternal Aunt     Breast cancer Neg Hx        Meds/Allergies   all current active meds have been reviewed, current meds:   Current Facility-Administered Medications   Medication Dose Route Frequency    acetaminophen (TYLENOL) tablet 650 mg  650 mg Oral Q6H PRN    acetaminophen (TYLENOL) tablet 975 mg  975 mg Oral Q8H [facility]    aluminum-magnesium hydroxide-simethicone (MYLANTA) oral suspension 30 mL  30 mL Oral Q6H PRN    [START ON 2021] amLODIPine (NORVASC) tablet 10 mg  10 mg Oral Daily    [START ON 2021] atorvastatin (LIPITOR) tablet 20 mg  20 mg Oral Daily    dexamethasone (DECADRON) tablet 4 mg  4 mg Oral BID With Meals    diazepam (VALIUM) tablet 2 mg  2 mg Oral Q6H PRN    [START ON 2021] enoxaparin (LOVENOX) subcutaneous injection 40 mg  40 mg Subcutaneous Daily    gabapentin (NEURONTIN) capsule 100 mg  100 mg Oral BID    HYDROmorphone (DILAUDID) injection 0 5 mg  0 5 mg Intravenous Q2H PRN    insulin lispro (HumaLOG) 100 units/mL subcutaneous injection 1-5 Units  1-5 Units Subcutaneous HS    insulin lispro (HumaLOG) 100 units/mL subcutaneous injection 1-5 Units  1-5 Units Subcutaneous TID AC    [START ON 2/12/2021] lidocaine (LIDODERM) 5 % patch 2 patch  2 patch Topical Daily    loratadine (CLARITIN) tablet 10 mg  10 mg Oral HS    LORazepam (ATIVAN) injection 2 mg  2 mg Intravenous Q10 Min PRN    ondansetron (ZOFRAN) injection 4 mg  4 mg Intravenous Q6H PRN    oxyCODONE (ROXICODONE) immediate release tablet 10 mg  10 mg Oral Q3H PRN    oxyCODONE (ROXICODONE) IR tablet 5 mg  5 mg Oral Q3H PRN    [START ON 2/12/2021] pantoprazole (PROTONIX) EC tablet 20 mg  20 mg Oral Early Morning    and PTA meds:   Prior to Admission Medications   Prescriptions Last Dose Informant Patient Reported? Taking?    Tradjenta 5 MG TABS  Self No No   Sig: TAKE 1 TABLET BY MOUTH EVERY DAY   amLODIPine (NORVASC) 10 mg tablet  Self No No   Sig: TAKE 1 TABLET BY MOUTH EVERY DAY   atorvastatin (LIPITOR) 20 mg tablet  Self No No   Sig: TAKE 1 TABLET DAILY   dexamethasone (DECADRON) 2 mg tablet  Self No No   Sig: Take 1 tablet (2 mg total) by mouth 2 (two) times a day with meals   diazepam (VALIUM) 2 mg tablet  Self No No   Sig: Take 1 tablet (2 mg total) by mouth every 6 (six) hours as needed for anxiety (dizziness)   gabapentin (NEURONTIN) 100 mg capsule  Self No No   Sig: Take 1-2 capsules (100-200 mg total) by mouth daily at bedtime To reduce nerve pain   Patient not taking: Reported on 2/11/2021   ipratropium (ATROVENT) 0 02 % nebulizer solution  Self Yes No   Sig: Inhale 0 5 mg as needed    lidocaine (LIDODERM) 5 %  Self No No   Sig: Apply 2 patches topically daily Remove & Discard patch within 12 hours or as directed by MD   Patient not taking: Reported on 2/11/2021   loratadine (CLARITIN) 10 mg tablet  Self No No   Sig: Take 1 tablet (10 mg total) by mouth daily at bedtime To reduce nighttime allergy inflammation   Patient not taking: Reported on 2/11/2021   meloxicam (MOBIC) 15 mg tablet  Self No No   Sig: Take 1 tablet (15 mg total) by mouth daily with dinner To reduce inflammation at night   Patient not taking: Reported on 2/11/2021   metFORMIN (GLUCOPHAGE) 1000 MG tablet  Self No No   Sig: Take 1 tablet (1,000 mg total) by mouth 2 (two) times a day with meals   ondansetron (ZOFRAN) 8 mg tablet  Self No No   Sig: Take 1 tablet (8 mg total) by mouth every 8 (eight) hours as needed for nausea or vomiting   oxyCODONE (ROXICODONE) 5 mg immediate release tablet  Self No No   Sig: Take 1 tablet (5 mg total) by mouth every 4 (four) hours as needed (severe cancer pain)Max Daily Amount: 30 mg   pantoprazole (PROTONIX) 20 mg tablet  Self No No   Sig: Take 1 tablet (20 mg total) by mouth daily      Facility-Administered Medications: None     Allergies   Allergen Reactions    Ciprofloxacin Itching     Starts at hands to feet then the whole entire body       Objective   First Vitals:   Blood Pressure: 126/79 (02/11/21 1559)  Pulse: 91 (02/11/21 1559)  Temperature: 98 °F (36 7 °C) (02/11/21 1559)  Respirations: 16 (02/11/21 1559)  SpO2: 95 % (02/11/21 1559)    Current Vitals:   Blood Pressure: 126/79 (02/11/21 1559)  Pulse: 91 (02/11/21 1559)  Temperature: 98 °F (36 7 °C) (02/11/21 1559)  Respirations: 16 (02/11/21 1559)  SpO2: 95 % (02/11/21 1559)    No intake or output data in the 24 hours ending 02/11/21 1709    Invasive Devices     Peripheral Intravenous Line            Peripheral IV 02/11/21 Left Antecubital less than 1 day                Physical Exam  Vitals signs and nursing note reviewed  Constitutional:       General: She is not in acute distress  Appearance: Normal appearance  She is normal weight  She is not ill-appearing, toxic-appearing or diaphoretic  HENT:      Head: Normocephalic and atraumatic  Neck:      Musculoskeletal: Normal range of motion  No neck rigidity or muscular tenderness  Comments: Good neck flexion and extension  Cardiovascular:      Rate and Rhythm: Normal rate and regular rhythm  Pulmonary:      Effort: Pulmonary effort is normal  No respiratory distress  Abdominal:      General: Abdomen is flat  There is no distension  Palpations: Abdomen is soft  Skin:     Coloration: Skin is not pale  Findings: No erythema  Neurological:      Mental Status: She is alert and oriented to person, place, and time  Mental status is at baseline  Psychiatric:         Mood and Affect: Mood normal          Behavior: Behavior normal          Thought Content: Thought content normal          Judgment: Judgment normal          Lab Results:   I have personally reviewed pertinent lab results  , CBC:   Lab Results   Component Value Date    WBC 7 96 02/11/2021    HGB 11 5 02/11/2021    HCT 36 6 02/11/2021    MCV 90 02/11/2021     02/11/2021    MCH 28 1 02/11/2021    MCHC 31 4 02/11/2021    RDW 16 9 (H) 02/11/2021    MPV 8 7 (L) 02/11/2021    NRBC 0 02/11/2021   , CMP:   Lab Results   Component Value Date    SODIUM 139 02/11/2021    K 3 6 02/11/2021     02/11/2021    CO2 29 02/11/2021    BUN 20 02/11/2021    CREATININE 0 57 (L) 02/11/2021    CALCIUM 9 3 02/11/2021    AST 11 02/11/2021    ALT 17 02/11/2021    ALKPHOS 48 02/11/2021    EGFR 117 02/11/2021   , Coagulation: No results found for: PT, INR, APTT  Imaging: I have personally reviewed pertinent reports  EKG, Pathology, and Other Studies: I have personally reviewed pertinent reports  Counseling / Coordination of Care  Total floor / unit time spent today 20 minutes  Greater than 50% of total time was spent with the patient and / or family counseling and / or coordination of care  A description of the counseling / coordination of care: 20

## 2021-02-11 NOTE — ASSESSMENT & PLAN NOTE
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

## 2021-02-11 NOTE — ANESTHESIA PREPROCEDURE EVALUATION
Procedure:  BRONCHOSCOPY FLEXIBLE (N/A Bronchus)  MEDIASTINOSCOPY (N/A Chest)    Relevant Problems   ANESTHESIA (within normal limits)   (-) History of anesthesia complications      CARDIO   (+) CAD in native artery   (+) Essential hypertension   (+) Hyperlipidemia      ENDO   (+) Diabetes mellitus type 2      GI/HEPATIC (within normal limits)      /RENAL   (+) Diabetic nephropathy associated with type 2 diabetes mellitus (HCC)      HEMATOLOGY (within normal limits)      MUSCULOSKELETAL   (+) Sciatica of left side      NEURO/PSYCH (within normal limits)      PULMONARY   (+) Uncomplicated asthma      Other   (+) Metastatic cancer of brain and spinal cord with unknown primary site   (+) Obesity (BMI 30 0-34  9)        Physical Exam    Airway    Mallampati score: I  TM Distance: >3 FB  Neck ROM: full     Dental       Cardiovascular  Rhythm: regular, Rate: normal, Cardiovascular exam normal    Pulmonary  Pulmonary exam normal Breath sounds clear to auscultation,     Other Findings        Anesthesia Plan  ASA Score- 3     Anesthesia Type- general with ASA Monitors  Additional Monitors:   Airway Plan: ETT  Plan Factors-    Chart reviewed  EKG reviewed  Imaging results reviewed  Existing labs reviewed  Patient summary reviewed  Patient is not a current smoker  Patient did not smoke on day of surgery  Induction- intravenous  Postoperative Plan-   Planned trial extubation    Informed Consent- Anesthetic plan and risks discussed with patient  I personally reviewed this patient with the CRNA  Discussed and agreed on the Anesthesia Plan with the CRNA  Kimberly Mays Echo (2/5/19):   LEFT VENTRICLE:  Systolic function was at the lower limits of normal  Ejection fraction was estimated to be 50 %  There were no regional wall motion abnormalities    Doppler parameters were consistent with abnormal left ventricular relaxation (grade 1 diastolic dysfunction)      MITRAL VALVE:  There was mild regurgitation      TRICUSPID VALVE:  There was mild regurgitation      PULMONIC VALVE:  There was mild regurgitation  NPO and allergies verified  Preoperative glucose was 118 this AM     Plan:  GETA    Risks and benefits of general anesthesia were discussed with the patient  Discussed risks of anesthesia including, but not limited to, the risk of dental injury, n/v, sore throat, corneal abrasions, and arrhythmias  All questions were answered  Anesthesia consent was obtained from the patient

## 2021-02-11 NOTE — CASE MANAGEMENT
CM made aware that patient is to be transferred to [facility] for a mediastinoscopy with Dr Dino Jaimes is accepting doctor, bed assignment pending  Medical necessity and face sheet in nursing sticker book

## 2021-02-12 ENCOUNTER — APPOINTMENT (OUTPATIENT)
Dept: RADIATION ONCOLOGY | Facility: CLINIC | Age: 61
End: 2021-02-12
Payer: COMMERCIAL

## 2021-02-12 ENCOUNTER — ANESTHESIA (INPATIENT)
Dept: PERIOP | Facility: HOSPITAL | Age: 61
DRG: 940 | End: 2021-02-12
Payer: COMMERCIAL

## 2021-02-12 VITALS — HEART RATE: 73 BPM

## 2021-02-12 LAB
ABO GROUP BLD: NORMAL
ANION GAP SERPL CALCULATED.3IONS-SCNC: 5 MMOL/L (ref 4–13)
BASOPHILS # BLD AUTO: 0.02 THOUSANDS/ΜL (ref 0–0.1)
BASOPHILS NFR BLD AUTO: 0 % (ref 0–1)
BLD GP AB SCN SERPL QL: NEGATIVE
BUN SERPL-MCNC: 26 MG/DL (ref 5–25)
CALCIUM SERPL-MCNC: 10 MG/DL (ref 8.3–10.1)
CHLORIDE SERPL-SCNC: 105 MMOL/L (ref 100–108)
CO2 SERPL-SCNC: 29 MMOL/L (ref 21–32)
CREAT SERPL-MCNC: 0.48 MG/DL (ref 0.6–1.3)
EOSINOPHIL # BLD AUTO: 0.04 THOUSAND/ΜL (ref 0–0.61)
EOSINOPHIL NFR BLD AUTO: 1 % (ref 0–6)
ERYTHROCYTE [DISTWIDTH] IN BLOOD BY AUTOMATED COUNT: 16.7 % (ref 11.6–15.1)
GFR SERPL CREATININE-BSD FRML MDRD: 124 ML/MIN/1.73SQ M
GLUCOSE SERPL-MCNC: 118 MG/DL (ref 65–140)
GLUCOSE SERPL-MCNC: 131 MG/DL (ref 65–140)
GLUCOSE SERPL-MCNC: 160 MG/DL (ref 65–140)
GLUCOSE SERPL-MCNC: 161 MG/DL (ref 65–140)
GLUCOSE SERPL-MCNC: 167 MG/DL (ref 65–140)
GLUCOSE SERPL-MCNC: 275 MG/DL (ref 65–140)
HCT VFR BLD AUTO: 36.2 % (ref 34.8–46.1)
HGB BLD-MCNC: 11.3 G/DL (ref 11.5–15.4)
IMM GRANULOCYTES # BLD AUTO: 0.02 THOUSAND/UL (ref 0–0.2)
IMM GRANULOCYTES NFR BLD AUTO: 0 % (ref 0–2)
LYMPHOCYTES # BLD AUTO: 0.93 THOUSANDS/ΜL (ref 0.6–4.47)
LYMPHOCYTES NFR BLD AUTO: 14 % (ref 14–44)
MCH RBC QN AUTO: 28.5 PG (ref 26.8–34.3)
MCHC RBC AUTO-ENTMCNC: 31.2 G/DL (ref 31.4–37.4)
MCV RBC AUTO: 91 FL (ref 82–98)
MONOCYTES # BLD AUTO: 0.54 THOUSAND/ΜL (ref 0.17–1.22)
MONOCYTES NFR BLD AUTO: 8 % (ref 4–12)
NEUTROPHILS # BLD AUTO: 5.15 THOUSANDS/ΜL (ref 1.85–7.62)
NEUTS SEG NFR BLD AUTO: 77 % (ref 43–75)
NRBC BLD AUTO-RTO: 0 /100 WBCS
PLATELET # BLD AUTO: 301 THOUSANDS/UL (ref 149–390)
PMV BLD AUTO: 9.8 FL (ref 8.9–12.7)
POTASSIUM SERPL-SCNC: 3.8 MMOL/L (ref 3.5–5.3)
RBC # BLD AUTO: 3.97 MILLION/UL (ref 3.81–5.12)
RH BLD: POSITIVE
SODIUM SERPL-SCNC: 139 MMOL/L (ref 136–145)
SPECIMEN EXPIRATION DATE: NORMAL
WBC # BLD AUTO: 6.7 THOUSAND/UL (ref 4.31–10.16)

## 2021-02-12 PROCEDURE — 82948 REAGENT STRIP/BLOOD GLUCOSE: CPT

## 2021-02-12 PROCEDURE — DW011ZZ BEAM RADIATION OF HEAD AND NECK USING PHOTONS 1 - 10 MEV: ICD-10-PCS | Performed by: INTERNAL MEDICINE

## 2021-02-12 PROCEDURE — 77412 RADIATION TX DELIVERY LVL 3: CPT | Performed by: RADIOLOGY

## 2021-02-12 PROCEDURE — 86901 BLOOD TYPING SEROLOGIC RH(D): CPT | Performed by: SURGERY

## 2021-02-12 PROCEDURE — 88342 IMHCHEM/IMCYTCHM 1ST ANTB: CPT | Performed by: PATHOLOGY

## 2021-02-12 PROCEDURE — 80048 BASIC METABOLIC PNL TOTAL CA: CPT | Performed by: INTERNAL MEDICINE

## 2021-02-12 PROCEDURE — 88365 INSITU HYBRIDIZATION (FISH): CPT | Performed by: PATHOLOGY

## 2021-02-12 PROCEDURE — 39402 MEDIASTINOSCPY W/LMPH NOD BX: CPT | Performed by: THORACIC SURGERY (CARDIOTHORACIC VASCULAR SURGERY)

## 2021-02-12 PROCEDURE — 88305 TISSUE EXAM BY PATHOLOGIST: CPT | Performed by: PATHOLOGY

## 2021-02-12 PROCEDURE — NC001 PR NO CHARGE: Performed by: FAMILY MEDICINE

## 2021-02-12 PROCEDURE — NC001 PR NO CHARGE: Performed by: THORACIC SURGERY (CARDIOTHORACIC VASCULAR SURGERY)

## 2021-02-12 PROCEDURE — 77417 THER RADIOLOGY PORT IMAGE(S): CPT | Performed by: RADIOLOGY

## 2021-02-12 PROCEDURE — 88341 IMHCHEM/IMCYTCHM EA ADD ANTB: CPT | Performed by: PATHOLOGY

## 2021-02-12 PROCEDURE — 85025 COMPLETE CBC W/AUTO DIFF WBC: CPT | Performed by: INTERNAL MEDICINE

## 2021-02-12 PROCEDURE — 0BJ08ZZ INSPECTION OF TRACHEOBRONCHIAL TREE, VIA NATURAL OR ARTIFICIAL OPENING ENDOSCOPIC: ICD-10-PCS | Performed by: THORACIC SURGERY (CARDIOTHORACIC VASCULAR SURGERY)

## 2021-02-12 PROCEDURE — 99232 SBSQ HOSP IP/OBS MODERATE 35: CPT | Performed by: PHYSICIAN ASSISTANT

## 2021-02-12 PROCEDURE — 86900 BLOOD TYPING SEROLOGIC ABO: CPT | Performed by: SURGERY

## 2021-02-12 PROCEDURE — 99233 SBSQ HOSP IP/OBS HIGH 50: CPT | Performed by: INTERNAL MEDICINE

## 2021-02-12 PROCEDURE — 86850 RBC ANTIBODY SCREEN: CPT | Performed by: SURGERY

## 2021-02-12 PROCEDURE — 77387 GUIDANCE FOR RADJ TX DLVR: CPT | Performed by: RADIOLOGY

## 2021-02-12 PROCEDURE — 07B74ZX EXCISION OF THORAX LYMPHATIC, PERCUTANEOUS ENDOSCOPIC APPROACH, DIAGNOSTIC: ICD-10-PCS | Performed by: THORACIC SURGERY (CARDIOTHORACIC VASCULAR SURGERY)

## 2021-02-12 RX ORDER — ONDANSETRON 2 MG/ML
INJECTION INTRAMUSCULAR; INTRAVENOUS AS NEEDED
Status: DISCONTINUED | OUTPATIENT
Start: 2021-02-12 | End: 2021-02-12

## 2021-02-12 RX ORDER — PROPOFOL 10 MG/ML
INJECTION, EMULSION INTRAVENOUS AS NEEDED
Status: DISCONTINUED | OUTPATIENT
Start: 2021-02-12 | End: 2021-02-12

## 2021-02-12 RX ORDER — LIDOCAINE HYDROCHLORIDE 10 MG/ML
INJECTION, SOLUTION EPIDURAL; INFILTRATION; INTRACAUDAL; PERINEURAL AS NEEDED
Status: DISCONTINUED | OUTPATIENT
Start: 2021-02-12 | End: 2021-02-12

## 2021-02-12 RX ORDER — SODIUM CHLORIDE, SODIUM LACTATE, POTASSIUM CHLORIDE, CALCIUM CHLORIDE 600; 310; 30; 20 MG/100ML; MG/100ML; MG/100ML; MG/100ML
75 INJECTION, SOLUTION INTRAVENOUS CONTINUOUS
Status: DISPENSED | OUTPATIENT
Start: 2021-02-12 | End: 2021-02-12

## 2021-02-12 RX ORDER — SODIUM CHLORIDE, SODIUM LACTATE, POTASSIUM CHLORIDE, CALCIUM CHLORIDE 600; 310; 30; 20 MG/100ML; MG/100ML; MG/100ML; MG/100ML
75 INJECTION, SOLUTION INTRAVENOUS CONTINUOUS
Status: DISCONTINUED | OUTPATIENT
Start: 2021-02-12 | End: 2021-02-12

## 2021-02-12 RX ORDER — FENTANYL CITRATE 50 UG/ML
INJECTION, SOLUTION INTRAMUSCULAR; INTRAVENOUS AS NEEDED
Status: DISCONTINUED | OUTPATIENT
Start: 2021-02-12 | End: 2021-02-12

## 2021-02-12 RX ORDER — SUCCINYLCHOLINE/SOD CL,ISO/PF 100 MG/5ML
SYRINGE (ML) INTRAVENOUS AS NEEDED
Status: DISCONTINUED | OUTPATIENT
Start: 2021-02-12 | End: 2021-02-12

## 2021-02-12 RX ORDER — SODIUM CHLORIDE, SODIUM LACTATE, POTASSIUM CHLORIDE, CALCIUM CHLORIDE 600; 310; 30; 20 MG/100ML; MG/100ML; MG/100ML; MG/100ML
INJECTION, SOLUTION INTRAVENOUS CONTINUOUS PRN
Status: DISCONTINUED | OUTPATIENT
Start: 2021-02-12 | End: 2021-02-12

## 2021-02-12 RX ORDER — ROCURONIUM BROMIDE 10 MG/ML
INJECTION, SOLUTION INTRAVENOUS AS NEEDED
Status: DISCONTINUED | OUTPATIENT
Start: 2021-02-12 | End: 2021-02-12

## 2021-02-12 RX ORDER — EPHEDRINE SULFATE 50 MG/ML
INJECTION INTRAVENOUS AS NEEDED
Status: DISCONTINUED | OUTPATIENT
Start: 2021-02-12 | End: 2021-02-12

## 2021-02-12 RX ORDER — PROMETHAZINE HYDROCHLORIDE 25 MG/ML
12.5 INJECTION, SOLUTION INTRAMUSCULAR; INTRAVENOUS ONCE AS NEEDED
Status: DISCONTINUED | OUTPATIENT
Start: 2021-02-12 | End: 2021-02-12 | Stop reason: HOSPADM

## 2021-02-12 RX ORDER — FENTANYL CITRATE/PF 50 MCG/ML
50 SYRINGE (ML) INJECTION
Status: DISCONTINUED | OUTPATIENT
Start: 2021-02-12 | End: 2021-02-12 | Stop reason: HOSPADM

## 2021-02-12 RX ORDER — SODIUM CHLORIDE, SODIUM LACTATE, POTASSIUM CHLORIDE, CALCIUM CHLORIDE 600; 310; 30; 20 MG/100ML; MG/100ML; MG/100ML; MG/100ML
125 INJECTION, SOLUTION INTRAVENOUS CONTINUOUS
Status: DISCONTINUED | OUTPATIENT
Start: 2021-02-12 | End: 2021-02-12

## 2021-02-12 RX ORDER — PROPOFOL 10 MG/ML
INJECTION, EMULSION INTRAVENOUS CONTINUOUS PRN
Status: DISCONTINUED | OUTPATIENT
Start: 2021-02-12 | End: 2021-02-12

## 2021-02-12 RX ORDER — ONDANSETRON 2 MG/ML
4 INJECTION INTRAMUSCULAR; INTRAVENOUS ONCE AS NEEDED
Status: DISCONTINUED | OUTPATIENT
Start: 2021-02-12 | End: 2021-02-12 | Stop reason: HOSPADM

## 2021-02-12 RX ADMIN — INSULIN LISPRO 3 UNITS: 100 INJECTION, SOLUTION INTRAVENOUS; SUBCUTANEOUS at 22:28

## 2021-02-12 RX ADMIN — SODIUM CHLORIDE, SODIUM LACTATE, POTASSIUM CHLORIDE, AND CALCIUM CHLORIDE 75 ML/HR: .6; .31; .03; .02 INJECTION, SOLUTION INTRAVENOUS at 16:06

## 2021-02-12 RX ADMIN — HYDROMORPHONE HYDROCHLORIDE 0.5 MG: 1 INJECTION, SOLUTION INTRAMUSCULAR; INTRAVENOUS; SUBCUTANEOUS at 07:29

## 2021-02-12 RX ADMIN — SUGAMMADEX 300 MG: 100 INJECTION, SOLUTION INTRAVENOUS at 12:38

## 2021-02-12 RX ADMIN — GABAPENTIN 100 MG: 100 CAPSULE ORAL at 08:46

## 2021-02-12 RX ADMIN — LIDOCAINE HYDROCHLORIDE 80 MG: 10 INJECTION, SOLUTION EPIDURAL; INFILTRATION; INTRACAUDAL; PERINEURAL at 11:40

## 2021-02-12 RX ADMIN — ONDANSETRON 4 MG: 2 INJECTION INTRAMUSCULAR; INTRAVENOUS at 12:35

## 2021-02-12 RX ADMIN — AMLODIPINE BESYLATE 10 MG: 10 TABLET ORAL at 08:46

## 2021-02-12 RX ADMIN — PHENYLEPHRINE HYDROCHLORIDE 20 MCG/MIN: 10 INJECTION INTRAVENOUS at 11:46

## 2021-02-12 RX ADMIN — ACETAMINOPHEN 975 MG: 325 TABLET, FILM COATED ORAL at 16:00

## 2021-02-12 RX ADMIN — OXYCODONE HYDROCHLORIDE 10 MG: 10 TABLET ORAL at 05:09

## 2021-02-12 RX ADMIN — PROPOFOL 100 MCG/KG/MIN: 10 INJECTION, EMULSION INTRAVENOUS at 11:44

## 2021-02-12 RX ADMIN — DEXAMETHASONE 4 MG: 4 TABLET ORAL at 16:00

## 2021-02-12 RX ADMIN — FENTANYL CITRATE 50 MCG: 50 INJECTION INTRAMUSCULAR; INTRAVENOUS at 11:40

## 2021-02-12 RX ADMIN — PANTOPRAZOLE SODIUM 20 MG: 20 TABLET, DELAYED RELEASE ORAL at 05:09

## 2021-02-12 RX ADMIN — EPHEDRINE SULFATE 10 MG: 50 INJECTION, SOLUTION INTRAVENOUS at 11:56

## 2021-02-12 RX ADMIN — ENOXAPARIN SODIUM 40 MG: 40 INJECTION SUBCUTANEOUS at 10:19

## 2021-02-12 RX ADMIN — ACETAMINOPHEN 975 MG: 325 TABLET, FILM COATED ORAL at 05:09

## 2021-02-12 RX ADMIN — ROCURONIUM BROMIDE 20 MG: 50 INJECTION, SOLUTION INTRAVENOUS at 12:28

## 2021-02-12 RX ADMIN — INSULIN LISPRO 1 UNITS: 100 INJECTION, SOLUTION INTRAVENOUS; SUBCUTANEOUS at 17:53

## 2021-02-12 RX ADMIN — PROPOFOL 150 MG: 10 INJECTION, EMULSION INTRAVENOUS at 11:40

## 2021-02-12 RX ADMIN — DEXAMETHASONE 4 MG: 4 TABLET ORAL at 08:46

## 2021-02-12 RX ADMIN — SODIUM CHLORIDE, SODIUM LACTATE, POTASSIUM CHLORIDE, AND CALCIUM CHLORIDE: .6; .31; .03; .02 INJECTION, SOLUTION INTRAVENOUS at 11:28

## 2021-02-12 RX ADMIN — ATORVASTATIN CALCIUM 20 MG: 20 TABLET, FILM COATED ORAL at 08:46

## 2021-02-12 RX ADMIN — CEFAZOLIN SODIUM 2000 MG: 2 SOLUTION INTRAVENOUS at 11:35

## 2021-02-12 RX ADMIN — ACETAMINOPHEN 975 MG: 325 TABLET, FILM COATED ORAL at 22:24

## 2021-02-12 RX ADMIN — LORATADINE 10 MG: 10 TABLET ORAL at 22:24

## 2021-02-12 RX ADMIN — GABAPENTIN 100 MG: 100 CAPSULE ORAL at 17:53

## 2021-02-12 RX ADMIN — ROCURONIUM BROMIDE 50 MG: 50 INJECTION, SOLUTION INTRAVENOUS at 11:51

## 2021-02-12 RX ADMIN — FENTANYL CITRATE 50 MCG: 50 INJECTION INTRAMUSCULAR; INTRAVENOUS at 11:51

## 2021-02-12 RX ADMIN — SODIUM CHLORIDE, SODIUM LACTATE, POTASSIUM CHLORIDE, AND CALCIUM CHLORIDE: .6; .31; .03; .02 INJECTION, SOLUTION INTRAVENOUS at 12:27

## 2021-02-12 RX ADMIN — Medication 80 MG: at 11:41

## 2021-02-12 NOTE — ANESTHESIA POSTPROCEDURE EVALUATION
Post-Op Assessment Note    CV Status:  Stable    Pain management: adequate     Mental Status:  Sleepy and arousable   Hydration Status:  Euvolemic   PONV Controlled:  Controlled   Airway Patency:  Patent      Post Op Vitals Reviewed: Yes      Staff: CRNA, Anesthesiologist         No complications documented      BP   133/65   Temp      Pulse  72   Resp   16   SpO2   100

## 2021-02-12 NOTE — PHYSICAL THERAPY NOTE
Physical Therapy Cancellation Note    Patient's Name: Benton Mckoy        02/12/21 0759   PT Last Visit   PT Visit Date 02/12/21   Note Type   Note type Evaluation   Cancel Reasons Patient to operating room       Orders received, chart reviewed  Pt going to OR today for "mediastinoscopy w flex bronch "  Will follow for PT evaluation/treatment as medically appropriate  Thank you       Ximena Escoto, PT, DPT

## 2021-02-12 NOTE — CONSULTS
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  Hx and PE limited by:  No limitations    HPI: Clementine Espinal is a 62y o  year old female who presents with stage IV metastatic squamous cell carcinoma unknown primary with leptomeningeal metastasis  She was initially seen for consultation by Dr eSna Ramirez at [facility] on February 5, 2021  Recommendations were made for palliative radiation therapy to the whole brain as well as treatment to the spine from L1 through the sacrum to improve her symptoms and slow the progression of her disease  She received 1 fraction of 300 cGy to both areas  She was evaluated by thoracic surgery and recommendations were made for mediastinoscopy to obtain additional tissue for histologic analysis and molecular markers  She was admitted to [address] [place] [address] with severe pain and worsening left thigh pain  She was transferred to [facility] and had mediastinoscopy with biopsies performed today with Dr Schaeffer Graft  Grossly she was found to have large white fixed lymph nodes in multiple lymph node stations  She is seen today as an inpatient to continue her palliative radiation therapy  Consults    Review of Systems   14 point review of system is negative except for as noted history of the present illness  Historical Information   Previous Oncology History:  As per history of present illness    Past Medical History:   Diagnosis Date    Acute on chronic congestive heart failure with left ventricular diastolic dysfunction (HCC)     last assessed 5/23/2017    Asthma     Atelectasis     [facility] [address] [facility]     [facility]     Diverticulitis 2017 with perforation and abscess     Hyperlipidemia     Hypertension     Lesion of spleen     Pericardial effusion      Past Surgical History:   Procedure Laterality Date    ABDOMINAL SURGERY      BREAST BIOPSY Left 10/04/2018     SECTION      COLON SURGERY      COLONOSCOPY      COLOSTOMY  2017    HARTMANS PROCEDURE N/A 2017    Procedure: EXPLORATORY LAPAROTOMY; PARTIAL SMALL BOWEL RESECTION; HARTMANS PROCEDURE; OSTOMY;  Surgeon: Christa Mcgill MD;  [facility];  Service:     HYSTERECTOMY  2018    IR BIOPSY LYMPH NODE  2021    IR LUMBAR PUNCTURE  2021    MAMMO STEREOTACTIC BREAST BIOPSY LEFT (ALL INC) Left 10/4/2018    OOPHORECTOMY Bilateral 2018    CA CLOSE ENTEROSTOMY N/A 2017    Procedure: OPEN COLOSTOMY REVERSAL;  Surgeon: Christa Mcgill MD;  [facility];  Service: General    CA COLONOSCOPY FLX DX W/COLLJ SPEC WHEN PFRMD N/A 2017    Procedure: COLONOSCOPY; DILATION OF OSTOMY;  Surgeon: Christa Mcgill MD;  [facility];   Service: General    CA EXC SKIN BENIG <0 5 CM REMAINDER BODY N/A 2017    Procedure: SCALP MASS EXCISION X 2;  Surgeon: Christa Mcgill MD;  [facility];  Service: [facility] N/A 2019    Procedure: INCISIONAL HERNIA REPAIR, COMPONENT SEPARATION;  Surgeon: Christa Mcgill MD;  [facility];  Service: General    CA TOTAL ABDOM HYSTERECTOMY N/A 2019    Procedure: TOTAL ABDOMINAL HYSTERECTOMY; BSO;  Surgeon: Javy Colbert MD;  [facility];  Service: Gynecology    VAC DRESSING APPLICATION N/A 4735    Procedure: Pushpa Green;  Surgeon: Christa Mcgill MD;  [facility];  Service:      Family History   Problem Relation Age of Onset    Hypertension Mother     Diabetes Mother     No Known Problems Sister     No Known Problems Daughter     Heart disease Son     No Known Problems Maternal Aunt     Lung cancer Maternal Aunt     No Known Problems Maternal Aunt     No Known Problems Maternal Aunt     No Known Problems Paternal Aunt     Breast cancer Neg Hx      Social History   Social History     Substance and Sexual Activity   Alcohol Use Not Currently    Frequency: Monthly or less    Drinks per session: 1 or 2    Binge frequency: Never    Comment: socially      Social History     Substance and Sexual Activity   Drug Use No     Social History     Tobacco Use   Smoking Status Former Smoker    Packs/day: 0 25    Years: 20 00    Pack years: 5 00    Types: Cigarettes    Start date: 1991   Julieann Frankel Quit date: 2021    Years since quittin 0   Smokeless Tobacco Never Used       Meds/Allergies   all current active meds have been reviewed    Allergies   Allergen Reactions    Ciprofloxacin Itching     Starts at hands to feet then the whole entire body       Objective     Intake/Output Summary (Last 24 hours) at 2021 1736  Last data filed at 2021 1335  Gross per 24 hour   Intake 1540 ml   Output --   Net 1540 ml     Invasive Devices     Peripheral Intravenous Line            Peripheral IV 21 Dorsal (posterior); Left Hand less than 1 day              Physical Exam  Vitals signs and nursing note reviewed  Constitutional:       General: She is not in acute distress  Appearance: She is well-developed  She is not diaphoretic  HENT:      Head: Normocephalic and atraumatic  Mouth/Throat:      Pharynx: No oropharyngeal exudate  Eyes:      General: No scleral icterus  Conjunctiva/sclera: Conjunctivae normal       Pupils: Pupils are equal, round, and reactive to light  Neck:      Musculoskeletal: Normal range of motion and neck supple  Thyroid: No thyromegaly  Trachea: No tracheal deviation  Cardiovascular:      Rate and Rhythm: Normal rate and regular rhythm  Heart sounds: Normal heart sounds  Pulmonary:      Effort: Pulmonary effort is normal  No respiratory distress  Breath sounds: Normal breath sounds  No wheezing or rales  Chest:      Chest wall: No tenderness  Abdominal:      General: Bowel sounds are normal  There is no distension  Palpations: Abdomen is soft  There is no mass  Tenderness: There is no abdominal tenderness  Musculoskeletal: Normal range of motion  General: No swelling or tenderness  Lymphadenopathy:      Cervical: No cervical adenopathy  Upper Body:      Right upper body: No supraclavicular adenopathy  Left upper body: No supraclavicular adenopathy  Skin:     General: Skin is warm and dry  Coloration: Skin is not pale  Findings: No erythema or rash  Neurological:      General: No focal deficit present  Mental Status: She is alert and oriented to person, place, and time  Cranial Nerves: No cranial nerve deficit  Coordination: Coordination normal    Psychiatric:         Mood and Affect: Mood normal          Behavior: Behavior normal          Thought Content: Thought content normal          Judgment: Judgment normal         Lab Results: I have personally reviewed pertinent reports  Imaging Studies: I have personally reviewed pertinent reports  EKG, Pathology, and Other Studies: I have personally reviewed pertinent reports  Assessment/Plan     Assessment and Plan:  Cristian Dominique is a 62y o  year old female who presents with stage IV metastatic squamous cell carcinoma unknown primary with leptomeningeal metastasis  She was initially seen for consultation by Dr Vidya Parekh at [facility] on February 5, 2021  Recommendations were made for palliative radiation therapy to the whole brain as well as treatment to the spine from L1 through the sacrum to improve her symptoms and slow the progression of her disease  She received 1 fraction of 300 cGy to both areas   Recommendations were made for mediastinoscopy to obtain additional tissue for histologic analysis and molecular markers    She was admitted to [address] [place] [address] with severe pain and worsening left thigh pain  She was transferred to [facility] and had mediastinoscopy with biopsies which is previously scheduled as an outpatient performed today with Dr Maria E Contreras  Grossly she was found to have large white fixed lymph nodes in multiple lymph node stations  She is seen today as an inpatient to continue her palliative radiation therapy  The patient is feeling well after mediastinoscopy today resting comfortably and denies any pain within the lumbar spine, hips or the left thigh region  Her mask for treatment was obtained from [facility]  She had treatment plan re-calculated in order to receive treatment here in [place] to both areas  She received 300 cGy to the whole brain and lumbar spine/sacrum today  She will continue treatment next week on February 15, 2021    Code Status: Level 1 - Full Code  Advance Directive and Living Will:      Power of :    POLST:      Counseling / Coordination of Care  Total floor / unit time spent today 45 minutes  Greater than 50% of total time was spent with the patient and / or family counseling and / or coordination of care   A description of the counseling / coordination of care: See Above

## 2021-02-12 NOTE — CONSULTS
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 with severe and worsening left thigh pain  She was transferred to undergo  mediastinoscopy on to 2/12/21  We will perform her surgery while she is an inpatient  Biopsy revealed Squamous cell carcinoma of unknown origin  MRI on 1/20/21 revealed Enhancement along the surface of the cervical and thoracic spinal cord compatible with CSF spread of tumor   Enhancement is seen at C3-4, and T2-3  On 2/5/31, patient was found to have secondary malignancy of brain and spinal cord with unknown primary site     Plan:  - Med  Oncology management as per Dr Griselda Nettles, F/u visit appt on 2/23/21   - mediastinoscopy planned today on2/12/21   - pain management - as per palliative care    I spent 45 minutes during this encounter with more than 50 percent of the time counseling the patient face to face about the above plan of care  Patient voiced understanding and agreed to proceed as above  History of Presenting Illness:  Scout Tilley is a 62 y o  female who was previously seen in the office by Dr Maria E Contreras for metastatic squamous cell cancer of unknown origin  She is known to have 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  She has mediastinal lymphadenopathy for which she was referred for a biopsy for adequate tissue sample  She was scheduled for mediastinoscopy on 2/12 however presented to [facility] with severe and worsening left thigh pain  She was then transferred to [facility]    Her story began when she was hospitalized at [facility] on January 17th with persistent dizziness and a headache  During her hospitalization, her MRI found 2 parenchymal lesions at 2 1 cm and 0 5 cm in the left parietal region  There was diffuse enhancement of the internal auditory canals bilaterally which was concerning for leptomeningeal tumor  Her MRI of the spine also showed there was enhancement of nerve roots that is being read as compatible with CSF spread of the tumor  Additionally, a CT scan of her chest abdomen and pelvis showed mediastinal and right hilar lymphadenopathy and a chronic splenic mass  As per the radiologist, there is no evidence of primary malignancy in the chest abdomen or pelvis  She got an IR biopsy of her malignant appearing lymph node  The pathology resulted as squamous cell carcinoma, poorly differentiated  Review of Systems - As stated in the HPI otherwise the fourteen point review of systems was negative      Past Medical History:   Diagnosis Date    Acute on chronic congestive heart failure with left ventricular diastolic dysfunction (HCC)     last assessed 5/23/2017    Asthma     Atelectasis     [facility]     Diabetes mellitus [facility]     Diverticulitis 2017    with perforation and abscess     Hyperlipidemia     Hypertension     Lesion of spleen     Pericardial effusion        Social History     Socioeconomic History    Marital status:      Spouse name: Not on file    Number of children: Not on file    Years of education: Not on file    Highest education level: Not on file   Occupational History    Not on file   Social Needs    Financial resource strain: Not on file    Food insecurity     Worry: Not on file     Inability: Not on file    Transportation needs     Medical: Not on file     Non-medical: Not on file   Tobacco Use    Smoking status: Former Smoker     Packs/day: 0 25     Years: 20 00     Pack years: 5 00     Types: Cigarettes     Start date: 8/2/1991     Quit date: 2021     Years since quittin 0    Smokeless tobacco: Never Used   Substance and Sexual Activity    Alcohol use: Not Currently     Frequency: Monthly or less     Drinks per session: 1 or 2     Binge frequency: Never     Comment: socially     Drug use: No    Sexual activity: Yes     Partners: Male     Birth control/protection: Surgical   Lifestyle    Physical activity     Days per week: 0 days     Minutes per session: 0 min    Stress: Not at all   Relationships    Social connections     Talks on phone: Not on file     Gets together: Not on file     Attends Orthodoxy service: Not on file     Active member of club or organization: Not on file     Attends meetings of clubs or organizations: Not on file     Relationship status: Not on file    Intimate partner violence     Fear of current or ex partner: Not on file     Emotionally abused: Not on file     Physically abused: Not on file     Forced sexual activity: Not on file   Other Topics Concern    Not on file   Social History Narrative    Not on file       Family History   Problem Relation Age of Onset    Hypertension Mother     Diabetes Mother     No Known Problems Sister     No Known Problems Daughter     Heart disease Son     No Known Problems Maternal Aunt     Lung cancer Maternal Aunt     No Known Problems Maternal Aunt     No Known Problems Maternal Aunt     No Known Problems Paternal Aunt     Breast cancer Neg Hx        Allergies   Allergen Reactions    Ciprofloxacin Itching     Starts at hands to feet then the whole entire body         Current Facility-Administered Medications:     acetaminophen (TYLENOL) tablet 650 mg, 650 mg, Oral, Q6H PRN, Eleanor Mix MD    acetaminophen (TYLENOL) tablet 975 mg, 975 mg, Oral, Q8H [address], Eleanor Mix MD, 975 mg at 21 0509    aluminum-magnesium hydroxide-simethicone (MYLANTA) oral suspension 30 mL, 30 mL, Oral, Q6H PRN, Eleanor Mix MD    amLODIPine (NORVASC) tablet 10 mg, 10 mg, Oral, Daily, Nabil Oscar MD, 10 mg at 02/12/21 0846    atorvastatin (LIPITOR) tablet 20 mg, 20 mg, Oral, Daily, Nabil Oscar MD, 20 mg at 02/12/21 0846    ceFAZolin (ANCEF) IVPB (premix in dextrose) 2,000 mg 50 mL, 2,000 mg, Intravenous, On Call To OR, Jasmyn Beckett MD    dexamethasone (DECADRON) tablet 4 mg, 4 mg, Oral, BID With Meals, Nabil Oscar MD, 4 mg at 02/12/21 0846    diazepam (VALIUM) tablet 2 mg, 2 mg, Oral, Q6H PRN, Nabil Oscar MD    enoxaparin (LOVENOX) subcutaneous injection 40 mg, 40 mg, Subcutaneous, Daily, Nabil Oscar MD    gabapentin (NEURONTIN) capsule 100 mg, 100 mg, Oral, BID, Nabil Oscar MD, 100 mg at 02/12/21 0846    HYDROmorphone (DILAUDID) injection 0 5 mg, 0 5 mg, Intravenous, Q2H PRN, Francoise Roman MD, 0 5 mg at 02/12/21 0729    insulin lispro (HumaLOG) 100 units/mL subcutaneous injection 1-5 Units, 1-5 Units, Subcutaneous, HS, Nabil Oscar MD, 2 Units at 02/11/21 2116    insulin lispro (HumaLOG) 100 units/mL subcutaneous injection 1-5 Units, 1-5 Units, Subcutaneous, TID AC, 1 Units at 02/11/21 1759 **AND** Fingerstick Glucose (POCT), , , 4x Daily AC and at bedtime, Nabil Oscar MD    lidocaine (LIDODERM) 5 % patch 2 patch, 2 patch, Topical, Daily, Nabil Oscar MD    loratadine (CLARITIN) tablet 10 mg, 10 mg, Oral, HS, Nabil Oscar MD    LORazepam (ATIVAN) injection 2 mg, 2 mg, Intravenous, Q10 Min PRN, Francoise Roman MD    ondansetron [facility] injection 4 mg, 4 mg, Intravenous, Q6H PRN, Nabil Oscar MD    oxyCODONE (ROXICODONE) immediate release tablet 10 mg, 10 mg, Oral, Q3H PRN, Francoise Roman MD, 10 mg at 02/12/21 0509    oxyCODONE (ROXICODONE) IR tablet 5 mg, 5 mg, Oral, Q3H PRN, Francoise Roman MD    pantoprazole (PROTONIX) EC tablet 20 mg, 20 mg, Oral, Early Morning, Nabil Oscar MD, 20 mg at 02/12/21 0509      /72   Pulse 75   Temp 98 °F (36 7 °C)   Resp 18   LMP 08/16/2014 (Approximate)   SpO2 96%       General Appearance: Alert, oriented        Eyes:    PERRL   Ears:    Normal external ear canals, both ears   Nose:   Nares normal, septum midline   Throat:   Mucosa moist  Pharynx without injection  Neck:   Supple       Lungs:     Clear to auscultation bilaterally   Chest Wall:    No tenderness or deformity    Heart:    Regular rate and rhythm       Abdomen:     Soft, non-tender, bowel sounds +, no organomegaly           Extremities:   Extremities no cyanosis or edema       Skin:   no rash or icterus  Lymph nodes:   Cervical, supraclavicular, and axillary nodes normal   Neurologic:  Cranial Nerves: Cranial nerve deficit present  Motor: Weakness present        Gait: Gait abnormal                 Recent Results (from the past 48 hour(s))   CBC and differential    Collection Time: 02/11/21  7:48 AM   Result Value Ref Range    WBC 7 96 4 31 - 10 16 Thousand/uL    RBC 4 09 3 81 - 5 12 Million/uL    Hemoglobin 11 5 11 5 - 15 4 g/dL    Hematocrit 36 6 34 8 - 46 1 %    MCV 90 82 - 98 fL    MCH 28 1 26 8 - 34 3 pg    MCHC 31 4 31 4 - 37 4 g/dL    RDW 16 9 (H) 11 6 - 15 1 %    MPV 8 7 (L) 8 9 - 12 7 fL    Platelets 692 132 - 487 Thousands/uL    nRBC 0 /100 WBCs    Neutrophils Relative 71 43 - 75 %    Immat GRANS % 0 0 - 2 %    Lymphocytes Relative 20 14 - 44 %    Monocytes Relative 8 4 - 12 %    Eosinophils Relative 1 0 - 6 %    Basophils Relative 0 0 - 1 %    Neutrophils Absolute 5 61 1 85 - 7 62 Thousands/µL    Immature Grans Absolute 0 03 0 00 - 0 20 Thousand/uL    Lymphocytes Absolute 1 56 0 60 - 4 47 Thousands/µL    Monocytes Absolute 0 63 0 17 - 1 22 Thousand/µL    Eosinophils Absolute 0 10 0 00 - 0 61 Thousand/µL    Basophils Absolute 0 03 0 00 - 0 10 Thousands/µL   Comprehensive metabolic panel    Collection Time: 02/11/21  7:48 AM   Result Value Ref Range    Sodium 139 136 - 145 mmol/L    Potassium 3 6 3 5 - 5 3 mmol/L    Chloride 101 100 - 108 mmol/L    CO2 29 21 - 32 mmol/L    ANION GAP 9 4 - 13 mmol/L    BUN 20 5 - 25 mg/dL    Creatinine 0 57 (L) 0 60 - 1 30 mg/dL    Glucose 131 65 - 140 mg/dL    Calcium 9 3 8 3 - 10 1 mg/dL    AST 11 5 - 45 U/L    ALT 17 12 - 78 U/L    Alkaline Phosphatase 48 46 - 116 U/L    Total Protein 7 2 6 4 - 8 2 g/dL    Albumin 3 5 3 5 - 5 0 g/dL    Total Bilirubin 0 90 0 20 - 1 00 mg/dL    eGFR 117 ml/min/1 73sq m   Fingerstick Glucose (POCT)    Collection Time: 02/11/21  8:56 AM   Result Value Ref Range    POC Glucose 132 65 - 140 mg/dl   Fingerstick Glucose (POCT)    Collection Time: 02/11/21 10:50 AM   Result Value Ref Range    POC Glucose 220 (H) 65 - 140 mg/dl   Fingerstick Glucose (POCT)    Collection Time: 02/11/21  4:37 PM   Result Value Ref Range    POC Glucose 189 (H) 65 - 140 mg/dl   Fingerstick Glucose (POCT)    Collection Time: 02/11/21  8:19 PM   Result Value Ref Range    POC Glucose 236 (H) 65 - 140 mg/dl   Basic metabolic panel    Collection Time: 02/12/21  5:55 AM   Result Value Ref Range    Sodium 139 136 - 145 mmol/L    Potassium 3 8 3 5 - 5 3 mmol/L    Chloride 105 100 - 108 mmol/L    CO2 29 21 - 32 mmol/L    ANION GAP 5 4 - 13 mmol/L    BUN 26 (H) 5 - 25 mg/dL    Creatinine 0 48 (L) 0 60 - 1 30 mg/dL    Glucose 131 65 - 140 mg/dL    Calcium 10 0 8 3 - 10 1 mg/dL    eGFR 124 ml/min/1 73sq m   CBC and differential    Collection Time: 02/12/21  5:55 AM   Result Value Ref Range    WBC 6 70 4 31 - 10 16 Thousand/uL    RBC 3 97 3 81 - 5 12 Million/uL    Hemoglobin 11 3 (L) 11 5 - 15 4 g/dL    Hematocrit 36 2 34 8 - 46 1 %    MCV 91 82 - 98 fL    MCH 28 5 26 8 - 34 3 pg    MCHC 31 2 (L) 31 4 - 37 4 g/dL    RDW 16 7 (H) 11 6 - 15 1 %    MPV 9 8 8 9 - 12 7 fL    Platelets 314 389 - 921 Thousands/uL    nRBC 0 /100 WBCs    Neutrophils Relative 77 (H) 43 - 75 %    Immat GRANS % 0 0 - 2 %    Lymphocytes Relative 14 14 - 44 %    Monocytes Relative 8 4 - 12 %    Eosinophils Relative 1 0 - 6 %    Basophils Relative 0 0 - 1 %    Neutrophils Absolute 5 15 1 85 - 7 62 Thousands/µL    Immature Grans Absolute 0 02 0 00 - 0 20 Thousand/uL    Lymphocytes Absolute 0 93 0 60 - 4 47 Thousands/µL    Monocytes Absolute 0 54 0 17 - 1 22 Thousand/µL    Eosinophils Absolute 0 04 0 00 - 0 61 Thousand/µL    Basophils Absolute 0 02 0 00 - 0 10 Thousands/µL   Type and screen    Collection Time: 02/12/21  5:55 AM   Result Value Ref Range    ABO Grouping B     Rh Factor Positive     Antibody Screen Negative     Specimen Expiration Date [id number]    Fingerstick Glucose (POCT)    Collection Time: 02/12/21  7:35 AM   Result Value Ref Range    POC Glucose 118 65 - 140 mg/dl         Cta Head And Neck With And Without Contrast    Result Date: 1/16/2021  Narrative: CTA NECK AND BRAIN WITH AND WITHOUT CONTRAST INDICATION: Dizziness, non-specific;  dizziness dizziness for 3 weeks  Diagnosed with vertigo  Low back pain radiating to left leg  Denies back injury  COMPARISON:   None  TECHNIQUE:  Routine CT imaging of the Brain without contrast   Post contrast imaging was performed after administration of iodinated contrast through the neck and brain  Post contrast axial 0 625 mm images timed to opacify the arterial system  3D rendering was performed on an independent workstation  MIP reconstructions performed  Coronal reconstructions were performed of the noncontrast portion of the brain  Radiation dose length product (DLP) for this visit:  1102 02 mGy/cm  This examination, like all CT scans performed in the [facility], was performed utilizing techniques to minimize radiation dose exposure, including the use of iterative  reconstruction and automated exposure control  IV Contrast:  100 mL of iohexol (OMNIPAQUE)  IMAGE QUALITY:   Diagnostic FINDINGS: NONCONTRAST BRAIN PARENCHYMA:  No acute intraparenchymal hemorrhage or extra-axial fluid collections  No acute infarctions   Decreased attenuation in the periventricular regions and centrum semiovale bilaterally, consistent with chronic small vessel ischemic white matter disease  There is a 2 6 x 1 6 cm vague isodense soft tissue mass in the left occipital lobe (series 2, image 20)  There is a large amount of surrounding vasogenic edema  There is minimal peripheral enhancement (series 4, image 57)  There is a 3 2 x 1 2 cm vague isodense soft tissue mass in the posterior medial left parietal lobe (series 2, image 24)  Large amount of surrounding vasogenic edema  There is minimal peripheral enhancement (series 4, image 43)  Bilateral internal carotid artery and vertebral artery calcifications  VENTRICLES AND EXTRA-AXIAL SPACES:  Ventricles and cerebral sulci are mildly dilated  VISUALIZED ORBITS AND PARANASAL SINUSES:  Unremarkable  CERVICAL VASCULATURE AORTIC ARCH AND GREAT VESSELS:  Mild atherosclerotic disease of the arch, proximal great vessels and visualized subclavian vessels  No significant stenosis  Bovine configuration of the arch  RIGHT VERTEBRAL ARTERY CERVICAL SEGMENT:  Normal origin  The vessel is normal in caliber throughout the neck  LEFT VERTEBRAL ARTERY CERVICAL SEGMENT:  Normal origin  The vessel is normal in caliber throughout the neck  RIGHT EXTRACRANIAL CAROTID SEGMENT:  There is moderate calcified and noncalcified atherosclerotic plaque formation throughout the distal common carotid artery, extending into the carotid bulb and proximal cervical internal carotid artery  Areas of ulceration are present along the plaque  This results in a moderate short segment stenosis of approximately 75%  Distally, the cervical internal carotid artery is normal  LEFT EXTRACRANIAL CAROTID SEGMENT:  There is mild calcified and noncalcified atherosclerotic plaque formation in the distal common carotid artery, extending into the carotid bulb and proximal cervical internal carotid artery  No flow-limiting stenosis  NASCET criteria was used to determine the degree of internal carotid artery diameter stenosis   INTRACRANIAL VASCULATURE INTERNAL CAROTID ARTERIES:  Normal enhancement of the intracranial portions of the internal carotid arteries  Moderate atherosclerotic changes in the distal cavernous and supraclinoid internal carotid arteries bilaterally, resulting in mild stenosis of the supraclinoid internal carotid arteries bilaterally  Normal ophthalmic artery origins  Normal ICA terminus  ANTERIOR CIRCULATION:  Symmetric A1 segments and anterior cerebral arteries with normal enhancement  Normal anterior communicating artery  MIDDLE CEREBRAL ARTERY CIRCULATION:  M1 segment and middle cerebral artery branches demonstrate normal enhancement bilaterally  DISTAL VERTEBRAL ARTERIES:  The intracranial segment of the right vertebral artery is hypoplastic but patent beyond the right posterior, inferior cerebellar artery  The intracranial segment of the left vertebral artery demonstrates calcified atherosclerotic plaque formation  No flow-limiting stenosis  Heron Robert Posterior inferior cerebellar artery origins are normal  Normal vertebral basilar junction  BASILAR ARTERY:  Basilar artery is normal in caliber  Normal superior cerebellar arteries  POSTERIOR CEREBRAL ARTERIES: Both posterior cerebral arteries arises from the basilar tip  Both arteries demonstrate normal enhancement  Normal posterior communicating arteries  DURAL VENOUS SINUSES:  Normal  NON VASCULAR ANATOMY BONY STRUCTURES: Mild degenerative changes of the cervical spine  No acute osseous abnormality  SOFT TISSUES OF THE NECK:  The palatine tonsils are enlarged bilaterally, right side greater than left  There is enlargement of the lingual tonsil  There are calcifications within the palatine tonsils bilaterally, consistent with concretions from prior  infection  There is beam hardening artifact limiting evaluation of the oropharynx  There are prominent lymph nodes along the anterior and posterior cervical carline chains bilaterally, left side greater than right   THORACIC INLET: There are enlarged subcarinal, paratracheal and right hilar lymph nodes  There are enlarged left sided paraesophageal lymph nodes, extending to the thoracic inlet  Enlarged bilateral supraclavicular lymph nodes extending along the carotid arteries bilaterally, left side larger than right  Several left-sided lymph nodes are necrotic  Impression: 1  Isodense soft tissue masses in the left occipital lobe and left parietal lobe with surrounding vasogenic edema  Although the findings may represent primary CNS neoplasm, the presence of mediastinal, hilar, paratracheal and cervical adenopathy, suggests metastatic disease  Recommend follow-up MRI of the brain with gadolinium and/or neurosurgical evaluation  Recommend follow-up CT chest abdomen pelvis for metastatic workup  2   Moderate atherosclerotic changes of the proximal internal carotid arteries bilaterally, right side greater than left resulting in moderate stenosis of the proximal cervical right internal carotid artery of approximately 75%  3   Moderate atherosclerotic disease of the supraclinoid segments of the internal carotid arteries bilaterally, resulting in mild stenosis bilaterally  No evidence of aneurysm, vascular malformation or vascular cut off  No evidence of large vessel central occlusive disease involving the [place]  4   Mild cerebral atrophy with chronic small vessel ischemic white matter disease  5   Mediastinal, hilar, paratracheal, paraesophageal, supraclavicular and cervical adenopathy, pathologic  6   Enlarged palatine tonsils and lingual tonsil  The oropharynx is not well visualized due to beam hardening artifact from dental hardware  Consider follow-up MRI of the soft tissues of the neck versus ENT consultation with direct laryngoscopy    I personally discussed this study with Tom Martínez on 1/16/2021 at 6:37 AM  Workstation performed: [id number]     Xr Femur 2 Views Left    Result Date: 2/11/2021  Narrative: LEFT FEMUR INDICATION:   pain (bony mets)  COMPARISON:  1/17/2021 VIEWS:  XR FEMUR 2 VW LEFT FINDINGS: There is no acute fracture or dislocation  No significant degenerative changes  No lytic or blastic osseous lesion  There are atherosclerotic calcifications  Soft tissues are otherwise unremarkable  Impression: No acute osseous abnormality  Workstation performed: [id number]     Xr Femur 2 Views Left    Result Date: 1/18/2021  Narrative: LEFT FEMUR INDICATION:   left leg pain  No history of injury  COMPARISON:  None VIEWS:  XR FEMUR 2 VW LEFT FINDINGS: There is no acute fracture or dislocation  No significant degenerative changes  No lytic or blastic osseous lesion  There are atherosclerotic calcifications  Soft tissues are otherwise unremarkable  Impression: No acute osseous abnormality  Workstation performed: [id number]     Mri Brain W Wo Contrast    Result Date: 1/18/2021  Narrative: MRI BRAIN WITH AND WITHOUT CONTRAST INDICATION: Intracranial mass on the CT  COMPARISON:  CT/CTA 1/16/2021 TECHNIQUE: Sagittal T1, axial T2, axial FLAIR, axial T1, axial [place], axial diffusion  Sagittal, axial T1 postcontrast   Axial bravo postcontrast with coronal reconstructions  IV Contrast:  7 mL of Gadobutrol injection (SINGLE-DOSE)  IMAGE QUALITY:   Intermittent motion related artifact FINDINGS: BRAIN PARENCHYMA:  Peripherally enhancing 21 x 21 x 15 mm mass in the parasagittal left parietal parenchyma  The vasogenic edema, local mass effect, minor punctate blood products  There is a 2nd right frontal vertex mass approximately 5 mm, more homogeneously enhancing without mass effect  Findings consistent with metastatic disease  Finally, there is diffuse enhancement of the internal auditory canals bilaterally 11:29-32  Although these could represent bilaterally there are tumor, there is Concern for CSF spread of tumor  Lumbar puncture suggested  No leptomeningeal or pachymeningeal enhancement the elsewhere  Mild degree of small vessel disease  Minor nonspecific punctate hemosiderin along the posterior body of the left lateral ventricle 6:56  VENTRICLES:  Normal for the patient's age  SELLA AND PITUITARY GLAND:  Normal  ORBITS:  Normal  PARANASAL SINUSES:  Trace maxillary mucosal thickening VASCULATURE:  Evaluation of the major intracranial vasculature demonstrates appropriate flow voids  CALVARIUM AND SKULL BASE:  Normal  EXTRACRANIAL SOFT TISSUES:  A 12 mm soft tissue nodule at the posterior vertex right parietal scalp     Impression: Metastatic disease, with 2 Parenchymal lesions, 2 1 and 5 mm in the greatest linear dimension  There is diffuse enhancement of the internal auditory canals bilaterally, which warrants lumbar puncture to exclude leptomeningeal tumor  The study was marked in EPIC for significant notification  Workstation performed: [id number]     Mri Cervical Spine W Wo Contrast    Result Date: 1/20/2021  Narrative: MRI CERVICAL SPINE WITH AND WITHOUT CONTRAST INDICATION: spinal mass   COMPARISON:  None  TECHNIQUE:  Sagittal T1, sagittal T2, and sagittal inversion recovery  Sagittal postcontrast T1  IV Contrast:  7 mL of Gadobutrol injection (SINGLE-DOSE) IMAGE QUALITY:  Diagnostic  FINDINGS: ALIGNMENT:  Straightening of the cervical lordosis  MARROW SIGNAL:  Normal marrow signal is identified within the visualized bony structures  No discrete marrow lesion  CERVICAL AND VISUALIZED UPPER THORACIC CORD:  Normal signal within the visualized cord  PREVERTEBRAL AND PARASPINAL SOFT TISSUES:  Normal  VISUALIZED POSTERIOR FOSSA:  The visualized posterior fossa demonstrates no abnormal signal  CERVICAL DISC SPACES:  Disc heights are maintained  No central or foraminal narrowing  POSTCONTRAST IMAGING:  Abnormal postcontrast enhancement identified along the surface of the spinal cord at C3-4 anteriorly (series 24 image 10) T2-3 along the surface of the dorsal cord series 24 image 10 compatible with CSF spread of tumor    Enhancement along the inferior aspect of cerebellum also noted  Impression: Enhancement along the surface of the cervical and thoracic spinal cord compatible with CSF spread of tumor  Enhancement is seen at C3-4, and T2-3  Workstation performed: [id number]     Mri Thoracic Spine W Wo Contrast    Result Date: 1/20/2021  Narrative: MRI THORACIC SPINE WITH AND WITHOUT CONTRAST INDICATION: rule out spinal mass  COMPARISON:  None  TECHNIQUE:  Sagittal T1, sagittal T2, sagittal inversion recovery, axial T2,  axial 2D MERGE  Sagittal and axial T1 postcontrast  IV Contrast:  7 mL of Gadobutrol injection (SINGLE-DOSE) IMAGE QUALITY:  Diagnostic  FINDINGS: ALIGNMENT:  Dextroscoliosis apex T8  MARROW SIGNAL:  Normal marrow signal is identified within the visualized bony structures  No discrete marrow lesion  THORACIC CORD:  Normal signal within the thoracic cord  PREVERTEBRAL AND PARASPINAL SOFT TISSUES:   Normal  THORACIC DEGENERATIVE CHANGE:  No disc herniation, canal stenosis or foraminal narrowing  No degenerative changes  POSTCONTRAST:  Nodular enhancement identified lower thoracic and upper lumbar spine seen best on sagittal series 25 images 8-11 compatible with CSF spread of tumor  Impression: Enhancement along the nerve roots of the filum terminale and distal spinal cord seen best on series 25 images 8 through 11 compatible with CSF spread of tumor  Dextroscoliosis apex T8  No significant central or foraminal narrowing  Workstation performed: [id number]     Mri Lumbar Spine W Wo Contrast    Result Date: 1/20/2021  Narrative: MRI LUMBAR SPINE WITH AND WITHOUT CONTRAST INDICATION: rule out spinal mass  COMPARISON:  None  TECHNIQUE:  Sagittal T1, sagittal T2, sagittal inversion recovery, axial T1 and axial T2, coronal T2  Sagittal and axial T1 postcontrast  IV Contrast:  7 mL of Gadobutrol injection (SINGLE-DOSE) IMAGE QUALITY:  Diagnostic FINDINGS: VERTEBRAL BODIES:  There are 5 lumbar type vertebral bodies  Levoscoliosis apex L2  Slight anterolisthesis L4 on L5  Normal marrow signal is identified within the visualized bony structures  No discrete marrow lesion  SACRUM:  Normal signal within the sacrum  No evidence of insufficiency or stress fracture  DISTAL CORD AND CONUS:  Please see below  PARASPINAL SOFT TISSUES:  Paraspinal soft tissues are unremarkable  LOWER THORACIC DISC SPACES:  Normal disc height and signal   No disc herniation, canal stenosis or foraminal narrowing  LUMBAR DISC SPACES: L1-L2:  Normal  L2-L3:  Normal  L3-L4:  Mild facet hypertrophy  No significant central or foraminal narrowing  L4-L5:  Severe facet degenerative change results in anterolisthesis and uncovering the posterior disc margin  Moderate left and mild right foraminal narrowing identified with mild central stenosis  L5-S1:  Mild diffuse annular bulge without significant central or foraminal narrowing  POSTCONTRAST IMAGING:  Nodular enhancement along the nerve roots of the filum terminale as well as along the distal spinal cord compatible with CSF spread of tumor  Impression: Innumerable foci of nodular enhancement along the filum terminale and distal spinal cord compatible CSF spread of tumor  Degenerative changes as described  Workstation performed: [id number]     Ct Chest Abdomen Pelvis W Contrast    Result Date: 1/17/2021  Narrative: CT CHEST, ABDOMEN AND PELVIS WITH IV CONTRAST INDICATION:   cancer w/u  Brain metastases, and cervical, hilar and mediastinal lymphadenopathy demonstrated on CTA of the head and neck from yesterday  History of sigmoid resection for perforated diverticulitis in 2017  Chronic splenic mass  COMPARISON:  CTA of the head and neck from January 16, 2021  MRI of the abdomen from February 21, 2019  CTs of the abdomen and pelvis from July 31, 2018 and May 1, 2017  TECHNIQUE: CT examination of the chest, abdomen and pelvis was performed   Axial, sagittal, and coronal 2D reformatted images were created from the source data and submitted for interpretation  Radiation dose length product (DLP) for this visit:  766 9 mGy-cm   This examination, like all CT scans performed in the [facility], was performed utilizing techniques to minimize radiation dose exposure, including the use of iterative reconstruction and automated exposure control  IV Contrast:  100 mL of iohexol (OMNIPAQUE) Enteric Contrast: Enteric contrast was not administered  FINDINGS: CHEST LUNGS:  7 mm tubular branching soft tissue nodule in the base of the right lower lobe, most consistent with endobronchial mucoid impaction  Lungs otherwise clear  PLEURA:  Unremarkable  HEART/GREAT VESSELS:  Heart and thoracic aorta unremarkable  Dilated central pulmonary arteries with main pulmonary artery diameter of 3 4 cm  MEDIASTINUM AND DAVID: Several enlarged lymph nodes including a 1 5 cm subcarinal node, 1 4 cm pretracheal node, 1 6 cm right hilar node and 1 5 cm left superior mediastinal node, adjacent to the left brachiocephalic vein  Esophagus unremarkable  Trachea and main stem bronchi normal  CHEST WALL AND LOWER NECK:   Unremarkable  ABDOMEN LIVER/BILIARY TREE:  8 mm cyst in the dome of the right lobe, unchanged since 7/31/2018  No new liver lesions  Bile ducts normal in caliber  GALLBLADDER:  Contracted  Otherwise unremarkable  SPLEEN:  7 7 x 6 8 cm hypoenhancing splenic mass, cyst slowly increased in size since CTs dating back to 4/25/2017 (at which time it measured 6 5 x 6 8 cm)  PANCREAS:  Unremarkable  ADRENAL GLANDS:  Hyperplastic left adrenal gland, in retrospect, unchanged since 2017  Normal right adrenal gland  KIDNEYS/URETERS:  Unremarkable  No hydronephrosis  STOMACH AND BOWEL:  Stomach unremarkable  Enteroenteric anastomosis in the right midabdomen  Colorectal anastomosis  Small intestine and colon otherwise unremarkable  APPENDIX:  Normal  ABDOMINOPELVIC CAVITY: No lymphadenopathy or mass  No ascites  No extraluminal gas   VESSELS: Atherosclerotic changes are present  No evidence of aneurysm  PELVIS REPRODUCTIVE ORGANS:  Post hysterectomy  URINARY BLADDER:  Unremarkable  ABDOMINAL WALL/INGUINAL REGIONS:  Unremarkable  OSSEOUS STRUCTURES:  Scoliosis  Degenerative disease in the thoracic and lumbar spine  No evidence of recent fracture or destructive lesion  Impression: 1  Mediastinal and right hilar lymphadenopathy  2   Chronic splenic mass, slowly enlarging since 2017  This is almost certainly benign and the differential diagnosis includes hamartoma, hemorrhagic cyst, littoral cell angioma or lymphangioma  3   No evidence of primary malignancy in the chest, abdomen or pelvis  Workstation performed: [id number]     Ir Biopsy Lymph Node    Result Date: 1/20/2021  Narrative: Ultrasound-guided fine needle aspiration biopsy of right cervical lymph node 1/20/2021 Clinical History: Cervical adenopathy   Technique: The patient was brought to the ultrasound area and placed supine on the table  After a brief ultrasound examination was performed of the right neck and correlated with the prior examination, the neck was then prepped and draped in usual sterile fashion  Lidocaine was administered to the skin and a 27 gauge needle was advanced into the nodule under direct ultrasound guidance  5 additional passes were made similarly  The specimen was found to be adequate  A full pathology report will follow  The patient tolerated the procedure well and suffered no complications  Impression: Impression: Successful fine needle aspiration biopsy of a right cervical lymph node yielding an adequate specimen  A full report will follow  Workstation performed: [id number]     Ir Lumbar Puncture    Result Date: 1/20/2021  Narrative: LUMBAR PUNCTURE UNDER FLUOROSCOPY 1/20/2021 History: Brain masses Contrast: None Fluoroscopy time:  5 minutes Number of images: 1 Radiation dose: 12 mGy Conscious sedation time: 15 minutes Procedure:  The patient was identified verbally, and by wrist band   " Time out" was performed  Informed consent was obtained  Following obtaining informed consent, the lower back was prepped and draped in usual sterile fashion  Lidocaine was given as local anesthesia  Using fluoroscopic guidance, A 20 gauge needle was placed over the L5-S1 interspace  Clear CSF was returned  This was sampled in multiple tubes, and sent for laboratory analysis as requested  Findings: CSF appear grossly clear  Impression: Impression: Successful lumbar puncture under fluoroscopic guidance   Workstation performed: [id number]

## 2021-02-12 NOTE — OCCUPATIONAL THERAPY NOTE
Occupational Therapy Evaluation     Patient Name: Rosy Israel  Today's Date: 2021  Problem List  Principal Problem:    Cancer related pain  Active Problems:    Diabetes mellitus type 2    Essential hypertension    Hyperlipidemia    Metastatic cancer of brain and spinal cord with unknown primary site    Past Medical History  Past Medical History:   Diagnosis Date    Acute on chronic congestive heart failure with left ventricular diastolic dysfunction ([facility] [address] )     last assessed 2017    Asthma     Atelectasis     CHF (congestive heart failure) ([facility] [address] [facility]     Diabetes mellitus ([facility] [address] [facility]     Diverticulitis 2017    with perforation and abscess     Hyperlipidemia     Hypertension     Lesion of spleen     Pericardial effusion      Past Surgical History  Past Surgical History:   Procedure Laterality Date    ABDOMINAL SURGERY      BREAST BIOPSY Left 10/04/2018     SECTION      COLON SURGERY      COLONOSCOPY      COLOSTOMY  2017    HARTMANS PROCEDURE N/A 2017    Procedure: EXPLORATORY LAPAROTOMY; PARTIAL SMALL BOWEL RESECTION; HARTMANS PROCEDURE; OSTOMY;  Surgeon: Andrew Alvares MD;  [facility];  Service:     HYSTERECTOMY      IR BIOPSY LYMPH NODE  2021    IR LUMBAR PUNCTURE  2021    MAMMO STEREOTACTIC BREAST BIOPSY LEFT (ALL INC) Left 10/4/2018    OOPHORECTOMY Bilateral     ND CLOSE ENTEROSTOMY N/A 2017    Procedure: OPEN COLOSTOMY REVERSAL;  Surgeon: Andrew Alvares MD;  [facility];  Service: General    ND COLONOSCOPY FLX DX W/COLLJ SPEC WHEN PFRMD N/A 2017    Procedure: COLONOSCOPY; DILATION OF OSTOMY;  Surgeon: Andrew Alvares MD;  Location: [facility];   Service: General    ND EXC SKIN BENIG <0 5 CM REMAINDER BODY N/A 2017    Procedure: SCALP MASS EXCISION X 2;  Surgeon: Andrew Alvares MD;  [facility];  Service: General    [address] [facility] N/A 2019    Procedure: [facility], COMPONENT SEPARATION;  Surgeon: Moses Yin MD;  [facility];  Service: General    [facility] TOTAL ABDOM HYSTERECTOMY N/A 2/27/2019    Procedure: TOTAL ABDOMINAL HYSTERECTOMY; BSO;  Surgeon: Breana Singh MD;  [facility];  Service: Gynecology    VAC DRESSING APPLICATION N/A 0/2/3765    Procedure: APPLICATION VAC DRESSING;  Surgeon: Moses Yin MD;  [facility];  Service:            02/12/21 0744   OT Last Visit   OT Visit Date 02/12/21   Note Type   Note type Evaluation   Cancel Reasons Patient to operating room     Received orders + reviewed chart  OR today for mediastinoscopy w flex bronch  OT will continue to follow and complete initial eval as appropriate      Amren Blevins MS, OTR/L

## 2021-02-12 NOTE — QUICK NOTE
Post Op Check Note - Surgery Resident  Clementine Espinal 62 y o  female MRN: [id number]  Unit/Bed#: [facility] 927-01 Encounter: [id number]    ASSESSMENT:  Clementine Espinal is a 62 y o  female who is status post mediastinoscopy    Subjective: No complaints, wants food    Physical Exam:  GEN: NAD  CV: RRR  Lung: Normal effort  Ab: Soft, NT/ND  Neuro: A+Ox3  Incisions: CDI    Satting well no RA, slightly sleepy, no issues, pain well-controlled    Blood pressure 143/74, pulse 76, temperature 98 °F (36 7 °C), resp  rate 20, last menstrual period 08/16/2014, SpO2 93 %, not currently breastfeeding  ,There is no height or weight on file to calculate BMI  Intake/Output Summary (Last 24 hours) at 2/12/2021 1525  Last data filed at 2/12/2021 1335  Gross per 24 hour   Intake 1540 ml   Output --   Net 1540 ml       Invasive Devices     Peripheral Intravenous Line            Peripheral IV 02/12/21 Dorsal (posterior); Left Hand less than 1 day                VTE Pharmacologic Prophylaxis: Enoxaparin (Lovenox)

## 2021-02-12 NOTE — OP NOTE
OPERATIVE REPORT  PATIENT NAME: Devin Fong    :  1960  MRN: [id number]  Pt Location: BE OR ROOM 08    SURGERY DATE: 2021    Surgeon(s) and Role:     * Jayy Tavarez MD - Primary     * Francia Kendall PA-C - Assisting    Preop Diagnosis:  Mediastinal lymphadenopathy [R59 0]  Squamous cell carcinoma of unknown origin [C44 92]    Post-Op Diagnosis Codes:     * Mediastinal lymphadenopathy [R59 0]     * Squamous cell carcinoma of unknown origin [C44 92]    Procedure(s) (LRB):  BRONCHOSCOPY FLEXIBLE (N/A)  MEDIASTINOSCOPY (N/A)    Specimen(s):  ID Type Source Tests Collected by Time Destination   1 : level 2L Tissue Lymph Node TISSUE EXAM Jayy Tavarez MD 2021 1219    2 : level 4R #1 Tissue Lymph Node TISSUE EXAM Jayy Tavarez MD 2021 1224    3 : level 4R #2 Tissue Lymph Node TISSUE EXAM Jayy Tavarez MD 2021 1228    4 : level 4L Tissue Lymph Node TISSUE EXAM Jayy Tavarez, MD 2021 1229        Estimated Blood Loss:   Minimal    Drains:  * No LDAs found *    Anesthesia Type:   General    Operative Indications:  Mediastinal lymphadenopathy [R59 0]  Squamous cell carcinoma of unknown origin [C44 92]    Operative Findings:  Large, white, fixed lymph nodes in multiple stations    Complications:   None    Procedure and Technique:  The patient was correctly identified by name and medical record number in the holding area and brought to the operative suite, where he was placed supine on the operative table  After satisfactory induction of general endotracheal anesthesia, a flexible bronchoscope was passed through the endotracheal tube visualizing the distal trachea, luis angel, right and left main stem bronchi, including all of the primary and secondary divisions  No evidence of any endobronchial tumor was noted  No suspicion or identified risk for TB or other airborne infectious disease; bronchoscopy procedure being performed for diagnostic purposes   Flexible bronchoscopy was then terminated and the scope was withdrawn  The patient was positioned supine with a shoulder roll underneath to provide optimal cervical extension  The neck and chest were prepped and draped in the usual sterile fashion  A 2 5 cm curvalinear transverse skin incision was then made 1 fingerbreath above the sternal notch using a #15-0 blade scalpel  Dissection was carried down through the subcutaneous tissue and platysma with electrocautery  The strap muscles were then identified and divided with electrocautery at the vertical midline along the median raphe, revealing the underlying pretracheal fascia  The pretracheal fascia was entered sharply with Metzenbaum scissors, and digital dissection was then used to develop a plane along the anterior trachea with care to avoid the innominate artery  The mediastinoscope was introduced into this tract  A combination of blunt dissection and electrocautery were used to expose the lateral borders of the trachea all the way down to the luis angel under mediastinoscopic visualization  Station 2L, 4L and 4R lymph nodes were biopsied and sent for frozen and permanent section  Hemostasis was excellent  After the biopsies were complete, the mediastinoscope was withdrawn  The strap muscles were brought together at the vertical midline and closed with #3-0 vicryl  The platysma was closed transversely with #3-0 Vicryl  A deep dermal #3-0 vicryl and subcuticular #4-0 Monocryl were used to close the skin in separate layers  The wound was dressed with Dermabond        I was present for the entire procedure, A qualified resident physician was not available and A physician assistant was required during the procedure for retraction tissue handling,dissection and suturing    Patient Disposition:  PACU  and extubated and stable    SIGNATURE: Rajan Oden MD  DATE: February 12, 2021  TIME: 12:39 PM

## 2021-02-12 NOTE — PLAN OF CARE
Problem: Potential for Falls  Goal: Patient will remain free of falls  Description: INTERVENTIONS:  - Assess patient frequently for physical needs  -  Identify cognitive and physical deficits and behaviors that affect risk of falls    -  [place] fall precautions as indicated by assessment   - Educate patient/family on patient safety including physical limitations  - Instruct patient to call for assistance with activity based on assessment  - Modify environment to reduce risk of injury  - Consider OT/PT consult to assist with strengthening/mobility  Outcome: Progressing

## 2021-02-12 NOTE — PROGRESS NOTES
Progress Note - Thoracic  Sharee Velarde 62 y o  female MRN: [id number]  Unit/Bed#: [facility] 927-01 Encounter: [id number]    Assessment:  60F w metastatic SCC of unknown origin and mediastinal lymphadenopathy  Plan:  OR today for mediastinoscopy w flex bronch  Consent obtained in office  Type and screen pending  NPO p mn  Ancef on hold for OR      Subjective/Objective   Subjective: no acute events overnight, ready for OR later this morning without any further questions    Objective:    Blood pressure 144/73, pulse 79, temperature 98 1 °F (36 7 °C), resp  rate 17, last menstrual period 08/16/2014, SpO2 90 %, not currently breastfeeding  ,There is no height or weight on file to calculate BMI  I/O last 24 hours:   In: 240 [P O :240]  Out: -     Invasive Devices     Peripheral Intravenous Line            Peripheral IV 02/11/21 Left Antecubital 1 day                Physical Exam:   NAD, alert and oriented x3  Normocephalic, atraumatic  MMM, EOMI, PERRLA  Norm resp effort on RA  RRR  Abd soft, NT/ND  No calf tenderness or peripheral edema  Motor/sensation intact in distal extremities  CN grossly intact  -rash/lesions      Lab, Imaging and other studies:  Lab Results   Component Value Date    WBC 7 96 02/11/2021    HGB 11 5 02/11/2021    HCT 36 6 02/11/2021    MCV 90 02/11/2021     02/11/2021      Lab Results   Component Value Date    CALCIUM 9 3 02/11/2021    K 3 6 02/11/2021    CO2 29 02/11/2021     02/11/2021    BUN 20 02/11/2021    CREATININE 0 57 (L) 02/11/2021       VTE Pharmacologic Prophylaxis: Heparin  VTE Mechanical Prophylaxis: sequential compression device

## 2021-02-12 NOTE — UTILIZATION REVIEW
Notification of Inpatient Admission/Inpatient Authorization Request   This is a Notification of Inpatient Admission for [address]  Be advised that this patient was admitted to our facility under Inpatient Status  Contact Maryana Epstein at [phone] for additional admission information  [facility]  DEDICATED [phone]  Patient Name:   Cristian Dominique   YOB: 1960       [address]   [address]:   [facility]  Tax ID: [id number]  NPI: [id number] Attending Provider/NPI:  Phone:  Address: Oscar Steward [[id number]]  [phone]  Same as Facility   Place of Service Code: 24 Place of Service Name:  [address] [place] [address]   Start Date: 2/11/21 1521 Discharge Date & Time: No discharge date for patient encounter  Type of Admission: Inpatient Status Discharge Disposition (if discharged): [address] [place] [address]   Patient Diagnoses: Carcinoma [facility]) [C80 1]     Orders: Admission Orders (From admission, onward)     Ordered        02/11/21 1545  Inpatient Admission  Once                    Assigned Utilization Review Contact: Maryana Epstein  Utilization ,   Network Utilization Review Department  Phone: [phone]; Fax [phone]   Email: [email or web address]   ATTENTION PAYERS: Please call the assigned Utilization  directly with any questions or concerns ALL voicemails in the department are confidential  Send all requests for admission clinical reviews, approved or denied determinations and any other requests to dedicated fax number belonging to the campus where the patient is receiving treatment

## 2021-02-12 NOTE — UTILIZATION REVIEW
Initial Clinical Review    Admission: Date/Time/Statement:   Admission Orders (From admission, onward)     Ordered        02/11/21 1545  Inpatient Admission  Once                   Orders Placed This Encounter   Procedures    Inpatient Admission     Standing Status:   Standing     Number of Occurrences:   1     Order Specific Question:   Level of Care     Answer:   Med Surg [16]     Order Specific Question:   Estimated length of stay     Answer:   More than 2 Midnights     Order Specific Question:   Certification     Answer:   I certify that inpatient services are medically necessary for this patient for a duration of greater than two midnights  See H&P and MD Progress Notes for additional information about the patient's course of treatment  ED Arrival Information     Patient not seen in ED --[facility]                      Chief complaint:   Intractable pain    Assessment/Plan:  63 y/o female tx'd to [address] with intractable back and left leg pain admitted inpatient to M/S unit with need thoracic surgery consult and further tx  Pt with PMhx of DM, HTN, and recently diagnosed of 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, mediastinum, unknown primary underwent RTx yesterday to the brain and to the spine who initially presented to [address] ED with intractable back and LLE pain and admitted for pain control and palliative care consult  XR femur negative for osseous abnormality  Started on IV pain meds by palliative at [facility]  Plan to continue IV and po pain meds with palliative care consult  Continue decadron  Onc & thoracic sx consulted  Thoracic surgery consult 2/11 -- A: metastatic squamous cell carcinoma and mediastinal lymphadenopathy  Plan: Bronchoscopy and mediastinoscopy in OR tomorrow  Palliative care consult 2/11 --   1   Symptom management - seizure rescue meds are added, and opioids are liberalized upon arrival to [facility] with incrased pain after ride  - oxyIR 5-10mg PO q3hrs PRN mod-severe pain               - hydromorphone 0 5mg IV q2hrs PRN breakthrough pain or inability to PO               - lorazepam 2mg IV q10min PRN seizure               - defer steroid dosing at this time to our consultant colleagues               - could consider XRT after diagnostics, Rad/Onc consult tomorrow to guide approach to symptomatic radiculopaties      2  Goals - full cares, no limits set at this time               - Pt is hopeful and assertive; she would like any and all options for treatment presented to her               - ALL options for treatment depend on accurate tissue diagnosis, which has not yet been achieved  - Pt agreeable to mediastinoscopy tomorrow with Dr Nita Trevino as scheduled  Pls maintain NPO after MN  OPERATIVE REPORT  SURGERY DATE: 2/12/2021     Procedure(s) (LRB):  BRONCHOSCOPY FLEXIBLE   MEDIASTINOSCOPY     Anesthesia Type:   General     Operative Indications:  Mediastinal lymphadenopathy [R59 0]  Squamous cell carcinoma of unknown origin [C44 92]     Operative Findings:  Large, white, fixed lymph nodes in multiple stations         Vital Signs:   Date/Time  Temp  Pulse  Resp  BP  MAP (mmHg)  SpO2   02/12/21 07:54:50  98 °F (36 7 °C)  75  18  141/72  95  96 %   02/11/21 22:38:16  98 1 °F (36 7 °C)  79  17  144/73  97  90 %   02/11/21 15:59:30  98 °F (36 7 °C)  91  16  126/79  95  95 %       Pertinent Labs/Diagnostic Test Results:   XR L femur 2/11 - No acute osseous abnormality         Results from last 7 days   Lab Units 02/12/21  0555 02/11/21  0748   WBC Thousand/uL 6 70 7 96   HEMOGLOBIN g/dL 11 3* 11 5   HEMATOCRIT % 36 2 36 6   PLATELETS Thousands/uL 301 299   NEUTROS ABS Thousands/µL 5 15 5 61     Results from last 7 days   Lab Units 02/12/21  0555 02/11/21  0748   SODIUM mmol/L 139 139   POTASSIUM mmol/L 3 8 3 6   CHLORIDE mmol/L 105 101   CO2 mmol/L 29 29   ANION GAP mmol/L 5 9   BUN mg/dL 26* 20 CREATININE mg/dL 0 48* 0 57*   EGFR ml/min/1 73sq m 124 117   CALCIUM mg/dL 10 0 9 3     Results from last 7 days   Lab Units 02/11/21  0748   AST U/L 11   ALT U/L 17   ALK PHOS U/L 48   TOTAL PROTEIN g/dL 7 2   ALBUMIN g/dL 3 5   TOTAL BILIRUBIN mg/dL 0 90     Results from last 7 days   Lab Units 02/12/21  0735 02/11/21  2019 02/11/21  1637 02/11/21  1050 02/11/21  0856   POC GLUCOSE mg/dl 118 236* 189* 220* 132     Results from last 7 days   Lab Units 02/12/21  0555 02/11/21  0748   GLUCOSE RANDOM mg/dL 131 131       Past Medical History:   Diagnosis Date    Acute on chronic congestive heart failure with left ventricular diastolic dysfunction (HCC)     last assessed 5/23/2017    Asthma     Atelectasis     CH[facility]estive [facility] failure) [facility]     Diabetes mellitus ([facility] )     Diverticulitis 2017    with perforation and abscess     Hyperlipidemia     Hypertension     Lesion of spleen     Pericardial effusion      Present on Admission:   Diabetes mellitus type 2   Essential hypertension   Metastatic cancer of brain and spinal cord with unknown primary site      Admitting Diagnosis: [facility] ) [C80 1]  Age/Sex: 62 y o  female  Admission Orders:  Scheduled Medications:  acetaminophen, 975 mg, Oral, Q8H [address]  amLODIPine, 10 mg, Oral, Daily  atorvastatin, 20 mg, Oral, Daily  cefazolin, 2,000 mg, Intravenous, On Call To OR  dexamethasone, 4 mg, Oral, BID With Meals  enoxaparin, 40 mg, Subcutaneous, Daily  gabapentin, 100 mg, Oral, BID  insulin lispro, 1-5 Units, Subcutaneous, HS  insulin lispro, 1-5 Units, Subcutaneous, TID AC  lidocaine, 2 patch, Topical, Daily  loratadine, 10 mg, Oral, HS  pantoprazole, 20 mg, Oral, Early Morning       PRN Meds:  acetaminophen, 650 mg, Oral, Q6H PRN  aluminum-magnesium hydroxide-simethicone, 30 mL, Oral, Q6H PRN  diazepam, 2 mg, Oral, Q6H PRN  HYDROmorphone, 0 5 mg, Intravenous, Q2H PRN  LORazepam, 2 mg, Intravenous, Q10 Min PRN  ondansetron, 4 mg, Intravenous, Q6H PRN  oxyCODONE, 10 mg, Oral, Q3H PRN 2/11 x2, 2/12 x1  oxyCODONE, 5 mg, Oral, Q3H PRN        IP CONSULT TO PALLIATIVE CARE  IP CONSULT TO THORACIC SURGERY  IP CONSULT TO ONCOLOGY  IP CONSULT TO RADIATION ONCOLOGY    Network Utilization Review Department  ATTENTION: Please call with any questions or concerns to [phone] and carefully listen to the prompts so that you are directed to the right person  All voicemails are confidential   Jaja Butler all requests for admission clinical reviews, approved or denied determinations and any other requests to dedicated fax number below belonging to the campus where the patient is receiving treatment   List of dedicated fax numbers for the Facilities:  [address] DENIALS (Administrative/Medical Necessity) [phone]   [address] (Maternity/NICU/Pediatrics) [phone]   [address] Bernadette Sanchez 2123 (  Chela Shanks "Iliana" 103) [address] [address] [facility] 14[facility] [address] [place]) [address] [place] [address] [phone]

## 2021-02-13 LAB
GLUCOSE SERPL-MCNC: 124 MG/DL (ref 65–140)
GLUCOSE SERPL-MCNC: 179 MG/DL (ref 65–140)
GLUCOSE SERPL-MCNC: 227 MG/DL (ref 65–140)
GLUCOSE SERPL-MCNC: 249 MG/DL (ref 65–140)

## 2021-02-13 PROCEDURE — 99232 SBSQ HOSP IP/OBS MODERATE 35: CPT | Performed by: PHYSICIAN ASSISTANT

## 2021-02-13 PROCEDURE — 99223 1ST HOSP IP/OBS HIGH 75: CPT | Performed by: INTERNAL MEDICINE

## 2021-02-13 PROCEDURE — 82948 REAGENT STRIP/BLOOD GLUCOSE: CPT

## 2021-02-13 PROCEDURE — 99231 SBSQ HOSP IP/OBS SF/LOW 25: CPT | Performed by: THORACIC SURGERY (CARDIOTHORACIC VASCULAR SURGERY)

## 2021-02-13 RX ADMIN — OXYCODONE HYDROCHLORIDE 10 MG: 10 TABLET ORAL at 21:42

## 2021-02-13 RX ADMIN — INSULIN LISPRO 2 UNITS: 100 INJECTION, SOLUTION INTRAVENOUS; SUBCUTANEOUS at 21:43

## 2021-02-13 RX ADMIN — DEXAMETHASONE 4 MG: 4 TABLET ORAL at 08:43

## 2021-02-13 RX ADMIN — ACETAMINOPHEN 650 MG: 325 TABLET, FILM COATED ORAL at 03:58

## 2021-02-13 RX ADMIN — DEXAMETHASONE 4 MG: 4 TABLET ORAL at 17:06

## 2021-02-13 RX ADMIN — AMLODIPINE BESYLATE 10 MG: 10 TABLET ORAL at 08:43

## 2021-02-13 RX ADMIN — PANTOPRAZOLE SODIUM 20 MG: 20 TABLET, DELAYED RELEASE ORAL at 06:11

## 2021-02-13 RX ADMIN — GABAPENTIN 100 MG: 100 CAPSULE ORAL at 08:43

## 2021-02-13 RX ADMIN — ACETAMINOPHEN 975 MG: 325 TABLET, FILM COATED ORAL at 17:04

## 2021-02-13 RX ADMIN — ENOXAPARIN SODIUM 40 MG: 40 INJECTION SUBCUTANEOUS at 08:43

## 2021-02-13 RX ADMIN — INSULIN LISPRO 2 UNITS: 100 INJECTION, SOLUTION INTRAVENOUS; SUBCUTANEOUS at 14:03

## 2021-02-13 RX ADMIN — GABAPENTIN 100 MG: 100 CAPSULE ORAL at 17:06

## 2021-02-13 RX ADMIN — ATORVASTATIN CALCIUM 20 MG: 20 TABLET, FILM COATED ORAL at 08:43

## 2021-02-13 RX ADMIN — ACETAMINOPHEN 975 MG: 325 TABLET, FILM COATED ORAL at 23:20

## 2021-02-13 RX ADMIN — INSULIN LISPRO 1 UNITS: 100 INJECTION, SOLUTION INTRAVENOUS; SUBCUTANEOUS at 17:06

## 2021-02-13 NOTE — ASSESSMENT & PLAN NOTE
Lab Results   Component Value Date    HGBA1C 5 3 01/05/2021     · Hold oral hypoglycemics while hospitalized  · Continue SSI coverage and Accu-Cheks

## 2021-02-13 NOTE — ASSESSMENT & PLAN NOTE
· Per record review, patient was recently admitted to [facility] 1/17/21  She initially presented to [facility] on 1/16/21 with persistent dizziness x 4 weeks  CT of the head and neck at that time revealed isodense soft tissue masses in the left occipital lobe and left parietal lobe with surrounding vasogenic edema  She apparently left emergency department AMA but returned to [facility] ED the following day  CT of the chest, abdomen, and pelvis revealed lymphadenopathy  She was seen in consultation by Neurosurgery  MRI of the brain revealed metastatic disease with 2 parenchymal lesions and diffuse enhancement of the internal auditory canals bilaterally which warranted lumbar puncture to exclude leptomeningeal tumor  IR performed lumbar puncture and aspiration biopsy of cervical lymph node 1/20/21  MRI of the C/T/L-spine revealed CSF spread of tumor  She was started on Decadron  She was discharged on 1/22/21  She followed up with Dr Maricel Wu of [facility] as OP on 1/28/21 and PCP the following day  The cervical lymph node biopsy from January revealed conclusive evidence for malignancy, malignant epithelioid neoplasm, 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  The patient then saw Palliative Care and Thoracic Surgery on 2/4/21  Thoracic surgery recommended mediastinoscopy  She was then seen in consult by Radiation Oncology 2/5/21  Her mediastinoscopy had to be rescheduled 2/2 weather it appears  She saw her Oncologist again on 2/10/21  Unfortunately, the patient was admitted the following day, 2/11/21, due to worsening pain  She was seen in consultation by Palliative care for pain control and ultimately transferred to [facility] to see Thoracic Surgery for her mediastinoscopy  · She underwent flexible bronchoscopy, mediastinoscopy by Dr Shellye Guidry on 2/12/21  Follow-up pathology  No need for dressing over neck incision per thoracic surgery    Thoracic surgery has since signed off  · [facility] following - plan to continue radiation therapy and follow-up with her primary oncologist as outpatient for consideration of chemotherapy or targeted therapy  · Rad/Onc following - patient undergoing whole brain and spine radiation    This will resume on Monday 2/15/21 per Rad/Onc

## 2021-02-13 NOTE — PROGRESS NOTES
Progress Note - Curtistine Primas 1960, 62 y o  female MRN: [id number]    Unit/Bed#: [facility] 927-01 Encounter: [id number]    Primary Care Provider: Marcie Roque DO   Date and time admitted to hospital: 2/11/2021  3:21 PM        * Cancer related pain  Assessment & Plan  · Per record review, patient initially presented to the [facility] w/ intractable back and LLE pain in the setting of metastatic cancer with spinal/brain/mediastinal involvement - XR femur negative for osseous abnormality  · Pain managed by palliative care prior to transfer  - palliative radiation per radiation oncology  · Continue current analgesic regimen w/ constipation prophylaxis  · See plan for metastatic cancer below  · PT/OT  · Supportive care    Metastatic cancer of brain and spinal cord with unknown primary site  Assessment & Plan  · Cervical lymph node biopsy January 2021 revealed conclusive evidence for malignancy, malignant epithelioid neoplasm, 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  · Dr Gaby Dolan of Thoracic Surgery performed bronchoscopy and mediastinoscopy today, 2/12/21  · Heme/Onc and Rad/Onc following       Diabetes mellitus type 2  Assessment & Plan  Lab Results   Component Value Date    HGBA1C 5 3 01/05/2021     · Hold oral hypoglycemics while hospitalized  · Continue SSI coverage and Accu-Cheks    Essential hypertension  Assessment & Plan  · Optimize pain control  · Continue Norvasc    Hyperlipidemia  Assessment & Plan  · Continue statin      VTE Pharmacologic Prophylaxis:   Pharmacologic: Enoxaparin (Lovenox)  Mechanical VTE Prophylaxis in Place: Yes    Patient Centered Rounds: I have performed bedside rounds with nursing staff today      Discussions with Specialists or Other Care Team Provider:     Education and Discussions with Family / Patient: patient; when asked if there was anyone she would like me to call today with an update the patient kindly declined     Time Spent for Care: 30 minutes  More than 50% of total time spent on counseling and coordination of care as described above  Current Length of Stay: 1 day(s)    Current Patient Status: Inpatient   Certification Statement: The patient will continue to require additional inpatient hospital stay due to as above    Discharge Plan: pending Thoracic surgery clearance and radiation therapy     Code Status: Level 1 - Full Code      Subjective:   Ms Jorje Rosas was seen earlier today s/p bronch/mediastinoscopy  She reported some pain in her legs, controlled, and otherwise denied complaint     Objective:     Vitals:   Temp (24hrs), Av 2 °F (36 8 °C), Min:98 °F (36 7 °C), Max:98 6 °F (37 °C)    Temp:  [98 °F (36 7 °C)-98 6 °F (37 °C)] 98 6 °F (37 °C)  HR:  [] 82  Resp:  [14-20] 18  BP: (131-156)/(59-74) 140/74  SpO2:  [90 %-100 %] 93 %  There is no height or weight on file to calculate BMI  Input and Output Summary (last 24 hours): Intake/Output Summary (Last 24 hours) at 20214  Last data filed at 2021 1335  Gross per 24 hour   Intake 1300 ml   Output --   Net 1300 ml       Physical Exam:     Physical Exam  Vitals signs and nursing note reviewed  Constitutional:       Comments: Patient seen earlier today comfortably resting in bed postprocedurally talking on her cell phone   Cardiovascular:      Rate and Rhythm: Normal rate and regular rhythm  Pulmonary:      Effort: Pulmonary effort is normal       Breath sounds: Normal breath sounds  Abdominal:      General: Bowel sounds are normal       Palpations: Abdomen is soft  Tenderness: There is no abdominal tenderness  Musculoskeletal:      Right lower leg: No edema  Left lower leg: No edema  Skin:     General: Skin is warm  Neurological:      Mental Status: She is alert and oriented to person, place, and time     Psychiatric:         Mood and Affect: Mood normal          Behavior: Behavior normal          Additional Data:     Labs:    Results from last 7 days   Lab Units 02/12/21  0555   WBC Thousand/uL 6 70   HEMOGLOBIN g/dL 11 3*   HEMATOCRIT % 36 2   PLATELETS Thousands/uL 301   NEUTROS PCT % 77*   LYMPHS PCT % 14   MONOS PCT % 8   EOS PCT % 1     Results from last 7 days   Lab Units 02/12/21  0555 02/11/21  0748   SODIUM mmol/L 139 139   POTASSIUM mmol/L 3 8 3 6   CHLORIDE mmol/L 105 101   CO2 mmol/L 29 29   BUN mg/dL 26* 20   CREATININE mg/dL 0 48* 0 57*   ANION GAP mmol/L 5 9   CALCIUM mg/dL 10 0 9 3   ALBUMIN g/dL  --  3 5   TOTAL BILIRUBIN mg/dL  --  0 90   ALK PHOS U/L  --  48   ALT U/L  --  17   AST U/L  --  11   GLUCOSE RANDOM mg/dL 131 131         Results from last 7 days   Lab Units 02/12/21  1752 02/12/21  1305 02/12/21  1039 02/12/21  0735 02/11/21  2019 02/11/21  1637 02/11/21  1050 02/11/21  0856   POC GLUCOSE mg/dl 167* 161* 160* 118 236* 189* 220* 132                   * I Have Reviewed All Lab Data Listed Above  * Additional Pertinent Lab Tests Reviewed:  All Labs Within Last 24 Hours Reviewed    Imaging:    Imaging Reports Reviewed Today Include: as above  Imaging Personally Reviewed by Myself Includes:  none    Recent Cultures (last 7 days):           Last 24 Hours Medication List:   Current Facility-Administered Medications   Medication Dose Route Frequency Provider Last Rate    acetaminophen  650 mg Oral Q6H PRN Raj Gutiérrez PA-C      acetaminophen  975 mg Oral [facility] Henretta Gone [facility]      aluminum-magnesium hydroxide-simethicone  30 mL Oral Q6H PRN Raj Gutiérrez PA-C      amLODIPine  10 mg Oral Daily Janny Antonio PA-C      atorvastatin  20 mg Oral Daily Marioeatha Paul [place], [place]      dexamethasone  4 mg Oral BID With Meals Raj Gutiérrez PA-C      diazepam  2 mg Oral Q6H PRN Raj Gutiérrez PA-C      enoxaparin  40 mg Subcutaneous Daily Janny Antonio PA-C      gabapentin  100 mg Oral BID Janny Antonio PA-C      HYDROmorphone  0 5 mg Intravenous Q2H PRN Raj Gutiérrez PA-C      insulin lispro  1-5 Units Subcutaneous HS Silverio Hung PA-C      insulin lispro  1-5 Units Subcutaneous TID [facility] Silverio Hung PA-C      lidocaine  2 patch Topical Daily Cande Dennison      loratadine  10 mg Oral HS Cande Dennison      LORazepam  2 mg Intravenous Q10 Min PRN Silverio Hung PA-C      ondansetron  4 mg Intravenous Q6H PRN Silverio Hung PA-C      oxyCODONE  10 mg Oral Q3H PRN Silverio Hung PA-C      oxyCODONE  5 mg Oral Q3H PRN Silverio Hung PA-C      pantoprazole  20 mg Oral Early Morning Silverio Hung PA-C          Today, Patient Was Seen By: Corby hernandes PA-C    ** Please Note: Dictation voice to text software may have been used in the creation of this document   **

## 2021-02-13 NOTE — ASSESSMENT & PLAN NOTE
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

## 2021-02-13 NOTE — ASSESSMENT & PLAN NOTE
· Per record review, patient initially presented to the [facility] with intractable back and LLE pain in the setting of metastatic cancer with spinal/brain/mediastinal involvement   · Pain management per Palliative Care  · Radiation treatments per Radiation Oncology  · Continue current analgesic regimen w/ constipation prophylaxis  · PT/OT  · Supportive care

## 2021-02-13 NOTE — CONSULTS
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    History of Presenting Illness:  81-year-old  female who was hospitalized in January 16, 2021 with dizziness, headache, CTA of the brain showed 2 6 x 1 6 cm hypodense soft tissue mass in the left occipital lobe, with surrounding vasogenic edema, 3 2 x 1 2 cm isodense soft tissue mass in the posterior mid medial left parietal lobe with vasogenic edema    Confirmed by MRI of the brain with leptomeningeal tumor with diffuse enhancement of the internal auditory canals bilaterally    CT scan of the chest abdomen and pelvis showed several enlarged lymph nodes in the mediastinum up to 1 5 cm of the subcarinal area, 1 6 right hilar and 1 5 left superior mediastinal lymph nodes,    She used to smoke half pack of cigarettes daily for many years, was evaluated by Radiation Oncology MRI of the cervical thoracic and lumbar spine showed innumerable foci of nodular enhancement along the spinal cord compatible with CSF spread of the tumor    The biopsy of the cervical lymph node was too small however consistent with squamous cell carcinoma of unknown primary    Because of the weakness she was initiated on radiation therapy to the brain as well as to the lumbar and thoracic area, she was admitted to the hospital for mediastinoscopy and larger biopsy for tissue on molecular tests    She also had been evaluated by Dr Faith Grant and awaiting for molecular tests and finishing radiation therapy to start chemotherapy    She had left lower extremity weakness, denies any diplopia odynophagia angina abdominal pain dysuria hematuria melena hematochezia  Review of Systems - As stated in the HPI otherwise the fourteen point review of systems was negative      Past Medical History: Diagnosis Date    Acute on chronic congestive heart failure with left ventricular diastolic dysfunction (HCC)     last assessed 2017    Asthma     Atelectasis     CHF (congestive heart failure) ([facility] )     Diabetes mellitus ([facility] )     Diverticulitis 2017    with perforation and abscess     Hyperlipidemia     Hypertension     Lesion of spleen     Pericardial effusion        Social History     Socioeconomic History    Marital status:      Spouse name: Not on file    Number of children: Not on file    Years of education: Not on file    Highest education level: Not on file   Occupational History    Not on file   Social Needs    Financial resource strain: Not on file    Food insecurity     Worry: Not on file     Inability: Not on file   [employer] needs     Medical: Not on file     Non-medical: Not on file   Tobacco Use    Smoking status: Former Smoker     Packs/day: 0 25     Years: 20 00     Pack years: 5 00     Types: Cigarettes     Start date: 1991     Quit date: 2021     Years since quittin 0    Smokeless tobacco: Never Used   Substance and Sexual Activity    Alcohol use: Not Currently     Frequency: Monthly or less     Drinks per session: 1 or 2     Binge frequency: Never     Comment: socially     Drug use: No    Sexual activity: Yes     Partners: Male     Birth control/protection: Surgical   Lifestyle    Physical activity     Days per week: 0 days     Minutes per session: 0 min    Stress: Not at all   Relationships    Social connections     Talks on phone: Not on file     Gets together: Not on file     Attends L[employer]-day service: Not on file     Active member of club or organization: Not on file     Attends meetings of clubs or organizations: Not on file     Relationship status: Not on file    Intimate partner violence     Fear of current or ex partner: Not on file     Emotionally abused: Not on file     Physically abused: Not on file     Forced sexual activity: Not on file   Other Topics Concern    Not on file   Social History Narrative    Not on file       Family History   Problem Relation Age of Onset    Hypertension Mother    Brett Reil Diabetes Mother     No Known Problems Sister     No Known Problems Daughter     Heart disease Son     No Known Problems Maternal Aunt     Lung cancer Maternal Aunt     No Known Problems Maternal Aunt     No Known Problems Maternal Aunt     No Known Problems Paternal Aunt     Breast cancer Neg Hx        Allergies   Allergen Reactions    Ciprofloxacin Itching     Starts at hands to feet then the whole entire body         Current Facility-Administered Medications:     acetaminophen (TYLENOL) tablet 650 mg, 650 mg, Oral, Q6H PRN, ARVIND Marquis-C, 650 mg at 02/13/21 0358    acetaminophen (TYLENOL) tablet 975 mg, 975 mg, Oral, Q8H [facility], ARVIND Rasmussen-C, 975 mg at 02/12/21 2224    aluminum-magnesium hydroxide-simethicone (MYLANTA) oral suspension 30 mL, 30 mL, Oral, Q6H PRN, Janny Antonio PA-C    amLODIPine (NORVASC) tablet 10 mg, 10 mg, Oral, Daily, Janny Antonio PA-C, 10 mg at 02/13/21 0843    atorvastatin (LIPITOR) tablet 20 mg, 20 mg, Oral, Daily, Janny Antonio PA-C, 20 mg at 02/13/21 0843    dexamethasone (DECADRON) tablet 4 mg, 4 mg, Oral, BID With Meals, Margaret Antonio, PA-C, 4 mg at 02/13/21 0843    diazepam (VALIUM) tablet 2 mg, 2 mg, Oral, Q6H PRN, Janny Antonio PA-C    enoxaparin (LOVENOX) subcutaneous injection 40 mg, 40 mg, Subcutaneous, Daily, Janny Antonio, PA-C, 40 mg at 02/13/21 0843    gabapentin (NEURONTIN) capsule 100 mg, 100 mg, Oral, BID, Janny Antonio, PA-C, 100 mg at 02/13/21 0843    HYDROmorphone (DILAUDID) injection 0 5 mg, 0 5 mg, Intravenous, Q2H PRN, ARVIND Milan-C, 0 5 mg at 02/12/21 0729    insulin lispro (HumaLOG) 100 units/mL subcutaneous injection 1-5 Units, 1-5 Units, Subcutaneous, HS, Jose Neri PA-C, 3 Units at 02/12/21 7408    insulin lispro (HumaLOG) 100 units/mL subcutaneous injection 1-5 Units, 1-5 Units, Subcutaneous, TID AC, 1 Units at 02/12/21 1753 **AND** Fingerstick Glucose (POCT), , , 4x Daily AC and at bedtime, ARVIND Rasmussen-C    lidocaine (LIDODERM) 5 % patch 2 patch, 2 patch, Topical, Daily, Janny Antonio PA-C    loratadine (CLARITIN) tablet 10 mg, 10 mg, Oral, HS, Janny Antonio, PA-C, 10 mg at 02/12/21 2224    LORazepam (ATIVAN) injection 2 mg, 2 mg, Intravenous, Q10 Min PRN, Janny Antonio PA-C    ondansetron [facility]) injection 4 mg, 4 mg, Intravenous, Q6H PRN, Janny Antonio, PA-C    oxyCODONE (ROXICODONE) immediate release tablet 10 mg, 10 mg, Oral, Q3H PRN, ARVIND Jeronimo-C, 10 mg at 02/12/21 0509    oxyCODONE (ROXICODONE) IR tablet 5 mg, 5 mg, Oral, Q3H PRN, Janny Antonio PA-C    pantoprazole (PROTONIX) EC tablet 20 mg, 20 mg, Oral, Early Morning, Janny Antonio, PA-C, 20 mg at 02/13/21 0611      /81   Pulse 75   Temp 98 2 °F (36 8 °C)   Resp 18   LMP 08/16/2014 (Approximate)   SpO2 97%       General Appearance:    Alert, oriented        Eyes:     Ears:     Nose:    Throat:    Neck:        Lungs:      Chest Wall:      Heart:         Abdomen:              Extremities:        Skin:    Lymph nodes:    Neurologic:   CNII-XII intact, weakness of the left lower extremity             Recent Results (from the past 48 hour(s))   Fingerstick Glucose (POCT)    Collection Time: 02/11/21 10:50 AM   Result Value Ref Range    POC Glucose 220 (H) 65 - 140 mg/dl   Fingerstick Glucose (POCT)    Collection Time: 02/11/21  4:37 PM   Result Value Ref Range    POC Glucose 189 (H) 65 - 140 mg/dl   Fingerstick Glucose (POCT)    Collection Time: 02/11/21  8:19 PM   Result Value Ref Range    POC Glucose 236 (H) 65 - 140 mg/dl   Basic metabolic panel    Collection Time: 02/12/21  5:55 AM   Result Value Ref Range    Sodium 139 136 - 145 mmol/L    Potassium 3 8 3 5 - 5 3 mmol/L    Chloride 105 100 - 108 mmol/L    CO2 29 21 - 32 mmol/L    ANION GAP 5 4 - 13 mmol/L    BUN 26 (H) 5 - 25 mg/dL    Creatinine 0 48 (L) 0 60 - 1 30 mg/dL    Glucose 131 65 - 140 mg/dL    Calcium 10 0 8 3 - 10 1 mg/dL    eGFR 124 ml/min/1 73sq m   CBC and differential    Collection Time: 02/12/21  5:55 AM   Result Value Ref Range    WBC 6 70 4 31 - 10 16 Thousand/uL    RBC 3 97 3 81 - 5 12 Million/uL    Hemoglobin 11 3 (L) 11 5 - 15 4 g/dL    Hematocrit 36 2 34 8 - 46 1 %    MCV 91 82 - 98 fL    MCH 28 5 26 8 - 34 3 pg    MCHC 31 2 (L) 31 4 - 37 4 g/dL    RDW 16 7 (H) 11 6 - 15 1 %    MPV 9 8 8 9 - 12 7 fL    Platelets 222 296 - 536 Thousands/uL    nRBC 0 /100 WBCs    Neutrophils Relative 77 (H) 43 - 75 %    Immat GRANS % 0 0 - 2 %    Lymphocytes Relative 14 14 - 44 %    Monocytes Relative 8 4 - 12 %    Eosinophils Relative 1 0 - 6 %    Basophils Relative 0 0 - 1 %    Neutrophils Absolute 5 15 1 85 - 7 62 Thousands/µL    Immature Grans Absolute 0 02 0 00 - 0 20 Thousand/uL    Lymphocytes Absolute 0 93 0 60 - 4 47 Thousands/µL    Monocytes Absolute 0 54 0 17 - 1 22 Thousand/µL    Eosinophils Absolute 0 04 0 00 - 0 61 Thousand/µL    Basophils Absolute 0 02 0 00 - 0 10 Thousands/µL   Type and screen    Collection Time: 02/12/21  5:55 AM   Result Value Ref Range    ABO Grouping B     Rh Factor Positive     Antibody Screen Negative     Specimen Expiration Date [id number]    Fingerstick Glucose (POCT)    Collection Time: 02/12/21  7:35 AM   Result Value Ref Range    POC Glucose 118 65 - 140 mg/dl   Fingerstick Glucose (POCT)    Collection Time: 02/12/21 10:39 AM   Result Value Ref Range    POC Glucose 160 (H) 65 - 140 mg/dl   Fingerstick Glucose (POCT)    Collection Time: 02/12/21  1:05 PM   Result Value Ref Range    POC Glucose 161 (H) 65 - 140 mg/dl   Fingerstick Glucose (POCT)    Collection Time: 02/12/21  5:52 PM   Result Value Ref Range    POC Glucose 167 (H) 65 - 140 mg/dl   Fingerstick Glucose (POCT)    Collection Time: 02/12/21  9:16 PM   Result Value Ref Range POC Glucose 275 (H) 65 - 140 mg/dl   Fingerstick Glucose (POCT)    Collection Time: 02/13/21  8:40 AM   Result Value Ref Range    POC Glucose 124 65 - 140 mg/dl         Cta Head And Neck With And Without Contrast    Result Date: 1/16/2021  Narrative: CTA NECK AND BRAIN WITH AND WITHOUT CONTRAST INDICATION: Dizziness, non-specific;  dizziness dizziness for 3 weeks  Diagnosed with vertigo  Low back pain radiating to left leg  Denies back injury  COMPARISON:   None  TECHNIQUE:  Routine CT imaging of the Brain without contrast   Post contrast imaging was performed after administration of iodinated contrast through the neck and brain  Post contrast axial 0 625 mm images timed to opacify the arterial system  3D rendering was performed on an independent workstation  MIP reconstructions performed  Coronal reconstructions were performed of the noncontrast portion of the brain  Radiation dose length product (DLP) for this visit:  1102 02 mGy/cm  This examination, like all CT scans performed in the [facility], was performed utilizing techniques to minimize radiation dose exposure, including the use of iterative  reconstruction and automated exposure control  IV Contrast:  100 mL of iohexol (OMNIPAQUE)  IMAGE QUALITY:   Diagnostic FINDINGS: NONCONTRAST BRAIN PARENCHYMA:  No acute intraparenchymal hemorrhage or extra-axial fluid collections  No acute infarctions  Decreased attenuation in the periventricular regions and centrum semiovale bilaterally, consistent with chronic small vessel ischemic white matter disease  There is a 2 6 x 1 6 cm vague isodense soft tissue mass in the left occipital lobe (series 2, image 20)  There is a large amount of surrounding vasogenic edema  There is minimal peripheral enhancement (series 4, image 57)  There is a 3 2 x 1 2 cm vague isodense soft tissue mass in the posterior medial left parietal lobe (series 2, image 24)    Large amount of surrounding vasogenic edema   There is minimal peripheral enhancement (series 4, image 43)  Bilateral internal carotid artery and vertebral artery calcifications  VENTRICLES AND EXTRA-AXIAL SPACES:  Ventricles and cerebral sulci are mildly dilated  VISUALIZED ORBITS AND PARANASAL SINUSES:  Unremarkable  CERVICAL VASCULATURE AORTIC ARCH AND GREAT VESSELS:  Mild atherosclerotic disease of the arch, proximal great vessels and visualized subclavian vessels  No significant stenosis  Bovine configuration of the arch  RIGHT VERTEBRAL ARTERY CERVICAL SEGMENT:  Normal origin  The vessel is normal in caliber throughout the neck  LEFT VERTEBRAL ARTERY CERVICAL SEGMENT:  Normal origin  The vessel is normal in caliber throughout the neck  RIGHT EXTRACRANIAL CAROTID SEGMENT:  There is moderate calcified and noncalcified atherosclerotic plaque formation throughout the distal common carotid artery, extending into the carotid bulb and proximal cervical internal carotid artery  Areas of ulceration are present along the plaque  This results in a moderate short segment stenosis of approximately 75%  Distally, the cervical internal carotid artery is normal  LEFT EXTRACRANIAL CAROTID SEGMENT:  There is mild calcified and noncalcified atherosclerotic plaque formation in the distal common carotid artery, extending into the carotid bulb and proximal cervical internal carotid artery  No flow-limiting stenosis  NASCET criteria was used to determine the degree of internal carotid artery diameter stenosis  INTRACRANIAL VASCULATURE INTERNAL CAROTID ARTERIES:  Normal enhancement of the intracranial portions of the internal carotid arteries  Moderate atherosclerotic changes in the distal cavernous and supraclinoid internal carotid arteries bilaterally, resulting in mild stenosis of the supraclinoid internal carotid arteries bilaterally  Normal ophthalmic artery origins  Normal ICA terminus   ANTERIOR CIRCULATION:  Symmetric A1 segments and anterior cerebral arteries with normal enhancement  Normal anterior communicating artery  MIDDLE CEREBRAL ARTERY CIRCULATION:  M1 segment and middle cerebral artery branches demonstrate normal enhancement bilaterally  DISTAL VERTEBRAL ARTERIES:  The intracranial segment of the right vertebral artery is hypoplastic but patent beyond the right posterior, inferior cerebellar artery  The intracranial segment of the left vertebral artery demonstrates calcified atherosclerotic plaque formation  No flow-limiting stenosis  [place] Pac Posterior inferior cerebellar artery origins are normal  Normal vertebral basilar junction  BASILAR ARTERY:  Basilar artery is normal in caliber  Normal superior cerebellar arteries  POSTERIOR CEREBRAL ARTERIES: Both posterior cerebral arteries arises from the basilar tip  Both arteries demonstrate normal enhancement  Normal posterior communicating arteries  DURAL VENOUS SINUSES:  Normal  NON VASCULAR ANATOMY BONY STRUCTURES: Mild degenerative changes of the cervical spine  No acute osseous abnormality  SOFT TISSUES OF THE NECK:  The palatine tonsils are enlarged bilaterally, right side greater than left  There is enlargement of the lingual tonsil  There are calcifications within the palatine tonsils bilaterally, consistent with concretions from prior  infection  There is beam hardening artifact limiting evaluation of the oropharynx  There are prominent lymph nodes along the anterior and posterior cervical carline chains bilaterally, left side greater than right  THORACIC INLET: There are enlarged subcarinal, paratracheal and right hilar lymph nodes  There are enlarged left sided paraesophageal lymph nodes, extending to the thoracic inlet  Enlarged bilateral supraclavicular lymph nodes extending along the carotid arteries bilaterally, left side larger than right  Several left-sided lymph nodes are necrotic  Impression: 1    Isodense soft tissue masses in the left occipital lobe and left parietal lobe with surrounding vasogenic edema  Although the findings may represent primary CNS neoplasm, the presence of mediastinal, hilar, paratracheal and cervical adenopathy, suggests metastatic disease  Recommend follow-up MRI of the brain with gadolinium and/or neurosurgical evaluation  Recommend follow-up CT chest abdomen pelvis for metastatic workup  2   Moderate atherosclerotic changes of the proximal internal carotid arteries bilaterally, right side greater than left resulting in moderate stenosis of the proximal cervical right internal carotid artery of approximately 75%  3   Moderate atherosclerotic disease of the supraclinoid segments of the internal carotid arteries bilaterally, resulting in mild stenosis bilaterally  No evidence of aneurysm, vascular malformation or vascular cut off  No evidence of large vessel central occlusive disease involving the [place]  4   Mild cerebral atrophy with chronic small vessel ischemic white matter disease  5   Mediastinal, hilar, paratracheal, paraesophageal, supraclavicular and cervical adenopathy, pathologic  6   Enlarged palatine tonsils and lingual tonsil  The oropharynx is not well visualized due to beam hardening artifact from dental hardware  Consider follow-up MRI of the soft tissues of the neck versus ENT consultation with direct laryngoscopy  I personally discussed this study with [employer] on 1/16/2021 at 6:37 AM  Workstation performed: [id number]     Xr Femur 2 Views Left    Result Date: 2/11/2021  Narrative: LEFT FEMUR INDICATION:   pain (bony mets)  COMPARISON:  1/17/2021 VIEWS:  XR FEMUR 2 VW LEFT FINDINGS: There is no acute fracture or dislocation  No significant degenerative changes  No lytic or blastic osseous lesion  There are atherosclerotic calcifications  Soft tissues are otherwise unremarkable  Impression: No acute osseous abnormality   Workstation performed: [id number]     Xr Femur 2 Views Left    Result Date: 1/18/2021  Narrative: LEFT FEMUR INDICATION:   left leg pain  No history of injury  COMPARISON:  None VIEWS:  XR FEMUR 2 VW LEFT FINDINGS: There is no acute fracture or dislocation  No significant degenerative changes  No lytic or blastic osseous lesion  There are atherosclerotic calcifications  Soft tissues are otherwise unremarkable  Impression: No acute osseous abnormality  Workstation performed: [id number]     Mri Brain W Wo Contrast    Result Date: 1/18/2021  Narrative: MRI BRAIN WITH AND WITHOUT CONTRAST INDICATION: Intracranial mass on the CT  COMPARISON:  CT/CTA 1/16/2021 TECHNIQUE: Sagittal T1, axial T2, axial FLAIR, axial T1, axial [place], axial diffusion  Sagittal, axial T1 postcontrast   Axial bravo postcontrast with coronal reconstructions  IV Contrast:  7 mL of Gadobutrol injection (SINGLE-DOSE)  IMAGE QUALITY:   Intermittent motion related artifact FINDINGS: BRAIN PARENCHYMA:  Peripherally enhancing 21 x 21 x 15 mm mass in the parasagittal left parietal parenchyma  The vasogenic edema, local mass effect, minor punctate blood products  There is a 2nd right frontal vertex mass approximately 5 mm, more homogeneously enhancing without mass effect  Findings consistent with metastatic disease  Finally, there is diffuse enhancement of the internal auditory canals bilaterally 11:29-32  Although these could represent bilaterally there are tumor, there is Concern for CSF spread of tumor  Lumbar puncture suggested  No leptomeningeal or pachymeningeal enhancement the elsewhere  Mild degree of small vessel disease  Minor nonspecific punctate hemosiderin along the posterior body of the left lateral ventricle 6:56  VENTRICLES:  Normal for the patient's age  SELLA AND PITUITARY GLAND:  Normal  ORBITS:  Normal  PARANASAL SINUSES:  Trace maxillary mucosal thickening VASCULATURE:  Evaluation of the major intracranial vasculature demonstrates appropriate flow voids   CALVARIUM AND SKULL BASE:  Normal  EXTRACRANIAL SOFT TISSUES: A 12 mm soft tissue nodule at the posterior vertex right parietal scalp     Impression: Metastatic disease, with 2 Parenchymal lesions, 2 1 and 5 mm in the greatest linear dimension  There is diffuse enhancement of the internal auditory canals bilaterally, which warrants lumbar puncture to exclude leptomeningeal tumor  The study was marked in EPIC for significant notification  Workstation performed: [id number]     Mri Cervical Spine W Wo Contrast    Result Date: 1/20/2021  Narrative: MRI CERVICAL SPINE WITH AND WITHOUT CONTRAST INDICATION: spinal mass   COMPARISON:  None  TECHNIQUE:  Sagittal T1, sagittal T2, and sagittal inversion recovery  Sagittal postcontrast T1  IV Contrast:  7 mL of Gadobutrol injection (SINGLE-DOSE) IMAGE QUALITY:  Diagnostic  FINDINGS: ALIGNMENT:  Straightening of the cervical lordosis  MARROW SIGNAL:  Normal marrow signal is identified within the visualized bony structures  No discrete marrow lesion  CERVICAL AND VISUALIZED UPPER THORACIC CORD:  Normal signal within the visualized cord  PREVERTEBRAL AND PARASPINAL SOFT TISSUES:  Normal  VISUALIZED POSTERIOR FOSSA:  The visualized posterior fossa demonstrates no abnormal signal  CERVICAL DISC SPACES:  Disc heights are maintained  No central or foraminal narrowing  POSTCONTRAST IMAGING:  Abnormal postcontrast enhancement identified along the surface of the spinal cord at C3-4 anteriorly (series 24 image 10) T2-3 along the surface of the dorsal cord series 24 image 10 compatible with CSF spread of tumor  Enhancement along the inferior aspect of cerebellum also noted  Impression: Enhancement along the surface of the cervical and thoracic spinal cord compatible with CSF spread of tumor  Enhancement is seen at C3-4, and T2-3  Workstation performed: [id number]     Mri Thoracic Spine W Wo Contrast    Result Date: 1/20/2021  Narrative: MRI THORACIC SPINE WITH AND WITHOUT CONTRAST INDICATION: rule out spinal mass  COMPARISON:  None  TECHNIQUE:  Sagittal T1, sagittal T2, sagittal inversion recovery, axial T2,  axial 2D MERGE  Sagittal and axial T1 postcontrast  IV Contrast:  7 mL of Gadobutrol injection (SINGLE-DOSE) IMAGE QUALITY:  Diagnostic  FINDINGS: ALIGNMENT:  Dextroscoliosis apex T8  MARROW SIGNAL:  Normal marrow signal is identified within the visualized bony structures  No discrete marrow lesion  THORACIC CORD:  Normal signal within the thoracic cord  PREVERTEBRAL AND PARASPINAL SOFT TISSUES:   Normal  THORACIC DEGENERATIVE CHANGE:  No disc herniation, canal stenosis or foraminal narrowing  No degenerative changes  POSTCONTRAST:  Nodular enhancement identified lower thoracic and upper lumbar spine seen best on sagittal series 25 images 8-11 compatible with CSF spread of tumor  Impression: Enhancement along the nerve roots of the filum terminale and distal spinal cord seen best on series 25 images 8 through 11 compatible with CSF spread of tumor  Dextroscoliosis apex T8  No significant central or foraminal narrowing  Workstation performed: [id number]     Mri Lumbar Spine W Wo Contrast    Result Date: 1/20/2021  Narrative: MRI LUMBAR SPINE WITH AND WITHOUT CONTRAST INDICATION: rule out spinal mass  COMPARISON:  None  TECHNIQUE:  Sagittal T1, sagittal T2, sagittal inversion recovery, axial T1 and axial T2, coronal T2  Sagittal and axial T1 postcontrast  IV Contrast:  7 mL of Gadobutrol injection (SINGLE-DOSE) IMAGE QUALITY:  Diagnostic FINDINGS: VERTEBRAL BODIES:  There are 5 lumbar type vertebral bodies  Levoscoliosis apex L2  Slight anterolisthesis L4 on L5  Normal marrow signal is identified within the visualized bony structures  No discrete marrow lesion  SACRUM:  Normal signal within the sacrum  No evidence of insufficiency or stress fracture  DISTAL CORD AND CONUS:  Please see below  PARASPINAL SOFT TISSUES:  Paraspinal soft tissues are unremarkable   LOWER THORACIC DISC SPACES:  Normal disc height and signal   No disc herniation, canal stenosis or foraminal narrowing  LUMBAR DISC SPACES: L1-L2:  Normal  L2-L3:  Normal  L3-L4:  Mild facet hypertrophy  No significant central or foraminal narrowing  L4-L5:  Severe facet degenerative change results in anterolisthesis and uncovering the posterior disc margin  Moderate left and mild right foraminal narrowing identified with mild central stenosis  L5-S1:  Mild diffuse annular bulge without significant central or foraminal narrowing  POSTCONTRAST IMAGING:  Nodular enhancement along the nerve roots of the filum terminale as well as along the distal spinal cord compatible with CSF spread of tumor  Impression: Innumerable foci of nodular enhancement along the filum terminale and distal spinal cord compatible CSF spread of tumor  Degenerative changes as described  Workstation performed: [id number]     Ct Chest Abdomen Pelvis W Contrast    Result Date: 1/17/2021  Narrative: CT CHEST, ABDOMEN AND PELVIS WITH IV CONTRAST INDICATION:   cancer w/u  Brain metastases, and cervical, hilar and mediastinal lymphadenopathy demonstrated on CTA of the head and neck from yesterday  History of sigmoid resection for perforated diverticulitis in 2017  Chronic splenic mass  COMPARISON:  CTA of the head and neck from January 16, 2021  MRI of the abdomen from February 21, 2019  CTs of the abdomen and pelvis from July 31, 2018 and May 1, 2017  TECHNIQUE: CT examination of the chest, abdomen and pelvis was performed  Axial, sagittal, and coronal 2D reformatted images were created from the source data and submitted for interpretation  Radiation dose length product (DLP) for this visit:  766 9 mGy-cm   This examination, like all CT scans performed in the [facility], was performed utilizing techniques to minimize radiation dose exposure, including the use of iterative reconstruction and automated exposure control   IV Contrast:  100 mL of iohexol (OMNIPAQUE) Enteric Contrast: Enteric contrast was not administered  FINDINGS: CHEST LUNGS:  7 mm tubular branching soft tissue nodule in the base of the right lower lobe, most consistent with endobronchial mucoid impaction  Lungs otherwise clear  PLEURA:  Unremarkable  HEART/GREAT VESSELS:  Heart and thoracic aorta unremarkable  Dilated central pulmonary arteries with main pulmonary artery diameter of 3 4 cm  MEDIASTINUM AND DAVID: Several enlarged lymph nodes including a 1 5 cm subcarinal node, 1 4 cm pretracheal node, 1 6 cm right hilar node and 1 5 cm left superior mediastinal node, adjacent to the left brachiocephalic vein  Esophagus unremarkable  Trachea and main stem bronchi normal  CHEST WALL AND LOWER NECK:   Unremarkable  ABDOMEN LIVER/BILIARY TREE:  8 mm cyst in the dome of the right lobe, unchanged since 7/31/2018  No new liver lesions  Bile ducts normal in caliber  GALLBLADDER:  Contracted  Otherwise unremarkable  SPLEEN:  7 7 x 6 8 cm hypoenhancing splenic mass, cyst slowly increased in size since CTs dating back to 4/25/2017 (at which time it measured 6 5 x 6 8 cm)  PANCREAS:  Unremarkable  ADRENAL GLANDS:  Hyperplastic left adrenal gland, in retrospect, unchanged since 2017  Normal right adrenal gland  KIDNEYS/URETERS:  Unremarkable  No hydronephrosis  STOMACH AND BOWEL:  Stomach unremarkable  Enteroenteric anastomosis in the right midabdomen  Colorectal anastomosis  Small intestine and colon otherwise unremarkable  APPENDIX:  Normal  ABDOMINOPELVIC CAVITY: No lymphadenopathy or mass  No ascites  No extraluminal gas  VESSELS:  Atherosclerotic changes are present  No evidence of aneurysm  PELVIS REPRODUCTIVE ORGANS:  Post hysterectomy  URINARY BLADDER:  Unremarkable  ABDOMINAL WALL/INGUINAL REGIONS:  Unremarkable  OSSEOUS STRUCTURES:  Scoliosis  Degenerative disease in the thoracic and lumbar spine  No evidence of recent fracture or destructive lesion  Impression: 1  Mediastinal and right hilar lymphadenopathy   2  Chronic splenic mass, slowly enlarging since 2017  This is almost certainly benign and the differential diagnosis includes hamartoma, hemorrhagic cyst, littoral cell angioma or lymphangioma  3   No evidence of primary malignancy in the chest, abdomen or pelvis  Workstation performed: [id number]     Ir Biopsy Lymph Node    Result Date: 1/20/2021  Narrative: Ultrasound-guided fine needle aspiration biopsy of right cervical lymph node 1/20/2021 Clinical History: Cervical adenopathy   Technique: The patient was brought to the ultrasound area and placed supine on the table  After a brief ultrasound examination was performed of the right neck and correlated with the prior examination, the neck was then prepped and draped in usual sterile fashion  Lidocaine was administered to the skin and a 27 gauge needle was advanced into the nodule under direct ultrasound guidance  5 additional passes were made similarly  The specimen was found to be adequate  A full pathology report will follow  The patient tolerated the procedure well and suffered no complications  Impression: Impression: Successful fine needle aspiration biopsy of a right cervical lymph node yielding an adequate specimen  A full report will follow  Workstation performed: [id number]     Ir Lumbar Puncture    Result Date: 1/20/2021  Narrative: LUMBAR PUNCTURE UNDER FLUOROSCOPY 1/20/2021 History: Brain masses Contrast: None Fluoroscopy time:  5 minutes Number of images: 1 Radiation dose: 12 mGy Conscious sedation time: 15 minutes Procedure: The patient was identified verbally, and by wrist band   " Time out" was performed  Informed consent was obtained  Following obtaining informed consent, the lower back was prepped and draped in usual sterile fashion  Lidocaine was given as local anesthesia  Using fluoroscopic guidance, A 20 gauge needle was placed over the L5-S1 interspace  Clear CSF was returned   This was sampled in multiple tubes, and sent for laboratory analysis as requested  Findings: CSF appear grossly clear  Impression: Impression: Successful lumbar puncture under fluoroscopic guidance   Workstation performed: [id number]     ECOG :0      Assessment and plan:    52-year-old [place] American female with meningeal carcinomatosis, 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, when she presented with weakness, dizziness and headache    CT scan of the chest abdomen and pelvis showed hilar and mediastinal lymphadenopathy, no evidence of parenchymal pulmonary disease    Biopsy of the cervical lymph node was minute however consistent with squamous cell carcinoma, poorly differentiated    Started on whole-brain radiation therapy as well as lumbar spine radiation therapy because of the weakness and CSF involvement    Admitted to the hospital status post mediastinoscopy and larger tissue specimen await for molecular tests, patient to continue radiation therapy and she will follow up with medical oncologist Dr Maria Person as an outpatient for consideration of chemotherapy or targeted therapy

## 2021-02-13 NOTE — PROGRESS NOTES
Progress Note - Thoracic Surgery   Baljit Reece 62 y o  female MRN: [id number]  Unit/Bed#: [facility] 927-01 Encounter: [id number]    Assessment:  61 y/o F p/w metastatic SCC, s/p mediastinoscopy on 2/12    Plan:  --Diet as tolerated  --Pain control  --f/u biopsies  --Thoracic Surgery will sign off    Subjective/Objective     Subjective:     No acute events overnight  Pt c/o slight sore throat this AM  Denies any difficulty eating or swallowing  Objective:     Blood pressure 137/71, pulse 82, temperature 98 4 °F (36 9 °C), resp  rate 18, last menstrual period 08/16/2014, SpO2 93 %, not currently breastfeeding  ,There is no height or weight on file to calculate BMI  Intake/Output Summary (Last 24 hours) at 2/13/2021 0304  Last data filed at 2/12/2021 2234  Gross per 24 hour   Intake 1785 ml   Output --   Net 1785 ml       Invasive Devices     Peripheral Intravenous Line            Peripheral IV 02/12/21 Dorsal (posterior); Left Hand less than 1 day                Physical Exam:     GEN: NAD  HEENT: MMM, incision c/d/i  CV: RRR  Lung: normal effort  Ab: Soft, NT/ND  Extrem: No CCE  Neuro:  A+Ox3, motor and sensation grossly intact    Lab, Imaging and other studies:  CBC:   Lab Results   Component Value Date    WBC 6 70 02/12/2021    HGB 11 3 (L) 02/12/2021    HCT 36 2 02/12/2021    MCV 91 02/12/2021     02/12/2021    MCH 28 5 02/12/2021    MCHC 31 2 (L) 02/12/2021    RDW 16 7 (H) 02/12/2021    MPV 9 8 02/12/2021    NRBC 0 02/12/2021   , CMP:   Lab Results   Component Value Date    SODIUM 139 02/12/2021    K 3 8 02/12/2021     02/12/2021    CO2 29 02/12/2021    BUN 26 (H) 02/12/2021    CREATININE 0 48 (L) 02/12/2021    CALCIUM 10 0 02/12/2021    EGFR 124 02/12/2021   , Coagulation: No results found for: PT, INR, APTT, Urinalysis: No results found for: COLORU, CLARITYU, SPECGRAV, PHUR, LEUKOCYTESUR, NITRITE, PROTEINUA, GLUCOSEU, KETONESU, BILIRUBINUR, BLOODU  VTE Pharmacologic Prophylaxis: Enoxaparin (Lovenox)  VTE Mechanical Prophylaxis: sequential compression device

## 2021-02-13 NOTE — PROGRESS NOTES
Progress Note - Jenifer Garcia 1960, 62 y o  female MRN: [id number]    Unit/Bed#: [facility] 927-01 Encounter: [id number]    Primary Care Provider: Burgess Cindy DO   Date and time admitted to hospital: 2/11/2021  3:21 PM        * Cancer related pain  Assessment & Plan  · Per record review, patient initially presented to the [facility] with intractable back and LLE pain in the setting of metastatic cancer with spinal/brain/mediastinal involvement   · Pain management per Palliative Care  · Radiation treatments per Radiation Oncology  · Continue current analgesic regimen w/ constipation prophylaxis  · PT/OT  · Supportive care    Metastatic cancer of brain and spinal cord with unknown primary site  Assessment & Plan  · Per record review, patient was recently admitted to [facility] 1/17/21  She initially presented to [facility] on 1/16/21 with persistent dizziness x 4 weeks  CT of the head and neck at that time revealed isodense soft tissue masses in the left occipital lobe and left parietal lobe with surrounding vasogenic edema  She apparently left emergency department AMA but returned to [facility] ED the following day  CT of the chest, abdomen, and pelvis revealed lymphadenopathy  She was seen in consultation by Neurosurgery  MRI of the brain revealed metastatic disease with 2 parenchymal lesions and diffuse enhancement of the internal auditory canals bilaterally which warranted lumbar puncture to exclude leptomeningeal tumor  IR performed lumbar puncture and aspiration biopsy of cervical lymph node 1/20/21  MRI of the C/T/L-spine revealed CSF spread of tumor  She was started on Decadron  She was discharged on 1/22/21  She followed up with Dr Gus Patel of Heme/Onc as OP on 1/28/21 and PCP the following day  The cervical lymph node biopsy from January revealed conclusive evidence for malignancy, malignant epithelioid neoplasm, 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    The patient then saw Palliative Care and Thoracic Surgery on 2/4/21  Thoracic surgery recommended mediastinoscopy  She was then seen in consult by Radiation Oncology 2/5/21  Her mediastinoscopy had to be rescheduled 2/2 weather it appears  She saw her Oncologist again on 2/10/21  Unfortunately, the patient was admitted the following day, 2/11/21, due to worsening pain  She was seen in consultation by Palliative care for pain control and ultimately transferred to [facility] to see Thoracic Surgery for her mediastinoscopy  · She underwent flexible bronchoscopy, mediastinoscopy by Dr Claire Holloway on 2/12/21  Follow-up pathology  No need for dressing over neck incision per thoracic surgery  Thoracic surgery has since signed off  · Heme/Onc following - plan to continue radiation therapy and follow-up with her primary oncologist as outpatient for consideration of chemotherapy or targeted therapy  · Rad/Onc following - patient undergoing whole brain and spine radiation  This will resume on Monday 2/15/21 per Rad/Onc     Diabetes mellitus type 2  Assessment & Plan  Lab Results   Component Value Date    HGBA1C 5 3 01/05/2021     · Hold oral hypoglycemics while hospitalized  · Continue SSI coverage and Accu-Cheks    Essential hypertension  Assessment & Plan  · Optimize pain control  · Continue Norvasc    Hyperlipidemia  Assessment & Plan  · Continue statin      VTE Pharmacologic Prophylaxis:   Pharmacologic: Enoxaparin (Lovenox)  Mechanical VTE Prophylaxis in Place: Yes    Patient Centered Rounds: I have performed bedside rounds with nursing staff today  Discussions with Specialists or Other Care Team Provider:     Education and Discussions with Family / Patient: patient; when asked if there was anyone she would like me to call today with an update the patient kindly declined and stated she had her phone ans was speaking with her family     Time Spent for Care: 30 minutes    More than 50% of total time spent on counseling and coordination of care as described above  Current Length of Stay: 2 day(s)    Current Patient Status: Inpatient   Certification Statement: The patient will continue to require additional inpatient hospital stay due to as above    Discharge Plan: for radiation as above    Code Status: Level 1 - Full Code      Subjective:   Ms Hallie Welch reports her pain from the mediastinoscopy is minimal, a /10 and she took Tylenol  Leg pain improved     Objective:     Vitals:   Temp (24hrs), Av 1 °F (36 7 °C), Min:97 8 °F (36 6 °C), Max:98 4 °F (36 9 °C)    Temp:  [97 8 °F (36 6 °C)-98 4 °F (36 9 °C)] 97 8 °F (36 6 °C)  HR:  [75-78] 78  Resp:  [18] 18  BP: (134-162)/(67-81) 134/67  SpO2:  [96 %-97 %] 96 %  There is no height or weight on file to calculate BMI  Input and Output Summary (last 24 hours): Intake/Output Summary (Last 24 hours) at 2021 1642  Last data filed at 2021 1547  Gross per 24 hour   Intake 845 ml   Output --   Net 845 ml       Physical Exam:     Physical Exam  Vitals signs and nursing note reviewed  Constitutional:       Comments: Patient seen earlier today sitting in bed comfortably resting on Facetime with her grandson she informed me  No acute distress   Cardiovascular:      Rate and Rhythm: Normal rate and regular rhythm  Pulmonary:      Effort: Pulmonary effort is normal       Breath sounds: Normal breath sounds  Abdominal:      General: Bowel sounds are normal       Palpations: Abdomen is soft  Tenderness: There is no abdominal tenderness  Musculoskeletal:      Right lower leg: No edema  Left lower leg: No edema  Skin:     General: Skin is warm  Neurological:      Mental Status: She is alert and oriented to person, place, and time     Psychiatric:         Mood and Affect: Mood normal          Behavior: Behavior normal          Additional Data:     Labs:    Results from last 7 days   Lab Units 21  0555   WBC Thousand/uL 6 70   HEMOGLOBIN g/dL 11 3*   HEMATOCRIT % 36 2   PLATELETS Thousands/uL 301   NEUTROS PCT % 77*   LYMPHS PCT % 14   MONOS PCT % 8   EOS PCT % 1     Results from last 7 days   Lab Units 02/12/21  0555 02/11/21  0748   SODIUM mmol/L 139 139   POTASSIUM mmol/L 3 8 3 6   CHLORIDE mmol/L 105 101   CO2 mmol/L 29 29   BUN mg/dL 26* 20   CREATININE mg/dL 0 48* 0 57*   ANION GAP mmol/L 5 9   CALCIUM mg/dL 10 0 9 3   ALBUMIN g/dL  --  3 5   TOTAL BILIRUBIN mg/dL  --  0 90   ALK PHOS U/L  --  48   ALT U/L  --  17   AST U/L  --  11   GLUCOSE RANDOM mg/dL 131 131         Results from last 7 days   Lab Units 02/13/21  1155 02/13/21  0840 02/12/21  2116 02/12/21  1752 02/12/21  1305 02/12/21  1039 02/12/21  0735 02/11/21  2019 02/11/21  1637 02/11/21  1050 02/11/21  0856   POC GLUCOSE mg/dl 249* 124 275* 167* 161* 160* 118 236* 189* 220* 132                   * I Have Reviewed All Lab Data Listed Above  * Additional Pertinent Lab Tests Reviewed:  All Labs Within Last 24 Hours Reviewed    Imaging:    Imaging Reports Reviewed Today Include: as noted above  Imaging Personally Reviewed by Myself Includes:  none    Recent Cultures (last 7 days):           Last 24 Hours Medication List:   Current Facility-Administered Medications   Medication Dose Route Frequency Provider Last Rate    acetaminophen  650 mg Oral Q6H PRN Jazzy Adames PA-C      acetaminophen  975 mg Oral [facility] Ana Maria Antonio PA-C      aluminum-magnesium hydroxide-simethicone  30 mL Oral Q6H PRN Jazzy Adames PA-C      amLODIPine  10 mg Oral Daily Janny Antonio PA-C      atorvastatin  20 mg Oral Daily Joey Antonio [place]      dexamethasone  4 mg Oral BID With Meals Jazzy Adames PA-C      diazepam  2 mg Oral Q6H PRN Jazzy Adames PA-C      enoxaparin  40 mg Subcutaneous Daily Janny Antonio PA-C      gabapentin  100 mg Oral BID Janny Antonio PA-C      HYDROmorphone  0 5 mg Intravenous Q2H PRN Joey Antonio PA-C      insulin lispro  1-5 Units Subcutaneous HS Jazzy Adames PA-C      insulin lispro  1-5 Units Subcutaneous TID AC José Luis Miranda PA-C      lidocaine  2 patch Topical Daily Cande Sharma      loratadine  10 mg Oral HS Cande Sharma      LORazepam  2 mg Intravenous Q10 Min PRN José Luis Miranda PA-C      ondansetron  4 mg Intravenous Q6H PRN José Luis Miranda PA-C      oxyCODONE  10 mg Oral Q3H PRN José Luis Miranda PA-C      oxyCODONE  5 mg Oral Q3H PRN José Luis Miranda PA-C      pantoprazole  20 mg Oral Early Morning José Luis Miranda PA-C          Today, Patient Was Seen By: Negar Mccartney PA-C    ** Please Note: Dictation voice to text software may have been used in the creation of this document   **

## 2021-02-13 NOTE — ASSESSMENT & PLAN NOTE
· Per record review, patient initially presented to the [facility] w/ intractable back and LLE pain in the setting of metastatic cancer with spinal/brain/mediastinal involvement - XR femur negative for osseous abnormality  · Pain managed by palliative care prior to transfer  - palliative radiation per radiation oncology  · Continue current analgesic regimen w/ constipation prophylaxis  · See plan for metastatic cancer below  · PT/OT  · Supportive care

## 2021-02-14 LAB
ANION GAP SERPL CALCULATED.3IONS-SCNC: 4 MMOL/L (ref 4–13)
BUN SERPL-MCNC: 20 MG/DL (ref 5–25)
CALCIUM SERPL-MCNC: 9.4 MG/DL (ref 8.3–10.1)
CHLORIDE SERPL-SCNC: 103 MMOL/L (ref 100–108)
CO2 SERPL-SCNC: 30 MMOL/L (ref 21–32)
CREAT SERPL-MCNC: 0.38 MG/DL (ref 0.6–1.3)
ERYTHROCYTE [DISTWIDTH] IN BLOOD BY AUTOMATED COUNT: 16.6 % (ref 11.6–15.1)
GFR SERPL CREATININE-BSD FRML MDRD: 134 ML/MIN/1.73SQ M
GLUCOSE SERPL-MCNC: 128 MG/DL (ref 65–140)
GLUCOSE SERPL-MCNC: 144 MG/DL (ref 65–140)
GLUCOSE SERPL-MCNC: 270 MG/DL (ref 65–140)
GLUCOSE SERPL-MCNC: 366 MG/DL (ref 65–140)
HCT VFR BLD AUTO: 31.7 % (ref 34.8–46.1)
HGB BLD-MCNC: 10 G/DL (ref 11.5–15.4)
MCH RBC QN AUTO: 28.2 PG (ref 26.8–34.3)
MCHC RBC AUTO-ENTMCNC: 31.5 G/DL (ref 31.4–37.4)
MCV RBC AUTO: 90 FL (ref 82–98)
PLATELET # BLD AUTO: 247 THOUSANDS/UL (ref 149–390)
PMV BLD AUTO: 9.7 FL (ref 8.9–12.7)
POTASSIUM SERPL-SCNC: 4 MMOL/L (ref 3.5–5.3)
RBC # BLD AUTO: 3.54 MILLION/UL (ref 3.81–5.12)
SODIUM SERPL-SCNC: 137 MMOL/L (ref 136–145)
WBC # BLD AUTO: 5.7 THOUSAND/UL (ref 4.31–10.16)

## 2021-02-14 PROCEDURE — 80048 BASIC METABOLIC PNL TOTAL CA: CPT | Performed by: PHYSICIAN ASSISTANT

## 2021-02-14 PROCEDURE — 97110 THERAPEUTIC EXERCISES: CPT

## 2021-02-14 PROCEDURE — 99232 SBSQ HOSP IP/OBS MODERATE 35: CPT | Performed by: PHYSICIAN ASSISTANT

## 2021-02-14 PROCEDURE — 85027 COMPLETE CBC AUTOMATED: CPT | Performed by: PHYSICIAN ASSISTANT

## 2021-02-14 PROCEDURE — 97163 PT EVAL HIGH COMPLEX 45 MIN: CPT

## 2021-02-14 PROCEDURE — 82948 REAGENT STRIP/BLOOD GLUCOSE: CPT

## 2021-02-14 RX ORDER — DOCUSATE SODIUM 100 MG/1
100 CAPSULE, LIQUID FILLED ORAL 2 TIMES DAILY
Status: DISCONTINUED | OUTPATIENT
Start: 2021-02-14 | End: 2021-02-15

## 2021-02-14 RX ADMIN — GABAPENTIN 100 MG: 100 CAPSULE ORAL at 08:00

## 2021-02-14 RX ADMIN — INSULIN LISPRO 4 UNITS: 100 INJECTION, SOLUTION INTRAVENOUS; SUBCUTANEOUS at 19:10

## 2021-02-14 RX ADMIN — GABAPENTIN 100 MG: 100 CAPSULE ORAL at 19:08

## 2021-02-14 RX ADMIN — OXYCODONE HYDROCHLORIDE 10 MG: 10 TABLET ORAL at 22:58

## 2021-02-14 RX ADMIN — ENOXAPARIN SODIUM 40 MG: 40 INJECTION SUBCUTANEOUS at 08:00

## 2021-02-14 RX ADMIN — DEXAMETHASONE 4 MG: 4 TABLET ORAL at 08:00

## 2021-02-14 RX ADMIN — ACETAMINOPHEN 975 MG: 325 TABLET, FILM COATED ORAL at 13:00

## 2021-02-14 RX ADMIN — ACETAMINOPHEN 975 MG: 325 TABLET, FILM COATED ORAL at 05:08

## 2021-02-14 RX ADMIN — INSULIN LISPRO 3 UNITS: 100 INJECTION, SOLUTION INTRAVENOUS; SUBCUTANEOUS at 12:12

## 2021-02-14 RX ADMIN — INSULIN LISPRO 3 UNITS: 100 INJECTION, SOLUTION INTRAVENOUS; SUBCUTANEOUS at 22:58

## 2021-02-14 RX ADMIN — OXYCODONE HYDROCHLORIDE 10 MG: 10 TABLET ORAL at 03:13

## 2021-02-14 RX ADMIN — PANTOPRAZOLE SODIUM 20 MG: 20 TABLET, DELAYED RELEASE ORAL at 05:08

## 2021-02-14 RX ADMIN — AMLODIPINE BESYLATE 10 MG: 10 TABLET ORAL at 08:00

## 2021-02-14 RX ADMIN — DEXAMETHASONE 4 MG: 4 TABLET ORAL at 19:08

## 2021-02-14 RX ADMIN — ATORVASTATIN CALCIUM 20 MG: 20 TABLET, FILM COATED ORAL at 08:00

## 2021-02-14 RX ADMIN — ACETAMINOPHEN 975 MG: 325 TABLET, FILM COATED ORAL at 22:57

## 2021-02-14 RX ADMIN — DOCUSATE SODIUM 100 MG: 100 CAPSULE, LIQUID FILLED ORAL at 19:08

## 2021-02-14 NOTE — PHYSICAL THERAPY NOTE
Physical Therapy Evaluation    Patient's Name: Magdy Gray    Admitting Diagnosis  Carcinoma [facility]) [C80 1]    Problem List  Patient Active Problem List   Diagnosis    Diabetes mellitus type 2    Uncomplicated asthma    Essential hypertension    Hyperlipidemia    [facility]    Diabetic nephropathy associated with type 2 diabetes mellitus ([facility]    CAD in native artery    Tobacco use    Sciatica of left side    Lumbar back pain with radiculopathy affecting left lower extremity    Cerebral edema (HCC)    Metastatic cancer of brain and spinal cord with unknown primary site    Leptomeningitis, carcinomatous ([facility]    Cancer related pain    Obesity (BMI 30 0-34  9)       Past Medical History  Past Medical History:   Diagnosis Date    Acute on chronic congestive heart failure with left ventricular diastolic dysfunction (HCC)     last assessed 2017    Asthma     Atelectasis     CHF (congestive heart failure) ([facility]     Diabetes mellitus ([facility]     Diverticulitis 2017    with perforation and abscess     Hyperlipidemia     Hypertension     Lesion of spleen     Pericardial effusion        Past Surgical History  Past Surgical History:   Procedure Laterality Date    ABDOMINAL SURGERY      BREAST BIOPSY Left 10/04/2018     SECTION      COLON SURGERY      COLONOSCOPY      COLOSTOMY  2017    HARTMANS PROCEDURE N/A 2017    Procedure: EXPLORATORY LAPAROTOMY; PARTIAL SMALL BOWEL RESECTION; HARTMANS PROCEDURE; OSTOMY;  Surgeon: Kalyani Levi MD;  [facility];  Service:     HYSTERECTOMY  2018    IR BIOPSY LYMPH NODE  2021    IR LUMBAR PUNCTURE  2021    MAMMO STEREOTACTIC BREAST BIOPSY LEFT (ALL INC) Left 10/4/2018    OOPHORECTOMY Bilateral 2018    SC CLOSE ENTEROSTOMY N/A 2017    Procedure: OPEN COLOSTOMY REVERSAL;  Surgeon: Kalyani Levi MD;  [facility];  Service: General    SC COLONOSCOPY FLX DX W/COLLJ SPEC WHEN PFRMD N/A 8/16/2017    Procedure: COLONOSCOPY; DILATION OF OSTOMY;  Surgeon: Connie Crane MD;  Location: [facility]; Service: General    NJ EXC SKIN BENIG <0 5 CM REMAINDER BODY N/A 11/29/2017    Procedure: SCALP MASS EXCISION X 2;  Surgeon: Connie Crane MD;  [facility];  Service: [address] [place] N/A 2/27/2019    Procedure: INCISIONAL HERNIA REPAIR, COMPONENT SEPARATION;  Surgeon: Connie Crane MD;  [facility];  Service: General    NJ TOTAL ABDOM HYSTERECTOMY N/A 2/27/2019    Procedure: TOTAL ABDOMINAL HYSTERECTOMY; BSO;  Surgeon: Lisa Monet MD;  [facility];  Service: Gynecology    VAC DRESSING APPLICATION N/A 6/8/4353    Procedure: APPLICATION VAC DRESSING;  Surgeon: Connie Crane MD;  [facility];  Service:         02/14/21 1135   PT Last Visit   PT Visit Date 02/14/21   Note Type   Note type Evaluation   Pain Assessment   Pain Assessment Tool 0-10   Pain Score 3   Pain Location/Orientation Orientation: Left; Location: Leg   Hospital Pain Intervention(s) Repositioned; Ambulation/increased activity   Home Living   Type of [address] Multi-level;Stairs to enter with rails  (3 JERRY, split level)   Home Equipment Quad cane   Additional Comments Pt reports her bedroom in on the lower level of the home with kitchen on upper level    Plans to switch bedrooms with her daughter to be on upper level with kitchen to avoid stair climbing   Prior Function   Level of [place] Independent with ADLs and functional mobility   Lives With Family   ADL Assistance Independent   IADLs Independent   Falls in the last 6 months 0   Vocational Retired   Comments Pt reports she retired one month ago, she has had progressive decline in functional status for the past 3 weeks, has been utilizing quad cane for 1 week, family providing assistance with stair climbing   Restrictions/Precautions   Weight Bearing Precautions Per Order No Other Precautions Fall Risk;Pain;Hard of hearing  (new onset hearing impairment)   General   Family/Caregiver Present No   Cognition   Overall Cognitive Status WFL   Arousal/Participation Alert   Orientation Level Oriented X4   Following Commands Follows all commands and directions without difficulty   Comments Pt pleasant and cooperative throughout session, requires cues to attend to task at times   RLE Assessment   RLE Assessment WFL  (Grossly 4/5)   LLE Assessment   LLE Assessment X   Strength LLE   L Hip Flexion 3-/5   L Knee Flexion 3-/5   L Knee Extension 3/5   L Ankle Dorsiflexion 3+/5   Light Touch   RLE Light Touch Grossly intact   LLE Light Touch Grossly intact   Proprioception   RLE Proprioception Grossly intact  (at great toe)   LLE Proprioception Grossly Intact  (at great toe)   Bed Mobility   Additional Comments Pt OOB upon PT arrival   Transfers   Sit to Stand 5  Supervision   Stand to Sit 5  Supervision   Additional Comments with RW   Ambulation/Elevation   Gait pattern Improper Weight shift;Decreased L stance;Decreased foot clearance; Foward flexed; Excessively slow  (L knee hyperextension inconsistently)   Gait Assistance 5  Supervision   Additional items Assist x 1   Assistive Device Rolling walker   Distance 60 ft, limited by fatigue  RW adjusted to height   Balance   Static Sitting Good   Dynamic Sitting Fair   Static Standing Fair   Dynamic Standing Fair -   Ambulatory Poor +   Activity Tolerance   Activity Tolerance Patient limited by fatigue   Nurse Made Aware RN updated   Assessment   Prognosis Good   Problem List Decreased strength;Decreased endurance; Impaired balance;Decreased mobility; Decreased safety awareness;Pain   Assessment Pt is a 62 y o  female seen for PT evaluation s/p admit to [facility] on 2/11/2021  Pt was admitted with a primary dx of: cancer related pain  PT now consulted for assessment of mobility and d/c needs  Pt with Up with assistance orders    Pts current comorbidities effecting treatment include: 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 (raditation treatment), CHF, DM, Asthma, s/p mediastinoscopy on 2/12  Pts current clinical presentation is Unstable/ Unpredictable (high complexity) due to Ongoing medical management for primary dx, Increased reliance on more restrictive AD compared to baseline, Decreased activity tolerance compared to baseline, Fall risk, Increased assistance needed from caregiver at current time, Trending lab values  Prior to admission, pt was independent with all mobility, progressive decline in function over the past 3 weeks, L LE weakness > R LE  Upon evaluation, pt currently is requiring supervision for transfers and supervision for ambulation 60 ft w/ RW  Pt presents at PT eval functioning below baseline and currently w/ overall mobility deficits 2* to: LLE weakness, impaired balance, decreased endurance, gait deviations, pain, decreased activity tolerance compared to baseline, decreased functional mobility tolerance compared to baseline, decreased safety awareness, fall risk  Pt currently at a fall risk 2* to impairments listed above  Pt will continue to benefit from skilled acute PT interventions to address stated impairments; to maximize functional mobility; for ongoing pt/ family training; and DME needs  At conclusion of PT session pt returned back in chair with phone and call bell within reach  Pt denies any further questions at this time  Recommend home with family care and HHPT upon hospital D/C  Goals   Patient Goals to get stronger   STG Expiration Date 02/28/21   Short Term Goal #1 In 14 days pt will be able to: 1  Demonstrate ability to perform all aspects of bed mobility independently to increase functional independence  2  Perform functional transfers with LRAD independently to facilitate safe return to previous living environment    3   Ambulate 150 ft with LRAD independently with stable vitals to improve safety with household distances and reduce fall risk  4  Improve LE strength grades by 1 to increase ease of functional mobility with transfers and gait  5  Pt will demonstrate improved balance by one grade in order to decrease risk of falls  6  Climb 6 steps with supervision and 1 HR to simulate entrance to home  PT Treatment Day 0   Plan   Treatment/Interventions Functional transfer training;LE strengthening/ROM; Elevations; Therapeutic exercise; Endurance training;Patient/family training;Equipment eval/education; Bed mobility;Gait training   PT Frequency Other (Comment)  (3-5x/week)   Recommendation   PT Discharge Recommendation Home with skilled therapy  (family care and HHPT)   Equipment Recommended Walker   PT - OK to Discharge No  (stair training)   Brenda 8 in Bed Without Bedrails 4   Lying on Back to Sitting on Edge of Flat Bed 3   Moving Bed to Chair 3   Standing Up From Chair 3   Walk in Room 3   Climb 3-5 Stairs 2   Basic Mobility Inpatient Raw Score 18   Basic Mobility Standardized Score 41 05     Treatment  11:25-11:35  TE: Performed seated in bedside chair to improve muscular strength, pt very motivated and requesting exercise to perform outside of PT treatment  Pt educated on LAQ, cues for 3 second hold and eccentric control, heel/toe raise exercise coordinated B/L LE's  Pt able to teach back all exercise, x10 reps, appreciative of exercise  Pt expressed concerns of stair climbing upon discharge  Will trial stairs next session and advance HEP as appropriate in preparation for discharge        Ankur Cunningham, PT, DPT

## 2021-02-14 NOTE — ASSESSMENT & PLAN NOTE
· Per record review, patient was recently admitted to [facility] 1/17/21  She initially presented to [facility] on 1/16/21 with persistent dizziness x 4 weeks  CT of the head and neck at that time revealed isodense soft tissue masses in the left occipital lobe and left parietal lobe with surrounding vasogenic edema  She apparently left emergency department AMA but returned to [facility] ED the following day  CT of the chest, abdomen, and pelvis revealed lymphadenopathy  She was seen in consultation by Neurosurgery  MRI of the brain revealed metastatic disease with 2 parenchymal lesions and diffuse enhancement of the internal auditory canals bilaterally which warranted lumbar puncture to exclude leptomeningeal tumor  IR performed lumbar puncture and aspiration biopsy of cervical lymph node 1/20/21  MRI of the C/T/L-spine revealed CSF spread of tumor  She was started on Decadron  She was discharged on 1/22/21  She followed up with Dr Wil James of [facility] as OP on 1/28/21 and PCP the following day  The cervical lymph node biopsy from January revealed conclusive evidence for malignancy, malignant epithelioid neoplasm, 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  The patient then saw Palliative Care and Thoracic Surgery on 2/4/21  Thoracic surgery recommended mediastinoscopy  She was then seen in consult by Radiation Oncology 2/5/21  Her mediastinoscopy had to be rescheduled 2/2 weather it appears  She saw her Oncologist again on 2/10/21  Unfortunately, the patient was admitted the following day, 2/11/21, due to worsening pain  She was seen in consultation by Palliative care for pain control and ultimately transferred to [facility] to see Thoracic Surgery for her mediastinoscopy  · She underwent flexible bronchoscopy, mediastinoscopy by Dr Serene Lopez on 2/12/21  Follow-up pathology  No need for dressing over neck incision per thoracic surgery    Thoracic surgery has since signed off  · Heme/Onc following - plan to continue radiation therapy and follow-up with her primary oncologist as outpatient for consideration of chemotherapy or targeted therapy  · Rad/Onc following - patient undergoing whole brain and spine radiation    This will resume on Monday 2/15/21 per Rad/Onc

## 2021-02-14 NOTE — PROGRESS NOTES
Progress Note - Russell Crane 1960, 62 y o  female MRN: [id number]    Unit/Bed#: [facility] 927-01 Encounter: [id number]    Primary Care Provider: Kanchan Vogt DO   Date and time admitted to hospital: 2/11/2021  3:21 PM        * Cancer related pain  Assessment & Plan  · Per record review, patient initially presented to the [facility] with intractable back and LLE pain in the setting of metastatic cancer with spinal/brain/mediastinal involvement   · Pain management per Palliative Care  · Radiation treatments per Radiation Oncology  · Continue current analgesic regimen w/ constipation prophylaxis  · PT recommending home with skilled therapy at this time, however patient will need to continue to work with PT first for stairs   · Supportive care    Metastatic cancer of brain and spinal cord with unknown primary site  Assessment & Plan  · Per record review, patient was recently admitted to [facility] 1/17/21  She initially presented to [facility] on 1/16/21 with persistent dizziness x 4 weeks  CT of the head and neck at that time revealed isodense soft tissue masses in the left occipital lobe and left parietal lobe with surrounding vasogenic edema  She apparently left emergency department AMA but returned to [facility] ED the following day  CT of the chest, abdomen, and pelvis revealed lymphadenopathy  She was seen in consultation by Neurosurgery  MRI of the brain revealed metastatic disease with 2 parenchymal lesions and diffuse enhancement of the internal auditory canals bilaterally which warranted lumbar puncture to exclude leptomeningeal tumor  IR performed lumbar puncture and aspiration biopsy of cervical lymph node 1/20/21  MRI of the C/T/L-spine revealed CSF spread of tumor  She was started on Decadron  She was discharged on 1/22/21  She followed up with Dr Maricel Wu of Heme/Onc as OP on 1/28/21 and PCP the following day    The cervical lymph node biopsy from January revealed conclusive evidence for malignancy, malignant epithelioid neoplasm, 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  The patient then saw Palliative Care and Thoracic Surgery on 2/4/21  Thoracic surgery recommended mediastinoscopy  She was then seen in consult by Radiation Oncology 2/5/21  Her mediastinoscopy had to be rescheduled 2/2 weather it appears  She saw her Oncologist again on 2/10/21  Unfortunately, the patient was admitted the following day, 2/11/21, due to worsening pain  She was seen in consultation by Palliative care for pain control and ultimately transferred to [facility] to see Thoracic Surgery for her mediastinoscopy  · She underwent flexible bronchoscopy, mediastinoscopy by Dr Sunil Dean on 2/12/21  Follow-up pathology  No need for dressing over neck incision per thoracic surgery  Thoracic surgery has since signed off  · Heme/Onc following - plan to continue radiation therapy and follow-up with her primary oncologist as outpatient for consideration of chemotherapy or targeted therapy  · Rad/Onc following - patient undergoing whole brain and spine radiation  This will resume on Monday 2/15/21 per Rad/Onc     Diabetes mellitus type 2  Assessment & Plan  Lab Results   Component Value Date    HGBA1C 5 3 01/05/2021     · Hold oral hypoglycemics while hospitalized  · Continue SSI coverage and Accu-Cheks    Essential hypertension  Assessment & Plan  · Optimize pain control  · Continue Norvasc    Hyperlipidemia  Assessment & Plan  · Continue statin      VTE Pharmacologic Prophylaxis:   Pharmacologic: Enoxaparin (Lovenox)  Mechanical VTE Prophylaxis in Place: Yes    Patient Centered Rounds: I have performed bedside rounds with nursing staff today      Discussions with Specialists or Other Care Team Provider:     Education and Discussions with Family / Patient: patient; when asked if there was anyone she would like me to call today with an update the patient kindly declined and states she has been [address] [address] with her family Time Spent for Care: 30 minutes  More than 50% of total time spent on counseling and coordination of care as described above  Current Length of Stay: 3 day(s)    Current Patient Status: Inpatient   Certification Statement: The patient will continue to require additional inpatient hospital stay due to radiation therapy and pending stairs with PT    Discharge Plan: continued radiation therapy and pending stairs with PT    Code Status: Level 1 - Full Code      Subjective:   Ms Deepak Mulligan reports some left lag pain today, which she usually gets more towards the evening  She has been [address] with her family  She reports having a BM yesterday - requesting stool softener    Objective:     Vitals:   Temp (24hrs), Av 9 °F (36 6 °C), Min:97 5 °F (36 4 °C), Max:98 4 °F (36 9 °C)    Temp:  [97 5 °F (36 4 °C)-98 4 °F (36 9 °C)] 98 4 °F (36 9 °C)  HR:  [70-78] 70  Resp:  [18] 18  BP: (134-152)/(67-84) 144/84  SpO2:  [94 %-96 %] 94 %  Body mass index is 31 52 kg/m²  Input and Output Summary (last 24 hours): Intake/Output Summary (Last 24 hours) at 2021 1500  Last data filed at 2021 1100  Gross per 24 hour   Intake 1120 ml   Output 0 ml   Net 1120 ml       Physical Exam:     Physical Exam  Vitals signs and nursing note reviewed  Constitutional:       Comments: Patient seen sitting upright comfortably resting in bed speaking with family on her cell phone when I entered the room  She is very pleasant and cooperative   Cardiovascular:      Rate and Rhythm: Normal rate and regular rhythm  Pulmonary:      Effort: Pulmonary effort is normal  No respiratory distress  Breath sounds: Normal breath sounds  No wheezing  Abdominal:      General: Bowel sounds are normal       Palpations: Abdomen is soft  Tenderness: There is no abdominal tenderness  Musculoskeletal:      Right lower leg: No edema  Left lower leg: No edema  Skin:     General: Skin is warm     Neurological:      Mental Status: She is alert and oriented to person, place, and time  Psychiatric:         Mood and Affect: Mood normal          Behavior: Behavior normal          Additional Data:     Labs:    Results from last 7 days   Lab Units 02/14/21  0515 02/12/21  0555   WBC Thousand/uL 5 70 6 70   HEMOGLOBIN g/dL 10 0* 11 3*   HEMATOCRIT % 31 7* 36 2   PLATELETS Thousands/uL 247 301   NEUTROS PCT %  --  77*   LYMPHS PCT %  --  14   MONOS PCT %  --  8   EOS PCT %  --  1     Results from last 7 days   Lab Units 02/14/21  0515  02/11/21  0748   SODIUM mmol/L 137   < > 139   POTASSIUM mmol/L 4 0   < > 3 6   CHLORIDE mmol/L 103   < > 101   CO2 mmol/L 30   < > 29   BUN mg/dL 20   < > 20   CREATININE mg/dL 0 38*   < > 0 57*   ANION GAP mmol/L 4   < > 9   CALCIUM mg/dL 9 4   < > 9 3   ALBUMIN g/dL  --   --  3 5   TOTAL BILIRUBIN mg/dL  --   --  0 90   ALK PHOS U/L  --   --  48   ALT U/L  --   --  17   AST U/L  --   --  11   GLUCOSE RANDOM mg/dL 144*   < > 131    < > = values in this interval not displayed  Results from last 7 days   Lab Units 02/14/21  1118 02/14/21  0755 02/13/21  2042 02/13/21  1701 02/13/21  1155 02/13/21  0840 02/12/21  2116 02/12/21  1752 02/12/21  1305 02/12/21  1039 02/12/21  0735 02/11/21  2019   POC GLUCOSE mg/dl 270* 128 227* 179* 249* 124 275* 167* 161* 160* 118 236*                   * I Have Reviewed All Lab Data Listed Above  * Additional Pertinent Lab Tests Reviewed:  All Labs Within Last 24 Hours Reviewed    Imaging:    Imaging Reports Reviewed Today Include: none  Imaging Personally Reviewed by Myself Includes:  none    Recent Cultures (last 7 days):           Last 24 Hours Medication List:   Current Facility-Administered Medications   Medication Dose Route Frequency Provider Last Rate    acetaminophen  650 mg Oral Q6H PRN Jose Neri PA-C      acetaminophen  975 mg Oral [facility] Janny Antonio PA-C      aluminum-magnesium hydroxide-simethicone  30 mL Oral Q6H PRN Jose Neri PA-C  amLODIPine  10 mg Oral Daily Janny Antonio PA-C      atorvastatin  20 mg Oral Daily [place], [place]      dexamethasone  4 mg Oral BID With Meals [place], [place]      diazepam  2 mg Oral Q6H PRN [facility] Saez      enoxaparin  40 mg Subcutaneous Daily Janny Antonio, [facility]      gabapentin  100 mg Oral BID [place], [place]      HYDROmorphone  0 5 mg Intravenous Q2H PRN [facility] Sae[facility]      insulin lispro  1-5 Units Subcutaneous HS [facility] Antonio      insulin lispro  1-5 Units Subcutaneous TID [facility]      lidocaine  2 patch Topical Daily [place], [place]      loratadine  10 mg Oral HS [place], [place]      LORazepam  2 mg Intravenous Q10 Min PRN [facility] Sae[facility]      ondansetron  4 mg Intravenous Q6H PRN [facility] galvez      oxyCODONE  10 mg [facility]H [facility]      oxyCODONE  5 mg Oral Q3H PRN [facility] galvez      pantoprazole  20 mg Oral Early Morning [facility] Sae[facility]          Today, Patient Was Seen By: Yamini Miller PA-C    ** Please Note: Dictation voice to text software may have been used in the creation of this document   **

## 2021-02-14 NOTE — ASSESSMENT & PLAN NOTE
· Per record review, patient initially presented to the [facility] with intractable back and LLE pain in the setting of metastatic cancer with spinal/brain/mediastinal involvement   · Pain management per Palliative Care  · Radiation treatments per Radiation Oncology  · Continue current analgesic regimen w/ constipation prophylaxis  · PT recommending home with skilled therapy at this time, however patient will need to continue to work with PT first for stairs   · Supportive care

## 2021-02-15 ENCOUNTER — APPOINTMENT (OUTPATIENT)
Dept: RADIATION ONCOLOGY | Facility: CLINIC | Age: 61
End: 2021-02-15
Attending: RADIOLOGY
Payer: COMMERCIAL

## 2021-02-15 LAB
GLUCOSE SERPL-MCNC: 161 MG/DL (ref 65–140)
GLUCOSE SERPL-MCNC: 212 MG/DL (ref 65–140)
GLUCOSE SERPL-MCNC: 280 MG/DL (ref 65–140)
GLUCOSE SERPL-MCNC: 332 MG/DL (ref 65–140)
GLUCOSE SERPL-MCNC: 360 MG/DL (ref 65–140)

## 2021-02-15 PROCEDURE — 77387 GUIDANCE FOR RADJ TX DLVR: CPT | Performed by: RADIOLOGY

## 2021-02-15 PROCEDURE — 77412 RADIATION TX DELIVERY LVL 3: CPT | Performed by: RADIOLOGY

## 2021-02-15 PROCEDURE — NC001 PR NO CHARGE: Performed by: FAMILY MEDICINE

## 2021-02-15 PROCEDURE — 99232 SBSQ HOSP IP/OBS MODERATE 35: CPT | Performed by: PHYSICIAN ASSISTANT

## 2021-02-15 PROCEDURE — 82948 REAGENT STRIP/BLOOD GLUCOSE: CPT

## 2021-02-15 PROCEDURE — 97167 OT EVAL HIGH COMPLEX 60 MIN: CPT

## 2021-02-15 PROCEDURE — 99232 SBSQ HOSP IP/OBS MODERATE 35: CPT | Performed by: FAMILY MEDICINE

## 2021-02-15 RX ORDER — DEXAMETHASONE 4 MG/1
4 TABLET ORAL
Status: DISCONTINUED | OUTPATIENT
Start: 2021-02-16 | End: 2021-02-24 | Stop reason: HOSPADM

## 2021-02-15 RX ORDER — SENNOSIDES 8.6 MG
1 TABLET ORAL 2 TIMES DAILY
Status: DISCONTINUED | OUTPATIENT
Start: 2021-02-16 | End: 2021-02-24 | Stop reason: HOSPADM

## 2021-02-15 RX ADMIN — OXYCODONE HYDROCHLORIDE 5 MG: 5 TABLET ORAL at 06:22

## 2021-02-15 RX ADMIN — ENOXAPARIN SODIUM 40 MG: 40 INJECTION SUBCUTANEOUS at 08:10

## 2021-02-15 RX ADMIN — ACETAMINOPHEN 975 MG: 325 TABLET, FILM COATED ORAL at 21:17

## 2021-02-15 RX ADMIN — PANTOPRAZOLE SODIUM 20 MG: 20 TABLET, DELAYED RELEASE ORAL at 06:23

## 2021-02-15 RX ADMIN — OXYCODONE HYDROCHLORIDE 15 MG: 10 TABLET ORAL at 23:24

## 2021-02-15 RX ADMIN — INSULIN LISPRO 1 UNITS: 100 INJECTION, SOLUTION INTRAVENOUS; SUBCUTANEOUS at 08:10

## 2021-02-15 RX ADMIN — ATORVASTATIN CALCIUM 20 MG: 20 TABLET, FILM COATED ORAL at 08:09

## 2021-02-15 RX ADMIN — DOCUSATE SODIUM 100 MG: 100 CAPSULE, LIQUID FILLED ORAL at 17:05

## 2021-02-15 RX ADMIN — INSULIN LISPRO 4 UNITS: 100 INJECTION, SOLUTION INTRAVENOUS; SUBCUTANEOUS at 21:16

## 2021-02-15 RX ADMIN — DOCUSATE SODIUM 100 MG: 100 CAPSULE, LIQUID FILLED ORAL at 08:09

## 2021-02-15 RX ADMIN — GABAPENTIN 100 MG: 100 CAPSULE ORAL at 17:05

## 2021-02-15 RX ADMIN — INSULIN LISPRO 2 UNITS: 100 INJECTION, SOLUTION INTRAVENOUS; SUBCUTANEOUS at 10:53

## 2021-02-15 RX ADMIN — AMLODIPINE BESYLATE 10 MG: 10 TABLET ORAL at 08:09

## 2021-02-15 RX ADMIN — DEXAMETHASONE 4 MG: 4 TABLET ORAL at 08:09

## 2021-02-15 RX ADMIN — ACETAMINOPHEN 975 MG: 325 TABLET, FILM COATED ORAL at 13:21

## 2021-02-15 RX ADMIN — GABAPENTIN 100 MG: 100 CAPSULE ORAL at 08:09

## 2021-02-15 RX ADMIN — INSULIN LISPRO 4 UNITS: 100 INJECTION, SOLUTION INTRAVENOUS; SUBCUTANEOUS at 17:06

## 2021-02-15 RX ADMIN — DEXAMETHASONE 4 MG: 4 TABLET ORAL at 17:05

## 2021-02-15 RX ADMIN — ACETAMINOPHEN 650 MG: 325 TABLET, FILM COATED ORAL at 06:23

## 2021-02-15 NOTE — PROGRESS NOTES
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 currently receiving whole brain and spine radiation therapy per [facility] (treatment resumed today 2/15/21)  Patient appears well and has a positive outlook despite her current condition  Plan:  1  Symptom management - left mid-thigh pain which is improved with pain regiment but not completely abated  · Will increase to Oxycodone IR 15 mg PO q3 hours PRN for moderate/severe pain  · Continue remainder of regimen  · Tylenol 975 mg PO q8 hours scheduled  · Tylenol 650 mg q6 hours PRN for mild pain  · Hydromorphone 0 5 mg IV q2 hours PRN for breakthrough pain  · Lorazepam 2 mg IV q10 minutes PRN for seizures  · Steroid dosing per primary team/[facility]    2  Goals -   · Patient fully understands and accepts her condition; she is interested in pursuing treatment at this time once options are available to her  · Currently awaiting tissue biopsy results before treatment plan can be determined  · Patient also wishes for adequate pain management so she can do physical therapy to ultimately get stronger and be able to go up the stairs leading into her home        Code Status: Level 1 - Full Code    Interval history:  Patient was seen and examined today afternoon  Her primary complaint is left mid-thigh pain which is preventing her from fully benefiting from her physical therapy sessions  This pain is throbbing in nature and was currently a 4-5/10  She also describes feeling "like my leg is asleep" but constantly  This pain is significantly worse for her when she lies down in bed so she spends the whole day seated in her chair  She states that she was given a total of OxyIR 15 mg PO last night which was much better than 10 mg alone; 5mg "does nothing"       She believes that her leg pain is preventing her from "healing" and states that once she is able to be pain-free she will be able to get stronger  She currently requires the assistance of a walker for ambulation as she was previously using a cane but that did not provide enough support  Prior to her diagnosis of cancer (during her admission on 1/17/21) patient was able to ambulate completely independently  She expresses apprehension that she is not strong enough in her current state to be able to use the stairs to get into her house  She currently denies any other active symptoms  She has no headaches, dizziness, nausea/vomiting, chest pain, SOB, abdominal discomfort, constipation/diarrhea, pruritis, or rashes  Remainder of ROS is negative       MEDICATIONS / ALLERGIES:     all current active meds have been reviewed, current meds:   Current Facility-Administered Medications   Medication Dose Route Frequency    acetaminophen (TYLENOL) tablet 650 mg  650 mg Oral Q6H PRN    acetaminophen (TYLENOL) tablet 975 mg  975 mg Oral Q8H [facility]    aluminum-magnesium hydroxide-simethicone (MYLANTA) oral suspension 30 mL  30 mL Oral Q6H PRN    amLODIPine (NORVASC) tablet 10 mg  10 mg Oral Daily    atorvastatin (LIPITOR) tablet 20 mg  20 mg Oral Daily    dexamethasone (DECADRON) tablet 4 mg  4 mg Oral BID With Meals    diazepam (VALIUM) tablet 2 mg  2 mg Oral Q6H PRN    docusate sodium (COLACE) capsule 100 mg  100 mg Oral BID    enoxaparin (LOVENOX) subcutaneous injection 40 mg  40 mg Subcutaneous Daily    gabapentin (NEURONTIN) capsule 100 mg  100 mg Oral BID    HYDROmorphone (DILAUDID) injection 0 5 mg  0 5 mg Intravenous Q2H PRN    insulin lispro (HumaLOG) 100 units/mL subcutaneous injection 1-5 Units  1-5 Units Subcutaneous HS    insulin lispro (HumaLOG) 100 units/mL subcutaneous injection 1-5 Units  1-5 Units Subcutaneous TID AC    lidocaine (LIDODERM) 5 % patch 2 patch  2 patch Topical Daily    loratadine (CLARITIN) tablet 10 mg  10 mg Oral HS    LORazepam (ATIVAN) injection 2 mg  2 mg Intravenous Q10 Min PRN    ondansetron (ZOFRAN) injection 4 mg  4 mg Intravenous Q6H PRN    oxyCODONE (ROXICODONE) immediate release tablet 10 mg  10 mg Oral Q3H PRN    oxyCODONE (ROXICODONE) IR tablet 5 mg  5 mg Oral Q3H PRN    pantoprazole (PROTONIX) EC tablet 20 mg  20 mg Oral Early Morning    and PTA meds:   Prior to Admission Medications   Prescriptions Last Dose Informant Patient Reported? Taking?    Tradjenta 5 MG TABS  Self No No   Sig: TAKE 1 TABLET BY MOUTH EVERY DAY   amLODIPine (NORVASC) 10 mg tablet  Self No No   Sig: TAKE 1 TABLET BY MOUTH EVERY DAY   atorvastatin (LIPITOR) 20 mg tablet  Self No No   Sig: TAKE 1 TABLET DAILY   dexamethasone (DECADRON) 2 mg tablet  Self No No   Sig: Take 1 tablet (2 mg total) by mouth 2 (two) times a day with meals   diazepam (VALIUM) 2 mg tablet  Self No No   Sig: Take 1 tablet (2 mg total) by mouth every 6 (six) hours as needed for anxiety (dizziness)   gabapentin (NEURONTIN) 100 mg capsule  Self No No   Sig: Take 1-2 capsules (100-200 mg total) by mouth daily at bedtime To reduce nerve pain   Patient not taking: Reported on 2/11/2021   ipratropium (ATROVENT) 0 02 % nebulizer solution  Self Yes No   Sig: Inhale 0 5 mg as needed    lidocaine (LIDODERM) 5 %  Self No No   Sig: Apply 2 patches topically daily Remove & Discard patch within 12 hours or as directed by MD   Patient not taking: Reported on 2/11/2021   loratadine (CLARITIN) 10 mg tablet  Self No No   Sig: Take 1 tablet (10 mg total) by mouth daily at bedtime To reduce nighttime allergy inflammation   Patient not taking: Reported on 2/11/2021   meloxicam (MOBIC) 15 mg tablet  Self No No   Sig: Take 1 tablet (15 mg total) by mouth daily with dinner To reduce inflammation at night   Patient not taking: Reported on 2/11/2021   metFORMIN (GLUCOPHAGE) 1000 MG tablet  Self No No   Sig: Take 1 tablet (1,000 mg total) by mouth 2 (two) times a day with meals   ondansetron (ZOFRAN) 8 mg tablet Self No No   Sig: Take 1 tablet (8 mg total) by mouth every 8 (eight) hours as needed for nausea or vomiting   oxyCODONE (ROXICODONE) 5 mg immediate release tablet  Self No No   Sig: Take 1 tablet (5 mg total) by mouth every 4 (four) hours as needed (severe cancer pain)Max Daily Amount: 30 mg   pantoprazole (PROTONIX) 20 mg tablet  Self No No   Sig: Take 1 tablet (20 mg total) by mouth daily      Facility-Administered Medications: None       Allergies   Allergen Reactions    Ciprofloxacin Itching     Starts at hands to feet then the whole entire body       OBJECTIVE:  Vitals:    02/14/21 1508 02/14/21 2156 02/15/21 0756 02/15/21 1500   BP: 150/82 154/87 161/90 156/82   Pulse: 75 67 71 74   Resp: 18 20 18 18   Temp: 98 4 °F (36 9 °C) 97 6 °F (36 4 °C) 97 6 °F (36 4 °C) 98 °F (36 7 °C)   TempSrc:       SpO2: 94% 98% 98% 97%   Weight:       Height:         Physical Exam  Physical Exam  Vitals signs reviewed  Constitutional:       General: She is not in acute distress  Appearance: She is not diaphoretic  HENT:      Head: Normocephalic and atraumatic  Eyes:      Extraocular Movements: Extraocular movements intact  Conjunctiva/sclera: Conjunctivae normal    Cardiovascular:      Rate and Rhythm: Normal rate and regular rhythm  Neurological:      General: No focal deficit present  Mental Status: She is alert and oriented to person, place, and time  Comments: Requires walker for ambulation; generalized weakness with walking but no obvious limp or gait abnormality         Lab Results: I have personally reviewed pertinent labs  Imaging Studies: I have personally reviewed pertinent images and reports  No new imaging studies in the last 24 hours  EKG, Pathology, and Other Studies: I have personally reviewed pertinent results  No new studies in the last 24 hours  · Tissue pathology from 2/12/21 pending     Counseling / Coordination of Care    Total floor / unit time spent today 30 minutes   Greater than 50% of total time was spent with the patient and / or family counseling and / or coordination of care      Reena Hoffmann, MS-4

## 2021-02-15 NOTE — PLAN OF CARE
Problem: OCCUPATIONAL THERAPY ADULT  Goal: Performs self-care activities at highest level of function for planned discharge setting  See evaluation for individualized goals  Description: Treatment Interventions: ADL retraining, Functional transfer training, UE strengthening/ROM, Endurance training, Patient/family training, Equipment evaluation/education, Compensatory technique education, Continued evaluation, Energy conservation, Activityengagement          See flowsheet documentation for full assessment, interventions and recommendations  Note: Limitation: Decreased ADL status, Decreased Safe judgement during ADL, Decreased endurance, Decreased self-care trans, Decreased high-level ADLs  Prognosis: Good  Assessment: Pt is a 62 y o  female admitted to hospitals on 2/11/2021 w/ Cancer related pain to back and LLE   has a past medical history of Acute on chronic congestive heart failure with left ventricular diastolic dysfunction ([facility] [address] ), Asthma, Atelectasis, CHF (congestive heart failure) ([facility] [address] ), Diabetes mellitus ([facility] [address] ), Diverticulitis (2017), Hyperlipidemia, Hypertension, Lesion of spleen, and Pericardial effusion  Pt with metastatic cx of the brain and SC  Pt with active OT orders and up with assistance  orders  As per pt report, pta, resides in a split-level home, 6+3 steps to main level  Pt lives with her dtr and 2 grandkids (15 and 25 y o)  Pt was I w/  ADLS and IADLS, (+) drove (However, pt reports she was not driving the last few days 2* increased LE pain, discussed cessation of driving for the time being as she feels unsafe completing)  Upon evaluation, pt currently requires S for transfers and CGA for mobility with RW  Pt currently  requires MIN A UB ADLs, MIN A LB ADLs, and S toileting  Pt with fear to complete steps at home, discussed safety and plan to address with physical therapy in future sessions as well   Pt is limited at this time 2*: pain, endurance, activity tolerance, functional mobility, balance, functional standing tolerance and decreased I w/ ADLS/IADLS  The following Occupational Performance Areas to address include: grooming, bathing/shower, toilet hygiene, dressing, health maintenance, functional mobility, community mobility, clothing management and cleaning  Pt to benefit from immediate acute skilled OT to address above deficits, improve overall functional independence, maximize fnxl mobility and reduce caregiver burden  From OT standpoint, recommendation at time of d/c would be home with skilled therapy and increased social support  Pt was left in chair after session with all current needs met  The patient's raw score on the AM-PAC Daily Activity inpatient short form is 19, standardized score is 40 22, greater than 39 4  Patients at this level are likely to benefit from DC to home  Please refer to the recommendation of the Occupational Therapist for safe DC planning       OT Discharge Recommendation: Home with skilled therapy(and increased social support )  OT - OK to Discharge: Yes(when medically stable )

## 2021-02-15 NOTE — PROGRESS NOTES
Progress Note - Mary Kate Asher 1960, 62 y o  female MRN: [id number]    Unit/Bed#: [facility] 927-01 Encounter: [id number]    Primary Care Provider: Patti Borrero DO   Date and time admitted to hospital: 2/11/2021  3:21 PM        * Cancer related pain  Assessment & Plan  · Per record review, patient initially presented to the [facility] with intractable back and LLE pain in the setting of metastatic cancer with spinal/brain/mediastinal involvement   · Pain management per Palliative Care; message sent to Dr Troy Rockwell today  · Radiation treatments per Radiation Oncology  · Continue current analgesic regimen w/ constipation prophylaxis  · PT recommending home with skilled therapy at this time, however patient will need to continue to work with PT first for stairs  This was discussed with the patient and CM today  · Supportive care    Metastatic cancer of brain and spinal cord with unknown primary site  Assessment & Plan  · Per record review, patient was recently admitted to [facility] 1/17/21  She initially presented to [facility] on 1/16/21 with persistent dizziness x 4 weeks  CT of the head and neck at that time revealed isodense soft tissue masses in the left occipital lobe and left parietal lobe with surrounding vasogenic edema  She apparently left emergency department AMA but returned to [facility] ED the following day  CT of the chest, abdomen, and pelvis revealed lymphadenopathy  She was seen in consultation by Neurosurgery  MRI of the brain revealed metastatic disease with 2 parenchymal lesions and diffuse enhancement of the internal auditory canals bilaterally which warranted lumbar puncture to exclude leptomeningeal tumor  IR performed lumbar puncture and aspiration biopsy of cervical lymph node 1/20/21  MRI of the C/T/L-spine revealed CSF spread of tumor  She was started on Decadron  She was discharged on 1/22/21  She followed up with Dr Wil James of Heme/Onc as OP on 1/28/21 and PCP the following day    The cervical lymph node biopsy from January revealed conclusive evidence for malignancy, malignant epithelioid neoplasm, 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  The patient then saw Palliative Care and Thoracic Surgery on 2/4/21  Thoracic surgery recommended mediastinoscopy  She was then seen in consult by Radiation Oncology 2/5/21  Her mediastinoscopy had to be rescheduled 2/2 weather it appears  She saw her Oncologist again on 2/10/21  Unfortunately, the patient was admitted the following day, 2/11/21, due to worsening pain  She was seen in consultation by Palliative care for pain control and ultimately transferred to [facility] to see Thoracic Surgery for her mediastinoscopy  · She underwent flexible bronchoscopy, mediastinoscopy by Dr Maria E Contreras on 2/12/21  Follow-up pathology  No need for dressing over neck incision per thoracic surgery  Thoracic surgery has since signed off  · Heme/Onc following - plan to continue radiation therapy and follow-up with her primary oncologist as outpatient for consideration of chemotherapy or targeted therapy  · Rad/Onc following - patient undergoing whole brain and spine radiation  This will resume on today, 2/15/21 per Rad/Onc     Diabetes mellitus type 2  Assessment & Plan  Lab Results   Component Value Date    HGBA1C 5 3 01/05/2021     · Hold oral hypoglycemics while hospitalized  · Continue SSI coverage and Accu-Cheks    Essential hypertension  Assessment & Plan  · Optimize pain control  · Continue Norvasc    Hyperlipidemia  Assessment & Plan  · Continue statin      VTE Pharmacologic Prophylaxis:   Pharmacologic: Enoxaparin (Lovenox)  Mechanical VTE Prophylaxis in Place: Yes    Patient Centered Rounds: I have performed bedside rounds with nursing staff today   [address] [place] [address] with Specialists or Other Care Team Provider: anthonyd Dr Rodriguez Parent palliative care and d/w      Education and Discussions with Family / Patient: patient; when asked if there was anyone she would like me to call today with an update the patient kindly declined and stated she has been facetiming and calling her family     Time Spent for Care: 20 minutes  More than 50% of total time spent on counseling and coordination of care as described above  Current Length of Stay: 4 day(s)    Current Patient Status: Inpatient   Certification Statement: The patient will continue to require additional inpatient hospital stay due to safe d/c planning    Discharge Plan: pending doing stairs with PT for their final recs, pain control, and safe d/c planning  Hopefully 24-48hr    Code Status: Level 1 - Full Code      Subjective:   Ms Warren Samson reports that she is worried about going home because she has not yet done stairs and has stairs to enter and leave her home  She also wants to d/c Palliative about her pain control     Objective:     Vitals:   Temp (24hrs), Av 9 °F (36 6 °C), Min:97 6 °F (36 4 °C), Max:98 4 °F (36 9 °C)    Temp:  [97 6 °F (36 4 °C)-98 4 °F (36 9 °C)] 97 6 °F (36 4 °C)  HR:  [67-75] 71  Resp:  [18-20] 18  BP: (150-161)/(82-90) 161/90  SpO2:  [94 %-98 %] 98 %  Body mass index is 31 52 kg/m²  Input and Output Summary (last 24 hours): Intake/Output Summary (Last 24 hours) at 2/15/2021 1036  Last data filed at 2/15/2021 0601  Gross per 24 hour   Intake 1020 ml   Output --   Net 1020 ml       Physical Exam:     Physical Exam  Vitals signs and nursing note reviewed  Constitutional:       Comments: Patient seen sitting upright comfortably resting at the side of the bed  Very pleasant and cooperative  No acute distress   Cardiovascular:      Rate and Rhythm: Normal rate and regular rhythm  Pulmonary:      Effort: Pulmonary effort is normal       Breath sounds: Normal breath sounds  Abdominal:      General: Bowel sounds are normal       Palpations: Abdomen is soft  Tenderness: There is no abdominal tenderness     Musculoskeletal:      Right lower leg: No edema  Left lower leg: No edema  Skin:     General: Skin is warm  Neurological:      Mental Status: She is alert and oriented to person, place, and time  Psychiatric:         Mood and Affect: Mood normal          Behavior: Behavior normal          Additional Data:     Labs:    Results from last 7 days   Lab Units 02/14/21  0515 02/12/21  0555   WBC Thousand/uL 5 70 6 70   HEMOGLOBIN g/dL 10 0* 11 3*   HEMATOCRIT % 31 7* 36 2   PLATELETS Thousands/uL 247 301   NEUTROS PCT %  --  77*   LYMPHS PCT %  --  14   MONOS PCT %  --  8   EOS PCT %  --  1     Results from last 7 days   Lab Units 02/14/21  0515  02/11/21  0748   SODIUM mmol/L 137   < > 139   POTASSIUM mmol/L 4 0   < > 3 6   CHLORIDE mmol/L 103   < > 101   CO2 mmol/L 30   < > 29   BUN mg/dL 20   < > 20   CREATININE mg/dL 0 38*   < > 0 57*   ANION GAP mmol/L 4   < > 9   CALCIUM mg/dL 9 4   < > 9 3   ALBUMIN g/dL  --   --  3 5   TOTAL BILIRUBIN mg/dL  --   --  0 90   ALK PHOS U/L  --   --  48   ALT U/L  --   --  17   AST U/L  --   --  11   GLUCOSE RANDOM mg/dL 144*   < > 131    < > = values in this interval not displayed  Results from last 7 days   Lab Units 02/15/21  0743 02/14/21  1704 02/14/21  1118 02/14/21  0755 02/13/21  2042 02/13/21  1701 02/13/21  1155 02/13/21  0840 02/12/21  2116 02/12/21  1752 02/12/21  1305 02/12/21  1039   POC GLUCOSE mg/dl 161* 366* 270* 128 227* 179* 249* 124 275* 167* 161* 160*                   * I Have Reviewed All Lab Data Listed Above  * Additional Pertinent Lab Tests Reviewed:  All Labs Within Last 24 Hours Reviewed    Imaging:    Imaging Reports Reviewed Today Include: none  Imaging Personally Reviewed by Myself Includes:  none    Recent Cultures (last 7 days):           Last 24 Hours Medication List:   Current Facility-Administered Medications   Medication Dose Route Frequency Provider Last Rate    acetaminophen  650 mg Oral Q6H PRN Elkin Cassidy PA-C      acetaminophen  975 mg Oral [facility], [facility]      aluminum-magnesium hydroxide-simethicone  30 mL Oral Q6H PRN Izell Faiza, [facility]      amLODIPine  10 mg Oral Daily Janny Antonio PA-C      atorvastatin  20 mg Oral Daily [place], [place]      dexamethasone  4 mg Oral BID With Meals [place], [place]      diazepam  2 mg Oral Q6H PRN Izell Faiza, [facility]      docusate sodium  100 mg Oral BID Mikala Mao PA-C      enoxaparin  40 mg Subcutaneous Daily Janny Antonio, [facility]      gabapentin  100 mg Oral BID [facility] [place]      HYDROmorphone  0 5 mg Intravenous Q2H PRN Izell Faiza, [facility]      insulin lispro  1-5 Units Subcutaneous HS Trellis Maxi Antonio, [facility]      insulin lispro  1-5 Units Subcutaneous TID [facility] Francia Faiza, [facility]      lidocaine  2 patch Topical Daily [place], [place]      loratadine  10 mg Oral HS [place], [place]      LORazepam  2 mg Intravenous Q10 Min PRN Izell Faiza, [facility]      ondansetron  4 mg Intravenous Q6H PRN Izell Faiaz, [facility]      oxyCODONE  10 mg Oral Q3H PRN Izell Faiza, [facility]      oxyCODONE  5 mg Oral Q3H PRN Izell Faiza, [facility]      pantoprazole  20 mg Oral Early Morning P[place]hell Faiza, [facility]          Today, Patient Was Seen By: Mikala aMo PA-C    ** Please Note: Dictation voice to text software may have been used in the creation of this document   **

## 2021-02-15 NOTE — PLAN OF CARE
Problem: Potential for Falls  Goal: Patient will remain free of falls  Description: INTERVENTIONS:  - Assess patient frequently for physical needs  -  Identify cognitive and physical deficits and behaviors that affect risk of falls    -  [place] fall precautions as indicated by assessment   - Educate patient/family on patient safety including physical limitations  - Instruct patient to call for assistance with activity based on assessment  - Modify environment to reduce risk of injury  - Consider OT/PT consult to assist with strengthening/mobility  Outcome: Progressing

## 2021-02-15 NOTE — UTILIZATION REVIEW
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 initially admitted on 2/11/21 as inpatient due to intractable back and LLE pain requiring thoracic surgery consult and treatment  Taken to OR on 2/12 for bronch and mediastinoscopy  Assessment/Plan:   2/12 c/o leg pain  per radiation oncology:  Treatment plan recalculated as her treatment was started at the [facility]  Received 300cGy to the whole brain and L spine/sacrum today  Next treatment on 2/15      2/13: has minimal pain, took some tylenol  Physical therapy in progress  Per oncology: has unknown primary site, diffuse mets to spinal cord, brain with 2 masses and surrounding edema  will continue radiation therapy, await molecular tests  Follow up as outpt to discuss chemo or targeted therapy  2/14: c/o LLE pain, Physical therapy in progress, working on stairs  They are recommending discharge to home with home PT when ready  Continue analgesics per palliative care recommendations  Patient feels that her pain is not controlled and she is worried about going home where there are stairs       Pertinent Labs/Diagnostic Results:   No new rads or EKG      Results from last 7 days   Lab Units 02/14/21  0515 02/12/21  0555 02/11/21  0748   WBC Thousand/uL 5 70 6 70 7 96   HEMOGLOBIN g/dL 10 0* 11 3* 11 5   HEMATOCRIT % 31 7* 36 2 36 6   PLATELETS Thousands/uL 247 301 299   NEUTROS ABS Thousands/µL  --  5 15 5 61         Results from last 7 days   Lab Units 02/14/21  0515 02/12/21  0555 02/11/21  0748   SODIUM mmol/L 137 139 139   POTASSIUM mmol/L 4 0 3 8 3 6   CHLORIDE mmol/L 103 105 101   CO2 mmol/L 30 29 29   ANION GAP mmol/L 4 5 9   BUN mg/dL 20 26* 20   CREATININE mg/dL 0 38* 0 48* 0 57*   EGFR ml/min/1 73sq m 134 124 117   CALCIUM mg/dL 9 4 10 0 9 3     Results from last 7 days   Lab Units 02/11/21  0748   AST U/L 11   ALT U/L 17   ALK PHOS U/L 48   TOTAL PROTEIN g/dL 7 2   ALBUMIN g/dL 3 5   TOTAL BILIRUBIN mg/dL 0 90     Results from last 7 days   Lab Units 02/15/21  1052 02/15/21  0743 02/14/21  2041 02/14/21  1704 02/14/21  1118 02/14/21  0755 02/13/21  2042 02/13/21  1701 02/13/21  1155 02/13/21  0840 02/12/21  2116 02/12/21  1752   POC GLUCOSE mg/dl 212* 161* 280* 366* 270* 128 227* 179* 249* 124 275* 167*     Results from last 7 days   Lab Units 02/14/21  0515 02/12/21  0555 02/11/21  0748   GLUCOSE RANDOM mg/dL 144* 131 131         Vital Signs:   Date/Time  Temp  Pulse  Resp  BP  MAP (mmHg)  SpO2    02/15/21 07:56:52  97 6 °F (36 4 °C)  71  18  161/90  114  98 %    02/14/21 21:56:32  97 6 °F (36 4 °C)  67  20  154/87  109  98 %    02/14/21 15:08:18  98 4 °F (36 9 °C)  75  18  150/82  105  94 %    02/14/21 07:41:30  98 4 °F (36 9 °C)  --  18  144/84  104  --    02/13/21 22:34:46  97 5 °F (36 4 °C)  70  18  152/79  103  94 %    02/13/21 15:47:18  97 8 °F (36 6 °C)  78  18  134/67  89  96 %    02/13/21 07:40:42  98 2 °F (36 8 °C)  75  18  162/81  108  97 %          Medications:   Scheduled Medications:  acetaminophen, 975 mg, Oral, Q8H SCARLETT  amLODIPine, 10 mg, Oral, Daily  atorvastatin, 20 mg, Oral, Daily  dexamethasone, 4 mg, Oral, BID With Meals  docusate sodium, 100 mg, Oral, BID  enoxaparin, 40 mg, Subcutaneous, Daily  gabapentin, 100 mg, Oral, BID  insulin lispro, 1-5 Units, Subcutaneous, HS  insulin lispro, 1-5 Units, Subcutaneous, TID AC  lidocaine, 2 patch, Topical, Daily  loratadine, 10 mg, Oral, HS  pantoprazole, 20 mg, Oral, Early Morning      Continuous IV Infusions:   LR dc on 2/12  PRN Meds:  acetaminophen, 650 mg, Oral, Q6H PRN x 1 2/13, x 1 2/15  aluminum-magnesium hydroxide-simethicone, 30 mL, Oral, Q6H PRN  diazepam, 2 mg, Oral, Q6H PRN  HYDROmorphone, 0 5 mg, Intravenous, Q2H PRN  LORazepam, 2 mg, Intravenous, Q10 Min PRN  ondansetron, 4 mg, Intravenous, Q6H PRN  oxyCODONE, 10 mg, Oral, Q3H PRN using 1-2 doses daily through 2/14  oxyCODONE, 5 mg, Oral, Q3H PRN x 1 2/15    Discharge Plan: D    Network Utilization Review Department  ATTENTION: Please call with any questions or concerns to [phone] and carefully listen to the prompts so that you are directed to the right person  All voicemails are confidential   Tammy Higgins all requests for admission clinical reviews, approved or denied determinations and any other requests to dedicated fax number below belonging to the campus where the patient is receiving treatment   List of dedicated fax numbers for the Facilities:  [address] DENIALS (Administrative/Medical Necessity) [phone]   [address] (Maternity/NICU/Pediatrics) [phone] 401 [address] Bernadette Sanchez 1277 ([facility]) [address] [place] [address] Seth Singleton 1481 [address] [place] [address] [phone]

## 2021-02-15 NOTE — CASE MANAGEMENT
LOS: 4  Not a bundle  Readmission risk: Green  Pt tx from [facility] for cancer related pain  Met with pt and discussed role of CM  Pt lives with her dtr and grandchildren in a multi-story home with 3 steps at entrance--6 steps inside  Pt is independent in ADLs but has recently required assist with ambulation  Pt has DME including cane and s/c  No prior [facility]  Preference for pharmacy is [facility] in [place], [place]  No MH/D&A tx hx  PCP- Parker Grissom DO ([phone])  No POA  Main contact; Rain Lutz ([phone])  Family will transport upon d/c     CM discussed [facility] recommendations with pt  [facility] of choice for [facility] agency provided  Pt requesting referral to [facility]  Referral made via [place]  CM discussed RW and commode for pt upon d/c  Pt reports she will be checking with family as she believes she may have access to RW and commode--will use that equipment instead of ordering new DME if available  Pt expressed concerns with transport to radiation appts  CM discussed [facility] with pt--pt open to using [facility]   to provide pt with [facility] transport sharmin  Pt still concerned at this time with her pain not being controlled and with managing stairs  Jessee REYES notified of same  PT to continue to work with pt  CM reviewed d/c planning process including the following: identifying help at home, patient preference for d/c planning needs, Discharge Lounge, [facility] Meds to Bed program, availability of treatment team to discuss questions or concerns patient and/or family may have regarding understanding medications and recognizing signs and symptoms once discharged  CM also encouraged patient to follow up with all recommended appointments after discharge  Patient advised of importance for patient and family to participate in managing patients medical well being  Patient/caregiver received discharge checklist  Content reviewed   Patient/caregiver encouraged to participate in discharge plan of care prior to discharge home

## 2021-02-15 NOTE — ASSESSMENT & PLAN NOTE
· Per record review, patient initially presented to the [facility] with intractable back and LLE pain in the setting of metastatic cancer with spinal/brain/mediastinal involvement   · Pain management per Palliative Care; message sent to Dr Temo Simons today  · Radiation treatments per Radiation Oncology  · Continue current analgesic regimen w/ constipation prophylaxis  · PT recommending home with skilled therapy at this time, however patient will need to continue to work with PT first for stairs   This was discussed with the patient and CM today  · Supportive care

## 2021-02-15 NOTE — ASSESSMENT & PLAN NOTE
· Per record review, patient was recently admitted to [facility] 1/17/21  She initially presented to [facility] on 1/16/21 with persistent dizziness x 4 weeks  CT of the head and neck at that time revealed isodense soft tissue masses in the left occipital lobe and left parietal lobe with surrounding vasogenic edema  She apparently left emergency department AMA but returned to [facility] ED the following day  CT of the chest, abdomen, and pelvis revealed lymphadenopathy  She was seen in consultation by Neurosurgery  MRI of the brain revealed metastatic disease with 2 parenchymal lesions and diffuse enhancement of the internal auditory canals bilaterally which warranted lumbar puncture to exclude leptomeningeal tumor  IR performed lumbar puncture and aspiration biopsy of cervical lymph node 1/20/21  MRI of the C/T/L-spine revealed CSF spread of tumor  She was started on Decadron  She was discharged on 1/22/21  She followed up with Dr Manuela Ramirez of [facility] as OP on 1/28/21 and PCP the following day  The cervical lymph node biopsy from January revealed conclusive evidence for malignancy, malignant epithelioid neoplasm, 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  The patient then saw Palliative Care and Thoracic Surgery on 2/4/21  Thoracic surgery recommended mediastinoscopy  She was then seen in consult by Radiation Oncology 2/5/21  Her mediastinoscopy had to be rescheduled 2/2 weather it appears  She saw her Oncologist again on 2/10/21  Unfortunately, the patient was admitted the following day, 2/11/21, due to worsening pain  She was seen in consultation by Palliative care for pain control and ultimately transferred to [facility] to see Thoracic Surgery for her mediastinoscopy  · She underwent flexible bronchoscopy, mediastinoscopy by Dr Linnea Lozoya on 2/12/21  Follow-up pathology  No need for dressing over neck incision per thoracic surgery    Thoracic surgery has since signed off  · [facility] following - plan to continue radiation therapy and follow-up with her primary oncologist as outpatient for consideration of chemotherapy or targeted therapy  · Rad/Onc following - patient undergoing whole brain and spine radiation    This will resume on today, 2/15/21 per Rad/Onc

## 2021-02-15 NOTE — CASE MANAGEMENT
[facility] unable to accept pt  CM notified pt of same  Pt undecided at this time re: if she wants a different agency for [facility] services  CM to f/u with pt for decision

## 2021-02-16 ENCOUNTER — APPOINTMENT (OUTPATIENT)
Dept: RADIATION ONCOLOGY | Facility: CLINIC | Age: 61
End: 2021-02-16
Attending: RADIOLOGY
Payer: COMMERCIAL

## 2021-02-16 LAB
GLUCOSE SERPL-MCNC: 155 MG/DL (ref 65–140)
GLUCOSE SERPL-MCNC: 230 MG/DL (ref 65–140)
GLUCOSE SERPL-MCNC: 324 MG/DL (ref 65–140)
GLUCOSE SERPL-MCNC: 327 MG/DL (ref 65–140)

## 2021-02-16 PROCEDURE — 77387 GUIDANCE FOR RADJ TX DLVR: CPT | Performed by: RADIOLOGY

## 2021-02-16 PROCEDURE — DW011ZZ BEAM RADIATION OF HEAD AND NECK USING PHOTONS 1 - 10 MEV: ICD-10-PCS | Performed by: INTERNAL MEDICINE

## 2021-02-16 PROCEDURE — 99232 SBSQ HOSP IP/OBS MODERATE 35: CPT | Performed by: INTERNAL MEDICINE

## 2021-02-16 PROCEDURE — 99232 SBSQ HOSP IP/OBS MODERATE 35: CPT | Performed by: FAMILY MEDICINE

## 2021-02-16 PROCEDURE — 82948 REAGENT STRIP/BLOOD GLUCOSE: CPT

## 2021-02-16 PROCEDURE — NC001 PR NO CHARGE: Performed by: FAMILY MEDICINE

## 2021-02-16 PROCEDURE — 77412 RADIATION TX DELIVERY LVL 3: CPT | Performed by: RADIOLOGY

## 2021-02-16 RX ORDER — INSULIN GLARGINE 100 [IU]/ML
10 INJECTION, SOLUTION SUBCUTANEOUS
Status: DISCONTINUED | OUTPATIENT
Start: 2021-02-16 | End: 2021-02-19

## 2021-02-16 RX ADMIN — SENNOSIDES 8.6 MG: 8.6 TABLET, FILM COATED ORAL at 08:24

## 2021-02-16 RX ADMIN — ENOXAPARIN SODIUM 40 MG: 40 INJECTION SUBCUTANEOUS at 08:23

## 2021-02-16 RX ADMIN — DEXAMETHASONE 4 MG: 4 TABLET ORAL at 13:10

## 2021-02-16 RX ADMIN — OXYCODONE HYDROCHLORIDE 15 MG: 10 TABLET ORAL at 13:10

## 2021-02-16 RX ADMIN — ACETAMINOPHEN 975 MG: 325 TABLET, FILM COATED ORAL at 05:50

## 2021-02-16 RX ADMIN — INSULIN LISPRO 3 UNITS: 100 INJECTION, SOLUTION INTRAVENOUS; SUBCUTANEOUS at 20:56

## 2021-02-16 RX ADMIN — PANTOPRAZOLE SODIUM 20 MG: 20 TABLET, DELAYED RELEASE ORAL at 05:50

## 2021-02-16 RX ADMIN — INSULIN GLARGINE 10 UNITS: 100 INJECTION, SOLUTION SUBCUTANEOUS at 23:17

## 2021-02-16 RX ADMIN — GABAPENTIN 100 MG: 100 CAPSULE ORAL at 08:23

## 2021-02-16 RX ADMIN — DEXAMETHASONE 4 MG: 4 TABLET ORAL at 08:24

## 2021-02-16 RX ADMIN — OXYCODONE HYDROCHLORIDE 15 MG: 10 TABLET ORAL at 22:56

## 2021-02-16 RX ADMIN — GABAPENTIN 100 MG: 100 CAPSULE ORAL at 17:03

## 2021-02-16 RX ADMIN — INSULIN LISPRO 1 UNITS: 100 INJECTION, SOLUTION INTRAVENOUS; SUBCUTANEOUS at 08:25

## 2021-02-16 RX ADMIN — AMLODIPINE BESYLATE 10 MG: 10 TABLET ORAL at 08:24

## 2021-02-16 RX ADMIN — ACETAMINOPHEN 975 MG: 325 TABLET, FILM COATED ORAL at 13:09

## 2021-02-16 RX ADMIN — ACETAMINOPHEN 650 MG: 325 TABLET, FILM COATED ORAL at 17:04

## 2021-02-16 RX ADMIN — ATORVASTATIN CALCIUM 20 MG: 20 TABLET, FILM COATED ORAL at 08:24

## 2021-02-16 RX ADMIN — ACETAMINOPHEN 975 MG: 325 TABLET, FILM COATED ORAL at 22:49

## 2021-02-16 RX ADMIN — INSULIN LISPRO 2 UNITS: 100 INJECTION, SOLUTION INTRAVENOUS; SUBCUTANEOUS at 11:17

## 2021-02-16 RX ADMIN — SENNOSIDES 8.6 MG: 8.6 TABLET, FILM COATED ORAL at 17:03

## 2021-02-16 RX ADMIN — INSULIN LISPRO 4 UNITS: 100 INJECTION, SOLUTION INTRAVENOUS; SUBCUTANEOUS at 17:05

## 2021-02-16 NOTE — PLAN OF CARE
Problem: Potential for Falls  Goal: Patient will remain free of falls  Description: INTERVENTIONS:  - Assess patient frequently for physical needs  -  Identify cognitive and physical deficits and behaviors that affect risk of falls    -  [place] fall precautions as indicated by assessment   - Educate patient/family on patient safety including physical limitations  - Instruct patient to call for assistance with activity based on assessment  - Modify environment to reduce risk of injury  - Consider OT/PT consult to assist with strengthening/mobility  Outcome: Progressing

## 2021-02-16 NOTE — PLAN OF CARE
Problem: Potential for Falls  Goal: Patient will remain free of falls  Description: INTERVENTIONS:  - Assess patient frequently for physical needs  -  Identify cognitive and physical deficits and behaviors that affect risk of falls    -  [place] fall precautions as indicated by assessment   - Educate patient/family on patient safety including physical limitations  - Instruct patient to call for assistance with activity based on assessment  - Modify environment to reduce risk of injury  - Consider OT/PT consult to assist with strengthening/mobility  Outcome: Progressing

## 2021-02-16 NOTE — CASE MANAGEMENT
CM met with pt to discuss dcp  Pt now agreeable with blanket referrals to [facility] that service pt's area  Referrals made via [facility]  Pt still remains undecided if she would like to utilize [facility] service at this time  Pt declines need for RW and commode upon d/c as she advised she will have access to them from family  Pt remains concerned with being able to manage stairs prior to d/c

## 2021-02-16 NOTE — PROGRESS NOTES
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 currently receiving whole brain and spine radiation therapy per [facility] (treatment resumed yesterday 2/15/21; had another session today afternoon)  Patient appears well today and continues to maintain a positive outlook  Plan:  1  Symptom management - left mid-thigh pain which is under better control   · Continue following regimen:  · Tylenol 950 mg PO q8 hours scheduled  · Gabapentin 100 mg PO BID scheduled  · Tylenol 650 mg PO q6 hours for mild pain  · Oxycodone IR 15 mg PO q3 hours PRN for moderate/severe pain  · Hydromorphone 0 5 mg IV q2 hours PRN for breakthrough pain  · Lorazepam 2mg IV q10 minutes PRN for seizures  · Steroid dosing per primary team/[facility]  · Patient's leg pain does not appear neuropathic in nature but discussed with her the option of increasing the Gabapentin dose/frequency if her pain control worsens    2  Goals -  · Patient wishes to pursue treatment and is hopeful that she will feel better once the "radiation kicks in"  · Preliminary tissue pathology showing "metastatic non-small cell carcinoma with necrosis"  · Patient's primary goal is to become strong enough to return to her baseline level of independence  · She is apprehensive about discharge as she does not feel like she is physically ready to take the 3 steps leading into her house (they do not have any banisters for support)  · Inside the house, there are 6 steps but there is support in place on the walls that she can use to stabilize herself   · She wishes to continue physical therapy so she can get stronger but wants to do this from her own home; she states she does not want to go to a rehab facility  Code Status: Level 1- Full Code      Interval history:  Patient seen and examined today morning   She received Oxycodone IR 15 mg PO around 11pm last night before bed and reports sleeping undisturbed from 11pm to 6am today morning  She still feels as if her pain is worse when lying down and is only able to tolerate being in bed after receiving pain medication; this afternoon she was able to lie on her side comfortable for a bit after receiving her scheduled dose of Tylenol 975 mg  She does not feel pain in her leg while walking or sitting upright and states it is more of a numbness where it feels like her leg is asleep  She denies any sharp or burning sensation in her leg  Patient believes that her gait instability while walking is due to general weakness in the leg rather than pain inhibiting her movement  She expresses worry over being discharged in her current state as she knows she will not be able to go up or down the steps leading to her house  Her physical therapy goal while inpatient is to practice using steps, but she has not yet tried that  She currently denies any other active symptoms  She has no headaches, dizziness, nausea/vomiting, chest pain, SOB, abdominal discomfort, constipation/diarrhea, pruritis, or rashes  Remainder of ROS is negative       MEDICATIONS / ALLERGIES:     all current active meds have been reviewed and current meds:   Current Facility-Administered Medications   Medication Dose Route Frequency    acetaminophen (TYLENOL) tablet 650 mg  650 mg Oral Q6H PRN    acetaminophen (TYLENOL) tablet 975 mg  975 mg Oral Q8H [address]    aluminum-magnesium hydroxide-simethicone (MYLANTA) oral suspension 30 mL  30 mL Oral Q6H PRN    amLODIPine (NORVASC) tablet 10 mg  10 mg Oral Daily    atorvastatin (LIPITOR) tablet 20 mg  20 mg Oral Daily    dexamethasone (DECADRON) tablet 4 mg  4 mg Oral BID (AM & Afternoon)    diazepam (VALIUM) tablet 2 mg  2 mg Oral Q6H PRN    enoxaparin (LOVENOX) subcutaneous injection 40 mg  40 mg Subcutaneous Daily    gabapentin (NEURONTIN) capsule 100 mg  100 mg Oral BID    HYDROmorphone (DILAUDID) injection 0 5 mg  0 5 mg Intravenous Q2H PRN    insulin lispro (HumaLOG) 100 units/mL subcutaneous injection 1-5 Units  1-5 Units Subcutaneous HS    insulin lispro (HumaLOG) 100 units/mL subcutaneous injection 1-5 Units  1-5 Units Subcutaneous TID AC    lidocaine (LIDODERM) 5 % patch 2 patch  2 patch Topical Daily    loratadine (CLARITIN) tablet 10 mg  10 mg Oral HS    LORazepam (ATIVAN) injection 2 mg  2 mg Intravenous Q10 Min PRN    ondansetron (ZOFRAN) injection 4 mg  4 mg Intravenous Q6H PRN    oxyCODONE (ROXICODONE) IR tablet 15 mg  15 mg Oral Q3H PRN    pantoprazole (PROTONIX) EC tablet 20 mg  20 mg Oral Early Morning    senna (SENOKOT) tablet 8 6 mg  1 tablet Oral BID       Allergies   Allergen Reactions    Ciprofloxacin Itching     Starts at hands to feet then the whole entire body       OBJECTIVE:    Physical Exam  Physical Exam  Constitutional:       General: She is not in acute distress  Appearance: She is not diaphoretic  HENT:      Head: Normocephalic and atraumatic  Eyes:      Extraocular Movements: Extraocular movements intact  Conjunctiva/sclera: Conjunctivae normal    Cardiovascular:      Rate and Rhythm: Normal rate and regular rhythm  Pulses: Normal pulses  Heart sounds: No murmur  Pulmonary:      [place]: Pulmonary effort is normal  No respiratory distress  Breath sounds: Normal breath sounds  Abdominal:      General: Bowel sounds are normal       Palpations: Abdomen is soft  Skin:     General: Skin is warm and dry  Capillary Refill: Capillary refill takes less than 2 seconds  Neurological:      General: No focal deficit present  Mental Status: She is alert and oriented to person, place, and time  Comments: 3/5 strength in left hip flexion; 5/5 strength in right hip flexion           Lab Results: I have personally reviewed pertinent labs    Imaging Studies: I have personally reviewed pertinent imaging  No new imaging in last 24 hours  EKG, Pathology, and Other Studies: I have personally reviewed pertinent studies  · Preliminary tissue pathology showing metastatic non-small cell carcinoma with necrosis     Counseling / Coordination of Care    Total floor / unit time spent today 45 minutes  Greater than 50% of total time was spent with the patient and / or family counseling and / or coordination of care   A description of the counseling / coordination of care:     Brandi Corrales, MS-4

## 2021-02-17 ENCOUNTER — APPOINTMENT (OUTPATIENT)
Dept: RADIATION ONCOLOGY | Facility: CLINIC | Age: 61
End: 2021-02-17
Attending: RADIOLOGY
Payer: COMMERCIAL

## 2021-02-17 LAB
GLUCOSE SERPL-MCNC: 186 MG/DL (ref 65–140)
GLUCOSE SERPL-MCNC: 203 MG/DL (ref 65–140)
GLUCOSE SERPL-MCNC: 290 MG/DL (ref 65–140)
GLUCOSE SERPL-MCNC: 296 MG/DL (ref 65–140)

## 2021-02-17 PROCEDURE — 97530 THERAPEUTIC ACTIVITIES: CPT

## 2021-02-17 PROCEDURE — 77412 RADIATION TX DELIVERY LVL 3: CPT | Performed by: RADIOLOGY

## 2021-02-17 PROCEDURE — DW011ZZ BEAM RADIATION OF HEAD AND NECK USING PHOTONS 1 - 10 MEV: ICD-10-PCS | Performed by: INTERNAL MEDICINE

## 2021-02-17 PROCEDURE — 99232 SBSQ HOSP IP/OBS MODERATE 35: CPT | Performed by: INTERNAL MEDICINE

## 2021-02-17 PROCEDURE — 77387 GUIDANCE FOR RADJ TX DLVR: CPT | Performed by: RADIOLOGY

## 2021-02-17 PROCEDURE — 82948 REAGENT STRIP/BLOOD GLUCOSE: CPT

## 2021-02-17 PROCEDURE — 99232 SBSQ HOSP IP/OBS MODERATE 35: CPT

## 2021-02-17 PROCEDURE — 97116 GAIT TRAINING THERAPY: CPT

## 2021-02-17 RX ORDER — ONDANSETRON 4 MG/1
4 TABLET, ORALLY DISINTEGRATING ORAL EVERY 6 HOURS PRN
Status: DISCONTINUED | OUTPATIENT
Start: 2021-02-17 | End: 2021-02-24 | Stop reason: HOSPADM

## 2021-02-17 RX ADMIN — AMLODIPINE BESYLATE 10 MG: 10 TABLET ORAL at 08:26

## 2021-02-17 RX ADMIN — INSULIN LISPRO 3 UNITS: 100 INJECTION, SOLUTION INTRAVENOUS; SUBCUTANEOUS at 12:18

## 2021-02-17 RX ADMIN — SENNOSIDES 8.6 MG: 8.6 TABLET, FILM COATED ORAL at 08:20

## 2021-02-17 RX ADMIN — GABAPENTIN 100 MG: 100 CAPSULE ORAL at 08:21

## 2021-02-17 RX ADMIN — ACETAMINOPHEN 975 MG: 325 TABLET, FILM COATED ORAL at 13:53

## 2021-02-17 RX ADMIN — GABAPENTIN 100 MG: 100 CAPSULE ORAL at 17:50

## 2021-02-17 RX ADMIN — PANTOPRAZOLE SODIUM 20 MG: 20 TABLET, DELAYED RELEASE ORAL at 05:36

## 2021-02-17 RX ADMIN — INSULIN LISPRO 3 UNITS: 100 INJECTION, SOLUTION INTRAVENOUS; SUBCUTANEOUS at 17:49

## 2021-02-17 RX ADMIN — INSULIN LISPRO 3 UNITS: 100 INJECTION, SOLUTION INTRAVENOUS; SUBCUTANEOUS at 08:19

## 2021-02-17 RX ADMIN — ACETAMINOPHEN 975 MG: 325 TABLET, FILM COATED ORAL at 23:34

## 2021-02-17 RX ADMIN — SENNOSIDES 8.6 MG: 8.6 TABLET, FILM COATED ORAL at 17:50

## 2021-02-17 RX ADMIN — INSULIN GLARGINE 10 UNITS: 100 INJECTION, SOLUTION SUBCUTANEOUS at 22:03

## 2021-02-17 RX ADMIN — OXYCODONE HYDROCHLORIDE 15 MG: 10 TABLET ORAL at 23:34

## 2021-02-17 RX ADMIN — ATORVASTATIN CALCIUM 20 MG: 20 TABLET, FILM COATED ORAL at 08:20

## 2021-02-17 RX ADMIN — ACETAMINOPHEN 975 MG: 325 TABLET, FILM COATED ORAL at 05:35

## 2021-02-17 RX ADMIN — OXYCODONE HYDROCHLORIDE 15 MG: 10 TABLET ORAL at 05:43

## 2021-02-17 RX ADMIN — ONDANSETRON 4 MG: 4 TABLET, ORALLY DISINTEGRATING ORAL at 17:47

## 2021-02-17 RX ADMIN — DEXAMETHASONE 4 MG: 4 TABLET ORAL at 08:20

## 2021-02-17 RX ADMIN — ENOXAPARIN SODIUM 40 MG: 40 INJECTION SUBCUTANEOUS at 08:21

## 2021-02-17 RX ADMIN — OXYCODONE HYDROCHLORIDE 15 MG: 10 TABLET ORAL at 13:53

## 2021-02-17 RX ADMIN — DEXAMETHASONE 4 MG: 4 TABLET ORAL at 13:53

## 2021-02-17 NOTE — ASSESSMENT & PLAN NOTE
Lab Results   Component Value Date    HGBA1C 5 3 01/05/2021     · Hold oral hypoglycemics while hospitalized  · Continue SSI coverage and Accu-Cheks - blood sugars remain relatively uncontrolled in the setting of Decadron use -> add basal/fixed insulin to regimen  · Carbohydrate restricted diet

## 2021-02-17 NOTE — PHYSICAL THERAPY NOTE
Physical Therapy Treatment Note    Patient's Name: Vinny Webber  : 1960       02/17/21 1026   PT Last Visit   PT Visit Date 21   Note Type   Note Type Treatment   Pain Assessment   Pain Assessment Tool Pain Assessment not indicated - pt denies pain   Restrictions/Precautions   Weight Bearing Precautions Per Order No   Other Precautions Fall Risk;Pain   General   Chart Reviewed Yes   Cognition   Attention Within functional limits   Orientation Level Oriented X4   Memory Within functional limits   Following Commands Follows one step commands without difficulty   Comments Pt very anxious about performing steps today, became tearful multiple times throughout session  Pt reports that she won't have anyone at home to help her up her steps and that she doesn't want to rely on anyone  However, later in the session pt reported that she has family that might be able to help, she just doesn't want to ask them for help  Unclear of pt's cog status as she would quickly switch from tearful and anxious to normal conversation  Bed Mobility   Additional Comments Seated EOB at start of session, ended session in recliner   Transfers   Sit to Stand 5  Supervision  (CGA)   Additional items Assist x 1; Impulsive   Stand to Sit 5  Supervision  (CGA)   Additional items Assist x 1; Impulsive   Additional Comments Transfers with RW  Very impulsive requiring CGA for steadying   Ambulation/Elevation   Gait pattern Foward flexed; Short stride; Inconsistent geena; Antalgic;Decreased foot clearance   Gait Assistance 4  Minimal assist   Additional items Assist x 1;Verbal cues  (SBA of 2nd for safety and chair follow)   Assistive Device Rolling walker   Distance 60ft + stair trial  During stair trial pt performed 3 steps up/down with BUEs on LHR with Denisa  On second trial progressed to using LHR + quad cane during which pt continued to required Denisa   Refused HHA + quad cane to simulate home setup stating "how do you expect me to do all this in one day?"   Stair Management Assistance 4  Minimal assist   Additional items Assist x 1;Verbal cues  (SBA of 2nd for safety)   Stair Management Technique One rail L;Step to pattern; Other (Comment)  (see comment above)   Number of Stairs 6  (2x3)   Balance   Static Sitting Fair +   Dynamic Sitting Fair   Static Standing Fair -   Dynamic Standing Poor +   Ambulatory Poor +   Activity Tolerance   Activity Tolerance Other (Comment)  (limited by anxiety)   Nurse Made Aware pt okay to see per RN   Assessment   Prognosis Good   Problem List Decreased strength;Decreased endurance; Impaired balance;Decreased mobility; Decreased cognition;Pain   Assessment Pt seen for PT session today with a focus on stair training, as pt has 3 JERRY w/o handrails, as well as stairs inside her home  Pt very anxious and tearful about practicing the stairs  Began with using BUEs on single HR, during which pt required Denisa  Progressed to using quad cane with single HR during which pt continued to require Denisa  Attempted to simulate entrance to home by using quad cane and HHA, however pt adamantly refused  Spent extensive time educating pt on the importance of practicing stairs in order to d/c home  Pt reports that she has family at home with her, but that they will not be there every time she needs to go to radiation  From a PT standpoint, pt will need physical assistance to safely go up/down her steps at home  Pt would benefit from continued practice with stairs while in the hospital to improve functional independence  Continuing to recommend home with social support and HHPT, pending further stair training  Barriers to Discharge Inaccessible home environment   Barriers to Discharge Comments Pt has 3 JERRY with no HR   Will need physical assistance to get up/down those steps   Goals   Patient Goals to go home   STG Expiration Date 02/28/21   Plan   Treatment/Interventions Functional transfer training;KARLA strengthening/ROM; Elevations; Therapeutic exercise; Endurance training;Gait training;Spoke to nursing;Spoke to case management;OT   Progress Progressing toward goals   PT Frequency Other (Comment)  (3-5x/wk)   Recommendation   PT Discharge Recommendation Return to previous environment with social support;Home with skilled therapy  (HHPT)   Equipment Recommended Walker   PT - OK to Discharge No  (needs further stair trial)   AM-PAC Basic Mobility Inpatient   Turning in Bed Without Bedrails 4   Moving Bed to Chair 3   Standing Up From Chair 3   Walk in Room 3   Climb 3-5 Stairs 2       Aaron Mcmillan, DPT

## 2021-02-17 NOTE — ASSESSMENT & PLAN NOTE
· Per record review, patient initially presented to the [facility] with intractable back and LLE pain in the setting of metastatic cancer with spinal/brain/mediastinal involvement   · Pain management per palliative care  · Radiation treatments per radiation oncology  · Continue current analgesic regimen w/ constipation prophylaxis  · Supportive care otherwise

## 2021-02-17 NOTE — ASSESSMENT & PLAN NOTE
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  · FNA of cervical lymph node last month w/ evidence of non-small cell carcinoma of uncertain histotype  · MRIs of C/T/L spine consistent with multiregional CSF spread of tumor - s/p lumbar puncture last month w/ CSF cytology without conclusive evidence of malignancy, but rather, atypical cellular changes of unknown significance  · CT of head and MRI brain last month also noted left occipital/parietal lesions w/ vasogenic edema - on Decadron  · Appreciate medical and radiation oncology evaluation - continue hold brain and lumbar/sacral spine palliative radiation  · PRN pain control  · Continues to work with PT/OT - patient continues to express concern/BR/frustration regarding ambulating up stairs at home and would like to continue working with inpatient PT/OT to comfortably do so once discharged

## 2021-02-17 NOTE — PLAN OF CARE
Problem: Potential for Falls  Goal: Patient will remain free of falls  Description: INTERVENTIONS:  - Assess patient frequently for physical needs  -  Identify cognitive and physical deficits and behaviors that affect risk of falls    -  [place] fall precautions as indicated by assessment   - Educate patient/family on patient safety including physical limitations  - Instruct patient to call for assistance with activity based on assessment  - Modify environment to reduce risk of injury  - Consider OT/PT consult to assist with strengthening/mobility  Outcome: Progressing

## 2021-02-17 NOTE — PLAN OF CARE
Problem: PHYSICAL THERAPY ADULT  Goal: Performs mobility at highest level of function for planned discharge setting  See evaluation for individualized goals  Description: Treatment/Interventions: Functional transfer training, LE strengthening/ROM, Elevations, Therapeutic exercise, Endurance training, Patient/family training, Equipment eval/education, Bed mobility, Gait training  Equipment Recommended: Lucy Love       See flowsheet documentation for full assessment, interventions and recommendations  Outcome: Progressing  Note: Prognosis: Good  Problem List: Decreased strength, Decreased endurance, Impaired balance, Decreased mobility, Decreased cognition, Pain  Assessment: Pt seen for PT session today with a focus on stair training, as pt has 3 JERRY w/o handrails, as well as stairs inside her home  Pt very anxious and tearful about practicing the stairs  Began with using BUEs on single HR, during which pt required Denisa  Progressed to using quad cane with single HR during which pt continued to require Denisa  Attempted to simulate entrance to home by using quad cane and HHA, however pt adamantly refused  Spent extensive time educating pt on the importance of practicing stairs in order to d/c home  Pt reports that she has family at home with her, but that they will not be there every time she needs to go to radiation  From a PT standpoint, pt will need physical assistance to safely go up/down her steps at home  Pt would benefit from continued practice with stairs while in the hospital to improve functional independence  Continuing to recommend home with social support and HHPT, pending further stair training  Barriers to Discharge: Inaccessible home environment  Barriers to Discharge Comments: (S) Pt has 3 JERRY with no HR   Will need physical assistance to get up/down those steps  PT Discharge Recommendation: Return to previous environment with social support, Home with skilled therapy(HHPT)     PT - OK to Discharge: No(needs further stair trial)    See flowsheet documentation for full assessment

## 2021-02-17 NOTE — ASSESSMENT & PLAN NOTE
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  · FNA of cervical lymph node last month w/ evidence of non-small cell carcinoma of uncertain histotype  · MRIs of C/T/L spine consistent with multiregional CSF spread of tumor - s/p lumbar puncture last month w/ CSF cytology without conclusive evidence of malignancy, but rather, atypical cellular changes of unknown significance  · CT of head and MRI brain last month also noted left occipital/parietal lesions w/ vasogenic edema - on Decadron  · Appreciate medical and radiation oncology evaluation - continue hold brain and lumbar/sacral spine palliative radiation  · PRN pain control  · Continues to work with PT/OT - patient expresses concern regarding ambulating up stairs at home - refusing skilled rehab

## 2021-02-17 NOTE — PROGRESS NOTES
[facility] - Progress Note  Patient: Moisés Inman 62 y o  female MRN: [id number]  Unit/Bed#: [facility] 927-01 Encounter: [id number]  Primary Care Provider: Viry Browne DO  Date Of Visit: 02/17/21        Assessment & Plan:    * Cancer related pain  Assessment & Plan  · Per record review, patient initially presented to the [facility] with intractable back and LLE pain in the setting of metastatic cancer with spinal/brain/mediastinal involvement   · Pain management per palliative care  · Radiation treatments per radiation oncology  · Continue current analgesic regimen w/ constipation prophylaxis  · Supportive care otherwise    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  Assessment & Plan  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  · FNA of cervical lymph node last month w/ evidence of non-small cell carcinoma of uncertain histotype  · MRIs of C/T/L spine consistent with multiregional CSF spread of tumor - s/p lumbar puncture last month w/ CSF cytology without conclusive evidence of malignancy, but rather, atypical cellular changes of unknown significance  · CT of head and MRI brain last month also noted left occipital/parietal lesions w/ vasogenic edema - on Decadron  · Appreciate medical and radiation oncology evaluation - continue hold brain and lumbar/sacral spine palliative radiation  · PRN pain control  · Continues to work with PT/OT - patient continues to express concern/BR/frustration regarding ambulating up stairs at home and would like to continue working with inpatient PT/OT to comfortably do so once discharged    Diabetes mellitus type 2  Assessment & Plan  Lab Results   Component Value Date    HGBA1C 5 3 01/05/2021     · Hold oral hypoglycemics while hospitalized  · Continue SSI coverage and Accu-Cheks - blood sugars remain relatively uncontrolled in the setting of Decadron use -> add basal/fixed insulin to regimen  · Carbohydrate restricted diet    Essential hypertension  Assessment & Plan  · Optimize pain control  · Continue Norvasc    Hyperlipidemia  Assessment & Plan  · Continue statin      DVT Prophylaxis:  Lovenox      Patient Centered Rounds:  I have performed bedside rounds and discussed plan of care with nursing today  Discussions with Specialists or Other Care Team Provider:  see above assessments if applicable    Education and Discussions with Family / Patient:  Patient at bedside, and daughter, over the phone today  Time Spent for Care:  32 minutes  More than 50% of total time spent on counseling and coordination of care as described above  Current Length of Stay: 6 day(s)    Current Patient Status: Inpatient   Certification Statement:  Patient will continue to require additional hospital stay due to assessments as noted above  Code Status: Level 1 - Full Code        Subjective:     Seen/examined earlier today  Patient was frustrated and tearful regarding inability to climb stairs as she sees is as prohibitive to a safe discharge home  States pain is fairly controlled on current regimen  Objective:     Vitals:   Temp (24hrs), Av 9 °F (36 6 °C), Min:97 9 °F (36 6 °C), Max:98 °F (36 7 °C)    Temp:  [97 9 °F (36 6 °C)-98 °F (36 7 °C)] 97 9 °F (36 6 °C)  HR:  [71-82] 79  Resp:  [17-18] 18  BP: (140-162)/(74-86) 140/83  SpO2:  [90 %-100 %] 100 %  Body mass index is 31 52 kg/m²  Input and Output Summary (last 24 hours):        Intake/Output Summary (Last 24 hours) at 2021 1619  Last data filed at 2021 1900  Gross per 24 hour   Intake 220 ml   Output --   Net 220 ml       Physical Exam:     GENERAL:  Well-developed/nourished - waxing/waning distress from pain  HEAD:  Normocephalic - atraumatic  EYES: PERRL - EOMI   MOUTH:  Mucosa moist  NECK:  Supple - full range of motion  CARDIAC:  Regular rate/rhythm - S1/S2 positive  PULMONARY:  Clear breath sounds bilaterally - nonlabored respirations  ABDOMEN:  Soft - nontender/nondistended - active bowel sounds  MUSCULOSKELETAL:  Motor strength/range of motion deconditioned requiring a walker for ambulation  NEUROLOGIC:  Alert/oriented at baseline  SKIN:  Chronic wrinkles/blemishes   PSYCHIATRIC:  Mood/affect tearful today      Additional Data:     Labs & Recent Cultures:    Results from last 7 days   Lab Units 02/14/21  0515 02/12/21  0555   WBC Thousand/uL 5 70 6 70   HEMOGLOBIN g/dL 10 0* 11 3*   HEMATOCRIT % 31 7* 36 2   PLATELETS Thousands/uL 247 301   NEUTROS PCT %  --  77*   LYMPHS PCT %  --  14   MONOS PCT %  --  8   EOS PCT %  --  1     Results from last 7 days   Lab Units 02/14/21  0515  02/11/21  0748   POTASSIUM mmol/L 4 0   < > 3 6   CHLORIDE mmol/L 103   < > 101   CO2 mmol/L 30   < > 29   BUN mg/dL 20   < > 20   CREATININE mg/dL 0 38*   < > 0 57*   CALCIUM mg/dL 9 4   < > 9 3   ALK PHOS U/L  --   --  48   ALT U/L  --   --  17   AST U/L  --   --  11    < > = values in this interval not displayed           Results from last 7 days   Lab Units 02/17/21  1149 02/17/21  0736 02/16/21  2040 02/16/21  1652 02/16/21  1110 02/16/21  0807 02/15/21  2039 02/15/21  1655 02/15/21  1052 02/15/21  0743 02/14/21  2041 02/14/21  1704   POC GLUCOSE mg/dl 203* 186* 324* 327* 230* 155* 360* 332* 212* 161* 280* 366*             Last 24 Hours Medication List:   Current Facility-Administered Medications   Medication Dose Route Frequency Provider Last Rate    acetaminophen  650 mg Oral Q6H PRN Mandie Romeo PA-C      acetaminophen  975 mg Oral [facility] Janny Antonio PA-C      aluminum-magnesium hydroxide-simethicone  30 mL Oral Q6H PRN Mandie Romeo PA-C      amLODIPine  10 mg Oral Daily Janny Antonio PA-C      atorvastatin  20 mg Oral Daily Janny Antonio PA-C      dexamethasone  4 mg Oral BID (AM & Afternoon) Laquita Smith MD      diazepam  2 mg Oral Q6H PRN Neville Lopez PA-C      enoxaparin  40 mg Subcutaneous Daily Janny Antonio PA-C      gabapentin  100 mg Oral BID Rebecca Antonio PA-C      HYDROmorphone  0 5 mg Intravenous Q2H PRN Rebecca Antonio PA-C      insulin glargine  10 Units Subcutaneous HS MD Bebeto Aviles insulin lispro  1-5 Units Subcutaneous 4x Daily (AC & HS) MD Bebeto Aviles insulin lispro  3 Units Subcutaneous TID With Meals Carina Escoto MD      lidocaine  2 patch Topical Daily Neville Lopez PA-C      loratadine  10 mg Oral HS Neville Lopez PA-C      LORazepam  2 mg Intravenous Q10 Min PRN Rebecca Antonio PA-C      ondansetron  4 mg Intravenous Q6H PRN Neville Lopez PA-C      oxyCODONE  15 mg Oral Q3H PRN Laquita Smith MD      pantoprazole  20 mg Oral Early Morning Neville Lopez PA-C      senna  1 tablet Oral BID Laquita Smith MD                  ** Please Note: This note is constructed using a voice recognition dictation system  An occasional wrong word/phrase or sound-a-like substitution may have been picked up by dictation device due to the inherent limitations of voice recognition software  Read the chart carefully and recognize, using reasonable context, where substitutions may have occurred  **

## 2021-02-17 NOTE — PLAN OF CARE
Problem: Potential for Falls  Goal: Patient will remain free of falls  Description: INTERVENTIONS:  - Assess patient frequently for physical needs  -  Identify cognitive and physical deficits and behaviors that affect risk of falls    -  [place] fall precautions as indicated by assessment   - Educate patient/family on patient safety including physical limitations  - Instruct patient to call for assistance with activity based on assessment  - Modify environment to reduce risk of injury  - Consider OT/PT consult to assist with strengthening/mobility  Outcome: Progressing

## 2021-02-17 NOTE — PROGRESS NOTES
[facility] - Progress Note  Patient: He Silva 62 y o  female MRN: [id number]  Unit/Bed#: [facility] 927-01 Encounter: [id number]  Primary Care Provider: José Sofia DO  Date Of Visit: 02/16/21        Assessment & Plan:    * Cancer related pain  Assessment & Plan  · Per record review, patient initially presented to the [facility] with intractable back and LLE pain in the setting of metastatic cancer with spinal/brain/mediastinal involvement   · Pain management per palliative care  · Radiation treatments per radiation oncology  · Continue current analgesic regimen w/ constipation prophylaxis  · Supportive care otherwise    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  Assessment & Plan  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  · FNA of cervical lymph node last month w/ evidence of non-small cell carcinoma of uncertain histotype  · MRIs of C/T/L spine consistent with multiregional CSF spread of tumor - s/p lumbar puncture last month w/ CSF cytology without conclusive evidence of malignancy, but rather, atypical cellular changes of unknown significance  · CT of head and MRI brain last month also noted left occipital/parietal lesions w/ vasogenic edema - on Decadron  · Appreciate medical and radiation oncology evaluation - continue hold brain and lumbar/sacral spine palliative radiation  · PRN pain control  · Continues to work with PT/OT - patient expresses concern regarding ambulating up stairs at home - refusing skilled rehab    Diabetes mellitus type 2  Assessment & Plan  Lab Results   Component Value Date    HGBA1C 5 3 01/05/2021     · Hold oral hypoglycemics while hospitalized  · Continue SSI coverage and Accu-Cheks - blood sugars remain relatively uncontrolled in the setting of Decadron use -> add basal/fixed insulin to regimen  · Carbohydrate restricted diet    Essential hypertension  Assessment & Plan  · Optimize pain control  · Continue Norvasc    Hyperlipidemia  Assessment & Plan  · Continue statin      DVT Prophylaxis:  SCDs       Patient Centered Rounds:  I have performed bedside rounds and discussed plan of care with nursing today  Discussions with Specialists or Other Care Team Provider:  see above assessments if applicable    Education and Discussions with Family / Patient:  Patient at bedside, who will self-update family as necessary  Time Spent for Care:  32 minutes  More than 50% of total time spent on counseling and coordination of care as described above  Current Length of Stay: 5 day(s)    Current Patient Status: Inpatient   Certification Statement:  Patient will continue to require additional hospital stay due to assessments as noted above  Code Status: Level 1 - Full Code        Subjective:     Seen/examined earlier in the day  States her pain has improved but still has waxing/waning episodes  She still expresses concern over walking up her steps at home once discharged  Objective:     Vitals:   Temp (24hrs), Av 9 °F (36 6 °C), Min:97 7 °F (36 5 °C), Max:98 °F (36 7 °C)    Temp:  [97 7 °F (36 5 °C)-98 °F (36 7 °C)] 98 °F (36 7 °C)  HR:  [74-82] 82  Resp:  [18] 18  BP: (153-162)/(74-89) 162/74  SpO2:  [94 %-98 %] 95 %  Body mass index is 31 52 kg/m²  Input and Output Summary (last 24 hours):        Intake/Output Summary (Last 24 hours) at 2021 2215  Last data filed at 2021 1900  Gross per 24 hour   Intake 700 ml   Output 1 ml   Net 699 ml       Physical Exam:     GENERAL:  Well-developed/nourished - waxing/waning distress from pain  HEAD:  Normocephalic - atraumatic  EYES: PERRL - EOMI   MOUTH:  Mucosa moist  NECK:  Supple - full range of motion  CARDIAC:  Rate controlled - S1/S2 positive  PULMONARY:  Clear breath sounds bilaterally - nonlabored respirations  ABDOMEN:  Soft - nontender/nondistended - active bowel sounds  MUSCULOSKELETAL:  Motor strength/range of motion deconditioned - improved left proximal thigh tenderness  NEUROLOGIC:  Alert/oriented at baseline  SKIN:  Chronic wrinkles/blemishes   PSYCHIATRIC:  Mood/affect pleasant      Additional Data:     Labs & Recent Cultures:    Results from last 7 days   Lab Units 02/14/21  0515 02/12/21  0555   WBC Thousand/uL 5 70 6 70   HEMOGLOBIN g/dL 10 0* 11 3*   HEMATOCRIT % 31 7* 36 2   PLATELETS Thousands/uL 247 301   NEUTROS PCT %  --  77*   LYMPHS PCT %  --  14   MONOS PCT %  --  8   EOS PCT %  --  1     Results from last 7 days   Lab Units 02/14/21  0515  02/11/21  0748   POTASSIUM mmol/L 4 0   < > 3 6   CHLORIDE mmol/L 103   < > 101   CO2 mmol/L 30   < > 29   BUN mg/dL 20   < > 20   CREATININE mg/dL 0 38*   < > 0 57*   CALCIUM mg/dL 9 4   < > 9 3   ALK PHOS U/L  --   --  48   ALT U/L  --   --  17   AST U/L  --   --  11    < > = values in this interval not displayed           Results from last 7 days   Lab Units 02/16/21  2040 02/16/21  1652 02/16/21  1110 02/16/21  0807 02/15/21  2039 02/15/21  1655 02/15/21  1052 02/15/21  0743 02/14/21  2041 02/14/21  1704 02/14/21  1118 02/14/21  0755   POC GLUCOSE mg/dl 324* 327* 230* 155* 360* 332* 212* 161* 280* 366* 270* 128                         Last 24 Hours Medication List:   Current Facility-Administered Medications   Medication Dose Route Frequency Provider Last Rate    acetaminophen  650 mg Oral Q6H PRN Neville Lopez PA-C      acetaminophen  975 mg Oral [facility] Janny Antonio PA-C      aluminum-magnesium hydroxide-simethicone  30 mL Oral Q6H PRN Rebecca Antonio PA-C      amLODIPine  10 mg Oral Daily Janny Antonio PA-C      atorvastatin  20 mg Oral Daily Cande Licona      dexamethasone  4 mg Oral BID (AM & Afternoon) Laquita Smith MD      diazepam  2 mg Oral Q6H PRN Neville Lopez PA-C      enoxaparin  40 mg Subcutaneous Daily Janny Antonio PA-C      gabapentin  100 mg Oral BID Nicky Betancourt [place]      HYDROmorphone  0 5 mg Intravenous Q2H PRN Nicky Betancourt PA-C      insulin glargine  10 Units Subcutaneous HS MD Lesli Villegas [START ON 2/17/2021] insulin lispro  1-5 Units Subcutaneous 4x Daily (AC & HS) MD Lesli Villegas Alvenia Sauce ON 2/17/2021] insulin lispro  3 Units Subcutaneous TID With Meals Nilesh Call MD      lidocaine  2 patch Topical Daily Nicky Betancourt PA-C      loratadine  10 mg Oral HS Nicky Betancourt PA-C      LORazepam  2 mg Intravenous Q10 Min PRN Lakeisha Antonio PA-C      ondansetron  4 mg Intravenous Q6H PRN Nicky Betancourt PA-C      oxyCODONE  15 mg Oral Q3H PRN Burgess Faheem MD      pantoprazole  20 mg Oral Early Morning Nicky Betancourt PA-C      senna  1 tablet Oral BID Burgess Faheem MD                  ** Please Note: This note is constructed using a voice recognition dictation system  An occasional wrong word/phrase or sound-a-like substitution may have been picked up by dictation device due to the inherent limitations of voice recognition software  Read the chart carefully and recognize, using reasonable context, where substitutions may have occurred  **

## 2021-02-17 NOTE — ASSESSMENT & PLAN NOTE
· Per record review, patient initially presented to the [facility] with intractable back and LLE pain in the setting of metastatic cancer with spinal/brain/mediastinal involvement   · Pain management per palliative care  · Radiation treatments per radiation oncology  · Continue current analgesic regimen w/ constipation prophylaxis  · Supportive care otherwise

## 2021-02-17 NOTE — PROGRESS NOTES
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 currently receiving whole brain and spine radiation therapy per [facility] (treatment resumed  2/15/21)  Patient appears a bit more tired today but her condition otherwise remains stable  Plan:  1  Symptom management - left mid-thigh pain which continues to remain well controlled with current pain regimen  · Continue the following:  § Tylenol 950 mg PO q8 hours scheduled  § Gabapentin 100 mg PO BID scheduled  § Tylenol 650 mg PO q6 hours for mild pain  § Oxycodone IR 15 mg PO q3 hours PRN for moderate/severe pain  § Hydromorphone 0 5 mg IV q2 hours PRN for breakthrough pain  § Lorazepam 2mg IV q10 minutes PRN for seizures  · Steroid dosing per primary team/[facility]  · Patient is aware of ability to increase Gabapentin dose/frequency if needed for better pain control     2  Goals -   · Patient's primary concern is her ability to use the stairs leading up to her house (3 steps with no banisters/nearby support)  · Had a PT session today to practice; per PT note, patient requires further stair trials prior to being eligible for discharge  · Patient also wishes to continue physical therapy from her own home; CM working with patient to arrange home health care services   · She maintains her desire to pursue treatment and still is hopeful that her condition will improve once the effects of radiation therapy kick in  · Final tissue pathology still pending; preliminary results showing metastatic non-small cell carcinoma with necrosis as of 2/16/21     Code Status: Level 1 - Full Code      Interval history:  Patient seen and examined today morning  She reports having "a rough morning" as she woke up with the feeling of numbness in her left leg  She tried to walk it off but was unable to   She received a dose of OxyIR 15 mg and  Noted relief  She believes her current pain regimen is sufficient to help with her leg pain  She worked with PT today who helped her practice some steps  Patient reports not feeling like the session went well; she still feels unsteady and does not believe she will be able to navigate steps  She does not wish to be discharged in her current state and appears very anxious  She has no other complaints at this time  Denies headaches, dizziness, nausea/vomiting, chest pain, SOB, abdominal discomfort, constipation/diarrhea, pruritis, or rashes  Remainder of ROS is negative       MEDICATIONS / ALLERGIES:     all current active meds have been reviewed and current meds:   Current Facility-Administered Medications   Medication Dose Route Frequency    acetaminophen (TYLENOL) tablet 650 mg  650 mg Oral Q6H PRN    acetaminophen (TYLENOL) tablet 975 mg  975 mg Oral Q8H [address]    aluminum-magnesium hydroxide-simethicone (MYLANTA) oral suspension 30 mL  30 mL Oral Q6H PRN    amLODIPine (NORVASC) tablet 10 mg  10 mg Oral Daily    atorvastatin (LIPITOR) tablet 20 mg  20 mg Oral Daily    dexamethasone (DECADRON) tablet 4 mg  4 mg Oral BID (AM & Afternoon)    diazepam (VALIUM) tablet 2 mg  2 mg Oral Q6H PRN    enoxaparin (LOVENOX) subcutaneous injection 40 mg  40 mg Subcutaneous Daily    gabapentin (NEURONTIN) capsule 100 mg  100 mg Oral BID    HYDROmorphone (DILAUDID) injection 0 5 mg  0 5 mg Intravenous Q2H PRN    insulin glargine (LANTUS) subcutaneous injection 10 Units 0 1 mL  10 Units Subcutaneous HS    insulin lispro (HumaLOG) 100 units/mL subcutaneous injection 1-5 Units  1-5 Units Subcutaneous 4x Daily (AC & HS)    insulin lispro (HumaLOG) 100 units/mL subcutaneous injection 3 Units  3 Units Subcutaneous TID With Meals    lidocaine (LIDODERM) 5 % patch 2 patch  2 patch Topical Daily    loratadine (CLARITIN) tablet 10 mg  10 mg Oral HS    LORazepam (ATIVAN) injection 2 mg  2 mg Intravenous Q10 Min PRN    ondansetron (ZOFRAN) injection 4 mg  4 mg Intravenous Q6H PRN    oxyCODONE (ROXICODONE) IR tablet 15 mg  15 mg Oral Q3H PRN    pantoprazole (PROTONIX) EC tablet 20 mg  20 mg Oral Early Morning    senna (SENOKOT) tablet 8 6 mg  1 tablet Oral BID       Allergies   Allergen Reactions    Ciprofloxacin Itching     Starts at hands to feet then the whole entire body       OBJECTIVE:    Physical Exam  Physical Exam  Constitutional:       General: She is not in acute distress  Appearance: She is not diaphoretic  HENT:      Head: Normocephalic and atraumatic  Eyes:      Extraocular Movements: Extraocular movements intact  Conjunctiva/sclera: Conjunctivae normal    Cardiovascular:      Rate and Rhythm: Normal rate and regular rhythm  Pulses: Normal pulses  Heart sounds: No murmur  No friction rub  No gallop  Pulmonary:      Effort: Pulmonary effort is normal  No respiratory distress  Breath sounds: Normal breath sounds  Abdominal:      General: Bowel sounds are normal       Palpations: Abdomen is soft  Skin:     General: Skin is warm and dry  Capillary Refill: Capillary refill takes less than 2 seconds  Neurological:      Mental Status: She is alert and oriented to person, place, and time  Comments: 3/5 strength in LLE; 5/5 strength in RLE  Intact sensation throughout          Lab Results: I have personally reviewed pertinent labs  Imaging Studies: No new imaging studies in the past 24 hours  EKG, Pathology, and Other Studies: No new studies in the past 24 hours  · Preliminary tissue pathology showing non-small cell carcinoma with necrosis (as of 2/16/2021)    Counseling / Coordination of Care    Total floor / unit time spent today 30 minutes  Greater than 50% of total time was spent with the patient and / or family counseling and / or coordination of care   A description of the counseling / coordination of care:     Donaldo Phillips, MS-4

## 2021-02-18 ENCOUNTER — APPOINTMENT (OUTPATIENT)
Dept: RADIATION ONCOLOGY | Facility: CLINIC | Age: 61
End: 2021-02-18
Attending: RADIOLOGY
Payer: COMMERCIAL

## 2021-02-18 LAB
GLUCOSE SERPL-MCNC: 193 MG/DL (ref 65–140)
GLUCOSE SERPL-MCNC: 255 MG/DL (ref 65–140)
GLUCOSE SERPL-MCNC: 257 MG/DL (ref 65–140)
GLUCOSE SERPL-MCNC: 331 MG/DL (ref 65–140)

## 2021-02-18 PROCEDURE — 77387 GUIDANCE FOR RADJ TX DLVR: CPT | Performed by: RADIOLOGY

## 2021-02-18 PROCEDURE — DW011ZZ BEAM RADIATION OF HEAD AND NECK USING PHOTONS 1 - 10 MEV: ICD-10-PCS | Performed by: INTERNAL MEDICINE

## 2021-02-18 PROCEDURE — 77412 RADIATION TX DELIVERY LVL 3: CPT | Performed by: RADIOLOGY

## 2021-02-18 PROCEDURE — 77336 RADIATION PHYSICS CONSULT: CPT | Performed by: RADIOLOGY

## 2021-02-18 PROCEDURE — 97530 THERAPEUTIC ACTIVITIES: CPT

## 2021-02-18 PROCEDURE — 99232 SBSQ HOSP IP/OBS MODERATE 35: CPT | Performed by: INTERNAL MEDICINE

## 2021-02-18 PROCEDURE — 82948 REAGENT STRIP/BLOOD GLUCOSE: CPT

## 2021-02-18 PROCEDURE — 97116 GAIT TRAINING THERAPY: CPT

## 2021-02-18 RX ORDER — GABAPENTIN 100 MG/1
100 CAPSULE ORAL 2 TIMES DAILY
Qty: 60 CAPSULE | Refills: 0 | Status: SHIPPED | OUTPATIENT
Start: 2021-02-18

## 2021-02-18 RX ORDER — OXYCODONE HYDROCHLORIDE 15 MG/1
15 TABLET ORAL
Qty: 90 TABLET | Refills: 0 | Status: SHIPPED | OUTPATIENT
Start: 2021-02-18

## 2021-02-18 RX ADMIN — INSULIN LISPRO 3 UNITS: 100 INJECTION, SOLUTION INTRAVENOUS; SUBCUTANEOUS at 12:36

## 2021-02-18 RX ADMIN — PANTOPRAZOLE SODIUM 20 MG: 20 TABLET, DELAYED RELEASE ORAL at 05:37

## 2021-02-18 RX ADMIN — ACETAMINOPHEN 975 MG: 325 TABLET, FILM COATED ORAL at 14:24

## 2021-02-18 RX ADMIN — ENOXAPARIN SODIUM 40 MG: 40 INJECTION SUBCUTANEOUS at 08:46

## 2021-02-18 RX ADMIN — LORATADINE 10 MG: 10 TABLET ORAL at 23:33

## 2021-02-18 RX ADMIN — OXYCODONE HYDROCHLORIDE 15 MG: 10 TABLET ORAL at 23:31

## 2021-02-18 RX ADMIN — INSULIN GLARGINE 10 UNITS: 100 INJECTION, SOLUTION SUBCUTANEOUS at 23:32

## 2021-02-18 RX ADMIN — INSULIN LISPRO 3 UNITS: 100 INJECTION, SOLUTION INTRAVENOUS; SUBCUTANEOUS at 17:30

## 2021-02-18 RX ADMIN — DEXAMETHASONE 4 MG: 4 TABLET ORAL at 14:24

## 2021-02-18 RX ADMIN — SENNOSIDES 8.6 MG: 8.6 TABLET, FILM COATED ORAL at 08:46

## 2021-02-18 RX ADMIN — SENNOSIDES 8.6 MG: 8.6 TABLET, FILM COATED ORAL at 17:30

## 2021-02-18 RX ADMIN — ACETAMINOPHEN 975 MG: 325 TABLET, FILM COATED ORAL at 05:37

## 2021-02-18 RX ADMIN — GABAPENTIN 100 MG: 100 CAPSULE ORAL at 08:46

## 2021-02-18 RX ADMIN — DEXAMETHASONE 4 MG: 4 TABLET ORAL at 08:46

## 2021-02-18 RX ADMIN — AMLODIPINE BESYLATE 10 MG: 10 TABLET ORAL at 08:46

## 2021-02-18 RX ADMIN — GABAPENTIN 100 MG: 100 CAPSULE ORAL at 17:30

## 2021-02-18 RX ADMIN — ATORVASTATIN CALCIUM 20 MG: 20 TABLET, FILM COATED ORAL at 08:46

## 2021-02-18 RX ADMIN — ACETAMINOPHEN 975 MG: 325 TABLET, FILM COATED ORAL at 23:31

## 2021-02-18 RX ADMIN — INSULIN LISPRO 3 UNITS: 100 INJECTION, SOLUTION INTRAVENOUS; SUBCUTANEOUS at 08:46

## 2021-02-18 NOTE — ASSESSMENT & PLAN NOTE
· Per record review, patient initially presented to the [facility] with intractable back and LLE pain in the setting of metastatic cancer with spinal/brain/mediastinal involvement   · Pain management per palliative care  · Radiation treatments per radiation oncology  · Continue current analgesic regimen w/ constipation prophylaxis  · Supportive care otherwise

## 2021-02-18 NOTE — ASSESSMENT & PLAN NOTE
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  · FNA of cervical lymph node last month w/ evidence of non-small cell carcinoma of uncertain histotype  · MRIs of C/T/L spine consistent with multiregional CSF spread of tumor - s/p lumbar puncture last month w/ CSF cytology without conclusive evidence of malignancy, but rather, atypical cellular changes of unknown significance  · CT of head and MRI brain last month also noted left occipital/parietal lesions w/ vasogenic edema - on Decadron  · Appreciate medical and radiation oncology evaluation - continue hold brain and lumbar/sacral spine palliative radiation  · PRN pain control  · Continues to work with PT/OT - patient continues to express concern/fear/frustration regarding ambulating up stairs at home and would like to continue working with inpatient PT/OT to comfortably do so once discharged - will need to consider remaining inpatient until radiation treatments are complete due to need for daily transport if discharged home

## 2021-02-18 NOTE — PHYSICAL THERAPY NOTE
Physical Therapy Treatment Note       02/18/21 1129   PT Last Visit   PT Visit Date 02/18/21   Note Type   Note Type Treatment   Pain Assessment   Pain Assessment Tool 0-10   Pain Score 2   Restrictions/Precautions   Weight Bearing Precautions Per Order No   Other Precautions Fall Risk;Pain   General   Chart Reviewed Yes   Family/Caregiver Present No   Cognition   Overall Cognitive Status WFL   Subjective   Subjective Pt  seated at EOB upon entry  Pt  agreeable to PT  Pt  reported no pain in the back  Reported she has pain when laying in bed  Talked to patient about knee wedges which could help alleviate the pain laying supine  Transfers   Sit to Stand 5  Supervision   Additional items Increased time required;Verbal cues;Armrests;Assist x 1   Stand to Sit 5  Supervision   Additional items Assist x 1; Impulsive;Verbal cues;Armrests   Ambulation/Elevation   Gait pattern Foward flexed; Excessively slow; Ataxia; Inconsistent geena;Decreased foot clearance; Improper Weight shift;L Foot drag   Gait Assistance 4  Minimal assist   Additional items Assist x 1;Verbal cues   Assistive Device Rolling walker; Axillary crutches   Distance 30ft with B/L axi  crutches, 30ft, 60ft with RW   Stair Management Assistance   (CS/CGA/Min A/Mod A x 2)   Additional items Assist x 1;Assist x 2;Verbal cues; Increased time required   Stair Management Technique One rail L;Step to pattern; Sideways; Foreward; With walker;Nonreciprocal   Number of Stairs   (3 with L HR, 3 with BHR,1 stepx3 with RW,1 stepx3 mod Ax2)   Balance   Static Sitting Fair +   Ambulatory Poor +   Activity Tolerance   Activity Tolerance Patient tolerated treatment well   Nurse Made Aware Yes   Assessment   Assessment pt  reported she has her daughter, grandson and granddaughter living with her  Her daughter works and grandchildren have online classes  Reported they will be able to help her negotiating steps  Practiced transfers and ambulation with B/L crutches   However patient more steady ambulating with RW  LE instability noted during ambulation and transfers  Left foot drag and cues for increasing heel strike, and to avoid occ  longer strides with feet going ahead of RW  Pt's steps are inconsistent and ataxic occ  Pt  needed Min A for ambualtion with B/L axillary crutches and CGA/CS for ambualtion with RW  Pt  negotiated 3 steps sideways with both hands on L handrail while ascending steps with CGA and cues for LE sequencing  pt needed only S for negotiated 2 steps with both handrails  Pt  was demonstrated negotiated steps backwards with RW and patient able to carry over the demonstration with CGA  However told patient she would need help to adjust the RW prior to she can negotiate steps with RW  Pt  was also demonstrated negotiating steps with B/L crutches however not able to attempt it sicne patient reported her hearing was getting really bad and was cold and wanted to get out og the stairwell area where we were practicing  Negotiated steps with Mod A x 2 arm in arm and cues for LE sequencing  pt  reported she prefers to go home and not to rehab  Suggested to patient to have handrails installed at home for the steps (steps are wide at home) and she would need assist for negotiating steps with or without handrail or AD  Recommend family training for doing steps  Noted overall progress with mobility however continues to require assistance for safe mobility  Recommend patient contin ue to use RW for ambualtion and transfers and continued skilled PT to address the mobility deficits  Barriers to Discharge Inaccessible home environment   Barriers to Discharge Comments pt  need assist on steps at home   Goals   Patient Goals Go home   STG Expiration Date 02/28/21   PT Treatment Day 1   Plan   Treatment/Interventions Functional transfer training;Elevations; Patient/family training;Gait training;Equipment eval/education;Spoke to nursing;Spoke to case management;Spoke to advanced practitioner Progress Progressing toward goals   PT Frequency Other (Comment)  (3-5x/week)   Recommendation   PT Discharge Recommendation Return to previous environment with social support;Home with skilled therapy  (HHPT)   Equipment Recommended Walker   PT - OK to Discharge No  (need further stair training)     Sylwia Crow, PTA

## 2021-02-18 NOTE — TREATMENT PLAN
2/18/2021 11:21 AM -  Informed by our CM colleagues that pt is not a candidate for ARC, but instead is progressing well towards D/C to home with family and VNA support  From our standpoint, pt is ready to D/C based on pain control  Will defer D/C planning and dispo to primary team   Controlled substances for pain have been sent to community pharmacy just today  Pt should follow in our clinic within a month for refills, status check    Defer steroid dosing to Rad/Onc team

## 2021-02-18 NOTE — PROGRESS NOTES
[address] Internal Medicine - Progress Note  Patient: Linnea Nam 62 y o  female MRN: [id number]  Unit/Bed#: [facility] 927-01 Encounter: [id number]  Primary Care Provider: Pricila Douglas DO  Date Of Visit: 02/18/21        Assessment & Plan:    * Cancer related pain  Assessment & Plan  · Per record review, patient initially presented to the [facility] with intractable back and LLE pain in the setting of metastatic cancer with spinal/brain/mediastinal involvement   · Pain management per palliative care  · Radiation treatments per radiation oncology  · Continue current analgesic regimen w/ constipation prophylaxis  · Supportive care otherwise    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  Assessment & Plan  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  · FNA of cervical lymph node last month w/ evidence of non-small cell carcinoma of uncertain histotype  · MRIs of C/T/L spine consistent with multiregional CSF spread of tumor - s/p lumbar puncture last month w/ CSF cytology without conclusive evidence of malignancy, but rather, atypical cellular changes of unknown significance  · CT of head and MRI brain last month also noted left occipital/parietal lesions w/ vasogenic edema - on Decadron  · Appreciate medical and radiation oncology evaluation - continue hold brain and lumbar/sacral spine palliative radiation  · PRN pain control  · Continues to work with PT/OT - patient continues to express concern/fear/frustration regarding ambulating up stairs at home and would like to continue working with inpatient PT/OT to comfortably do so once discharged - will need to consider remaining inpatient until radiation treatments are complete due to need for daily transport if discharged home    Diabetes mellitus type 2  Assessment & Plan  Lab Results   Component Value Date    HGBA1C 5 3 2021     · Hold oral hypoglycemics while hospitalized  · Continue SSI coverage and Accu-Cheks - blood sugars remain relatively uncontrolled in the setting of Decadron use -> add basal/fixed insulin to regimen  · Carbohydrate restricted diet    Essential hypertension  Assessment & Plan  · Optimize pain control  · Continue Norvasc    Hyperlipidemia  Assessment & Plan  · Continue statin      DVT Prophylaxis:  Lovenox       Patient Centered Rounds:  I have performed bedside rounds and discussed plan of care with nursing today  Discussions with Specialists or Other Care Team Provider:  see above assessments if applicable    Education and Discussions with Family / Patient:  Patient at bedside - daughter, Pennie Crespo, aware of plan    Time Spent for Care:  32 minutes  More than 50% of total time spent on counseling and coordination of care as described above  Current Length of Stay: 7 day(s)    Current Patient Status: Inpatient   Certification Statement:  Patient will continue to require additional hospital stay due to assessments as noted above  Code Status: Level 1 - Full Code        Subjective:     Encounter earlier in the day while working with physical therapy  States pain is somewhat better controlled today  Objective:     Vitals:   Temp (24hrs), Av °F (36 7 °C), Min:97 9 °F (36 6 °C), Max:98 °F (36 7 °C)    Temp:  [97 9 °F (36 6 °C)-98 °F (36 7 °C)] 98 °F (36 7 °C)  HR:  [70-78] 78  Resp:  [16-18] 16  BP: (144-151)/(70-81) 151/70  SpO2:  [93 %-95 %] 95 %  Body mass index is 31 52 kg/m²  Input and Output Summary (last 24 hours):        Intake/Output Summary (Last 24 hours) at 2021 1550  Last data filed at 2021 0232  Gross per 24 hour   Intake --   Output 0 ml   Net 0 ml       Physical Exam:     GENERAL:  Well-developed/nourished - waxing/waning but improving distress from pain today  HEAD:  Normocephalic - atraumatic  EYES: PERRL - EOMI   MOUTH:  Mucosa moist  NECK:  Supple - full range of motion  CARDIAC:  Rate controlled - S1/S2 positive  PULMONARY:  Clear breath sounds bilaterally - nonlabored respirations  ABDOMEN:  Soft - nontender/nondistended - active bowel sounds  MUSCULOSKELETAL:  Motor strength/range of motion deconditioned requiring use of a walker for ambulation  NEUROLOGIC:  Alert/oriented at baseline  SKIN:  Chronic wrinkles/blemishes   PSYCHIATRIC:  Mood/affect more pleasant today      Additional Data:     Labs & Recent Cultures:    Results from last 7 days   Lab Units 02/14/21  0515 02/12/21  0555   WBC Thousand/uL 5 70 6 70   HEMOGLOBIN g/dL 10 0* 11 3*   HEMATOCRIT % 31 7* 36 2   PLATELETS Thousands/uL 247 301   NEUTROS PCT %  --  77*   LYMPHS PCT %  --  14   MONOS PCT %  --  8   EOS PCT %  --  1     Results from last 7 days   Lab Units 02/14/21  0515   POTASSIUM mmol/L 4 0   CHLORIDE mmol/L 103   CO2 mmol/L 30   BUN mg/dL 20   CREATININE mg/dL 0 38*   CALCIUM mg/dL 9 4         Results from last 7 days   Lab Units 02/18/21  1050 02/18/21  0740 02/17/21  2045 02/17/21  1655 02/17/21  1149 02/17/21  0736 02/16/21  2040 02/16/21  1652 02/16/21  1110 02/16/21  0807 02/15/21  2039 02/15/21  1655   POC GLUCOSE mg/dl 193* 255* 296* 290* 203* 186* 324* 327* 230* 155* 360* 332*                         Last 24 Hours Medication List:   Current Facility-Administered Medications   Medication Dose Route Frequency Provider Last Rate    acetaminophen  650 mg Oral Q6H PRN Janny Antonio PA-C      acetaminophen  975 mg Oral Q8H [address] Janny Antonio PA-C      aluminum-magnesium hydroxide-simethicone  30 mL Oral Q6H PRN Janny Antonio PA-C      amLODIPine  10 mg Oral Daily Janny Antonio PA-C      atorvastatin  20 mg Oral Daily Lj Antonio [place]      dexamethasone  4 mg Oral BID (AM & Afternoon) Spencer Mckeon MD      diazepam  2 mg Oral Q6H PRN Tye Cardenas PA-C      enoxaparin  40 mg Subcutaneous Daily Janny Antonio PA-C      gabapentin  100 mg Oral BID Saroj Antonio PA-C      HYDROmorphone  0 5 mg Intravenous Q2H PRN Saroj Antonio PA-C      insulin glargine  10 Units Subcutaneous HS Jenna Hall MD     [facility] insulin lispro  1-5 Units Subcutaneous 4x Daily (AC & HS) Jenna Hall MD     [facility] insulin lispro  3 Units Subcutaneous TID With Meals Jenna Hall MD      lidocaine  2 patch Topical Daily Mariaelena Webber PA-C      loratadine  10 mg Oral HS Mariaelena Webber PA-C      LORazepam  2 mg Intravenous Q10 Min PRN Saroj Antonio PA-C      ondansetron  4 mg Oral Q6H PRN Sofía Rasmussen PA-C      oxyCODONE  15 mg Oral Q3H PRN Pillo Beavers MD      pantoprazole  20 mg Oral Early Morning Mariaelena Webber PA-C      senna  1 tablet Oral BID Pillo Beavers MD                  ** Please Note: This note is constructed using a voice recognition dictation system  An occasional wrong word/phrase or sound-a-like substitution may have been picked up by dictation device due to the inherent limitations of voice recognition software  Read the chart carefully and recognize, using reasonable context, where substitutions may have occurred  **

## 2021-02-18 NOTE — PLAN OF CARE
Problem: PHYSICAL THERAPY ADULT  Goal: Performs mobility at highest level of function for planned discharge setting  See evaluation for individualized goals  Description: Treatment/Interventions: Functional transfer training, LE strengthening/ROM, Elevations, Therapeutic exercise, Endurance training, Patient/family training, Equipment eval/education, Bed mobility, Gait training  Equipment Recommended: Mariangel Guo       See flowsheet documentation for full assessment, interventions and recommendations  Note: Prognosis: Good  Problem List: Decreased strength, Decreased endurance, Impaired balance, Decreased mobility, Decreased cognition, Pain  Assessment: pt  reported she has her daughter, grandson and granddaughter living with her  Her daughter works and grandchildren have online classes  Reported they will be able to help her negotiating steps  Practiced transfers and ambulation with B/L crutches  However patient more steady ambulating with RW  LE instability noted during ambulation and transfers  Left foot drag and cues for increasing heel strike, and to avoid occ  longer strides with feet going ahead of RW  Pt's steps are inconsistent and ataxic occ  Pt  needed Min A for ambualtion with B/L axillary crutches and CGA/CS for ambualtion with RW  Pt  negotiated 3 steps sideways with both hands on L handrail while ascending steps with CGA and cues for LE sequencing  pt needed only S for negotiated 2 steps with both handrails  Pt  was demonstrated negotiated steps backwards with RW and patient able to carry over the demonstration with CGA  However told patient she would need help to adjust the RW prior to she can negotiate steps with RW  Pt  was also demonstrated negotiating steps with B/L crutches however not able to attempt it sicne patient reported her hearing was getting really bad and was cold and [facility]   Negotiated steps with Mod A x 2 arm in arm and cues for LE sequencing  pt  reported she prefers to go home and not to rehab  Suggested to patient to have handrails installed at home for the steps (steps are wide at home) and she would need assist for negotiating steps with or without handrail or AD  Recommend family training for doing steps  Noted overall progress with mobility however continues to require assistance for safe mobility  Recommend patient contin ue to use RW for ambualtion and transfers and continued skilled PT to address the mobility deficits  Barriers to Discharge: Inaccessible home environment  Barriers to Discharge Comments: (S) pt  need assist on steps at home  PT Discharge Recommendation: Return to previous environment with social support, Home with skilled therapy(HHPT)     PT - OK to Discharge: No(need further stair training)    See flowsheet documentation for full assessment

## 2021-02-18 NOTE — CASE MANAGEMENT
Met with pt to discuss DCP as she is medically stable for DC to lower level of care  She became angry, wants to go home, is concerned about going to rehab and having to leave to go to radiation, who will pay for the transports  Therapists in attendance, recommended handrails in home  She lives with family but they work or are in school  Explained we wanted to discuss DCP for her, she is agreeable to [place] [id number]  Notified Dr Vi Gomez and Dr uArelio Hidalgo    Additional referrals sent via [facility] to [place] [address] agencies, at this time no accepting agency    PT agreeable to same

## 2021-02-19 ENCOUNTER — APPOINTMENT (OUTPATIENT)
Dept: RADIATION ONCOLOGY | Facility: CLINIC | Age: 61
End: 2021-02-19
Attending: RADIOLOGY
Payer: COMMERCIAL

## 2021-02-19 LAB
GLUCOSE SERPL-MCNC: 153 MG/DL (ref 65–140)
GLUCOSE SERPL-MCNC: 260 MG/DL (ref 65–140)
GLUCOSE SERPL-MCNC: 300 MG/DL (ref 65–140)
GLUCOSE SERPL-MCNC: 310 MG/DL (ref 65–140)

## 2021-02-19 PROCEDURE — 97535 SELF CARE MNGMENT TRAINING: CPT

## 2021-02-19 PROCEDURE — 99232 SBSQ HOSP IP/OBS MODERATE 35: CPT | Performed by: INTERNAL MEDICINE

## 2021-02-19 PROCEDURE — 77412 RADIATION TX DELIVERY LVL 3: CPT | Performed by: RADIOLOGY

## 2021-02-19 PROCEDURE — 82948 REAGENT STRIP/BLOOD GLUCOSE: CPT

## 2021-02-19 PROCEDURE — 97530 THERAPEUTIC ACTIVITIES: CPT

## 2021-02-19 PROCEDURE — DW011ZZ BEAM RADIATION OF HEAD AND NECK USING PHOTONS 1 - 10 MEV: ICD-10-PCS | Performed by: INTERNAL MEDICINE

## 2021-02-19 PROCEDURE — 77387 GUIDANCE FOR RADJ TX DLVR: CPT | Performed by: RADIOLOGY

## 2021-02-19 PROCEDURE — 97116 GAIT TRAINING THERAPY: CPT

## 2021-02-19 RX ORDER — INSULIN GLARGINE 100 [IU]/ML
15 INJECTION, SOLUTION SUBCUTANEOUS
Status: DISCONTINUED | OUTPATIENT
Start: 2021-02-19 | End: 2021-02-24 | Stop reason: HOSPADM

## 2021-02-19 RX ORDER — HYDRALAZINE HYDROCHLORIDE 20 MG/ML
5 INJECTION INTRAMUSCULAR; INTRAVENOUS EVERY 6 HOURS PRN
Status: DISCONTINUED | OUTPATIENT
Start: 2021-02-19 | End: 2021-02-24 | Stop reason: HOSPADM

## 2021-02-19 RX ADMIN — AMLODIPINE BESYLATE 10 MG: 10 TABLET ORAL at 08:39

## 2021-02-19 RX ADMIN — GABAPENTIN 100 MG: 100 CAPSULE ORAL at 08:39

## 2021-02-19 RX ADMIN — INSULIN LISPRO 3 UNITS: 100 INJECTION, SOLUTION INTRAVENOUS; SUBCUTANEOUS at 08:40

## 2021-02-19 RX ADMIN — GABAPENTIN 100 MG: 100 CAPSULE ORAL at 17:14

## 2021-02-19 RX ADMIN — INSULIN GLARGINE 15 UNITS: 100 INJECTION, SOLUTION SUBCUTANEOUS at 23:36

## 2021-02-19 RX ADMIN — ACETAMINOPHEN 975 MG: 325 TABLET, FILM COATED ORAL at 13:44

## 2021-02-19 RX ADMIN — INSULIN LISPRO 3 UNITS: 100 INJECTION, SOLUTION INTRAVENOUS; SUBCUTANEOUS at 11:00

## 2021-02-19 RX ADMIN — SENNOSIDES 8.6 MG: 8.6 TABLET, FILM COATED ORAL at 08:39

## 2021-02-19 RX ADMIN — ACETAMINOPHEN 975 MG: 325 TABLET, FILM COATED ORAL at 06:49

## 2021-02-19 RX ADMIN — DEXAMETHASONE 4 MG: 4 TABLET ORAL at 13:44

## 2021-02-19 RX ADMIN — SENNOSIDES 8.6 MG: 8.6 TABLET, FILM COATED ORAL at 17:14

## 2021-02-19 RX ADMIN — PANTOPRAZOLE SODIUM 20 MG: 20 TABLET, DELAYED RELEASE ORAL at 06:49

## 2021-02-19 RX ADMIN — INSULIN LISPRO 3 UNITS: 100 INJECTION, SOLUTION INTRAVENOUS; SUBCUTANEOUS at 17:14

## 2021-02-19 RX ADMIN — ENOXAPARIN SODIUM 40 MG: 40 INJECTION SUBCUTANEOUS at 08:38

## 2021-02-19 RX ADMIN — ATORVASTATIN CALCIUM 20 MG: 20 TABLET, FILM COATED ORAL at 08:39

## 2021-02-19 RX ADMIN — ACETAMINOPHEN 975 MG: 325 TABLET, FILM COATED ORAL at 23:37

## 2021-02-19 RX ADMIN — LORATADINE 10 MG: 10 TABLET ORAL at 23:38

## 2021-02-19 RX ADMIN — DEXAMETHASONE 4 MG: 4 TABLET ORAL at 08:39

## 2021-02-19 NOTE — PLAN OF CARE
Problem: PHYSICAL THERAPY ADULT  Goal: Performs mobility at highest level of function for planned discharge setting  See evaluation for individualized goals  Description: Treatment/Interventions: Functional transfer training, LE strengthening/ROM, Elevations, Therapeutic exercise, Endurance training, Patient/family training, Equipment eval/education, Bed mobility, Gait training  Equipment Recommended: Monica       See flowsheet documentation for full assessment, interventions and recommendations  Outcome: Progressing  Note: Prognosis: Guarded  Problem List: Decreased strength, Decreased endurance, Impaired balance, Decreased mobility, Decreased coordination, Impaired hearing  Assessment: Pt  noted to be visibly fatigued this session  pt  agreeable to PT  Pt  reported no pain  Noted unsteady gait with LLE instabillity and wekaness  Pt  given cues for AD and LE sequencing while ambulating and also to take steps within the RW  Excess WBing through UE noted during ambualtion  pt  needed standing brief rests due to fatigue  Negotiated 3 steps twice with Max- Mod A on L and Mod A on R for negotiating steps  Talked to isidro's family member Joceline for a FT for stair negotiation for 02/20/21 at noon with PT  Reported that to [facility] and attending PT Alberto Faust  Pt needed increased assist for stair negotiation this session compared to yesterday  PT to see NV to see due to possible DC recommendation change  Pt  continues to be below her PLOF and requires assist with mobility at least a S level for ambulation cristiano  long distance and assist with stair negotiation  If patient to go home patient requires increased assist at home for mobility and safety due to LE weakness  Will cotninue to follow during the stay to maximize functional mobility   Pt  possibly could use W/C cristiano for longer distances  Barriers to Discharge: Inaccessible home environment  Barriers to Discharge Comments: (S) Pt  needs assist for stair negotiation   PT Discharge Recommendation: Return to previous environment with social support, Home with skilled therapy, Other (Comment)(HOME PT)     PT - OK to Discharge: No(need furtehr stair training)    See flowsheet documentation for full assessment

## 2021-02-19 NOTE — PLAN OF CARE
Problem: Potential for Falls  Goal: Patient will remain free of falls  Description: INTERVENTIONS:  - Assess patient frequently for physical needs  -  Identify cognitive and physical deficits and behaviors that affect risk of falls    -  Rigby fall precautions as indicated by assessment   - Educate patient/family on patient safety including physical limitations  - Instruct patient to call for assistance with activity based on assessment  - Modify environment to reduce risk of injury  - Consider OT/PT consult to assist with strengthening/mobility  Outcome: Progressing

## 2021-02-19 NOTE — ASSESSMENT & PLAN NOTE
· Optimize pain control  · Continue Norvasc - PRN IV Hydralazine on board for BP spikes  · Low-sodium diet

## 2021-02-19 NOTE — OCCUPATIONAL THERAPY NOTE
Occupational Therapy Evaluation     Patient Name: Felicita Machuca  Today's Date: 2021  Problem List  Principal Problem:    Cancer related pain  Active Problems:    Diabetes mellitus type 2    Essential hypertension    Hyperlipidemia    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    Past Medical History  Past Medical History:   Diagnosis Date    Acute on chronic congestive heart failure with left ventricular diastolic dysfunction ([facility] [address] )     last assessed 2017    Asthma     Atelectasis     CHF (congestive heart failure) ([facility] [address] [facility]     Diabetes mellitus ([facility] [address] [facility]     Diverticulitis 2017    with perforation and abscess     Hyperlipidemia     Hypertension     Lesion of spleen     Pericardial effusion      Past Surgical History  Past Surgical History:   Procedure Laterality Date    ABDOMINAL SURGERY      BREAST BIOPSY Left 10/04/2018     SECTION      COLON SURGERY      COLONOSCOPY      COLOSTOMY  2017    HARTMANS PROCEDURE N/A 2017    Procedure: EXPLORATORY LAPAROTOMY; PARTIAL SMALL BOWEL RESECTION; HARTMANS PROCEDURE; OSTOMY;  Surgeon: Amadeo Arteaga MD;  [facility];  Service:     HYSTERECTOMY  2018    IR BIOPSY LYMPH NODE  2021    IR LUMBAR PUNCTURE  2021    MAMMO STEREOTACTIC BREAST BIOPSY LEFT (ALL INC) Left 10/4/2018    OOPHORECTOMY Bilateral 2018    AZ BRONCHOSCOPY,DIAGNOSTIC N/A 2021    Procedure: BRONCHOSCOPY FLEXIBLE;  Surgeon: Bridgett Valerio MD;  [facility];  Service: Thoracic    AZ CLOSE ENTEROSTOMY N/A 2017    Procedure: OPEN COLOSTOMY REVERSAL;  Surgeon: Amadeo Arteaga MD;  [facility];  Service: General    AZ COLONOSCOPY FLX DX W/COLLJ SPEC WHEN PFRMD N/A 2017    Procedure: COLONOSCOPY; DILATION OF OSTOMY;  Surgeon: Amadeo Arteaga MD;  [facility]: [facility];   Service: General    AZ EXC SKIN BENIG <0 5 CM REMAINDER BODY N/A 2017    Procedure: SCALP MASS EXCISION X 2;  Surgeon: Chon Schmitz Hudson Rebolledo MD;  [facility];  Service: General    SD MEDIASTINOSCOPY WITH LYMPH NODE BIOPSY/IES N/A 2/12/2021    Procedure: MEDIASTINOSCOPY;  Surgeon: Jayy Tavarez MD;  [facility]: [facility];  Service: Thoracic    [id number] [facility] N/A 2/27/2019    Procedure: INCISIONAL HERNIA REPAIR, COMPONENT SEPARATION;  Surgeon: Tej Andujar MD;  [facility];  Service: General    SD TOTAL ABDOM HYSTERECTOMY N/A 2/27/2019    Procedure: TOTAL ABDOMINAL HYSTERECTOMY; BSO;  Surgeon: Sahra Nj MD;  [facility];  Service: Gynecology    VAC DRESSING APPLICATION N/A 0/3/0576    Procedure: APPLICATION VAC DRESSING;  Surgeon: Tej Andujar MD;  [facility];  Service:              02/15/21 1344   OT Last Visit   OT Visit Date 02/15/21   Note Type   Note type Evaluation   Restrictions/Precautions   Weight Bearing Precautions Per Order No   Other Precautions Fall Risk;Pain;Hard of hearing  (pt reports loss of hearing in R ear, decreased in L)   Pain Assessment   Pain Assessment Tool 0-10   Pain Score 4   Pain Location/Orientation Orientation: Left; Location: Leg   Pain Onset/Description Onset: Ongoing   Effect of Pain on Daily Activities Reports pain decreases with ambulation    Patient's Stated Pain Goal No pain   Hospital Pain Intervention(s) Repositioned; Ambulation/increased activity   Home Living   Type of [address]   (split level, 6+3 to main level )   Bathroom Shower/Tub Tub/shower unit   Bathroom Toilet Standard   Bathroom Equipment Shower chair   Home Equipment Quad cane  (reports was only using cane for a few days before admission )   Prior Function   Level of [place] Independent with ADLs and functional mobility   Lives With Daughter  (+ 2 grandkids (15 and 25 y o ))   Receives [facility] [address] in the last 6 months 0   Vocational Retired   Lifestyle   Autonomy Pt reports living in split level home with her dtr and grandkids  PTA pt reports being I with ADLs/IADLs   Reciprocal Relationships family    Service to Others retired from working for [employer] time with her family    Psychosocial   Psychosocial (WDL) WDL   Subjective   Subjective "I just want to be able to walk"   ADL   Eating Assistance 6  Modified independent   Grooming Assistance 5  Supervision/Setup   Grooming Deficit Wash/dry hands  (pt utilizes sink for balance of B/L UEs)   [address]; Thread LUE;Pull around back   LB Dressing Assistance 4  Minimal Assistance   LB Dressing Deficit Pull up over hips   Toileting Assistance  5  Supervision/Setup   Bed Mobility   Additional Comments Pt seated OOB upon arrival    Transfers   Sit to Stand 5  Supervision   Stand to Sit 5  Supervision   Toilet transfer 5  Supervision   Additional Comments transfers with RW    Functional Mobility   Functional Mobility   (CGA)   Additional Comments noted limp on L side 2* pain    Additional items Rolling walker   Balance   Static Sitting Fair +   Dynamic Sitting Fair   Static Standing Fair   Dynamic Standing Fair -   Ambulatory Fair -   Activity Tolerance   Activity Tolerance Patient limited by fatigue   Nurse Made Aware RN clearance for OT   RUE Assessment   RUE Assessment WFL   LUE Assessment   LUE Assessment WFL   Hand Function   Gross Motor Coordination Functional   Sensation   Light Touch No apparent deficits   Vision - Complex Assessment   Ocular Range of Motion WFL   Head Position WDL   Tracking Able to track stimulus in all quads without difficulty   Cognition   Overall Cognitive Status [facility]   Arousal/Participation Alert; Responsive; Cooperative   Attention Within functional limits   Orientation Level Oriented X4   Memory Within functional limits Following Commands Follows all commands and directions without difficulty   Comments Pt pleasant and cooperative t/o session    Assessment   Limitation Decreased ADL status; Decreased Safe judgement during ADL;Decreased endurance;Decreased self-care trans;Decreased high-level ADLs   Prognosis Good   Assessment Pt is a 62 y o  female admitted to [facility]"" on 2/11/2021 w/ Cancer related pain to back and LLE   has a past medical history of Acute on chronic congestive heart failure with left ventricular diastolic dysfunction ([facility] [address] ), Asthma, Atelectasis, CHF (congestive heart failure) ([facility] [address] Diabetes mellitus ([facility] [address] ), Diverticulitis (2017), Hyperlipidemia, Hypertension, Lesion of spleen, and Pericardial effusion  Pt with metastatic cx of the brain and SC  Pt with active OT orders and up with assistance  orders  As per pt report, pta, resides in a split-level home, 6+3 steps to main level  Pt lives with her dtr and 2 grandkids (15 and 25 y o)  Pt was I w/  ADLS and IADLS, (+) drove (However, pt reports she was not driving the last few days 2* increased LE pain, discussed cessation of driving for the time being as she feels unsafe completing)  Upon evaluation, pt currently requires S for transfers and CGA for mobility with RW  Pt currently  requires MIN A UB ADLs, MIN A LB ADLs, and S toileting  Pt with fear to complete steps at home, discussed safety and plan to address with physical therapy in future sessions as well  Pt is limited at this time 2*: pain, endurance, activity tolerance, functional mobility, balance, functional standing tolerance and decreased I w/ ADLS/IADLS  The following Occupational Performance Areas to address include: grooming, bathing/shower, toilet hygiene, dressing, health maintenance, functional mobility, community mobility, clothing management and cleaning   Pt to benefit from immediate acute skilled OT to address above deficits, improve overall functional independence, maximize fnxl mobility and reduce caregiver burden  From OT standpoint, recommendation at time of d/c would be home with skilled therapy and increased social support  Pt was left in chair after session with all current needs met  The patient's raw score on the AM-PAC Daily Activity inpatient short form is 19, standardized score is 40 22, greater than 39 4  Patients at this level are likely to benefit from DC to home  Please refer to the recommendation of the Occupational Therapist for safe DC planning  Goals   LTG Time Frame 10-14   Plan   Treatment Interventions ADL retraining;Functional transfer training;UE strengthening/ROM; Endurance training;Patient/family training;Equipment evaluation/education; Compensatory technique education;Continued evaluation; Energy conservation; Activityengagement   Goal Expiration Date 03/01/21   OT Frequency 3-5x/wk   Recommendation   OT Discharge Recommendation Home with skilled therapy  (and increased social support )   OT - OK to Discharge Yes  (when medically stable )   [facility] Daily Activity Inpatient   Lower Body Dressing 3   Bathing 3   Toileting 3   Upper Body Dressing 3   Grooming 3   Eating 4   Daily Activity Raw Score 19   Daily Activity Standardized Score (Calc for Raw Score >=11) 40 22   -[facility] Applied Cognition Inpatient   Following a Speech/Presentation 4   Understanding Ordinary Conversation 4   Taking Medications 4   Remembering Where Things Are Placed or Put Away 4   Remembering List of 4-5 Errands 4   Taking Care of Complicated Tasks 4   Applied Cognition Raw Score 24   Applied Cognition Standardized Score 62 21       GOALS    1) Pt will increase activity tolerance to G for 30 min txment sessions    2) Pt will complete UB/LB dressing/self care w/ mod I using adaptive device and DME as needed    3) Pt will complete bathing w/ Mod I w/ use of AE and DME as needed    4) Pt will complete toileting w/ mod I w/ G hygiene/thoroughness using DME as needed    5) Pt will improve functional transfers to Mod I on/off all surfaces using DME as needed w/ G balance/safety     6) Pt will improve functional mobility during ADL/IADL/leisure tasks to Mod I using DME as needed w/ G balance/safety     7) Pt will participate in simulated IADL management task with DME as needed to increase independence to  w/ G safety and endurance    8) Pt will demonstrate G carryover of pt/caregiver education and training as appropriate      9) Pt will demonstrate 100% carryover of energy conservation techniques t/o functional I/ADL/leisure tasks w/o cues s/p skilled education    Julia Israel MS, OTR/L

## 2021-02-19 NOTE — ASSESSMENT & PLAN NOTE
Lab Results   Component Value Date    HGBA1C 5 3 01/05/2021     · Hold oral hypoglycemics while hospitalized  · Continue SSI coverage and Accu-Cheks - blood sugars remain relatively uncontrolled in the setting of Decadron use -> titrate basal/fixed insulin daily  · Carbohydrate restricted diet

## 2021-02-19 NOTE — CASE MANAGEMENT
bryn Baptiste, Caregivers, they can see patient 2/23  TCT Oliva, message left to return call  PT for possible DC 2/24 and would like to confirm they can accept    [phone]

## 2021-02-19 NOTE — ASSESSMENT & PLAN NOTE
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  · FNA of cervical lymph node last month w/ evidence of non-small cell carcinoma of uncertain histotype  · MRIs of C/T/L spine consistent with multiregional CSF spread of tumor - s/p lumbar puncture last month w/ CSF cytology without conclusive evidence of malignancy, but rather, atypical cellular changes of unknown significance  · CT of head and MRI brain last month also noted left occipital/parietal lesions w/ vasogenic edema - on Decadron  · Appreciate medical and radiation oncology evaluation - continue hold brain and lumbar/sacral spine palliative radiation  · PRN pain control  · Continues to work with PT/OT - ambulation up simulated stairs progressively improves daily - she would like to continue working with inpatient PT/OT to comfortably do so once discharged - will need to consider remaining inpatient until radiation treatments are complete due to need for daily transport if discharged home

## 2021-02-19 NOTE — OCCUPATIONAL THERAPY NOTE
OccupationalTherapy Progress Note     Patient Name: Mary Kate Asher  Today's Date: 2/19/2021  Problem List  Principal Problem:    Cancer related pain  Active Problems:    Diabetes mellitus type 2    Essential hypertension    Hyperlipidemia    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       02/19/21 6728   OT Last Visit   OT Visit Date 02/19/21   Note Type   Note Type Treatment   Restrictions/Precautions   Other Precautions Fall Risk;Hard of hearing  (hearing better in L ear)   Lifestyle   Autonomy Pt reports living in split level home with her dtr and grandkids  PTA pt reports being I with ADLs/IADLs   Reciprocal Relationships family    Service to Others retired from working for [employer] time with her family    Pain Assessment   Pain Assessment Tool 0-10   Pain Score 4   Pain Location/Orientation Location: Leg;Orientation: Left   Hospital Pain Intervention(s) Repositioned   ADL   Grooming Assistance 5  Supervision/Setup   Grooming Deficit Wash/dry hands   Grooming Comments Requires steadying at the sink and cues for safe hand placement   Toileting Assistance  5  Supervision/Setup   Bed Mobility   Supine to Sit 6  Modified independent   Sit to Supine 5  Supervision   Additional Comments Pt left on transportation stretcher en route to radiation therapy   Transfers   Sit to Stand 4  Minimal assistance  (CGA)   Additional items Increased time required   Stand to Sit 5  Supervision   Additional items Increased time required   Toilet transfer 4  Minimal assistance   Additional items   (gb on L)   Additional Comments Pt requires cues for safe hand placement/positioning with use of rw   Functional Mobility   Functional Mobility 4  Minimal assistance   Additional Comments CGA with use of rw and fatigues quickly  Ambulates < household distances    Requires max VCs for safe positioning during mobility in the bathroom (prefers to reach across the room to various surfaces and not appropriately moving rw)  Fair carry over of rw use from previous sessions   Cognition   Overall Cognitive Status [facility]   Arousal/Participation Alert; Responsive; Cooperative   Attention Attends with cues to redirect   Orientation Level Oriented X4   Memory Within functional limits   Following Commands Follows one step commands without difficulty   Comments Pt is pleasant, but expressing her dissatisfaction with her hospitalization and staff thus far  Demonstrates fair carry over of strategies learned in previous sessions  Continues to require cues for safety   Activity Tolerance   Activity Tolerance Patient limited by fatigue   Medical Staff Made Aware RN manager, RN   Assessment   Assessment Pt is seen for OT treatment session 2/19/21 including interventions to: increase independence with self care tasks, improve functional transfers with fall prevention strategies, increase endurance, and pt education  In comparison to previous session, pt with minimal improvement, and actually requiring increased assist with toilet transfers  Pt expressing frustration with nursing care, reporting she'd like to be fully bathed with fresh sheets prior to 7AM daily  She has been navigating around the room and completing self care in stance in the bathroom, but reports she's had LOB and is unhappy that nursing staff did not bring a chair for her to sit on when she is less steady during ADLs  OT updated RN and RN manager regarding complaints and OT recommendation for steadying assist from staff vs chair in front of sink for ADL routine  From an OT standpoint, concern for patient's ability to safely complete ADL routine and navigate throughout the house upon d/c  Recommend home OT AND increased assist vs STR upon d/c  Pt is to continue to benefit from skilled occupational therapy services while in the hospital to maximize functioning and independence with daily activities      Plan   Treatment Interventions ADL retraining;Functional transfer training;UE strengthening/ROM; Endurance training;Patient/family training;Equipment evaluation/education; Compensatory technique education;Continued evaluation; Activityengagement; Energy conservation   Goal Expiration Date 03/01/21   OT Frequency 3-5x/wk   Recommendation   OT Discharge Recommendation   (see below)   OT - OK to Discharge   (yes)   Additional Comments  From an OT standpoint, concern for patient's ability to safely complete ADL routine and navigate throughout the house upon d/c  Recommend home OT AND increased assist vs STR upon d/c  AM-PAC Daily Activity Inpatient   Lower Body Dressing 3   Bathing 3   Toileting 4   Upper Body Dressing 4   Grooming 4   Eating 4   Daily Activity Raw Score 22   Daily Activity Standardized Score (Calc for Raw Score >=11) 47  1   AM-PAC Applied Cognition Inpatient   Following a Speech/Presentation 4   Understanding Ordinary Conversation 4   Taking Medications 4   Remembering Where Things Are Placed or Put Away 4   Remembering List of 4-5 Errands 4   Taking Care of Complicated Tasks 4   Applied Cognition Raw Score 24   Applied Cognition Standardized Score 62 21     PARIS Muñoz, OTR/L

## 2021-02-19 NOTE — PLAN OF CARE
Problem: OCCUPATIONAL THERAPY ADULT  Goal: Performs self-care activities at highest level of function for planned discharge setting  See evaluation for individualized goals  Description: Treatment Interventions: ADL retraining, Functional transfer training, UE strengthening/ROM, Endurance training, Patient/family training, Equipment evaluation/education, Compensatory technique education, Continued evaluation, Energy conservation, Activityengagement          See flowsheet documentation for full assessment, interventions and recommendations  Note: Limitation: Decreased ADL status, Decreased Safe judgement during ADL, Decreased endurance, Decreased self-care trans, Decreased high-level ADLs  Prognosis: Good  Assessment: Pt is seen for OT treatment session 2/19/21 including interventions to: increase independence with self care tasks, improve functional transfers with fall prevention strategies, increase endurance, and pt education  In comparison to previous session, pt with minimal improvement, and actually requiring increased assist with toilet transfers  Pt expressing frustration with nursing care, reporting she'd like to be fully bathed with fresh sheets prior to 7AM daily  She has been navigating around the room and completing self care in stance in the bathroom, but reports she's had LOB and is unhappy that nursing staff did not bring a chair for her to sit on when she is less steady during ADLs  OT updated RN and RN manager regarding complaints and OT recommendation for steadying assist from staff vs chair in front of sink for ADL routine  From an OT standpoint, concern for patient's ability to safely complete ADL routine and navigate throughout the house upon d/c  Recommend home OT AND increased assist vs STR upon d/c  Pt is to continue to benefit from skilled occupational therapy services while in the hospital to maximize functioning and independence with daily activities        OT Discharge Recommendation: (see below)  OT - OK to Discharge: (yes)

## 2021-02-19 NOTE — ASSESSMENT & PLAN NOTE
· Per record review, patient initially presented to the [facility] with intractable back and LLE pain in the setting of metastatic cancer with spinal/brain/mediastinal involvement   · Pain management per palliative care  · Radiation treatments per radiation oncology  · Continue current analgesic regimen w/ constipation prophylaxis  · Supportive care otherwise

## 2021-02-19 NOTE — PROGRESS NOTES
[facility] - Progress Note  Patient: Johnnie Buckley 62 y o  female MRN: [id number]  Unit/Bed#: [facility] 927-01 Encounter: [id number]  Primary Care Provider: Henny Cavazos DO  Date Of Visit: 02/19/21        Assessment & Plan:    * Cancer related pain  Assessment & Plan  · Per record review, patient initially presented to the [facility] with intractable back and LLE pain in the setting of metastatic cancer with spinal/brain/mediastinal involvement   · Pain management per palliative care  · Radiation treatments per radiation oncology  · Continue current analgesic regimen w/ constipation prophylaxis  · Supportive care otherwise    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  Assessment & Plan  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  · FNA of cervical lymph node last month w/ evidence of non-small cell carcinoma of uncertain histotype  · MRIs of C/T/L spine consistent with multiregional CSF spread of tumor - s/p lumbar puncture last month w/ CSF cytology without conclusive evidence of malignancy, but rather, atypical cellular changes of unknown significance  · CT of head and MRI brain last month also noted left occipital/parietal lesions w/ vasogenic edema - on Decadron  · Appreciate medical and radiation oncology evaluation - continue hold brain and lumbar/sacral spine palliative radiation  · PRN pain control  · Continues to work with PT/OT - ambulation up simulated stairs progressively improves daily - she would like to continue working with inpatient PT/OT to comfortably do so once discharged - will need to consider remaining inpatient until radiation treatments are complete due to need for daily transport if discharged home    Diabetes mellitus type 2  Assessment & Plan  Lab Results Component Value Date    HGBA1C 5 3 2021     · Hold oral hypoglycemics while hospitalized  · Continue SSI coverage and Accu-Cheks - blood sugars remain relatively uncontrolled in the setting of Decadron use -> titrate basal/fixed insulin daily  · Carbohydrate restricted diet    Essential hypertension  Assessment & Plan  · Optimize pain control  · Continue Norvasc - PRN IV Hydralazine on board for BP spikes  · Low-sodium diet    Hyperlipidemia  Assessment & Plan  · Continue statin      DVT Prophylaxis:  Lovenox      Patient Centered Rounds:  I have performed bedside rounds and discussed plan of care with nursing today  Discussions with Specialists or Other Care Team Provider:  see above assessments if applicable    Education and Discussions with Family / Patient:  Patient at bedside, who will self-update family today  Time Spent for Care:  32 minutes  More than 50% of total time spent on counseling and coordination of care as described above  Current Length of Stay: 8 day(s)    Current Patient Status: Inpatient   Certification Statement:  Patient will continue to require additional hospital stay due to assessments as noted above  Code Status: Level 1 - Full Code        Subjective:     Seen/examined earlier today  She continues to work with PT/OT and states she feels better regarding ambulation with her walker and up simulated steps  Pain is fairly controlled at this time  Objective:     Vitals:   Temp (24hrs), Av °F (36 7 °C), Min:97 9 °F (36 6 °C), Max:98 °F (36 7 °C)    Temp:  [97 9 °F (36 6 °C)-98 °F (36 7 °C)] 97 9 °F (36 6 °C)  HR:  [71-80] 80  Resp:  [18] 18  BP: (165-176)/(92-95) 165/92  SpO2:  [93 %-98 %] 93 %  Body mass index is 31 52 kg/m²  Input and Output Summary (last 24 hours):        Intake/Output Summary (Last 24 hours) at 2021 1728  Last data filed at 2021 1344  Gross per 24 hour   Intake 640 ml   Output --   Net 640 ml       Physical Exam:     GENERAL: Well-developed/nourished - improved pain today  HEAD:  Normocephalic - atraumatic  EYES: PERRL - EOMI   MOUTH:  Mucosa moist  NECK:  Supple - full range of motion  CARDIAC:  Rate controlled - S1/S2 positive  PULMONARY:  Clear breath sounds bilaterally - nonlabored respirations  ABDOMEN:  Soft - nontender/nondistended - active bowel sounds  MUSCULOSKELETAL:  Motor strength/range of motion deconditioned requiring a walker for ambulation  NEUROLOGIC:  Alert/oriented at baseline  SKIN:  Chronic wrinkles/blemishes   PSYCHIATRIC:  Mood/affect stable      Additional Data:     Labs & Recent Cultures:    Results from last 7 days   Lab Units 02/14/21  0515   WBC Thousand/uL 5 70   HEMOGLOBIN g/dL 10 0*   HEMATOCRIT % 31 7*   PLATELETS Thousands/uL 247     Results from last 7 days   Lab Units 02/14/21  0515   POTASSIUM mmol/L 4 0   CHLORIDE mmol/L 103   CO2 mmol/L 30   BUN mg/dL 20   CREATININE mg/dL 0 38*   CALCIUM mg/dL 9 4         Results from last 7 days   Lab Units 02/19/21  1714 02/19/21  1052 02/19/21  0838 02/18/21  2106 02/18/21  1717 02/18/21  1050 02/18/21  0740 02/17/21  2045 02/17/21  1655 02/17/21  1149 02/17/21  0736 02/16/21  2040   POC GLUCOSE mg/dl 300* 260* 153* 331* 257* 193* 255* 296* 290* 203* 186* 324*                         Last 24 Hours Medication List:   Current Facility-Administered Medications   Medication Dose Route Frequency Provider Last Rate    acetaminophen  650 mg Oral Q6H PRN Janny Antonio PA-C      acetaminophen  975 mg Oral Q8H [address] Janny Antonio PA-C      aluminum-magnesium hydroxide-simethicone  30 mL Oral Q6H PRN Janny Antonio PA-C      amLODIPine  10 mg Oral Daily Janny Antonio PA-C      atorvastatin  20 mg Oral Daily Anni Antonio [place]      dexamethasone  4 mg Oral BID (AM & Afternoon) Trent Neves MD      diazepam  2 mg Oral Q6H PRN Jay Virgen PA-C      enoxaparin  40 mg Subcutaneous Daily Janny Antonio PA-C      gabapentin  100 mg Oral BID Tey Cardenas PA-C      hydrALAZINE  5 mg Intravenous Q6H PRN Sabrina Martinez MD      HYDROmorphone  0 5 mg Intravenous Q2H PRN Janny Antonio PA-C      insulin glargine  15 Units Subcutaneous HS Sabrina Martinez MD      insulin lispro  1-5 Units Subcutaneous 4x Daily (AC & HS) Sabrina Martinez MD     Danika Servin ON 2/20/2021] insulin lispro  5 Units Subcutaneous TID With Meals Sabrina Martinez MD      lidocaine  2 patch Topical Daily Tye Cardenas PA-C      loratadine  10 mg Oral HS Tye Cardenas PA-C      LORazepam  2 mg Intravenous Q10 Min PRN Lj Antonio PA-C      ondansetron  4 mg Oral Q6H PRN Katalina Richards PA-C      oxyCODONE  15 mg Oral Q3H PRN Spencer Mckeon MD      pantoprazole  20 mg Oral Early Morning Tye Cardenas PA-C      senna  1 tablet Oral BID Spencer Mckeon MD                    ** Please Note: This note is constructed using a voice recognition dictation system  An occasional wrong word/phrase or sound-a-like substitution may have been picked up by dictation device due to the inherent limitations of voice recognition software  Read the chart carefully and recognize, using reasonable context, where substitutions may have occurred  **

## 2021-02-19 NOTE — UTILIZATION REVIEW
Continued Stay Review    Date: 2/19/21                      Current Patient Class: IP  Current Level of Care: MS    HPI:60 y o  female initially admitted on 2/11 as inpatient due to intractable back and LLE pain requiring thoracic surgery consult and treatment  Taken to OR on 2/12 for bronch and mediastinoscopy  PMH: dm, htn, squamous cell ca mets to brain/spine/medastinum     Assessment/Plan:   Pt receiving RT to brain and spine for stage 4 metastatic squamous cell CA  She has 4 more treatments left  Pt is working with PT to improve performance on steps which are needed for home  Continues to use PRN oral analgesia        Pertinent Labs/Diagnostic Results:     Results from last 7 days   Lab Units 02/14/21  0515   WBC Thousand/uL 5 70   HEMOGLOBIN g/dL 10 0*   HEMATOCRIT % 31 7*   PLATELETS Thousands/uL 247         Results from last 7 days   Lab Units 02/14/21  0515   SODIUM mmol/L 137   POTASSIUM mmol/L 4 0   CHLORIDE mmol/L 103   CO2 mmol/L 30   ANION GAP mmol/L 4   BUN mg/dL 20   CREATININE mg/dL 0 38*   EGFR ml/min/1 73sq m 134   CALCIUM mg/dL 9 4         Results from last 7 days   Lab Units 02/19/21  1052 02/19/21  0838 02/18/21  2106 02/18/21  1717 02/18/21  1050 02/18/21  0740 02/17/21  2045 02/17/21  1655 02/17/21  1149 02/17/21  0736 02/16/21  2040 02/16/21  1652   POC GLUCOSE mg/dl 260* 153* 331* 257* 193* 255* 296* 290* 203* 186* 324* 327*     Results from last 7 days   Lab Units 02/14/21  0515   GLUCOSE RANDOM mg/dL 144*     Vital Signs:   02/19/21 07:45:09  98 °F (36 7 °C)  72  --  176/95Abnormal   122  98 %  --  --   02/19/21 07:44:38  98 °F (36 7 °C)  71  18  176/95Abnormal   122  98 %  --  --   02/18/21 15:48:56  98 3 °F (36 8 °C)  77  18  147/71  96  --  --  --   02/18/21 0900  --  --  --  --  --  --  None (Room air)  --   02/18/21 07:27:54  98 °F (36 7 °C)  78  16  151/70  97  95 %  --  --   02/17/21 22:23:35  97 9 °F (36 6 °C)  70  18  144/81  102  93 %  --  --   02/17/21 14:54:50  97 9 °F (36 6 °C)  79  18  140/83  102  100 %  --  --   02/17/21 08:22:16  --  79  --  155/83  107  90 %  --  Lying   02/17/21 07:32:44  97 9 °F (36 6 °C)  71  17  156/84  108  91 %  None (Room air)  Lying   02/17/21 0100  --  --  --  --  --  --  None (Room air)  --     Medications:   Scheduled Medications:  acetaminophen, 975 mg, Oral, Q8H [address]  amLODIPine, 10 mg, Oral, Daily  atorvastatin, 20 mg, Oral, Daily  dexamethasone, 4 mg, Oral, BID (AM & Afternoon)  enoxaparin, 40 mg, Subcutaneous, Daily  gabapentin, 100 mg, Oral, BID  insulin glargine, 10 Units, Subcutaneous, HS  insulin lispro, 1-5 Units, Subcutaneous, 4x Daily (AC & HS)  insulin lispro, 3 Units, Subcutaneous, TID With Meals  lidocaine, 2 patch, Topical, Daily  loratadine, 10 mg, Oral, HS  pantoprazole, 20 mg, Oral, Early Morning  senna, 1 tablet, Oral, BID      Continuous IV Infusions:     PRN Meds:  acetaminophen, 650 mg, Oral, Q6H PRN  aluminum-magnesium hydroxide-simethicone, 30 mL, Oral, Q6H PRN  diazepam, 2 mg, Oral, Q6H PRN  HYDROmorphone, 0 5 mg, Intravenous, Q2H PRN  LORazepam, 2 mg, Intravenous, Q10 Min PRN  ondansetron, 4 mg, Oral, Q6H PRN  oxyCODONE, 15 mg, Oral, Q3H PRN - x 1 2/18    [facility]    Network Utilization Review Department  ATTENTION: Please call with any questions or concerns to [phone] and carefully listen to the prompts so that you are directed to the right person  All voicemails are confidential   Chrystine Can all requests for admission clinical reviews, approved or denied determinations and any other requests to dedicated fax number below belonging to the campus where the patient is receiving treatment   List of dedicated fax numbers for the Facilities:  [address] DENIALS (Administrative/Medical Necessity) [phone]   [address] (Maternity/NICU/Pediatrics) 261 [facility],7Th Floor [phone]   [address] [phone] [address] [place] [address] [address]) [address] [place] [address] Seth [address] 1481 [phone]   [address] 951 [phone]

## 2021-02-20 LAB
GLUCOSE SERPL-MCNC: 231 MG/DL (ref 65–140)
GLUCOSE SERPL-MCNC: 240 MG/DL (ref 65–140)
GLUCOSE SERPL-MCNC: 376 MG/DL (ref 65–140)
GLUCOSE SERPL-MCNC: 80 MG/DL (ref 65–140)

## 2021-02-20 PROCEDURE — 97116 GAIT TRAINING THERAPY: CPT

## 2021-02-20 PROCEDURE — 97530 THERAPEUTIC ACTIVITIES: CPT

## 2021-02-20 PROCEDURE — 99232 SBSQ HOSP IP/OBS MODERATE 35: CPT | Performed by: INTERNAL MEDICINE

## 2021-02-20 PROCEDURE — 82948 REAGENT STRIP/BLOOD GLUCOSE: CPT

## 2021-02-20 RX ORDER — XYLITOL/YERBA SANTA
5 AEROSOL, SPRAY WITH PUMP (ML) MUCOUS MEMBRANE 4 TIMES DAILY PRN
Status: DISCONTINUED | OUTPATIENT
Start: 2021-02-20 | End: 2021-02-24 | Stop reason: HOSPADM

## 2021-02-20 RX ORDER — DOCUSATE SODIUM 100 MG/1
100 CAPSULE, LIQUID FILLED ORAL 2 TIMES DAILY
Status: DISCONTINUED | OUTPATIENT
Start: 2021-02-20 | End: 2021-02-24 | Stop reason: HOSPADM

## 2021-02-20 RX ADMIN — SENNOSIDES 8.6 MG: 8.6 TABLET, FILM COATED ORAL at 17:22

## 2021-02-20 RX ADMIN — DEXAMETHASONE 4 MG: 4 TABLET ORAL at 08:51

## 2021-02-20 RX ADMIN — AMLODIPINE BESYLATE 10 MG: 10 TABLET ORAL at 08:51

## 2021-02-20 RX ADMIN — ENOXAPARIN SODIUM 40 MG: 40 INJECTION SUBCUTANEOUS at 08:51

## 2021-02-20 RX ADMIN — LORATADINE 10 MG: 10 TABLET ORAL at 22:08

## 2021-02-20 RX ADMIN — GABAPENTIN 100 MG: 100 CAPSULE ORAL at 17:22

## 2021-02-20 RX ADMIN — GABAPENTIN 100 MG: 100 CAPSULE ORAL at 08:51

## 2021-02-20 RX ADMIN — Medication 5 SPRAY: at 22:09

## 2021-02-20 RX ADMIN — ATORVASTATIN CALCIUM 20 MG: 20 TABLET, FILM COATED ORAL at 08:51

## 2021-02-20 RX ADMIN — ACETAMINOPHEN 975 MG: 325 TABLET, FILM COATED ORAL at 13:23

## 2021-02-20 RX ADMIN — ACETAMINOPHEN 975 MG: 325 TABLET, FILM COATED ORAL at 22:08

## 2021-02-20 RX ADMIN — DOCUSATE SODIUM 100 MG: 100 CAPSULE, LIQUID FILLED ORAL at 17:22

## 2021-02-20 RX ADMIN — INSULIN GLARGINE 15 UNITS: 100 INJECTION, SOLUTION SUBCUTANEOUS at 22:09

## 2021-02-20 RX ADMIN — DEXAMETHASONE 4 MG: 4 TABLET ORAL at 13:23

## 2021-02-20 RX ADMIN — PANTOPRAZOLE SODIUM 20 MG: 20 TABLET, DELAYED RELEASE ORAL at 05:38

## 2021-02-20 RX ADMIN — DOCUSATE SODIUM 100 MG: 100 CAPSULE, LIQUID FILLED ORAL at 10:16

## 2021-02-20 RX ADMIN — ACETAMINOPHEN 975 MG: 325 TABLET, FILM COATED ORAL at 05:38

## 2021-02-20 NOTE — ASSESSMENT & PLAN NOTE
· Per record review, patient initially presented to the [facility] with intractable back and LLE pain in the setting of metastatic cancer with spinal/brain/mediastinal involvement   · Pain management per palliative care  · Radiation treatments per radiation oncology  · Continue current analgesic regimen w/ constipation prophylaxis  · Supportive care otherwise

## 2021-02-20 NOTE — PHYSICAL THERAPY NOTE
Physical Therapy Treatment Note    Patient's Name: Magdy Gray  : 1960       02/20/21 1241   PT Last Visit   PT Visit Date 21   Note Type   Note Type Treatment   Pain Assessment   Pain Assessment Tool Pain Assessment not indicated - pt denies pain   Restrictions/Precautions   Weight Bearing Precautions Per Order No   Other Precautions Fall Risk;Pain   General   Chart Reviewed Yes   Family/Caregiver Present Yes  (daugther "Joceline" and grandson present for family training)   Cognition   Overall Cognitive Status WFL   Attention Attends with cues to redirect   Orientation Level Oriented X4   Memory Within functional limits   Following Commands Follows one step commands without difficulty   Bed Mobility   Additional Comments Pt OOB standing at RW upon entry into room  Pt ended session sitting EOB with all needs in reach   Transfers   Sit to Stand 5  Supervision   Additional items Assist x 1; Increased time required;Verbal cues   Stand to Sit 5  Supervision   Additional items Assist x 1; Increased time required;Verbal cues   Additional Comments Transfers with RW  Increased time required to come into full hip and knee extension   Ambulation/Elevation   Gait pattern L Foot drag;Decreased foot clearance; Excessively slow; Ataxia   Gait Assistance 5  Supervision  (CGA)   Additional items Assist x 1;Verbal cues   Assistive Device Rolling walker   Distance 60ft + stair training with family  Performed 3 steps with b/l HHA x3 trials  Initial trial with PT  and daughter  2nd and 3rd trials with dgt and grandson  Pt ascended steps facing forward and descended backwards facing   Stair Management Technique No rails; Foreward;Backward; Step to pattern  (HHAx2)   Number of Stairs 9  (3x3)   Balance   Static Sitting Good   Dynamic Sitting Fair +   Static Standing Fair -   Dynamic Standing Fair -   Ambulatory Fair -   Endurance Deficit   Endurance Deficit Yes   Endurance Deficit Description weakness   Activity Tolerance Activity Tolerance Patient limited by fatigue   Nurse Made Aware pt okay to see per RN   Assessment   Prognosis Guarded   Problem List Decreased strength;Decreased endurance; Impaired balance;Decreased mobility;Pain   Assessment Pt seen today for further stair training, as well as family training  Pt's daughter "Tania Hooper" and grandson present  Simulated steps into pt's home (3 steps with no handrails) with HHAx2  During first trial, pt performed with PT and daughter with VCs for step to pattern and "up with the good, down with the bad " During 2nd and 3rd trials, pt practiced with hands on assistance from daughter and grandson  Extensive time spent educating family (>15 minutes) on proper hand placement as well body mechanics when assisting pt on stairs  Pt's family able to demonstrate good technique  Confirmed with family that 2 people will be home at all times to assist pt up and down stairs  All patient and family questions answered  Continuing to recommend d/c home with social support (2 family members to assist with stairs to enter home) and HHPT  Barriers to Discharge Inaccessible home environment   Goals   Patient Goals to get better at the steps   STG Expiration Date 02/28/21   Plan   Treatment/Interventions Functional transfer training;Elevations; Therapeutic exercise;LE strengthening/ROM; Endurance training;Gait training;Spoke to nursing;OT   Progress Progressing toward goals   PT Frequency Other (Comment)  (3-5x/wk)   Recommendation   PT Discharge Recommendation Return to previous environment with social support;Home with skilled therapy  (HHPT)   Equipment Recommended Walker   PT - OK to Discharge Yes  (when medically stable)   AM-PAC Basic Mobility Inpatient   Turning in Bed Without Bedrails 4   Lying on Back to Sitting on Edge of Flat Bed 3   Moving Bed to Chair 3   Standing Up From Chair 3   Walk in Room 3   Climb 3-5 Stairs 1   Basic Mobility Inpatient Raw Score 17   Basic Mobility Standardized Score 39 67       Illa Coma, PT, DPT

## 2021-02-20 NOTE — ASSESSMENT & PLAN NOTE
· also with mediastinal involvement and right hilar lymphadenopathy  · S/p mediastinoscopy on 2/12 - findings of large white lymph nodes in multiple regions -> 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  · FNA of cervical lymph node last month w/ evidence of non-small cell carcinoma of uncertain histotype  · MRIs of C/T/L spine consistent with multiregional CSF spread of tumor - s/p lumbar puncture last month w/ CSF cytology without conclusive evidence of malignancy, but rather, atypical cellular changes of unknown significance  · CT of head and MRI brain last month also noted left occipital/parietal lesions w/ vasogenic edema - on Decadron  · Appreciate medical and radiation oncology evaluation - continue hold brain and lumbar/sacral spine palliative radiation  · PRN pain control  · Continues to work with PT/OT - ambulation up simulated stairs progressively improves daily - she would like to continue working with inpatient PT/OT to comfortably do so once discharged - to remain inpatient until radiation treatments are complete due to need for daily transport if discharged home

## 2021-02-20 NOTE — PLAN OF CARE
Problem: Potential for Falls  Goal: Patient will remain free of falls  Description: INTERVENTIONS:  - Assess patient frequently for physical needs  -  Identify cognitive and physical deficits and behaviors that affect risk of falls    -  [place] fall precautions as indicated by assessment   - Educate patient/family on patient safety including physical limitations  - Instruct patient to call for assistance with activity based on assessment  - Modify environment to reduce risk of injury  - Consider OT/PT consult to assist with strengthening/mobility  Outcome: Progressing     Problem: PAIN - ADULT  Goal: Verbalizes/displays adequate comfort level or baseline comfort level  Description: Interventions:  - Encourage patient to monitor pain and request assistance  - Assess pain using appropriate pain scale  - Administer analgesics based on type and severity of pain and evaluate response  - Implement non-pharmacological measures as appropriate and evaluate response  - Consider cultural and social influences on pain and pain management  - Notify physician/advanced practitioner if interventions unsuccessful or patient reports new pain  Outcome: Progressing     Problem: INFECTION - ADULT  Goal: Absence or prevention of progression during hospitalization  Description: INTERVENTIONS:  - Assess and monitor for signs and symptoms of infection  - Monitor lab/diagnostic results  - Monitor all insertion sites, i e  indwelling lines, tubes, and drains  - Monitor endotracheal if appropriate and nasal secretions for changes in amount and color  - [place] appropriate cooling/warming therapies per order  - Administer medications as ordered  - Instruct and encourage patient and family to use good hand hygiene technique  - Identify and instruct in appropriate isolation precautions for identified infection/condition  Outcome: Progressing     Problem: SAFETY ADULT  Goal: Patient will remain free of falls  Description: INTERVENTIONS:  - Assess patient frequently for physical needs  -  Identify cognitive and physical deficits and behaviors that affect risk of falls    -  [place] fall precautions as indicated by assessment   - Educate patient/family on patient safety including physical limitations  - Instruct patient to call for assistance with activity based on assessment  - Modify environment to reduce risk of injury  - Consider OT/PT consult to assist with strengthening/mobility  Outcome: Progressing  Goal: Maintain or return to baseline ADL function  Description: INTERVENTIONS:  -  Assess patient's ability to carry out ADLs; assess patient's baseline for ADL function and identify physical deficits which impact ability to perform ADLs (bathing, care of mouth/teeth, toileting, grooming, dressing, etc )  - Assess/evaluate cause of self-care deficits   - Assess range of motion  - Assess patient's mobility; develop plan if impaired  - Assess patient's need for assistive devices and provide as appropriate  - Encourage maximum independence but intervene and supervise when necessary  - Involve family in performance of ADLs  - Assess for home care needs following discharge   - Consider OT consult to assist with ADL evaluation and planning for discharge  - Provide patient education as appropriate  Outcome: Progressing  Goal: Maintain or return mobility status to optimal level  Description: INTERVENTIONS:  - Assess patient's baseline mobility status (ambulation, transfers, stairs, etc )    - Identify cognitive and physical deficits and behaviors that affect mobility  - Identify mobility aids required to assist with transfers and/or ambulation (gait belt, sit-to-stand, lift, walker, cane, etc )  - [place] fall precautions as indicated by assessment  - Record patient progress and toleration of activity level on Mobility SBAR; progress patient to next Phase/Stage  - Instruct patient to call for assistance with activity based on assessment  - Consider rehabilitation consult to assist with strengthening/weightbearing, etc   Outcome: Progressing     Problem: DISCHARGE PLANNING  Goal: Discharge to home or other facility with appropriate resources  Description: INTERVENTIONS:  - Identify barriers to discharge w/patient and caregiver  - Arrange for needed discharge resources and transportation as appropriate  - Identify discharge learning needs (meds, wound care, etc )  - Arrange for interpretive services to assist at discharge as needed  - Refer to Case Management Department for coordinating discharge planning if the patient needs post-hospital services based on physician/advanced practitioner order or complex needs related to functional status, cognitive ability, or social support system  Outcome: Progressing     Problem: Knowledge Deficit  Goal: Patient/family/caregiver demonstrates understanding of disease process, treatment plan, medications, and discharge instructions  Description: Complete learning assessment and assess knowledge base    Interventions:  - Provide teaching at level of understanding  - Provide teaching via preferred learning methods  Outcome: Progressing

## 2021-02-20 NOTE — PROGRESS NOTES
[facility] - Progress Note  Patient: Rafiq Mercer 62 y o  female MRN: [id number]  Unit/Bed#: [facility] 927-01 Encounter: [id number]  Primary Care Provider: Parker Grissom DO  Date Of Visit: 02/20/21        Assessment & Plan:    * Cancer related pain  Assessment & Plan  · Per record review, patient initially presented to the [facility] with intractable back and LLE pain in the setting of metastatic cancer with spinal/brain/mediastinal involvement   · Pain management per palliative care  · Radiation treatments per radiation oncology  · Continue current analgesic regimen w/ constipation prophylaxis  · Supportive care otherwise    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  Assessment & Plan  · also with mediastinal involvement and right hilar lymphadenopathy  · S/p mediastinoscopy on 2/12 - findings of large white lymph nodes in multiple regions -> 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  · FNA of cervical lymph node last month w/ evidence of non-small cell carcinoma of uncertain histotype  · MRIs of C/T/L spine consistent with multiregional CSF spread of tumor - s/p lumbar puncture last month w/ CSF cytology without conclusive evidence of malignancy, but rather, atypical cellular changes of unknown significance  · CT of head and MRI brain last month also noted left occipital/parietal lesions w/ vasogenic edema - on Decadron  · Appreciate medical and radiation oncology evaluation - continue hold brain and lumbar/sacral spine palliative radiation  · PRN pain control  · Continues to work with PT/OT - ambulation up simulated stairs progressively improves daily - she would like to continue working with inpatient PT/OT to comfortably do so once discharged - to remain inpatient until radiation treatments are complete due to need for daily transport if discharged home    Diabetes mellitus type 2  Assessment & Plan  Lab Results   Component Value Date HGBA1C 5 3 2021     · Hold oral hypoglycemics while hospitalized  · Continue SSI coverage and Accu-Cheks - blood sugars remain relatively uncontrolled in the setting of Decadron use -> titrate basal/fixed insulin daily  · Carbohydrate restricted diet    Essential hypertension  Assessment & Plan  · Optimize pain control  · Continue Norvasc - PRN IV Hydralazine on board for BP spikes  · Low-sodium diet    Hyperlipidemia  Assessment & Plan  · Continue statin      DVT Prophylaxis:  Lovenox      Patient Centered Rounds:  I have performed bedside rounds and discussed plan of care with nursing today  Discussions with Specialists or Other Care Team Provider:  see above assessments if applicable    Education and Discussions with Family / Patient:  Patient at bedside, who will self-update daughter  Time Spent for Care:  32 minutes  More than 50% of total time spent on counseling and coordination of care as described above  Current Length of Stay: 9 day(s)    Current Patient Status: Inpatient   Certification Statement:  Patient will continue to require additional hospital stay due to assessments as noted above  Code Status: Level 1 - Full Code        Subjective:     Encountered earlier today  Seen standing upright ambulating with the use of a walker  States pain is better controlled today  Objective:     Vitals:   Temp (24hrs), Av °F (36 7 °C), Min:97 8 °F (36 6 °C), Max:98 4 °F (36 9 °C)    Temp:  [97 8 °F (36 6 °C)-98 4 °F (36 9 °C)] 98 4 °F (36 9 °C)  HR:  [70-87] 87  Resp:  [18-20] 18  BP: (135-165)/(69-92) 135/69  SpO2:  [93 %-99 %] 95 %  Body mass index is 31 52 kg/m²  Input and Output Summary (last 24 hours):        Intake/Output Summary (Last 24 hours) at 2021 1521  Last data filed at 2021 1458  Gross per 24 hour   Intake 480 ml   Output 0 ml   Net 480 ml       Physical Exam:     GENERAL:  Well-developed/nourished - pain improved today  HEAD:  Normocephalic - atraumatic  EYES: PERRL - EOMI   MOUTH:  Mucosa moist  NECK:  Supple - full range of motion  CARDIAC:  Rate controlled - S1/S2 positive  PULMONARY:  Clear breath sounds bilaterally - nonlabored respirations  ABDOMEN:  Soft - nontender/nondistended - active bowel sounds  MUSCULOSKELETAL:  Motor strength/range of motion deconditioned requiring a walker for ambulation  NEUROLOGIC:  Alert/oriented at baseline  SKIN:  Chronic wrinkles/blemishes   PSYCHIATRIC:  Mood/affect stable      Additional Data:     Labs & Recent Cultures:    Results from last 7 days   Lab Units 02/14/21  0515   WBC Thousand/uL 5 70   HEMOGLOBIN g/dL 10 0*   HEMATOCRIT % 31 7*   PLATELETS Thousands/uL 247     Results from last 7 days   Lab Units 02/14/21  0515   POTASSIUM mmol/L 4 0   CHLORIDE mmol/L 103   CO2 mmol/L 30   BUN mg/dL 20   CREATININE mg/dL 0 38*   CALCIUM mg/dL 9 4         Results from last 7 days   Lab Units 02/20/21  1151 02/20/21  0755 02/19/21  2051 02/19/21  1714 02/19/21  1052 02/19/21  0838 02/18/21  2106 02/18/21  1717 02/18/21  1050 02/18/21  0740 02/17/21  2045 02/17/21  1655   POC GLUCOSE mg/dl 240* 80 310* 300* 260* 153* 331* 257* 193* 255* 296* 290*                         Last 24 Hours Medication List:   Current Facility-Administered Medications   Medication Dose Route Frequency Provider Last Rate    acetaminophen  650 mg Oral Q6H PRN Janny Antonio PA-C      acetaminophen  975 mg Oral Q8H [facility] Janny Antonio PA-C      aluminum-magnesium hydroxide-simethicone  30 mL Oral Q6H PRN Janny Antonio PA-C      amLODIPine  10 mg Oral Daily Janny Antonio PA-C      atorvastatin  20 mg Oral Daily Cande Balderrama      dexamethasone  4 mg Oral BID (AM & Afternoon) Iman Roblero MD      diazepam  2 mg Oral Q6H PRN Sky Guillen PA-C      docusate sodium  100 mg Oral BID Bennie Hewitt MD      enoxaparin  40 mg Subcutaneous Daily Janny Antonio PA-C      gabapentin  100 mg Oral BID Sky Guillen PA-C  hydrALAZINE  5 mg Intravenous Q6H PRN John Hurtado MD      HYDROmorphone  0 5 mg Intravenous Q2H PRN Janny Antonio PA-C      insulin glargine  15 Units Subcutaneous HS John Hurtado MD     Duran insulin lispro  1-5 Units Subcutaneous 4x Daily (AC & HS) John Hurtado MD     [place]uran insulin lispro  5 Units Subcutaneous TID With Meals John Hurtado MD      lidocaine  2 patch Topical Daily Jay Virgen PA-C      loratadine  10 mg Oral HS Jay Virgen PA-C      LORazepam  2 mg Intravenous Q10 Min PRN Anni Antonio PA-C      ondansetron  4 mg Oral Q6H PRN Fransisco Ash PA-C      oxyCODONE  15 mg Oral Q3H PRN Trent Neves MD      pantoprazole  20 mg Oral Early Morning Jay Virgen PA-C      senna  1 tablet Oral BID Trent Neves MD                    ** Please Note: This note is constructed using a voice recognition dictation system  An occasional wrong word/phrase or sound-a-like substitution may have been picked up by dictation device due to the inherent limitations of voice recognition software  Read the chart carefully and recognize, using reasonable context, where substitutions may have occurred  **

## 2021-02-20 NOTE — PLAN OF CARE
Problem: PHYSICAL THERAPY ADULT  Goal: Performs mobility at highest level of function for planned discharge setting  See evaluation for individualized goals  Description: Treatment/Interventions: Functional transfer training, LE strengthening/ROM, Elevations, Therapeutic exercise, Endurance training, Patient/family training, Equipment eval/education, Bed mobility, Gait training  Equipment Recommended: [place]       See flowsheet documentation for full assessment, interventions and recommendations  Outcome: Progressing  Note: Prognosis: Guarded  Problem List: Decreased strength, Decreased endurance, Impaired balance, Decreased mobility, Pain  Assessment: Pt seen today for further stair training, as well as family training  Pt's daughter "Wojciech Patel" and grandson present  Simulated steps into pt's home (3 steps with no handrails) with HHAx2  During first trial, pt performed with PT and daughter with VCs for step to pattern and "up with the good, down with the bad " During 2nd and 3rd trials, pt practiced with hands on assistance from daughter and grandson  Extensive time spent educating family (>15 minutes) on proper hand placement as well body mechanics when assisting pt on stairs  Pt's family able to demonstrate good technique  Confirmed with family that 2 people will be home at all times to assist pt up and down stairs  All patient and family questions answered  Continuing to recommend d/c home with social support (2 family members to assist with stairs to enter home) and HHPT  Barriers to Discharge: Inaccessible home environment  Barriers to Discharge Comments: (S) Pt  needs assist for stair negotiation   PT Discharge Recommendation: Return to previous environment with social support, Home with skilled therapy(HHPT)     PT - OK to Discharge: Yes(when medically stable)    See flowsheet documentation for full assessment

## 2021-02-21 LAB
GLUCOSE SERPL-MCNC: 148 MG/DL (ref 65–140)
GLUCOSE SERPL-MCNC: 148 MG/DL (ref 65–140)
GLUCOSE SERPL-MCNC: 171 MG/DL (ref 65–140)
GLUCOSE SERPL-MCNC: 368 MG/DL (ref 65–140)

## 2021-02-21 PROCEDURE — 99232 SBSQ HOSP IP/OBS MODERATE 35: CPT | Performed by: INTERNAL MEDICINE

## 2021-02-21 PROCEDURE — 82948 REAGENT STRIP/BLOOD GLUCOSE: CPT

## 2021-02-21 RX ADMIN — GABAPENTIN 100 MG: 100 CAPSULE ORAL at 17:56

## 2021-02-21 RX ADMIN — ACETAMINOPHEN 975 MG: 325 TABLET, FILM COATED ORAL at 23:11

## 2021-02-21 RX ADMIN — DOCUSATE SODIUM 100 MG: 100 CAPSULE, LIQUID FILLED ORAL at 17:56

## 2021-02-21 RX ADMIN — ATORVASTATIN CALCIUM 20 MG: 20 TABLET, FILM COATED ORAL at 09:10

## 2021-02-21 RX ADMIN — ENOXAPARIN SODIUM 40 MG: 40 INJECTION SUBCUTANEOUS at 09:10

## 2021-02-21 RX ADMIN — PANTOPRAZOLE SODIUM 20 MG: 20 TABLET, DELAYED RELEASE ORAL at 06:38

## 2021-02-21 RX ADMIN — AMLODIPINE BESYLATE 10 MG: 10 TABLET ORAL at 09:10

## 2021-02-21 RX ADMIN — ACETAMINOPHEN 975 MG: 325 TABLET, FILM COATED ORAL at 06:38

## 2021-02-21 RX ADMIN — INSULIN GLARGINE 15 UNITS: 100 INJECTION, SOLUTION SUBCUTANEOUS at 21:28

## 2021-02-21 RX ADMIN — DEXAMETHASONE 4 MG: 4 TABLET ORAL at 15:00

## 2021-02-21 RX ADMIN — DEXAMETHASONE 4 MG: 4 TABLET ORAL at 09:10

## 2021-02-21 RX ADMIN — SENNOSIDES 8.6 MG: 8.6 TABLET, FILM COATED ORAL at 17:56

## 2021-02-21 RX ADMIN — DOCUSATE SODIUM 100 MG: 100 CAPSULE, LIQUID FILLED ORAL at 09:09

## 2021-02-21 RX ADMIN — ACETAMINOPHEN 975 MG: 325 TABLET, FILM COATED ORAL at 15:00

## 2021-02-21 RX ADMIN — GABAPENTIN 100 MG: 100 CAPSULE ORAL at 09:09

## 2021-02-21 RX ADMIN — SENNOSIDES 8.6 MG: 8.6 TABLET, FILM COATED ORAL at 09:10

## 2021-02-21 NOTE — ASSESSMENT & PLAN NOTE
Lab Results   Component Value Date    HGBA1C 5 3 01/05/2021     · Hold oral hypoglycemics while hospitalized  · Continue SSI coverage and Accu-Cheks - blood sugars remain relatively uncontrolled in the setting of Decadron use -> titrate basal/fixed insulin as necessary daily  · Carbohydrate restricted diet

## 2021-02-21 NOTE — PROGRESS NOTES
[facility] - Progress Note  Patient: Eugenia Venegas 62 y o  female MRN: [id number]  Unit/Bed#: [facility] 927-01 Encounter: [id number]  Primary Care Provider: Vicente Terry DO  Date Of Visit: 02/21/21        Assessment & Plan:    * Cancer related pain  Assessment & Plan  · Per record review, patient initially presented to the [facility] with intractable back and LLE pain in the setting of metastatic cancer with spinal/brain/mediastinal involvement   · Pain management per palliative care  · Radiation treatments per radiation oncology  · Continue current analgesic regimen w/ constipation prophylaxis  · Supportive care otherwise    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  Assessment & Plan  · also with mediastinal involvement and right hilar lymphadenopathy  · S/p mediastinoscopy on 2/12 - findings of large white lymph nodes in multiple regions -> 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  · FNA of cervical lymph node last month w/ evidence of non-small cell carcinoma of uncertain histotype  · MRIs of C/T/L spine consistent with multiregional CSF spread of tumor - s/p lumbar puncture last month w/ CSF cytology without conclusive evidence of malignancy, but rather, atypical cellular changes of unknown significance  · CT of head and MRI brain last month also noted left occipital/parietal lesions w/ vasogenic edema - on Decadron  · Appreciate medical and radiation oncology evaluation - continue hold brain and lumbar/sacral spine palliative radiation  · PRN pain control  · Continues to work with PT/OT - ambulation up simulated stairs progressively improves daily - she would like to continue working with inpatient PT/OT to comfortably do so once discharged - to remain inpatient until radiation treatments are complete due to need for daily transport if discharged home    Diabetes mellitus type 2  Assessment & Plan  Lab Results   Component Value Date HGBA1C 5 3 2021     · Hold oral hypoglycemics while hospitalized  · Continue SSI coverage and Accu-Cheks - blood sugars remain relatively uncontrolled in the setting of Decadron use -> titrate basal/fixed insulin as necessary daily  · Carbohydrate restricted diet    Essential hypertension  Assessment & Plan  · Optimize pain control  · Continue Norvasc - PRN IV Hydralazine on board for BP spikes  · Low-sodium diet    Hyperlipidemia  Assessment & Plan  · Continue statin      DVT Prophylaxis:  Lovenox       Patient Centered Rounds:  I have performed bedside rounds and discussed plan of care with nursing today  Discussions with Specialists or Other Care Team Provider:  see above assessments if applicable    Education and Discussions with Family / Patient:  Patient at bedside, who will self-update family  Time Spent for Care:  32 minutes  More than 50% of total time spent on counseling and coordination of care as described above  Current Length of Stay: 10 day(s)    Current Patient Status: Inpatient   Certification Statement:  Patient will continue to require additional hospital stay due to assessments as noted above  Code Status: Level 1 - Full Code        Subjective:     No new complaints this time  States pain is improved today  Continues to work with therapy regarding ambulation  Objective:     Vitals:   Temp (24hrs), Av 4 °F (36 9 °C), Min:98 1 °F (36 7 °C), Max:98 5 °F (36 9 °C)    Temp:  [98 1 °F (36 7 °C)-98 5 °F (36 9 °C)] 98 1 °F (36 7 °C)  HR:  [70-74] 74  Resp:  [18] 18  BP: (148-157)/(80-87) 157/80  SpO2:  [93 %-99 %] 94 %  Body mass index is 31 52 kg/m²  Input and Output Summary (last 24 hours):        Intake/Output Summary (Last 24 hours) at 2021 1550  Last data filed at 2021 1109  Gross per 24 hour   Intake 440 ml   Output 2 ml   Net 438 ml       Physical Exam:     GENERAL:  Well-developed/nourished - waxing/waning but improved pain  HEAD:  Normocephalic - atraumatic  EYES: PERRL - EOMI   MOUTH:  Mucosa moist  NECK:  Supple - full range of motion  CARDIAC:  Rate controlled - S1/S2 positive  PULMONARY:  Clear breath sounds bilaterally - nonlabored respirations  ABDOMEN:  Soft - nontender/nondistended - active bowel sounds  MUSCULOSKELETAL:  Motor strength/range of motion deconditioned requiring a walker for ambulation  NEUROLOGIC:  Alert/oriented at baseline  SKIN:  Chronic wrinkles/blemishes   PSYCHIATRIC:  Mood/affect stable      Additional Data:     Labs & Recent Cultures:              Invalid input(s): LABALBU      Results from last 7 days   Lab Units 02/21/21  1133 02/21/21  0828 02/20/21  2046 02/20/21  1701 02/20/21  1151 02/20/21  0755 02/19/21  2051 02/19/21  1714 02/19/21  1052 02/19/21  0838 02/18/21  2106 02/18/21  1717   POC GLUCOSE mg/dl 148* 148* 231* 376* 240* 80 310* 300* 260* 153* 331* 257*                         Last 24 Hours Medication List:   Current Facility-Administered Medications   Medication Dose Route Frequency Provider Last Rate    acetaminophen  650 mg Oral Q6H PRN Janny Antonio PA-C      acetaminophen  975 mg Oral Q8H [facility] Janny Antonio PA-C      aluminum-magnesium hydroxide-simethicone  30 mL Oral Q6H PRN Janny Antonio PA-C      amLODIPine  10 mg Oral Daily Janny Antonio PA-C      atorvastatin  20 mg Oral Daily Jigar Gale, [place]      dexamethasone  4 mg Oral BID (AM & Afternoon) Zaire Baxter MD      diazepam  2 mg Oral Q6H PRN Jigar Gale PA-C      docusate sodium  100 mg Oral BID Aisha Frost MD      enoxaparin  40 mg Subcutaneous Daily Janny Antonio PA-C      gabapentin  100 mg Oral BID Janny Antonio PA-C      hydrALAZINE  5 mg Intravenous Q6H PRN Aisha Frost MD      HYDROmorphone  0 5 mg Intravenous Q2H PRN Janny Antonio PA-C      insulin glargine  15 Units Subcutaneous HS Aisha Frost MD      insulin lispro  1-5 Units Subcutaneous 4x Daily (AC & HS) Aisha Frost MD      insulin lispro  5 Units Subcutaneous TID With Meals Sarah Reece MD      lidocaine  2 patch Topical Daily Chaka Gonzales, [facility]      loratadine  10 mg Oral HS Chaka Gonzales, [facility]      LORazepam  2 mg Intravenous Q10 Min PRN Beuford Bride Grapsy, [facility]      ondansetron  4 mg Oral Q6H PRN Kevin Phlegm, [facility]      oxyCODONE  15 mg Oral Q3H PRN Jesse Starks MD      pantoprazole  20 mg Oral Early Morning Chaka Gonzales, [facility]      saliva substitute  5 spray Mouth/Throat 4x Daily PRN Sarah Reece MD      senna  1 tablet Oral BID Jesse Starks MD                    ** Please Note: This note is constructed using a voice recognition dictation system  An occasional wrong word/phrase or sound-a-like substitution may have been picked up by dictation device due to the inherent limitations of voice recognition software  Read the chart carefully and recognize, using reasonable context, where substitutions may have occurred  **

## 2021-02-21 NOTE — ASSESSMENT & PLAN NOTE
· Per record review, patient initially presented to the [facility] with intractable back and LLE pain in the setting of metastatic cancer with spinal/brain/mediastinal involvement   · Pain management per palliative care  · Radiation treatments per radiation oncology  · Continue current analgesic regimen w/ constipation prophylaxis  · Supportive care otherwise

## 2021-02-22 ENCOUNTER — DOCUMENTATION (OUTPATIENT)
Dept: HEMATOLOGY ONCOLOGY | Facility: CLINIC | Age: 61
End: 2021-02-22

## 2021-02-22 ENCOUNTER — APPOINTMENT (OUTPATIENT)
Dept: RADIATION ONCOLOGY | Facility: CLINIC | Age: 61
End: 2021-02-22
Attending: RADIOLOGY
Payer: COMMERCIAL

## 2021-02-22 ENCOUNTER — PREP FOR PROCEDURE (OUTPATIENT)
Dept: INTERVENTIONAL RADIOLOGY/VASCULAR | Facility: CLINIC | Age: 61
End: 2021-02-22

## 2021-02-22 DIAGNOSIS — C79.49 SECONDARY MALIGNANCY OF BRAIN AND SPINAL CORD WITH UNKNOWN PRIMARY SITE (HCC): Primary | ICD-10-CM

## 2021-02-22 DIAGNOSIS — Z51.12 ENCOUNTER FOR ANTINEOPLASTIC CHEMOTHERAPY AND IMMUNOTHERAPY: Primary | ICD-10-CM

## 2021-02-22 DIAGNOSIS — C79.49 SECONDARY MALIGNANCY OF BRAIN AND SPINAL CORD WITH UNKNOWN PRIMARY SITE (HCC): ICD-10-CM

## 2021-02-22 DIAGNOSIS — C80.1 SECONDARY MALIGNANCY OF BRAIN AND SPINAL CORD WITH UNKNOWN PRIMARY SITE (HCC): ICD-10-CM

## 2021-02-22 DIAGNOSIS — C79.31 SECONDARY MALIGNANCY OF BRAIN AND SPINAL CORD WITH UNKNOWN PRIMARY SITE (HCC): ICD-10-CM

## 2021-02-22 DIAGNOSIS — C80.1 SECONDARY MALIGNANCY OF BRAIN AND SPINAL CORD WITH UNKNOWN PRIMARY SITE (HCC): Primary | ICD-10-CM

## 2021-02-22 DIAGNOSIS — Z51.12 ENCOUNTER FOR ANTINEOPLASTIC CHEMOTHERAPY AND IMMUNOTHERAPY: ICD-10-CM

## 2021-02-22 DIAGNOSIS — C79.31 SECONDARY MALIGNANCY OF BRAIN AND SPINAL CORD WITH UNKNOWN PRIMARY SITE (HCC): Primary | ICD-10-CM

## 2021-02-22 DIAGNOSIS — C79.49 LEPTOMENINGITIS, CARCINOMATOUS (HCC): ICD-10-CM

## 2021-02-22 DIAGNOSIS — C80.1 LEPTOMENINGITIS, CARCINOMATOUS (HCC): ICD-10-CM

## 2021-02-22 DIAGNOSIS — Z51.11 ENCOUNTER FOR ANTINEOPLASTIC CHEMOTHERAPY AND IMMUNOTHERAPY: Primary | ICD-10-CM

## 2021-02-22 DIAGNOSIS — C34.90 PRIMARY ADENOCARCINOMA OF LUNG, UNSPECIFIED LATERALITY (HCC): Primary | ICD-10-CM

## 2021-02-22 DIAGNOSIS — Z51.11 ENCOUNTER FOR ANTINEOPLASTIC CHEMOTHERAPY AND IMMUNOTHERAPY: ICD-10-CM

## 2021-02-22 LAB
GLUCOSE SERPL-MCNC: 123 MG/DL (ref 65–140)
GLUCOSE SERPL-MCNC: 148 MG/DL (ref 65–140)
GLUCOSE SERPL-MCNC: 161 MG/DL (ref 65–140)
GLUCOSE SERPL-MCNC: 225 MG/DL (ref 65–140)

## 2021-02-22 PROCEDURE — 77417 THER RADIOLOGY PORT IMAGE(S): CPT | Performed by: RADIOLOGY

## 2021-02-22 PROCEDURE — DW011ZZ BEAM RADIATION OF HEAD AND NECK USING PHOTONS 1 - 10 MEV: ICD-10-PCS | Performed by: INTERNAL MEDICINE

## 2021-02-22 PROCEDURE — 77412 RADIATION TX DELIVERY LVL 3: CPT | Performed by: RADIOLOGY

## 2021-02-22 PROCEDURE — 77387 GUIDANCE FOR RADJ TX DLVR: CPT | Performed by: RADIOLOGY

## 2021-02-22 PROCEDURE — 82948 REAGENT STRIP/BLOOD GLUCOSE: CPT

## 2021-02-22 PROCEDURE — 99232 SBSQ HOSP IP/OBS MODERATE 35: CPT | Performed by: INTERNAL MEDICINE

## 2021-02-22 RX ORDER — SODIUM CHLORIDE 9 MG/ML
75 INJECTION, SOLUTION INTRAVENOUS CONTINUOUS
Status: CANCELLED | OUTPATIENT
Start: 2021-02-22

## 2021-02-22 RX ORDER — CYANOCOBALAMIN 1000 UG/ML
1000 INJECTION INTRAMUSCULAR; SUBCUTANEOUS ONCE
Status: CANCELLED | OUTPATIENT
Start: 2021-03-03

## 2021-02-22 RX ADMIN — DEXAMETHASONE 4 MG: 4 TABLET ORAL at 13:05

## 2021-02-22 RX ADMIN — INSULIN GLARGINE 15 UNITS: 100 INJECTION, SOLUTION SUBCUTANEOUS at 22:41

## 2021-02-22 RX ADMIN — ONDANSETRON 4 MG: 4 TABLET, ORALLY DISINTEGRATING ORAL at 17:09

## 2021-02-22 RX ADMIN — ACETAMINOPHEN 975 MG: 325 TABLET, FILM COATED ORAL at 13:06

## 2021-02-22 RX ADMIN — SENNOSIDES 8.6 MG: 8.6 TABLET, FILM COATED ORAL at 17:05

## 2021-02-22 RX ADMIN — GABAPENTIN 100 MG: 100 CAPSULE ORAL at 07:47

## 2021-02-22 RX ADMIN — DOCUSATE SODIUM 100 MG: 100 CAPSULE, LIQUID FILLED ORAL at 17:05

## 2021-02-22 RX ADMIN — AMLODIPINE BESYLATE 10 MG: 10 TABLET ORAL at 07:47

## 2021-02-22 RX ADMIN — SENNOSIDES 8.6 MG: 8.6 TABLET, FILM COATED ORAL at 07:48

## 2021-02-22 RX ADMIN — ATORVASTATIN CALCIUM 20 MG: 20 TABLET, FILM COATED ORAL at 07:47

## 2021-02-22 RX ADMIN — ACETAMINOPHEN 975 MG: 325 TABLET, FILM COATED ORAL at 22:41

## 2021-02-22 RX ADMIN — DOCUSATE SODIUM 100 MG: 100 CAPSULE, LIQUID FILLED ORAL at 07:47

## 2021-02-22 RX ADMIN — ACETAMINOPHEN 975 MG: 325 TABLET, FILM COATED ORAL at 06:11

## 2021-02-22 RX ADMIN — ENOXAPARIN SODIUM 40 MG: 40 INJECTION SUBCUTANEOUS at 07:48

## 2021-02-22 RX ADMIN — PANTOPRAZOLE SODIUM 20 MG: 20 TABLET, DELAYED RELEASE ORAL at 06:11

## 2021-02-22 RX ADMIN — DEXAMETHASONE 4 MG: 4 TABLET ORAL at 07:48

## 2021-02-22 RX ADMIN — GABAPENTIN 100 MG: 100 CAPSULE ORAL at 17:06

## 2021-02-22 NOTE — PROGRESS NOTES
Pt called me back & I went over the benefits & she wanted to know about coverage for ambulance she went to the [place] [id number] on 02/17/21 & was transported by ambulance to [address] [place] [address] she wants to know what her benefit is for that  Called the ins back & spoke to reza coffman that if its billed under hospital on a UB04 then its covered 100% it its billed on a 1500 that would be professional & that is handled by [facility] & she transfrd me there    spoke to pallavi & kathy coffman that if its billed on a 1500 then the pt would have a $70 co-pay then covered 100%   Called the ot back & gave her that info & she thanked me

## 2021-02-22 NOTE — ASSESSMENT & PLAN NOTE
· Per record review, patient initially presented to the [facility] with intractable back and LLE pain in the setting of metastatic cancer with spinal/brain/mediastinal involvement   · Pain management per palliative care  · Radiation treatments per radiation oncology  · Continue current analgesic regimen w/ constipation prophylaxis  · Supportive care otherwise

## 2021-02-22 NOTE — PLAN OF CARE
Problem: Potential for Falls  Goal: Patient will remain free of falls  Description: INTERVENTIONS:  - Assess patient frequently for physical needs  -  Identify cognitive and physical deficits and behaviors that affect risk of falls    -  [place] fall precautions as indicated by assessment   - Educate patient/family on patient safety including physical limitations  - Instruct patient to call for assistance with activity based on assessment  - Modify environment to reduce risk of injury  - Consider OT/PT consult to assist with strengthening/mobility  Outcome: Progressing     Problem: PAIN - ADULT  Goal: Verbalizes/displays adequate comfort level or baseline comfort level  Description: Interventions:  - Encourage patient to monitor pain and request assistance  - Assess pain using appropriate pain scale  - Administer analgesics based on type and severity of pain and evaluate response  - Implement non-pharmacological measures as appropriate and evaluate response  - Consider cultural and social influences on pain and pain management  - Notify physician/advanced practitioner if interventions unsuccessful or patient reports new pain  Outcome: Progressing     Problem: INFECTION - ADULT  Goal: Absence or prevention of progression during hospitalization  Description: INTERVENTIONS:  - Assess and monitor for signs and symptoms of infection  - Monitor lab/diagnostic results  - Monitor all insertion sites, i e  indwelling lines, tubes, and drains  - Monitor endotracheal if appropriate and nasal secretions for changes in amount and color  - [place] appropriate cooling/warming therapies per order  - Administer medications as ordered  - Instruct and encourage patient and family to use good hand hygiene technique  - Identify and instruct in appropriate isolation precautions for identified infection/condition  Outcome: Progressing     Problem: SAFETY ADULT  Goal: Patient will remain free of falls  Description: INTERVENTIONS:  - Assess patient frequently for physical needs  -  Identify cognitive and physical deficits and behaviors that affect risk of falls    -  [place] fall precautions as indicated by assessment   - Educate patient/family on patient safety including physical limitations  - Instruct patient to call for assistance with activity based on assessment  - Modify environment to reduce risk of injury  - Consider OT/PT consult to assist with strengthening/mobility  Outcome: Progressing  Goal: Maintain or return to baseline ADL function  Description: INTERVENTIONS:  -  Assess patient's ability to carry out ADLs; assess patient's baseline for ADL function and identify physical deficits which impact ability to perform ADLs (bathing, care of mouth/teeth, toileting, grooming, dressing, etc )  - Assess/evaluate cause of self-care deficits   - Assess range of motion  - Assess patient's mobility; develop plan if impaired  - Assess patient's need for assistive devices and provide as appropriate  - Encourage maximum independence but intervene and supervise when necessary  - Involve family in performance of ADLs  - Assess for home care needs following discharge   - Consider OT consult to assist with ADL evaluation and planning for discharge  - Provide patient education as appropriate  Outcome: Progressing  Goal: Maintain or return mobility status to optimal level  Description: INTERVENTIONS:  - Assess patient's baseline mobility status (ambulation, transfers, stairs, etc )    - Identify cognitive and physical deficits and behaviors that affect mobility  - Identify mobility aids required to assist with transfers and/or ambulation (gait belt, sit-to-stand, lift, walker, cane, etc )  - [place] fall precautions as indicated by assessment  - Record patient progress and toleration of activity level on Mobility SBAR; progress patient to next Phase/Stage  - Instruct patient to call for assistance with activity based on assessment  - Consider rehabilitation consult to assist with strengthening/weightbearing, etc   Outcome: Progressing     Problem: DISCHARGE PLANNING  Goal: Discharge to home or other facility with appropriate resources  Description: INTERVENTIONS:  - Identify barriers to discharge w/patient and caregiver  - Arrange for needed discharge resources and transportation as appropriate  - Identify discharge learning needs (meds, wound care, etc )  - Arrange for interpretive services to assist at discharge as needed  - Refer to Case Management Department for coordinating discharge planning if the patient needs post-hospital services based on physician/advanced practitioner order or complex needs related to functional status, cognitive ability, or social support system  Outcome: Progressing     Problem: Knowledge Deficit  Goal: Patient/family/caregiver demonstrates understanding of disease process, treatment plan, medications, and discharge instructions  Description: Complete learning assessment and assess knowledge base    Interventions:  - Provide teaching at level of understanding  - Provide teaching via preferred learning methods  Outcome: Progressing

## 2021-02-22 NOTE — CASE MANAGEMENT
[facility], Caregivers, they can accept pt on service  [facility]'s phone number [phone]    AVS can be uploaded in [facility]

## 2021-02-22 NOTE — PLAN OF CARE
Problem: Potential for Falls  Goal: Patient will remain free of falls  Description: INTERVENTIONS:  - Assess patient frequently for physical needs  -  Identify cognitive and physical deficits and behaviors that affect risk of falls    -  [place] fall precautions as indicated by assessment   - Educate patient/family on patient safety including physical limitations  - Instruct patient to call for assistance with activity based on assessment  - Modify environment to reduce risk of injury  - Consider OT/PT consult to assist with strengthening/mobility  Outcome: Progressing     Problem: PAIN - ADULT  Goal: Verbalizes/displays adequate comfort level or baseline comfort level  Description: Interventions:  - Encourage patient to monitor pain and request assistance  - Assess pain using appropriate pain scale  - Administer analgesics based on type and severity of pain and evaluate response  - Implement non-pharmacological measures as appropriate and evaluate response  - Consider cultural and social influences on pain and pain management  - Notify physician/advanced practitioner if interventions unsuccessful or patient reports new pain  Outcome: Progressing     Problem: INFECTION - ADULT  Goal: Absence or prevention of progression during hospitalization  Description: INTERVENTIONS:  - Assess and monitor for signs and symptoms of infection  - Monitor lab/diagnostic results  - Monitor all insertion sites, i e  indwelling lines, tubes, and drains  - Monitor endotracheal if appropriate and nasal secretions for changes in amount and color  - [place] appropriate cooling/warming therapies per order  - Administer medications as ordered  - Instruct and encourage patient and family to use good hand hygiene technique  - Identify and instruct in appropriate isolation precautions for identified infection/condition  Outcome: Progressing     Problem: SAFETY ADULT  Goal: Patient will remain free of falls  Description: INTERVENTIONS:  - Assess patient frequently for physical needs  -  Identify cognitive and physical deficits and behaviors that affect risk of falls    -  [place] fall precautions as indicated by assessment   - Educate patient/family on patient safety including physical limitations  - Instruct patient to call for assistance with activity based on assessment  - Modify environment to reduce risk of injury  - Consider OT/PT consult to assist with strengthening/mobility  Outcome: Progressing  Goal: Maintain or return to baseline ADL function  Description: INTERVENTIONS:  -  Assess patient's ability to carry out ADLs; assess patient's baseline for ADL function and identify physical deficits which impact ability to perform ADLs (bathing, care of mouth/teeth, toileting, grooming, dressing, etc )  - Assess/evaluate cause of self-care deficits   - Assess range of motion  - Assess patient's mobility; develop plan if impaired  - Assess patient's need for assistive devices and provide as appropriate  - Encourage maximum independence but intervene and supervise when necessary  - Involve family in performance of ADLs  - Assess for home care needs following discharge   - Consider OT consult to assist with ADL evaluation and planning for discharge  - Provide patient education as appropriate  Outcome: Progressing  Goal: Maintain or return mobility status to optimal level  Description: INTERVENTIONS:  - Assess patient's baseline mobility status (ambulation, transfers, stairs, etc )    - Identify cognitive and physical deficits and behaviors that affect mobility  - Identify mobility aids required to assist with transfers and/or ambulation (gait belt, sit-to-stand, lift, walker, cane, etc )  - [place] fall precautions as indicated by assessment  - Record patient progress and toleration of activity level on Mobility SBAR; progress patient to next Phase/Stage  - Instruct patient to call for assistance with activity based on assessment  - Consider rehabilitation consult to assist with strengthening/weightbearing, etc   Outcome: Progressing     Problem: DISCHARGE PLANNING  Goal: Discharge to home or other facility with appropriate resources  Description: INTERVENTIONS:  - Identify barriers to discharge w/patient and caregiver  - Arrange for needed discharge resources and transportation as appropriate  - Identify discharge learning needs (meds, wound care, etc )  - Arrange for interpretive services to assist at discharge as needed  - Refer to Case Management Department for coordinating discharge planning if the patient needs post-hospital services based on physician/advanced practitioner order or complex needs related to functional status, cognitive ability, or social support system  Outcome: Progressing     Problem: Knowledge Deficit  Goal: Patient/family/caregiver demonstrates understanding of disease process, treatment plan, medications, and discharge instructions  Description: Complete learning assessment and assess knowledge base    Interventions:  - Provide teaching at level of understanding  - Provide teaching via preferred learning methods  Outcome: Progressing

## 2021-02-22 NOTE — PROGRESS NOTES
Called ins & spoke to [facility] call ref# [phone]  Pt has an active empire plan that runs on a cal year  effective 01/01/21 she sd that as long as we are in network w/the local bc then we are in network w/this plan  No deduct co-ins 100% out of pocket $5550 met $225 once the out of pocket is met any co-pays will be waived  [place] all services are covered 100%   called pt to go over this info w/her but got her voicemail   Left her a message to call me back

## 2021-02-22 NOTE — PROGRESS NOTES
[facility] - Progress Note  Patient: Moisés Inman 62 y o  female MRN: [id number]  Unit/Bed#: [facility] 927-01 Encounter: [id number]  Primary Care Provider: Viry Browne DO  Date Of Visit: 02/22/21        Assessment & Plan:    * Cancer related pain  Assessment & Plan  · Per record review, patient initially presented to the [facility] with intractable back and LLE pain in the setting of metastatic cancer with spinal/brain/mediastinal involvement   · Pain management per palliative care  · Radiation treatments per radiation oncology  · Continue current analgesic regimen w/ constipation prophylaxis  · Supportive care otherwise    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  Assessment & Plan  · also with mediastinal involvement and right hilar lymphadenopathy  · S/p mediastinoscopy on 2/12 - findings of large white lymph nodes in multiple regions -> 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  · FNA of cervical lymph node last month w/ evidence of non-small cell carcinoma of uncertain histotype  · MRIs of C/T/L spine consistent with multiregional CSF spread of tumor - s/p lumbar puncture last month w/ CSF cytology without conclusive evidence of malignancy, but rather, atypical cellular changes of unknown significance  · CT of head and MRI brain last month also noted left occipital/parietal lesions w/ vasogenic edema - on Decadron  · Appreciate medical and radiation oncology evaluation - continue hold brain and lumbar/sacral spine palliative radiation  · PRN pain control  · Continues to work with PT/OT - ambulation up simulated stairs progressively improves daily - she would like to continue working with inpatient PT/OT to comfortably do so once discharged - to remain inpatient until radiation treatments are complete due to need for daily transport if discharged home    Diabetes mellitus type 2  Assessment & Plan  Lab Results   Component Value Date HGBA1C 5 3 2021     · Hold oral hypoglycemics while hospitalized  · Continue SSI coverage and Accu-Cheks - blood sugars remain relatively uncontrolled in the setting of Decadron use -> titrate basal/fixed insulin as necessary daily  · Carbohydrate restricted diet    Essential hypertension  Assessment & Plan  · Optimize pain control  · Continue Norvasc - PRN IV Hydralazine on board for BP spikes  · Low-sodium diet    Hyperlipidemia  Assessment & Plan  · Continue statin      DVT Prophylaxis:  Lovenox       Patient Centered Rounds:  I have performed bedside rounds and discussed plan of care with nursing today  Discussions with Specialists or Other Care Team Provider:  see above assessments if applicable    Education and Discussions with Family / Patient:  Patient at bedside, who will self-update family today - previously spoke with daughter, Jj Mrorell, over the phone  Time Spent for Care:  32 minutes  More than 50% of total time spent on counseling and coordination of care as described above  Current Length of Stay: 11 day(s)    Current Patient Status: Inpatient   Certification Statement:  Patient will continue to require additional hospital stay due to assessments as noted above  Code Status: Level 1 - Full Code        Subjective:     Seen/examined earlier today  States pain is better controlled today  Has more relatively pleasant demeanor today  Continues to work with physical therapy and ambulate with her walker as tolerated  Objective:     Vitals:   Temp (24hrs), Av 9 °F (36 6 °C), Min:97 9 °F (36 6 °C), Max:98 °F (36 7 °C)    Temp:  [97 9 °F (36 6 °C)-98 °F (36 7 °C)] 98 °F (36 7 °C)  HR:  [] 78  Resp:  [17-18] 18  BP: (130-167)/(70-99) 130/70  SpO2:  [91 %-94 %] 91 %  Body mass index is 31 52 kg/m²  Input and Output Summary (last 24 hours):        Intake/Output Summary (Last 24 hours) at 2021 1647  Last data filed at 2021 0214  Gross per 24 hour   Intake 0 ml Output 0 ml   Net 0 ml       Physical Exam:     GENERAL:  Well-developed/nourished - waxing/waning but improved distress from pain  HEAD:  Normocephalic - atraumatic  EYES: PERRL - EOMI   MOUTH:  Mucosa moist  NECK:  Supple - full range of motion  CARDIAC:  Rate controlled - S1/S2 positive  PULMONARY:  Clear breath sounds bilaterally - nonlabored respirations  ABDOMEN:  Soft - nontender/nondistended - active bowel sounds  MUSCULOSKELETAL:  Motor strength/range of motion deconditioned requiring a walker for ambulation - pedal edema noted  NEUROLOGIC:  Alert/oriented at baseline  SKIN:  Chronic wrinkles/blemishes   PSYCHIATRIC:  Mood/affect pleasant      Additional Data:     Labs & Recent Cultures:              Invalid input(s): LABALBU      Results from last 7 days   Lab Units 02/22/21  1638 02/22/21  1058 02/22/21  0753 02/21/21  2123 02/21/21  1641 02/21/21  1133 02/21/21  0828 02/20/21  2046 02/20/21  1701 02/20/21  1151 02/20/21  0755 02/19/21  2051   POC GLUCOSE mg/dl 161* 225* 148* 368* 171* 148* 148* 231* 376* 240* 80 310*                         Last 24 Hours Medication List:   Current Facility-Administered Medications   Medication Dose Route Frequency Provider Last Rate    acetaminophen  650 mg Oral Q6H PRN Jeny Beavers PA-C      acetaminophen  975 mg Oral Q8H [address] Janny Antonio, PA-STEPHANY      aluminum-magnesium hydroxide-simethicone  30 mL Oral Q6H PRN Janny Antonio PA-C      amLODIPine  10 mg Oral Daily Janny Antonio PA-C      atorvastatin  20 mg Oral Daily Jeny Beavers [place]      dexamethasone  4 mg Oral BID (AM & Afternoon) Lidia Pardo MD      diazepam  2 mg Oral Q6H PRN Jeny Beavers PA-C      docusate sodium  100 mg Oral BID Maribel Arthur MD      enoxaparin  40 mg Subcutaneous Daily Janny Antonio PA-C      gabapentin  100 mg Oral BID Janny Antonio, [facility]      hydrALAZINE  5 mg Intravenous Q6H PRN Maribel Arthur MD      HYDROmorphone  0 5 mg Intravenous Q2H PRN Paulo Lopez PA-C      insulin glargine  15 Units Subcutaneous HS Tunde Bustillo MD     [facility] insulin lispro  1-5 Units Subcutaneous 4x Daily (AC & HS) Tunde Bustillo MD     [facility] insulin lispro  5 Units Subcutaneous TID With Meals Tunde Bustillo MD      lidocaine  2 patch Topical Daily Paulo Lopez PA-C      LORazepam  2 mg Intravenous Q10 Min PRN Guera Yahir Grapsy, [facility]      ondansetron  4 mg Oral Q6H PRN [employer], [facility]      oxyCODONE  15 mg Oral Q3H PRN Jake Quan MD      pantoprazole  20 mg Oral Early Morning Paulo Lopez PA-C      saliva substitute  5 spray Mouth/Throat 4x Daily PRN Tunde Bustillo MD      senna  1 tablet Oral BID Jake Quan MD                    ** Please Note: This note is constructed using a voice recognition dictation system  An occasional wrong word/phrase or sound-a-like substitution may have been picked up by dictation device due to the inherent limitations of voice recognition software  Read the chart carefully and recognize, using reasonable context, where substitutions may have occurred  **

## 2021-02-23 ENCOUNTER — APPOINTMENT (OUTPATIENT)
Dept: RADIATION ONCOLOGY | Facility: CLINIC | Age: 61
End: 2021-02-23
Attending: RADIOLOGY
Payer: COMMERCIAL

## 2021-02-23 LAB
GLUCOSE SERPL-MCNC: 127 MG/DL (ref 65–140)
GLUCOSE SERPL-MCNC: 155 MG/DL (ref 65–140)
GLUCOSE SERPL-MCNC: 168 MG/DL (ref 65–140)
GLUCOSE SERPL-MCNC: 198 MG/DL (ref 65–140)

## 2021-02-23 PROCEDURE — 77387 GUIDANCE FOR RADJ TX DLVR: CPT | Performed by: RADIOLOGY

## 2021-02-23 PROCEDURE — 77412 RADIATION TX DELIVERY LVL 3: CPT | Performed by: RADIOLOGY

## 2021-02-23 PROCEDURE — 99232 SBSQ HOSP IP/OBS MODERATE 35: CPT | Performed by: GENERAL PRACTICE

## 2021-02-23 PROCEDURE — 82948 REAGENT STRIP/BLOOD GLUCOSE: CPT

## 2021-02-23 RX ADMIN — SENNOSIDES 8.6 MG: 8.6 TABLET, FILM COATED ORAL at 21:43

## 2021-02-23 RX ADMIN — PANTOPRAZOLE SODIUM 20 MG: 20 TABLET, DELAYED RELEASE ORAL at 05:41

## 2021-02-23 RX ADMIN — ENOXAPARIN SODIUM 40 MG: 40 INJECTION SUBCUTANEOUS at 08:20

## 2021-02-23 RX ADMIN — ACETAMINOPHEN 975 MG: 325 TABLET, FILM COATED ORAL at 23:02

## 2021-02-23 RX ADMIN — DEXAMETHASONE 4 MG: 4 TABLET ORAL at 14:01

## 2021-02-23 RX ADMIN — GABAPENTIN 100 MG: 100 CAPSULE ORAL at 21:43

## 2021-02-23 RX ADMIN — ONDANSETRON 4 MG: 4 TABLET, ORALLY DISINTEGRATING ORAL at 17:03

## 2021-02-23 RX ADMIN — ONDANSETRON 4 MG: 4 TABLET, ORALLY DISINTEGRATING ORAL at 17:04

## 2021-02-23 RX ADMIN — ACETAMINOPHEN 975 MG: 325 TABLET, FILM COATED ORAL at 05:41

## 2021-02-23 RX ADMIN — DEXAMETHASONE 4 MG: 4 TABLET ORAL at 08:20

## 2021-02-23 RX ADMIN — ATORVASTATIN CALCIUM 20 MG: 20 TABLET, FILM COATED ORAL at 08:20

## 2021-02-23 RX ADMIN — AMLODIPINE BESYLATE 10 MG: 10 TABLET ORAL at 08:20

## 2021-02-23 RX ADMIN — INSULIN GLARGINE 15 UNITS: 100 INJECTION, SOLUTION SUBCUTANEOUS at 21:43

## 2021-02-23 RX ADMIN — DOCUSATE SODIUM 100 MG: 100 CAPSULE, LIQUID FILLED ORAL at 08:20

## 2021-02-23 RX ADMIN — SENNOSIDES 8.6 MG: 8.6 TABLET, FILM COATED ORAL at 08:20

## 2021-02-23 RX ADMIN — DOCUSATE SODIUM 100 MG: 100 CAPSULE, LIQUID FILLED ORAL at 21:42

## 2021-02-23 RX ADMIN — GABAPENTIN 100 MG: 100 CAPSULE ORAL at 08:19

## 2021-02-23 RX ADMIN — ACETAMINOPHEN 975 MG: 325 TABLET, FILM COATED ORAL at 14:01

## 2021-02-23 NOTE — ASSESSMENT & PLAN NOTE
Lab Results   Component Value Date    HGBA1C 5 3 01/05/2021     · Hold oral hypoglycemics while hospitalized  · Continue SSI coverage and Accu-Cheks - blood sugars remain relatively uncontrolled in the setting of Decadron use -> titrate basal/fixed insulin as necessary daily  · As A1C low, will restart oral meds at d/c and stop insulin  · Carbohydrate restricted diet

## 2021-02-23 NOTE — UTILIZATION REVIEW
Continued Stay Review    Date: 2/23/21                        Current Patient Class: IP  Current Level of Care: MS    HPI:60 y o  female initially admitted on  2/11 as inpatient due to intractable back and LLE pain requiring thoracic surgery consult and treatment  Taken to OR on 2/12 for bronch and mediastinoscopy  PMH: dm, htn, squamous cell ca mets to brain/spine/medastinum     Assessment/Plan:   Pt continues to receive whole brain and spinal RT daily  She has 2 more treatments to go  She continues with PRN analgesia and bowel regimen  Pt has remained IP for daily RT sessions  Pt continues to have poor glucose control  Ongoing mgmt of basal and fixed insulin regimen continues  Pt is on Room Air  Pt reports pain control is improving  She is working with PT and ambulating with walker  Pt will have port placed prior to d/c  Pain has been well controlled on current regimen  Did use Zofran x 1 PRN yesterday for some nausea        Pertinent Labs/Diagnostic Results:       Results from last 7 days   Lab Units 02/23/21  1126 02/23/21  0758 02/22/21  2101 02/22/21  1638 02/22/21  1058 02/22/21  0753 02/21/21  2123 02/21/21  1641 02/21/21  1133 02/21/21  0828 02/20/21  2046 02/20/21  1701   POC GLUCOSE mg/dl 198* 127 123 161* 225* 148* 368* 171* 148* 148* 231* 376*       Vital Signs:   02/23/21 0820  --  --  --  148/82  --  --  None (Room air)   02/23/21 07:56:47  --  --  18  161/78  106  --  --   02/22/21 2300  --  --  --  --  --  --  None (Room air)   02/22/21 22:06:02  97 3 °F (36 3 °C)Abnormal   77  18  138/72  94  90 %  --   02/22/21 14:59:32  98 °F (36 7 °C)  78  18  130/70  90  91 %  --   02/22/21 08:32:53  97 9 °F (36 6 °C)  77  17  143/81  102  93 %  --   02/21/21 21:23:53  97 9 °F (36 6 °C)  100  18  167/99  122  94 %  None (Room air)   02/21/21 15:11:25  98 1 °F (36 7 °C)  74  18  157/80  106  94 %  --   02/21/21 0905  --  --  --  --  --  --  None (Room air)   02/21/21 07:56:48  98 3 °F (36 8 °C)  --  18 151/87  108  99 %  None (Room air)     Medications:   Scheduled Medications:  acetaminophen, 975 mg, Oral, Q8H [place]  amLODIPine, 10 mg, Oral, Daily  atorvastatin, 20 mg, Oral, Daily  dexamethasone, 4 mg, Oral, BID (AM & Afternoon)  docusate sodium, 100 mg, Oral, BID  enoxaparin, 40 mg, Subcutaneous, Daily  gabapentin, 100 mg, Oral, BID  insulin glargine, 15 Units, Subcutaneous, HS  insulin lispro, 1-5 Units, Subcutaneous, 4x Daily (AC & HS)  insulin lispro, 5 Units, Subcutaneous, TID With Meals  lidocaine, 2 patch, Topical, Daily  pantoprazole, 20 mg, Oral, Early Morning  senna, 1 tablet, Oral, BID      Continuous IV Infusions:     PRN Meds:  acetaminophen, 650 mg, Oral, Q6H PRN  aluminum-magnesium hydroxide-simethicone, 30 mL, Oral, Q6H PRN  diazepam, 2 mg, Oral, Q6H PRN  hydrALAZINE, 5 mg, Intravenous, Q6H PRN  HYDROmorphone, 0 5 mg, Intravenous, Q2H PRN  LORazepam, 2 mg, Intravenous, Q10 Min PRN  ondansetron, 4 mg, Oral, Q6H PRN - x 1 2/22  oxyCODONE, 15 mg, Oral, Q3H PRN  saliva substitute, 5 spray, Mouth/Throat, 4x Daily PRN    Discharge Plan: home with [facility]  Network Utilization Review Department  ATTENTION: Please call with any questions or concerns to [phone] and carefully listen to the prompts so that you are directed to the right person  All voicemails are confidential   Kenia Jamison all requests for admission clinical reviews, approved or denied determinations and any other requests to dedicated fax number below belonging to the campus where the patient is receiving treatment   List of dedicated fax numbers for the Facilities:  [address] DENIALS (Administrative/Medical Necessity) [phone]   [address] [place] [address] (Maternity/NICU/Pediatrics) 261 [facility],[address] [address] [facility] [place] [facility] [place] [address] [address] ([address]) [address] [place] [address] Seth Singleton 1481 [phone]   [place] [address] [phone]

## 2021-02-23 NOTE — ASSESSMENT & PLAN NOTE
· Per record review, patient initially presented to the [facility] with intractable back and LLE pain in the setting of metastatic cancer with spinal/brain/mediastinal involvement   · Pain management per palliative care  · Radiation treatments per radiation oncology  · Continue current analgesic regimen w/ constipation prophylaxis  · Supportive care otherwise

## 2021-02-23 NOTE — PLAN OF CARE
Problem: Potential for Falls  Goal: Patient will remain free of falls  Description: INTERVENTIONS:  - Assess patient frequently for physical needs  -  Identify cognitive and physical deficits and behaviors that affect risk of falls    -  [place] fall precautions as indicated by assessment   - Educate patient/family on patient safety including physical limitations  - Instruct patient to call for assistance with activity based on assessment  - Modify environment to reduce risk of injury  - Consider OT/PT consult to assist with strengthening/mobility  Outcome: Progressing     Problem: PAIN - ADULT  Goal: Verbalizes/displays adequate comfort level or baseline comfort level  Description: Interventions:  - Encourage patient to monitor pain and request assistance  - Assess pain using appropriate pain scale  - Administer analgesics based on type and severity of pain and evaluate response  - Implement non-pharmacological measures as appropriate and evaluate response  - Consider cultural and social influences on pain and pain management  - Notify physician/advanced practitioner if interventions unsuccessful or patient reports new pain  Outcome: Progressing     Problem: INFECTION - ADULT  Goal: Absence or prevention of progression during hospitalization  Description: INTERVENTIONS:  - Assess and monitor for signs and symptoms of infection  - Monitor lab/diagnostic results  - Monitor all insertion sites, i e  indwelling lines, tubes, and drains  - Monitor endotracheal if appropriate and nasal secretions for changes in amount and color  - [place] appropriate cooling/warming therapies per order  - Administer medications as ordered  - Instruct and encourage patient and family to use good hand hygiene technique  - Identify and instruct in appropriate isolation precautions for identified infection/condition  Outcome: Progressing     Problem: SAFETY ADULT  Goal: Patient will remain free of falls  Description: INTERVENTIONS:  - Assess patient frequently for physical needs  -  Identify cognitive and physical deficits and behaviors that affect risk of falls    -  [place] fall precautions as indicated by assessment   - Educate patient/family on patient safety including physical limitations  - Instruct patient to call for assistance with activity based on assessment  - Modify environment to reduce risk of injury  - Consider OT/PT consult to assist with strengthening/mobility  Outcome: Progressing  Goal: Maintain or return to baseline ADL function  Description: INTERVENTIONS:  -  Assess patient's ability to carry out ADLs; assess patient's baseline for ADL function and identify physical deficits which impact ability to perform ADLs (bathing, care of mouth/teeth, toileting, grooming, dressing, etc )  - Assess/evaluate cause of self-care deficits   - Assess range of motion  - Assess patient's mobility; develop plan if impaired  - Assess patient's need for assistive devices and provide as appropriate  - Encourage maximum independence but intervene and supervise when necessary  - Involve family in performance of ADLs  - Assess for home care needs following discharge   - Consider OT consult to assist with ADL evaluation and planning for discharge  - Provide patient education as appropriate  Outcome: Progressing  Goal: Maintain or return mobility status to optimal level  Description: INTERVENTIONS:  - Assess patient's baseline mobility status (ambulation, transfers, stairs, etc )    - Identify cognitive and physical deficits and behaviors that affect mobility  - Identify mobility aids required to assist with transfers and/or ambulation (gait belt, sit-to-stand, lift, walker, cane, etc )  - [place] fall precautions as indicated by assessment  - Record patient progress and toleration of activity level on Mobility SBAR; progress patient to next Phase/Stage  - Instruct patient to call for assistance with activity based on assessment  - Consider rehabilitation consult to assist with strengthening/weightbearing, etc   Outcome: Progressing     Problem: DISCHARGE PLANNING  Goal: Discharge to home or other facility with appropriate resources  Description: INTERVENTIONS:  - Identify barriers to discharge w/patient and caregiver  - Arrange for needed discharge resources and transportation as appropriate  - Identify discharge learning needs (meds, wound care, etc )  - Arrange for interpretive services to assist at discharge as needed  - Refer to Case Management Department for coordinating discharge planning if the patient needs post-hospital services based on physician/advanced practitioner order or complex needs related to functional status, cognitive ability, or social support system  Outcome: Progressing     Problem: Knowledge Deficit  Goal: Patient/family/caregiver demonstrates understanding of disease process, treatment plan, medications, and discharge instructions  Description: Complete learning assessment and assess knowledge base    Interventions:  - Provide teaching at level of understanding  - Provide teaching via preferred learning methods  Outcome: Progressing

## 2021-02-23 NOTE — PROGRESS NOTES
Progress Note - Mary Kate Asher 1960, 62 y o  female MRN: [id number]    Unit/Bed#: [facility] 927-01 Encounter: [id number]    Primary Care Provider: Patti Borrero DO   Date and time admitted to hospital: 2/11/2021  3:21 PM        * Cancer related pain  Assessment & Plan  · Per record review, patient initially presented to the [facility] with intractable back and LLE pain in the setting of metastatic cancer with spinal/brain/mediastinal involvement   · Pain management per palliative care  · Radiation treatments per radiation oncology  · Continue current analgesic regimen w/ constipation prophylaxis  · Supportive care otherwise    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  Assessment & Plan  · also with mediastinal involvement and right hilar lymphadenopathy  · S/p mediastinoscopy on 2/12 - findings of large white lymph nodes in multiple regions -> 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  · FNA of cervical lymph node last month w/ evidence of non-small cell carcinoma of uncertain histotype  · MRIs of C/T/L spine consistent with multiregional CSF spread of tumor - s/p lumbar puncture last month w/ CSF cytology without conclusive evidence of malignancy, but rather, atypical cellular changes of unknown significance  · CT of head and MRI brain last month also noted left occipital/parietal lesions w/ vasogenic edema - on Decadron  · Appreciate medical and radiation oncology evaluation - continue hold brain and lumbar/sacral spine palliative radiation  · PRN pain control  · Continues to work with PT/OT - ambulation up simulated stairs progressively improves daily - she would like to continue working with inpatient PT/OT to comfortably do so once discharged - to remain inpatient until radiation treatments are complete due to need for daily transport if discharged home    Hyperlipidemia  Assessment & Plan  · Continue statin    Essential hypertension  Assessment & Plan  · Optimize pain control  · Continue Norvasc - PRN IV Hydralazine on board for BP spikes  · Low-sodium diet    Diabetes mellitus type 2  Assessment & Plan  Lab Results   Component Value Date    HGBA1C 5 3 2021     · Hold oral hypoglycemics while hospitalized  · Continue SSI coverage and Accu-Cheks - blood sugars remain relatively uncontrolled in the setting of Decadron use -> titrate basal/fixed insulin as necessary daily  · As A1C low, will restart oral meds at d/c and stop insulin  · Carbohydrate restricted diet    VTE Pharmacologic Prophylaxis:   Pharmacologic: Enoxaparin (Lovenox)  Mechanical VTE Prophylaxis in Place: Yes    Patient Centered Rounds: I have performed bedside rounds with nursing staff today  Discussions with Specialists or Other Care Team Provider: no    Education and Discussions with Family / Patient: pt    Time Spent for Care: 30 minutes  More than 50% of total time spent on counseling and coordination of care as described above  Current Length of Stay: 12 day(s)    Current Patient Status: Inpatient   Certification Statement: The patient will continue to require additional inpatient hospital stay due to need to complete radiation    Discharge Plan: tomorrow    Code Status: Level 1 - Full Code      Subjective:   No acute complaints    Objective:     Vitals:   Temp (24hrs), Av 9 °F (36 6 °C), Min:97 3 °F (36 3 °C), Max:98 2 °F (36 8 °C)    Temp:  [97 3 °F (36 3 °C)-98 2 °F (36 8 °C)] 98 2 °F (36 8 °C)  HR:  [69-77] 74  Resp:  [18] 18  BP: (136-161)/(72-82) 136/78  SpO2:  [90 %-95 %] 95 %  Body mass index is 31 52 kg/m²  Input and Output Summary (last 24 hours): Intake/Output Summary (Last 24 hours) at 2021 1858  Last data filed at 2021 1312  Gross per 24 hour   Intake 520 ml   Output --   Net 520 ml       Physical Exam:     Physical Exam  HENT:      Head: Normocephalic and atraumatic        Nose: Nose normal       Mouth/Throat:      Mouth: Mucous membranes are moist    Eyes:      Extraocular Movements: Extraocular movements intact  Conjunctiva/sclera: Conjunctivae normal    Neck:      Musculoskeletal: Normal range of motion and neck supple  Cardiovascular:      Rate and Rhythm: Normal rate and regular rhythm  Pulmonary:      Effort: Pulmonary effort is normal       Breath sounds: No wheezing or rales  Abdominal:      General: Bowel sounds are normal  There is no distension  Palpations: Abdomen is soft  Tenderness: There is no abdominal tenderness  Musculoskeletal: Normal range of motion  Right lower leg: No edema  Left lower leg: No edema  Skin:     General: Skin is warm and dry  Neurological:      Mental Status: She is alert and oriented to person, place, and time  Mental status is at baseline  Additional Data:     Labs: Invalid input(s): LABALBU        * I Have Reviewed All Lab Data Listed Above  * Additional Pertinent Lab Tests Reviewed:  Sahra 66 Admission Reviewed        Recent Cultures (last 7 days):           Last 24 Hours Medication List:   Current Facility-Administered Medications   Medication Dose Route Frequency Provider Last Rate    acetaminophen  650 mg Oral Q6H PRN [employer], PA-STEPHANY      acetaminophen  975 mg Oral [facility] Jessica Antonio PA-C      aluminum-magnesium hydroxide-simethicone  30 mL Oral Q6H PRN [employer]      amLODIPine  10 mg Oral Daily Kamaljit Antonio PA-C      atorvastatin  20 mg Oral Daily Pilloisadora Joseph [place]      dexamethasone  4 mg Oral BID (AM & Afternoon) Elaine Velez MD      diazepam  2 mg Oral Q6H PRN Pilloisadora Joseph PA-C      docusate sodium  100 mg Oral BID Adri Tucker MD      enoxaparin  40 mg Subcutaneous Daily Janny Antonio PA-C      gabapentin  100 mg Oral BID Janny Antonio PA-C      hydrALAZINE  5 mg Intravenous Q6H PRN Adri Tucker MD      HYDROmorphone  0 5 mg Intravenous Q2H PRN Pilloisadora Joseph [facility]      insulin glargine  15 Units Subcutaneous HS Walter Huffman MD      insulin lispro  1-5 Units Subcutaneous 4x Daily (AC & HS) Walter Huffman MD     Yuriy Leland insulin lispro  5 Units Subcutaneous TID With Meals Walter Huffman MD      lidocaine  2 patch Topical Daily Nupur Cason PA-C      LORazepam  2 mg Intravenous Q10 Min PRN Willian Antonio PA-C      ondansetron  4 mg Oral Q6H PRN Yung Machuca PA-C      oxyCODONE  15 mg Oral Q3H PRN Lamar Mccarthy MD      pantoprazole  20 mg Oral Early Morning Nupur Cason PA-C      saliva substitute  5 spray Mouth/Throat 4x Daily PRN Walter Huffman MD      senna  1 tablet Oral BID Lamar Mccarthy MD          Today, Patient Was Seen By: Jacquelin Camargo DO    ** Please Note: Dictation voice to text software may have been used in the creation of this document   **

## 2021-02-24 ENCOUNTER — APPOINTMENT (OUTPATIENT)
Dept: RADIATION ONCOLOGY | Facility: CLINIC | Age: 61
End: 2021-02-24
Attending: RADIOLOGY
Payer: COMMERCIAL

## 2021-02-24 VITALS
TEMPERATURE: 98.1 F | SYSTOLIC BLOOD PRESSURE: 128 MMHG | BODY MASS INDEX: 31.68 KG/M2 | RESPIRATION RATE: 18 BRPM | HEART RATE: 83 BPM | HEIGHT: 60 IN | OXYGEN SATURATION: 95 % | DIASTOLIC BLOOD PRESSURE: 65 MMHG | WEIGHT: 161.38 LBS

## 2021-02-24 LAB
GLUCOSE SERPL-MCNC: 143 MG/DL (ref 65–140)
GLUCOSE SERPL-MCNC: 211 MG/DL (ref 65–140)

## 2021-02-24 PROCEDURE — 82948 REAGENT STRIP/BLOOD GLUCOSE: CPT

## 2021-02-24 PROCEDURE — 97535 SELF CARE MNGMENT TRAINING: CPT

## 2021-02-24 PROCEDURE — 77412 RADIATION TX DELIVERY LVL 3: CPT | Performed by: RADIOLOGY

## 2021-02-24 PROCEDURE — 97116 GAIT TRAINING THERAPY: CPT

## 2021-02-24 PROCEDURE — 77387 GUIDANCE FOR RADJ TX DLVR: CPT | Performed by: RADIOLOGY

## 2021-02-24 PROCEDURE — 99239 HOSP IP/OBS DSCHRG MGMT >30: CPT | Performed by: GENERAL PRACTICE

## 2021-02-24 PROCEDURE — 77336 RADIATION PHYSICS CONSULT: CPT | Performed by: RADIOLOGY

## 2021-02-24 PROCEDURE — 97530 THERAPEUTIC ACTIVITIES: CPT

## 2021-02-24 RX ORDER — DOCUSATE SODIUM 100 MG/1
100 CAPSULE, LIQUID FILLED ORAL 2 TIMES DAILY
Qty: 60 CAPSULE | Refills: 0 | Status: SHIPPED | OUTPATIENT
Start: 2021-02-24

## 2021-02-24 RX ORDER — DEXAMETHASONE 4 MG/1
4 TABLET ORAL
Qty: 60 TABLET | Refills: 0 | Status: SHIPPED | OUTPATIENT
Start: 2021-02-25 | End: 2021-03-29 | Stop reason: SDUPTHER

## 2021-02-24 RX ORDER — ACETAMINOPHEN 500 MG
1000 TABLET ORAL EVERY 8 HOURS SCHEDULED
Qty: 180 TABLET | Refills: 0 | Status: SHIPPED | OUTPATIENT
Start: 2021-02-24 | End: 2021-04-07 | Stop reason: SDUPTHER

## 2021-02-24 RX ORDER — SENNOSIDES 8.6 MG
8.6 TABLET ORAL 2 TIMES DAILY
Qty: 60 TABLET | Refills: 0 | Status: SHIPPED | OUTPATIENT
Start: 2021-02-24

## 2021-02-24 RX ADMIN — ACETAMINOPHEN 975 MG: 325 TABLET, FILM COATED ORAL at 13:05

## 2021-02-24 RX ADMIN — DEXAMETHASONE 4 MG: 4 TABLET ORAL at 13:05

## 2021-02-24 RX ADMIN — DOCUSATE SODIUM 100 MG: 100 CAPSULE, LIQUID FILLED ORAL at 08:10

## 2021-02-24 RX ADMIN — ATORVASTATIN CALCIUM 20 MG: 20 TABLET, FILM COATED ORAL at 08:10

## 2021-02-24 RX ADMIN — PANTOPRAZOLE SODIUM 20 MG: 20 TABLET, DELAYED RELEASE ORAL at 06:06

## 2021-02-24 RX ADMIN — SENNOSIDES 8.6 MG: 8.6 TABLET, FILM COATED ORAL at 08:10

## 2021-02-24 RX ADMIN — ACETAMINOPHEN 975 MG: 325 TABLET, FILM COATED ORAL at 06:06

## 2021-02-24 RX ADMIN — ENOXAPARIN SODIUM 40 MG: 40 INJECTION SUBCUTANEOUS at 08:10

## 2021-02-24 RX ADMIN — AMLODIPINE BESYLATE 10 MG: 10 TABLET ORAL at 08:10

## 2021-02-24 RX ADMIN — GABAPENTIN 100 MG: 100 CAPSULE ORAL at 08:10

## 2021-02-24 RX ADMIN — ONDANSETRON 4 MG: 4 TABLET, ORALLY DISINTEGRATING ORAL at 17:03

## 2021-02-24 RX ADMIN — DEXAMETHASONE 4 MG: 4 TABLET ORAL at 08:10

## 2021-02-24 NOTE — PLAN OF CARE
Problem: OCCUPATIONAL THERAPY ADULT  Goal: Performs self-care activities at highest level of function for planned discharge setting  See evaluation for individualized goals  Description: Treatment Interventions: ADL retraining, Functional transfer training, UE strengthening/ROM, Endurance training, Patient/family training, Equipment evaluation/education, Compensatory technique education, Continued evaluation, Energy conservation, Activityengagement          See flowsheet documentation for full assessment, interventions and recommendations  Outcome: Progressing  Note: Limitation: Decreased ADL status, Decreased Safe judgement during ADL, Decreased endurance, Decreased self-care trans, Decreased high-level ADLs  Prognosis: Good  Assessment: Patient participated in Skilled OT session this date with interventions consisting of ADL re training with the use of correct body mechnaics, Energy Conservation techniques, safety awareness and fall prevention techniques,  therapeutic activities to: increase activity tolerance and increase OOB/ sitting tolerance   Upon arrival patient was found seated OOB to Recliner  Pt demonstrated the following tasks: S sit <> stand, S fnxl ambulation with RW, S to don pants, MIN A UBD  Pt requires MOD A with B/L HHA to negotiate stairs  In comparison to previous session, patient with improvements in transfers and ADL performance  Pt does require breaks between tasks  Patient continues to be functioning below baseline level, occupational performance remains limited secondary to factors listed above and increased risk for falls and injury  From OT standpoint, recommendation at time of d/c would be home with skilled therapy and social support  Patient to benefit from continued Occupational Therapy treatment while in the hospital to address deficits as defined above and maximize level of functional independence with ADLs and functional mobility   Pt was left in chair after session with all current needs met  The patient's raw score on the AM-PAC Daily Activity inpatient short form is 19, standardized score is 40 22, greater than 39 4  Patients at this level are likely to benefit from DC to home  Please refer to the recommendation of the Occupational Therapist for safe DC planning  OT Discharge Recommendation: Home with skilled therapy(and social support )  OT - OK to Discharge: Yes(when medically stable )       Problem: OCCUPATIONAL THERAPY ADULT  Goal: Performs self-care activities at highest level of function for planned discharge setting  See evaluation for individualized goals  Description: Treatment Interventions: ADL retraining, Functional transfer training, UE strengthening/ROM, Endurance training, Patient/family training, Equipment evaluation/education, Compensatory technique education, Continued evaluation, Energy conservation, Activityengagement          See flowsheet documentation for full assessment, interventions and recommendations  Outcome: Progressing  Note: Limitation: Decreased ADL status, Decreased Safe judgement during ADL, Decreased endurance, Decreased self-care trans, Decreased high-level ADLs  Prognosis: Good  Assessment: Patient participated in Skilled OT session this date with interventions consisting of ADL re training with the use of correct body mechnaics, Energy Conservation techniques, safety awareness and fall prevention techniques,  therapeutic activities to: increase activity tolerance and increase OOB/ sitting tolerance   Upon arrival patient was found seated OOB to Recliner  Pt demonstrated the following tasks: S sit <> stand, S fnxl ambulation with RW, S to don pants, MIN A UBD  Pt requires MOD A with B/L HHA to negotiate stairs  In comparison to previous session, patient with improvements in transfers and ADL performance  Pt does require breaks between tasks   Patient continues to be functioning below baseline level, occupational performance remains limited secondary to factors listed above and increased risk for falls and injury  From OT standpoint, recommendation at time of d/c would be home with skilled therapy and social support  Patient to benefit from continued Occupational Therapy treatment while in the hospital to address deficits as defined above and maximize level of functional independence with ADLs and functional mobility  Pt was left in chair after session with all current needs met  The patient's raw score on the AM-PAC Daily Activity inpatient short form is 19, standardized score is 40 22, greater than 39 4  Patients at this level are likely to benefit from DC to home  Please refer to the recommendation of the Occupational Therapist for safe DC planning       OT Discharge Recommendation: Home with skilled therapy(and social support )  OT - OK to Discharge: Yes(when medically stable )

## 2021-02-24 NOTE — PLAN OF CARE
Problem: Potential for Falls  Goal: Patient will remain free of falls  Description: INTERVENTIONS:  - Assess patient frequently for physical needs  -  Identify cognitive and physical deficits and behaviors that affect risk of falls    -  [place] fall precautions as indicated by assessment   - Educate patient/family on patient safety including physical limitations  - Instruct patient to call for assistance with activity based on assessment  - Modify environment to reduce risk of injury  - Consider OT/PT consult to assist with strengthening/mobility  Outcome: Progressing     Problem: PAIN - ADULT  Goal: Verbalizes/displays adequate comfort level or baseline comfort level  Description: Interventions:  - Encourage patient to monitor pain and request assistance  - Assess pain using appropriate pain scale  - Administer analgesics based on type and severity of pain and evaluate response  - Implement non-pharmacological measures as appropriate and evaluate response  - Consider cultural and social influences on pain and pain management  - Notify physician/advanced practitioner if interventions unsuccessful or patient reports new pain  Outcome: Progressing     Problem: INFECTION - ADULT  Goal: Absence or prevention of progression during hospitalization  Description: INTERVENTIONS:  - Assess and monitor for signs and symptoms of infection  - Monitor lab/diagnostic results  - Monitor all insertion sites, i e  indwelling lines, tubes, and drains  - Monitor endotracheal if appropriate and nasal secretions for changes in amount and color  - [place] appropriate cooling/warming therapies per order  - Administer medications as ordered  - Instruct and encourage patient and family to use good hand hygiene technique  - Identify and instruct in appropriate isolation precautions for identified infection/condition  Outcome: Progressing     Problem: SAFETY ADULT  Goal: Patient will remain free of falls  Description: INTERVENTIONS:  - Assess patient frequently for physical needs  -  Identify cognitive and physical deficits and behaviors that affect risk of falls    -  [place] fall precautions as indicated by assessment   - Educate patient/family on patient safety including physical limitations  - Instruct patient to call for assistance with activity based on assessment  - Modify environment to reduce risk of injury  - Consider OT/PT consult to assist with strengthening/mobility  Outcome: Progressing  Goal: Maintain or return to baseline ADL function  Description: INTERVENTIONS:  -  Assess patient's ability to carry out ADLs; assess patient's baseline for ADL function and identify physical deficits which impact ability to perform ADLs (bathing, care of mouth/teeth, toileting, grooming, dressing, etc )  - Assess/evaluate cause of self-care deficits   - Assess range of motion  - Assess patient's mobility; develop plan if impaired  - Assess patient's need for assistive devices and provide as appropriate  - Encourage maximum independence but intervene and supervise when necessary  - Involve family in performance of ADLs  - Assess for home care needs following discharge   - Consider OT consult to assist with ADL evaluation and planning for discharge  - Provide patient education as appropriate  Outcome: Progressing  Goal: Maintain or return mobility status to optimal level  Description: INTERVENTIONS:  - Assess patient's baseline mobility status (ambulation, transfers, stairs, etc )    - Identify cognitive and physical deficits and behaviors that affect mobility  - Identify mobility aids required to assist with transfers and/or ambulation (gait belt, sit-to-stand, lift, walker, cane, etc )  - [place] fall precautions as indicated by assessment  - Record patient progress and toleration of activity level on Mobility SBAR; progress patient to next Phase/Stage  - Instruct patient to call for assistance with activity based on assessment  - Consider rehabilitation consult to assist with strengthening/weightbearing, etc   Outcome: Progressing     Problem: DISCHARGE PLANNING  Goal: Discharge to home or other facility with appropriate resources  Description: INTERVENTIONS:  - Identify barriers to discharge w/patient and caregiver  - Arrange for needed discharge resources and transportation as appropriate  - Identify discharge learning needs (meds, wound care, etc )  - Arrange for interpretive services to assist at discharge as needed  - Refer to Case Management Department for coordinating discharge planning if the patient needs post-hospital services based on physician/advanced practitioner order or complex needs related to functional status, cognitive ability, or social support system  Outcome: Progressing     Problem: Knowledge Deficit  Goal: Patient/family/caregiver demonstrates understanding of disease process, treatment plan, medications, and discharge instructions  Description: Complete learning assessment and assess knowledge base    Interventions:  - Provide teaching at level of understanding  - Provide teaching via preferred learning methods  Outcome: Progressing

## 2021-02-24 NOTE — PHYSICAL THERAPY NOTE
PHYSICAL THERAPY NOTE  Patient Name: Rosana Payan  UTBU[facility]O Date: 2/24/2021 02/24/21 1031   PT Last Visit   PT Visit Date 02/24/21   Note Type   Note Type Treatment   Pain Assessment   Pain Assessment Tool 0-10   Pain Score No Pain   Restrictions/Precautions   Weight Bearing Precautions Per Order No   Other Precautions Fall Risk   General   Chart Reviewed Yes   Response to Previous Treatment Patient with no complaints from previous session  Family/Caregiver Present No   Cognition   Overall Cognitive Status WFL   Arousal/Participation Alert; Responsive; Cooperative   Attention Within functional limits   Orientation Level Oriented X4   Memory Within functional limits   Following Commands Follows one step commands without difficulty   Comments Pt pleasant and cooperative to work w/ therapy   Subjective   Subjective "I'm leaving today"   Bed Mobility   Supine to Sit Unable to assess   Sit to Supine Unable to assess   Additional Comments Pt seated OOB upon PT arrival  Pt returned seated OOB at end of session w/ all needs within reach   Transfers   Sit to Stand 5  Supervision   Additional items Increased time required   Stand to Sit 5  Supervision   Additional items Increased time required   Additional Comments Transfers w/ RW   Ambulation/Elevation   Gait pattern Excessively slow; Short stride; Inconsistent geena;L Foot drag   Gait Assistance 5  Supervision   Additional items Verbal cues   Assistive Device Rolling walker   Distance 60ft + 60ft  (seated rest break required)   Stair Management Assistance 4  Minimal assist   Additional items Assist x 2;Verbal cues   Stair Management Technique No rails; Step to pattern; Foreward;Backward  (HHAx2; ascend forward and descend backward)   Number of Stairs 3   Balance   Static Sitting Fair +   Dynamic Sitting Fair   Static Standing Fair   Dynamic Standing [address] Deficit   Endurance Deficit Yes   Endurance Deficit Description weakness, fatigue   Activity Tolerance   Activity Tolerance Patient limited by fatigue   Nurse Made Aware yes   Assessment   Prognosis Fair   Problem List Decreased strength;Decreased endurance; Impaired balance;Decreased mobility   Assessment Pt presents with decreased mobility, strength, balance, and activity tolerance  Pt demonstrated ability to perform STS functional transfers at supervision  Pt ambulated 60ft + 60ft with RW at supervision- required one seated rest break  Pt performed 3 stairs w/ B/L HHA and Min Ax2  Pt requires VCs for ascending w/ RLE and descending w/ LLE  Pt continues to benefit from skilled therapy to maximize functional independence  Recommendation at this time is Home PT  Pt would benefit from continued ambulation, functional transfers, strength, balance, and activity tolerance   Barriers to Discharge Inaccessible home environment   Goals   Patient Goals to return home today   STG Expiration Date 02/28/21   PT Treatment Day 2   Plan   Treatment/Interventions Functional transfer training;LE strengthening/ROM; Elevations; Endurance training;Patient/family training;Equipment eval/education;Gait training;Spoke to nursing   Progress Progressing toward goals   PT Frequency   (3-5x/week)   Recommendation   PT Discharge Recommendation Home with skilled therapy  (Home PT)   Equipment Recommended [facility] Recommended Wheeled walker   PT - OK to Discharge Yes  (when medically cleared)   AM-PAC Basic Mobility Inpatient   Turning in Bed Without Bedrails 4   Lying on Back to Sitting on Edge of Flat Bed 3   Moving Bed to Chair 4   Standing Up From Chair 4   Walk in Room 3   Climb 3-5 Stairs 2   Basic Mobility Inpatient Raw Score 20   Basic Mobility Standardized Score 43 99     Salud Pino, PT, DPT

## 2021-02-24 NOTE — OCCUPATIONAL THERAPY NOTE
OccupationalTherapy Progress Note     Patient Name: Sharee Velarde  FSMAF'W Date: 2/24/2021  Problem List  Principal Problem:    Cancer related pain  Active Problems:    Diabetes mellitus type 2    Essential hypertension    Hyperlipidemia    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          02/24/21 1030   OT Last Visit   OT Visit Date 02/24/21   Note Type   Note Type Treatment   Restrictions/Precautions   Weight Bearing Precautions Per Order No   Other Precautions Fall Risk   General   Response to Previous Treatment Patient with no complaints from previous session   Pain Assessment   Pain Assessment Tool 0-10   Pain Score No Pain   ADL   UB Dressing Assistance 4  Minimal Assistance   UB Dressing Deficit Thread RUE; Thread LUE;Pull around back   UB Dressing Comments Pt donned robe with MIN A    LB Dressing Assistance 5  Supervision/Setup   LB Dressing Deficit Setup; Increased time to complete; Thread RLE into pants; Thread LLE into pants;Pull up over hips   Bed Mobility   Additional Comments Pt seated OOB upon arrival    Transfers   Sit to Stand 5  Supervision   Additional items Increased time required   Stand to Sit 5  Supervision   Additional items Increased time required   Additional Comments Transfers with RW    Functional Mobility   Functional Mobility 5  Supervision   Additional Comments Ax1, pt requires MOD A with B/L HHA to negotiate stairs    Additional items Rolling walker   Cognition   Overall Cognitive Status [facility]   Arousal/Participation Alert; Responsive; Cooperative   Attention Attends with cues to redirect   Orientation Level Oriented X4   Memory Within functional limits   Following Commands Follows one step commands with increased time or repetition   Comments Pt pleasant and cooperative t/o session   Activity Tolerance   Activity Tolerance Patient limited by fatigue   Medical Staff Made Aware PT Evaline Mantle, CM    Assessment   Assessment Patient participated in Skilled OT session this date with interventions consisting of ADL re training with the use of correct body mechnaics, Energy Conservation techniques, safety awareness and fall prevention techniques,  therapeutic activities to: increase activity tolerance and increase OOB/ sitting tolerance   Upon arrival patient was found seated OOB to Recliner  Pt demonstrated the following tasks: S sit <> stand, S fnxl ambulation with RW, S to don pants, MIN A UBD  Pt requires MOD A with B/L HHA to negotiate stairs  In comparison to previous session, patient with improvements in transfers and ADL performance  Pt does require breaks between tasks  Patient continues to be functioning below baseline level, occupational performance remains limited secondary to factors listed above and increased risk for falls and injury  From OT standpoint, recommendation at time of d/c would be home with skilled therapy and social support  Patient to benefit from continued Occupational Therapy treatment while in the hospital to address deficits as defined above and maximize level of functional independence with ADLs and functional mobility  Pt was left in chair after session with all current needs met  The patient's raw score on the AM-PAC Daily Activity inpatient short form is 19, standardized score is 40 22, greater than 39 4  Patients at this level are likely to benefit from DC to home  Please refer to the recommendation of the Occupational Therapist for safe DC planning  Plan   Treatment Interventions ADL retraining;UE strengthening/ROM; Functional transfer training; Endurance training;Patient/family training;Equipment evaluation/education; Compensatory technique education;Continued evaluation; Energy conservation; Activityengagement   Goal Expiration Date 03/01/21   OT Treatment Day 2   OT Frequency 3-5x/wk   Recommendation   OT Discharge Recommendation Home with skilled therapy  (and social support )   OT - OK to Discharge Yes  (when medically stable )   AM-PAC Daily Activity Inpatient   Lower Body Dressing 3   Bathing 3   Toileting 3   Upper Body Dressing 3   Grooming 3   Eating 4   Daily Activity Raw Score 19   Daily Activity Standardized Score (Calc for Raw Score >=11) 40 22   AM-PAC Applied Cognition Inpatient   Following a Speech/Presentation 3   Understanding Ordinary Conversation 4   Taking Medications 4   Remembering Where Things Are Placed or Put Away 4   Remembering List of 4-5 Errands 3   Taking Care of Complicated Tasks 3   Applied Cognition Raw Score 21   Applied Cognition Standardized Score 44 3     Owen Dawkins MS, OTR/L

## 2021-02-24 NOTE — CASE MANAGEMENT
[facility] sharmin submitted for pt  Pt to be d/c to home today with [facility] [id number]  [facility] services  Pt aware of same  Family to transport

## 2021-02-24 NOTE — DISCHARGE INSTRUCTIONS
Brain Metastasis   WHAT YOU NEED TO KNOW:   Brain metastasis is cancer that has spread within your brain or spreads from your body to your brain  Some examples are lung, breast, skin, and colon cancer  DISCHARGE INSTRUCTIONS:   Call 911 for any of the following:   · Your leg or arm feels warm, tender, and painful  It may be swollen and red  · You feel lightheaded, short of breath, and have chest pain  · You cough up blood  Seek care immediately if:   · You have new problems walking or moving one side of your body  · You have new or worsening headaches or body swelling  · You have a seizure  Contact your healthcare provider if:   · You have questions or concerns about your condition or care  Follow up with your healthcare provider or oncologist as directed: Your healthcare provider may suggest tests such as an MRI or PET scan every 3 months  These tests help check for new or returning tumors  Work with your healthcare providers to create a follow-up care plan that is right for you  Medicines:   · Steroids  help reduce swelling  · Anticonvulsants  help decrease or stop seizures  · Blood thinners  help prevent blood clots  Clots can cause strokes, heart attacks, and death  The following are general safety guidelines to follow while you are taking a blood thinner:    ? Watch for bleeding and bruising while you take blood thinners  Watch for bleeding from your gums or nose  Watch for blood in your urine and bowel movements  Use a soft washcloth on your skin, and a soft toothbrush to brush your teeth  This can keep your skin and gums from bleeding  If you shave, use an electric shaver  Do not play contact sports  ? Tell your dentist and other healthcare providers that you take a blood thinner  Wear a bracelet or necklace that says you take this medicine  ? Do not start or stop any other medicines unless your healthcare provider tells you to   Many medicines cannot be used with blood thinners  ? Take your blood thinner exactly as prescribed by your healthcare provider  Do not skip does or take less than prescribed  Tell your provider right away if you forget to take your blood thinner, or if you take too much  ? Warfarin  is a blood thinner that you may need to take  The following are things you should be aware of if you take warfarin:     § Foods and medicines can affect the amount of warfarin in your blood  Do not make major changes to your diet while you take warfarin  Warfarin works best when you eat about the same amount of vitamin K every day  Vitamin K is found in green leafy vegetables and certain other foods  Ask for more information about what to eat when you are taking warfarin  § You will need to see your healthcare provider for follow-up visits when you are on warfarin  You will need regular blood tests  These tests are used to decide how much medicine you need  · Take your medicine as directed  Contact your healthcare provider if you think your medicine is not helping or if you have side effects  Tell him or her if you are allergic to any medicine  Keep a list of the medicines, vitamins, and herbs you take  Include the amounts, and when and why you take them  Bring the list or the pill bottles to follow-up visits  Carry your medicine list with you in case of an emergency  Manage the effects of brain metastasis:   · Prevent infections  Cancer treatments make it easier to get infections  Wash your hands often with soap and water  Carry germ-killing gel with you in case you do not have access to soap and water  Try to avoid people who have a cold or the flu  · Treat pain  Tell your healthcare provider if you are in pain  If you need pain medicine, learn how, when, and how often to take it  Do not wait until the pain is severe before you take your medicine  Tell healthcare providers if your pain does not decrease  · Stay safe    Cancer treatments can make you dizzy or sleepy  Prevent falls by calling someone when you get out of bed or if you need help  Ask your healthcare provider if it is safe for you to drive  · Seek support from healthcare providers  The disease can change the way you act, think, and feel  Your memory, concentration, and ability to learn may decline  You may act without thinking or become more emotional  Talk with your healthcare provider about these changes and about continuing care, treatments, and home services  Go to all follow-up appointments  For support and more information:   · [facility] Seth Deputado Rohan De Carpenter [address], [address]  Phone: [phone]   Web Address: [employer] at  [address] [place] [address]  0185 Information is for End User's use only and may not be sold, redistributed or otherwise used for commercial purposes  All illustrations and images included in CareNotes® are the copyrighted property of [employer]  or [address] [place], [place] [address]lisa   The above information is an  only  It is not intended as medical advice for individual conditions or treatments  Talk to your doctor, nurse or pharmacist before following any medical regimen to see if it is safe and effective for you

## 2021-02-24 NOTE — PLAN OF CARE
Problem: PHYSICAL THERAPY ADULT  Goal: Performs mobility at highest level of function for planned discharge setting  See evaluation for individualized goals  Description: Treatment/Interventions: Functional transfer training, LE strengthening/ROM, Elevations, Therapeutic exercise, Endurance training, Patient/family training, Equipment eval/education, Bed mobility, Gait training  Equipment Recommended: Carter Dorantes       See flowsheet documentation for full assessment, interventions and recommendations  Outcome: Progressing  Note: Prognosis: Fair  Problem List: Decreased strength, Decreased endurance, Impaired balance, Decreased mobility  Assessment: Pt presents with decreased mobility, strength, balance, and activity tolerance  Pt demonstrated ability to perform STS functional transfers at supervision  Pt ambulated 60ft + 60ft with RW at supervision- required one seated rest break  Pt performed 3 stairs w/ B/L HHA and Min Ax2  Pt requires VCs for ascending w/ RLE and descending w/ LLE  Pt continues to benefit from skilled therapy to maximize functional independence  Recommendation at this time is Home PT  Pt would benefit from continued ambulation, functional transfers, strength, balance, and activity tolerance  Barriers to Discharge: Inaccessible home environment  Barriers to Discharge Comments: (S) Pt  needs assist for stair negotiation   PT Discharge Recommendation: Home with skilled therapy(Home PT)     PT - OK to Discharge: Yes(when medically cleared)    See flowsheet documentation for full assessment

## 2021-02-25 ENCOUNTER — TRANSITIONAL CARE MANAGEMENT (OUTPATIENT)
Dept: INTERNAL MEDICINE CLINIC | Facility: CLINIC | Age: 61
End: 2021-02-25

## 2021-02-25 ENCOUNTER — TELEPHONE (OUTPATIENT)
Dept: PALLIATIVE MEDICINE | Facility: CLINIC | Age: 61
End: 2021-02-25

## 2021-02-25 ENCOUNTER — TELEPHONE (OUTPATIENT)
Dept: INTERNAL MEDICINE CLINIC | Facility: CLINIC | Age: 61
End: 2021-02-25

## 2021-02-25 ENCOUNTER — APPOINTMENT (OUTPATIENT)
Dept: RADIATION ONCOLOGY | Facility: CLINIC | Age: 61
End: 2021-02-25
Payer: COMMERCIAL

## 2021-02-25 ENCOUNTER — TELEPHONE (OUTPATIENT)
Dept: HEMATOLOGY ONCOLOGY | Facility: CLINIC | Age: 61
End: 2021-02-25

## 2021-02-25 ENCOUNTER — APPOINTMENT (OUTPATIENT)
Dept: RADIATION ONCOLOGY | Facility: CLINIC | Age: 61
End: 2021-02-25
Attending: RADIOLOGY
Payer: COMMERCIAL

## 2021-02-25 DIAGNOSIS — Z51.12 ENCOUNTER FOR ANTINEOPLASTIC CHEMOTHERAPY AND IMMUNOTHERAPY: Primary | ICD-10-CM

## 2021-02-25 DIAGNOSIS — Z51.11 ENCOUNTER FOR ANTINEOPLASTIC CHEMOTHERAPY AND IMMUNOTHERAPY: Primary | ICD-10-CM

## 2021-02-25 NOTE — ASSESSMENT & PLAN NOTE
Lab Results   Component Value Date    HGBA1C 5 3 01/05/2021     · Hold oral hypoglycemics while hospitalized  · Continue SSI coverage and Accu-Cheks - blood sugars remain relatively uncontrolled in the setting of Decadron use   · As A1C low, will restart oral meds at d/c and stop insulin  · Carbohydrate restricted diet

## 2021-02-25 NOTE — TELEPHONE ENCOUNTER
TCM  pain management from cancer, Admitted 02/09  discharged 02/24 stalin and transferd to [place]  She only wants to see Dr Alvarez Mediate he is booked out can we fit her in somewhere soon? ?

## 2021-02-25 NOTE — DISCHARGE SUMMARY
Discharge- Vinny Webber 1960, 62 y o  female MRN: [id number]    Unit/Bed#: [facility] 927-01 Encounter: [id number]    Primary Care Provider: Ranjit Suarez DO   Date and time admitted to hospital: 2/11/2021  3:21 PM        * Cancer related pain  Assessment & Plan  · Per record review, patient initially presented to the [facility] with intractable back and LLE pain in the setting of metastatic cancer with spinal/brain/mediastinal involvement   · Pain management per palliative care  · Radiation treatments per radiation oncology  · Continue current analgesic regimen w/ constipation prophylaxis  · Supportive care otherwise    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  Assessment & Plan  · also with mediastinal involvement and right hilar lymphadenopathy  · S/p mediastinoscopy on 2/12 - findings of large white lymph nodes in multiple regions -> 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  · FNA of cervical lymph node last month w/ evidence of non-small cell carcinoma of uncertain histotype  · MRIs of C/T/L spine consistent with multiregional CSF spread of tumor - s/p lumbar puncture last month w/ CSF cytology without conclusive evidence of malignancy, but rather, atypical cellular changes of unknown significance  · CT of head and MRI brain last month also noted left occipital/parietal lesions w/ vasogenic edema - on Decadron  · Appreciate medical and radiation oncology evaluation - continue hold brain and lumbar/sacral spine palliative radiation  · PRN pain control  · Continues to work with PT/OT - ambulation up simulated stairs progressively improves daily - she would like to continue working with inpatient PT/OT to comfortably do so once discharged - remained inpatient until radiation treatments completed due to need for daily transport if discharged home    Hyperlipidemia  Assessment & Plan  · Continue statin    Essential hypertension  Assessment & Plan  · Optimize pain control  · Continue Norvasc - PRN IV Hydralazine on board for BP spikes  · Low-sodium diet    Diabetes mellitus type 2  Assessment & Plan  Lab Results   Component Value Date    HGBA1C 5 3 01/05/2021     · Hold oral hypoglycemics while hospitalized  · Continue SSI coverage and Accu-Cheks - blood sugars remain relatively uncontrolled in the setting of Decadron use   · As A1C low, will restart oral meds at d/c and stop insulin  · Carbohydrate restricted diet      Discharging Physician / Practitioner: Marv Kim DO  PCP: José Sofia DO  Admission Date:   Admission Orders (From admission, onward)     Ordered        02/11/21 1545  Inpatient Admission  Once                   Discharge Date: 02/24/21    Resolved Problems  Date Reviewed: 2/22/2021    None          Consultations During Hospital Stay:  · Palliative  · Thoracics  · Med Onc  · Rad Onc    Procedures Performed:   · Mediastinoscopy 2/12    Significant Findings / Test Results:   · 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    Incidental Findings:   · none     Test Results Pending at Discharge (will require follow up):   · none     Outpatient Tests Requested:  · none    Complications:  none    Reason for Admission: need for mediastinoscopy    Hospital Course:     He Silva is a 62 y o  female patient who originally presented to the hospital on 2/11/2021 due to metastatic cancer  [facility] to intractable pain and scheduled for OP mediastinoscopy  Transferred to [facility] for procedure  Completed brain radiation  Pt worked w/ PT/OT and cleared for d/c home w/ services  Please see above list of diagnoses and related plan for additional information       Condition at Discharge: stable     Discharge Day Visit / Exam:     Subjective:  No acute complaints  Vitals: Blood Pressure: 128/65 (02/24/21 1505)  Pulse: 83 (02/24/21 1505)  Temperature: 98 1 °F (36 7 °C) (02/24/21 1505)  Temp Source: Oral (02/21/21 4845)  Respirations: 18 (02/24/21 1505)  Height: 5' (152 4 cm) (02/14/21 1322)  Weight - Scale: 73 2 kg (161 lb 6 oz) (02/14/21 1300)  SpO2: 95 % (02/24/21 1505)  Exam:   Physical Exam  HENT:      Head: Normocephalic and atraumatic  Nose: Nose normal       Mouth/Throat:      Mouth: Mucous membranes are moist    Eyes:      Extraocular Movements: Extraocular movements intact  Conjunctiva/sclera: Conjunctivae normal    Neck:      Musculoskeletal: Normal range of motion and neck supple  Cardiovascular:      Rate and Rhythm: Normal rate and regular rhythm  Pulmonary:      Effort: Pulmonary effort is normal       Breath sounds: Normal breath sounds  No wheezing or rales  Abdominal:      General: Bowel sounds are normal  There is no distension  Palpations: Abdomen is soft  Tenderness: There is no abdominal tenderness  Musculoskeletal: Normal range of motion  Right lower leg: No edema  Left lower leg: No edema  Skin:     General: Skin is warm and dry  Neurological:      Mental Status: She is alert and oriented to person, place, and time  Mental status is at baseline  Discussion with Family: no    Discharge instructions/Information to patient and family:   See after visit summary for information provided to patient and family  Provisions for Follow-Up Care:  See after visit summary for information related to follow-up care and any pertinent home health orders  Disposition:     Home with VNA Services (Reminder: Complete face to face encounter)    For Discharges to [facility] SNF:   · Not Applicable to this Patient - Not Applicable to this Patient    Planned Readmission: no     Discharge Statement:  I spent 35 minutes discharging the patient  This time was spent on the day of discharge  I had direct contact with the patient on the day of discharge   Greater than 50% of the total time was spent examining patient, answering all patient questions, arranging and discussing plan of care with patient as well as directly providing post-discharge instructions  Additional time then spent on discharge activities  Discharge Medications:  See after visit summary for reconciled discharge medications provided to patient and family        ** Please Note: This note has been constructed using a voice recognition system **

## 2021-02-25 NOTE — ASSESSMENT & PLAN NOTE
· Per record review, patient initially presented to the [facility] with intractable back and LLE pain in the setting of metastatic cancer with spinal/brain/mediastinal involvement   · Pain management per palliative care  · Radiation treatments per radiation oncology  · Continue current analgesic regimen w/ constipation prophylaxis  · Supportive care otherwise

## 2021-02-25 NOTE — TELEPHONE ENCOUNTER
SPOKE TO EUGENIO AND SHE WILL CALL PT TO SEE IF SHE WANTS TO COME IN ON MON   EUGENIO CALLED BOTH NUMBERS AND MAIL BOXES ARE FULL

## 2021-02-25 NOTE — UTILIZATION REVIEW
Notification of Discharge  This is a Notification of Discharge from our facility [address]  Please be advised that this patient has been discharge from our facility  Below you will find the admission and discharge date and time including the patients disposition  PRESENTATION DATE: 2/11/2021  3:21 PM  OBS ADMISSION DATE:   IP ADMISSION DATE: 2/11/21 1521   DISCHARGE DATE: 2/24/2021  6:15 PM  DISPOSITION: Home with [facility] with [facility]   Admission Orders listed below:  Admission Orders (From admission, onward)     Ordered        02/11/21 1545  Inpatient Admission  Once                   Please contact the UR Department if additional information is required to close this patient's authorization/case  [address] Utilization Review Department  Main: [phone] x carefully listen to the prompts  All voicemails are confidential   [email or web address]  Send all requests for admission clinical reviews, approved or denied determinations and any other requests to dedicated fax number below belonging to the campus where the patient is receiving treatment   List of dedicated fax numbers:  [address] DENIALS (Administrative/Medical Necessity) [phone]   1000  [facility] (Maternity/NICU/Pediatrics) [phone] [address] [facility] [phone]     [address]  [phone]   Philip Garza [phone]   [facility] [address] [place] [address] [phone]   [facility]  [phone]   [address] [facility]  [address] [facility] 1000 [facility] [phone]

## 2021-02-25 NOTE — PROGRESS NOTES
1st attempt-LVM asking pt to return call for TCM documentation and TCM appointment        By Emma Salazar

## 2021-02-25 NOTE — ASSESSMENT & PLAN NOTE
· also with mediastinal involvement and right hilar lymphadenopathy  · S/p mediastinoscopy on 2/12 - findings of large white lymph nodes in multiple regions -> 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  · FNA of cervical lymph node last month w/ evidence of non-small cell carcinoma of uncertain histotype  · MRIs of C/T/L spine consistent with multiregional CSF spread of tumor - s/p lumbar puncture last month w/ CSF cytology without conclusive evidence of malignancy, but rather, atypical cellular changes of unknown significance  · CT of head and MRI brain last month also noted left occipital/parietal lesions w/ vasogenic edema - on Decadron  · Appreciate medical and radiation oncology evaluation - continue hold brain and lumbar/sacral spine palliative radiation  · PRN pain control  · Continues to work with PT/OT - ambulation up simulated stairs progressively improves daily - she would like to continue working with inpatient PT/OT to comfortably do so once discharged - remained inpatient until radiation treatments completed due to need for daily transport if discharged home

## 2021-02-25 NOTE — TELEPHONE ENCOUNTER
Call from [address] at [address]  [phone]  Called to verify phone numbers and alternate phone number

## 2021-02-25 NOTE — TELEPHONE ENCOUNTER
Phone call to both phone numbers Mobile and home  Unable to leave a message  Both mailboxes were full and cannot accept messages at this time

## 2021-02-26 ENCOUNTER — APPOINTMENT (OUTPATIENT)
Dept: RADIATION ONCOLOGY | Facility: CLINIC | Age: 61
End: 2021-02-26
Attending: RADIOLOGY
Payer: COMMERCIAL

## 2021-02-26 ENCOUNTER — APPOINTMENT (OUTPATIENT)
Dept: RADIATION ONCOLOGY | Facility: CLINIC | Age: 61
End: 2021-02-26
Payer: COMMERCIAL

## 2021-02-26 ENCOUNTER — PATIENT OUTREACH (OUTPATIENT)
Dept: HEMATOLOGY ONCOLOGY | Facility: CLINIC | Age: 61
End: 2021-02-26

## 2021-02-26 NOTE — PROGRESS NOTES
Reached out to patient on home phone  Left message to return my call to discuss PORT placement    Implanted venous access device information from [address] emailed to patient at [email or web address]

## 2021-03-01 ENCOUNTER — TELEPHONE (OUTPATIENT)
Dept: HEMATOLOGY ONCOLOGY | Facility: CLINIC | Age: 61
End: 2021-03-01

## 2021-03-01 ENCOUNTER — TELEPHONE (OUTPATIENT)
Dept: PALLIATIVE MEDICINE | Facility: CLINIC | Age: 61
End: 2021-03-01

## 2021-03-01 ENCOUNTER — TELEPHONE (OUTPATIENT)
Dept: INTERNAL MEDICINE CLINIC | Facility: CLINIC | Age: 61
End: 2021-03-01

## 2021-03-01 NOTE — TELEPHONE ENCOUNTER
Jun Godinez / Caregivers    FYI - She rec'ed a referral for home care for the patient and she's not been able to reach her    have called several times and lft messages    so far have not rec'ed a call back

## 2021-03-01 NOTE — TELEPHONE ENCOUNTER
I reached out to patient, no answer  Left message on cell phone and home phone on file for patient to call back to schedule a hospital follow up

## 2021-03-01 NOTE — PROGRESS NOTES
Called and left message on patient's cell phone to please call to discuss PORT placement and review upcoming appointments

## 2021-03-01 NOTE — TELEPHONE ENCOUNTER
Call from [facility] at [address]  [facility] has been trying to reach patient to set up care  [facility] has left mesasges with Jaqui Mims daughter  [phone] and has not received return call  Patient has no voicemail set up  Patient has f/u with Dr Lizbet Kerr on 3/3  Will pass on to Dr Felix Jones
Spoke to Matthew, patient's daughter, advising her that Brock Daly from [facility] have been trying to setup care and if it was possible for her to call  Matthew stated she asked her mother Paul Mauricio to call and setup care  [phone] number  Matthew said she will follow up 
60

## 2021-03-01 NOTE — TELEPHONE ENCOUNTER
Message from Deepali ([facility]), Hailee January would like patient's B-12 appt to be rescheduled to 3/3 - SPoke to Harjeet Kruger at Infusion center - changed to 3/3 at 10:30

## 2021-03-02 ENCOUNTER — TELEPHONE (OUTPATIENT)
Dept: HEMATOLOGY ONCOLOGY | Facility: CLINIC | Age: 61
End: 2021-03-02

## 2021-03-02 ENCOUNTER — HOSPITAL ENCOUNTER (OUTPATIENT)
Dept: INFUSION CENTER | Facility: CLINIC | Age: 61
Discharge: HOME | End: 2021-03-02

## 2021-03-03 ENCOUNTER — HOSPITAL ENCOUNTER (OUTPATIENT)
Dept: INFUSION CENTER | Facility: CLINIC | Age: 61
Discharge: HOME | End: 2021-03-03
Payer: COMMERCIAL

## 2021-03-03 ENCOUNTER — OFFICE VISIT (OUTPATIENT)
Dept: HEMATOLOGY ONCOLOGY | Facility: CLINIC | Age: 61
End: 2021-03-03
Payer: COMMERCIAL

## 2021-03-03 ENCOUNTER — PATIENT OUTREACH (OUTPATIENT)
Dept: HEMATOLOGY ONCOLOGY | Facility: CLINIC | Age: 61
End: 2021-03-03

## 2021-03-03 VITALS
HEART RATE: 69 BPM | BODY MASS INDEX: 30.82 KG/M2 | DIASTOLIC BLOOD PRESSURE: 84 MMHG | WEIGHT: 157 LBS | RESPIRATION RATE: 20 BRPM | OXYGEN SATURATION: 99 % | SYSTOLIC BLOOD PRESSURE: 142 MMHG | HEIGHT: 60 IN

## 2021-03-03 DIAGNOSIS — Z51.12 ENCOUNTER FOR ANTINEOPLASTIC CHEMOTHERAPY AND IMMUNOTHERAPY: ICD-10-CM

## 2021-03-03 DIAGNOSIS — C79.31 SECONDARY MALIGNANCY OF BRAIN AND SPINAL CORD WITH UNKNOWN PRIMARY SITE (HCC): ICD-10-CM

## 2021-03-03 DIAGNOSIS — C79.49 SECONDARY MALIGNANCY OF BRAIN AND SPINAL CORD WITH UNKNOWN PRIMARY SITE (HCC): ICD-10-CM

## 2021-03-03 DIAGNOSIS — C80.1 LEPTOMENINGITIS, CARCINOMATOUS (HCC): ICD-10-CM

## 2021-03-03 DIAGNOSIS — Z51.11 ENCOUNTER FOR ANTINEOPLASTIC CHEMOTHERAPY AND IMMUNOTHERAPY: ICD-10-CM

## 2021-03-03 DIAGNOSIS — G89.3 CANCER RELATED PAIN: ICD-10-CM

## 2021-03-03 DIAGNOSIS — C34.90 LUNG CANCER METASTATIC TO BRAIN (HCC): Primary | ICD-10-CM

## 2021-03-03 DIAGNOSIS — C79.49 LEPTOMENINGITIS, CARCINOMATOUS (HCC): ICD-10-CM

## 2021-03-03 DIAGNOSIS — C80.1 LEPTOMENINGITIS, CARCINOMATOUS (HCC): Primary | ICD-10-CM

## 2021-03-03 DIAGNOSIS — C80.1 SECONDARY MALIGNANCY OF BRAIN AND SPINAL CORD WITH UNKNOWN PRIMARY SITE (HCC): ICD-10-CM

## 2021-03-03 DIAGNOSIS — C79.49 LEPTOMENINGITIS, CARCINOMATOUS (HCC): Primary | ICD-10-CM

## 2021-03-03 DIAGNOSIS — C79.31 LUNG CANCER METASTATIC TO BRAIN (HCC): Primary | ICD-10-CM

## 2021-03-03 PROCEDURE — 99214 OFFICE O/P EST MOD 30 MIN: CPT | Performed by: INTERNAL MEDICINE

## 2021-03-03 PROCEDURE — 96372 THER/PROPH/DIAG INJ SC/IM: CPT

## 2021-03-03 PROCEDURE — 3008F BODY MASS INDEX DOCD: CPT | Performed by: INTERNAL MEDICINE

## 2021-03-03 RX ORDER — CYANOCOBALAMIN 1000 UG/ML
1000 INJECTION INTRAMUSCULAR; SUBCUTANEOUS ONCE
Status: COMPLETED | OUTPATIENT
Start: 2021-03-03 | End: 2021-03-03

## 2021-03-03 RX ORDER — DEXAMETHASONE 4 MG/1
4 TABLET ORAL 2 TIMES DAILY WITH MEALS
Qty: 6 TABLET | Refills: 6 | Status: SHIPPED | OUTPATIENT
Start: 2021-03-03 | End: 2021-03-04

## 2021-03-03 RX ORDER — FOLIC ACID 1 MG/1
1 TABLET ORAL DAILY
Qty: 30 TABLET | Refills: 6 | Status: SHIPPED | OUTPATIENT
Start: 2021-03-03 | End: 2021-03-04

## 2021-03-03 RX ADMIN — CYANOCOBALAMIN 1000 MCG: 1000 INJECTION, SOLUTION INTRAMUSCULAR; SUBCUTANEOUS at 12:54

## 2021-03-03 NOTE — PROGRESS NOTES
[address] [facility] - Hematology & Medical Oncology  Outpatient Visit Encounter Note    Magdy Grya 62 y o  female [id number] Date:  3/3/2021    ONCOLOGY HISTORY        Oncology History   Lung cancer metastatic to brain [facility])   2/5/2021 Initial Diagnosis    A  Level 2L lymph node (excision):     - Metastatic non-small cell carcinoma with necrosis compatible with pulmonary adenocarcinoma involving three (3) of three (3) lymph nodes  - Extranodal extension present  B  Level 4R lymph node #1 (excision):     - Metastatic non-small cell carcinoma with necrosis compatible with pulmonary adenocarcinoma involving portions of fibroconnective tissue and scant lymphoid tissue  C  Level 4R lymph node #2 (excision):     - Metastatic non-small cell carcinoma with necrosis compatible with pulmonary adenocarcinoma involving portions of fibroconnective tissue and scant lymphoid tissue  D  Level 4L lymph node (excision):     - Metastatic non-small cell carcinoma compatible with pulmonary adenocarcinoma involving two (2) of two (2) lymph nodes  CT TAP 1/17/21  1  Mediastinal and right hilar lymphadenopathy  2   Chronic splenic mass, slowly enlarging since 2017  This is almost certainly benign and the differential diagnosis includes hamartoma, hemorrhagic cyst, littoral cell angioma or lymphangioma  3   No evidence of primary malignancy in the chest, abdomen or pelvis  MRI Brain 1/18/21  Metastatic disease, with 2 Parenchymal lesions, 2 1 and 5 mm in the greatest linear dimension  There is diffuse enhancement of the internal auditory canals bilaterally, which warrants lumbar puncture to exclude leptomeningeal tumor       3/12/2021 -  Chemotherapy    cyanocobalamin injection 1,000 mcg, 1,000 mcg, Intramuscular, Once, 0 of 3 cycles  fosaprepitant (EMEND) 150 mg in sodium chloride 0 9 % 250 mL IVPB, 150 mg, Intravenous, Once, 0 of 6 cycles  CARBOplatin (PARAPLATIN) in sodium chloride 0 9 % 250 mL IVPB, , Intravenous, Once, 0 of 6 cycles  PEMEtrexed (ALIMTA) 850 mg in sodium chloride 0 9 % 100 mL chemo infusion, 500 mg/m2, Intravenous, Once, 0 of 6 cycles  pembrolizumab (KEYTRUDA) 200 mg in sodium chloride 0 9 % 50 mL IVPB, 200 mg, Intravenous, Once, 0 of 6 cycles     Leptomeningitis, carcinomatous ([facility] [address] )   2/10/2021 Initial Diagnosis    Leptomeningitis, carcinomatous ([facility] [address] )     3/12/2021 -  Chemotherapy    cyanocobalamin injection 1,000 mcg, 1,000 mcg, Intramuscular, Once, 0 of 3 cycles  fosaprepitant (EMEND) 150 mg in sodium chloride 0 9 % 250 mL IVPB, 150 mg, Intravenous, Once, 0 of 6 cycles  CARBOplatin (PARAPLATIN) in sodium chloride 0 9 % 250 mL IVPB, , Intravenous, Once, 0 of 6 cycles  PEMEtrexed (ALIMTA) 850 mg in sodium chloride 0 9 % 100 mL chemo infusion, 500 mg/m2, Intravenous, Once, 0 of 6 cycles  pembrolizumab (KEYTRUDA) 200 mg in sodium chloride 0 9 % 50 mL IVPB, 200 mg, Intravenous, Once, 0 of 6 cycles           SUBJECTIVE      ECOG Initial Visit 0   ECOG This Visit 2   Pain Score Initial Visit 0   Pain Score This Visit 0   Personal Cancer History None   Family Cancer History Not Fully Aware   Tobacco Use History Yes; 20 years <1/2ppd   Alcohol Use History Denies     Initial HPI  Ms Benton Mckoy is here for a new consult visit  She self-referred  She was recently diagnosed with metastatic cancer of likely non-small cell histology  In review of her chart and talking with her, her story began when she was hospitalized at [facility] on January 17th with persistent dizziness and a headache  During her hospitalization, her MRI found 2 parenchymal lesions at 2 1 cm and 0 5 cm in the left parietal region  There was diffuse enhancement of the internal auditory canals bilaterally which was concerning for leptomeningeal tumor  Her MRI of the spine also showed there was enhancement of nerve roots that is being read as compatible with CSF spread of the tumor  Additionally, a CT scan of her chest abdomen and pelvis showed mediastinal and right hilar lymphadenopathy and a chronic splenic mass  As per the radiologist, there is no evidence of primary malignancy in the chest abdomen or pelvis  She got an IR biopsy of her malignant appearing lymph node  The pathology resulted as squamous cell carcinoma, poorly differentiated  This Visit:    She is here with her daughter  She is in a wheelchair  Since last time I saw her, she was hospitalized because of ambulatory dysfunction and almost failure to thrive  In the hospital she was managed with radiation therapy for her leptomeningeal disease and the patient tells me she is feeling noticeable improvement in her symptoms with significant reduction in pain since she was discharged  Her current major complaint is fatigue and lack of appetite  She denies any nausea vomiting abdominal pain chest pain coughing shortness of breath  She denies any kind of confusion  She has noticed slight worsening of hearing ability as well  I have reviewed the relevant past medical, surgical, social and family history  I have also reviewed allergies and medications for this patient  Review of Systems  Review of Systems   All other systems reviewed and are negative  OBJECTIVE     Physical Exam  Vitals:    03/03/21 1108   BP: 142/84   BP Location: Left arm   Patient Position: Sitting   Cuff Size: Adult   Pulse: 69   Resp: 20   SpO2: 99%   Weight: 71 2 kg (157 lb)   Height: 5' (1 524 m)       Physical Exam  Vitals signs reviewed  Constitutional:       General: She is not in acute distress  Appearance: She is not ill-appearing, toxic-appearing or diaphoretic  HENT:      Head: Normocephalic and atraumatic  Eyes:      Extraocular Movements: Extraocular movements intact  Neck:      Musculoskeletal: Normal range of motion  Cardiovascular:      Rate and Rhythm: Normal rate     Pulmonary:      Effort: Pulmonary effort is normal  No respiratory distress  Abdominal:      General: There is no distension  Skin:     General: Skin is dry  Neurological:      Mental Status: She is alert and oriented to person, place, and time  Cranial Nerves: Cranial nerve deficit present  Gait: Gait abnormal           Imaging  Relevant imaging reviewed in chart    Labs  Relevant labs reviewed in chart   ASSESSMENT & PLAN      Diagnosis ICD-10-CM Associated Orders   1  Lung cancer metastatic to brain [facility])  C34 90     C79 31    2  Leptomeningitis, carcinomatous ([facility] [address] )  C79 49     C80 1    3  Encounter for antineoplastic chemotherapy and immunotherapy  Z51 11     Z51 12    4  Cancer related pain  G80 2        58-year-old female with biopsy-proven primary lung adenocarcinoma with intracranial metastases with leptomeningeal carcinomatosis  See below for details  Metastatic pulmonary adenocarcinoma  Leptomeningeal carcinomatosis  Encounter for antineoplastic chemotherapy and immunotherapy   Oncology history updated accordingly this visit   Goals of care/patient communication  o I discussed with her the clinical course leading up to their cancer diagnosis  I reviewed relevant office notes, imaging reports and pathology result as well   o I told her that this is a case of incurable, Stage IV/Metastatic disease and what this means  I told her that the goal of anti-cancer therapy is to provide best QoL and PFS/OS as shown in clinical trials  I told her that there will be a point when the cancer will not respond to this anti-neoplastic therapy   o I explained the risks/benefits of the proposed cancer therapy: CARBOPLATIN + PEMETREXED + PEMBROLIZUMAB and after discussion including understanding risks of possible life-threatening complications and therapy-related malignancy development, informed consent has been signed and obtained    She also understands that during the course of treatment, she may experience possible complications and required hospitalization and can lead to unforeseen death as well   TNM/Staging At Diagnosis  o [id number]  o Stage 4   Disease Features/Tumor Markers/Genetics  o Notable Path Features:  Foundation testing ordered on February 17th and process and awaiting PDL1   Treatment  o SP XRT to her brain and spine  o 1L palliative intent treatment as outlined above   Other Supportive Care  o N/A   Treatment Team Members  o Ariana Sender - Dr Liza Salazar TSH before treatment  Bradley Gonzalez  o None at the moment      Follow Up  · With C2      All questions were answered to the patient's satisfaction during this encounter  They appreciated and thanked me for spending time with them  The patient knows the contact information for our office and know to reach out for any relevant concerns related to this encounter  For all other listed problems and medical diagnosis in his chart - they are managed by PCP and/or other specialists, which patient acknowledges  Bri Hutchinson MD  Hematology & Medical Oncology

## 2021-03-03 NOTE — LETTER
March 3, 2021     Patient: Joseph Garcia   YOB: 1960   Date of Visit: 3/3/2021       To Whom it May Concern:    Joseph Garcia is under my professional care  She was seen in my office on 3/3/2021  The patient's daughter was responsible for her transportation to and from appointment  If you have any questions or concerns, please don't hesitate to call           Sincerely,          Abilio Jaramillo MD        CC: No Recipients

## 2021-03-04 ENCOUNTER — TELEPHONE (OUTPATIENT)
Dept: HEMATOLOGY ONCOLOGY | Facility: CLINIC | Age: 61
End: 2021-03-04

## 2021-03-04 DIAGNOSIS — C34.90 LUNG CANCER METASTATIC TO BRAIN (HCC): Primary | ICD-10-CM

## 2021-03-04 DIAGNOSIS — C79.31 LUNG CANCER METASTATIC TO BRAIN (HCC): Primary | ICD-10-CM

## 2021-03-04 DIAGNOSIS — Z51.12 ENCOUNTER FOR ANTINEOPLASTIC IMMUNOTHERAPY: ICD-10-CM

## 2021-03-04 NOTE — PROGRESS NOTES
Sonja Hope is 100%  Report scanned in  Hence, treatment is now with single agent Pembro  [place] orders reflected  Elvis Tim RN to speak with infusion team and update patient's daughter

## 2021-03-04 NOTE — TELEPHONE ENCOUNTER
Per Dr Sandra Rodríguez resulted, patient only needs to be on Keytruda  Patient's port insertion cancelled  Spoke to [employer] at [facility] for a timeout for treatment on 3/10  Daniel Harrington RN confirmed, no central line needed for Tarah Foley, RN Navigator who will cancel port placement  Spoke to patient's daughter Shantelle Mann advising her of the above  Shantelle Mann verbalized understanding

## 2021-03-08 ENCOUNTER — TELEPHONE (OUTPATIENT)
Dept: LAB | Facility: HOSPITAL | Age: 61
End: 2021-03-08

## 2021-03-08 ENCOUNTER — PATIENT OUTREACH (OUTPATIENT)
Dept: CASE MANAGEMENT | Facility: HOSPITAL | Age: 61
End: 2021-03-08

## 2021-03-08 DIAGNOSIS — Z51.12 ENCOUNTER FOR ANTINEOPLASTIC IMMUNOTHERAPY: ICD-10-CM

## 2021-03-08 DIAGNOSIS — C34.90 LUNG CANCER METASTATIC TO BRAIN (HCC): Primary | ICD-10-CM

## 2021-03-08 DIAGNOSIS — C79.31 LUNG CANCER METASTATIC TO BRAIN (HCC): Primary | ICD-10-CM

## 2021-03-08 NOTE — PROGRESS NOTES
Met with patient and her daughter during her follow-up with Dr Mandie Hutchinson  Introduced myself and explained the role of a nurse navigator  Provided information on PORT placement and the use of a PORT  Informed patient and her daughter to expect a call from IR to schedule PORT placement  Daughter expressed concern over transportation issues  Patient does not drive at this time and daughter stated she is working two jobs Monday through Friday  I suggested the use of [facility] transport  PT's daughter is concerned [employer] will not provide services because she is not sure how much assistance the patient will need getting in and out of the [place]  I provided my direct phone number and instructed PT and her daughter to call with any questions or concerns or if they needed additional help with transportation issues

## 2021-03-08 NOTE — PROGRESS NOTES
MSW s/w pt by phone today to introduce myself and assess for any needs  Pt initially seemed hesitant with the call and says that she is done with her radiation treatments and wasn't sure why I would be calling  I explained that I try to introduce myself to all patients and see how they're doing with treatment or if I can be helpful to them in any way  Pt acknowledges that transportation is an issue for her, however she has already discussed [facility] with someone  She feels that she is not a good candidate at this time because she requires assistance to get out of the house and does not feel comfortable travelling alone  She says that for now if she does not have anyone available to bring her in, she takes an Uber  MSW suggested that if other programs start to reopen as the Covid pandemic hopefully slows down, that I would let her know  Pt was agreeable and thanked me for calling  MSW will remain available to pt as needed moving forward, no other needs at this time

## 2021-03-09 ENCOUNTER — LAB (OUTPATIENT)
Dept: LAB | Facility: HOSPITAL | Age: 61
End: 2021-03-09
Payer: COMMERCIAL

## 2021-03-09 LAB
ALBUMIN SERPL BCP-MCNC: 3.4 G/DL (ref 3.5–5)
ALP SERPL-CCNC: 51 U/L (ref 46–116)
ALT SERPL W P-5'-P-CCNC: 21 U/L (ref 12–78)
ANION GAP SERPL CALCULATED.3IONS-SCNC: 9 MMOL/L (ref 4–13)
AST SERPL W P-5'-P-CCNC: 8 U/L (ref 5–45)
BASOPHILS # BLD AUTO: 0.01 THOUSANDS/ΜL (ref 0–0.1)
BASOPHILS NFR BLD AUTO: 0 % (ref 0–1)
BILIRUB SERPL-MCNC: 0.75 MG/DL (ref 0.2–1)
BUN SERPL-MCNC: 13 MG/DL (ref 5–25)
CALCIUM ALBUM COR SERPL-MCNC: 10.1 MG/DL (ref 8.3–10.1)
CALCIUM SERPL-MCNC: 9.6 MG/DL (ref 8.3–10.1)
CHLORIDE SERPL-SCNC: 96 MMOL/L (ref 100–108)
CO2 SERPL-SCNC: 31 MMOL/L (ref 21–32)
CREAT SERPL-MCNC: 0.58 MG/DL (ref 0.6–1.3)
EOSINOPHIL # BLD AUTO: 0.01 THOUSAND/ΜL (ref 0–0.61)
EOSINOPHIL NFR BLD AUTO: 0 % (ref 0–6)
ERYTHROCYTE [DISTWIDTH] IN BLOOD BY AUTOMATED COUNT: 17.6 % (ref 11.6–15.1)
GFR SERPL CREATININE-BSD FRML MDRD: 117 ML/MIN/1.73SQ M
GLUCOSE SERPL-MCNC: 156 MG/DL (ref 65–140)
HCT VFR BLD AUTO: 37.6 % (ref 34.8–46.1)
HGB BLD-MCNC: 12.3 G/DL (ref 11.5–15.4)
IMM GRANULOCYTES # BLD AUTO: 0.03 THOUSAND/UL (ref 0–0.2)
IMM GRANULOCYTES NFR BLD AUTO: 0 % (ref 0–2)
LYMPHOCYTES # BLD AUTO: 0.27 THOUSANDS/ΜL (ref 0.6–4.47)
LYMPHOCYTES NFR BLD AUTO: 4 % (ref 14–44)
MAGNESIUM SERPL-MCNC: 1.4 MG/DL (ref 1.6–2.6)
MCH RBC QN AUTO: 29.9 PG (ref 26.8–34.3)
MCHC RBC AUTO-ENTMCNC: 32.7 G/DL (ref 31.4–37.4)
MCV RBC AUTO: 91 FL (ref 82–98)
MONOCYTES # BLD AUTO: 0.42 THOUSAND/ΜL (ref 0.17–1.22)
MONOCYTES NFR BLD AUTO: 6 % (ref 4–12)
NEUTROPHILS # BLD AUTO: 6.04 THOUSANDS/ΜL (ref 1.85–7.62)
NEUTS SEG NFR BLD AUTO: 90 % (ref 43–75)
NRBC BLD AUTO-RTO: 0 /100 WBCS
PLATELET # BLD AUTO: 176 THOUSANDS/UL (ref 149–390)
PMV BLD AUTO: 9.1 FL (ref 8.9–12.7)
POTASSIUM SERPL-SCNC: 4.4 MMOL/L (ref 3.5–5.3)
PROT SERPL-MCNC: 7 G/DL (ref 6.4–8.2)
RBC # BLD AUTO: 4.12 MILLION/UL (ref 3.81–5.12)
SODIUM SERPL-SCNC: 136 MMOL/L (ref 136–145)
TSH SERPL DL<=0.05 MIU/L-ACNC: 0.66 UIU/ML (ref 0.36–3.74)
WBC # BLD AUTO: 6.78 THOUSAND/UL (ref 4.31–10.16)

## 2021-03-09 PROCEDURE — 85025 COMPLETE CBC W/AUTO DIFF WBC: CPT

## 2021-03-09 PROCEDURE — 83735 ASSAY OF MAGNESIUM: CPT

## 2021-03-09 PROCEDURE — 80053 COMPREHEN METABOLIC PANEL: CPT

## 2021-03-09 PROCEDURE — 36415 COLL VENOUS BLD VENIPUNCTURE: CPT

## 2021-03-09 PROCEDURE — 84443 ASSAY THYROID STIM HORMONE: CPT

## 2021-03-09 RX ORDER — SODIUM CHLORIDE 9 MG/ML
20 INJECTION, SOLUTION INTRAVENOUS ONCE
Status: CANCELLED | OUTPATIENT
Start: 2021-03-10

## 2021-03-10 ENCOUNTER — HOSPITAL ENCOUNTER (OUTPATIENT)
Dept: INFUSION CENTER | Facility: CLINIC | Age: 61
Discharge: HOME | End: 2021-03-10

## 2021-03-10 ENCOUNTER — HOSPITAL ENCOUNTER (OUTPATIENT)
Dept: INFUSION CENTER | Facility: CLINIC | Age: 61
Discharge: HOME | End: 2021-03-10
Payer: COMMERCIAL

## 2021-03-10 VITALS
DIASTOLIC BLOOD PRESSURE: 78 MMHG | SYSTOLIC BLOOD PRESSURE: 158 MMHG | RESPIRATION RATE: 20 BRPM | TEMPERATURE: 96.3 F | HEART RATE: 72 BPM

## 2021-03-10 DIAGNOSIS — C79.31 LUNG CANCER METASTATIC TO BRAIN (HCC): ICD-10-CM

## 2021-03-10 DIAGNOSIS — C34.90 LUNG CANCER METASTATIC TO BRAIN (HCC): ICD-10-CM

## 2021-03-10 DIAGNOSIS — Z51.12 ENCOUNTER FOR ANTINEOPLASTIC IMMUNOTHERAPY: Primary | ICD-10-CM

## 2021-03-10 PROCEDURE — 96413 CHEMO IV INFUSION 1 HR: CPT

## 2021-03-10 RX ORDER — SODIUM CHLORIDE 9 MG/ML
20 INJECTION, SOLUTION INTRAVENOUS ONCE
Status: COMPLETED | OUTPATIENT
Start: 2021-03-10 | End: 2021-03-10

## 2021-03-10 RX ADMIN — SODIUM CHLORIDE 200 MG: 9 INJECTION, SOLUTION INTRAVENOUS at 09:37

## 2021-03-10 RX ADMIN — SODIUM CHLORIDE 20 ML/HR: 9 INJECTION, SOLUTION INTRAVENOUS at 08:46

## 2021-03-10 NOTE — PLAN OF CARE
Problem: Potential for Falls  Goal: Patient will remain free of falls  Description: INTERVENTIONS:  - Assess patient frequently for physical needs  -  Identify cognitive and physical deficits and behaviors that affect risk of falls  -  [place] fall precautions as indicated by assessment   - Educate patient/family on patient safety including physical limitations  - Instruct patient to call for assistance with activity based on assessment  - Modify environment to reduce risk of injury  - Consider OT/PT consult to assist with strengthening/mobility  Outcome: Progressing     Problem: SAFETY ADULT  Goal: Patient will remain free of falls  Description: INTERVENTIONS:  - Assess patient frequently for physical needs  -  Identify cognitive and physical deficits and behaviors that affect risk of falls  -  [place] fall precautions as indicated by assessment   - Educate patient/family on patient safety including physical limitations  - Instruct patient to call for assistance with activity based on assessment  - Modify environment to reduce risk of injury  - Consider OT/PT consult to assist with strengthening/mobility  Outcome: Progressing     Problem: Knowledge Deficit  Goal: Patient/family/caregiver demonstrates understanding of disease process, treatment plan, medications, and discharge instructions  Description: Complete learning assessment and assess knowledge base    Interventions:  - Provide teaching at level of understanding  - Provide teaching via preferred learning methods  Outcome: Progressing

## 2021-03-10 NOTE — PROGRESS NOTES
Pt to clinic for initial [facility] treatment, Jordin Mar RN from MD office aware question 3 on consent needs to be completed, tolerated infusion without complications, pt declined a nutrition consult at this time stating she would rather talk with Dr Elpidio Galindo about her eating issues first, aware of next appointment, PIV removed, avs printed and reviewed

## 2021-03-11 ENCOUNTER — TELEPHONE (OUTPATIENT)
Dept: INTERNAL MEDICINE CLINIC | Facility: CLINIC | Age: 61
End: 2021-03-11

## 2021-03-15 ENCOUNTER — OFFICE VISIT (OUTPATIENT)
Dept: INTERNAL MEDICINE CLINIC | Facility: CLINIC | Age: 61
End: 2021-03-15
Payer: COMMERCIAL

## 2021-03-15 VITALS
WEIGHT: 154.2 LBS | TEMPERATURE: 98.6 F | BODY MASS INDEX: 30.12 KG/M2 | SYSTOLIC BLOOD PRESSURE: 128 MMHG | DIASTOLIC BLOOD PRESSURE: 82 MMHG | HEART RATE: 78 BPM | OXYGEN SATURATION: 98 %

## 2021-03-15 DIAGNOSIS — C34.90 LUNG CANCER METASTATIC TO BRAIN (HCC): Primary | ICD-10-CM

## 2021-03-15 DIAGNOSIS — H91.91 HEARING LOSS OF RIGHT EAR, UNSPECIFIED HEARING LOSS TYPE: ICD-10-CM

## 2021-03-15 DIAGNOSIS — G93.6 CEREBRAL EDEMA (HCC): ICD-10-CM

## 2021-03-15 DIAGNOSIS — C79.31 LUNG CANCER METASTATIC TO BRAIN (HCC): Primary | ICD-10-CM

## 2021-03-15 PROCEDURE — 99214 OFFICE O/P EST MOD 30 MIN: CPT | Performed by: INTERNAL MEDICINE

## 2021-03-15 PROCEDURE — 1111F DSCHRG MED/CURRENT MED MERGE: CPT | Performed by: INTERNAL MEDICINE

## 2021-03-15 NOTE — PROGRESS NOTES
Triciamomiguelito    NAME: Michael Meyer  AGE: 62 y o  SEX: female  : 1960     DATE: 3/15/2021     Assessment and Plan:     1  Lung cancer metastatic to brain ([address] )  2  Hearing loss of right ear, unspecified hearing loss type  3  Cerebral edema (HCC)    Interval history was reviewed  She is on treatment for her lung cancer with mets  She completed radiation therapy  Continue decadron  Will get her evaluated by audiology  Return in about 4 months (around 7/15/2021) for Follow-up  Chief Complaint:     Chief Complaint   Patient presents with    Follow-up     discharged from [facility]; 2021-Cancer related pain        History of Present Illness:     Patient had to be hospitalized due to failure to thrive and increased pain  Now has definitive diagnosis of lung cancer with radiation to brain  She completed a course of physical therapy and is now on Keytruda  Pain isn't so bad  She is prescribed oxycodone from palliative care  Her spirits remain high  Has lost her hair and her appetite  Trying her best to keep up with protein intake  Eats smaller, more frequent meals  Food doesn't taste as good unfortunately  Also she has had hearing problems going on that she finally wants to get checked out  Review of Systems:     Review of Systems   Constitutional: Positive for appetite change  Negative for activity change and fatigue  Respiratory: Negative for apnea, cough, chest tightness, shortness of breath and wheezing  Cardiovascular: Negative for chest pain, palpitations and leg swelling  Gastrointestinal: Negative for abdominal distention, abdominal pain, blood in stool, constipation, diarrhea, nausea and vomiting  Neurological: Positive for weakness  Negative for dizziness, light-headedness, numbness and headaches  Psychiatric/Behavioral: Negative for behavioral problems, confusion, hallucinations, sleep disturbance and suicidal ideas   The patient is not nervous/anxious  Problem List:     Patient Active Problem List   Diagnosis    Diabetes mellitus type 2    Uncomplicated asthma    Essential hypertension    Hyperlipidemia    Chronic diastolic HF (heart failure) ([facility] [address] [facility]    Diabetic nephropathy associated with type 2 diabetes mellitus ([facility] [address] )    CAD in native artery    Tobacco use    Sciatica of left side    Lumbar back pain with radiculopathy affecting left lower extremity    Cerebral edema (HCC)    Lung cancer metastatic to brain ([facility] [address] )    Leptomeningitis, carcinomatous ([facility] [address] )    Cancer related pain    Obesity (BMI 30 0-34  9)    Encounter for antineoplastic chemotherapy and immunotherapy    Encounter for antineoplastic immunotherapy      Objective:     /82 (BP Location: Left arm, Patient Position: Sitting, Cuff Size: Standard)   Pulse 78   Temp 98 6 °F (37 °C) (Temporal) Comment: w/ tylenol  Wt 69 9 kg (154 lb 3 2 oz) Comment: w/ shoes denied off  LMP 08/16/2014 (Approximate)   SpO2 98%   BMI 30 12 kg/m²     Physical Exam  Vitals signs reviewed  Constitutional:       General: She is not in acute distress  Appearance: She is well-developed  She is not diaphoretic  Neck:      Musculoskeletal: Neck supple  Thyroid: No thyromegaly  Vascular: No JVD  Cardiovascular:      Rate and Rhythm: Normal rate and regular rhythm  Heart sounds: Normal heart sounds  No murmur  Pulmonary:      Effort: Pulmonary effort is normal  No respiratory distress  Breath sounds: Normal breath sounds  No wheezing or rales  Abdominal:      General: Bowel sounds are normal  There is no distension  Palpations: Abdomen is soft  Tenderness: There is no abdominal tenderness  Musculoskeletal:      Right lower leg: No edema  Left lower leg: No edema  Lymphadenopathy:      Cervical: No cervical adenopathy  Skin:     General: Skin is warm and dry  Neurological:      Mental Status: She is alert  Psychiatric:         Mood and Affect: Mood normal          Behavior: Behavior normal        Marcie Roque, [facility] [address]

## 2021-03-17 ENCOUNTER — SOCIAL WORK (OUTPATIENT)
Dept: PALLIATIVE MEDICINE | Facility: CLINIC | Age: 61
End: 2021-03-17
Payer: COMMERCIAL

## 2021-03-17 ENCOUNTER — OFFICE VISIT (OUTPATIENT)
Dept: PALLIATIVE MEDICINE | Facility: CLINIC | Age: 61
End: 2021-03-17
Payer: COMMERCIAL

## 2021-03-17 VITALS
SYSTOLIC BLOOD PRESSURE: 140 MMHG | DIASTOLIC BLOOD PRESSURE: 80 MMHG | TEMPERATURE: 98.6 F | OXYGEN SATURATION: 97 % | HEART RATE: 78 BPM | RESPIRATION RATE: 20 BRPM

## 2021-03-17 DIAGNOSIS — Z71.89 COUNSELING AND COORDINATION OF CARE: Primary | ICD-10-CM

## 2021-03-17 DIAGNOSIS — M54.16 LUMBAR BACK PAIN WITH RADICULOPATHY AFFECTING LEFT LOWER EXTREMITY: ICD-10-CM

## 2021-03-17 DIAGNOSIS — G89.3 CANCER RELATED PAIN: ICD-10-CM

## 2021-03-17 DIAGNOSIS — C79.31 LUNG CANCER METASTATIC TO BRAIN (HCC): Primary | ICD-10-CM

## 2021-03-17 DIAGNOSIS — C34.90 LUNG CANCER METASTATIC TO BRAIN (HCC): Primary | ICD-10-CM

## 2021-03-17 DIAGNOSIS — Z71.89 GOALS OF CARE, COUNSELING/DISCUSSION: ICD-10-CM

## 2021-03-17 PROCEDURE — NC001 PR NO CHARGE

## 2021-03-17 PROCEDURE — 1036F TOBACCO NON-USER: CPT | Performed by: INTERNAL MEDICINE

## 2021-03-17 PROCEDURE — 99215 OFFICE O/P EST HI 40 MIN: CPT | Performed by: INTERNAL MEDICINE

## 2021-03-17 NOTE — PROGRESS NOTES
Palliative and Supportive Care   Abhijeet Martinez 62 y o  female [id number]    Assessment/Plan:  1  Lung cancer metastatic to brain ([address] )    2  Lumbar back pain with radiculopathy affecting left lower extremity    3  Cancer related pain    4  Goals of care, counseling/discussion        Requested Prescriptions      No prescriptions requested or ordered in this encounter       No refills needed today but crrent regimen effective  Goals of care are very clearly treatment focused  Provided emotional support today  Wheelchair as below  It was a pleasure to meet Lory Anthony and she can follow up in the office in 1 month, or sooner if needed  Patient is in need of a transport chair:    Patient can not utilize a standard manual wheelchair because patient cannot self propel  Patient has a caregiver who is available, willing, and able to provide assistance with the transport chair  There are no discontinued medications  Representatives have queried the patient's controlled substance dispensing history in the Prescription Drug Monitoring Program in compliance with regulations before I have prescribed any controlled substances  The prescription history is consistent with prescribed therapy and our practice policies  40 minutes (08:20-09:00) were spent face to face with Abhijeet Martinez and her family with greater than 50% of the time spent in counseling or coordination of care including discussions of treatment instructions   All of the patient's questions were answered during this discussion  No follow-ups on file  Subjective:   Chief Complaint  Follow up visit for:  symptom management  HPI     Abhijeet Martinez is a 62 y o  female with metastatic NSCLC with mets to brain currently on keytruda through Dr Salmon Check office who presents for follow up of symptom mgmt needs  Patient reports that since hospital discharge she has done overall well  She endorses much improved pain control    She has taken 2 oxycodone since discharge for severe pain which primarily occured in her low back  This did provide good relief  Otherwise through a combination of steroids and neurontin her leg pain continues to be managed  She notes that taking her AM decadron is associated with lightheadedness  She continues to lose weight but is supplementing with protein shakes  Functionally she is still impaired  Using a walker, but this is problematic when she has to go far distances  Still taking Colace daily which has been helpful and denies constipation  No significant nausea  Goals of care are treatment focused and we discussed some of her short term life goals, including taking her granddaughter to [place]  The following portions of the medical history were reviewed: past medical history, problem list, medication list, and social history      Current Outpatient Medications:     acetaminophen (TYLENOL) 500 mg tablet, Take 2 tablets (1,000 mg total) by mouth every 8 (eight) hours, Disp: 180 tablet, Rfl: 0    amLODIPine (NORVASC) 10 mg tablet, TAKE 1 TABLET BY MOUTH EVERY DAY, Disp: 90 tablet, Rfl: 2    atorvastatin (LIPITOR) 20 mg tablet, TAKE 1 TABLET DAILY, Disp: 90 tablet, Rfl: 1    dexamethasone (DECADRON) 4 mg tablet, Take 1 tablet (4 mg total) by mouth 2 (two) times a day, Disp: 60 tablet, Rfl: 0    gabapentin (NEURONTIN) 100 mg capsule, Take 1 capsule (100 mg total) by mouth 2 (two) times a day, Disp: 60 capsule, Rfl: 0    metFORMIN (GLUCOPHAGE) 1000 MG tablet, Take 1 tablet (1,000 mg total) by mouth 2 (two) times a day with meals, Disp: 180 tablet, Rfl: 3    ondansetron (ZOFRAN) 8 mg tablet, Take 1 tablet (8 mg total) by mouth every 8 (eight) hours as needed for nausea or vomiting, Disp: 30 tablet, Rfl: 0    oxyCODONE (ROXICODONE) 15 mg immediate release tablet, Take 1 tablet (15 mg total) by mouth every 3 (three) hours as needed (cancer pain)Max Daily Amount: 120 mg, Disp: 90 tablet, Rfl: 0    pantoprazole (PROTONIX) 20 mg tablet, Take 1 tablet (20 mg total) by mouth daily (Patient taking differently: Take 20 mg by mouth as needed ), Disp: 30 tablet, Rfl: 0    senna (SENOKOT) 8 6 mg, Take 1 tablet (8 6 mg total) by mouth 2 (two) times a day, Disp: 60 tablet, Rfl: 0    Tradjenta 5 MG TABS, TAKE 1 TABLET BY MOUTH EVERY DAY, Disp: 90 tablet, Rfl: 3    diazepam (VALIUM) 2 mg tablet, Take 1 tablet (2 mg total) by mouth every 6 (six) hours as needed for anxiety (dizziness) (Patient not taking: Reported on 3/3/2021), Disp: 10 tablet, Rfl: 0    docusate sodium (COLACE) 100 mg capsule, Take 1 capsule (100 mg total) by mouth 2 (two) times a day (Patient taking differently: Take 100 mg by mouth daily ), Disp: 60 capsule, Rfl: 0  Review of Systems    All other systems negative    Objective:  Vital Signs  /80 (BP Location: Left arm, Cuff Size: Standard)   Pulse 78   Temp 98 6 °F (37 °C) (Temporal)   Resp 20   LMP 08/16/2014 (Approximate)   SpO2 97%    Physical Exam    Constitutional: Appears well-developed and well-nourished  In no acute distress  Head: Normocephalic and atraumatic  Eyes: EOM are normal  Right eye exhibits no discharge  Left eye exhibits no discharge  No scleral icterus  Pulmonary/Chest: Effort normal  No stridor  No respiratory distress  Abdominal: No distension  Musculoskeletal: No edema  Neurological: Alert, oriented and appropriately conversant  Skin: Skin is dry, not diaphoretic  Psychiatric: Displays a normal mood and affect  Behavior, judgement and thought content appear normal    Vitals reviewed

## 2021-03-17 NOTE — PROGRESS NOTES
Palliative LSW saw patient during clinic visit  LSW appreciates the opportunity to provide patient/family with emotional support and guidance while patient continues to receive medical attention from the medical team     Topics discussed: Patient would like a transport chair to assist her for when she is out and about  Patient reported that the walker is difficult and takes her a long time to get to where she is trying to go  Areas that need follow-up: LSW faxed over DME request for transport chair  Resources given:None  Others present:  Dr Tena Carrillo and family       LSW will continue to follow as requested by the medical team, patient, or family

## 2021-03-18 ENCOUNTER — TELEPHONE (OUTPATIENT)
Dept: PALLIATIVE MEDICINE | Facility: CLINIC | Age: 61
End: 2021-03-18

## 2021-03-18 NOTE — TELEPHONE ENCOUNTER
[facility] LSW placed call to Kourtney Donaldson and verified that referral was received   Was informed that delivery was made this morning at 11:15am

## 2021-03-26 ENCOUNTER — TELEPHONE (OUTPATIENT)
Dept: HEMATOLOGY ONCOLOGY | Facility: CLINIC | Age: 61
End: 2021-03-26

## 2021-03-26 NOTE — TELEPHONE ENCOUNTER
LVM for Jj Petros, advised her Sheila Scott has treatment on Wednesday, we would like her labs completed either tomorrow or Monday  Asked she call us back if she has any further questions

## 2021-03-29 DIAGNOSIS — C80.1 SECONDARY MALIGNANCY OF BRAIN AND SPINAL CORD WITH UNKNOWN PRIMARY SITE (HCC): ICD-10-CM

## 2021-03-29 DIAGNOSIS — C79.49 SECONDARY MALIGNANCY OF BRAIN AND SPINAL CORD WITH UNKNOWN PRIMARY SITE (HCC): ICD-10-CM

## 2021-03-29 DIAGNOSIS — C79.31 SECONDARY MALIGNANCY OF BRAIN AND SPINAL CORD WITH UNKNOWN PRIMARY SITE (HCC): ICD-10-CM

## 2021-03-29 RX ORDER — DEXAMETHASONE 4 MG/1
4 TABLET ORAL
Qty: 60 TABLET | Refills: 0 | Status: SHIPPED | OUTPATIENT
Start: 2021-03-29

## 2021-03-29 NOTE — TELEPHONE ENCOUNTER
Primary palliative medicine provider: [facility]    Medication requested:Dexamethasone 4 mg     If for pain, how has the patient been taking their pain medicine?      Last appointment: 3 17    Next scheduled appointment: 4 14    PDMP review:

## 2021-03-30 ENCOUNTER — TELEPHONE (OUTPATIENT)
Dept: HEMATOLOGY ONCOLOGY | Facility: CLINIC | Age: 61
End: 2021-03-30

## 2021-03-30 ENCOUNTER — APPOINTMENT (OUTPATIENT)
Dept: LAB | Facility: HOSPITAL | Age: 61
End: 2021-03-30
Payer: COMMERCIAL

## 2021-03-30 RX ORDER — MAGNESIUM SULFATE HEPTAHYDRATE 40 MG/ML
2 INJECTION, SOLUTION INTRAVENOUS ONCE
Status: CANCELLED
Start: 2021-03-31

## 2021-03-30 RX ORDER — SODIUM CHLORIDE 9 MG/ML
20 INJECTION, SOLUTION INTRAVENOUS ONCE
Status: CANCELLED | OUTPATIENT
Start: 2021-03-31

## 2021-03-30 NOTE — PROGRESS NOTES
Per Dr Kellen Burnett, added Mag 2g  Timeout completed with Jerica Johnson RN at [facility] infusion center  Time added to appt as well

## 2021-03-30 NOTE — PROGRESS NOTES
TIME OUT:  completed with Samanta Ruffin, RN for Dr Kelvin Pham , Per Dr Lilliana Luo 2g  added to tomorrows tx plan

## 2021-03-30 NOTE — TELEPHONE ENCOUNTER
LVM for Seema Zapata, following up on my message to her Friday  Asked her to have Elmer Newsome get her labs completed before tomorrow's infusion therapy  Also asked her to call us back regarding setting up mobile labs for patient  Previously setup however, cancelled by family

## 2021-03-31 ENCOUNTER — OFFICE VISIT (OUTPATIENT)
Dept: HEMATOLOGY ONCOLOGY | Facility: CLINIC | Age: 61
End: 2021-03-31
Payer: COMMERCIAL

## 2021-03-31 ENCOUNTER — HOSPITAL ENCOUNTER (OUTPATIENT)
Dept: INFUSION CENTER | Facility: CLINIC | Age: 61
Discharge: HOME | End: 2021-03-31
Payer: COMMERCIAL

## 2021-03-31 VITALS
SYSTOLIC BLOOD PRESSURE: 162 MMHG | HEART RATE: 99 BPM | OXYGEN SATURATION: 92 % | RESPIRATION RATE: 20 BRPM | DIASTOLIC BLOOD PRESSURE: 96 MMHG | TEMPERATURE: 97.5 F

## 2021-03-31 VITALS
BODY MASS INDEX: 30.14 KG/M2 | SYSTOLIC BLOOD PRESSURE: 168 MMHG | WEIGHT: 154.32 LBS | TEMPERATURE: 97.5 F | DIASTOLIC BLOOD PRESSURE: 86 MMHG | HEART RATE: 101 BPM | RESPIRATION RATE: 20 BRPM

## 2021-03-31 DIAGNOSIS — E44.1 MILD PROTEIN-CALORIE MALNUTRITION (HCC): ICD-10-CM

## 2021-03-31 DIAGNOSIS — C79.31 LUNG CANCER METASTATIC TO BRAIN (HCC): ICD-10-CM

## 2021-03-31 DIAGNOSIS — C34.90 LUNG CANCER METASTATIC TO BRAIN (HCC): ICD-10-CM

## 2021-03-31 DIAGNOSIS — Z51.12 ENCOUNTER FOR ANTINEOPLASTIC IMMUNOTHERAPY: ICD-10-CM

## 2021-03-31 DIAGNOSIS — C34.90 LUNG CANCER METASTATIC TO BRAIN (HCC): Primary | ICD-10-CM

## 2021-03-31 DIAGNOSIS — C80.1 LEPTOMENINGITIS, CARCINOMATOUS (HCC): ICD-10-CM

## 2021-03-31 DIAGNOSIS — Z87.891 HISTORY OF SMOKING: ICD-10-CM

## 2021-03-31 DIAGNOSIS — C79.31 LUNG CANCER METASTATIC TO BRAIN (HCC): Primary | ICD-10-CM

## 2021-03-31 DIAGNOSIS — Z51.12 ENCOUNTER FOR ANTINEOPLASTIC IMMUNOTHERAPY: Primary | ICD-10-CM

## 2021-03-31 DIAGNOSIS — G89.3 CANCER RELATED PAIN: ICD-10-CM

## 2021-03-31 DIAGNOSIS — C79.49 LEPTOMENINGITIS, CARCINOMATOUS (HCC): ICD-10-CM

## 2021-03-31 PROCEDURE — 96367 TX/PROPH/DG ADDL SEQ IV INF: CPT

## 2021-03-31 PROCEDURE — 99214 OFFICE O/P EST MOD 30 MIN: CPT | Performed by: INTERNAL MEDICINE

## 2021-03-31 PROCEDURE — 96413 CHEMO IV INFUSION 1 HR: CPT

## 2021-03-31 PROCEDURE — 96366 THER/PROPH/DIAG IV INF ADDON: CPT

## 2021-03-31 RX ORDER — SODIUM CHLORIDE 9 MG/ML
20 INJECTION, SOLUTION INTRAVENOUS ONCE
Status: COMPLETED | OUTPATIENT
Start: 2021-03-31 | End: 2021-03-31

## 2021-03-31 RX ORDER — MAGNESIUM SULFATE HEPTAHYDRATE 40 MG/ML
2 INJECTION, SOLUTION INTRAVENOUS ONCE
Status: COMPLETED | OUTPATIENT
Start: 2021-03-31 | End: 2021-03-31

## 2021-03-31 RX ADMIN — SODIUM CHLORIDE 200 MG: 9 INJECTION, SOLUTION INTRAVENOUS at 11:29

## 2021-03-31 RX ADMIN — SODIUM CHLORIDE 20 ML/HR: 9 INJECTION, SOLUTION INTRAVENOUS at 09:12

## 2021-03-31 RX ADMIN — MAGNESIUM SULFATE HEPTAHYDRATE 2 G: 40 INJECTION, SOLUTION INTRAVENOUS at 09:20

## 2021-03-31 NOTE — PROGRESS NOTES
Pt to clinic for magnesium and keytruda, offers no complaints today, pt's BP elevated upon arrival, pt took BP meds while in chair, Santy Schafer RN notified of elevated BP, pt tolerated infusions without complications, aware of next appointment, PIV removed, avs declined

## 2021-03-31 NOTE — PLAN OF CARE
Problem: Potential for Falls  Goal: Patient will remain free of falls  Description: INTERVENTIONS:  - Assess patient frequently for physical needs  -  Identify cognitive and physical deficits and behaviors that affect risk of falls  -  [place] fall precautions as indicated by assessment   - Educate patient/family on patient safety including physical limitations  - Instruct patient to call for assistance with activity based on assessment  - Modify environment to reduce risk of injury  - Consider OT/PT consult to assist with strengthening/mobility  Outcome: Progressing     Problem: SAFETY ADULT  Goal: Patient will remain free of falls  Description: INTERVENTIONS:  - Assess patient frequently for physical needs  -  Identify cognitive and physical deficits and behaviors that affect risk of falls  -  [place] fall precautions as indicated by assessment   - Educate patient/family on patient safety including physical limitations  - Instruct patient to call for assistance with activity based on assessment  - Modify environment to reduce risk of injury  - Consider OT/PT consult to assist with strengthening/mobility  Outcome: Progressing     Problem: Knowledge Deficit  Goal: Patient/family/caregiver demonstrates understanding of disease process, treatment plan, medications, and discharge instructions  Description: Complete learning assessment and assess knowledge base    Interventions:  - Provide teaching at level of understanding  - Provide teaching via preferred learning methods  Outcome: Progressing

## 2021-03-31 NOTE — PROGRESS NOTES
[facility] - Hematology & Medical Oncology  Outpatient Visit Encounter Note    Benton Mckoy 62 y o  female [id number] Date:  3/31/2021    ONCOLOGY HISTORY        Oncology History   Lung cancer metastatic to brain [facility])   2/5/2021 Initial Diagnosis    A  Level 2L lymph node (excision):     - Metastatic non-small cell carcinoma with necrosis compatible with pulmonary adenocarcinoma involving three (3) of three (3) lymph nodes  - Extranodal extension present  B  Level 4R lymph node #1 (excision):     - Metastatic non-small cell carcinoma with necrosis compatible with pulmonary adenocarcinoma involving portions of fibroconnective tissue and scant lymphoid tissue  C  Level 4R lymph node #2 (excision):     - Metastatic non-small cell carcinoma with necrosis compatible with pulmonary adenocarcinoma involving portions of fibroconnective tissue and scant lymphoid tissue  D  Level 4L lymph node (excision):     - Metastatic non-small cell carcinoma compatible with pulmonary adenocarcinoma involving two (2) of two (2) lymph nodes  CT TAP 1/17/21  1  Mediastinal and right hilar lymphadenopathy  2   Chronic splenic mass, slowly enlarging since 2017  This is almost certainly benign and the differential diagnosis includes hamartoma, hemorrhagic cyst, littoral cell angioma or lymphangioma  3   No evidence of primary malignancy in the chest, abdomen or pelvis  MRI Brain 1/18/21  Metastatic disease, with 2 Parenchymal lesions, 2 1 and 5 mm in the greatest linear dimension  There is diffuse enhancement of the internal auditory canals bilaterally, which warrants lumbar puncture to exclude leptomeningeal tumor       2/10/2021 - 2/24/2021 Radiation    Whole brain to 3000 cGy in 10 fractions  L1-S3 spine 3000 cGy in 10 fractions     3/3/2021 -  [facility]  PDL1 100% 3/4/21  NGS no target mutations 3/9/21  Staging form: Lung, AJCC 8th Edition  - Clinical stage from 3/3/2021: Stage IV (cT0, cNX, cM1) - Signed by Ashok Bundy MD on 3/3/2021  Stage prefix: Initial diagnosis  Sites of metastasis: Brain, Other  Type of lung cancer: Locally advanced or metastatic non-small cell lung cancer  Stage used in treatment planning: Yes  National guidelines used in treatment planning: Yes  Type of national guideline used in treatment planning: NCCN       3/10/2021 -  Chemotherapy    pembrolizumab (KEYTRUDA) 200 mg in sodium chloride 0 9 % 50 mL IVPB, 200 mg, Intravenous, Once, 1 of 6 cycles  Administration: 200 mg (3/10/2021)     3/12/2021 - 3/12/2021 Chemotherapy    cyanocobalamin injection 1,000 mcg, 1,000 mcg, Intramuscular, Once, 1 of 3 cycles  Administration: 1,000 mcg (3/3/2021)  fosaprepitant (EMEND) 150 mg in sodium chloride 0 9 % 250 mL IVPB, 150 mg, Intravenous, Once, 0 of 6 cycles  CARBOplatin (PARAPLATIN) in sodium chloride 0 9 % 250 mL IVPB, , Intravenous, Once, 0 of 6 cycles  PEMEtrexed (ALIMTA) 850 mg in sodium chloride 0 9 % 100 mL chemo infusion, 500 mg/m2, Intravenous, Once, 0 of 6 cycles  pembrolizumab (KEYTRUDA) 200 mg in sodium chloride 0 9 % 50 mL IVPB, 200 mg, Intravenous, Once, 0 of 6 cycles     Leptomeningitis, carcinomatous ([facility] [address] )   2/10/2021 Initial Diagnosis    Leptomeningitis, carcinomatous ([address] )     2/10/2021 - 2/24/2021 Radiation    Whole brain to 3000 cGy in 10 fractions  L1-S3 spine 3000 cGy in 10 fractions     3/12/2021 - 3/12/2021 Chemotherapy    cyanocobalamin injection 1,000 mcg, 1,000 mcg, Intramuscular, Once, 1 of 3 cycles  Administration: 1,000 mcg (3/3/2021)  fosaprepitant (EMEND) 150 mg in sodium chloride 0 9 % 250 mL IVPB, 150 mg, Intravenous, Once, 0 of 6 cycles  CARBOplatin (PARAPLATIN) in sodium chloride 0 9 % 250 mL IVPB, , Intravenous, Once, 0 of 6 cycles  PEMEtrexed (ALIMTA) 850 mg in sodium chloride 0 9 % 100 mL chemo infusion, 500 mg/m2, Intravenous, Once, 0 of 6 cycles  pembrolizumab (KEYTRUDA) 200 mg in sodium chloride 0 9 % 50 mL IVPB, 200 mg, Intravenous, Once, 0 of 6 cycles           SUBJECTIVE      ECOG Initial Visit 0   ECOG This Visit 2   Pain Score Initial Visit 0   Pain Score This Visit 0   Personal Cancer History None   Family Cancer History Not Fully Aware   Tobacco Use History Yes; 20 years <1/2ppd   Alcohol Use History Denies     Initial HPI  Ms Shruthi Calzada is here for a new consult visit  She self-referred  She was recently diagnosed with metastatic cancer of likely non-small cell histology  In review of her chart and talking with her, her story began when she was hospitalized at [place] on January 17th with persistent dizziness and a headache  During her hospitalization, her MRI found 2 parenchymal lesions at 2 1 cm and 0 5 cm in the left parietal region  There was diffuse enhancement of the internal auditory canals bilaterally which was concerning for leptomeningeal tumor  Her MRI of the spine also showed there was enhancement of nerve roots that is being read as compatible with CSF spread of the tumor  Additionally, a CT scan of her chest abdomen and pelvis showed mediastinal and right hilar lymphadenopathy and a chronic splenic mass  As per the radiologist, there is no evidence of primary malignancy in the chest abdomen or pelvis  She got an IR biopsy of her malignant appearing lymph node  The pathology resulted as squamous cell carcinoma, poorly differentiated  This Visit:    She is here today to see me  She is scheduled to have her cycle 2 pembrolizumab today  She is here with her granddaughter  She denies any fevers chills nausea vomiting systemic complaints  Denies any diarrhea or worsening of breathing  She says his taste buds are not that good she is trying to eat a because of a lack of taste that is difficult at times  Denies diarrhea denies skin rashes  She says that her legs are still weak but they are getting a little bit better maybe    Uses [employer] walker around the house     I have reviewed the relevant past medical, surgical, social and family history  I have also reviewed allergies and medications for this patient  Review of Systems  Review of Systems   All other systems reviewed and are negative  OBJECTIVE     Physical Exam  Vitals:    03/31/21 0814   BP: 162/96   BP Location: Left arm   Patient Position: Sitting   Cuff Size: Adult   Pulse: 99   Resp: 20   Temp: 97 5 °F (36 4 °C)   TempSrc: Tympanic   SpO2: 92%       Physical Exam  Vitals signs reviewed  Constitutional:       General: She is not in acute distress  Appearance: She is not ill-appearing or toxic-appearing  HENT:      Head: Normocephalic and atraumatic  Eyes:      Extraocular Movements: Extraocular movements intact  Neck:      Musculoskeletal: Normal range of motion  Cardiovascular:      Rate and Rhythm: Normal rate  Pulmonary:      Effort: Pulmonary effort is normal  No respiratory distress  Abdominal:      General: There is no distension  Skin:     General: Skin is dry  Neurological:      Mental Status: She is alert and oriented to person, place, and time  Mental status is at baseline  Cranial Nerves: No cranial nerve deficit  Gait: Gait abnormal           Imaging  Relevant imaging reviewed in chart    Labs  Relevant labs reviewed in chart   ASSESSMENT & PLAN      Diagnosis ICD-10-CM Associated Orders   1  Lung cancer metastatic to brain [facility])  C34 90     C79 31    2  Leptomeningitis, carcinomatous ([email or web address] )  C79 49     C80 1    3  Encounter for antineoplastic immunotherapy  Z51 12    4  Cancer related pain  G89 3    5  History of smoking  Z87 891    6  Mild protein-calorie malnutrition ([facility] [address] )  E44 1        78-year-old female with biopsy-proven primary lung adenocarcinoma with intracranial metastases with leptomeningeal carcinomatosis  See below for details      Metastatic pulmonary adenocarcinoma  Leptomeningeal carcinomatosis  Encounter for antineoplastic chemotherapy and immunotherapy   Oncology history updated accordingly this visit   Goals of care/patient communication  o Please see previous notes for documentation regarding discussion of treatment plan, rationale, toxicities, consent process etc   o I told her that she needs to make sure she eats and drinks to maintain her calorie count and give her ideas and suggestions to help including eating salty foods on occasion to help increase her salivary gland stimulation   TNM/Staging At Diagnosis  o [id number]  o Stage 4   Disease Features/Tumor Markers/Genetics  o Notable Path Features:  Foundation 100% PDL1 staining   Treatment  o SP XRT to her brain and spine  o 1L palliative intent single agent pembrolizumab  - Continue with scheduled cycles   Other Supportive Care  o N/A   Treatment Team Members  o Rashmi Chavarria - Dr Anaid Banda TSH before treatment  Mony Dunbar  o None at the moment      Follow Up  · As scheduled      All questions were answered to the patient's satisfaction during this encounter  They appreciated and thanked me for spending time with them  The patient knows the contact information for our office and know to reach out for any relevant concerns related to this encounter  For all other listed problems and medical diagnosis in his chart - they are managed by PCP and/or other specialists, which patient acknowledges  Bri Zapata MD  Hematology & Medical Oncology

## 2021-04-06 ENCOUNTER — ANNUAL EXAM (OUTPATIENT)
Dept: OBGYN CLINIC | Facility: CLINIC | Age: 61
End: 2021-04-06
Payer: COMMERCIAL

## 2021-04-06 VITALS — WEIGHT: 153 LBS | BODY MASS INDEX: 29.88 KG/M2 | DIASTOLIC BLOOD PRESSURE: 90 MMHG | SYSTOLIC BLOOD PRESSURE: 158 MMHG

## 2021-04-06 DIAGNOSIS — Z01.419 ENCOUNTER FOR GYNECOLOGICAL EXAMINATION: Primary | ICD-10-CM

## 2021-04-06 DIAGNOSIS — Z12.31 SCREENING MAMMOGRAM, ENCOUNTER FOR: ICD-10-CM

## 2021-04-06 PROCEDURE — 99396 PREV VISIT EST AGE 40-64: CPT | Performed by: OBSTETRICS & GYNECOLOGY

## 2021-04-06 NOTE — PROGRESS NOTES
Assessment/Plan:    Encounter for gynecological examination  Pap/HPV not indicated  Mammogram current  Colonoscopy current    Dx metastatic lung cancer undergoing chemotherapy, wheelchair bound  Here with granddaughter  Diagnoses and all orders for this visit:    Encounter for gynecological examination    Screening mammogram, encounter for  -     Mammo screening bilateral w 3d & cad; Future          Subjective:      Patient ID: Kareem Kay is a 62 y o  female  Patient presents for a routine annual visit  Menarche- Y/O  Last Pap- 2/5/19 HPV OHR  Hysterectomy KHUSHI-BSO  Mammogram-12/7/20 WNL  Colonoscopy-8/16/17   [id number]  Former smoker  Social drinker  Currently sexually active  No concerns/questions for today's visit  Dx with metastatic lung cancer - malnourishment - no sense of taste, minimal appetite  Gynecologic Exam  The patient's pertinent negatives include no genital itching, genital lesions, genital odor, genital rash, pelvic pain, vaginal bleeding or vaginal discharge  Pertinent negatives include no chills, constipation, diarrhea, fever, frequency, nausea, sore throat, urgency or vomiting  She is not sexually active  She is postmenopausal        The following portions of the patient's history were reviewed and updated as appropriate:   She  has a past medical history of Acute on chronic congestive heart failure with left ventricular diastolic dysfunction ([address] ), Asthma, Atelectasis, CHF (congestive heart failure) ([address] ), Diabetes mellitus ([address] ), Diverticulitis (2017), Hyperlipidemia, Hypertension, Lesion of spleen, and Pericardial effusion    She   Patient Active Problem List    Diagnosis Date Noted    Encounter for gynecological examination 04/06/2021    Mild protein-calorie malnutrition ([address] ) 03/31/2021    Goals of care, counseling/discussion 03/17/2021    Encounter for antineoplastic immunotherapy 03/04/2021    Cancer related pain 02/11/2021    Obesity (BMI 30 0-34 9) 02/11/2021    Leptomeningitis, carcinomatous ([facility] ) 02/10/2021    Lung cancer metastatic to brain ([place] [address] ) 2021    Sciatica of left side 2021    Lumbar back pain with radiculopathy affecting left lower extremity 2021    Cerebral edema ([place] [address] ) 2021    History of smoking 2019    CAD in native artery 2018    Essential hypertension 2017    Hyperlipidemia 2017    Diabetic nephropathy associated with type 2 diabetes mellitus ([place] [address] ) 2017    Chronic diastolic HF (heart failure) ([place] [address] )     Uncomplicated asthma     Diabetes mellitus type 2 2017     She  has a past surgical history that includes HARTMANS PROCEDURE (N/A, 2017); VAC DRESSING APPLICATION (N/A, 8886); Colostomy (2017); Abdominal surgery;  section; pr colonoscopy flx dx w/collj spec when pfrmd (N/A, 2017); Colonoscopy; Colon surgery; pr close enterostomy (N/A, 2017); pr exc skin benig <0 5 cm remainder body (N/A, 2017); Mammo stereotactic breast biopsy left (all inc) (Left, 10/4/2018); pr repair incisional hernia,reducible (N/A, 2019); pr total abdom hysterectomy (N/A, 2019); Hysterectomy (); Oophorectomy (Bilateral, ); Breast biopsy (Left, 10/04/2018); IR lumbar puncture (2021); IR biopsy lymph node (2021); pr bronchoscopy,diagnostic (N/A, 2021); and pr mediastinoscopy with lymph node biopsy/ies (N/A, 2021)  Her family history includes Diabetes in her mother; Heart disease in her son; Hypertension in her mother; Lung cancer in her maternal aunt; No Known Problems in her daughter, maternal aunt, maternal aunt, maternal aunt, paternal aunt, and sister  She  reports that she quit smoking about 2 months ago  Her smoking use included cigarettes  She started smoking about 29 years ago  She has a 5 00 pack-year smoking history  She has never used smokeless tobacco  She reports previous alcohol use   She reports that she does not use drugs  Current Outpatient Medications   Medication Sig Dispense Refill    acetaminophen (TYLENOL) 500 mg tablet Take 2 tablets (1,000 mg total) by mouth every 8 (eight) hours 180 tablet 0    amLODIPine (NORVASC) 10 mg tablet TAKE 1 TABLET BY MOUTH EVERY DAY 90 tablet 2    atorvastatin (LIPITOR) 20 mg tablet TAKE 1 TABLET DAILY 90 tablet 1    dexamethasone (DECADRON) 4 mg tablet Take 1 tablet (4 mg total) by mouth 2 (two) times a day 60 tablet 0    docusate sodium (COLACE) 100 mg capsule Take 1 capsule (100 mg total) by mouth 2 (two) times a day (Patient taking differently: Take 100 mg by mouth daily ) 60 capsule 0    gabapentin (NEURONTIN) 100 mg capsule Take 1 capsule (100 mg total) by mouth 2 (two) times a day 60 capsule 0    metFORMIN (GLUCOPHAGE) 1000 MG tablet Take 1 tablet (1,000 mg total) by mouth 2 (two) times a day with meals 180 tablet 3    ondansetron (ZOFRAN) 8 mg tablet Take 1 tablet (8 mg total) by mouth every 8 (eight) hours as needed for nausea or vomiting 30 tablet 0    oxyCODONE (ROXICODONE) 15 mg immediate release tablet Take 1 tablet (15 mg total) by mouth every 3 (three) hours as needed (cancer pain)Max Daily Amount: 120 mg 90 tablet 0    pantoprazole (PROTONIX) 20 mg tablet Take 1 tablet (20 mg total) by mouth daily (Patient taking differently: Take 20 mg by mouth as needed ) 30 tablet 0    senna (SENOKOT) 8 6 mg Take 1 tablet (8 6 mg total) by mouth 2 (two) times a day 60 tablet 0    Tradjenta 5 MG TABS TAKE 1 TABLET BY MOUTH EVERY DAY 90 tablet 3     No current facility-administered medications for this visit        Current Outpatient Medications on File Prior to Visit   Medication Sig    acetaminophen (TYLENOL) 500 mg tablet Take 2 tablets (1,000 mg total) by mouth every 8 (eight) hours    amLODIPine (NORVASC) 10 mg tablet TAKE 1 TABLET BY MOUTH EVERY DAY    atorvastatin (LIPITOR) 20 mg tablet TAKE 1 TABLET DAILY    dexamethasone (DECADRON) 4 mg tablet Take 1 tablet (4 mg total) by mouth 2 (two) times a day    docusate sodium (COLACE) 100 mg capsule Take 1 capsule (100 mg total) by mouth 2 (two) times a day (Patient taking differently: Take 100 mg by mouth daily )    gabapentin (NEURONTIN) 100 mg capsule Take 1 capsule (100 mg total) by mouth 2 (two) times a day    metFORMIN (GLUCOPHAGE) 1000 MG tablet Take 1 tablet (1,000 mg total) by mouth 2 (two) times a day with meals    ondansetron (ZOFRAN) 8 mg tablet Take 1 tablet (8 mg total) by mouth every 8 (eight) hours as needed for nausea or vomiting    oxyCODONE (ROXICODONE) 15 mg immediate release tablet Take 1 tablet (15 mg total) by mouth every 3 (three) hours as needed (cancer pain)Max Daily Amount: 120 mg    pantoprazole (PROTONIX) 20 mg tablet Take 1 tablet (20 mg total) by mouth daily (Patient taking differently: Take 20 mg by mouth as needed )    senna (SENOKOT) 8 6 mg Take 1 tablet (8 6 mg total) by mouth 2 (two) times a day    Tradjenta 5 MG TABS TAKE 1 TABLET BY MOUTH EVERY DAY    [DISCONTINUED] folic acid (FOLVITE) 1 mg tablet Take 1 tablet (1 mg total) by mouth daily     No current facility-administered medications on file prior to visit  She is allergic to ciprofloxacin       Review of Systems   Constitutional: Negative for activity change, appetite change, chills, fatigue and fever  HENT: Negative for rhinorrhea, sneezing and sore throat  Eyes: Negative for visual disturbance  Respiratory: Negative for cough, shortness of breath and wheezing  Cardiovascular: Negative for chest pain, palpitations and leg swelling  Gastrointestinal: Negative for abdominal distention, constipation, diarrhea, nausea and vomiting  Genitourinary: Negative for difficulty urinating, frequency, pelvic pain, urgency and vaginal discharge  Neurological: Negative for syncope and light-headedness           Objective:      /90   Wt 69 4 kg (153 lb)   LMP 08/16/2014 (Approximate)   BMI 29 88 kg/m²          Physical Exam  Constitutional:       General: She is not in acute distress  Appearance: Normal appearance  She is well-developed  She is not diaphoretic  Chest:      Breasts: Breasts are symmetrical          Right: No inverted nipple, mass, nipple discharge, skin change or tenderness  Left: No inverted nipple, mass, nipple discharge, skin change or tenderness  Abdominal:      Palpations: Abdomen is soft  Abdomen is not rigid  Tenderness: There is no abdominal tenderness  There is no guarding or rebound  Hernia: There is no hernia in the left inguinal area  Genitourinary:     Labia:         Right: No rash, tenderness, lesion or injury  Left: No rash, tenderness, lesion or injury  Vagina: No vaginal discharge or bleeding  Adnexa:         Right: No mass, tenderness or fullness  Left: No mass, tenderness or fullness  Comments: Urethral meatus: no lesions, non tender  Urethra: non tender    Vaginal mucosa atrophic  Skin:     General: Skin is warm and dry  Coloration: Skin is not pale  Findings: No erythema or rash

## 2021-04-06 NOTE — PATIENT INSTRUCTIONS
Wellness Visit for Adults   WHAT YOU NEED TO KNOW:   What is a wellness visit? A wellness visit is when you see your healthcare provider to get screened for health problems  Your healthcare provider will also give you advice on how to stay healthy  Write down your questions so you remember to ask them  Ask your healthcare provider how often you should have a wellness visit  What happens at a wellness visit? Your healthcare provider will ask about your health, and your family history of health problems  This includes high blood pressure, heart disease, and cancer  He or she will ask if you have symptoms that concern you, if you smoke, and about your mood  You may also be asked about your intake of medicines, supplements, food, and alcohol  Any of the following may be done:  · Your weight  will be checked  Your height may also be checked so your body mass index (BMI) can be calculated  Your BMI shows if you are at a healthy weight  · Your blood pressure  and heart rate will be checked  Your temperature may also be checked  · Blood and urine tests  may be done  Blood tests may be done to check your cholesterol levels  Abnormal cholesterol levels increase your risk for heart disease and stroke  You may also need a blood or urine test to check for diabetes if you are at increased risk  Urine tests may be done to look for signs of an infection or kidney disease  · A physical exam  includes checking your heartbeat and lungs with a stethoscope  Your healthcare provider may also check your skin to look for sun damage  · Screening tests  may be recommended  A screening test is done to check for diseases that may not cause symptoms  The screening tests you may need depend on your age, gender, family history, and lifestyle habits  For example, colorectal screening may be recommended if you are 48years old or older  What screening tests do I need if I am a woman?    · A Pap smear  is used to screen for cervical cancer  Pap smears are usually done every 3 to 5 years depending on your age  You may need them more often if you have had abnormal Pap smear test results in the past  Ask your healthcare provider how often you should have a Pap smear  · A mammogram  is an x-ray of your breasts to screen for breast cancer  Experts recommend mammograms every 2 years starting at age 48 years  You may need a mammogram at age 52 years or younger if you have an increased risk for breast cancer  Talk to your healthcare provider about when you should start having mammograms and how often you need them  What vaccines might I need? · Get an influenza vaccine  every year  The influenza vaccine protects you from the flu  Several types of viruses cause the flu  The viruses change over time, so new vaccines are made each year  · Get a tetanus-diphtheria (Td) booster vaccine  every 10 years  This vaccine protects you against tetanus and diphtheria  Tetanus is a severe infection that may cause painful muscle spasms and lockjaw  Diphtheria is a severe bacterial infection that causes a thick covering in the back of your mouth and throat  · Get a human papillomavirus (HPV) vaccine  if you are female and aged 23 to 32 or male 23 to 24 and never received it  This vaccine protects you from HPV infection  HPV is the most common infection spread by sexual contact  HPV may also cause vaginal, penile, and anal cancers  · Get a pneumococcal vaccine  if you are aged 72 years or older  The pneumococcal vaccine is an injection given to protect you from pneumococcal disease  Pneumococcal disease is an infection caused by pneumococcal bacteria  The infection may cause pneumonia, meningitis, or an ear infection  · Get a shingles vaccine  if you are 60 or older, even if you have had shingles before  The shingles vaccine is an injection to protect you from the varicella-zoster virus  This is the same virus that causes chickenpox   Shingles is a painful rash that develops in people who had chickenpox or have been exposed to the virus  How can I eat healthy? My Plate is a model for planning healthy meals  It shows the types and amounts of foods that should go on your plate  Fruits and vegetables make up about half of your plate, and grains and protein make up the other half  A serving of dairy is included on the side of your plate  The amount of calories and serving sizes you need depends on your age, gender, weight, and height  Examples of healthy foods are listed below:  · Eat a variety of vegetables  such as dark green, red, and orange vegetables  You can also include canned vegetables low in sodium (salt) and frozen vegetables without added butter or sauces  · Eat a variety of fresh fruits , canned fruit in 100% juice, frozen fruit, and dried fruit  · Include whole grains  At least half of the grains you eat should be whole grains  Examples include whole-wheat bread, wheat pasta, brown rice, and whole-grain cereals such as oatmeal     · Eat a variety of protein foods such as seafood (fish and shellfish), lean meat, and poultry without skin (turkey and chicken)  Examples of lean meats include pork leg, shoulder, or tenderloin, and beef round, sirloin, tenderloin, and extra lean ground beef  Other protein foods include eggs and egg substitutes, beans, peas, soy products, nuts, and seeds  · Choose low-fat dairy products such as skim or 1% milk or low-fat yogurt, cheese, and cottage cheese  · Limit unhealthy fats  such as butter, hard margarine, and shortening  How much exercise do I need? Exercise at least 30 minutes per day on most days of the week  Some examples of exercise include walking, biking, dancing, and swimming  You can also fit in more physical activity by taking the stairs instead of the elevator or parking farther away from stores  Include muscle strengthening activities 2 days each week   Regular exercise provides many health benefits  It helps you manage your weight, and decreases your risk for type 2 diabetes, heart disease, stroke, and high blood pressure  Exercise can also help improve your mood  Ask your healthcare provider about the best exercise plan for you  What are some general health and safety guidelines I should follow? · Do not smoke  Nicotine and other chemicals in cigarettes and cigars can cause lung damage  Ask your healthcare provider for information if you currently smoke and need help to quit  E-cigarettes or smokeless tobacco still contain nicotine  Talk to your healthcare provider before you use these products  · Limit alcohol  A drink of alcohol is 12 ounces of beer, 5 ounces of wine, or 1½ ounces of liquor  · Lose weight, if needed  Being overweight increases your risk of certain health conditions  These include heart disease, high blood pressure, type 2 diabetes, and certain types of cancer  · Protect your skin  Do not sunbathe or use tanning beds  Use sunscreen with a SPF 15 or higher  Apply sunscreen at least 15 minutes before you go outside  Reapply sunscreen every 2 hours  Wear protective clothing, hats, and sunglasses when you are outside  · Drive safely  Always wear your seatbelt  Make sure everyone in your car wears a seatbelt  A seatbelt can save your life if you are in an accident  Do not use your cell phone when you are driving  This could distract you and cause an accident  Pull over if you need to make a call or send a text message  · Practice safe sex  Use latex condoms if are sexually active and have more than one partner  Your healthcare provider may recommend screening tests for sexually transmitted infections (STIs)  · Wear helmets, lifejackets, and protective gear  Always wear a helmet when you ride a bike or motorcycle, go skiing, or play sports that could cause a head injury  Wear protective equipment when you play sports   Wear a lifejacket when you are on a boat or doing water sports  CARE AGREEMENT:   You have the right to help plan your care  Learn about your health condition and how it may be treated  Discuss treatment options with your healthcare providers to decide what care you want to receive  You always have the right to refuse treatment  The above information is an  only  It is not intended as medical advice for individual conditions or treatments  Talk to your doctor, nurse or pharmacist before following any medical regimen to see if it is safe and effective for you  © Copyright [address] Information is for End User's use only and may not be sold, redistributed or otherwise used for commercial purposes   All illustrations and images included in CareNotes® are the copyrighted property of [employer]  or [address] [place] [address]

## 2021-04-06 NOTE — ASSESSMENT & PLAN NOTE
Pap/HPV not indicated  Mammogram current  Colonoscopy current    Dx metastatic lung cancer undergoing chemotherapy, wheelchair bound  Here with granddaughter

## 2021-04-07 DIAGNOSIS — C80.1 SECONDARY MALIGNANCY OF BRAIN AND SPINAL CORD WITH UNKNOWN PRIMARY SITE (HCC): ICD-10-CM

## 2021-04-07 DIAGNOSIS — C79.31 SECONDARY MALIGNANCY OF BRAIN AND SPINAL CORD WITH UNKNOWN PRIMARY SITE (HCC): ICD-10-CM

## 2021-04-07 DIAGNOSIS — C79.49 SECONDARY MALIGNANCY OF BRAIN AND SPINAL CORD WITH UNKNOWN PRIMARY SITE (HCC): ICD-10-CM

## 2021-04-07 RX ORDER — ACETAMINOPHEN 500 MG
1000 TABLET ORAL EVERY 8 HOURS SCHEDULED
Qty: 180 TABLET | Refills: 0 | Status: SHIPPED | OUTPATIENT
Start: 2021-04-07

## 2021-04-07 NOTE — TELEPHONE ENCOUNTER
Primary palliative medicine provider: Dr Julia Bernal    Medication requested:Tylenol 500mg tablets    If for pain, how has the patient been taking their pain medicine? 2 pills , 3 times daily       Last appointment:3/17/21    Next scheduled appointment:4/14/71    PDMP review: N/A

## 2021-04-13 ENCOUNTER — TELEPHONE (OUTPATIENT)
Dept: INTERNAL MEDICINE CLINIC | Facility: CLINIC | Age: 61
End: 2021-04-13

## 2021-04-13 DIAGNOSIS — I10 ESSENTIAL HYPERTENSION: ICD-10-CM

## 2021-04-13 DIAGNOSIS — I25.10 CAD IN NATIVE ARTERY: ICD-10-CM

## 2021-04-13 RX ORDER — METOPROLOL SUCCINATE 50 MG/1
50 TABLET, EXTENDED RELEASE ORAL DAILY
Qty: 90 TABLET | Refills: 1 | Status: SHIPPED | OUTPATIENT
Start: 2021-04-13

## 2021-04-13 NOTE — TELEPHONE ENCOUNTER
Looking for a refill on:  Metoprolol succ ER 50mgs tabs  Taking once a day    Also was taking 81 mgs aspirin before her biopsy  She stopped it for her biopsy  Can she resume taking it? ??

## 2021-04-14 ENCOUNTER — OFFICE VISIT (OUTPATIENT)
Dept: PALLIATIVE MEDICINE | Facility: CLINIC | Age: 61
End: 2021-04-14
Payer: COMMERCIAL

## 2021-04-14 VITALS
DIASTOLIC BLOOD PRESSURE: 80 MMHG | HEART RATE: 80 BPM | RESPIRATION RATE: 20 BRPM | SYSTOLIC BLOOD PRESSURE: 150 MMHG | TEMPERATURE: 98 F | OXYGEN SATURATION: 95 %

## 2021-04-14 DIAGNOSIS — C80.1 LEPTOMENINGITIS, CARCINOMATOUS (HCC): Primary | ICD-10-CM

## 2021-04-14 DIAGNOSIS — G89.3 CANCER RELATED PAIN: ICD-10-CM

## 2021-04-14 DIAGNOSIS — C79.31 LUNG CANCER METASTATIC TO BRAIN (HCC): ICD-10-CM

## 2021-04-14 DIAGNOSIS — C79.49 LEPTOMENINGITIS, CARCINOMATOUS (HCC): Primary | ICD-10-CM

## 2021-04-14 DIAGNOSIS — Z71.89 ADVICE GIVEN ABOUT COVID-19 VIRUS INFECTION: ICD-10-CM

## 2021-04-14 DIAGNOSIS — C34.90 LUNG CANCER METASTATIC TO BRAIN (HCC): ICD-10-CM

## 2021-04-14 PROCEDURE — 1036F TOBACCO NON-USER: CPT | Performed by: INTERNAL MEDICINE

## 2021-04-14 PROCEDURE — 99213 OFFICE O/P EST LOW 20 MIN: CPT | Performed by: INTERNAL MEDICINE

## 2021-04-14 NOTE — PROGRESS NOTES
Palliative and Supportive Care   Paul Mcgee 62 y o  female [id number]    Assessment/Plan:  1  Leptomeningitis, carcinomatous ([facility] )    2  Lung cancer metastatic to brain ([facility] [address] )    3  Cancer related pain    4  Advice given about COVID-19 virus infection      No changes today  Using oxycodone only when needed  Advised to register in [address] for COVID vaccine  Continues on steroids  Will place [facility] PT order since patient is substantially debilitated  Hopefully this is not indicative of a steroid myopathy since patient seems to otherwise be tolerating decadron well  Will follow up in a few months  Requested Prescriptions      No prescriptions requested or ordered in this encounter     There are no discontinued medications  Representatives have queried the patient's controlled substance dispensing history in the Prescription Drug Monitoring Program in compliance with regulations before I have prescribed any controlled substances  The prescription history is consistent with prescribed therapy and our practice policies  20 minutes were spent face to face with Paul Mcgee and her family with greater than 50% of the time spent in counseling or coordination of care including discussions of treatment instructions   All of the patient's questions were answered during this discussion  No follow-ups on file  Subjective:   Chief Complaint  Follow up visit for:  symptom management  HPI     Paul Mcgee is a 62 y o  female [facility] who presents for follow up  Since our last visit she has done mostly well  Pain continues to be present but managed with tylenol  Has only used 1 oxycodone since our last visit but knows to use if needed  Appetite has remained stable  Denies constipation  Unfortunately she still feels quite shaky  She requires a moderate amount of assistance which is frustrating to her  She inquires about rehab and I heartily agreed       The following portions of the medical history were reviewed: past medical history, problem list, medication list, and social history      Current Outpatient Medications:     acetaminophen (TYLENOL) 500 mg tablet, Take 2 tablets (1,000 mg total) by mouth every 8 (eight) hours, Disp: 180 tablet, Rfl: 0    amLODIPine (NORVASC) 10 mg tablet, TAKE 1 TABLET BY MOUTH EVERY DAY, Disp: 90 tablet, Rfl: 2    atorvastatin (LIPITOR) 20 mg tablet, TAKE 1 TABLET DAILY, Disp: 90 tablet, Rfl: 1    docusate sodium (COLACE) 100 mg capsule, Take 1 capsule (100 mg total) by mouth 2 (two) times a day (Patient taking differently: Take 100 mg by mouth daily ), Disp: 60 capsule, Rfl: 0    gabapentin (NEURONTIN) 100 mg capsule, Take 1 capsule (100 mg total) by mouth 2 (two) times a day, Disp: 60 capsule, Rfl: 0    metFORMIN (GLUCOPHAGE) 1000 MG tablet, Take 1 tablet (1,000 mg total) by mouth 2 (two) times a day with meals, Disp: 180 tablet, Rfl: 3    metoprolol succinate (TOPROL-XL) 50 mg 24 hr tablet, Take 1 tablet (50 mg total) by mouth daily, Disp: 90 tablet, Rfl: 1    ondansetron (ZOFRAN) 8 mg tablet, Take 1 tablet (8 mg total) by mouth every 8 (eight) hours as needed for nausea or vomiting, Disp: 30 tablet, Rfl: 0    oxyCODONE (ROXICODONE) 15 mg immediate release tablet, Take 1 tablet (15 mg total) by mouth every 3 (three) hours as needed (cancer pain)Max Daily Amount: 120 mg, Disp: 90 tablet, Rfl: 0    pantoprazole (PROTONIX) 20 mg tablet, Take 1 tablet (20 mg total) by mouth daily (Patient taking differently: Take 20 mg by mouth as needed ), Disp: 30 tablet, Rfl: 0    senna (SENOKOT) 8 6 mg, Take 1 tablet (8 6 mg total) by mouth 2 (two) times a day, Disp: 60 tablet, Rfl: 0    Tradjenta 5 MG TABS, TAKE 1 TABLET BY MOUTH EVERY DAY, Disp: 90 tablet, Rfl: 3    dexamethasone (DECADRON) 4 mg tablet, Take 1 tablet (4 mg total) by mouth 2 (two) times a day, Disp: 60 tablet, Rfl: 0  Review of Systems    All other systems negative    Objective:  Vital Signs  /80 (BP Location: Right arm, Cuff Size: Standard)   Pulse 80   Temp 98 °F (36 7 °C) (Skin)   Resp 20   LMP 08/16/2014 (Approximate)   SpO2 95%    Physical Exam    Constitutional: Appears well-developed and well-nourished  In no acute distress  Head: Normocephalic and atraumatic  Eyes: EOM are normal  Right eye exhibits no discharge  Left eye exhibits no discharge  No scleral icterus  Pulmonary/Chest: Effort normal  No stridor  No respiratory distress  Abdominal: No distension  Musculoskeletal: No edema  Neurological: Alert, oriented and appropriately conversant  Skin: Skin is dry, not diaphoretic  Psychiatric: Displays a normal mood and affect  Behavior, judgement and thought content appear normal    Vitals reviewed

## 2021-04-16 ENCOUNTER — TELEPHONE (OUTPATIENT)
Dept: PALLIATIVE MEDICINE | Facility: CLINIC | Age: 61
End: 2021-04-16

## 2021-04-16 NOTE — TELEPHONE ENCOUNTER
Received a call from [facility] stating that patient is out of their service area     This nurse called [address] and was told that her insurance was not accepted     Call placed to [facility]  message left a  regarding home health  referral     Patient given update on progress

## 2021-04-17 ENCOUNTER — HOSPITAL ENCOUNTER (EMERGENCY)
Facility: HOSPITAL | Age: 61
End: 2021-04-18
Attending: EMERGENCY MEDICINE | Admitting: EMERGENCY MEDICINE
Payer: COMMERCIAL

## 2021-04-17 ENCOUNTER — APPOINTMENT (EMERGENCY)
Dept: RADIOLOGY | Facility: HOSPITAL | Age: 61
End: 2021-04-17
Payer: COMMERCIAL

## 2021-04-17 ENCOUNTER — APPOINTMENT (EMERGENCY)
Dept: CT IMAGING | Facility: HOSPITAL | Age: 61
End: 2021-04-17
Payer: COMMERCIAL

## 2021-04-17 DIAGNOSIS — I31.3 PERICARDIAL EFFUSION: Primary | ICD-10-CM

## 2021-04-17 DIAGNOSIS — C79.31 LUNG CANCER METASTATIC TO BRAIN (HCC): ICD-10-CM

## 2021-04-17 DIAGNOSIS — C34.90 LUNG CANCER METASTATIC TO BRAIN (HCC): ICD-10-CM

## 2021-04-17 LAB
ALBUMIN SERPL BCP-MCNC: 2.9 G/DL (ref 3.5–5)
ALP SERPL-CCNC: 98 U/L (ref 46–116)
ALT SERPL W P-5'-P-CCNC: 39 U/L (ref 12–78)
ANION GAP SERPL CALCULATED.3IONS-SCNC: 11 MMOL/L (ref 4–13)
AST SERPL W P-5'-P-CCNC: 16 U/L (ref 5–45)
BASOPHILS # BLD AUTO: 0.01 THOUSANDS/ΜL (ref 0–0.1)
BASOPHILS NFR BLD AUTO: 0 % (ref 0–1)
BILIRUB SERPL-MCNC: 0.64 MG/DL (ref 0.2–1)
BUN SERPL-MCNC: 33 MG/DL (ref 5–25)
CALCIUM ALBUM COR SERPL-MCNC: 9.7 MG/DL (ref 8.3–10.1)
CALCIUM SERPL-MCNC: 8.8 MG/DL (ref 8.3–10.1)
CHLORIDE SERPL-SCNC: 93 MMOL/L (ref 100–108)
CO2 SERPL-SCNC: 27 MMOL/L (ref 21–32)
CREAT SERPL-MCNC: 0.79 MG/DL (ref 0.6–1.3)
EOSINOPHIL # BLD AUTO: 0 THOUSAND/ΜL (ref 0–0.61)
EOSINOPHIL NFR BLD AUTO: 0 % (ref 0–6)
ERYTHROCYTE [DISTWIDTH] IN BLOOD BY AUTOMATED COUNT: 16.1 % (ref 11.6–15.1)
GFR SERPL CREATININE-BSD FRML MDRD: 94 ML/MIN/1.73SQ M
GLUCOSE SERPL-MCNC: 284 MG/DL (ref 65–140)
HCT VFR BLD AUTO: 34 % (ref 34.8–46.1)
HGB BLD-MCNC: 11 G/DL (ref 11.5–15.4)
IMM GRANULOCYTES # BLD AUTO: 0.11 THOUSAND/UL (ref 0–0.2)
IMM GRANULOCYTES NFR BLD AUTO: 1 % (ref 0–2)
LYMPHOCYTES # BLD AUTO: 0.22 THOUSANDS/ΜL (ref 0.6–4.47)
LYMPHOCYTES NFR BLD AUTO: 3 % (ref 14–44)
MAGNESIUM SERPL-MCNC: 1.2 MG/DL (ref 1.6–2.6)
MCH RBC QN AUTO: 31 PG (ref 26.8–34.3)
MCHC RBC AUTO-ENTMCNC: 32.4 G/DL (ref 31.4–37.4)
MCV RBC AUTO: 96 FL (ref 82–98)
MONOCYTES # BLD AUTO: 0.69 THOUSAND/ΜL (ref 0.17–1.22)
MONOCYTES NFR BLD AUTO: 8 % (ref 4–12)
NEUTROPHILS # BLD AUTO: 7.14 THOUSANDS/ΜL (ref 1.85–7.62)
NEUTS SEG NFR BLD AUTO: 88 % (ref 43–75)
NRBC BLD AUTO-RTO: 0 /100 WBCS
NT-PROBNP SERPL-MCNC: 127 PG/ML
PLATELET # BLD AUTO: 295 THOUSANDS/UL (ref 149–390)
PMV BLD AUTO: 8.6 FL (ref 8.9–12.7)
POTASSIUM SERPL-SCNC: 4.3 MMOL/L (ref 3.5–5.3)
PROT SERPL-MCNC: 6.5 G/DL (ref 6.4–8.2)
RBC # BLD AUTO: 3.55 MILLION/UL (ref 3.81–5.12)
SODIUM SERPL-SCNC: 131 MMOL/L (ref 136–145)
TROPONIN I SERPL-MCNC: <0.02 NG/ML
WBC # BLD AUTO: 8.17 THOUSAND/UL (ref 4.31–10.16)

## 2021-04-17 PROCEDURE — 99285 EMERGENCY DEPT VISIT HI MDM: CPT

## 2021-04-17 PROCEDURE — 80053 COMPREHEN METABOLIC PANEL: CPT | Performed by: EMERGENCY MEDICINE

## 2021-04-17 PROCEDURE — 71275 CT ANGIOGRAPHY CHEST: CPT

## 2021-04-17 PROCEDURE — 36415 COLL VENOUS BLD VENIPUNCTURE: CPT | Performed by: EMERGENCY MEDICINE

## 2021-04-17 PROCEDURE — 84484 ASSAY OF TROPONIN QUANT: CPT | Performed by: EMERGENCY MEDICINE

## 2021-04-17 PROCEDURE — 83735 ASSAY OF MAGNESIUM: CPT | Performed by: EMERGENCY MEDICINE

## 2021-04-17 PROCEDURE — 70491 CT SOFT TISSUE NECK W/DYE: CPT

## 2021-04-17 PROCEDURE — 71045 X-RAY EXAM CHEST 1 VIEW: CPT

## 2021-04-17 PROCEDURE — 85025 COMPLETE CBC W/AUTO DIFF WBC: CPT | Performed by: EMERGENCY MEDICINE

## 2021-04-17 PROCEDURE — 83880 ASSAY OF NATRIURETIC PEPTIDE: CPT | Performed by: EMERGENCY MEDICINE

## 2021-04-17 PROCEDURE — G1004 CDSM NDSC: HCPCS

## 2021-04-17 PROCEDURE — 99291 CRITICAL CARE FIRST HOUR: CPT | Performed by: EMERGENCY MEDICINE

## 2021-04-17 PROCEDURE — 96360 HYDRATION IV INFUSION INIT: CPT

## 2021-04-17 PROCEDURE — 93005 ELECTROCARDIOGRAM TRACING: CPT

## 2021-04-17 RX ADMIN — SODIUM CHLORIDE 500 ML: 0.9 INJECTION, SOLUTION INTRAVENOUS at 23:03

## 2021-04-18 ENCOUNTER — ANESTHESIA (INPATIENT)
Dept: PERIOP | Facility: HOSPITAL | Age: 61
DRG: 164 | End: 2021-04-18
Payer: COMMERCIAL

## 2021-04-18 ENCOUNTER — ANESTHESIA EVENT (INPATIENT)
Dept: PERIOP | Facility: HOSPITAL | Age: 61
DRG: 164 | End: 2021-04-18
Payer: COMMERCIAL

## 2021-04-18 ENCOUNTER — HOSPITAL ENCOUNTER (INPATIENT)
Facility: HOSPITAL | Age: 61
LOS: 6 days | Discharge: HOME HEALTH | DRG: 164 | End: 2021-04-24
Attending: INTERNAL MEDICINE | Admitting: HOSPITALIST
Payer: COMMERCIAL

## 2021-04-18 VITALS
OXYGEN SATURATION: 96 % | TEMPERATURE: 97.1 F | RESPIRATION RATE: 22 BRPM | DIASTOLIC BLOOD PRESSURE: 93 MMHG | BODY MASS INDEX: 30.04 KG/M2 | HEIGHT: 60 IN | HEART RATE: 102 BPM | SYSTOLIC BLOOD PRESSURE: 146 MMHG | WEIGHT: 153 LBS

## 2021-04-18 DIAGNOSIS — I31.3 MALIGNANT PERICARDIAL EFFUSION (HCC): ICD-10-CM

## 2021-04-18 DIAGNOSIS — C34.90 LUNG CANCER METASTATIC TO BRAIN (HCC): ICD-10-CM

## 2021-04-18 DIAGNOSIS — R60.9 EDEMA: ICD-10-CM

## 2021-04-18 DIAGNOSIS — I31.3 PERICARDIAL EFFUSION: ICD-10-CM

## 2021-04-18 DIAGNOSIS — I82.409 ACUTE DVT (DEEP VENOUS THROMBOSIS) (HCC): Primary | ICD-10-CM

## 2021-04-18 DIAGNOSIS — C79.31 LUNG CANCER METASTATIC TO BRAIN (HCC): ICD-10-CM

## 2021-04-18 DIAGNOSIS — C80.1 MALIGNANT PERICARDIAL EFFUSION (HCC): ICD-10-CM

## 2021-04-18 PROBLEM — I31.31 MALIGNANT PERICARDIAL EFFUSION: Status: ACTIVE | Noted: 2017-05-16

## 2021-04-18 LAB
ABO GROUP BLD: NORMAL
ALBUMIN SERPL BCP-MCNC: 3.1 G/DL (ref 3.5–5)
ALP SERPL-CCNC: 107 U/L (ref 46–116)
ALT SERPL W P-5'-P-CCNC: 47 U/L (ref 12–78)
ANION GAP SERPL CALCULATED.3IONS-SCNC: 9 MMOL/L (ref 4–13)
ANISOCYTOSIS BLD QL SMEAR: PRESENT
AST SERPL W P-5'-P-CCNC: 18 U/L (ref 5–45)
BASOPHILS # BLD MANUAL: 0 THOUSAND/UL (ref 0–0.1)
BASOPHILS NFR MAR MANUAL: 0 % (ref 0–1)
BILIRUB SERPL-MCNC: 1.09 MG/DL (ref 0.2–1)
BLD GP AB SCN SERPL QL: NEGATIVE
BUN SERPL-MCNC: 34 MG/DL (ref 5–25)
CALCIUM ALBUM COR SERPL-MCNC: 10 MG/DL (ref 8.3–10.1)
CALCIUM SERPL-MCNC: 9.3 MG/DL (ref 8.3–10.1)
CHLORIDE SERPL-SCNC: 94 MMOL/L (ref 100–108)
CO2 SERPL-SCNC: 27 MMOL/L (ref 21–32)
CREAT SERPL-MCNC: 0.66 MG/DL (ref 0.6–1.3)
EOSINOPHIL # BLD MANUAL: 0 THOUSAND/UL (ref 0–0.4)
EOSINOPHIL NFR BLD MANUAL: 0 % (ref 0–6)
ERYTHROCYTE [DISTWIDTH] IN BLOOD BY AUTOMATED COUNT: 16 % (ref 11.6–15.1)
EST. AVERAGE GLUCOSE BLD GHB EST-MCNC: 171 MG/DL
GFR SERPL CREATININE-BSD FRML MDRD: 111 ML/MIN/1.73SQ M
GLUCOSE SERPL-MCNC: 187 MG/DL (ref 65–140)
GLUCOSE SERPL-MCNC: 232 MG/DL (ref 65–140)
GLUCOSE SERPL-MCNC: 240 MG/DL (ref 65–140)
GLUCOSE SERPL-MCNC: 261 MG/DL (ref 65–140)
GLUCOSE SERPL-MCNC: 294 MG/DL (ref 65–140)
HBA1C MFR BLD: 7.6 %
HCT VFR BLD AUTO: 33.1 % (ref 34.8–46.1)
HGB BLD-MCNC: 10.7 G/DL (ref 11.5–15.4)
LYMPHOCYTES # BLD AUTO: 0.09 THOUSAND/UL (ref 0.6–4.47)
LYMPHOCYTES # BLD AUTO: 1 % (ref 14–44)
MACROCYTES BLD QL AUTO: PRESENT
MAGNESIUM SERPL-MCNC: 1.5 MG/DL (ref 1.6–2.6)
MCH RBC QN AUTO: 31.7 PG (ref 26.8–34.3)
MCHC RBC AUTO-ENTMCNC: 32.3 G/DL (ref 31.4–37.4)
MCV RBC AUTO: 98 FL (ref 82–98)
MONOCYTES # BLD AUTO: 0.44 THOUSAND/UL (ref 0–1.22)
MONOCYTES NFR BLD: 5 % (ref 4–12)
NEUTROPHILS # BLD MANUAL: 8.19 THOUSAND/UL (ref 1.85–7.62)
NEUTS BAND NFR BLD MANUAL: 1 % (ref 0–8)
NEUTS SEG NFR BLD AUTO: 93 % (ref 43–75)
NRBC BLD AUTO-RTO: 0 /100 WBCS
NRBC BLD AUTO-RTO: 1 /100 WBC (ref 0–2)
PHOSPHATE SERPL-MCNC: 4 MG/DL (ref 2.3–4.1)
PLATELET # BLD AUTO: 304 THOUSANDS/UL (ref 149–390)
PLATELET BLD QL SMEAR: ADEQUATE
PMV BLD AUTO: 8.9 FL (ref 8.9–12.7)
POLYCHROMASIA BLD QL SMEAR: PRESENT
POTASSIUM SERPL-SCNC: 4.3 MMOL/L (ref 3.5–5.3)
PROT SERPL-MCNC: 6.6 G/DL (ref 6.4–8.2)
RBC # BLD AUTO: 3.38 MILLION/UL (ref 3.81–5.12)
RBC MORPH BLD: PRESENT
RH BLD: POSITIVE
SODIUM SERPL-SCNC: 130 MMOL/L (ref 136–145)
SPECIMEN EXPIRATION DATE: NORMAL
WBC # BLD AUTO: 8.71 THOUSAND/UL (ref 4.31–10.16)

## 2021-04-18 PROCEDURE — 3051F HG A1C>EQUAL 7.0%<8.0%: CPT | Performed by: INTERNAL MEDICINE

## 2021-04-18 PROCEDURE — 0PB00ZZ EXCISION OF STERNUM, OPEN APPROACH: ICD-10-PCS | Performed by: THORACIC SURGERY (CARDIOTHORACIC VASCULAR SURGERY)

## 2021-04-18 PROCEDURE — 87205 SMEAR GRAM STAIN: CPT | Performed by: THORACIC SURGERY (CARDIOTHORACIC VASCULAR SURGERY)

## 2021-04-18 PROCEDURE — 88305 TISSUE EXAM BY PATHOLOGIST: CPT | Performed by: PATHOLOGY

## 2021-04-18 PROCEDURE — 88341 IMHCHEM/IMCYTCHM EA ADD ANTB: CPT | Performed by: PATHOLOGY

## 2021-04-18 PROCEDURE — 84100 ASSAY OF PHOSPHORUS: CPT | Performed by: INTERNAL MEDICINE

## 2021-04-18 PROCEDURE — 88342 IMHCHEM/IMCYTCHM 1ST ANTB: CPT | Performed by: PATHOLOGY

## 2021-04-18 PROCEDURE — 99223 1ST HOSP IP/OBS HIGH 75: CPT | Performed by: INTERNAL MEDICINE

## 2021-04-18 PROCEDURE — 87102 FUNGUS ISOLATION CULTURE: CPT | Performed by: THORACIC SURGERY (CARDIOTHORACIC VASCULAR SURGERY)

## 2021-04-18 PROCEDURE — 82948 REAGENT STRIP/BLOOD GLUCOSE: CPT

## 2021-04-18 PROCEDURE — C9113 INJ PANTOPRAZOLE SODIUM, VIA: HCPCS | Performed by: INTERNAL MEDICINE

## 2021-04-18 PROCEDURE — 87206 SMEAR FLUORESCENT/ACID STAI: CPT | Performed by: THORACIC SURGERY (CARDIOTHORACIC VASCULAR SURGERY)

## 2021-04-18 PROCEDURE — 83735 ASSAY OF MAGNESIUM: CPT | Performed by: INTERNAL MEDICINE

## 2021-04-18 PROCEDURE — 33025 INCISION OF HEART SAC: CPT | Performed by: THORACIC SURGERY (CARDIOTHORACIC VASCULAR SURGERY)

## 2021-04-18 PROCEDURE — 85007 BL SMEAR W/DIFF WBC COUNT: CPT | Performed by: INTERNAL MEDICINE

## 2021-04-18 PROCEDURE — 87075 CULTR BACTERIA EXCEPT BLOOD: CPT | Performed by: THORACIC SURGERY (CARDIOTHORACIC VASCULAR SURGERY)

## 2021-04-18 PROCEDURE — 0W9D00Z DRAINAGE OF PERICARDIAL CAVITY WITH DRAINAGE DEVICE, OPEN APPROACH: ICD-10-PCS | Performed by: THORACIC SURGERY (CARDIOTHORACIC VASCULAR SURGERY)

## 2021-04-18 PROCEDURE — 99232 SBSQ HOSP IP/OBS MODERATE 35: CPT | Performed by: NURSE PRACTITIONER

## 2021-04-18 PROCEDURE — 88112 CYTOPATH CELL ENHANCE TECH: CPT | Performed by: PATHOLOGY

## 2021-04-18 PROCEDURE — 87116 MYCOBACTERIA CULTURE: CPT | Performed by: THORACIC SURGERY (CARDIOTHORACIC VASCULAR SURGERY)

## 2021-04-18 PROCEDURE — 99232 SBSQ HOSP IP/OBS MODERATE 35: CPT | Performed by: THORACIC SURGERY (CARDIOTHORACIC VASCULAR SURGERY)

## 2021-04-18 PROCEDURE — 86901 BLOOD TYPING SEROLOGIC RH(D): CPT | Performed by: INTERNAL MEDICINE

## 2021-04-18 PROCEDURE — 87070 CULTURE OTHR SPECIMN AEROBIC: CPT | Performed by: THORACIC SURGERY (CARDIOTHORACIC VASCULAR SURGERY)

## 2021-04-18 PROCEDURE — 86900 BLOOD TYPING SEROLOGIC ABO: CPT | Performed by: INTERNAL MEDICINE

## 2021-04-18 PROCEDURE — 83036 HEMOGLOBIN GLYCOSYLATED A1C: CPT | Performed by: INTERNAL MEDICINE

## 2021-04-18 PROCEDURE — 80053 COMPREHEN METABOLIC PANEL: CPT | Performed by: INTERNAL MEDICINE

## 2021-04-18 PROCEDURE — 86850 RBC ANTIBODY SCREEN: CPT | Performed by: INTERNAL MEDICINE

## 2021-04-18 PROCEDURE — 85027 COMPLETE CBC AUTOMATED: CPT | Performed by: INTERNAL MEDICINE

## 2021-04-18 RX ORDER — LIDOCAINE HYDROCHLORIDE 10 MG/ML
INJECTION, SOLUTION EPIDURAL; INFILTRATION; INTRACAUDAL; PERINEURAL AS NEEDED
Status: DISCONTINUED | OUTPATIENT
Start: 2021-04-18 | End: 2021-04-18

## 2021-04-18 RX ORDER — ATORVASTATIN CALCIUM 40 MG/1
20 TABLET, FILM COATED ORAL
Status: CANCELLED | OUTPATIENT
Start: 2021-04-18

## 2021-04-18 RX ORDER — AMLODIPINE BESYLATE 10 MG/1
10 TABLET ORAL DAILY
Status: DISCONTINUED | OUTPATIENT
Start: 2021-04-18 | End: 2021-04-24 | Stop reason: HOSPADM

## 2021-04-18 RX ORDER — EPHEDRINE SULFATE 50 MG/ML
INJECTION INTRAVENOUS AS NEEDED
Status: DISCONTINUED | OUTPATIENT
Start: 2021-04-18 | End: 2021-04-18

## 2021-04-18 RX ORDER — MAGNESIUM HYDROXIDE/ALUMINUM HYDROXICE/SIMETHICONE 120; 1200; 1200 MG/30ML; MG/30ML; MG/30ML
30 SUSPENSION ORAL EVERY 6 HOURS PRN
Status: CANCELLED | OUTPATIENT
Start: 2021-04-18

## 2021-04-18 RX ORDER — ONDANSETRON 2 MG/ML
4 INJECTION INTRAMUSCULAR; INTRAVENOUS ONCE AS NEEDED
Status: DISCONTINUED | OUTPATIENT
Start: 2021-04-18 | End: 2021-04-18 | Stop reason: HOSPADM

## 2021-04-18 RX ORDER — FENTANYL CITRATE/PF 50 MCG/ML
25 SYRINGE (ML) INJECTION
Status: DISCONTINUED | OUTPATIENT
Start: 2021-04-18 | End: 2021-04-18 | Stop reason: HOSPADM

## 2021-04-18 RX ORDER — METOPROLOL SUCCINATE 50 MG/1
50 TABLET, EXTENDED RELEASE ORAL DAILY
Status: DISCONTINUED | OUTPATIENT
Start: 2021-04-18 | End: 2021-04-24 | Stop reason: HOSPADM

## 2021-04-18 RX ORDER — CEFAZOLIN SODIUM 1 G/3ML
INJECTION, POWDER, FOR SOLUTION INTRAMUSCULAR; INTRAVENOUS AS NEEDED
Status: DISCONTINUED | OUTPATIENT
Start: 2021-04-18 | End: 2021-04-18

## 2021-04-18 RX ORDER — SODIUM CHLORIDE, SODIUM LACTATE, POTASSIUM CHLORIDE, CALCIUM CHLORIDE 600; 310; 30; 20 MG/100ML; MG/100ML; MG/100ML; MG/100ML
INJECTION, SOLUTION INTRAVENOUS CONTINUOUS PRN
Status: DISCONTINUED | OUTPATIENT
Start: 2021-04-18 | End: 2021-04-18

## 2021-04-18 RX ORDER — SODIUM CHLORIDE, SODIUM LACTATE, POTASSIUM CHLORIDE, CALCIUM CHLORIDE 600; 310; 30; 20 MG/100ML; MG/100ML; MG/100ML; MG/100ML
20 INJECTION, SOLUTION INTRAVENOUS CONTINUOUS
Status: DISCONTINUED | OUTPATIENT
Start: 2021-04-18 | End: 2021-04-20

## 2021-04-18 RX ORDER — MAGNESIUM HYDROXIDE/ALUMINUM HYDROXICE/SIMETHICONE 120; 1200; 1200 MG/30ML; MG/30ML; MG/30ML
30 SUSPENSION ORAL EVERY 6 HOURS PRN
Status: DISCONTINUED | OUTPATIENT
Start: 2021-04-18 | End: 2021-04-24 | Stop reason: HOSPADM

## 2021-04-18 RX ORDER — SUCCINYLCHOLINE/SOD CL,ISO/PF 100 MG/5ML
SYRINGE (ML) INTRAVENOUS AS NEEDED
Status: DISCONTINUED | OUTPATIENT
Start: 2021-04-18 | End: 2021-04-18

## 2021-04-18 RX ORDER — ATORVASTATIN CALCIUM 20 MG/1
20 TABLET, FILM COATED ORAL
Status: DISCONTINUED | OUTPATIENT
Start: 2021-04-18 | End: 2021-04-24 | Stop reason: HOSPADM

## 2021-04-18 RX ORDER — DEXAMETHASONE 4 MG/1
4 TABLET ORAL EVERY 12 HOURS SCHEDULED
Status: DISCONTINUED | OUTPATIENT
Start: 2021-04-18 | End: 2021-04-24 | Stop reason: HOSPADM

## 2021-04-18 RX ORDER — METOPROLOL SUCCINATE 50 MG/1
50 TABLET, EXTENDED RELEASE ORAL DAILY
Status: CANCELLED | OUTPATIENT
Start: 2021-04-18

## 2021-04-18 RX ORDER — GLYCOPYRROLATE 0.2 MG/ML
INJECTION INTRAMUSCULAR; INTRAVENOUS AS NEEDED
Status: DISCONTINUED | OUTPATIENT
Start: 2021-04-18 | End: 2021-04-18

## 2021-04-18 RX ORDER — FENTANYL CITRATE 50 UG/ML
INJECTION, SOLUTION INTRAMUSCULAR; INTRAVENOUS AS NEEDED
Status: DISCONTINUED | OUTPATIENT
Start: 2021-04-18 | End: 2021-04-18

## 2021-04-18 RX ORDER — ONDANSETRON 2 MG/ML
4 INJECTION INTRAMUSCULAR; INTRAVENOUS EVERY 6 HOURS PRN
Status: CANCELLED | OUTPATIENT
Start: 2021-04-18

## 2021-04-18 RX ORDER — ONDANSETRON 2 MG/ML
4 INJECTION INTRAMUSCULAR; INTRAVENOUS EVERY 6 HOURS PRN
Status: DISCONTINUED | OUTPATIENT
Start: 2021-04-18 | End: 2021-04-24 | Stop reason: HOSPADM

## 2021-04-18 RX ORDER — HYDROMORPHONE HCL IN WATER/PF 6 MG/30 ML
0.2 PATIENT CONTROLLED ANALGESIA SYRINGE INTRAVENOUS
Status: DISCONTINUED | OUTPATIENT
Start: 2021-04-18 | End: 2021-04-18 | Stop reason: HOSPADM

## 2021-04-18 RX ORDER — PANTOPRAZOLE SODIUM 40 MG/1
40 INJECTION, POWDER, FOR SOLUTION INTRAVENOUS
Status: CANCELLED | OUTPATIENT
Start: 2021-04-18

## 2021-04-18 RX ORDER — AMLODIPINE BESYLATE 5 MG/1
10 TABLET ORAL DAILY
Status: CANCELLED | OUTPATIENT
Start: 2021-04-18

## 2021-04-18 RX ORDER — DOCUSATE SODIUM 100 MG/1
100 CAPSULE, LIQUID FILLED ORAL DAILY
Status: DISCONTINUED | OUTPATIENT
Start: 2021-04-18 | End: 2021-04-24 | Stop reason: HOSPADM

## 2021-04-18 RX ORDER — OXYCODONE HYDROCHLORIDE 5 MG/1
5 TABLET ORAL EVERY 4 HOURS PRN
Status: DISCONTINUED | OUTPATIENT
Start: 2021-04-18 | End: 2021-04-24

## 2021-04-18 RX ORDER — ALBUTEROL SULFATE 90 UG/1
AEROSOL, METERED RESPIRATORY (INHALATION) AS NEEDED
Status: DISCONTINUED | OUTPATIENT
Start: 2021-04-18 | End: 2021-04-18

## 2021-04-18 RX ORDER — PANTOPRAZOLE SODIUM 40 MG/1
40 INJECTION, POWDER, FOR SOLUTION INTRAVENOUS
Status: DISCONTINUED | OUTPATIENT
Start: 2021-04-18 | End: 2021-04-19

## 2021-04-18 RX ORDER — ONDANSETRON 2 MG/ML
INJECTION INTRAMUSCULAR; INTRAVENOUS AS NEEDED
Status: DISCONTINUED | OUTPATIENT
Start: 2021-04-18 | End: 2021-04-18

## 2021-04-18 RX ORDER — DOCUSATE SODIUM 100 MG/1
100 CAPSULE, LIQUID FILLED ORAL DAILY
Status: CANCELLED | OUTPATIENT
Start: 2021-04-18

## 2021-04-18 RX ORDER — ACETAMINOPHEN 325 MG/1
650 TABLET ORAL EVERY 6 HOURS PRN
Status: DISCONTINUED | OUTPATIENT
Start: 2021-04-18 | End: 2021-04-24 | Stop reason: HOSPADM

## 2021-04-18 RX ORDER — OXYCODONE HYDROCHLORIDE 5 MG/1
5 TABLET ORAL EVERY 4 HOURS PRN
Status: CANCELLED | OUTPATIENT
Start: 2021-04-18

## 2021-04-18 RX ORDER — ETOMIDATE 2 MG/ML
INJECTION INTRAVENOUS AS NEEDED
Status: DISCONTINUED | OUTPATIENT
Start: 2021-04-18 | End: 2021-04-18

## 2021-04-18 RX ORDER — ACETAMINOPHEN 325 MG/1
650 TABLET ORAL EVERY 6 HOURS PRN
Status: CANCELLED | OUTPATIENT
Start: 2021-04-18

## 2021-04-18 RX ORDER — NEOSTIGMINE METHYLSULFATE 1 MG/ML
INJECTION INTRAVENOUS AS NEEDED
Status: DISCONTINUED | OUTPATIENT
Start: 2021-04-18 | End: 2021-04-18

## 2021-04-18 RX ADMIN — EPHEDRINE SULFATE 5 MG: 50 INJECTION, SOLUTION INTRAVENOUS at 13:02

## 2021-04-18 RX ADMIN — Medication 80 MG: at 12:56

## 2021-04-18 RX ADMIN — ATORVASTATIN CALCIUM 20 MG: 20 TABLET, FILM COATED ORAL at 17:24

## 2021-04-18 RX ADMIN — ETOMIDATE 15 MG: 20 INJECTION, SOLUTION INTRAVENOUS at 12:56

## 2021-04-18 RX ADMIN — INSULIN HUMAN 3 UNITS: 100 INJECTION, SOLUTION PARENTERAL at 14:12

## 2021-04-18 RX ADMIN — LIDOCAINE HYDROCHLORIDE 50 MG: 10 INJECTION, SOLUTION EPIDURAL; INFILTRATION; INTRACAUDAL; PERINEURAL at 12:56

## 2021-04-18 RX ADMIN — DEXAMETHASONE 4 MG: 4 TABLET ORAL at 20:55

## 2021-04-18 RX ADMIN — AMLODIPINE BESYLATE 10 MG: 10 TABLET ORAL at 17:24

## 2021-04-18 RX ADMIN — SODIUM CHLORIDE, SODIUM LACTATE, POTASSIUM CHLORIDE, AND CALCIUM CHLORIDE 20 ML/HR: .6; .31; .03; .02 INJECTION, SOLUTION INTRAVENOUS at 14:00

## 2021-04-18 RX ADMIN — GLYCOPYRROLATE 0.4 MG: 0.2 INJECTION, SOLUTION INTRAMUSCULAR; INTRAVENOUS at 13:36

## 2021-04-18 RX ADMIN — INSULIN LISPRO 3 UNITS: 100 INJECTION, SOLUTION INTRAVENOUS; SUBCUTANEOUS at 23:58

## 2021-04-18 RX ADMIN — CEFAZOLIN 1000 MG: 1 INJECTION, POWDER, FOR SOLUTION INTRAMUSCULAR; INTRAVENOUS at 13:02

## 2021-04-18 RX ADMIN — DEXAMETHASONE 4 MG: 4 TABLET ORAL at 09:28

## 2021-04-18 RX ADMIN — SODIUM CHLORIDE, SODIUM LACTATE, POTASSIUM CHLORIDE, AND CALCIUM CHLORIDE: .6; .31; .03; .02 INJECTION, SOLUTION INTRAVENOUS at 12:21

## 2021-04-18 RX ADMIN — ALBUTEROL SULFATE 4 PUFF: 90 AEROSOL, METERED RESPIRATORY (INHALATION) at 13:30

## 2021-04-18 RX ADMIN — ACETAMINOPHEN 650 MG: 325 TABLET ORAL at 09:37

## 2021-04-18 RX ADMIN — PANTOPRAZOLE SODIUM 40 MG: 40 INJECTION, POWDER, FOR SOLUTION INTRAVENOUS at 06:39

## 2021-04-18 RX ADMIN — FENTANYL CITRATE 50 MCG: 50 INJECTION INTRAMUSCULAR; INTRAVENOUS at 12:56

## 2021-04-18 RX ADMIN — INSULIN LISPRO 2 UNITS: 100 INJECTION, SOLUTION INTRAVENOUS; SUBCUTANEOUS at 06:26

## 2021-04-18 RX ADMIN — METOPROLOL SUCCINATE 50 MG: 50 TABLET, EXTENDED RELEASE ORAL at 17:25

## 2021-04-18 RX ADMIN — NEOSTIGMINE METHYLSULFATE 3 MG: 1 INJECTION INTRAVENOUS at 13:36

## 2021-04-18 RX ADMIN — ALBUTEROL SULFATE 6 PUFF: 90 AEROSOL, METERED RESPIRATORY (INHALATION) at 13:05

## 2021-04-18 RX ADMIN — ONDANSETRON 4 MG: 2 INJECTION INTRAMUSCULAR; INTRAVENOUS at 13:13

## 2021-04-18 RX ADMIN — IOHEXOL 100 ML: 350 INJECTION, SOLUTION INTRAVENOUS at 00:06

## 2021-04-18 RX ADMIN — ACETAMINOPHEN 650 MG: 325 TABLET ORAL at 20:55

## 2021-04-18 NOTE — PLAN OF CARE
Problem: Potential for Falls  Goal: Patient will remain free of falls  Description: INTERVENTIONS:  - Assess patient frequently for physical needs  -  Identify cognitive and physical deficits and behaviors that affect risk of falls    -  Alverda fall precautions as indicated by assessment   - Educate patient/family on patient safety including physical limitations  - Instruct patient to call for assistance with activity based on assessment  - Modify environment to reduce risk of injury  - Consider OT/PT consult to assist with strengthening/mobility  Outcome: Progressing

## 2021-04-18 NOTE — PROGRESS NOTES
[facility]  Progress Note - Bryant Quintana 1960, 62 y o  female MRN: [id number]  Unit/Bed#: [facility] Encounter: [id number]  Primary Care Provider: Isael Phillips DO   Date and time admitted to hospital: 4/18/2021  4:53 AM    * Malignant pericardial effusion (HCC)  Assessment & Plan  · Evaluation by Cardiothoracic surgery for possible pericardial window  · Oncology consult for follow-up  · NPO for pericardial window today per thoracic surgery, for possible malignant pericardial pleural effusion  · Will follow metabolic profile, CBC with coagulation profile and type and screen completed  · Continue oxygen supplementation  · Patient has been on dexamethasone 4 milligrams twice daily for the cerebral edema  Will continue  Diabetes mellitus type 2  Assessment & Plan  Lab Results   Component Value Date    HGBA1C 7 6 (H) 04/18/2021     Patient on both linagliptin and metformin; hold while inpatient  Check blood sugars per protocol  Every 6 hours for now due to NPO status  Insulin sliding scale  Transition to q i d  And HS when patient returns from surgeon no longer NPO  Recent Labs     04/18/21  0603   POCGLU 261*       Blood Sugar Average: Last 72 hrs:  (P) 261    Lung cancer metastatic to brain [facility])  Assessment & Plan  · Patient on dexamethasone 4 mg tablet twice daily  History of smoking  Assessment & Plan  · Currently patient has not been smoking  Hyperlipidemia  Assessment & Plan  · Continue Lipitor 20 mg daily    Essential hypertension  Assessment & Plan  · Continue Norvasc 10 mg daily  · Metoprolol succinate 50 mg q day        VTE Pharmacologic Prophylaxis:   High Risk (Score >/= 5) - Pharmacological DVT Prophylaxis Contraindicated  Sequential Compression Devices Ordered  going to the or at this time     Patient Centered Rounds: I performed bedside rounds with nursing staff today    Discussions with Specialists or Other Care Team Provider: Nursing    Education and Discussions with Family / Patient: Patient declined call to   said her daughter was here very late and needed to sleep    Time Spent for Care: 60 minutes  More than 50% of total time spent on counseling and coordination of care as described above  Current Length of Stay: 0 day(s)  Current Patient Status: Inpatient   Certification Statement: The patient will continue to require additional inpatient hospital stay due to npo pending OR   Discharge Plan: Anticipate discharge in >72 hrs to possibly home however pt going to or would need to be evaluated post op sp final plan of care     Code Status: Level 1 - Full Code    Subjective:   Patient is sitting up in chair leaning on the table  She is on 2 L of oxygen states that her breathing is much better but she did come in with shortness of breath  Patient reports that she has no fevers or chills  Objective:     Vitals:   Temp (24hrs), Av 7 °F (36 5 °C), Min:97 1 °F (36 2 °C), Max:98 5 °F (36 9 °C)    Temp:  [97 1 °F (36 2 °C)-98 5 °F (36 9 °C)] 98 5 °F (36 9 °C)  HR:  [102-109] 105  Resp:  [18-22] 18  BP: (115-158)/(76-96) 115/76  SpO2:  [86 %-98 %] 98 %  Body mass index is 29 92 kg/m²  Input and Output Summary (last 24 hours):   No intake or output data in the 24 hours ending 21 1236    Physical Exam:   Physical Exam  Constitutional:       General: She is not in acute distress  Appearance: She is ill-appearing  She is not toxic-appearing or diaphoretic  HENT:      Head: Normocephalic and atraumatic  Mouth/Throat:      Pharynx: Oropharynx is clear  Eyes:      General:         Right eye: No discharge  Left eye: No discharge  Conjunctiva/sclera: Conjunctivae normal    Cardiovascular:      Rate and Rhythm: Normal rate  Heart sounds: No murmur  No friction rub  No gallop  Pulmonary:      Effort: No respiratory distress  Breath sounds: No stridor  No wheezing, rhonchi or rales  Comments: Very diminished and poor effort 2 liters nc   Chest:      Chest wall: No tenderness  Abdominal:      General: There is no distension  Palpations: There is no mass  Tenderness: There is no abdominal tenderness  There is no right CVA tenderness, left CVA tenderness, guarding or rebound  Hernia: No hernia is present  Musculoskeletal:         General: No swelling  Right lower leg: Edema present  Left lower leg: Edema (bl pitting edema lower extremeties ) present  Skin:     Coloration: Skin is not jaundiced or pale  Findings: No bruising, erythema, lesion or rash  Neurological:      Mental Status: She is alert and oriented to person, place, and time     Psychiatric:      Comments: Depressed affect          Additional Data:     Labs:  Results from last 7 days   Lab Units 04/18/21  0605 04/17/21  2303   WBC Thousand/uL 8 71 8 17   HEMOGLOBIN g/dL 10 7* 11 0*   HEMATOCRIT % 33 1* 34 0*   PLATELETS Thousands/uL 304 295   BANDS PCT % 1  --    NEUTROS PCT %  --  88*   LYMPHS PCT %  --  3*   LYMPHO PCT % 1*  --    MONOS PCT %  --  8   MONO PCT % 5  --    EOS PCT % 0 0     Results from last 7 days   Lab Units 04/18/21  0605   SODIUM mmol/L 130*   POTASSIUM mmol/L 4 3   CHLORIDE mmol/L 94*   CO2 mmol/L 27   BUN mg/dL 34*   CREATININE mg/dL 0 66   ANION GAP mmol/L 9   CALCIUM mg/dL 9 3   ALBUMIN g/dL 3 1*   TOTAL BILIRUBIN mg/dL 1 09*   ALK PHOS U/L 107   ALT U/L 47   AST U/L 18   GLUCOSE RANDOM mg/dL 240*         Results from last 7 days   Lab Units 04/18/21  0603   POC GLUCOSE mg/dl 261*     Results from last 7 days   Lab Units 04/18/21  0807   HEMOGLOBIN A1C % 7 6*           Lines/Drains:  Invasive Devices     Peripheral Intravenous Line            Peripheral IV 04/17/21 Left Antecubital less than 1 day                      Imaging: Reviewed radiology reports from this admission including: chest CT scan    Recent Cultures (last 7 days):         Last 24 Hours Medication List: Current Facility-Administered Medications   Medication Dose Route Frequency Provider Last Rate    [MAR Hold] acetaminophen  650 mg Oral Q6H PRN Lucie Egan MD      [facility] Hold] aluminum-magnesium hydroxide-simethicone  30 mL Oral Q6H PRN Lucie Egan MD      [facility] Hold] amLODIPine  10 mg Oral Daily Lucie Egan MD      [facility] Hold] atorvastatin  20 mg Oral Daily With Tiffanie White MD      [facility] Hold] dexamethasone  4 mg Oral Q12H [address] Lucie Egan MD      [facility] Hold] docusate sodium  100 mg Oral Daily Lucie Egan MD      [facility] Hold] enoxaparin  40 mg Subcutaneous Daily Lucie Egan MD      [facility] Hold] insulin lispro  1-5 Units Subcutaneous Q6H [address] Lucie Egan MD      [facility] Hold] metoprolol succinate  50 mg Oral Daily Lucie Egan MD      [facility] Hold] ondansetron  4 mg Intravenous Q6H PRN Lucie Egan MD      [facility] Hold] oxyCODONE  5 mg Oral Q4H PRN Lucie Egan MD      [facility] Hold] pantoprazole  40 mg Intravenous Q24H [address] Lucie Egan MD          Today, Patient Was Seen By: ROSA Monteiro    **Please Note: This note may have been constructed using a voice recognition system  **

## 2021-04-18 NOTE — ED PROVIDER NOTES
History  Chief Complaint   Patient presents with    Cough     cough x 1 wk, localized swelling to lower neck right upper chest, b/l foot swelling, recent ca dx, being treated with [place]      Patient is a 58-year-old female past medical history of lung cancer on Keytruda, diabetes, hypertension, CHF, CAD, asthma, pericardial effusion presenting with cough  Patient states that she has had a cough for 1 week which is intermittently productive, clear, and states that she has noticed localized swelling to the right side of her neck as well as to the right upper chest   She states that the area is mildly tender to palpation in the pain is nonradiating  She states she noticed it gradually throughout the day today  She notes bilateral leg swelling for 2 weeks as well as shortness of breath which is worse when lying flat  She notes chronic and unchanged lightheadedness  Family at bedside states that she has been eating and drinking poorly over the last several days to weeks  She denies any chest pain, dysuria, fevers, dysphagia, upper respiratory symptoms, nausea vomiting, headaches, vision changes, rashes  She states that she finished radiation 1 month ago and that her last chemotherapy treatment was 3 weeks ago  She is currently completing a course of steroids, last dose 4 hours ago  Prior to Admission Medications   Prescriptions Last Dose Informant Patient Reported? Taking?    Tradjenta 5 MG TABS  Self No No   Sig: TAKE 1 TABLET BY MOUTH EVERY DAY   acetaminophen (TYLENOL) 500 mg tablet   No No   Sig: Take 2 tablets (1,000 mg total) by mouth every 8 (eight) hours   amLODIPine (NORVASC) 10 mg tablet  Self No No   Sig: TAKE 1 TABLET BY MOUTH EVERY DAY   atorvastatin (LIPITOR) 20 mg tablet  Self No No   Sig: TAKE 1 TABLET DAILY   dexamethasone (DECADRON) 4 mg tablet  Self No No   Sig: Take 1 tablet (4 mg total) by mouth 2 (two) times a day   docusate sodium (COLACE) 100 mg capsule  Self No No   Sig: Take 1 capsule (100 mg total) by mouth 2 (two) times a day   Patient taking differently: Take 100 mg by mouth daily    gabapentin (NEURONTIN) 100 mg capsule  Self No No   Sig: Take 1 capsule (100 mg total) by mouth 2 (two) times a day   metFORMIN (GLUCOPHAGE) 1000 MG tablet  Self No No   Sig: Take 1 tablet (1,000 mg total) by mouth 2 (two) times a day with meals   metoprolol succinate (TOPROL-XL) 50 mg 24 hr tablet   No No   Sig: Take 1 tablet (50 mg total) by mouth daily   ondansetron (ZOFRAN) 8 mg tablet  Self No No   Sig: Take 1 tablet (8 mg total) by mouth every 8 (eight) hours as needed for nausea or vomiting   oxyCODONE (ROXICODONE) 15 mg immediate release tablet  Self No No   Sig: Take 1 tablet (15 mg total) by mouth every 3 (three) hours as needed (cancer pain)Max Daily Amount: 120 mg   pantoprazole (PROTONIX) 20 mg tablet  Self No No   Sig: Take 1 tablet (20 mg total) by mouth daily   Patient taking differently: Take 20 mg by mouth as needed    senna (SENOKOT) 8 6 mg  Self No No   Sig: Take 1 tablet (8 6 mg total) by mouth 2 (two) times a day      Facility-Administered Medications: None       Past Medical History:   Diagnosis Date    Acute on chronic congestive heart failure with left ventricular diastolic dysfunction (HCC)     last assessed 2017    Asthma     Atelectasis     [facility]est[facility] failure[facility] [address] [facility]     Diabetes mellitus ([address] )     Diverticulitis 2017    with perforation and abscess     Hyperlipidemia     Hypertension     Lesion of spleen     Lung cancer metastatic to brain ([address] )     Pericardial effusion        Past Surgical History:   Procedure Laterality Date    ABDOMINAL SURGERY      BREAST BIOPSY Left 10/04/2018     SECTION      COLON SURGERY      COLONOSCOPY      COLOSTOMY  2017    HARTMANS PROCEDURE N/A 2017    Procedure: EXPLORATORY LAPAROTOMY; PARTIAL SMALL BOWEL RESECTION; HARTMANS PROCEDURE; OSTOMY;  Surgeon: Fatmata Arreola MD;  [facility]; Service:     HYSTERECTOMY  2018    IR BIOPSY LYMPH NODE  1/20/2021    IR LUMBAR PUNCTURE  1/20/2021    MAMMO STEREOTACTIC BREAST BIOPSY LEFT (ALL INC) Left 10/4/2018    OOPHORECTOMY Bilateral 2018    PERICARDIAL WINDOW N/A 4/18/2021    Procedure: SUBXIPHOID PERICARDIAL WINDOW;  Surgeon: Silverio Camp MD;  [facility];  Service: Thoracic    ND [address] N/A 2/12/2021    Procedure: [place];  Surgeon: Silverio Camp MD;  [facility];  Service: Thoracic    ND CLOSE ENTEROSTOMY N/A 9/8/2017    Procedure: OPEN COLOSTOMY REVERSAL;  Surgeon: Zana Stapleton MD;  [facility];  Service: General    ND COLONOSCOPY FLX DX W/COLLJ [address] PFRMD N/A 8/16/2017    Procedure: COLONOSCOPY; DILATION OF OSTOMY;  Surgeon: Zana Stapleton MD;  [facility];   Service: General    ND EXC SKIN BENIG <0 5 CM REMAINDER BODY N/A 11/29/2017    Procedure: SCALP MASS EXCISION X 2;  Surgeon: Zana Stapleton MD;  [facility];  Service: General    ND MEDIASTINOSCOPY WITH LYMPH NODE BIOPSY/IES N/A 2/12/2021    Procedure: MEDIASTINOSCOPY;  Surgeon: Silverio Camp MD;  [facility];  Service: Thoracic    [address] [place] [address] N/A 2/27/2019    Procedure: INCISIONAL HERNIA REPAIR, COMPONENT SEPARATION;  Surgeon: Zana Stapleton MD;  [facility];  Service: General    ND TOTAL ABDOM HYSTERECTOMY N/A 2/27/2019    Procedure: TOTAL ABDOMINAL HYSTERECTOMY; BSO;  Surgeon: Shravan Marcos MD;  [facility];  Service: Gynecology    VAC DRESSING APPLICATION N/A 6/5/8434    Procedure: [place];  Surgeon: Zana Stapleton MD;  [facility];  Service:        Family History   Problem Relation Age of Onset    Hypertension Mother     Diabetes Mother     No Known Problems Sister     No Known Problems Daughter     Heart disease Son     No Known Problems Maternal Aunt     Lung cancer Maternal Aunt     No Known Problems Maternal Aunt     No Known Problems Maternal Aunt     No Known Problems Paternal Aunt     Breast cancer Neg Hx      I have reviewed and agree with the history as documented  E-Cigarette/Vaping    E-Cigarette Use Never User      E-Cigarette/Vaping Substances    Nicotine No     THC No     CBD No     Flavoring No     Other No     Unknown No      Social History     Tobacco Use    Smoking status: Former Smoker     Packs/day: 0 25     Years: 20 00     Pack years: 5 00     Types: Cigarettes     Start date: 1991     Quit date: 2021     Years since quittin 2    Smokeless tobacco: Never Used   Substance Use Topics    Alcohol use: Not Currently     Alcohol/week: 1 0 standard drinks     Types: 1 Glasses of wine per week     Frequency: Monthly or less     Drinks per session: 1 or 2     Binge frequency: Never     Comment: socially    Drug use: Never       Review of Systems   All other systems reviewed and are negative  Physical Exam  Physical Exam  Vitals signs reviewed  Constitutional:       General: She is not in acute distress  Appearance: Normal appearance  She is not ill-appearing  HENT:      Mouth/Throat:      Mouth: Mucous membranes are moist    Eyes:      Conjunctiva/sclera: Conjunctivae normal    Neck:      Musculoskeletal: Normal range of motion and neck supple  No muscular tenderness  Comments: Right-sided edema adjacent to the SCM, possible lymphadenopathy  Cardiovascular:      Rate and Rhythm: Normal rate and regular rhythm  Pulses: Normal pulses  Heart sounds: Normal heart sounds  Pulmonary:      Comments: Lung sounds diminished bilaterally with scattered crackles, rales at the bilateral bases, currently no signs respiratory distress  Abdominal:      General: Abdomen is flat  Palpations: Abdomen is soft  Tenderness: There is no abdominal tenderness  Musculoskeletal: Normal range of motion           General: Swelling present  No tenderness  Right lower leg: Edema present  Left lower leg: Edema present  Comments: +1 to 2 pitting edema to the bilateral calves   Skin:     General: Skin is warm and dry  Neurological:      General: No focal deficit present  Mental Status: She is alert     Psychiatric:         Mood and Affect: Mood normal          Vital Signs  ED Triage Vitals [04/17/21 2214]   Temperature Pulse Respirations Blood Pressure SpO2   (!) 97 1 °F (36 2 °C) (!) 109 18 130/90 92 %      Temp Source Heart Rate Source Patient Position - Orthostatic VS BP Location FiO2 (%)   Temporal Monitor Sitting Left arm --      Pain Score       --           Vitals:    04/17/21 2214 04/18/21 0015 04/18/21 0227 04/18/21 0315   BP: 130/90 158/96  146/93   Pulse: (!) 109 (!) 109 105 102   Patient Position - Orthostatic VS: Sitting   Sitting         Visual Acuity      ED Medications  Medications   sodium chloride 0 9 % bolus 500 mL (0 mL Intravenous Stopped 4/18/21 0003)   iohexol (OMNIPAQUE) 350 MG/ML injection (MULTI-DOSE) 100 mL (100 mL Intravenous Given 4/18/21 0006)       Diagnostic Studies  Results Reviewed     Procedure Component Value Units Date/Time    NT-BNP PRO [[id number]]  (Abnormal) Collected: 04/17/21 2303    Lab Status: Final result Specimen: Blood from Arm, Left Updated: 04/17/21 2329     NT-proBNP 127 pg/mL     Magnesium [[id number]]  (Abnormal) Collected: 04/17/21 2303    Lab Status: Final result Specimen: Blood from Arm, Left Updated: 04/17/21 2329     Magnesium 1 2 mg/dL     Troponin I [[id number]]  (Normal) Collected: 04/17/21 2303    Lab Status: Final result Specimen: Blood from Arm, Left Updated: 04/17/21 2324     Troponin I <0 02 ng/mL     Comprehensive metabolic panel [[id number]]  (Abnormal) Collected: 04/17/21 2303    Lab Status: Final result Specimen: Blood from Arm, Left Updated: 04/17/21 2322     Sodium 131 mmol/L      Potassium 4 3 mmol/L      Chloride 93 mmol/L      CO2 27 mmol/L      ANION GAP 11 mmol/L      BUN 33 mg/dL      Creatinine 0 79 mg/dL      Glucose 284 mg/dL      Calcium 8 8 mg/dL      Corrected Calcium 9 7 mg/dL      AST 16 U/L      ALT 39 U/L      Alkaline Phosphatase 98 U/L      Total Protein 6 5 g/dL      Albumin 2 9 g/dL      Total Bilirubin 0 64 mg/dL      eGFR 94 ml/min/1 73sq m     Narrative:      National Kidney Disease Foundation guidelines for Chronic Kidney Disease (CKD):     Stage 1 with normal or high GFR (GFR > 90 mL/min/1 73 square meters)    Stage 2 Mild CKD (GFR = 60-89 mL/min/1 73 square meters)    Stage 3A Moderate CKD (GFR = 45-59 mL/min/1 73 square meters)    Stage 3B Moderate CKD (GFR = 30-44 mL/min/1 73 square meters)    Stage 4 Severe CKD (GFR = 15-29 mL/min/1 73 square meters)    Stage 5 End Stage CKD (GFR <15 mL/min/1 73 square meters)  Note: GFR calculation is accurate only with a steady state creatinine    CBC and differential [[id number]]  (Abnormal) Collected: 04/17/21 2303    Lab Status: Final result Specimen: Blood from Arm, Left Updated: 04/17/21 2310     WBC 8 17 Thousand/uL      RBC 3 55 Million/uL      Hemoglobin 11 0 g/dL      Hematocrit 34 0 %      MCV 96 fL      MCH 31 0 pg      MCHC 32 4 g/dL      RDW 16 1 %      MPV 8 6 fL      Platelets 995 Thousands/uL      nRBC 0 /100 WBCs      Neutrophils Relative 88 %      Immat GRANS % 1 %      Lymphocytes Relative 3 %      Monocytes Relative 8 %      Eosinophils Relative 0 %      Basophils Relative 0 %      Neutrophils Absolute 7 14 Thousands/µL      Immature Grans Absolute 0 11 Thousand/uL      Lymphocytes Absolute 0 22 Thousands/µL      Monocytes Absolute 0 69 Thousand/µL      Eosinophils Absolute 0 00 Thousand/µL      Basophils Absolute 0 01 Thousands/µL                  CTA ED chest PE study   Final Result by Stacey Solano MD (04/18 0125)      1  No evidence of pulmonary embolism  2   Large pericardial effusion     3   Left greater than right diffuse interlobular septal thickening and nodularity concerning for lymphangitic spread of tumor versus pneumonitis  4   Diffuse thickening of the bronchi which may be due to superimposed bronchitis  5   Small right pleural effusion  6   Increase in size of mediastinal lymphadenopathy, likely metastatic  7   Nodular thickening of bilateral adrenal glands, likely metastatic  The study was marked in [facility] for immediate notification  Workstation performed: [id number]         CT soft tissue neck with contrast   Final Result by Ramses Wilder MD (04/18 0132)      1  No significant neck mass is seen  2   Mediastinal lymphadenopathy and a few small supraclavicular lymph nodes which have an abnormal central hypodensity, likely metastatic  3   See CT of the chest performed concurrently for further evaluation  Workstation performed: [id number]         XR chest 1 view portable   Final Result by Mitra Griffith MD (04/18 1220)      New cardiomegaly  The rapid change suggests a pericardial effusion  Increased pulmonary markings suggesting pulmonary edema  Subsequent CT chest shows a pericardial effusion                    Workstation performed: [id number]                    Procedures  ECG 12 Lead Documentation Only    Date/Time: 4/17/2021 10:49 PM  Performed by: Sheila Aleman DO  Authorized by: Sheila Aleman DO     ECG reviewed by me, the ED Provider: yes    Patient location:  ED  Previous ECG:     Previous ECG:  Compared to current    Comparison ECG info:  New T-wave inversion in V4  Interpretation:     Interpretation: non-specific    Rate:     ECG rate assessment: tachycardic    Rhythm:     Rhythm: sinus rhythm    Ectopy:     Ectopy: none    QRS:     QRS axis:  Normal    QRS intervals:  Normal  Conduction:     Conduction: normal    ST segments:     ST segments:  Non-specific    Elevation:  I, II, III and aVF    Depression:  V4, V5 and V6  T waves:     T waves: inverted      Inverted:  V4, V5, V6, I, II, III and aVF    CriticalCare Time  Performed by: Lluvia Mcgarry DO  Authorized by: Lluvia Mcgarry DO     Critical care provider statement:     Critical care time (minutes):  40    Critical care was necessary to treat or prevent imminent or life-threatening deterioration of the following conditions:  Respiratory failure, circulatory failure and cardiac failure    Critical care was time spent personally by me on the following activities:  Obtaining history from patient or surrogate, development of treatment plan with patient or surrogate, discussions with consultants, evaluation of patient's response to treatment, examination of patient, interpretation of cardiac output measurements, ordering and performing treatments and interventions, ordering and review of laboratory studies, ordering and review of radiographic studies, re-evaluation of patient's condition and review of old charts             ED Course  ED Course as of Apr 21 0929   Sun Apr 18, 2021   0209 Patient with large pericardial effusion on CT, now tachycardic to 120s, patient requiring oxygen which she states that she does not have at home  Will transfer to [facility] for further management  4722 Have spoken with Cardiothoracic surgery who recommends transfer to [facility], awaiting medicine accepting  MDM  Number of Diagnoses or Management Options  Pericardial effusion:   Diagnosis management comments: Patient is a 28-year-old female past medical history of lung carcinoma on Keytruda, diabetes, hypertension, CAD, CHF, asthma, pericardial effusion presenting with shortness of breath    Patient is currently saturating appropriately on 2 L home oxygen with no signs respiratory distress but with diminished lung sounds bilaterally with scattered rales and wheezes most so to the bases and mid lung fields bilaterally, mild lower extremity edema, and mild right-sided neck edema which may be lymphadenopathy however in the setting of known cancer will obtain CT of the neck and chest as well as cardiac workup and continue to monitor  Patient denies any pain at this time  Disposition  Final diagnoses:   Pericardial effusion     Time reflects when diagnosis was documented in both MDM as applicable and the Disposition within this note     Time User Action Codes Description Comment    4/18/2021  3:07 AM Franklin Creed Add [I31 3] Pericardial effusion     4/18/2021  3:11 AM Vahe Wharton Add [C34 90,  C79 31] Lung cancer metastatic to brain [facility])       ED Disposition     ED Disposition Condition Date/Time Comment    Transfer to Another Facility-In Network  [place] Apr 18, 2021  3:07 AM Amarjit Rivera should be transferred out to [facility]          MD Documentation      Most Recent Value   Patient Condition  The patient has been stabilized such that within reasonable medical probability, no material deterioration of the patient condition or the condition of the unborn child(otilio) is likely to result from the transfer   Reason for Transfer  Level of Care needed not available at this facility   Benefits of Transfer  Specialized equipment and/or services available at the receiving facility (Include comment)________________________   Accepting Physician  DR Chadwick Name, [facility]    (Name & Tel number)  PACS   Sending MD DR Ramsey   Provider Certification  General risk, such as traffic hazards, adverse weather conditions, rough terrain or turbulence, possible failure of equipment (including vehicle or aircraft), or consequences of actions of persons outside the control of the transport personnel, Unanticipated needs of medical equipment and personnel during transport, Risk of worsening condition, The possibility of a transport vehicle being unavailable      RN Documentation      [facility] Name, Kami Lara 38 Assignment  [id number]  (Name & Tel number)  PACS   Report Given to  ASHLEY FAIRBANKS      Follow-up Information    None         Discharge Medication List as of 4/18/2021  4:13 AM      CONTINUE these medications which have NOT CHANGED    Details   acetaminophen (TYLENOL) 500 mg tablet Take 2 tablets (1,000 mg total) by mouth every 8 (eight) hours, Starting Wed 4/7/2021, Normal      amLODIPine (NORVASC) 10 mg tablet TAKE 1 TABLET BY MOUTH EVERY DAY, Normal      atorvastatin (LIPITOR) 20 mg tablet TAKE 1 TABLET DAILY, Normal      dexamethasone (DECADRON) 4 mg tablet Take 1 tablet (4 mg total) by mouth 2 (two) times a day, Starting Mon 3/29/2021, Normal      docusate sodium (COLACE) 100 mg capsule Take 1 capsule (100 mg total) by mouth 2 (two) times a day, Starting Wed 2/24/2021, Normal      gabapentin (NEURONTIN) 100 mg capsule Take 1 capsule (100 mg total) by mouth 2 (two) times a day, Starting Thu 2/18/2021, Normal      metFORMIN (GLUCOPHAGE) 1000 MG tablet Take 1 tablet (1,000 mg total) by mouth 2 (two) times a day with meals, Starting Thu 5/21/2020, Normal      metoprolol succinate (TOPROL-XL) 50 mg 24 hr tablet Take 1 tablet (50 mg total) by mouth daily, Starting Tue 4/13/2021, Normal      ondansetron (ZOFRAN) 8 mg tablet Take 1 tablet (8 mg total) by mouth every 8 (eight) hours as needed for nausea or vomiting, Starting Fri 2/5/2021, Normal      oxyCODONE (ROXICODONE) 15 mg immediate release tablet Take 1 tablet (15 mg total) by mouth every 3 (three) hours as needed (cancer pain)Max Daily Amount: 120 mg, Starting Thu 2/18/2021, Normal      pantoprazole (PROTONIX) 20 mg tablet Take 1 tablet (20 mg total) by mouth daily, Starting Fri 2/5/2021, Normal      senna (SENOKOT) 8 6 mg Take 1 tablet (8 6 mg total) by mouth 2 (two) times a day, Starting Wed 2/24/2021, Normal      Tradjenta 5 MG TABS TAKE 1 TABLET BY MOUTH EVERY DAY, Normal           No discharge procedures on file      PDMP Review       Value Time User    PDMP Reviewed  Yes 2/18/2021 11:18 AM Paty Jernigan MD          ED Provider  Electronically Signed by           Yanira Arzola DO  04/21/21 8040

## 2021-04-18 NOTE — EMTALA/ACUTE CARE TRANSFER
[address] [facility] [address]  [address] [place] [address]  Dept: [phone]      EMTALA TRANSFER CONSENT    NAME Amarjit Rivera                                         1960                              MRN [id number]    I have been informed of my rights regarding examination, treatment, and transfer   by Dr Lina Valdez DO    Benefits: Specialized equipment and/or services available at the receiving facility (Include comment)________________________    Risks:        Transfer Request   I acknowledge that my medical condition has been evaluated and explained to me by the emergency department physician or other qualified medical person and/or my attending physician who has recommended and offered to me further medical examination and treatment  I understand the Hospital's obligation with respect to the treatment and stabilization of my emergency medical condition  I nevertheless request to be transferred  I release the Hospital, the doctor, and any other persons caring for me from all responsibility or liability for any injury or ill effects that may result from my transfer and agree to accept all responsibility for the consequences of my choice to transfer, rather than receive stabilizing treatment at the Hospital  I understand that because the transfer is my request, my insurance may not provide reimbursement for the services  The Hospital will assist and direct me and my family in how to make arrangements for transfer, but the hospital is not liable for any fees charged by the transport service  In spite of this understanding, I refuse to consent to further medical examination and treatment which has been offered to me, and request transfer to [address], [address] : [facility]  I authorize the performance of emergency medical procedures and treatments upon me in both transit and upon arrival at the receiving facility    Additionally, I authorize the release of any and all medical records to the receiving facility and request they be transported with me, if possible  I authorize the performance of emergency medical procedures and treatments upon me in both transit and upon arrival at the receiving facility  Additionally, I authorize the release of any and all medical records to the receiving facility and request they be transported with me, if possible  I understand that the safest mode of transportation during a medical emergency is an ambulance and that the Hospital advocates the use of this mode of transport  Risks of traveling to the receiving facility by car, including absence of medical control, life sustaining equipment, such as oxygen, and medical personnel has been explained to me and I fully understand them  (RUPA CORRECT BOX BELOW)  [  ]  I consent to the stated transfer and to be transported by ambulance/helicopter  [  ]  I consent to the stated transfer, but refuse transportation by ambulance and accept full responsibility for my transportation by car  I understand the risks of non-ambulance transfers and I exonerate the Hospital and its staff from any deterioration in my condition that results from this refusal     X___________________________________________    DATE  21  TIME________  Signature of patient or legally responsible individual signing on patient behalf           RELATIONSHIP TO PATIENT_________________________          Provider Certification    NAME Miroslava Gardiner                                        [facility] 1960                              MRN [id number]    A medical screening exam was performed on the above named patient  Based on the examination:    Condition Necessitating Transfer The encounter diagnosis was Pericardial effusion      Patient Condition: The patient has been stabilized such that within reasonable medical probability, no material deterioration of the patient condition or the condition of the unborn child(otilio) is likely to result from the transfer    Reason for Transfer: Level of Care needed not available at this facility    Transfer Requirements: [address]   · Space available and qualified personnel available for treatment as acknowledged by PACS  · Agreed to accept transfer and to provide appropriate medical treatment as acknowledged by       Tea Gautam  · Appropriate medical records of the examination and treatment of the patient are provided at the time of transfer   [address] [facility], [address] _______  · Transfer will be performed by qualified personnel from    and appropriate transfer equipment as required, including the use of necessary and appropriate life support measures  Provider Certification: I have examined the patient and explained the following risks and benefits of being transferred/refusing transfer to the patient/family:  General risk, such as traffic hazards, adverse weather conditions, rough terrain or turbulence, possible failure of equipment (including vehicle or aircraft), or consequences of actions of persons outside the control of the transport personnel, Unanticipated needs of medical equipment and personnel during transport, Risk of worsening condition, The possibility of a transport vehicle being unavailable      Based on these reasonable risks and benefits to the patient and/or the unborn child(otilio), and based upon the information available at the time of the patients examination, I certify that the medical benefits reasonably to be expected from the provision of appropriate medical treatments at another medical facility outweigh the increasing risks, if any, to the individuals medical condition, and in the case of labor to the unborn child, from effecting the transfer      X____________________________________________ DATE 04/18/21        TIME_______      ORIGINAL - SEND TO MEDICAL RECORDS   COPY - SEND WITH PATIENT DURING TRANSFER

## 2021-04-18 NOTE — ANESTHESIA POSTPROCEDURE EVALUATION
Post-Op Assessment Note    CV Status:  Stable    Pain management: adequate     Mental Status:  Alert and sleepy   Hydration Status:  Euvolemic   PONV Controlled:  Controlled   Airway Patency:  Patent      Post Op Vitals Reviewed: Yes      Staff: CRNA         No complications documented      BP   160/78   Temp   97 5   Pulse  90   Resp   16   SpO2   99

## 2021-04-18 NOTE — ASSESSMENT & PLAN NOTE
Evaluation by Cardiothoracic surgery for possible pericardial window  Oncology consult for follow-up  NPO starting now  Will follow metabolic profile, CBC with coagulation profile and type and screen in preparation for possible CT surgery procedure  Continue oxygen supplementation  Patient has been on dexamethasone 4 milligrams twice daily for the cerebral edema  Will continue

## 2021-04-18 NOTE — CONSULTS
Consultation - Thoracic Surgery  Franco Green 62 y o  female MRN: [id number]  Unit/Bed#: [facility] 426-01 Encounter: [id number]        Assessment/Plan     Assessment:  62 F w/ lung cancer and mets with malignant pericardial effusion    Plan:   OR today for pericardial window   NPO   IVF   Pain control   Primary per medicine    History of Present Illness     HPI:  Franco Green is a 62 y o  female who presented to [facility] due to cough and localized swelling of the upper extremities  She underwent a CT scan which showed a large pericardial effusion  Patient has been stable  She was transferred to [facility] for further management by Thoracic Surgery    Review of Systems   Constitutional: Negative for activity change, chills and fever  HENT: Negative for congestion and trouble swallowing  Eyes: Negative for photophobia and visual disturbance  Respiratory: Positive for cough  Negative for chest tightness and shortness of breath  Cardiovascular: Negative for chest pain and palpitations  Gastrointestinal: Negative for abdominal distention and abdominal pain  Genitourinary: Negative for difficulty urinating and dysuria  Musculoskeletal: Negative for arthralgias and myalgias  Skin: Negative for color change and pallor  Neurological: Negative for dizziness and weakness  Hematological: Negative for adenopathy  Does not bruise/bleed easily  Psychiatric/Behavioral: Negative for agitation and confusion  All other systems reviewed and are negative          Historical Information   Past Medical History:   Diagnosis Date    Acute on chronic congestive heart failure with left ventricular diastolic dysfunction (HCC)     last assessed 5/23/2017    Asthma     Atelectasis     [facility]     Diabetes mellitus [facility]     Diverticulitis 2017    with perforation and abscess     Hyperlipidemia     Hypertension     Lesion of spleen     Lung cancer metastatic to brain ([facility]     Pericardial effusion      Past Surgical History:   Procedure Laterality Date    ABDOMINAL SURGERY      BREAST BIOPSY Left 10/04/2018     SECTION      COLON SURGERY      COLONOSCOPY      COLOSTOMY  2017    HARTMANS PROCEDURE N/A 2017    Procedure: EXPLORATORY LAPAROTOMY; PARTIAL SMALL BOWEL RESECTION; HARTMANS PROCEDURE; OSTOMY;  Surgeon: Precious Ferrara MD;  [facility];  Service:     HYSTERECTOMY  2018    IR BIOPSY LYMPH NODE  2021    IR LUMBAR PUNCTURE  2021    MAMMO STEREOTACTIC BREAST BIOPSY LEFT (ALL INC) Left 10/4/2018    OOPHORECTOMY Bilateral 2018    ND BRONCHOSCOPY,DIAGNOSTIC N/A 2021    Procedure: BRONCHOSCOPY FLEXIBLE;  Surgeon: Hugo Kruse MD;  [facility];  Service: Thoracic    ND CLOSE ENTEROSTOMY N/A 2017    Procedure: OPEN COLOSTOMY REVERSAL;  Surgeon: Precious Ferrara MD;  [facility];  Service: General    ND COLONOSCOPY FLX DX W/COLLJ [address] PFRMD N/A 2017    Procedure: COLONOSCOPY; DILATION OF OSTOMY;  Surgeon: Precious Ferrara MD;  Location: [facility];   Service: General    ND EXC SKIN BENIG <0 5 CM REMAINDER BODY N/A 2017    Procedure: SCALP MASS EXCISION X 2;  Surgeon: Precious Ferrara MD;  [facility];  Service: General    ND MEDIASTINOSCOPY WITH LYMPH NODE BIOPSY/IES N/A 2021    Procedure: MEDIASTINOSCOPY;  Surgeon: Hugo Kruse MD;  [facility];  Service: Thoracic    [address] N/A 2019    Procedure: INCISIONAL HERNIA REPAIR, COMPONENT SEPARATION;  Surgeon: Precious Ferrara MD;  [facility];  Service: General    ND TOTAL ABDOM HYSTERECTOMY N/A 2019    Procedure: TOTAL ABDOMINAL HYSTERECTOMY; BSO;  Surgeon: Rosalina Carolina MD;  [facility];  Service: Gynecology    VAC DRESSING APPLICATION N/A     Procedure: APPLICATION VAC DRESSING;  Surgeon: Precious Ferrara MD;  [facility];  Service: Social History   Social History     Substance and Sexual Activity   Alcohol Use Not Currently    Alcohol/week: 1 0 standard drinks    Types: 1 Glasses of wine per week    Frequency: Monthly or less    Drinks per session: 1 or 2    Binge frequency: Never    Comment: socially     Social History     Substance and Sexual Activity   Drug Use Never     Social History     Tobacco Use   Smoking Status Former Smoker    Packs/day: 0 25    Years: 20 00    Pack years: 5 00    Types: Cigarettes    Start date: 1991   Michelle Powell Quit date: 2021    Years since quittin 2   Smokeless Tobacco Never Used     Family History:   Family History   Problem Relation Age of Onset    Hypertension Mother     Diabetes Mother     No Known Problems Sister     No Known Problems Daughter     Heart disease Son     No Known Problems Maternal Aunt     Lung cancer Maternal Aunt     No Known Problems Maternal Aunt     No Known Problems Maternal Aunt     No Known Problems Paternal Aunt     Breast cancer Neg Hx        Meds/Allergies   PTA meds:   Prior to Admission Medications   Prescriptions Last Dose Informant Patient Reported? Taking?    Tradjenta 5 MG TABS  Self No No   Sig: TAKE 1 TABLET BY MOUTH EVERY DAY   acetaminophen (TYLENOL) 500 mg tablet   No No   Sig: Take 2 tablets (1,000 mg total) by mouth every 8 (eight) hours   amLODIPine (NORVASC) 10 mg tablet  Self No No   Sig: TAKE 1 TABLET BY MOUTH EVERY DAY   atorvastatin (LIPITOR) 20 mg tablet  Self No No   Sig: TAKE 1 TABLET DAILY   dexamethasone (DECADRON) 4 mg tablet  Self No No   Sig: Take 1 tablet (4 mg total) by mouth 2 (two) times a day   docusate sodium (COLACE) 100 mg capsule  Self No No   Sig: Take 1 capsule (100 mg total) by mouth 2 (two) times a day   Patient taking differently: Take 100 mg by mouth daily    gabapentin (NEURONTIN) 100 mg capsule  Self No No   Sig: Take 1 capsule (100 mg total) by mouth 2 (two) times a day   metFORMIN (GLUCOPHAGE) 1000 MG tablet  Self No No   Sig: Take 1 tablet (1,000 mg total) by mouth 2 (two) times a day with meals   metoprolol succinate (TOPROL-XL) 50 mg 24 hr tablet   No No   Sig: Take 1 tablet (50 mg total) by mouth daily   ondansetron (ZOFRAN) 8 mg tablet  Self No No   Sig: Take 1 tablet (8 mg total) by mouth every 8 (eight) hours as needed for nausea or vomiting   oxyCODONE (ROXICODONE) 15 mg immediate release tablet  Self No No   Sig: Take 1 tablet (15 mg total) by mouth every 3 (three) hours as needed (cancer pain)Max Daily Amount: 120 mg   pantoprazole (PROTONIX) 20 mg tablet  Self No No   Sig: Take 1 tablet (20 mg total) by mouth daily   Patient taking differently: Take 20 mg by mouth as needed    senna (SENOKOT) 8 6 mg  Self No No   Sig: Take 1 tablet (8 6 mg total) by mouth 2 (two) times a day      Facility-Administered Medications: None     Allergies   Allergen Reactions    Ciprofloxacin Itching     Starts at hands to feet then the whole entire body       Objective   First Vitals:   Blood Pressure: 126/86 (04/18/21 0750)  Pulse: (!) 108 (04/18/21 0750)  Temperature: 97 6 °F (36 4 °C) (04/18/21 0750)  Temp Source: Oral (04/18/21 0750)  Respirations: 22 (04/18/21 0750)  Weight - Scale: 69 5 kg (153 lb 3 5 oz) (04/18/21 0454)  SpO2: 97 % (04/18/21 0750)    Current Vitals:   Blood Pressure: 126/86 (04/18/21 0750)  Pulse: (!) 108 (04/18/21 0750)  Temperature: 97 6 °F (36 4 °C) (04/18/21 0750)  Temp Source: Oral (04/18/21 0750)  Respirations: 22 (04/18/21 0750)  Weight - Scale: 69 5 kg (153 lb 3 5 oz) (04/18/21 0454)  SpO2: 97 % (04/18/21 0750)    No intake or output data in the 24 hours ending 04/18/21 0916    Invasive Devices     Peripheral Intravenous Line            Peripheral IV 04/17/21 Left Antecubital less than 1 day                Physical Exam  Vitals signs reviewed  Constitutional:       General: She is not in acute distress  Appearance: She is not toxic-appearing     HENT:      Head: Normocephalic and atraumatic  Right Ear: External ear normal       Left Ear: External ear normal       Nose: Nose normal  No rhinorrhea  Mouth/Throat:      Mouth: Mucous membranes are moist       Pharynx: Oropharynx is clear  Eyes:      General: No scleral icterus  Extraocular Movements: Extraocular movements intact  Conjunctiva/sclera: Conjunctivae normal       Pupils: Pupils are equal, round, and reactive to light  Neck:      Musculoskeletal: Normal range of motion and neck supple  Cardiovascular:      Rate and Rhythm: Normal rate and regular rhythm  Pulses: Normal pulses  Pulmonary:      Effort: Pulmonary effort is normal  No respiratory distress  Abdominal:      General: There is no distension  Palpations: Abdomen is soft  Musculoskeletal:         General: No swelling or deformity  Skin:     General: Skin is warm  Coloration: Skin is not jaundiced  Neurological:      General: No focal deficit present  Mental Status: She is alert and oriented to person, place, and time  Psychiatric:         Mood and Affect: Mood normal          Behavior: Behavior normal            Lab Results: I have personally reviewed pertinent lab results  Imaging: I have personally reviewed pertinent reports  EKG, Pathology, and Other Studies: I have personally reviewed pertinent reports        Code Status: Level 1 - Full Code  Advance Directive and Living Will:      Power of :    POLST:

## 2021-04-18 NOTE — ANESTHESIA PREPROCEDURE EVALUATION
Procedure:  WINDOW PERICARDIAL (N/A Chest)    Pericardial effusion that is likely chronic in nature    Previous ECHO  EF 50%, DD1,   Relevant Problems   CARDIO   (+) CAD in native artery   (+) Essential hypertension   (+) Hyperlipidemia      ENDO   (+) Diabetes mellitus type 2      /RENAL   (+) Diabetic nephropathy associated with type 2 diabetes mellitus (HCC)      MUSCULOSKELETAL   (+) Sciatica of left side      PULMONARY   (+) Uncomplicated asthma        Physical Exam    Airway    Mallampati score: II  TM Distance: >3 FB  Neck ROM: full     Dental   No notable dental hx     Cardiovascular  Rhythm: regular, Rate: normal, Cardiovascular exam normal    Pulmonary  Pulmonary exam normal Breath sounds clear to auscultation,     Other Findings  Large overbite      Anesthesia Plan  ASA Score- 4     Anesthesia Type- general with ASA Monitors  Additional Monitors: arterial line  Airway Plan: ETT  Comment: Risks/benefits and alternatives discussed with patient including likely possibility of PONV and sore throat, as well as the rare possibilities of aspiration, dental/oropharyngeal/ocular injuries, or grave/life threatening anesthetic and surgical emergencies          Plan Factors-Exercise tolerance (METS): <4 METS  Patient summary reviewed  Patient instructed to abstain from smoking on day of procedure  Patient did not smoke on day of surgery  Induction- intravenous  Postoperative Plan- Plan for postoperative opioid use  Planned trial extubation    Informed Consent- Anesthetic plan and risks discussed with patient  I personally reviewed this patient with the CRNA  Discussed and agreed on the Anesthesia Plan with the CRNA  Annabelle Murray

## 2021-04-18 NOTE — OP NOTE
OPERATIVE REPORT  PATIENT NAME: Chon Martinez    :  1960  MRN: [id number]  Pt Location: [facility] OR ROOM 08    SURGERY DATE: 2021    Surgeon(s) and Role:     * Sabina Paez MD - Primary     * Mechelle Bernard MD - Assisting    Preop Diagnosis:  Pericardial effusion [I31 3]    Post-Op Diagnosis Codes:     * Pericardial effusion [I31 3]    Procedure(s) (LRB):  WINDOW PERICARDIAL (N/A)    Specimen(s):  ID Type Source Tests Collected by Time Destination   1 :  Body Fluid Pericardial Fluid NON-GYNECOLOGIC CYTOLOGY Sabina Paez MD 2021 1306    2 :  Tissue Pericardium TISSUE EXAM Sabina Paez MD 2021 1313    A :  Body Fluid Pericardial Fluid ANAEROBIC CULTURE AND GRAM STAIN, FUNGAL CULTURE, BODY FLUID CULTURE AND GRAM STAIN, AFB CULTURE WITH STAIN Sabina Paez MD 2021 1306        Estimated Blood Loss:   50 mL    Drains:  Closed/Suction Drain Midline Chest Bulb 19 Fr  (Active)   Number of days: 0       Anesthesia Type:   General    Operative Indications:  Pericardial effusion [I31 3]    Operative Findings:  720cc sanguineous effusion    Complications:   None    Procedure and Technique:  The patient was correctly identified in the holding area and was brought to the operating room and placed in the supine position  The patient was prepped and draped in the usual fashion  A timeout was performed confirming procedure and patient  A 5 cm incision was made over the xiphoid and dissection was carried down onto the xiphoid  The xiphoid was excised  The midline fascia was opened  A retractor was placed under the bottom sternal edge and a retrosternal plane was dissected  The pericardium was identified and grasped with an Allis clamp  An incision was made with a Metzenbaum scissors and the fluid was released  Some of the fluid was sent for cytology and culture  A 720 cc pericardial effusion that appeared sanguineous was drained    Once all the fluid was drained, a piece of anterior pericardium approximately 2 x 3 cm was excised and sent for routine pathology  There are no pericardial masses on digital palpation  A 19 [employer] Mike Cassis was placed through a separate stab incision in the left upper quadrant to lie along the diaphragmatic surface within the pericardium  This drain was sutured in place and placed to Bulb drainage  Hemostasis was adequate  The midline fascia was closed with a running #1 PDS  The subcutaneous and subcuticular layers were closed with running absorbable suture  The incision was dressed with Steri-strips, telfa and a dry sterile dressing  Sponge and instrument counts were correct x2  The patient went to the PACU in stable condition     I was present for the entire procedure    Patient Disposition:  PACU  and hemodynamically stable    SIGNATURE: Shavonne Perez MD  DATE: April 18, 2021  TIME: 1:20 PM

## 2021-04-18 NOTE — ASSESSMENT & PLAN NOTE
· Evaluation by Cardiothoracic surgery for possible pericardial window  · Oncology consult for follow-up  · NPO for pericardial window today per thoracic surgery, for possible malignant pericardial pleural effusion  · Will follow metabolic profile, CBC with coagulation profile and type and screen completed  · Continue oxygen supplementation  · Patient has been on dexamethasone 4 milligrams twice daily for the cerebral edema  Will continue

## 2021-04-18 NOTE — H&P
[address] [facility] 1960, 62 y o  female MRN: [id number]  Unit/Bed#: [facility] 426-01 Encounter: [id number]  Primary Care Provider: Guillermo Olsen DO   Date and time admitted to hospital: 4/18/2021  4:53 AM    * Malignant pericardial effusion [facility])  Assessment & Plan  Evaluation by Cardiothoracic surgery for possible pericardial window  Oncology consult for follow-up  NPO starting now  Will follow metabolic profile, CBC with coagulation profile and type and screen in preparation for possible CT surgery procedure  Continue oxygen supplementation  Patient has been on dexamethasone 4 milligrams twice daily for the cerebral edema  Will continue  Diabetes mellitus type 2  Assessment & Plan  Lab Results   Component Value Date    HGBA1C 5 3 01/05/2021     Patient on both linagliptin and metformin; would put on hold due to patient's NPO status  Check blood sugars per protocol  Every 6 hours for now due to NPO status  Insulin sliding scale  No results for input(s): POCGLU in the last 72 hours  Blood Sugar Average: Last 72 hrs:      Lung cancer metastatic to brain [facility])  Assessment & Plan  Patient on dexamethasone 4 mg tablet twice daily  History of smoking  Assessment & Plan  Currently patient has not been smoking  Hyperlipidemia  Assessment & Plan  Continue Lipitor 20 mg daily    Essential hypertension  Assessment & Plan  Continue Norvasc 10 mg daily  Metoprolol succinate 50 mg q day    VTE Prophylaxis: Enoxaparin (Lovenox)  / sequential compression device   Code Status: Level 1 - Full Code full Code  POLST: There is no POLST form on file for this patient (pre-hospital)    Anticipated Length of Stay:  Patient will be admitted on an Inpatient basis with an anticipated length of stay of  greater than 2 midnights  Justification for Hospital Stay: Please see detailed plans noted above      Chief Complaint:     Cough with shortness of breath  History of Present Illness:  Karena Poon is a 62 y o  female with a long history of smoking; recently diagnosed with lung cancer and subsequently leptomeninges it is secondary to the lung cancer itself  Likewise, she has cancer related pain, 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, hyperlipidemia, essential hypertension and diabetes mellitus type 2  patient was seen on March 31, 2021 by Hematology-Oncology Sebastian Bright)  and her oncologic history is as follows:     Lung cancer metastatic to brain ([facility] [address] )   2/5/2021 Initial Diagnosis   A  Level 2L lymph node (excision):      - Metastatic non-small cell carcinoma with necrosis compatible with pulmonary adenocarcinoma involving three (3) of three (3) lymph nodes  - Extranodal extension present  B  Level 4R lymph node #1 (excision):      - Metastatic non-small cell carcinoma with necrosis compatible with pulmonary adenocarcinoma involving portions of fibroconnective tissue and scant lymphoid tissue  C  Level 4R lymph node #2 (excision):      - Metastatic non-small cell carcinoma with necrosis compatible with pulmonary adenocarcinoma involving portions of fibroconnective tissue and scant lymphoid tissue  D  Level 4L lymph node (excision):      - Metastatic non-small cell carcinoma compatible with pulmonary adenocarcinoma involving two (2) of two (2) lymph nodes  CT TAP 1/17/21   1  Mediastinal and right hilar lymphadenopathy  2   Chronic splenic mass, slowly enlarging since 2017  This is almost certainly benign and the differential diagnosis includes hamartoma, hemorrhagic cyst, littoral cell angioma or lymphangioma  3   No evidence of primary malignancy in the chest, abdomen or pelvis  MRI Brain 1/18/21   Metastatic disease, with 2 Parenchymal lesions, 2 1 and 5 mm in the greatest linear dimension  There is diffuse enhancement of the internal auditory canals bilaterally, which warrants lumbar puncture to exclude leptomeningeal tumor  Leptomeningitis, carcinomatous ([facility] [address] )   2/10/2021 Initial Diagnosis    Leptomeningitis, carcinomatous ([facility] [address] )    2/10/2021 - 2/24/2021 Radiation    Whole brain to 3000 cGy in 10 fractions    L1-S3 spine 3000 cGy in 10 fractions      3/3/2021 -  [place] 100% 3/4/21 NGS no target mutations 3/9/21     Staging form: Lung, AJCC 8th Edition   - Clinical stage from 3/3/2021: Stage IV (cT0, cNX, cM1) - Signed by Donald Mays MD on 3/3/2021   Stage prefix: Initial diagnosis   Sites of metastasis: Brain, Other   Type of lung cancer: Locally advanced or metastatic non-small cell lung cancer   Stage used in treatment planning: Yes   National guidelines used in treatment planning: Yes   Type of national guideline used in treatment planning: NCCN      3/10/2021 -  Chemotherapy    pembrolizumab (KEYTRUDA) 200 mg in sodium chloride 0 9 % 50 mL IVPB, 200 mg, Intravenous, Once, 1 of 6 cycles   Administration: 200 mg (3/10/2021)      3/12/2021 - 3/12/2021 Chemotherapy    cyanocobalamin injection 1,000 mcg, 1,000 mcg, Intramuscular, Once, 1 of 3 cycles   Administration: 1,000 mcg (3/3/2021)   fosaprepitant (EMEND) 150 mg in sodium chloride 0 9 % 250 mL IVPB, 150 mg, Intravenous, Once, 0 of 6 cycles   CARBOplatin (PARAPLATIN) in sodium chloride 0 9 % 250 mL IVPB, , Intravenous, Once, 0 of 6 cycles   PEMEtrexed (ALIMTA) 850 mg in sodium chloride 0 9 % 100 mL chemo infusion, 500 mg/m2, Intravenous, Once, 0 of 6 cycles   pembrolizumab (KEYTRUDA) 200 mg in sodium chloride 0 9 % 50 mL IVPB, 200 mg, Intravenous, Once, 0 of 6 cycles        2/10/2021 - 2/24/2021 Radiation    Whole brain to 3000 cGy in 10 fractions    L1-S3 spine 3000 cGy in 10 fractions       3/12/2021 - 3/12/2021 Chemotherapy      cyanocobalamin injection 1,000 mcg, 1,000 mcg, Intramuscular, Once, 1 of 3 cycles     Administration: 1,000 mcg (3/3/2021)     fosaprepitant (EMEND) 150 mg in sodium chloride 0 9 % 250 mL IVPB, 150 mg, Intravenous, Once, 0 of 6 cycles   CARBOplatin (PARAPLATIN) in sodium chloride 0 9 % 250 mL IVPB, , Intravenous, Once, 0 of 6 cycles   PEMEtrexed (ALIMTA) 850 mg in sodium chloride 0 9 % 100 mL chemo infusion, 500 mg/m2, Intravenous, Once, 0 of 6 cycles   pembrolizumab (KEYTRUDA) 200 mg in sodium chloride 0 9 % 50 mL IVPB, 200 mg, Intravenous, Once, 0 of 6 cycles     Patient is seen in [facility] due to a cough and she was also noted to have localized swelling of the lower neck, right upper chest, bilateral feet swelling  because of that she underwent CT scan of the chest which unfortunately showed large pleural effusion  Patient is likewise tachycardic with note of hypoxia and currently at 2 L, is saturating in the mid 90s  The patient is therefore transferred to [facility] for further evaluation and possibly pericardial window with CT surgery  Currently, patient is chest complaining of being hungry and wanting something to drink  However patient is currently NPO  Otherwise she is not currently in any pain  Review of Systems:    Constitutional:  Denies fever or chills   Eyes:  Denies change in visual acuity   HENT:  Denies nasal congestion or sore throat   Respiratory:  With shortness of breath  Currently without any cough    Cardiovascular:  Denies chest pain or edema   GI:  Denies abdominal pain, nausea, vomiting, bloody stools or diarrhea   :  Denies dysuria   Musculoskeletal:  Denies back pain or joint pain   Integument:  Denies rash   Neurologic:  Denies headache, focal weakness or sensory changes   Endocrine:  Denies polyuria or polydipsia   Lymphatic:  Denies swollen glands   Psychiatric:  Denies depression or anxiety     Past Medical and Surgical History:   Past Medical History:   Diagnosis Date    Acute on chronic congestive heart failure with left ventricular diastolic dysfunction ([facility] [address] )     last assessed 5/23/2017    Asthma     Atelectasis     CHF [facility]congestive heart failure) [facility] [address] [facility]     Diabetes mellitus ([facility] [address] )     Diverticulitis 2017    with perforation and abscess     Hyperlipidemia     Hypertension     Lesion of spleen     [facility]     Pericardial effusion      Past Surgical History:   Procedure Laterality Date    ABDOMINAL SURGERY      BREAST BIOPSY Left 10/04/2018     SECTION      COLON SURGERY      COLONOSCOPY      COLOSTOMY  2017    HARTMANS PROCEDURE N/A 2017    Procedure: EXPLORATORY LAPAROTOMY; PARTIAL SMALL BOWEL RESECTION; HARTMANS PROCEDURE; OSTOMY;  Surgeon: Donald Camara MD;  [facility];  Service:     HYSTERECTOMY  2018    IR BIOPSY LYMPH NODE  2021    IR LUMBAR PUNCTURE  2021    MAMMO STEREOTACTIC BREAST BIOPSY LEFT (ALL INC) Left 10/4/2018    OOPHORECTOMY Bilateral 2018    OK BRONCHOSCOPY,DIAGNOSTIC N/A 2021    Procedure: BRONCHOSCOPY FLEXIBLE;  Surgeon: Ricarda Conroy MD;  [facility]: [facility];  Service: Thoracic    OK CLOSE ENTEROSTOMY N/A 2017    Procedure: OPEN COLOSTOMY REVERSAL;  Surgeon: Donald Camara MD;  [facility];  Service: General    OK COLONOSCOPY FLX DX W/COLLJ [address] PFRMD N/A 2017    Procedure: COLONOSCOPY; DILATION OF OSTOMY;  Surgeon: Donald Camara MD;  Location: [facility];   Service: General    OK EXC SKIN BENIG <0 5 CM REMAINDER BODY N/A 2017    Procedure: SCALP MASS EXCISION X 2;  Surgeon: Donald Camara MD;  [facility];  Service: General    OK MEDIASTINOSCOPY WITH LYMPH NODE BIOPSY/IES N/A 2021    Procedure: MEDIASTINOSCOPY;  Surgeon: Ricarda Conroy MD;  [facility];  Service: Thoracic    [id number] [place] [address] N/A 2019    Procedure: INCISIONAL HERNIA REPAIR, COMPONENT SEPARATION;  Surgeon: Donald Camara MD;  [facility];  Service: General    OK TOTAL ABDOM HYSTERECTOMY N/A 2019    Procedure: TOTAL ABDOMINAL HYSTERECTOMY; BSO;  Surgeon: Barbara Nuñez MD Nuha;  [facility];  Service: Gynecology    VAC DRESSING APPLICATION N/A 621    Procedure: APPLICATION VAC DRESSING;  Surgeon: Anali Jackson MD;  [facility];  Service:        Meds/Allergies:  Medications Prior to Admission   Medication    acetaminophen (TYLENOL) 500 mg tablet    amLODIPine (NORVASC) 10 mg tablet    atorvastatin (LIPITOR) 20 mg tablet    dexamethasone (DECADRON) 4 mg tablet    docusate sodium (COLACE) 100 mg capsule    gabapentin (NEURONTIN) 100 mg capsule    metFORMIN (GLUCOPHAGE) 1000 MG tablet    metoprolol succinate (TOPROL-XL) 50 mg 24 hr tablet    ondansetron (ZOFRAN) 8 mg tablet    oxyCODONE (ROXICODONE) 15 mg immediate release tablet    pantoprazole (PROTONIX) 20 mg tablet    senna (SENOKOT) 8 6 mg    Tradjenta 5 MG TABS       Allergies:    Allergies   Allergen Reactions    Ciprofloxacin Itching     Starts at hands to feet then the whole entire body     History:  Marital Status:    Occupation:   Patient Pre-hospital Living Situation:  Lives at home  Patient Pre-hospital Level of Mobility:  Ambulatory  Patient Pre-hospital Diet Restrictions:  Diabetic  Substance Use History:   Social History     Substance and Sexual Activity   Alcohol Use Not Currently    Alcohol/week: 1 0 standard drinks    Types: 1 Glasses of wine per week    Frequency: Monthly or less    Drinks per session: 1 or 2    Binge frequency: Never    Comment: socially     Social History     Tobacco Use   Smoking Status Former Smoker    Packs/day: 0 25    Years: 20 00    Pack years: 5 00    Types: Cigarettes    Start date: 1991   Duran Quit date: 2021    Years since quittin 2   Smokeless Tobacco Never Used     Social History     Substance and Sexual Activity   Drug Use Never       Family History:  Family History   Problem Relation Age of Onset    Hypertension Mother     Diabetes Mother     No Known Problems Sister     No Known Problems Daughter  Heart disease Son     No Known Problems Maternal Aunt     Lung cancer Maternal Aunt     No Known Problems Maternal Aunt     No Known Problems Maternal Aunt     No Known Problems Paternal Aunt     Breast cancer Neg Hx        Physical Exam:     Vitals:      97 1 degrees Fahrenheit, 102 per minute, 22 per minute, 96 percent on 2 liters of O2   146/90  Constitutional:  Well developed, well nourished, mild distress sickly appearing, can speak in short sentences (about 5 words)   Eyes:  PERRL, conjunctiva normal   HENT:  Atraumatic, external ears normal, nose normal, oropharynx dry no pharyngeal exudates  Neck- normal range of motion, no tenderness, supple   Respiratory:  Mild-to-moderate respiratory distress normal breath sounds, no rales, no wheezing   Cardiovascular:  Normal rate, normal rhythm, no murmurs, no gallops  GI:  Soft, nondistended, normal bowel sounds, nontender, no organomegaly, no mass, no rebound, no guarding   :  No costovertebral angle tenderness   Musculoskeletal:  No edema, no tenderness, no deformities   Back- no tenderness  Integument:  Well hydrated, no rash   Lymphatic:  No lymphadenopathy noted   Neurologic:  Alert &awake, communicative, CN 2-12 normal, normal motor function, normal sensory function, no focal deficits noted   Psychiatric:  Speech and behavior appropriate       Lab Results: I have personally reviewed pertinent films in PACS    Results from last 7 days   Lab Units 04/17/21  2303   WBC Thousand/uL 8 17   HEMOGLOBIN g/dL 11 0*   HEMATOCRIT % 34 0*   PLATELETS Thousands/uL 295   NEUTROS PCT % 88*   LYMPHS PCT % 3*   MONOS PCT % 8   EOS PCT % 0     Results from last 7 days   Lab Units 04/17/21  2303   POTASSIUM mmol/L 4 3   CHLORIDE mmol/L 93*   CO2 mmol/L 27   BUN mg/dL 33*   CREATININE mg/dL 0 79   CALCIUM mg/dL 8 8   ALK PHOS U/L 98   ALT U/L 39   AST U/L 16               Imaging: I have personally reviewed pertinent films in PACS    Ct Soft Tissue Neck With Contrast    Result Date: 4/18/2021  Narrative: CT NECK WITH CONTRAST INDICATION:   Neck mass, solitary, afebrile (Age => 16y) neck swelling  COMPARISON:  None  TECHNIQUE:  Axial, sagittal, and coronal 2D reformatted images were created from the axial source data and submitted for interpretation  Radiation dose length product (DLP) for this visit:  770 mGy-cm   This examination, like all CT scans performed in the [facility], was performed utilizing techniques to minimize radiation dose exposure, including the use of iterative reconstruction and automated exposure control  IV Contrast:  100 mL of iohexol (OMNIPAQUE)  was administered intravenously without immediate adverse reaction  IMAGE QUALITY:  Diagnostic  FINDINGS: VISUALIZED BRAIN PARENCHYMA: Limited evaluation  No acute intracranial pathology of the visualized brain parenchyma  VISUALIZED ORBITS AND PARANASAL SINUSES:  Normal  NASAL CAVITY AND NASOPHARYNX:  Normal  SUPRAHYOID NECK:  Normal oral cavity, tongue base, tonsillar fossa and epiglottis  INFRAHYOID NECK:  Aryepiglottic folds and piriform sinuses are normal   Normal glottis and subglottic airway  THYROID GLAND:  Unremarkable  PAROTID AND SUBMANDIBULAR GLANDS:  Normal  LYMPH NODES:  There are a few small supraclavicular lymph nodes which have abnormal central hypodensity for example in the right supraclavicular region (series 2, image 50), likely metastatic  No significant upper cervical lymphadenopathy is seen  The distal lymphadenopathy is seen, see CT of the chest for further evaluation  VASCULAR STRUCTURES:  Normal enhancement of the cervical vasculature  THORACIC INLET:  See CT of the chest performed concurrently for further evaluation  BONY STRUCTURES: No acute fracture or destructive osseous lesion  Impression: 1  No significant neck mass is seen   2   Mediastinal lymphadenopathy and a few small supraclavicular lymph nodes which have an abnormal central hypodensity, likely metastatic  3   See CT of the chest performed concurrently for further evaluation  Workstation performed: [id number]     Cta Ed Chest Pe Study    Result Date: 4/18/2021  Narrative: CTA - CHEST WITH IV CONTRAST - PULMONARY ANGIOGRAM INDICATION:   Shortness of breath Shortness of breath, cancer  COMPARISON: None  TECHNIQUE: CTA examination of the chest was performed using angiographic technique according to a protocol specifically tailored to evaluate for pulmonary embolism  Axial, sagittal, and coronal 2D reformatted images were created from the source data and  submitted for interpretation  In addition, coronal 3D MIP postprocessing was performed on the acquisition scanner  Radiation dose length product (DLP) for this visit:  419 mGy-cm   This examination, like all CT scans performed in the [facility], was performed utilizing techniques to minimize radiation dose exposure, including the use of iterative reconstruction and automated exposure control  IV Contrast:  100 mL of iohexol (OMNIPAQUE)  was administered intravenously without immediate adverse reaction  FINDINGS: PULMONARY ARTERIAL TREE:  No pulmonary embolus is seen  LUNGS:  There is right greater than left diffuse interlobular septal thickening and nodularity concerning for lymphangitic spread of tumor versus pneumonitis  A dominant area of nodularity measures 1 0 x 0 8 x 0 5 cm in the right upper lobe (series 2, image 71)  There is diffuse thickening of the bronchi  PLEURA:  Small right pleural effusion  HEART/GREAT VESSELS:  The heart is normal in size  There is a large pericardial effusion  MEDIASTINUM AND DAVID:  Increase in mediastinal lymphadenopathy for example a prevascular lymph node measures 1 4 cm in short axis (series 2, image 72)  CHEST WALL AND LOWER NECK:   Unremarkable   VISUALIZED STRUCTURES IN THE UPPER ABDOMEN:  Heterogeneous density of previously noted splenic mass is difficult to measure in size due to the lack of intravenous contrast   Development of nodular thickening of bilateral adrenal glands for example a nodule in the left adrenal gland measures 1 cm (series 2, image 224)  OSSEOUS STRUCTURES:  No acute fracture or destructive osseous lesion  Dextroscoliosis of the thoracic spine  Impression: 1  No evidence of pulmonary embolism  2   Large pericardial effusion  3   Left greater than right diffuse interlobular septal thickening and nodularity concerning for lymphangitic spread of tumor versus pneumonitis  4   Diffuse thickening of the bronchi which may be due to superimposed bronchitis  5   Small right pleural effusion  6   Increase in size of mediastinal lymphadenopathy, likely metastatic  7   Nodular thickening of bilateral adrenal glands, likely metastatic  The study was marked in [facility] for immediate notification  Workstation performed: [id number]         ** Please Note: Dragon 360 Dictation voice to text software was used in the creation of this document   **

## 2021-04-18 NOTE — ASSESSMENT & PLAN NOTE
Lab Results   Component Value Date    HGBA1C 7 6 (H) 04/18/2021     Patient on both linagliptin and metformin; hold while inpatient  Check blood sugars per protocol  Every 6 hours for now due to NPO status  Insulin sliding scale  Transition to q i d   And HS when patient returns from surgeon no longer NPO  Recent Labs     04/18/21  0603   POCGLU 261*       Blood Sugar Average: Last 72 hrs:  (P) 261

## 2021-04-18 NOTE — ASSESSMENT & PLAN NOTE
Lab Results   Component Value Date    HGBA1C 5 3 01/05/2021     Patient on both linagliptin and metformin; would put on hold due to patient's NPO status  Check blood sugars per protocol  Every 6 hours for now due to NPO status  Insulin sliding scale  No results for input(s): POCGLU in the last 72 hours      Blood Sugar Average: Last 72 hrs:

## 2021-04-18 NOTE — CONSULTS
Oncology Consult Note  Cecilia Gibson 62 y o  female MRN: [id number]  Unit/Bed#: 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 in 02/05/2021 when she was hospitalized in January 17, 2021 with headache and dizziness, MRI showed 2 parenchymal lesions measuring 2 1 cm and 0 5 cm in the left parietal region with edema and possible leptomeningeal enhancement, also incasement of the nerve roots compatible with CSF spread, CT scan of the chest abdomen pelvis showed mediastinal and right hilar lymphadenopathy and a chronic splenic mass    No evidence of primary malignancy could be identified    Mediastinoscopy showed metastatic carcinoma at level 2L, level 4R, 4 L    Status post whole-brain radiation 3000 cGy, also radiation to L1 and S3 bony lesions    Molecular tests showed PDL expression of 100% no other target mutations    She received Pembrolizumab followed by radiation therapy consistent with pemetrexed/carboplatin by Dr Brianna Noonan    She had cough, swelling of the neck, right upper chest area, and lower extremity bilaterally, CT scan of the chest showed large pericardial effusion, nodule in the right upper lobe of the lung measuring 1 x 0 8 x 0 5 cm, small right pleural effusion, increase in mediastinal lymphadenopathy nodular thickening of bilateral adrenal glands consistent with metastatic disease    The patient was sent here for thoracic surgery consult and possible pericardial window for possible malignant pericardial pleural effusion  Initiated on dexamethasone 4 mg every 12 hour, Lovenox 40 mg subcu daily, she is on amlodipine, atorvastatin, insulin            Review of Systems - As stated in the HPI otherwise the fourteen point review of systems was negative      Past Medical History:   Diagnosis Date    Acute on chronic congestive heart failure with left ventricular diastolic dysfunction (HCC)     last assessed 2017    Asthma     Atelectasis     CHF (congestive heart failure) ([facility] )     Diabetes mellitus ([facility] )     Diverticulitis 2017    with perforation and abscess     Hyperlipidemia     Hypertension     Lesion of spleen     Lung cancer metastatic to brain ([facility] )     Pericardial effusion        Social History     Socioeconomic History    Marital status:      Spouse name: Not on file    Number of children: Not on file    Years of education: Not on file    Highest education level: Not on file   Occupational History    Not on file   Social Needs    Financial resource strain: Not on file    Food insecurity     Worry: Not on file     Inability: Not on file    Transportation needs     Medical: Not on file     Non-medical: Not on file   Tobacco Use    Smoking status: Former Smoker     Packs/day: 0 25     Years: 20 00     Pack years: 5 00     Types: Cigarettes     Start date: 1991     Quit date: 2021     Years since quittin 2    Smokeless tobacco: Never Used   Substance and Sexual Activity    Alcohol use: Not Currently     Alcohol/week: 1 0 standard drinks     Types: 1 Glasses of wine per week     Frequency: Monthly or less     Drinks per session: 1 or 2     Binge frequency: Never     Comment: socially    Drug use: Never    Sexual activity: Yes     Partners: Male     Birth control/protection: Surgical   Lifestyle    Physical activity     Days per week: 0 days     Minutes per session: 0 min    Stress: Not at all   Relationships    Social connections     Talks on phone: Not on file     Gets together: Not on file     Attends Buddhist service: Not on file     Active member of club or organization: Not on file     Attends meetings of clubs or organizations: Not on file     Relationship status: Not on file    Intimate partner violence     Fear of current or ex partner: Not on file     Emotionally abused: Not on file     Physically abused: Not on file     Forced sexual activity: Not on file   Other Topics Concern    Not on file   Social History Narrative    Not on file       Family History   Problem Relation Age of Onset    Hypertension Mother    [facility] Diabetes Mother     No Known Problems Sister     No Known Problems Daughter     Heart disease Son     No Known Problems Maternal Aunt     Lung cancer Maternal Aunt     No Known Problems Maternal Aunt     No Known Problems Maternal Aunt     No Known Problems Paternal Aunt     Breast cancer Neg Hx        Allergies   Allergen Reactions    Ciprofloxacin Itching     Starts at hands to feet then the whole entire body         Current Facility-Administered Medications:     acetaminophen (TYLENOL) tablet 650 mg, 650 mg, Oral, Q6H PRN, Leonardo Higgins MD, 650 mg at 04/18/21 0471    aluminum-magnesium hydroxide-simethicone (MYLANTA) oral suspension 30 mL, 30 mL, Oral, Q6H PRN, Leonardo Higgins MD    amLODIPine (NORVASC) tablet 10 mg, 10 mg, Oral, Daily, Leonardo Higgins MD    atorvastatin (LIPITOR) tablet 20 mg, 20 mg, Oral, Daily With Dinner, Leonardo Higgins MD    dexamethasone (DECADRON) tablet 4 mg, 4 mg, Oral, Q12H [address], Leonardo Higgins MD, 4 mg at 04/18/21 0741    docusate sodium (COLACE) capsule 100 mg, 100 mg, Oral, Daily, Leonardo Higgins MD    enoxaparin (LOVENOX) subcutaneous injection 40 mg, 40 mg, Subcutaneous, Daily, Leonardo Higgins MD    insulin lispro (HumaLOG) 100 units/mL subcutaneous injection 1-5 Units, 1-5 Units, Subcutaneous, Q6H [address], 2 Units at 04/18/21 0626 **AND** Fingerstick Glucose (POCT), , , Q6H, Leonardo Higgins MD    metoprolol succinate (TOPROL-XL) 24 hr tablet 50 mg, 50 mg, Oral, Daily, Leonardo Higgins MD    ondansetron [facility]) injection 4 mg, 4 mg, Intravenous, Q6H PRN, Leonardo Higgins MD    oxyCODONE (ROXICODONE) IR tablet 5 mg, 5 mg, Oral, Q4H PRN, Jj Cabrera MD    pantoprazole (PROTONIX) injection 40 mg, 40 mg, Intravenous, Q24H [facility], Jj Cabrera MD, 40 mg at 04/18/21 0639      /86 (BP Location: Left arm)   Pulse (!) 108   Temp 97 6 °F (36 4 °C) (Oral)   Resp 22   Wt 69 5 kg (153 lb 3 5 oz)   LMP 08/16/2014 (Approximate)   SpO2 97%   BMI 29 92 kg/m²       General Appearance:    Alert, oriented she is on 3 L O2        Eyes:    PERRL   Ears:    Normal external ear canals, both ears   Nose:   Nares normal, septum midline   Throat:   Mucosa moist  Pharynx without injection  Neck:   Supple       Lungs:     Clear to auscultation bilaterally   Chest Wall:    No tenderness or deformity    Heart:    Regular rate and rhythm       Abdomen:     Soft, non-tender, bowel sounds +, no organomegaly           Extremities:   Bilateral edema more pronounced on the left lower extremity +2 to 3       Skin:   no rash or icterus      Lymph nodes:   Cervical, supraclavicular, and axillary nodes normal   Neurologic:   CNII-XII intact, normal strength, sensation and reflexes     Throughout               Recent Results (from the past 48 hour(s))   Comprehensive metabolic panel    Collection Time: 04/17/21 11:03 PM   Result Value Ref Range    Sodium 131 (L) 136 - 145 mmol/L    Potassium 4 3 3 5 - 5 3 mmol/L    Chloride 93 (L) 100 - 108 mmol/L    CO2 27 21 - 32 mmol/L    ANION GAP 11 4 - 13 mmol/L    BUN 33 (H) 5 - 25 mg/dL    Creatinine 0 79 0 60 - 1 30 mg/dL    Glucose 284 (H) 65 - 140 mg/dL    Calcium 8 8 8 3 - 10 1 mg/dL    Corrected Calcium 9 7 8 3 - 10 1 mg/dL    AST 16 5 - 45 U/L    ALT 39 12 - 78 U/L    Alkaline Phosphatase 98 46 - 116 U/L    Total Protein 6 5 6 4 - 8 2 g/dL    Albumin 2 9 (L) 3 5 - 5 0 g/dL    Total Bilirubin 0 64 0 20 - 1 00 mg/dL    eGFR 94 ml/min/1 73sq m   CBC and differential    Collection Time: 04/17/21 11:03 PM   Result Value Ref Range    WBC 8 17 4 31 - 10 16 Thousand/uL    RBC 3 55 (L) 3 81 - 5 12 Million/uL Hemoglobin 11 0 (L) 11 5 - 15 4 g/dL    Hematocrit 34 0 (L) 34 8 - 46 1 %    MCV 96 82 - 98 fL    MCH 31 0 26 8 - 34 3 pg    MCHC 32 4 31 4 - 37 4 g/dL    RDW 16 1 (H) 11 6 - 15 1 %    MPV 8 6 (L) 8 9 - 12 7 fL    Platelets 008 015 - 741 Thousands/uL    nRBC 0 /100 WBCs    Neutrophils Relative 88 (H) 43 - 75 %    Immat GRANS % 1 0 - 2 %    Lymphocytes Relative 3 (L) 14 - 44 %    Monocytes Relative 8 4 - 12 %    Eosinophils Relative 0 0 - 6 %    Basophils Relative 0 0 - 1 %    Neutrophils Absolute 7 14 1 85 - 7 62 Thousands/µL    Immature Grans Absolute 0 11 0 00 - 0 20 Thousand/uL    Lymphocytes Absolute 0 22 (L) 0 60 - 4 47 Thousands/µL    Monocytes Absolute 0 69 0 17 - 1 22 Thousand/µL    Eosinophils Absolute 0 00 0 00 - 0 61 Thousand/µL    Basophils Absolute 0 01 0 00 - 0 10 Thousands/µL   Troponin I    Collection Time: 04/17/21 11:03 PM   Result Value Ref Range    Troponin I <0 02 <=0 04 ng/mL   NT-BNP PRO    Collection Time: 04/17/21 11:03 PM   Result Value Ref Range    NT-proBNP 127 (H) <125 pg/mL   Magnesium    Collection Time: 04/17/21 11:03 PM   Result Value Ref Range    Magnesium 1 2 (L) 1 6 - 2 6 mg/dL   Fingerstick Glucose (POCT)    Collection Time: 04/18/21  6:03 AM   Result Value Ref Range    POC Glucose 261 (H) 65 - 140 mg/dl   CBC and differential    Collection Time: 04/18/21  6:05 AM   Result Value Ref Range    WBC 8 71 4 31 - 10 16 Thousand/uL    RBC 3 38 (L) 3 81 - 5 12 Million/uL    Hemoglobin 10 7 (L) 11 5 - 15 4 g/dL    Hematocrit 33 1 (L) 34 8 - 46 1 %    MCV 98 82 - 98 fL    MCH 31 7 26 8 - 34 3 pg    MCHC 32 3 31 4 - 37 4 g/dL    RDW 16 0 (H) 11 6 - 15 1 %    MPV 8 9 8 9 - 12 7 fL    Platelets 494 613 - 713 Thousands/uL    nRBC 0 /100 WBCs   Comprehensive metabolic panel    Collection Time: 04/18/21  6:05 AM   Result Value Ref Range    Sodium 130 (L) 136 - 145 mmol/L    Potassium 4 3 3 5 - 5 3 mmol/L    Chloride 94 (L) 100 - 108 mmol/L    CO2 27 21 - 32 mmol/L    ANION GAP 9 4 - 13 mmol/L BUN 34 (H) 5 - 25 mg/dL    Creatinine 0 66 0 60 - 1 30 mg/dL    Glucose 240 (H) 65 - 140 mg/dL    Calcium 9 3 8 3 - 10 1 mg/dL    Corrected Calcium 10 0 8 3 - 10 1 mg/dL    AST 18 5 - 45 U/L    ALT 47 12 - 78 U/L    Alkaline Phosphatase 107 46 - 116 U/L    Total Protein 6 6 6 4 - 8 2 g/dL    Albumin 3 1 (L) 3 5 - 5 0 g/dL    Total Bilirubin 1 09 (H) 0 20 - 1 00 mg/dL    eGFR 111 ml/min/1 73sq m   Magnesium    Collection Time: 04/18/21  6:05 AM   Result Value Ref Range    Magnesium 1 5 (L) 1 6 - 2 6 mg/dL   Phosphorus    Collection Time: 04/18/21  6:05 AM   Result Value Ref Range    Phosphorus 4 0 2 3 - 4 1 mg/dL   Manual Differential(PHLEBS Do Not Order)    Collection Time: 04/18/21  6:05 AM   Result Value Ref Range    Segmented % 93 (H) 43 - 75 %    Bands % 1 0 - 8 %    Lymphocytes % 1 (L) 14 - 44 %    Monocytes % 5 4 - 12 %    Eosinophils, % 0 0 - 6 %    Basophils % 0 0 - 1 %    Absolute Neutrophils 8 19 (H) 1 85 - 7 62 Thousand/uL    Lymphocytes Absolute 0 09 (L) 0 60 - 4 47 Thousand/uL    Monocytes Absolute 0 44 0 00 - 1 22 Thousand/uL    Eosinophils Absolute 0 00 0 00 - 0 40 Thousand/uL    Basophils Absolute 0 00 0 00 - 0 10 Thousand/uL    Total Counted      nRBC 1 0 - 2 /100 WBC    RBC Morphology Present     Anisocytosis Present     Macrocytes Present     Polychromasia Present     Platelet Estimate Adequate Adequate   Hemoglobin A1c w/EAG Estimation (Orders if not completed within the last 90 days)    Collection Time: 04/18/21  6:32 AM   Result Value Ref Range    Hemoglobin A1C 7 6 (H) Normal 3 8-5 6%; PreDiabetic 5 7-6 4%;  Diabetic >=6 5%; Glycemic control for adults with diabetes <7 0% %     mg/dl   Type and screen    Collection Time: 04/18/21  6:32 AM   Result Value Ref Range    ABO Grouping B     Rh Factor Positive     Antibody Screen Negative     Specimen Expiration Date [id number]          Ct Soft Tissue Neck With Contrast    Result Date: 4/18/2021  Narrative: CT NECK WITH CONTRAST INDICATION: Neck mass, solitary, afebrile (Age => 16y) neck swelling  COMPARISON:  None  TECHNIQUE:  Axial, sagittal, and coronal 2D reformatted images were created from the axial source data and submitted for interpretation  Radiation dose length product (DLP) for this visit:  770 mGy-cm   This examination, like all CT scans performed in the [facility], was performed utilizing techniques to minimize radiation dose exposure, including the use of iterative reconstruction and automated exposure control  IV Contrast:  100 mL of iohexol (OMNIPAQUE)  was administered intravenously without immediate adverse reaction  IMAGE QUALITY:  Diagnostic  FINDINGS: VISUALIZED BRAIN PARENCHYMA: Limited evaluation  No acute intracranial pathology of the visualized brain parenchyma  VISUALIZED ORBITS AND PARANASAL SINUSES:  Normal  NASAL CAVITY AND NASOPHARYNX:  Normal  SUPRAHYOID NECK:  Normal oral cavity, tongue base, tonsillar fossa and epiglottis  INFRAHYOID NECK:  Aryepiglottic folds and piriform sinuses are normal   Normal glottis and subglottic airway  THYROID GLAND:  Unremarkable  PAROTID AND SUBMANDIBULAR GLANDS:  Normal  LYMPH NODES:  There are a few small supraclavicular lymph nodes which have abnormal central hypodensity for example in the right supraclavicular region (series 2, image 50), likely metastatic  No significant upper cervical lymphadenopathy is seen  The distal lymphadenopathy is seen, see CT of the chest for further evaluation  VASCULAR STRUCTURES:  Normal enhancement of the cervical vasculature  THORACIC INLET:  See CT of the chest performed concurrently for further evaluation  BONY STRUCTURES: No acute fracture or destructive osseous lesion  Impression: 1  No significant neck mass is seen  2   Mediastinal lymphadenopathy and a few small supraclavicular lymph nodes which have an abnormal central hypodensity, likely metastatic  3   See CT of the chest performed concurrently for further evaluation  Workstation performed: [id number]     Cta Ed Chest Pe Study    Result Date: 4/18/2021  Narrative: CTA - CHEST WITH IV CONTRAST - PULMONARY ANGIOGRAM INDICATION:   Shortness of breath Shortness of breath, cancer  COMPARISON: None  TECHNIQUE: CTA examination of the chest was performed using angiographic technique according to a protocol specifically tailored to evaluate for pulmonary embolism  Axial, sagittal, and coronal 2D reformatted images were created from the source data and  submitted for interpretation  In addition, coronal 3D MIP postprocessing was performed on the acquisition scanner  Radiation dose length product (DLP) for this visit:  419 mGy-cm   This examination, like all CT scans performed in the [facility], was performed utilizing techniques to minimize radiation dose exposure, including the use of iterative reconstruction and automated exposure control  IV Contrast:  100 mL of iohexol (OMNIPAQUE)  was administered intravenously without immediate adverse reaction  FINDINGS: PULMONARY ARTERIAL TREE:  No pulmonary embolus is seen  LUNGS:  There is right greater than left diffuse interlobular septal thickening and nodularity concerning for lymphangitic spread of tumor versus pneumonitis  A dominant area of nodularity measures 1 0 x 0 8 x 0 5 cm in the right upper lobe (series 2, image 71)  There is diffuse thickening of the bronchi  PLEURA:  Small right pleural effusion  HEART/GREAT VESSELS:  The heart is normal in size  There is a large pericardial effusion  MEDIASTINUM AND DAVID:  Increase in mediastinal lymphadenopathy for example a prevascular lymph node measures 1 4 cm in short axis (series 2, image 72)  CHEST WALL AND LOWER NECK:   Unremarkable   VISUALIZED STRUCTURES IN THE UPPER ABDOMEN:  Heterogeneous density of previously noted splenic mass is difficult to measure in size due to the lack of intravenous contrast   Development of nodular thickening of bilateral adrenal glands for example a nodule in the left adrenal gland measures 1 cm (series 2, image 224)  OSSEOUS STRUCTURES:  No acute fracture or destructive osseous lesion  Dextroscoliosis of the thoracic spine  Impression: 1  No evidence of pulmonary embolism  2   Large pericardial effusion  3   Left greater than right diffuse interlobular septal thickening and nodularity concerning for lymphangitic spread of tumor versus pneumonitis  4   Diffuse thickening of the bronchi which may be due to superimposed bronchitis  5   Small right pleural effusion  6   Increase in size of mediastinal lymphadenopathy, likely metastatic  7   Nodular thickening of bilateral adrenal glands, likely metastatic  The study was marked in [facility] for immediate notification   Workstation performed: [id number]     ECOG :1      Assessment and plan:    78-year-old heavy smoker who was diagnosed with 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 with mediastinal involvement as well as brain metastases, and possible leptomeningeal involvement, molecular tests showed PDL expression of 100%    She received whole-brain radiation therapy and radiation therapy to the thoracic spine    Initiated on Pembrolizumab/carboplatin/Alimta by Dr Rajwinder Lawrence    Presented with cough, shortness of breath, swelling of the neck, upper chest area, lower extremity edema was found to have large pericardial effusion, imaging studies showed bilateral adrenal glands metastases, increased mediastinal lymph nodes consistent with disease progression    Scheduled for pericardial window drainage by thoracic surgery    Patient to follow-up as an outpatient for consideration of second-line therapy

## 2021-04-19 ENCOUNTER — APPOINTMENT (INPATIENT)
Dept: NON INVASIVE DIAGNOSTICS | Facility: HOSPITAL | Age: 61
DRG: 164 | End: 2021-04-19
Payer: COMMERCIAL

## 2021-04-19 PROBLEM — R60.0 EDEMA OF LEFT LOWER EXTREMITY: Status: ACTIVE | Noted: 2021-04-19

## 2021-04-19 LAB
ANION GAP SERPL CALCULATED.3IONS-SCNC: 7 MMOL/L (ref 4–13)
APTT PPP: 24 SECONDS (ref 23–37)
APTT PPP: 77 SECONDS (ref 23–37)
BASOPHILS # BLD AUTO: 0 THOUSANDS/ΜL (ref 0–0.1)
BASOPHILS NFR BLD AUTO: 0 % (ref 0–1)
BUN SERPL-MCNC: 25 MG/DL (ref 5–25)
CALCIUM SERPL-MCNC: 8.5 MG/DL (ref 8.3–10.1)
CHLORIDE SERPL-SCNC: 102 MMOL/L (ref 100–108)
CO2 SERPL-SCNC: 27 MMOL/L (ref 21–32)
CREAT SERPL-MCNC: 0.41 MG/DL (ref 0.6–1.3)
EOSINOPHIL # BLD AUTO: 0 THOUSAND/ΜL (ref 0–0.61)
EOSINOPHIL NFR BLD AUTO: 0 % (ref 0–6)
ERYTHROCYTE [DISTWIDTH] IN BLOOD BY AUTOMATED COUNT: 16 % (ref 11.6–15.1)
GFR SERPL CREATININE-BSD FRML MDRD: 130 ML/MIN/1.73SQ M
GLUCOSE SERPL-MCNC: 200 MG/DL (ref 65–140)
GLUCOSE SERPL-MCNC: 211 MG/DL (ref 65–140)
GLUCOSE SERPL-MCNC: 280 MG/DL (ref 65–140)
GLUCOSE SERPL-MCNC: 291 MG/DL (ref 65–140)
HCT VFR BLD AUTO: 32.2 % (ref 34.8–46.1)
HGB BLD-MCNC: 10.3 G/DL (ref 11.5–15.4)
IMM GRANULOCYTES # BLD AUTO: 0.09 THOUSAND/UL (ref 0–0.2)
IMM GRANULOCYTES NFR BLD AUTO: 1 % (ref 0–2)
INR PPP: 1.16 (ref 0.84–1.19)
LYMPHOCYTES # BLD AUTO: 0.19 THOUSANDS/ΜL (ref 0.6–4.47)
LYMPHOCYTES NFR BLD AUTO: 3 % (ref 14–44)
MCH RBC QN AUTO: 31.3 PG (ref 26.8–34.3)
MCHC RBC AUTO-ENTMCNC: 32 G/DL (ref 31.4–37.4)
MCV RBC AUTO: 98 FL (ref 82–98)
MONOCYTES # BLD AUTO: 0.59 THOUSAND/ΜL (ref 0.17–1.22)
MONOCYTES NFR BLD AUTO: 8 % (ref 4–12)
NEUTROPHILS # BLD AUTO: 6.79 THOUSANDS/ΜL (ref 1.85–7.62)
NEUTS SEG NFR BLD AUTO: 88 % (ref 43–75)
NRBC BLD AUTO-RTO: 0 /100 WBCS
PLATELET # BLD AUTO: 271 THOUSANDS/UL (ref 149–390)
PMV BLD AUTO: 8.8 FL (ref 8.9–12.7)
POTASSIUM SERPL-SCNC: 4.1 MMOL/L (ref 3.5–5.3)
PROTHROMBIN TIME: 14.8 SECONDS (ref 11.6–14.5)
RBC # BLD AUTO: 3.29 MILLION/UL (ref 3.81–5.12)
SODIUM SERPL-SCNC: 136 MMOL/L (ref 136–145)
WBC # BLD AUTO: 7.66 THOUSAND/UL (ref 4.31–10.16)

## 2021-04-19 PROCEDURE — 85610 PROTHROMBIN TIME: CPT | Performed by: SURGERY

## 2021-04-19 PROCEDURE — C9113 INJ PANTOPRAZOLE SODIUM, VIA: HCPCS | Performed by: SURGERY

## 2021-04-19 PROCEDURE — 85730 THROMBOPLASTIN TIME PARTIAL: CPT | Performed by: INTERNAL MEDICINE

## 2021-04-19 PROCEDURE — 93971 EXTREMITY STUDY: CPT

## 2021-04-19 PROCEDURE — 82948 REAGENT STRIP/BLOOD GLUCOSE: CPT

## 2021-04-19 PROCEDURE — 80048 BASIC METABOLIC PNL TOTAL CA: CPT | Performed by: NURSE PRACTITIONER

## 2021-04-19 PROCEDURE — 99232 SBSQ HOSP IP/OBS MODERATE 35: CPT | Performed by: INTERNAL MEDICINE

## 2021-04-19 PROCEDURE — 85025 COMPLETE CBC W/AUTO DIFF WBC: CPT | Performed by: NURSE PRACTITIONER

## 2021-04-19 PROCEDURE — 85730 THROMBOPLASTIN TIME PARTIAL: CPT | Performed by: SURGERY

## 2021-04-19 RX ORDER — LANOLIN ALCOHOL/MO/W.PET/CERES
3 CREAM (GRAM) TOPICAL
Status: DISCONTINUED | OUTPATIENT
Start: 2021-04-19 | End: 2021-04-24 | Stop reason: HOSPADM

## 2021-04-19 RX ORDER — PANTOPRAZOLE SODIUM 40 MG/1
40 TABLET, DELAYED RELEASE ORAL
Status: DISCONTINUED | OUTPATIENT
Start: 2021-04-20 | End: 2021-04-24 | Stop reason: HOSPADM

## 2021-04-19 RX ORDER — HEPARIN SODIUM 10000 [USP'U]/100ML
3-30 INJECTION, SOLUTION INTRAVENOUS
Status: DISCONTINUED | OUTPATIENT
Start: 2021-04-19 | End: 2021-04-24

## 2021-04-19 RX ORDER — INSULIN GLARGINE 100 [IU]/ML
7 INJECTION, SOLUTION SUBCUTANEOUS
Status: DISCONTINUED | OUTPATIENT
Start: 2021-04-19 | End: 2021-04-20

## 2021-04-19 RX ADMIN — DOCUSATE SODIUM 100 MG: 100 CAPSULE, LIQUID FILLED ORAL at 08:57

## 2021-04-19 RX ADMIN — INSULIN LISPRO 1 UNITS: 100 INJECTION, SOLUTION INTRAVENOUS; SUBCUTANEOUS at 17:42

## 2021-04-19 RX ADMIN — HEPARIN SODIUM 18 UNITS/KG/HR: 10000 INJECTION, SOLUTION INTRAVENOUS at 11:26

## 2021-04-19 RX ADMIN — INSULIN LISPRO 1 UNITS: 100 INJECTION, SOLUTION INTRAVENOUS; SUBCUTANEOUS at 05:23

## 2021-04-19 RX ADMIN — PANTOPRAZOLE SODIUM 40 MG: 40 INJECTION, POWDER, FOR SOLUTION INTRAVENOUS at 05:22

## 2021-04-19 RX ADMIN — ENOXAPARIN SODIUM 40 MG: 40 INJECTION SUBCUTANEOUS at 08:57

## 2021-04-19 RX ADMIN — ATORVASTATIN CALCIUM 20 MG: 20 TABLET, FILM COATED ORAL at 17:42

## 2021-04-19 RX ADMIN — INSULIN GLARGINE 7 UNITS: 100 INJECTION, SOLUTION SUBCUTANEOUS at 22:14

## 2021-04-19 RX ADMIN — DEXAMETHASONE 4 MG: 4 TABLET ORAL at 08:57

## 2021-04-19 RX ADMIN — MELATONIN TAB 3 MG 3 MG: 3 TAB at 22:16

## 2021-04-19 RX ADMIN — METOPROLOL SUCCINATE 50 MG: 50 TABLET, EXTENDED RELEASE ORAL at 08:57

## 2021-04-19 RX ADMIN — ACETAMINOPHEN 650 MG: 325 TABLET ORAL at 08:57

## 2021-04-19 RX ADMIN — DEXAMETHASONE 4 MG: 4 TABLET ORAL at 22:15

## 2021-04-19 RX ADMIN — INSULIN LISPRO 2 UNITS: 100 INJECTION, SOLUTION INTRAVENOUS; SUBCUTANEOUS at 12:29

## 2021-04-19 RX ADMIN — INSULIN LISPRO 2 UNITS: 100 INJECTION, SOLUTION INTRAVENOUS; SUBCUTANEOUS at 22:20

## 2021-04-19 RX ADMIN — ACETAMINOPHEN 650 MG: 325 TABLET ORAL at 22:18

## 2021-04-19 RX ADMIN — AMLODIPINE BESYLATE 10 MG: 10 TABLET ORAL at 08:57

## 2021-04-19 NOTE — ASSESSMENT & PLAN NOTE
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

## 2021-04-19 NOTE — PLAN OF CARE
Problem: Potential for Falls  Goal: Patient will remain free of falls  Description: INTERVENTIONS:  - Assess patient frequently for physical needs  -  Identify cognitive and physical deficits and behaviors that affect risk of falls    -  [place] fall precautions as indicated by assessment   - Educate patient/family on patient safety including physical limitations  - Instruct patient to call for assistance with activity based on assessment  - Modify environment to reduce risk of injury  - Consider OT/PT consult to assist with strengthening/mobility  Outcome: Progressing     Problem: Prexisting or High Potential for Compromised Skin Integrity  Goal: Skin integrity is maintained or improved  Description: INTERVENTIONS:  - Identify patients at risk for skin breakdown  - Assess and monitor skin integrity  - Assess and monitor nutrition and hydration status  - Monitor labs   - Assess for incontinence   - Turn and reposition patient  - Assist with mobility/ambulation  - Relieve pressure over bony prominences  - Avoid friction and shearing  - Provide appropriate hygiene as needed including keeping skin clean and dry  - Evaluate need for skin moisturizer/barrier cream  - Collaborate with interdisciplinary team   - Patient/family teaching  - Consider wound care consult   Outcome: Progressing     Problem: PAIN - ADULT  Goal: Verbalizes/displays adequate comfort level or baseline comfort level  Description: Interventions:  - Encourage patient to monitor pain and request assistance  - Assess pain using appropriate pain scale  - Administer analgesics based on type and severity of pain and evaluate response  - Implement non-pharmacological measures as appropriate and evaluate response  - Consider cultural and social influences on pain and pain management  - Notify physician/advanced practitioner if interventions unsuccessful or patient reports new pain  Outcome: Progressing     Problem: INFECTION - ADULT  Goal: Absence or prevention of progression during hospitalization  Description: INTERVENTIONS:  - Assess and monitor for signs and symptoms of infection  - Monitor lab/diagnostic results  - Monitor all insertion sites, i e  indwelling lines, tubes, and drains  - Monitor endotracheal if appropriate and nasal secretions for changes in amount and color  - [place] appropriate cooling/warming therapies per order  - Administer medications as ordered  - Instruct and encourage patient and family to use good hand hygiene technique  - Identify and instruct in appropriate isolation precautions for identified infection/condition  Outcome: Progressing  Goal: Absence of fever/infection during neutropenic period  Description: INTERVENTIONS:  - Monitor WBC    Outcome: Progressing     Problem: SAFETY ADULT  Goal: Patient will remain free of falls  Description: INTERVENTIONS:  - Assess patient frequently for physical needs  -  Identify cognitive and physical deficits and behaviors that affect risk of falls    -  [place] fall precautions as indicated by assessment   - Educate patient/family on patient safety including physical limitations  - Instruct patient to call for assistance with activity based on assessment  - Modify environment to reduce risk of injury  - Consider OT/PT consult to assist with strengthening/mobility  Outcome: Progressing  Goal: Maintain or return to baseline ADL function  Description: INTERVENTIONS:  -  Assess patient's ability to carry out ADLs; assess patient's baseline for ADL function and identify physical deficits which impact ability to perform ADLs (bathing, care of mouth/teeth, toileting, grooming, dressing, etc )  - Assess/evaluate cause of self-care deficits   - Assess range of motion  - Assess patient's mobility; develop plan if impaired  - Assess patient's need for assistive devices and provide as appropriate  - Encourage maximum independence but intervene and supervise when necessary  - Involve family in performance of ADLs  - Assess for home care needs following discharge   - Consider OT consult to assist with ADL evaluation and planning for discharge  - Provide patient education as appropriate  Outcome: Progressing  Goal: Maintain or return mobility status to optimal level  Description: INTERVENTIONS:  - Assess patient's baseline mobility status (ambulation, transfers, stairs, etc )    - Identify cognitive and physical deficits and behaviors that affect mobility  - Identify mobility aids required to assist with transfers and/or ambulation (gait belt, sit-to-stand, lift, walker, cane, etc )  - [place] fall precautions as indicated by assessment  - Record patient progress and toleration of activity level on Mobility SBAR; progress patient to next Phase/Stage  - Instruct patient to call for assistance with activity based on assessment  - Consider rehabilitation consult to assist with strengthening/weightbearing, etc   Outcome: Progressing     Problem: DISCHARGE PLANNING  Goal: Discharge to home or other facility with appropriate resources  Description: INTERVENTIONS:  - Identify barriers to discharge w/patient and caregiver  - Arrange for needed discharge resources and transportation as appropriate  - Identify discharge learning needs (meds, wound care, etc )  - Arrange for interpretive services to assist at discharge as needed  - Refer to Case Management Department for coordinating discharge planning if the patient needs post-hospital services based on physician/advanced practitioner order or complex needs related to functional status, cognitive ability, or social support system  Outcome: Progressing     Problem: Knowledge Deficit  Goal: Patient/family/caregiver demonstrates understanding of disease process, treatment plan, medications, and discharge instructions  Description: Complete learning assessment and assess knowledge base    Interventions:  - Provide teaching at level of understanding  - Provide teaching via preferred learning methods  Outcome: Progressing

## 2021-04-19 NOTE — UTILIZATION REVIEW
Initial Clinical Review    Admission: Date/Time/Statement:   Admission Orders (From admission, onward)     Ordered        04/18/21 0453  Inpatient Admission  Once                   Orders Placed This Encounter   Procedures    Inpatient Admission     Standing Status:   Standing     Number of Occurrences:   1     Order Specific Question:   Level of Care     Answer:   Level 2 Stepdown / HOT [14]     Order Specific Question:   Estimated length of stay     Answer:   More than 2 Midnights     Order Specific Question:   Certification     Answer:   I certify that inpatient services are medically necessary for this patient for a duration of greater than two midnights  See H&P and MD Progress Notes for additional information about the patient's course of treatment  Initial Presentation: 62year old female, presented to the ED @ [facility], Transferred to [facility] [employer] level of care, via EMS  Admitted as Inpatient due to Malignant pericardial effusion  A long history of smoking; recently diagnosed with lung cancer and subsequently leptomeninges it is secondary to the lung cancer itself  Likewise, she has cancer related pain, 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, hyperlipidemia, essential hypertension and diabetes mellitus type 2  Date: 04/18/2021  Reports coughing and local swelling of upper extremities  Consult Thoracic Surgery for possible window  Consult Oncology  NPO  Will follow metabolic profile, CBC with coagulation profile and type and screen in preparation for possible CT surgery procedure  Continue O2  Continue dexamethasone  04/18/2021  Consult Thoracic Surgery:  CT scan which showed a large pericardial effusion  To OR today for pericardial window  NPO  IV flds  Analgesia PRN      SURGERY DATE: 4/18/2021  Procedure(s) (LRB):  WINDOW PERICARDIAL (N/A)  Anesthesia Type:   General  Operative Findings:  720cc sanguineous effusion    04/18/2021  Consult Oncology:  She received whole-brain radiation therapy and radiation therapy to the thoracic spine      Initiated on Pembrolizumab/carboplatin/Alimta by Dr Zhen Vang  Presented with cough, shortness of breath, swelling of the neck, upper chest area, lower extremity edema was found to have large pericardial effusion, imaging studies showed bilateral adrenal glands metastases, increased mediastinal lymph nodes consistent with disease progression  Scheduled for pericardial window drainage by thoracic surgery  Day 2: 04/19/2021  POD #1  Malignant pericardial effusion s/p pericardial window  Left radial A Line  Closed/Suction chest bulb  O2 @ 3L via NC      Mark CHO diet  Up with assistance  VTE Prophylaxis: Enoxaparin (Lovenox)  / sequential compression device       ED Triage Vitals   Temperature Pulse Respirations Blood Pressure SpO2   04/18/21 0750 04/18/21 0750 04/18/21 0750 04/18/21 0750 04/18/21 0750   97 6 °F (36 4 °C) (!) 108 22 126/86 97 %      Temp Source Heart Rate Source Patient Position - Orthostatic VS BP Location FiO2 (%)   04/18/21 0750 04/18/21 1440 04/18/21 0750 04/18/21 0750 --   Oral Monitor Sitting Left arm       Pain Score       04/18/21 0504       No Pain          Wt Readings from Last 1 Encounters:   04/19/21 69 kg (152 lb 1 9 oz)     Additional Vital Signs:   Date/Time  Temp  Pulse  Resp  BP  MAP (mmHg)  Arterial Line BP  MAP  SpO2  Calculated FIO2 (%) - Nasal Cannula  O2 Flow Rate (L/min)  Nasal Cannula O2 Flow Rate (L/min)  O2 Device  Cardiac (WDL)  Patient Position - Orthostatic VS   04/19/21 1500  98 1 °F (36 7 °C)  84  16  114/61  81  --  --  96 %  --  --  --  Nasal cannula  --  Sitting   04/19/21 1200  --  --  --  --  --  --  --  --  --  --  --  Nasal cannula  --  --   04/19/21 0800  --  --  --  --  --  120/64  86 mmHg  --  --  --  --  --   --  --   O2 Device: RA 87% at 04/19/21 0800   04/19/21 0700  97 6 °F (36 4 °C)  82  20  125/60  85  122/64  86 mmHg  97 %  --  --  --  Nasal cannula  -- Sitting   04/19/21 0600  --  100  --  --  --  130/66  92 mmHg  --  --  --  --  --  --  --   04/19/21 0500  --  90  --  --  --  124/72  92 mmHg  --  --  --  --  --  --  --   04/19/21 0400  --  92  --  --  --  120/68  88 mmHg  97 %  28  --  2 L/min  Nasal cannula  --  --   04/19/21 0300  97 7 °F (36 5 °C)  93  18  --  --  128/80  98 mmHg  --  --  --  --  --  --  --   04/19/21 0200  --  100  --  118/63  83  110/66  84 mmHg  --  --  --  --  --  --  --   04/19/21 0100  --  100  --  119/65  86  116/68  86 mmHg  --  --  --  --  --  --  --   04/19/21 0000  --  --  --  --  --  126/74  92 mmHg  --  --  --  --  --  --  --   04/18/21 2305  97 8 °F (36 6 °C)  94  18  94/54  67  --  --  96 %  --  --  --  Nasal cannula  --  Lying   04/18/21 2300  --  94  --  106/57  78  106/62  78 mmHg  --  --  --  --  --  --  --   04/18/21 2200  --  100  --  117/60  82  124/74  92 mmHg  --  --  --  --  --  --  --   04/18/21 2100  --  106Abnormal   --  --  --  136/82  100 mmHg  --  --  --  --  --  --  --   04/18/21 2000  --  --  --  --  --  144/86  106 mmHg  --  --  --  --  --  --  --   04/18/21 1947  97 8 °F (36 6 °C)  108Abnormal   18  129/62  87  --  --  92 %  --  --  --  Nasal cannula  --  Sitting   04/18/21 1440  97 6 °F (36 4 °C)  97  14  157/85  107  163/90  111 mmHg  94 %  28  --  2 L/min  Nasal cannula  --  Sitting   04/18/21 1420  --  --  --  --  --  --  --  --  --  --  --  --  X  --   04/18/21 1415  --  88  20  166/91  --  168/85  112 mmHg  100 %  32  --  3 L/min  Nasal cannula  --  --   04/18/21 1400  97 4 °F (36 3 °C)Abnormal   92  20  169/85  --  168/82  126 mmHg  95 %  --  6 L/min  --  Simple mask  X  --   04/18/21 1054  98 5 °F (36 9 °C)  105  18  115/76  89  --  --  98 %  28  --  2 L/min  Nasal cannula  --  Sitting   04/18/21 0922  --  --  --  140/84  --  --  --  --  --  --  --  --  --  --   04/18/21 0800  --  --  --  --  --  --  --  98 %  28  --  2 L/min  Nasal cannula  --  --   04/18/21 0750  97 6 °F (36 4 °C)  108Abnormal   22 126/86  99  --  --  97 %  --  --  --  Nasal cannula  --  Sitting     Date and Time Eye Opening Best Verbal Response Best Motor Response Mike Coma Scale Score   04/19/21 1200 4 5 6 15   04/19/21 0800 4 5 6 15   04/19/21 0400 4 5 6 15   04/19/21 0000 4 5 6 15   04/18/21 2000 4 5 6 15   04/18/21 1600 4 5 6 15   04/18/21 1440 4 5 6 15   04/18/21 1420 4 5 6 15   04/18/21 1400 4 5 6 15     04/18/2021 @ 0112  CTa chest:  1   No evidence of pulmonary embolism  2   Large pericardial effusion  3   Left greater than right diffuse interlobular septal thickening and nodularity concerning for lymphangitic spread of tumor versus pneumonitis  4   Diffuse thickening of the bronchi which may be due to superimposed bronchitis  5   Small right pleural effusion  6   Increase in size of mediastinal lymphadenopathy, likely metastatic  7   Nodular thickening of bilateral adrenal glands, likely metastatic      04/18/2021 @ 0125  CT soft tissue neck:  1   No significant neck mass is seen  2   Mediastinal lymphadenopathy and a few small supraclavicular lymph nodes which have an abnormal central hypodensity, likely metastatic  3   See CT of the chest performed concurrently for further evaluation  04/18/2021 @ 1218  Chest X:  New cardiomegaly   The rapid change suggests a pericardial effusion  Increased pulmonary markings suggesting pulmonary edema  Subsequent CT chest shows a pericardial effusion       04/17/2021 @ 2252  ECG:  NSR    Pertinent Labs/Diagnostic Test Results:     Results from last 7 days   Lab Units 04/19/21 0518 04/18/21  0605 04/17/21  2303   WBC Thousand/uL 7 66 8 71 8 17   HEMOGLOBIN g/dL 10 3* 10 7* 11 0*   HEMATOCRIT % 32 2* 33 1* 34 0*   PLATELETS Thousands/uL 271 304 295   NEUTROS ABS Thousands/µL 6 79  --  7 14   BANDS PCT %  --  1  --      Results from last 7 days   Lab Units 04/19/21 0518 04/18/21  0605 04/17/21  2303   SODIUM mmol/L 136 130* 131*   POTASSIUM mmol/L 4 1 4 3 4 3   CHLORIDE mmol/L 102 94* 93*   CO2 mmol/L 27 27 27   ANION GAP mmol/L 7 9 11   BUN mg/dL 25 34* 33*   CREATININE mg/dL 0 41* 0 66 0 79   EGFR ml/min/1 73sq m 130 111 94   CALCIUM mg/dL 8 5 9 3 8 8   MAGNESIUM mg/dL  --  1 5* 1 2*   PHOSPHORUS mg/dL  --  4 0  --      Results from last 7 days   Lab Units 04/18/21  0605 04/17/21  2303   AST U/L 18 16   ALT U/L 47 39   ALK PHOS U/L 107 98   TOTAL PROTEIN g/dL 6 6 6 5   ALBUMIN g/dL 3 1* 2 9*   TOTAL BILIRUBIN mg/dL 1 09* 0 64     Results from last 7 days   Lab Units 04/19/21  1205 04/19/21  0522 04/18/21  2355 04/18/21  1802 04/18/21  1351 04/18/21  0603   POC GLUCOSE mg/dl 280* 200* 294* 187* 232* 261*     Results from last 7 days   Lab Units 04/19/21  0518 04/18/21  0605 04/17/21  2303   GLUCOSE RANDOM mg/dL 211* 240* 284*     Results from last 7 days   Lab Units 04/18/21  0632   HEMOGLOBIN A1C % 7 6*   EAG mg/dl 171     Results from last 7 days   Lab Units 04/17/21  2303   TROPONIN I ng/mL <0 02     Results from last 7 days   Lab Units 04/19/21  0518   PROTIME seconds 14 8*   INR  1 16   PTT seconds 24       Results from last 7 days   Lab Units 04/17/21  2303   NT-PRO BNP pg/mL 127*     Results from last 7 days   Lab Units 04/18/21  1306   GRAM STAIN RESULT  2+ Polys  No bacteria seen   BODY FLUID CULTURE, STERILE  No growth     Past Medical History:   Diagnosis Date    Acute on chronic congestive heart failure with left ventricular diastolic dysfunction (HCC)     last assessed 5/23/2017    Asthma     Atelectasis     [facility] [address] [facility]     Diabetes mellitus [facility] [address] [facility]     Diverticulitis 2017    with perforation and abscess     Hyperlipidemia     Hypertension     Lesion of spleen     Lung cancer metastatic to brain ([facility] [address] [facility]     Pericardial effusion      Present on Admission:   Essential hypertension   Diabetes mellitus type 2   Hyperlipidemia   Lung cancer metastatic to brain [facility])      Admitting Diagnosis: Pericardial effusion [I31 3]  Age/Sex: 61 y o  female  Admission Orders:  Scheduled Medications:  amLODIPine, 10 mg, Oral, Daily  atorvastatin, 20 mg, Oral, Daily With Dinner  dexamethasone, 4 mg, Oral, Q12H SCARLETT  docusate sodium, 100 mg, Oral, Daily  insulin glargine, 7 Units, Subcutaneous, HS  insulin lispro, 1-5 Units, Subcutaneous, TID AC  insulin lispro, 1-5 Units, Subcutaneous, HS  metoprolol succinate, 50 mg, Oral, Daily  [START ON 4/20/2021] pantoprazole, 40 mg, Oral, Early Morning      Continuous IV Infusions:  heparin (porcine), 3-30 Units/kg/hr (Order-Specific), Intravenous, Titrated  lactated ringers, 20 mL/hr, Intravenous, Continuous      PRN Meds:  acetaminophen, 650 mg, Oral, Q6H PRN  X 2 doses om 4/18;  X 1 dose on 4/19  aluminum-magnesium hydroxide-simethicone, 30 mL, Oral, Q6H PRN  ondansetron, 4 mg, Intravenous, Q6H PRN  oxyCODONE, 5 mg, Oral, Q4H PRN      Consult PT/OT  Beny SCDs  O2 @ 3L via NC  IP CONSULT TO CASE MANAGEMENT  IP CONSULT TO CARDIOTHORACIC SURGERY  IP CONSULT TO ONCOLOGY    Network Utilization Review Department  ATTENTION: Please call with any questions or concerns to [phone] and carefully listen to the prompts so that you are directed to the right person  All voicemails are confidential   [facility] all requests for admission clinical reviews, approved or denied determinations and any other requests to dedicated fax number below belonging to the campus where the patient is receiving treatment   List of dedicated fax numbers for the Facilities:  [address] DENIALS (Administrative/Medical Necessity) [phone]   [address] [place] [address] (Maternity/NICU/Pediatrics) 261 [facility],[address] Bernadette Sanchez [address] [place], [place] [address] [place] [address] [address] [place] [id number] [phone]

## 2021-04-19 NOTE — ASSESSMENT & PLAN NOTE
Lab Results   Component Value Date    HGBA1C 7 6 (H) 04/18/2021     · Patient on both linagliptin and metformin; hold while inpatient    · SSI/accuchecks while admitted  · Will also add basal insulin due to hyperglycemia  Recent Labs     04/18/21  1351 04/18/21  1802 04/18/21  2355 04/19/21  0522   POCGLU 232* 187* 294* 200*       Blood Sugar Average: Last 72 hrs:  (P) 234 8

## 2021-04-19 NOTE — CASE MANAGEMENT
CM met with the pt at bedside and gathered the following information:       HOME:  Pt lives in a split level home with 3 JERRY no HR and 6 steps to 2nd floor  LIVES W/:  Pt lives with her dtr and grand children (19 and 14)    :  Filomena Barthel (dtr) [phone]    INDEPENDENCE:  Ind for ADL ambulates w SPC and needs assistance with steps    TRANSPORTATION:  Family transport    DME:  SPC, wheelchair, shower chair    HHC:  Per chart pt had [facility] but per pt she had no history of HHC  I/P REHAB:  None reported    INCOME:  FCI    MENTAL HEALTH: None reported    D&A:  None reported    PCP:  Dr De Jesus Pae:  [facility]    INSURANCE:  [address] [place] [address] [address] POA:  None reported    TRANSPORTATION AT D/C:  Family Transport          CM reviewed d/c planning process including the following: identifying help at home, patient preference for d/c planning needs, Discharge Lounge, [facility] Meds to Bed program, availability of treatment team to discuss questions or concerns patient and/or family may have regarding understanding medications and recognizing signs and symptoms once discharged  CM also encouraged patient to follow up with all recommended appointments after discharge  Patient advised of importance for patient and family to participate in managing patients medical well-being  Patient/caregiver received discharge checklist  Content reviewed  Patient/caregiver encouraged to participate in discharge plan of care prior to discharge home

## 2021-04-19 NOTE — ASSESSMENT & PLAN NOTE
Presented with malignant pericardial effusion in setting of lung CA  Thoracic Surgery following   · S/P pericardial window with drain placement on 4/18  · Drain in place, continue local care per thoracic  · Follow cultures  · Oncology input appreciated   Recommend outpatient f/u to discuss 2nd line chemotherapy  · Monitor labs   · Wean oxygen as able   · PT/OT

## 2021-04-19 NOTE — QUICK NOTE
Post- OP Note - Thoracic Surgery   May Riff 62 y o  female MRN: [id number]  Unit/Bed#: [facility] 426-01 Encounter: [id number]    Assessment:  Patient is a 62 y o  female with malignant pericardial effusion s/p pericardial window 4/18    Drain: 60 cc SS recorded    Plan: Will follow drain outputs  Likely removal early this week  I/Os- please record output of drain  Pain/Nausea Control  OOB as tolerated   Incentive Spirometry    Subjective/Objective     Subjective:   Patient alert and oriented  No chest pain or SOB  Objective:    Blood pressure 129/62, pulse (!) 108, temperature 97 8 °F (36 6 °C), temperature source Oral, resp  rate 18, height 5' (1 524 m), weight 69 5 kg (153 lb 3 5 oz), last menstrual period 08/16/2014, SpO2 92 %, not currently breastfeeding  ,Body mass index is 29 92 kg/m²  Physical Exam:   Gen: NAD  HEENT: MMM  CV: well perfused  Chest Wall: drain intact  Lungs: Normal respiratory effort on 2L NC  Abd: soft, nontender, nondistended  Skin: warm/ dry  Neuro:  AxO x3

## 2021-04-19 NOTE — PROGRESS NOTES
[facility]  Progress Note - Amaya Caban 1960, 62 y o  female MRN: [id number]  Unit/Bed#: [facility] 426-01 Encounter: [id number]  Primary Care Provider: Anthony White DO   Date and time admitted to hospital: 4/18/2021  4:53 AM    Duplex resulted showing extensive DVTs per tech  Spoke with thoracic surgery, recommend IV heparin gtt  Updated RN  * Malignant pericardial effusion [facility])  Assessment & Plan  Presented with malignant pericardial effusion in setting of lung CA  Thoracic Surgery following   · S/P pericardial window with drain placement on 4/18  · Drain in place, continue local care per thoracic  · Follow cultures  · Oncology input appreciated  Recommend outpatient f/u to discuss 2nd line chemotherapy  · Monitor labs   · Wean oxygen as able   · PT/OT    Lung cancer metastatic to brain [facility])  Assessment & Plan  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 mediastinal involvement as well as brain metastases, and possible leptomeningeal involvement  · Now with malignant effusion s/p window  · S/p WBRT and radiation to thoracic spine  · Was on Pembrolizumab/carboplatin/Alimta by Dr Lupillo Motley  · Oncology consult appreciated, will need outpatient f/u to discuss 2nd line therapy     Edema of left lower extremity  Assessment & Plan  Check duplex  · Encourage elevation    History of smoking  Assessment & Plan  · Currently patient has not been smoking  Hyperlipidemia  Assessment & Plan  · Continue Lipitor 20 mg daily    Essential hypertension  Assessment & Plan  BP stable   · Continue Norvasc 10 mg daily  · Metoprolol succinate 50 mg q day    Diabetes mellitus type 2  Assessment & Plan  Lab Results   Component Value Date    HGBA1C 7 6 (H) 04/18/2021     · Patient on both linagliptin and metformin; hold while inpatient    · SSI/accuchecks while admitted  · Will also add basal insulin due to hyperglycemia  Recent Labs     21  1351 21  1802 21  2355 21  0522   POCGLU 232* 187* 294* 200*       Blood Sugar Average: Last 72 hrs:  (P) 234 8        VTE Pharmacologic Prophylaxis:   Moderate Risk (Score 3-4) - Pharmacological DVT Prophylaxis Ordered: enoxaparin (Lovenox)  Patient Centered Rounds: I performed bedside rounds with nursing staff today  Discussions with Specialists or Other Care Team Provider: appreciate thoracic and oncology, d/w CM    Education and Discussions with Family / Patient: Patient declined call to   Time Spent for Care: 30 minutes  More than 50% of total time spent on counseling and coordination of care as described above  Current Length of Stay: 1 day(s)  Current Patient Status: Inpatient   Certification Statement: The patient will continue to require additional inpatient hospital stay due to ongoing drain management   Discharge Plan: Anticipate discharge in >72 hrs to discharge location to be determined pending rehab evaluations  Code Status: Level 1 - Full Code    Subjective:   Doing okay  Some soreness around drain site  Denies SOB  Has a dry cough  LLE swollen for several weeks  Objective:     Vitals:   Temp (24hrs), Av 8 °F (36 6 °C), Min:97 4 °F (36 3 °C), Max:98 5 °F (36 9 °C)    Temp:  [97 4 °F (36 3 °C)-98 5 °F (36 9 °C)] 97 6 °F (36 4 °C)  HR:  [] 82  Resp:  [14-20] 20  BP: ()/(54-91) 125/60  SpO2:  [92 %-100 %] 97 %  Body mass index is 29 71 kg/m²  Input and Output Summary (last 24 hours): Intake/Output Summary (Last 24 hours) at 2021 0901  Last data filed at 2021 0801  Gross per 24 hour   Intake 1130 ml   Output 1665 ml   Net -535 ml       Physical Exam:   Physical Exam  Vitals signs and nursing note reviewed  Constitutional:       General: She is not in acute distress  Comments: On 2L via NC satting 98%   Cardiovascular:      Rate and Rhythm: Normal rate and regular rhythm  Comments: Pericardial drain noted   Pulmonary:      Effort: No respiratory distress  Abdominal:      General: There is no distension  Musculoskeletal:      Left lower leg: Edema present  Neurological:      Mental Status: She is oriented to person, place, and time  Psychiatric:         Mood and Affect: Mood normal           Additional Data:     Labs:  Results from last 7 days   Lab Units 04/19/21  0518 04/18/21  0605   WBC Thousand/uL 7 66 8 71   HEMOGLOBIN g/dL 10 3* 10 7*   HEMATOCRIT % 32 2* 33 1*   PLATELETS Thousands/uL 271 304   BANDS PCT %  --  1   NEUTROS PCT % 88*  --    LYMPHS PCT % 3*  --    LYMPHO PCT %  --  1*   MONOS PCT % 8  --    MONO PCT %  --  5   EOS PCT % 0 0     Results from last 7 days   Lab Units 04/19/21  0518 04/18/21  0605   SODIUM mmol/L 136 130*   POTASSIUM mmol/L 4 1 4 3   CHLORIDE mmol/L 102 94*   CO2 mmol/L 27 27   BUN mg/dL 25 34*   CREATININE mg/dL 0 41* 0 66   ANION GAP mmol/L 7 9   CALCIUM mg/dL 8 5 9 3   ALBUMIN g/dL  --  3 1*   TOTAL BILIRUBIN mg/dL  --  1 09*   ALK PHOS U/L  --  107   ALT U/L  --  47   AST U/L  --  18   GLUCOSE RANDOM mg/dL 211* 240*     Results from last 7 days   Lab Units 04/19/21  0518   INR  1 16     Results from last 7 days   Lab Units 04/19/21  0522 04/18/21  2355 04/18/21  1802 04/18/21  1351 04/18/21  0603   POC GLUCOSE mg/dl 200* 294* 187* 232* 261*     Results from last 7 days   Lab Units 04/18/21  0632   HEMOGLOBIN A1C % 7 6*           Lines/Drains:  Invasive Devices     Peripheral Intravenous Line            Peripheral IV 04/17/21 Left Antecubital 1 day          Arterial Line            Arterial Line 04/18/21 Left Radial 1 day          Drain            Closed/Suction Drain Midline Chest Bulb 19 Fr  less than 1 day                      Imaging: No pertinent imaging reviewed      Recent Cultures (last 7 days):         Last 24 Hours Medication List:   Current Facility-Administered Medications   Medication Dose Route Frequency Provider Last Rate  acetaminophen  650 mg Oral Q6H PRN Eileen Bradley MD      aluminum-magnesium hydroxide-simethicone  30 mL Oral Q6H PRN Eileen Bradley MD      amLODIPine  10 mg Oral Daily Eileen Bradley MD      atorvastatin  20 mg Oral Daily With Kaur Young MD      dexamethasone  4 mg Oral Q12H [address] Eileen Bradley MD      docusate sodium  100 mg Oral Daily Eileen Bradley MD      enoxaparin  40 mg Subcutaneous Daily Eileen Bradley MD      insulin glargine  7 Units Subcutaneous HS Romi Latif PA-C      insulin lispro  1-5 Units Subcutaneous TID AC Nevin Harley PA-C      insulin lispro  1-5 Units Subcutaneous HS Romi Latif PA-C      lactated ringers  20 mL/hr Intravenous Continuous Eileen Bradley MD 20 mL/hr (04/18/21 1400)    metoprolol succinate  50 mg Oral Daily Eileen Bradley MD      ondansetron  4 mg Intravenous Q6H PRN Eileen Bradley MD      oxyCODONE  5 mg Oral Q4H PRN MD Trang Pereyra ON 4/20/2021] pantoprazole  40 mg Oral Early Morning Fei Lauren MD          Today, Patient Was Seen By: Romi Latif PA-C    **Please Note: This note may have been constructed using a voice recognition system  **

## 2021-04-20 PROBLEM — I82.409 ACUTE DVT (DEEP VENOUS THROMBOSIS) (HCC): Status: ACTIVE | Noted: 2021-04-19

## 2021-04-20 LAB
ANION GAP SERPL CALCULATED.3IONS-SCNC: 3 MMOL/L (ref 4–13)
APTT PPP: 101 SECONDS (ref 23–37)
APTT PPP: 75 SECONDS (ref 23–37)
APTT PPP: 80 SECONDS (ref 23–37)
APTT PPP: 91 SECONDS (ref 23–37)
ATRIAL RATE: 107 BPM
BASOPHILS # BLD AUTO: 0.01 THOUSANDS/ΜL (ref 0–0.1)
BASOPHILS NFR BLD AUTO: 0 % (ref 0–1)
BUN SERPL-MCNC: 21 MG/DL (ref 5–25)
CALCIUM SERPL-MCNC: 8.7 MG/DL (ref 8.3–10.1)
CHLORIDE SERPL-SCNC: 100 MMOL/L (ref 100–108)
CO2 SERPL-SCNC: 30 MMOL/L (ref 21–32)
CREAT SERPL-MCNC: 0.4 MG/DL (ref 0.6–1.3)
EOSINOPHIL # BLD AUTO: 0 THOUSAND/ΜL (ref 0–0.61)
EOSINOPHIL NFR BLD AUTO: 0 % (ref 0–6)
ERYTHROCYTE [DISTWIDTH] IN BLOOD BY AUTOMATED COUNT: 15.9 % (ref 11.6–15.1)
GFR SERPL CREATININE-BSD FRML MDRD: 131 ML/MIN/1.73SQ M
GLUCOSE SERPL-MCNC: 208 MG/DL (ref 65–140)
GLUCOSE SERPL-MCNC: 242 MG/DL (ref 65–140)
GLUCOSE SERPL-MCNC: 263 MG/DL (ref 65–140)
GLUCOSE SERPL-MCNC: 275 MG/DL (ref 65–140)
GLUCOSE SERPL-MCNC: 335 MG/DL (ref 65–140)
HCT VFR BLD AUTO: 32.2 % (ref 34.8–46.1)
HGB BLD-MCNC: 10.4 G/DL (ref 11.5–15.4)
IMM GRANULOCYTES # BLD AUTO: 0.06 THOUSAND/UL (ref 0–0.2)
IMM GRANULOCYTES NFR BLD AUTO: 0 % (ref 0–2)
LYMPHOCYTES # BLD AUTO: 0.16 THOUSANDS/ΜL (ref 0.6–4.47)
LYMPHOCYTES NFR BLD AUTO: 3 % (ref 14–44)
MCH RBC QN AUTO: 31.7 PG (ref 26.8–34.3)
MCHC RBC AUTO-ENTMCNC: 32.3 G/DL (ref 31.4–37.4)
MCV RBC AUTO: 98 FL (ref 82–98)
MONOCYTES # BLD AUTO: 0.36 THOUSAND/ΜL (ref 0.17–1.22)
MONOCYTES NFR BLD AUTO: 6 % (ref 4–12)
NEUTROPHILS # BLD AUTO: 5.78 THOUSANDS/ΜL (ref 1.85–7.62)
NEUTS SEG NFR BLD AUTO: 90 % (ref 43–75)
NRBC BLD AUTO-RTO: 0 /100 WBCS
P AXIS: 63 DEGREES
PLATELET # BLD AUTO: 284 THOUSANDS/UL (ref 149–390)
PMV BLD AUTO: 8.9 FL (ref 8.9–12.7)
POTASSIUM SERPL-SCNC: 4.3 MMOL/L (ref 3.5–5.3)
PR INTERVAL: 170 MS
QRS AXIS: 47 DEGREES
QRSD INTERVAL: 84 MS
QT INTERVAL: 322 MS
QTC INTERVAL: 429 MS
RBC # BLD AUTO: 3.28 MILLION/UL (ref 3.81–5.12)
SODIUM SERPL-SCNC: 133 MMOL/L (ref 136–145)
T WAVE AXIS: 233 DEGREES
VENTRICULAR RATE: 107 BPM
WBC # BLD AUTO: 6.37 THOUSAND/UL (ref 4.31–10.16)

## 2021-04-20 PROCEDURE — 85025 COMPLETE CBC W/AUTO DIFF WBC: CPT | Performed by: INTERNAL MEDICINE

## 2021-04-20 PROCEDURE — 85730 THROMBOPLASTIN TIME PARTIAL: CPT | Performed by: INTERNAL MEDICINE

## 2021-04-20 PROCEDURE — 93010 ELECTROCARDIOGRAM REPORT: CPT | Performed by: INTERNAL MEDICINE

## 2021-04-20 PROCEDURE — 97167 OT EVAL HIGH COMPLEX 60 MIN: CPT

## 2021-04-20 PROCEDURE — 82948 REAGENT STRIP/BLOOD GLUCOSE: CPT

## 2021-04-20 PROCEDURE — 93971 EXTREMITY STUDY: CPT | Performed by: SURGERY

## 2021-04-20 PROCEDURE — 97163 PT EVAL HIGH COMPLEX 45 MIN: CPT

## 2021-04-20 PROCEDURE — 85730 THROMBOPLASTIN TIME PARTIAL: CPT | Performed by: THORACIC SURGERY (CARDIOTHORACIC VASCULAR SURGERY)

## 2021-04-20 PROCEDURE — 99232 SBSQ HOSP IP/OBS MODERATE 35: CPT | Performed by: INTERNAL MEDICINE

## 2021-04-20 PROCEDURE — 80048 BASIC METABOLIC PNL TOTAL CA: CPT | Performed by: INTERNAL MEDICINE

## 2021-04-20 RX ORDER — INSULIN GLARGINE 100 [IU]/ML
12 INJECTION, SOLUTION SUBCUTANEOUS
Status: DISCONTINUED | OUTPATIENT
Start: 2021-04-20 | End: 2021-04-21

## 2021-04-20 RX ADMIN — DEXAMETHASONE 4 MG: 4 TABLET ORAL at 08:33

## 2021-04-20 RX ADMIN — PANTOPRAZOLE SODIUM 40 MG: 40 TABLET, DELAYED RELEASE ORAL at 06:44

## 2021-04-20 RX ADMIN — AMLODIPINE BESYLATE 10 MG: 10 TABLET ORAL at 08:31

## 2021-04-20 RX ADMIN — DEXAMETHASONE 4 MG: 4 TABLET ORAL at 21:17

## 2021-04-20 RX ADMIN — INSULIN LISPRO 2 UNITS: 100 INJECTION, SOLUTION INTRAVENOUS; SUBCUTANEOUS at 21:17

## 2021-04-20 RX ADMIN — ATORVASTATIN CALCIUM 20 MG: 20 TABLET, FILM COATED ORAL at 18:05

## 2021-04-20 RX ADMIN — INSULIN LISPRO 2 UNITS: 100 INJECTION, SOLUTION INTRAVENOUS; SUBCUTANEOUS at 18:03

## 2021-04-20 RX ADMIN — INSULIN GLARGINE 12 UNITS: 100 INJECTION, SOLUTION SUBCUTANEOUS at 21:17

## 2021-04-20 RX ADMIN — DOCUSATE SODIUM 100 MG: 100 CAPSULE, LIQUID FILLED ORAL at 08:31

## 2021-04-20 RX ADMIN — INSULIN LISPRO 2 UNITS: 100 INJECTION, SOLUTION INTRAVENOUS; SUBCUTANEOUS at 06:44

## 2021-04-20 RX ADMIN — METOPROLOL SUCCINATE 50 MG: 50 TABLET, EXTENDED RELEASE ORAL at 08:33

## 2021-04-20 RX ADMIN — MELATONIN TAB 3 MG 3 MG: 3 TAB at 21:17

## 2021-04-20 RX ADMIN — INSULIN LISPRO 3 UNITS: 100 INJECTION, SOLUTION INTRAVENOUS; SUBCUTANEOUS at 11:43

## 2021-04-20 NOTE — ASSESSMENT & PLAN NOTE
Noted to have LLE >>RLE  · Duplex was checked and despite report being in EPIC showing negative, I personally discussed it with the vascular tech and vascular on call and patient [address] DVT in the peroneal vein and posterior tibial vein  Non-occlusive thrombus in the popliteal and NEEDS TO BE TREATED per my d/w vascular  · Unclear why report does not reflect our conversation  · D/w thoracic surgery, placed on heparin gtt   When cleared, will likely require therapeutic lovenox at dc vs eliquis/xarelto

## 2021-04-20 NOTE — ASSESSMENT & PLAN NOTE
Lab Results   Component Value Date    HGBA1C 7 6 (H) 04/18/2021     · Patient on both linagliptin and metformin; hold while inpatient    · SSI/accuchecks while admitted  · Added basal insulin, will increase  Recent Labs     04/19/21  0522 04/19/21  1205 04/19/21  2219 04/20/21  0549   POCGLU 200* 280* 291* 242*       Blood Sugar Average: Last 72 hrs:  (P) 248 788

## 2021-04-20 NOTE — UTILIZATION REVIEW
Continued Stay Review    Date: 04/20/2021                      POD # 2 S/P SUBXIPHOID PERICARDIAL WINDOW  Current Patient Class: Inpatient Current Level of Care: Level 1 stepdown    HPI:61 y o  female initially admitted on 04/18/2021   Malignant pericardial effusion, POS #2, S/P window  4/19- Acute DVT  Assessment/Plan:  4/19 Started on IV Heparin gtt -  Continue IV heparin gtt  Continue O2 @ 2 5L via NC  Maintain pericardial drain (closed suction chest bulb) - bloody outout  Monitor for ABLA - trend Hgl  Vital Signs: /86 (BP Location: Left arm)   Pulse 87   Temp 97 8 °F (36 6 °C) (Oral)   Resp 20   Ht 5' (1 524 m)   Wt 69 kg (152 lb 1 9 oz)   LMP 08/16/2014 (Approximate)   SpO2 98%   BMI 29 71 kg/m²       Pertinent Labs/Diagnostic Results:      04/19/2021  VAS Lower limb venous Duplex:  ACUTE OCCLUSIVE DVT in the peroneal vein and posterior tibial vein   Non-occlusive thrombus in the popliteal and NEEDS TO BE TREATED     Results from last 7 days   Lab Units 04/20/21 0447 04/19/21  0518 04/18/21  0605 04/17/21  2303   WBC Thousand/uL 6 37 7 66 8 71 8 17   HEMOGLOBIN g/dL 10 4* 10 3* 10 7* 11 0*   HEMATOCRIT % 32 2* 32 2* 33 1* 34 0*   PLATELETS Thousands/uL 284 271 304 295   NEUTROS ABS Thousands/µL 5 78 6 79  --  7 14   BANDS PCT %  --   --  1  --      Results from last 7 days   Lab Units 04/20/21 0447 04/19/21  0518 04/18/21  0605 04/17/21  2303   SODIUM mmol/L 133* 136 130* 131*   POTASSIUM mmol/L 4 3 4 1 4 3 4 3   CHLORIDE mmol/L 100 102 94* 93*   CO2 mmol/L 30 27 27 27   ANION GAP mmol/L 3* 7 9 11   BUN mg/dL 21 25 34* 33*   CREATININE mg/dL 0 40* 0 41* 0 66 0 79   EGFR ml/min/1 73sq m 131 130 111 94   CALCIUM mg/dL 8 7 8 5 9 3 8 8   MAGNESIUM mg/dL  --   --  1 5* 1 2*   PHOSPHORUS mg/dL  --   --  4 0  --      Results from last 7 days   Lab Units 04/18/21  0605 04/17/21  2303   AST U/L 18 16   ALT U/L 47 39   ALK PHOS U/L 107 98   TOTAL PROTEIN g/dL 6 6 6 5   ALBUMIN g/dL 3 1* 2 9* TOTAL BILIRUBIN mg/dL 1 09* 0 64     Results from last 7 days   Lab Units 04/20/21  1059 04/20/21  0549 04/19/21  2219 04/19/21  1205 04/19/21  0522 04/18/21  2355 04/18/21  1802 04/18/21  1351 04/18/21  0603   POC GLUCOSE mg/dl 335* 242* 291* 280* 200* 294* 187* 232* 261*     Results from last 7 days   Lab Units 04/20/21  0447 04/19/21  0518 04/18/21  0605 04/17/21  2303   GLUCOSE RANDOM mg/dL 208* 211* 240* 284*     Results from last 7 days   Lab Units 04/18/21  0632   HEMOGLOBIN A1C % 7 6*   EAG mg/dl 171     Results from last 7 days   Lab Units 04/17/21  2303   TROPONIN I ng/mL <0 02     Results from last 7 days   Lab Units 04/20/21  0447 04/19/21  2358 04/19/21  1730 04/19/21  0518   PROTIME seconds  --   --   --  14 8*   INR   --   --   --  1 16   PTT seconds 101* 75* 77* 24     Results from last 7 days   Lab Units 04/17/21  2303   NT-PRO BNP pg/mL 127*     Results from last 7 days   Lab Units 04/18/21  1306   GRAM STAIN RESULT  2+ Polys  No bacteria seen   BODY FLUID CULTURE, STERILE  No growth     Medications:   Scheduled Medications:  amLODIPine, 10 mg, Oral, Daily  atorvastatin, 20 mg, Oral, Daily With Dinner  dexamethasone, 4 mg, Oral, Q12H SCARLETT  docusate sodium, 100 mg, Oral, Daily  insulin glargine, 12 Units, Subcutaneous, HS  insulin lispro, 1-5 Units, Subcutaneous, TID AC  insulin lispro, 1-5 Units, Subcutaneous, HS  melatonin, 3 mg, Oral, HS  metoprolol succinate, 50 mg, Oral, Daily  pantoprazole, 40 mg, Oral, Early Morning      Continuous IV Infusions:  heparin (porcine), 3-30 Units/kg/hr (Order-Specific), Intravenous, Titrated  lactated ringers, 20 mL/hr, Intravenous, Continuous      PRN Meds:  acetaminophen, 650 mg, Oral, Q6H PRN  aluminum-magnesium hydroxide-simethicone, 30 mL, Oral, Q6H PRN  ondansetron, 4 mg, Intravenous, Q6H PRN  oxyCODONE, 5 mg, Oral, Q4H PRN        Discharge Plan: TBD    Network Utilization Review Department  ATTENTION: Please call with any questions or concerns to [phone] and carefully listen to the prompts so that you are directed to the right person  All voicemails are confidential   Adri Alley all requests for admission clinical reviews, approved or denied determinations and any other requests to dedicated fax number below belonging to the campus where the patient is receiving treatment   List of dedicated fax numbers for the Facilities:  [address] DENIALS (Administrative/Medical Necessity) [phone]   [address] (Maternity/NICU/Pediatrics) [phone]   [address] [id number] [place] [address] Seth Nadia Singleton [address] [place] [address] [phone]

## 2021-04-20 NOTE — ASSESSMENT & PLAN NOTE
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

## 2021-04-20 NOTE — PLAN OF CARE
Problem: Potential for Falls  Goal: Patient will remain free of falls  Description: INTERVENTIONS:  - Assess patient frequently for physical needs  -  Identify cognitive and physical deficits and behaviors that affect risk of falls    -  [place] fall precautions as indicated by assessment   - Educate patient/family on patient safety including physical limitations  - Instruct patient to call for assistance with activity based on assessment  - Modify environment to reduce risk of injury  - Consider OT/PT consult to assist with strengthening/mobility  4/20/2021 0833 by Noreen Hernandez RN  Outcome: Progressing  4/20/2021 0833 by Noreen Hernandez RN  Outcome: Progressing     Problem: Prexisting or High Potential for Compromised Skin Integrity  Goal: Skin integrity is maintained or improved  Description: INTERVENTIONS:  - Identify patients at risk for skin breakdown  - Assess and monitor skin integrity  - Assess and monitor nutrition and hydration status  - Monitor labs   - Assess for incontinence   - Turn and reposition patient  - Assist with mobility/ambulation  - Relieve pressure over bony prominences  - Avoid friction and shearing  - Provide appropriate hygiene as needed including keeping skin clean and dry  - Evaluate need for skin moisturizer/barrier cream  - Collaborate with interdisciplinary team   - Patient/family teaching  - Consider wound care consult   4/20/2021 9254 by Noreen Hernandez RN  Outcome: Progressing  4/20/2021 0833 by Noreen Hernandez RN  Outcome: Progressing     Problem: PAIN - ADULT  Goal: Verbalizes/displays adequate comfort level or baseline comfort level  Description: Interventions:  - Encourage patient to monitor pain and request assistance  - Assess pain using appropriate pain scale  - Administer analgesics based on type and severity of pain and evaluate response  - Implement non-pharmacological measures as appropriate and evaluate response  - Consider cultural and social influences on pain and pain management  - Notify physician/advanced practitioner if interventions unsuccessful or patient reports new pain  4/20/2021 0833 by Janice Mooney RN  Outcome: Progressing  4/20/2021 0833 by Janice Mooney RN  Outcome: Progressing     Problem: INFECTION - ADULT  Goal: Absence or prevention of progression during hospitalization  Description: INTERVENTIONS:  - Assess and monitor for signs and symptoms of infection  - Monitor lab/diagnostic results  - Monitor all insertion sites, i e  indwelling lines, tubes, and drains  - Monitor endotracheal if appropriate and nasal secretions for changes in amount and color  - [place] appropriate cooling/warming therapies per order  - Administer medications as ordered  - Instruct and encourage patient and family to use good hand hygiene technique  - Identify and instruct in appropriate isolation precautions for identified infection/condition  4/20/2021 0833 by Janice Mooney RN  Outcome: Progressing  4/20/2021 0833 by Janice Mooney RN  Outcome: Progressing  Goal: Absence of fever/infection during neutropenic period  Description: INTERVENTIONS:  - Monitor WBC    4/20/2021 0833 by Janice Mooney RN  Outcome: Progressing  4/20/2021 0833 by Janice Mooney RN  Outcome: Progressing     Problem: SAFETY ADULT  Goal: Patient will remain free of falls  Description: INTERVENTIONS:  - Assess patient frequently for physical needs  -  Identify cognitive and physical deficits and behaviors that affect risk of falls    -  [place] fall precautions as indicated by assessment   - Educate patient/family on patient safety including physical limitations  - Instruct patient to call for assistance with activity based on assessment  - Modify environment to reduce risk of injury  - Consider OT/PT consult to assist with strengthening/mobility  4/20/2021 0833 by Janice Mooney RN  Outcome: Progressing  4/20/2021 0833 by Janice Mooney RN  Outcome: Progressing  Goal: Maintain or return to baseline ADL function  Description: INTERVENTIONS:  -  Assess patient's ability to carry out ADLs; assess patient's baseline for ADL function and identify physical deficits which impact ability to perform ADLs (bathing, care of mouth/teeth, toileting, grooming, dressing, etc )  - Assess/evaluate cause of self-care deficits   - Assess range of motion  - Assess patient's mobility; develop plan if impaired  - Assess patient's need for assistive devices and provide as appropriate  - Encourage maximum independence but intervene and supervise when necessary  - Involve family in performance of ADLs  - Assess for home care needs following discharge   - Consider OT consult to assist with ADL evaluation and planning for discharge  - Provide patient education as appropriate  4/20/2021 0833 by Jaycee Lucia RN  Outcome: Progressing  4/20/2021 0833 by Jaycee Lucia RN  Outcome: Progressing  Goal: Maintain or return mobility status to optimal level  Description: INTERVENTIONS:  - Assess patient's baseline mobility status (ambulation, transfers, stairs, etc )    - Identify cognitive and physical deficits and behaviors that affect mobility  - Identify mobility aids required to assist with transfers and/or ambulation (gait belt, sit-to-stand, lift, walker, cane, etc )  - [place] fall precautions as indicated by assessment  - Record patient progress and toleration of activity level on Mobility SBAR; progress patient to next Phase/Stage  - Instruct patient to call for assistance with activity based on assessment  - Consider rehabilitation consult to assist with strengthening/weightbearing, etc   4/20/2021 0833 by Jaycee Lucia RN  Outcome: Progressing  4/20/2021 0833 by Jaycee Lucia RN  Outcome: Progressing     Problem: DISCHARGE PLANNING  Goal: Discharge to home or other facility with appropriate resources  Description: INTERVENTIONS:  - Identify barriers to discharge w/patient and caregiver  - Arrange for needed discharge resources and transportation as appropriate  - Identify discharge learning needs (meds, wound care, etc )  - Arrange for interpretive services to assist at discharge as needed  - Refer to Case Management Department for coordinating discharge planning if the patient needs post-hospital services based on physician/advanced practitioner order or complex needs related to functional status, cognitive ability, or social support system  4/20/2021 0833 by Jaycee Lucia RN  Outcome: Progressing  4/20/2021 0833 by Jaycee Lucia RN  Outcome: Progressing     Problem: Knowledge Deficit  Goal: Patient/family/caregiver demonstrates understanding of disease process, treatment plan, medications, and discharge instructions  Description: Complete learning assessment and assess knowledge base    Interventions:  - Provide teaching at level of understanding  - Provide teaching via preferred learning methods  4/20/2021 0833 by Jaycee Lucia RN  Outcome: Progressing  4/20/2021 0833 by Jaycee Lucia RN  Outcome: Progressing

## 2021-04-20 NOTE — PROGRESS NOTES
[facility] [facility]  Progress Note - Maurice Garcia 1960, 62 y o  female MRN: [id number]  Unit/Bed#: [facility] 426-01 Encounter: [id number]  Primary Care Provider: Zayda Campos,    Date and time admitted to hospital: 4/18/2021  4:53 AM    * Malignant pericardial effusion [facility])  Assessment & Plan  Presented with malignant pericardial effusion in setting of lung CA  Thoracic Surgery following   · S/P pericardial window with drain placement on 4/18  · Drain in place, continue local care per thoracic  · Follow cultures  · Oncology input appreciated  Recommend outpatient f/u to discuss 2nd line chemotherapy  · Monitor labs   · Wean oxygen as able   · PT/OT    Acute DVT (deep venous thrombosis) (HCC)  Assessment & Plan  Noted to have LLE >>RLE  · Duplex was checked and despite report being in EPIC showing negative, I personally discussed it with the vascular tech and vascular on call and patient [address] DVT in the peroneal vein and posterior tibial vein  Non-occlusive thrombus in the popliteal and NEEDS TO BE TREATED per my d/w vascular  · Unclear why report does not reflect our conversation  · D/w thoracic surgery, placed on heparin gtt  When cleared, will likely require therapeutic lovenox at dc vs eliquis/xarelto     Lung cancer metastatic to brain [facility])  Assessment & Plan  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 mediastinal involvement as well as brain metastases, and possible leptomeningeal involvement  · Now with malignant effusion s/p window  · S/p WBRT and radiation to thoracic spine  · Was on Pembrolizumab/carboplatin/Alimta by Dr Carter Gasca  · Oncology consult appreciated, will need outpatient f/u to discuss 2nd line therapy     History of smoking  Assessment & Plan  · Currently patient has not been smoking      Hyperlipidemia  Assessment & Plan  · Continue Lipitor 20 mg daily    Essential hypertension  Assessment & Plan  BP stable   · Continue Norvasc 10 mg daily  · Metoprolol succinate 50 mg q day    Diabetes mellitus type 2  Assessment & Plan  Lab Results   Component Value Date    HGBA1C 7 6 (H) 2021     · Patient on both linagliptin and metformin; hold while inpatient  · SSI/accuchecks while admitted  · Added basal insulin, will increase  Recent Labs     21  0522 21  1205 21  2219 21  0549   POCGLU 200* 280* 291* 242*       Blood Sugar Average: Last 72 hrs:  (P) 248 375        VTE Pharmacologic Prophylaxis:   Moderate Risk (Score 3-4) - Pharmacological DVT Prophylaxis Ordered: heparin drip  Patient Centered Rounds: I performed bedside rounds with nursing staff today  Discussions with Specialists or Other Care Team Provider: Vascular resident yesterday, Appreciate CM input, appreciate thoracic surgery input    Education and Discussions with Family / Patient: Patient declined call to   Time Spent for Care: 30 minutes  More than 50% of total time spent on counseling and coordination of care as described above  Current Length of Stay: 2 day(s)  Current Patient Status: Inpatient   Certification Statement: The patient will continue to require additional inpatient hospital stay due to ongoing as above   Discharge Plan: Anticipate discharge in >72 hrs to discharge location to be determined pending rehab evaluations  Code Status: Level 1 - Full Code    Subjective:   Pt reports doing ok today  Has some pain at drain site  No shortness of breath but per RN does desaturate when taken off oxygen  No pain in LLE  Objective:     Vitals:   Temp (24hrs), Av °F (36 7 °C), Min:97 8 °F (36 6 °C), Max:98 3 °F (36 8 °C)    Temp:  [97 8 °F (36 6 °C)-98 3 °F (36 8 °C)] 97 9 °F (36 6 °C)  HR:  [83-88] 88  Resp:  [16-20] 20  BP: (108-135)/(61-80) 135/66  SpO2:  [96 %-98 %] 97 %  Body mass index is 29 71 kg/m²       Input and Output Summary (last 24 hours): Intake/Output Summary (Last 24 hours) at 4/20/2021 0846  Last data filed at 4/19/2021 2259  Gross per 24 hour   Intake 375 14 ml   Output 905 ml   Net -529 86 ml       Physical Exam:   Physical Exam  Vitals signs and nursing note reviewed  Constitutional:       Appearance: She is obese  Comments: On 2 5L via NC   Cardiovascular:      Rate and Rhythm: Normal rate and regular rhythm  Comments: Pericardial drain in place with bloody output   Pulmonary:      Effort: No respiratory distress  Comments: Decreased   Abdominal:      General: There is no distension  Musculoskeletal:      Right lower leg: Edema present  Left lower leg: Edema (>>>R) present  Neurological:      Mental Status: She is oriented to person, place, and time  Psychiatric:         Mood and Affect: Mood normal           Additional Data:     Labs:  Results from last 7 days   Lab Units 04/20/21 0447 04/18/21  0605   WBC Thousand/uL 6 37   < > 8 71   HEMOGLOBIN g/dL 10 4*   < > 10 7*   HEMATOCRIT % 32 2*   < > 33 1*   PLATELETS Thousands/uL 284   < > 304   BANDS PCT %  --   --  1   NEUTROS PCT % 90*   < >  --    LYMPHS PCT % 3*   < >  --    LYMPHO PCT %  --   --  1*   MONOS PCT % 6   < >  --    MONO PCT %  --   --  5   EOS PCT % 0   < > 0    < > = values in this interval not displayed  Results from last 7 days   Lab Units 04/20/21 0447 04/18/21  0605   SODIUM mmol/L 133*   < > 130*   POTASSIUM mmol/L 4 3   < > 4 3   CHLORIDE mmol/L 100   < > 94*   CO2 mmol/L 30   < > 27   BUN mg/dL 21   < > 34*   CREATININE mg/dL 0 40*   < > 0 66   ANION GAP mmol/L 3*   < > 9   CALCIUM mg/dL 8 7   < > 9 3   ALBUMIN g/dL  --   --  3 1*   TOTAL BILIRUBIN mg/dL  --   --  1 09*   ALK PHOS U/L  --   --  107   ALT U/L  --   --  47   AST U/L  --   --  18   GLUCOSE RANDOM mg/dL 208*   < > 240*    < > = values in this interval not displayed       Results from last 7 days   Lab Units 04/19/21  0518   INR  1 16     Results from last 7 days   Lab Units 04/20/21  0549 04/19/21  2219 04/19/21  1205 04/19/21  0522 04/18/21  2355 04/18/21  1802 04/18/21  1351 04/18/21  0603   POC GLUCOSE mg/dl 242* 291* 280* 200* 294* 187* 232* 261*     Results from last 7 days   Lab Units 04/18/21  0632   HEMOGLOBIN A1C % 7 6*           Lines/Drains:  Invasive Devices     Peripheral Intravenous Line            Peripheral IV 04/19/21 Left Antecubital less than 1 day    Peripheral IV 04/20/21 Left Forearm less than 1 day          Drain            Closed/Suction Drain Midline Chest Bulb 19 Fr  1 day                      Imaging: No pertinent imaging reviewed      Recent Cultures (last 7 days):   Results from last 7 days   Lab Units 04/18/21  1306   GRAM STAIN RESULT  2+ Polys  No bacteria seen   BODY FLUID CULTURE, STERILE  No growth       Last 24 Hours Medication List:   Current Facility-Administered Medications   Medication Dose Route Frequency Provider Last Rate    acetaminophen  650 mg Oral Q6H PRN Sheila Lindsey MD      aluminum-magnesium hydroxide-simethicone  30 mL Oral Q6H PRN Sheila Lindsey MD      amLODIPine  10 mg Oral Daily Sheila Lindsey MD      atorvastatin  20 mg Oral Daily With Brenda Rutledge MD      dexamethasone  4 mg Oral Q12H [facility] Sheila Lindsey MD      docusate sodium  100 mg Oral Daily Sheila Lindsey MD      heparin (porcine)  3-30 Units/kg/hr (Order-Specific) Intravenous Titrated Tala Martins PA-C Stopped (04/20/21 0826)    insulin glargine  12 Units Subcutaneous HS Nevin Harley PA-C      insulin lispro  1-5 Units Subcutaneous TID AC Nevin Harley PA-C      insulin lispro  1-5 Units Subcutaneous HS Nevin Harley PA-C      lactated ringers  20 mL/hr Intravenous Continuous Sheila Lindsey MD 20 mL/hr (04/18/21 1400)    melatonin  3 mg Oral HS ROSA Plunkett      metoprolol succinate  50 mg Oral Daily Sheila Lindsey MD      ondansetron  4 mg Intravenous Q6H PRN MD Es Yarbrough oxyCODONE  5 mg Oral Q4H PRN Shaka Ortiz MD      pantoprazole  40 mg Oral Early Morning Meliza Johansen MD          Today, Patient Was Seen By: Tho Laureano PA-C    **Please Note: This note may have been constructed using a voice recognition system  **

## 2021-04-20 NOTE — PLAN OF CARE
Problem: OCCUPATIONAL THERAPY ADULT  Goal: Performs self-care activities at highest level of function for planned discharge setting  See evaluation for individualized goals  Description: Treatment Interventions: ADL retraining, Functional transfer training, UE strengthening/ROM, Endurance training, Patient/family training, Equipment evaluation/education, Compensatory technique education, Continued evaluation, Energy conservation, Activityengagement          See flowsheet documentation for full assessment, interventions and recommendations  Note: Limitation: Decreased ADL status, Decreased endurance, Decreased self-care trans, Decreased high-level ADLs  Prognosis: Good  Assessment: Pt is a [address] y o  female admitted to [facility] on 4/18/2021 w/ malignant pericardial effusion s/p pericardial window on 4/18/2021  Pt  has a past medical history of Acute on chronic congestive heart failure with left ventricular diastolic dysfunction ([facility] [address] ), Asthma, Atelectasis, CHF (congestive heart failure) ([facility] [address] ), Diabetes mellitus ([facility] [address] ), Diverticulitis (2017), Hyperlipidemia, Hypertension, Lesion of spleen, Lung cancer metastatic to brain [facility]), and Pericardial effusion  Pt with active OT orders and up with assistance  orders  Pt resides in a split level home with 3 JERRY  Pt lives with multiple family members who are able to assist as needed upon d/c  Pt reports that she is typically I with ADLS, but has required some increased assistance lately, requires A for IADLS, and ambulates with RW PTA  (-)   Currently pt is min A for functional transfers, min-mod A for functional mobility, min A for UB ADLS and mod A for LB ADLS  Pt is limited at this time 2*: endurance, activity tolerance, functional mobility, forward functional reach, functional standing tolerance and decreased I w/ ADLS/IADLS  The following Occupational Performance Areas to address include: grooming, bathing/shower, toilet hygiene, dressing, functional mobility and clothing management  Based on the aforementioned OT evaluation, functional performance deficits, and assessments, pt has been identified as a high complexity evaluation  From OT standpoint, anticipate d/c STR pending progress  Pt to continue to benefit from acute immediate OT services to address the following goals 3-5x/week to  w/in 10-14 days:        OT Discharge Recommendation: Home with home health rehabilitation(pending progress)  OT - OK to Discharge: Yes(When medically appropriate)

## 2021-04-20 NOTE — PLAN OF CARE
Problem: PHYSICAL THERAPY ADULT  Goal: Performs mobility at highest level of function for planned discharge setting  See evaluation for individualized goals  Description: Treatment/Interventions: Functional transfer training, LE strengthening/ROM, Elevations, Therapeutic exercise, Endurance training, Patient/family training, Equipment eval/education, Bed mobility, Gait training  Equipment Recommended: Walker(already owns)       See flowsheet documentation for full assessment, interventions and recommendations  Note: Prognosis: Good  Problem List: Decreased strength, Decreased endurance, Impaired balance, Decreased mobility, Pain  Assessment: Pt seen for high complexity physical therapy evaluation  Pt is a 61 y/o female w/ history/comorbidities of smoker, lung CA, leptomeninges, HLD, HTN, DM II who is now admitted as a transfer from [facility]  Seen there w/ cough, SOB  Found to have malignant pericardial effusion, and underwent pericardial window 4/18/21  Due to acute medical issues, pain,, acute surgery, need for transfer to higher level of care, pain, fall risk, note unstab;e clinical picture  PT consulted to assess mobility, d/c needs  Pt presents w/ decreased functional mob, standing balance, endurance, B LE strength, barriers at home  will benefit from skilled PT to correct for the above problems  Provided pt can have assistance on d/c, lean towards d/c home w/ family assist, home therapy- will follow for progress           PT Discharge Recommendation: Home with home health rehabilitation     PT - OK to Discharge: No    See flowsheet documentation for full assessment

## 2021-04-20 NOTE — PHYSICAL THERAPY NOTE
Physical Therapy Evaluation    Patient's Name: Pieter Rubi    Admitting Diagnosis  Pericardial effusion [I31 3]    Problem List  Patient Active Problem List   Diagnosis    Diabetes mellitus type 2    Uncomplicated asthma    Essential hypertension    Hyperlipidemia    [facility]    Malignant pericardial effusion ([facility]    Diabetic nephropathy associated with type 2 diabetes mellitus ([facility]    CAD in native artery    History of smoking    Sciatica of left side    Lumbar back pain with radiculopathy affecting left lower extremity    Cerebral edema ([facility]    Lung cancer metastatic to brain ([facility]    Leptomeningitis, carcinomatous ([facility] )    Cancer related pain    Obesity (BMI 30 0-34  9)    Encounter for antineoplastic immunotherapy    Goals of care, counseling/discussion    Mild protein-calorie malnutrition ([facility] )    Encounter for gynecological examination    Advice given about COVID-19 virus infection    Acute D[facility]       Past Medical History  Past Medical History:   Diagnosis Date    Acute on chronic congestive heart failure with left ventricular diastolic dysfunction (HCC)     last assessed 2017    Asthma     Atelectasis     CHF (congestive heart failure) ([facility]     Diabetes mellitus ([facility]     Diverticulitis 2017    with perforation and abscess     Hyperlipidemia     Hypertension     Lesion of spleen     Lung cancer metastatic to brain ([facility]     Pericardial effusion        Past Surgical History  Past Surgical History:   Procedure Laterality Date    ABDOMINAL SURGERY      BREAST BIOPSY Left 10/04/2018     SECTION      COLON SURGERY      COLONOSCOPY      COLOSTOMY  2017    HARTMANS PROCEDURE N/A 2017    Procedure: EXPLORATORY LAPAROTOMY; PARTIAL SMALL BOWEL RESECTION; HARTMANS PROCEDURE; OSTOMY;  Surgeon: Precious Ferrara MD;  [facility];  Service:     HYSTERECTOMY  2018    IR BIOPSY LYMPH NODE  1/20/2021    IR LUMBAR PUNCTURE  1/20/2021    MAMMO STEREOTACTIC BREAST BIOPSY LEFT (ALL INC) Left 10/4/2018    OOPHORECTOMY Bilateral 2018    PERICARDIAL WINDOW N/A 4/18/2021    Procedure: SUBXIPHOID PERICARDIAL WINDOW;  Surgeon: Joseph Alejandro MD;  [facility];  Service: Thoracic    CT [address] N/A 2/12/2021    Procedure: Garnell Nel;  Surgeon: Joseph Alejandro MD;  [facility];  Service: Thoracic    CT CLOSE ENTEROSTOMY N/A 9/8/2017    Procedure: OPEN COLOSTOMY REVERSAL;  Surgeon: Merline Dunnings, MD;  [facility];  Service: General    CT COLONOSCOPY FLX DX W/COLLJ [address] PFRMD N/A 8/16/2017    Procedure: COLONOSCOPY; DILATION OF OSTOMY;  Surgeon: Merline Dunnings, MD;  [facility];   Service: General    CT EXC SKIN BENIG <0 5 CM REMAINDER BODY N/A 11/29/2017    Procedure: SCALP MASS EXCISION X 2;  Surgeon: Merline Dunnings, MD;  [facility];  Service: General    CT MEDIASTINOSCOPY WITH LYMPH NODE BIOPSY/IES N/A 2/12/2021    Procedure: MEDIASTINOSCOPY;  Surgeon: Joseph Alejandro MD;  [facility];  Service: Thoracic    [address] [place] [address] N/A 2/27/2019    Procedure: INCISIONAL HERNIA REPAIR, COMPONENT SEPARATION;  Surgeon: Merline Dunnings, MD;  [facility];  Service: General    CT TOTAL ABDOM HYSTERECTOMY N/A 2/27/2019    Procedure: TOTAL ABDOMINAL HYSTERECTOMY; BSO;  Surgeon: Jose Her MD;  [facility];  Service: Gynecology    VAC DRESSING APPLICATION N/A 8/8/7981    Procedure: APPLICATION VAC DRESSING;  Surgeon: Merline Dunnings, MD;  [facility];  Service:           04/20/21 1030   PT Last Visit   PT Visit Date 04/20/21   Note Type   Note type Evaluation   Pain Assessment   Pain Assessment Tool 0-10   Pain Score 5   Pain Location/Orientation Location: Generalized   Patient's Stated Pain Goal No pain   Hospital Pain Intervention(s) Ambulation/increased activity   Home Living   Type of Home House   Additional Comments Resides in split level home w/ dtr, 2 grandchildren  On last d/c, had private caregiver assist, unclear if she still has this  Indep most self care, ambulates w/ RW   Prior Function   Level of [place] Independent with ADLs and functional mobility   Falls in the last 6 months 1 to 4   Restrictions/Precautions   Weight Bearing Precautions Per Order No   Other Precautions Multiple lines; Fall Risk;Pain;Telemetry; Impulsive   General   Family/Caregiver Present No   Cognition   Overall Cognitive Status WFL   Arousal/Participation Alert   Orientation Level Oriented X4   Memory Unable to assess   Following Commands Follows one step commands without difficulty   RLE Assessment   RLE Assessment   (strength grossly 4-/5)   LLE Assessment   LLE Assessment   (strength grossly 4-/5)   Transfers   Sit to Stand 4  Minimal assistance   Additional items Assist x 1   Stand to Sit 4  Minimal assistance   Additional items Assist x 1   Ambulation/Elevation   Gait pattern   (slow, short step length, forward flexion)   Gait Assistance 4  Minimal assist   Additional items Assist x 1  (w/ 2nd for a chair follow)   Assistive Device Rolling walker   Distance 70'x1- bearing wt on forearms through 75% of gait, w/ forward flexion  Cued for posture  Balance   Static Sitting Normal   Dynamic Sitting Good   Static Standing Fair -   Dynamic Standing Poor +   Ambulatory Poor +   Endurance Deficit   Endurance Deficit Yes   Endurance Deficit Description fatigue, weakness, pain   Activity Tolerance   Activity Tolerance Treatment limited secondary to medical complications (Comment); Patient limited by pain; Patient limited by fatigue   Nurse Made Aware yes   Assessment   Prognosis Good   Problem List Decreased strength;Decreased endurance; Impaired balance;Decreased mobility;Pain   Assessment Pt seen for high complexity physical therapy evaluation    Pt is a 63 y/o female w/ history/comorbidities of smoker, lung CA, leptomeninges, HLD, HTN, DM II who is now admitted as a transfer from [place]  Seen there w/ cough, SOB  Found to have malignant pericardial effusion, and underwent pericardial window 4/18/21  Due to acute medical issues, pain,, acute surgery, need for transfer to higher level of care, pain, fall risk, note unstab;e clinical picture  PT consulted to assess mobility, d/c needs  Pt presents w/ decreased functional mob, standing balance, endurance, B LE strength, barriers at home  will benefit from skilled PT to correct for the above problems  Provided pt can have assistance on d/c, lean towards d/c home w/ family assist, home therapy- will follow for progress   Goals   Patient Goals to walk more   STG Expiration Date 05/04/21   Short Term Goal #1 1-2 wks: bed mob and transfers w/ indep, standing balance to good/normal w/ device, ambulate 200-300 ft w/ RW and S/mod I, increase B LE strength by 1/2 -1 grade, ambulate 6 stairs w/ S   PT Treatment Day 0   Plan   Treatment/Interventions Functional transfer training;LE strengthening/ROM; Elevations; Therapeutic exercise; Endurance training;Patient/family training;Equipment eval/education; Bed mobility;Gait training   PT Frequency Other (Comment)  (3-5x/wk)   Recommendation   PT Discharge Recommendation Home with home health rehabilitation   Equipment Recommended Tatum Rodgers  (already owns)   Change/add to [place]?  No   PT - OK to Discharge No   AM-PAC Basic Mobility Inpatient   Turning in Bed Without Bedrails 3   Lying on Back to Sitting on Edge of Flat Bed 3   Moving Bed to Chair 3   Standing Up From Chair 3   Walk in Room 3   Climb 3-5 Stairs 2   Basic Mobility Inpatient Raw Score 17   Basic Mobility Standardized Score 39 67         Jah Goldberg PT, DPT, CSRS

## 2021-04-20 NOTE — OCCUPATIONAL THERAPY NOTE
Occupational Therapy Evaluation     Patient Name: Drew Duque  Today's Date: 2021  Problem List  Principal Problem:    Malignant pericardial effusion ([facility] )  Active Problems:    Diabetes mellitus type 2    Essential hypertension    Hyperlipidemia    History of smoking    Lung cancer metastatic to brain ([facility] )    Acute DVT (deep venous thrombosis) [facility])    Past Medical History  Past Medical History:   Diagnosis Date    Acute on chronic congestive heart failure with left ventricular diastolic dysfunction ([facility] )     last assessed 2017    Asthma     Atelectasis     CHF (congestive heart failure) ([facility] )     Diabetes mellitus ([facility] )     Diverticulitis 2017    with perforation and abscess     Hyperlipidemia     Hypertension     Lesion of spleen     Lung cancer metastatic to brain [facility])     Pericardial effusion      Past Surgical History  Past Surgical History:   Procedure Laterality Date    ABDOMINAL SURGERY      BREAST BIOPSY Left 10/04/2018     SECTION      COLON SURGERY      COLONOSCOPY      COLOSTOMY  2017    HARTMANS PROCEDURE N/A 2017    Procedure: EXPLORATORY LAPAROTOMY; PARTIAL SMALL BOWEL RESECTION; HARTMANS PROCEDURE; OSTOMY;  Surgeon: Flaca Enriquez MD;  [facility];  Service:     HYSTERECTOMY  2018    IR BIOPSY LYMPH NODE  2021    IR LUMBAR PUNCTURE  2021    MAMMO STEREOTACTIC BREAST BIOPSY LEFT (ALL INC) Left 10/4/2018    OOPHORECTOMY Bilateral 2018    PERICARDIAL WINDOW N/A 2021    Procedure: SUBXIPHOID PERICARDIAL WINDOW;  Surgeon: Nitin Bazan MD;  [facility];  Service: Thoracic    [place] [address] N/A 2021    Procedure: BRONCHOSCOPY FLEXIBLE;  Surgeon: Nitin Bazan MD;  [facility];  Service: Thoracic    ND CLOSE ENTEROSTOMY N/A 2017    Procedure: OPEN COLOSTOMY REVERSAL;  Surgeon: Flaca Enriquez MD;  [facility];  Service: General    ND COLONOSCOPY FLX DX W/COLLJ [address] [facility] [address] WHEN PFRMD N/A 8/16/2017    Procedure: COLONOSCOPY; DILATION OF OSTOMY;  Surgeon: Nova Yeh MD;  [facility]; Service: General    KS EXC SKIN BENIG <0 5 CM REMAINDER BODY N/A 11/29/2017    Procedure: SCALP MASS EXCISION X 2;  Surgeon: Nova Yeh MD;  [facility];  Service: General    KS MEDIASTINOSCOPY WITH LYMPH NODE BIOPSY/IES N/A 2/12/2021    Procedure: MEDIASTINOSCOPY;  Surgeon: Shanique Jain MD;  [facility];  Service: Thoracic    [address] N/A 2/27/2019    Procedure: INCISIONAL HERNIA REPAIR, COMPONENT SEPARATION;  Surgeon: Nova Yeh MD;  [facility];  Service: General    KS TOTAL ABDOM HYSTERECTOMY N/A 2/27/2019    Procedure: TOTAL ABDOMINAL HYSTERECTOMY; BSO;  Surgeon: Monty Leahy MD;  [facility];  Service: Gynecology    VAC DRESSING APPLICATION N/A 5/1/8719    Procedure: APPLICATION VAC DRESSING;  Surgeon: Nova Yeh MD;  [facility];  Service:            04/20/21 1017   OT Last Visit   OT Visit Date 04/20/21   Note Type   Note type Evaluation   Restrictions/Precautions   Other Precautions Multiple lines;Telemetry;O2;Fall Risk;Pain   Home Living   Type of [facility] Two level   Bathroom Shower/Tub Tub/shower unit   Bathroom Toilet Standard   Bathroom Equipment Shower chair   [address]; Wheelchair-manual   Additional Comments Pt reports living in a split-level home with 3 JERRY     Prior Function   Level of [place] Independent with ADLs and functional mobility   Lives With Family   Receives Help From Family   ADL Assistance Independent   IADLs Needs assistance   Falls in the last 6 months 0   Vocational Retired   Lifestyle   Autonomy Pt reports that she is typically I with ADLS, but has required some increased assistance lately, requires A for IADLS, and ambulates with RW PTA  (-)    Reciprocal Relationships Pt lives with multiple family members who are able to assist as needed upon d/c    Service to Others 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   [address] Enjoys being with family   ADL   Where Assessed Chair   Eating Assistance 7  3 [facility] 5  [address] 4  Minimal Assistance   LB [address] 3  Moderate Assistance   [address] 4  Minimal Assistance    [address] 3  Moderate Assistance   [address]  3  Moderate Assistance   Transfers   Sit to Stand 4  Minimal assistance   Additional items Assist x 1; Increased time required;Verbal cues   Stand to Sit 4  Minimal assistance   Additional items Assist x 1; Increased time required;Verbal cues   Functional Mobility   Functional Mobility 4  Minimal assistance   Additional Comments Pt demonstrated short household mobility with min-mod A  Additional items Rolling walker   Balance   Static Sitting Good   Dynamic Sitting Fair +   Static Standing Fair   Dynamic Standing Fair -   Ambulatory Poor +   Activity Tolerance   Activity Tolerance Patient limited by fatigue   Medical Staff Made Aware PT [address]    Nurse Made Aware RN confirmed okay to see pt   Cognition   Overall Cognitive Status [facility]   Arousal/Participation Alert; Cooperative   Attention Within functional limits   Orientation Level Oriented X4   Memory Decreased recall of precautions   Following Commands Follows one step commands without difficulty   Comments Pt is very pleasant and cooperative to work with therapy  Assessment   Limitation Decreased ADL status; Decreased endurance;Decreased self-care trans;Decreased high-level ADLs   Prognosis Good   Assessment Pt is a 62 y o  female admitted to [facility] on 4/18/2021 w/ malignant pericardial effusion s/p pericardial window on 4/18/2021   Pt  has a past medical history of Acute on chronic congestive heart failure with left ventricular diastolic dysfunction ([facility] ), Asthma, Atelectasis, CHF (congestive heart failure) ([facility] ), Diabetes mellitus ([facility] ), Diverticulitis (2017), Hyperlipidemia, Hypertension, Lesion of spleen, Lung cancer metastatic to brain [facility]), and Pericardial effusion  Pt with active OT orders and up with assistance  orders  Pt resides in a split level home with 3 JERRY  Pt lives with multiple family members who are able to assist as needed upon d/c  Pt reports that she is typically I with ADLS, but has required some increased assistance lately, requires A for IADLS, and ambulates with RW PTA  (-)   Currently pt is min A for functional transfers, min-mod A for functional mobility, min A for UB ADLS and mod A for LB ADLS  Pt is limited at this time 2*: endurance, activity tolerance, functional mobility, forward functional reach, functional standing tolerance and decreased I w/ ADLS/IADLS  The following Occupational Performance Areas to address include: grooming, bathing/shower, toilet hygiene, dressing, functional mobility and clothing management  Based on the aforementioned OT evaluation, functional performance deficits, and assessments, pt has been identified as a high complexity evaluation  From OT standpoint, anticipate d/c home with home OT pending progress  Pt to continue to benefit from acute immediate OT services to address the following goals 3-5x/week to  w/in 10-14 days: [place] Goals   Patient Goals To feel better and return home   LTG Time Frame 10-14   Long Term Goal #1 See goals below   Plan   Treatment Interventions ADL retraining;Functional transfer training;UE strengthening/ROM; Endurance training;Patient/family training;Equipment evaluation/education; Compensatory technique education;Continued evaluation; Energy conservation; Activityengagement   Goal Expiration Date 21   OT Frequency 3-5x/wk   Recommendation   OT Discharge Recommendation Home with home therapy  (pending progress)   OT - OK to Discharge Yes  (When medically appropriate)   AM-PAC Daily Activity Inpatient   Lower Body Dressing 2   Bathing 2   Toileting 2   Upper Body Dressing 3   Grooming 3   Eating 4   Daily Activity Raw Score 16   Daily Activity Standardized Score (Calc for Raw Score >=11) 35 96   AM-PAC Applied Cognition Inpatient   Following a Speech/Presentation 3   Understanding Ordinary Conversation 4   Taking Medications 4   Remembering Where Things Are Placed or Put Away 4   Remembering List of 4-5 Errands 4   Taking Care of Complicated Tasks 3   Applied Cognition Raw Score 22   Applied Cognition Standardized Score 47 83   Modified Fede Scale   Modified Shanks Scale 4       GOALS    1) Pt will increase activity tolerance to G for 30 min txment sessions    2) Pt will complete UB/LB dressing/self care w/ mod I using adaptive device and DME as needed    3) Pt will complete bathing w/ Mod I w/ use of AE and DME as needed    4) Pt will complete toileting w/ mod I w/ G hygiene/thoroughness using DME as needed    5) Pt will improve functional transfers to Mod I on/off all surfaces using DME as needed w/ G balance/safety     6) Pt will improve functional mobility during ADL/IADL/leisure tasks to Mod I using DME as needed w/ G balance/safety     7) Pt will demonstrate G carryover of pt/caregiver education and training as appropriate  8) Pt will demonstrate 100% carryover of energy conservation techniques t/o functional I/ADL/leisure tasks w/o cues s/p skilled education    9) Pt will independently identify and utilize 2-3 coping strategies to increase positive affect and promote overall well-being      10) Pt will engage in ongoing cognitive assessment w/ G participation to assist w/ safe d/c planning/recommendations    PARIS Oliveros, OTR/L

## 2021-04-21 PROBLEM — R06.89 ACUTE RESPIRATORY INSUFFICIENCY: Status: ACTIVE | Noted: 2021-04-21

## 2021-04-21 LAB
ANION GAP SERPL CALCULATED.3IONS-SCNC: 5 MMOL/L (ref 4–13)
APTT PPP: 59 SECONDS (ref 23–37)
APTT PPP: 64 SECONDS (ref 23–37)
APTT PPP: 91 SECONDS (ref 23–37)
BACTERIA SPEC BFLD CULT: NO GROWTH
BASOPHILS # BLD AUTO: 0 THOUSANDS/ΜL (ref 0–0.1)
BASOPHILS NFR BLD AUTO: 0 % (ref 0–1)
BUN SERPL-MCNC: 14 MG/DL (ref 5–25)
CALCIUM SERPL-MCNC: 8.5 MG/DL (ref 8.3–10.1)
CHLORIDE SERPL-SCNC: 100 MMOL/L (ref 100–108)
CO2 SERPL-SCNC: 29 MMOL/L (ref 21–32)
CREAT SERPL-MCNC: 0.34 MG/DL (ref 0.6–1.3)
EOSINOPHIL # BLD AUTO: 0 THOUSAND/ΜL (ref 0–0.61)
EOSINOPHIL NFR BLD AUTO: 0 % (ref 0–6)
ERYTHROCYTE [DISTWIDTH] IN BLOOD BY AUTOMATED COUNT: 15.6 % (ref 11.6–15.1)
GFR SERPL CREATININE-BSD FRML MDRD: 138 ML/MIN/1.73SQ M
GLUCOSE SERPL-MCNC: 219 MG/DL (ref 65–140)
GLUCOSE SERPL-MCNC: 238 MG/DL (ref 65–140)
GLUCOSE SERPL-MCNC: 247 MG/DL (ref 65–140)
GLUCOSE SERPL-MCNC: 253 MG/DL (ref 65–140)
GLUCOSE SERPL-MCNC: 280 MG/DL (ref 65–140)
GRAM STN SPEC: NORMAL
GRAM STN SPEC: NORMAL
HCT VFR BLD AUTO: 31.3 % (ref 34.8–46.1)
HGB BLD-MCNC: 10.1 G/DL (ref 11.5–15.4)
IMM GRANULOCYTES # BLD AUTO: 0.13 THOUSAND/UL (ref 0–0.2)
IMM GRANULOCYTES NFR BLD AUTO: 2 % (ref 0–2)
LYMPHOCYTES # BLD AUTO: 0.19 THOUSANDS/ΜL (ref 0.6–4.47)
LYMPHOCYTES NFR BLD AUTO: 3 % (ref 14–44)
MCH RBC QN AUTO: 31.4 PG (ref 26.8–34.3)
MCHC RBC AUTO-ENTMCNC: 32.3 G/DL (ref 31.4–37.4)
MCV RBC AUTO: 97 FL (ref 82–98)
MONOCYTES # BLD AUTO: 0.42 THOUSAND/ΜL (ref 0.17–1.22)
MONOCYTES NFR BLD AUTO: 7 % (ref 4–12)
NEUTROPHILS # BLD AUTO: 5.59 THOUSANDS/ΜL (ref 1.85–7.62)
NEUTS SEG NFR BLD AUTO: 88 % (ref 43–75)
NRBC BLD AUTO-RTO: 0 /100 WBCS
PLATELET # BLD AUTO: 273 THOUSANDS/UL (ref 149–390)
PMV BLD AUTO: 8.7 FL (ref 8.9–12.7)
POTASSIUM SERPL-SCNC: 4.2 MMOL/L (ref 3.5–5.3)
RBC # BLD AUTO: 3.22 MILLION/UL (ref 3.81–5.12)
SODIUM SERPL-SCNC: 134 MMOL/L (ref 136–145)
WBC # BLD AUTO: 6.33 THOUSAND/UL (ref 4.31–10.16)

## 2021-04-21 PROCEDURE — 82948 REAGENT STRIP/BLOOD GLUCOSE: CPT

## 2021-04-21 PROCEDURE — 85730 THROMBOPLASTIN TIME PARTIAL: CPT | Performed by: INTERNAL MEDICINE

## 2021-04-21 PROCEDURE — 80048 BASIC METABOLIC PNL TOTAL CA: CPT | Performed by: INTERNAL MEDICINE

## 2021-04-21 PROCEDURE — 99232 SBSQ HOSP IP/OBS MODERATE 35: CPT | Performed by: PHYSICIAN ASSISTANT

## 2021-04-21 PROCEDURE — 85025 COMPLETE CBC W/AUTO DIFF WBC: CPT | Performed by: INTERNAL MEDICINE

## 2021-04-21 RX ORDER — INSULIN GLARGINE 100 [IU]/ML
15 INJECTION, SOLUTION SUBCUTANEOUS
Status: DISCONTINUED | OUTPATIENT
Start: 2021-04-21 | End: 2021-04-24 | Stop reason: HOSPADM

## 2021-04-21 RX ADMIN — DEXAMETHASONE 4 MG: 4 TABLET ORAL at 09:36

## 2021-04-21 RX ADMIN — INSULIN LISPRO 2 UNITS: 100 INJECTION, SOLUTION INTRAVENOUS; SUBCUTANEOUS at 16:51

## 2021-04-21 RX ADMIN — PANTOPRAZOLE SODIUM 40 MG: 40 TABLET, DELAYED RELEASE ORAL at 05:43

## 2021-04-21 RX ADMIN — INSULIN LISPRO 2 UNITS: 100 INJECTION, SOLUTION INTRAVENOUS; SUBCUTANEOUS at 12:01

## 2021-04-21 RX ADMIN — OXYCODONE HYDROCHLORIDE 5 MG: 5 TABLET ORAL at 20:44

## 2021-04-21 RX ADMIN — MELATONIN TAB 3 MG 3 MG: 3 TAB at 21:13

## 2021-04-21 RX ADMIN — DEXAMETHASONE 4 MG: 4 TABLET ORAL at 20:50

## 2021-04-21 RX ADMIN — INSULIN GLARGINE 15 UNITS: 100 INJECTION, SOLUTION SUBCUTANEOUS at 21:12

## 2021-04-21 RX ADMIN — DOCUSATE SODIUM 100 MG: 100 CAPSULE, LIQUID FILLED ORAL at 09:35

## 2021-04-21 RX ADMIN — INSULIN LISPRO 2 UNITS: 100 INJECTION, SOLUTION INTRAVENOUS; SUBCUTANEOUS at 21:12

## 2021-04-21 RX ADMIN — AMLODIPINE BESYLATE 10 MG: 10 TABLET ORAL at 09:35

## 2021-04-21 RX ADMIN — METOPROLOL SUCCINATE 50 MG: 50 TABLET, EXTENDED RELEASE ORAL at 09:35

## 2021-04-21 RX ADMIN — ATORVASTATIN CALCIUM 20 MG: 20 TABLET, FILM COATED ORAL at 16:52

## 2021-04-21 RX ADMIN — HEPARIN SODIUM 12 UNITS/KG/HR: 10000 INJECTION, SOLUTION INTRAVENOUS at 10:55

## 2021-04-21 RX ADMIN — ACETAMINOPHEN 650 MG: 325 TABLET ORAL at 20:43

## 2021-04-21 RX ADMIN — INSULIN LISPRO 1 UNITS: 100 INJECTION, SOLUTION INTRAVENOUS; SUBCUTANEOUS at 06:24

## 2021-04-21 NOTE — ASSESSMENT & PLAN NOTE
Presented with malignant pericardial effusion in setting of lung CA  Thoracic Surgery following   · S/P pericardial window with drain placement on 4/18  · Drain in place, continue local care per thoracic  · Follow cultures  · Oncology input appreciated   Recommend outpatient f/u to discuss 2nd line chemotherapy  · Monitor labs   · Wean oxygen as able - weaned from 2 L to 1 L today   · PT/OT

## 2021-04-21 NOTE — ASSESSMENT & PLAN NOTE
Lab Results   Component Value Date    HGBA1C 7 6 (H) 04/18/2021     · Patient on both linagliptin and metformin; hold while inpatient    · SSI/accuchecks while admitted  · Added basal insulin, will increase and add scheduled humalog with meals  · Likely steroid induced hyperglycemia contributing   Recent Labs     04/20/21  1059 04/20/21  1553 04/20/21 2053 04/21/21  0550   POCGLU 335* 263* 275* 219*       Blood Sugar Average: Last 72 hrs:  (P) 196 [id number]

## 2021-04-21 NOTE — ASSESSMENT & PLAN NOTE
Noted to have LLE >>RLE  · Duplex was checked and despite report being in EPIC showing negative, previous provider personally discussed it with the vascular tech and vascular on call and patient [address] DVT in the peroneal vein and posterior tibial vein  Non-occlusive thrombus in the popliteal and NEEDS TO BE TREATED per d/w vascular  · Updated report to reflect findings of acute non-occlusive DVT in popliteal vein and acute occlusive DVT in peroneal and posterior tibial veins on the left  · D/w thoracic surgery, placed on heparin gtt   When cleared, will likely require therapeutic lovenox at dc vs eliquis/xarelto

## 2021-04-21 NOTE — PROGRESS NOTES
[address] [facility]  Progress Note - Nitin Katz 1960, 62 y o  female MRN: [id number]  Unit/Bed#: [facility] 426-01 Encounter: [id number]  Primary Care Provider: Woodson Shone, DO   Date and time admitted to hospital: 4/18/2021  4:53 AM    * Malignant pericardial effusion [facility])  Assessment & Plan  Presented with malignant pericardial effusion in setting of lung CA  Thoracic Surgery following   · S/P pericardial window with drain placement on 4/18  · Drain in place, continue local care per thoracic  · Follow cultures  · Oncology input appreciated  Recommend outpatient f/u to discuss 2nd line chemotherapy  · Monitor labs   · Wean oxygen as able - weaned from 2 L to 1 L today   · PT/OT    Acute respiratory insufficiency  Assessment & Plan  · Patient is on 2 L NC O2, reports not on oxygen at home  · SpO2 98% on 2 L , wean as tolerated, took down to 1 L- d/w nursing to wean oxygen     Acute DVT (deep venous thrombosis) (HCC)  Assessment & Plan  Noted to have LLE >>RLE  · Duplex was checked and despite report being in EPIC showing negative, previous provider personally discussed it with the vascular tech and vascular on call and patient DOES HAVE ACUTE OCCLUSIVE DVT in the peroneal vein and posterior tibial vein  Non-occlusive thrombus in the popliteal and NEEDS TO BE TREATED per d/w vascular  · Updated report to reflect findings of acute non-occlusive DVT in popliteal vein and acute occlusive DVT in peroneal and posterior tibial veins on the left  · D/w thoracic surgery, placed on heparin gtt   When cleared, will likely require therapeutic lovenox at dc vs eliquis/xarelto     Lung cancer metastatic to brain 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 mediastinal involvement as well as brain metastases, and possible leptomeningeal involvement  · Now with malignant effusion s/p window  · S/p WBRT and radiation to thoracic spine  · Was on Pembrolizumab/carboplatin/Alimta by Dr Sonny Love  · Oncology consult appreciated, will need outpatient f/u to discuss 2nd line therapy     History of smoking  Assessment & Plan  · Currently patient has not been smoking  Hyperlipidemia  Assessment & Plan  · Continue Lipitor 20 mg daily    Essential hypertension  Assessment & Plan  BP stable   · Continue Norvasc 10 mg daily  · Metoprolol succinate 50 mg q day    Diabetes mellitus type 2  Assessment & Plan  Lab Results   Component Value Date    HGBA1C 7 6 (H) 04/18/2021     · Patient on both linagliptin and metformin; hold while inpatient  · SSI/accuchecks while admitted  · Added basal insulin, will increase and add scheduled humalog with meals  · Likely steroid induced hyperglycemia contributing   Recent Labs     04/20/21  1059 04/20/21  1553 04/20/21  2053 04/21/21  0550   POCGLU 335* 263* 275* 219*       Blood Sugar Average: Last 72 hrs:  (P) 809 [id number]      VTE Pharmacologic Prophylaxis:   Pharmacologic: Heparin Drip  Mechanical VTE Prophylaxis in Place: Yes    Patient Centered Rounds: I have performed bedside rounds with nursing staff today  Discussions with Specialists or Other Care Team Provider: RN, thoracic surgery resident, will d/w CM    Education and Discussions with Family / Patient: patient declined call to family     Time Spent for Care: 30 minutes  More than 50% of total time spent on counseling and coordination of care as described above  Current Length of Stay: 3 day(s)    Current Patient Status: Inpatient   Certification Statement: The patient will continue to require additional inpatient hospital stay due to ongoing drain management    Discharge Plan: none yet, pending removal of drain, weaning of oxygen, and AC plan on d/c     Code Status: Level 1 - Full Code      Subjective: The patient has no acute complaints, feels well today    States she does not wear oxygen at home   Denies pain or SOB     Objective:     Vitals:   Temp (24hrs), Av °F (36 7 °C), Min:97 8 °F (36 6 °C), Max:98 2 °F (36 8 °C)    Temp:  [97 8 °F (36 6 °C)-98 2 °F (36 8 °C)] 98 1 °F (36 7 °C)  HR:  [74-98] 77  Resp:  [16-20] 20  BP: (116-152)/(65-87) 144/82  SpO2:  [97 %-99 %] 98 %  Body mass index is 29 71 kg/m²  Input and Output Summary (last 24 hours): Intake/Output Summary (Last 24 hours) at 2021 0909  Last data filed at 2021 0201  Gross per 24 hour   Intake 30 ml   Output 720 ml   Net -690 ml       Physical Exam:     Physical Exam  Vitals signs reviewed  Constitutional:       General: She is not in acute distress  Appearance: She is not toxic-appearing  Comments: [place]   HENT:      Head: Normocephalic and atraumatic  Eyes:      General: No scleral icterus  Extraocular Movements: Extraocular movements intact  Neck:      Musculoskeletal: Normal range of motion and neck supple  Cardiovascular:      Rate and Rhythm: Normal rate and regular rhythm  Comments: Drain in place  Pulmonary:      Effort: Pulmonary effort is normal       Comments: Decreased b/l  Musculoskeletal: Normal range of motion  Skin:     General: Skin is warm and dry  Neurological:      General: No focal deficit present  Mental Status: She is alert and oriented to person, place, and time  Psychiatric:         Mood and Affect: Mood normal          Behavior: Behavior normal          Thought Content:  Thought content normal            Additional Data:     Labs:    Results from last 7 days   Lab Units 21  0414  21  0605   WBC Thousand/uL 6 33   < > 8 71   HEMOGLOBIN g/dL 10 1*   < > 10 7*   HEMATOCRIT % 31 3*   < > 33 1*   PLATELETS Thousands/uL 273   < > 304   BANDS PCT %  --   --  1   NEUTROS PCT % 88*   < >  --    LYMPHS PCT % 3*   < >  --    LYMPHO PCT %  --   --  1*   MONOS PCT % 7   < >  --    MONO PCT %  --   --  5   EOS PCT % 0   < > 0    < > = values in this interval not displayed  Results from last 7 days   Lab Units 04/21/21  0414  04/18/21  0605   SODIUM mmol/L 134*   < > 130*   POTASSIUM mmol/L 4 2   < > 4 3   CHLORIDE mmol/L 100   < > 94*   CO2 mmol/L 29   < > 27   BUN mg/dL 14   < > 34*   CREATININE mg/dL 0 34*   < > 0 66   ANION GAP mmol/L 5   < > 9   CALCIUM mg/dL 8 5   < > 9 3   ALBUMIN g/dL  --   --  3 1*   TOTAL BILIRUBIN mg/dL  --   --  1 09*   ALK PHOS U/L  --   --  107   ALT U/L  --   --  47   AST U/L  --   --  18   GLUCOSE RANDOM mg/dL 238*   < > 240*    < > = values in this interval not displayed  Results from last 7 days   Lab Units 04/19/21  0518   INR  1 16     Results from last 7 days   Lab Units 04/21/21  0550 04/20/21  2053 04/20/21  1553 04/20/21  1059 04/20/21  0549 04/19/21  2219 04/19/21  1205 04/19/21  0522 04/18/21  2355 04/18/21  1802 04/18/21  1351 04/18/21  0603   POC GLUCOSE mg/dl 219* 275* 263* 335* 242* 291* 280* 200* 294* 187* 232* 261*     Results from last 7 days   Lab Units 04/18/21  0632   HEMOGLOBIN A1C % 7 6*               * I Have Reviewed All Lab Data Listed Above  * Additional Pertinent Lab Tests Reviewed:  All Labs Within Last 24 Hours Reviewed    Imaging:    Imaging Reports Reviewed Today Include: none  Imaging Personally Reviewed by Myself Includes:  none    Recent Cultures (last 7 days):     Results from last 7 days   Lab Units 04/18/21  1306   GRAM STAIN RESULT  2+ Polys  No bacteria seen   BODY FLUID CULTURE, STERILE  No growth       Last 24 Hours Medication List:   Current Facility-Administered Medications   Medication Dose Route Frequency Provider Last Rate    acetaminophen  650 mg Oral Q6H PRN Jen Gutierrez MD      aluminum-magnesium hydroxide-simethicone  30 mL Oral Q6H PRN Jen Gutierrez MD      amLODIPine  10 mg Oral Daily Jen Gutierrez MD      atorvastatin  20 mg Oral Daily With Aminata Culver MD      dexamethasone  4 mg Oral Q12H [address] Jen Gutierrez MD      docusate sodium  100 mg Oral Daily Shaka Ortiz MD      heparin (porcine)  3-30 Units/kg/hr (Order-Specific) Intravenous Titrated Tho Laureano PA-C 12 Units/kg/hr (04/21/21 0513)    insulin glargine  15 Units Subcutaneous HS Nely Mckenna PA-C      insulin lispro  1-5 Units Subcutaneous TID AC Nevin Harley PA-C      insulin lispro  1-5 Units Subcutaneous HS Envinseun Harley PA-C      insulin lispro  2 Units Subcutaneous TID With Meals [employer], PA-C      melatonin  3 mg Oral HS ROSA Plunkett      metoprolol succinate  50 mg Oral Daily Shaka Ortiz MD      ondansetron  4 mg Intravenous Q6H PRN Shaka Ortiz MD      oxyCODONE  5 mg Oral Q4H PRN Shaka Ortiz MD      pantoprazole  40 mg Oral Early Morning Meliza Johansen MD          Today, Patient Was Seen By: Chapis Beach PA-C    ** Please Note: Dictation voice to text software may have been used in the creation of this document   **

## 2021-04-21 NOTE — ASSESSMENT & PLAN NOTE
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

## 2021-04-21 NOTE — ASSESSMENT & PLAN NOTE
· Patient is on 2 L NC O2, reports not on oxygen at home  · SpO2 98% on 2 L , wean as tolerated, took down to 1 L- d/w nursing to wean oxygen

## 2021-04-22 LAB
ANION GAP SERPL CALCULATED.3IONS-SCNC: 5 MMOL/L (ref 4–13)
APTT PPP: 76 SECONDS (ref 23–37)
BACTERIA SPEC ANAEROBE CULT: NO GROWTH
BUN SERPL-MCNC: 12 MG/DL (ref 5–25)
CALCIUM SERPL-MCNC: 8.8 MG/DL (ref 8.3–10.1)
CHLORIDE SERPL-SCNC: 99 MMOL/L (ref 100–108)
CO2 SERPL-SCNC: 30 MMOL/L (ref 21–32)
CREAT SERPL-MCNC: 0.38 MG/DL (ref 0.6–1.3)
ERYTHROCYTE [DISTWIDTH] IN BLOOD BY AUTOMATED COUNT: 15.5 % (ref 11.6–15.1)
GFR SERPL CREATININE-BSD FRML MDRD: 133 ML/MIN/1.73SQ M
GLUCOSE SERPL-MCNC: 160 MG/DL (ref 65–140)
GLUCOSE SERPL-MCNC: 178 MG/DL (ref 65–140)
GLUCOSE SERPL-MCNC: 179 MG/DL (ref 65–140)
GLUCOSE SERPL-MCNC: 192 MG/DL (ref 65–140)
GLUCOSE SERPL-MCNC: 309 MG/DL (ref 65–140)
HCT VFR BLD AUTO: 34.6 % (ref 34.8–46.1)
HGB BLD-MCNC: 11.4 G/DL (ref 11.5–15.4)
MCH RBC QN AUTO: 31.8 PG (ref 26.8–34.3)
MCHC RBC AUTO-ENTMCNC: 32.9 G/DL (ref 31.4–37.4)
MCV RBC AUTO: 96 FL (ref 82–98)
PLATELET # BLD AUTO: 320 THOUSANDS/UL (ref 149–390)
PMV BLD AUTO: 9.1 FL (ref 8.9–12.7)
POTASSIUM SERPL-SCNC: 4 MMOL/L (ref 3.5–5.3)
RBC # BLD AUTO: 3.59 MILLION/UL (ref 3.81–5.12)
SODIUM SERPL-SCNC: 134 MMOL/L (ref 136–145)
WBC # BLD AUTO: 7.05 THOUSAND/UL (ref 4.31–10.16)

## 2021-04-22 PROCEDURE — 99232 SBSQ HOSP IP/OBS MODERATE 35: CPT | Performed by: PHYSICIAN ASSISTANT

## 2021-04-22 PROCEDURE — 85027 COMPLETE CBC AUTOMATED: CPT | Performed by: PHYSICIAN ASSISTANT

## 2021-04-22 PROCEDURE — 85730 THROMBOPLASTIN TIME PARTIAL: CPT | Performed by: THORACIC SURGERY (CARDIOTHORACIC VASCULAR SURGERY)

## 2021-04-22 PROCEDURE — 82948 REAGENT STRIP/BLOOD GLUCOSE: CPT

## 2021-04-22 PROCEDURE — 97116 GAIT TRAINING THERAPY: CPT

## 2021-04-22 PROCEDURE — 80048 BASIC METABOLIC PNL TOTAL CA: CPT | Performed by: PHYSICIAN ASSISTANT

## 2021-04-22 RX ADMIN — MELATONIN TAB 3 MG 3 MG: 3 TAB at 21:38

## 2021-04-22 RX ADMIN — INSULIN LISPRO 2 UNITS: 100 INJECTION, SOLUTION INTRAVENOUS; SUBCUTANEOUS at 06:38

## 2021-04-22 RX ADMIN — HEPARIN SODIUM 14 UNITS/KG/HR: 10000 INJECTION, SOLUTION INTRAVENOUS at 15:53

## 2021-04-22 RX ADMIN — AMLODIPINE BESYLATE 10 MG: 10 TABLET ORAL at 09:06

## 2021-04-22 RX ADMIN — INSULIN LISPRO 1 UNITS: 100 INJECTION, SOLUTION INTRAVENOUS; SUBCUTANEOUS at 06:19

## 2021-04-22 RX ADMIN — INSULIN LISPRO 2 UNITS: 100 INJECTION, SOLUTION INTRAVENOUS; SUBCUTANEOUS at 11:08

## 2021-04-22 RX ADMIN — INSULIN LISPRO 1 UNITS: 100 INJECTION, SOLUTION INTRAVENOUS; SUBCUTANEOUS at 11:07

## 2021-04-22 RX ADMIN — INSULIN LISPRO 1 UNITS: 100 INJECTION, SOLUTION INTRAVENOUS; SUBCUTANEOUS at 21:38

## 2021-04-22 RX ADMIN — METOPROLOL SUCCINATE 50 MG: 50 TABLET, EXTENDED RELEASE ORAL at 09:06

## 2021-04-22 RX ADMIN — DEXAMETHASONE 4 MG: 4 TABLET ORAL at 21:38

## 2021-04-22 RX ADMIN — INSULIN GLARGINE 15 UNITS: 100 INJECTION, SOLUTION SUBCUTANEOUS at 21:38

## 2021-04-22 RX ADMIN — ATORVASTATIN CALCIUM 20 MG: 20 TABLET, FILM COATED ORAL at 15:56

## 2021-04-22 RX ADMIN — INSULIN LISPRO 3 UNITS: 100 INJECTION, SOLUTION INTRAVENOUS; SUBCUTANEOUS at 17:13

## 2021-04-22 RX ADMIN — PANTOPRAZOLE SODIUM 40 MG: 40 TABLET, DELAYED RELEASE ORAL at 06:19

## 2021-04-22 RX ADMIN — INSULIN LISPRO 2 UNITS: 100 INJECTION, SOLUTION INTRAVENOUS; SUBCUTANEOUS at 17:15

## 2021-04-22 RX ADMIN — DEXAMETHASONE 4 MG: 4 TABLET ORAL at 09:06

## 2021-04-22 RX ADMIN — DOCUSATE SODIUM 100 MG: 100 CAPSULE, LIQUID FILLED ORAL at 09:06

## 2021-04-22 NOTE — ASSESSMENT & PLAN NOTE
Noted to have LLE >>RLE  · Duplex was checked and despite report being in EPIC showing negative, previous provider personally discussed it with the vascular tech and vascular on call and patient [address] DVT in the peroneal vein and posterior tibial vein  Non-occlusive thrombus in the popliteal and NEEDS TO BE TREATED per d/w vascular  · Updated report to reflect findings of acute non-occlusive DVT in popliteal vein and acute occlusive DVT in peroneal and posterior tibial veins on the left  · D/w thoracic surgery, placed on heparin gtt  When cleared, will likely require therapeutic lovenox at dc vs eliquis/xarelto    Have sent price check to pharmacy for Eliquis

## 2021-04-22 NOTE — ASSESSMENT & PLAN NOTE
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

## 2021-04-22 NOTE — ASSESSMENT & PLAN NOTE
Presented with malignant pericardial effusion in setting of lung CA  Thoracic Surgery following   · S/P pericardial window with drain placement on 4/18  · Drain in place, continue local care per thoracic  D/w thoracic today drain to remain in place at this time  · Follow cultures  · Oncology input appreciated   Recommend outpatient f/u to discuss 2nd line chemotherapy  · Monitor labs   · Wean oxygen as able - weaned from 2 L to 1 L today   · PT/OT

## 2021-04-22 NOTE — PROGRESS NOTES
[facility] [facility]  Progress Note - Rajan Bidding 1960, 62 y o  female MRN: [id number]  Unit/Bed#: [facility] 426-01 Encounter: [id number]  Primary Care Provider: Rl Mcgrath DO   Date and time admitted to hospital: 4/18/2021  4:53 AM    * Malignant pericardial effusion [facility])  Assessment & Plan  Presented with malignant pericardial effusion in setting of lung CA  Thoracic Surgery following   · S/P pericardial window with drain placement on 4/18  · Drain in place, continue local care per thoracic  D/w thoracic today drain to remain in place at this time  · Follow cultures  · Oncology input appreciated  Recommend outpatient f/u to discuss 2nd line chemotherapy  · Monitor labs   · Wean oxygen as able - weaned from 2 L to 1 L today   · PT/OT    Acute respiratory insufficiency  Assessment & Plan  · Patient is on 2 L NC O2, reports not on oxygen at home  · Now weaned to room air     Acute DVT (deep venous thrombosis) ([facility] [address] )  Assessment & Plan  Noted to have LLE >>RLE  · Duplex was checked and despite report being in EPIC showing negative, previous provider personally discussed it with the vascular tech and vascular on call and patient [address] DVT in the peroneal vein and posterior tibial vein  Non-occlusive thrombus in the popliteal and NEEDS TO BE TREATED per d/w vascular  · Updated report to reflect findings of acute non-occlusive DVT in popliteal vein and acute occlusive DVT in peroneal and posterior tibial veins on the left  · D/w thoracic surgery, placed on heparin gtt  When cleared, will likely require therapeutic lovenox at dc vs eliquis/xarelto    Have sent price check to pharmacy for Eliquis    Lung cancer metastatic to brain [facility])  Assessment & Plan  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 mediastinal involvement as well as brain metastases, and possible leptomeningeal involvement  · Now with malignant effusion s/p window  · S/p WBRT and radiation to thoracic spine  · Was on Pembrolizumab/carboplatin/Alimta by Dr Leverette Gaucher  · Oncology consult appreciated, will need outpatient f/u to discuss 2nd line therapy     History of smoking  Assessment & Plan  · Currently patient has not been smoking  Hyperlipidemia  Assessment & Plan  · Continue Lipitor 20 mg daily    Essential hypertension  Assessment & Plan  BP stable   · Continue Norvasc 10 mg daily  · Metoprolol succinate 50 mg q day    Diabetes mellitus type 2  Assessment & Plan  Lab Results   Component Value Date    HGBA1C 7 6 (H) 2021     · Patient on both linagliptin and metformin; hold while inpatient  · SSI/accuchecks while admitted  · Added basal insulin, will increase and add scheduled humalog with meals  · Likely steroid induced hyperglycemia contributing   Recent Labs     21  1615 21  2051 21  0537 21  1042   POCGLU 280* 253* 178* 179*       Blood Sugar Average: Last 72 hrs:  (P) 890 [id number]        VTE Pharmacologic Prophylaxis:   Pharmacologic: Heparin Drip  Mechanical VTE Prophylaxis in Place: Yes    Patient Centered Rounds: I have performed bedside rounds with nursing staff today  Discussions with Specialists or Other Care Team Provider: RN    Education and Discussions with Family / Patient: patient, declined call     Time Spent for Care: 20 minutes  More than 50% of total time spent on counseling and coordination of care as described above  Current Length of Stay: 4 day(s)    Current Patient Status: Inpatient   Certification Statement: The patient will continue to require additional inpatient hospital stay due to mgmt of pericadial drain    Discharge Plan: none yet  Pending thoracic clearance and drain removal    Code Status: Level 1 - Full Code      Subjective: The patient has no acute complaints    Feels well     Objective:     Vitals:   Temp (24hrs), Av 4 °F (36 9 °C), Min:97 6 °F (36 4 °C), Max:99 °F (37 2 °C)    Temp:  [97 6 °F (36 4 °C)-99 °F (37 2 °C)] 99 °F (37 2 °C)  HR:  [74-96] 74  Resp:  [18-20] 20  BP: (112-145)/(62-81) 112/62  SpO2:  [95 %-98 %] 96 %  Body mass index is 29 97 kg/m²  Input and Output Summary (last 24 hours): Intake/Output Summary (Last 24 hours) at 4/22/2021 1615  Last data filed at 4/22/2021 1546  Gross per 24 hour   Intake 598 3 ml   Output 875 ml   Net -276 7 ml       Physical Exam:     Physical Exam  Vitals signs reviewed  Constitutional:       General: She is not in acute distress  HENT:      Head: Normocephalic and atraumatic  Eyes:      General: No scleral icterus  Extraocular Movements: Extraocular movements intact  Neck:      Musculoskeletal: Normal range of motion  Cardiovascular:      Rate and Rhythm: Normal rate and regular rhythm  Pulmonary:      Effort: Pulmonary effort is normal       Breath sounds: Normal breath sounds  Abdominal:      General: Bowel sounds are normal  There is no distension  Palpations: Abdomen is soft  Musculoskeletal: Normal range of motion  Skin:     General: Skin is warm  Neurological:      General: No focal deficit present  Mental Status: She is alert and oriented to person, place, and time  Psychiatric:         Mood and Affect: Mood normal          Behavior: Behavior normal          Thought Content:  Thought content normal            Additional Data:     Labs:    Results from last 7 days   Lab Units 04/22/21  0450 04/21/21  0414  04/18/21  0605   WBC Thousand/uL 7 05 6 33   < > 8 71   HEMOGLOBIN g/dL 11 4* 10 1*   < > 10 7*   HEMATOCRIT % 34 6* 31 3*   < > 33 1*   PLATELETS Thousands/uL 320 273   < > 304   BANDS PCT %  --   --   --  1   NEUTROS PCT %  --  88*   < >  --    LYMPHS PCT %  --  3*   < >  --    LYMPHO PCT %  --   --   --  1*   MONOS PCT %  --  7   < >  --    MONO PCT %  --   --   --  5   EOS PCT %  --  0   < > 0    < > = values in this interval not displayed  Results from last 7 days   Lab Units 04/22/21  0450  04/18/21  0605   SODIUM mmol/L 134*   < > 130*   POTASSIUM mmol/L 4 0   < > 4 3   CHLORIDE mmol/L 99*   < > 94*   CO2 mmol/L 30   < > 27   BUN mg/dL 12   < > 34*   CREATININE mg/dL 0 38*   < > 0 66   ANION GAP mmol/L 5   < > 9   CALCIUM mg/dL 8 8   < > 9 3   ALBUMIN g/dL  --   --  3 1*   TOTAL BILIRUBIN mg/dL  --   --  1 09*   ALK PHOS U/L  --   --  107   ALT U/L  --   --  47   AST U/L  --   --  18   GLUCOSE RANDOM mg/dL 160*   < > 240*    < > = values in this interval not displayed  Results from last 7 days   Lab Units 04/19/21  0518   INR  1 16     Results from last 7 days   Lab Units 04/22/21  1042 04/22/21  0537 04/21/21  2051 04/21/21  1615 04/21/21  1107 04/21/21  0550 04/20/21  2053 04/20/21  1553 04/20/21  1059 04/20/21  0549 04/19/21  2219 04/19/21  1205   POC GLUCOSE mg/dl 179* 178* 253* 280* 247* 219* 275* 263* 335* 242* 291* 280*     Results from last 7 days   Lab Units 04/18/21  0632   HEMOGLOBIN A1C % 7 6*               * I Have Reviewed All Lab Data Listed Above  * Additional Pertinent Lab Tests Reviewed:  All Labs Within Last 24 Hours Reviewed    Imaging:    Imaging Reports Reviewed Today Include: none  Imaging Personally Reviewed by Myself Includes:  none    Recent Cultures (last 7 days):     Results from last 7 days   Lab Units 04/18/21  1306   GRAM STAIN RESULT  2+ Polys  No bacteria seen   BODY FLUID CULTURE, STERILE  No growth       Last 24 Hours Medication List:   Current Facility-Administered Medications   Medication Dose Route Frequency Provider Last Rate    acetaminophen  650 mg Oral Q6H PRN Gunner Tucker MD      aluminum-magnesium hydroxide-simethicone  30 mL Oral Q6H PRN Gunner Tucker MD      amLODIPine  10 mg Oral Daily Gunenr Tucker MD      atorvastatin  20 mg Oral Daily With Kori Thomas MD      dexamethasone  4 mg Oral Q12H [address] Gunner Tucker MD      docusate sodium  100 mg Oral Daily Gunner Tucker MD      heparin (porcine)  3-30 Units/kg/hr (Order-Specific) Intravenous Titrated Fuentes Kemp PA-C 14 Units/kg/hr (04/22/21 1553)    insulin glargine  15 Units Subcutaneous HS Nely Mckenna PA-C      insulin lispro  1-5 Units Subcutaneous TID AC Nevin Harley PA-C      insulin lispro  1-5 Units Subcutaneous HS Nevin Harley PA-C      insulin lispro  2 Units Subcutaneous TID With Meals [employer], PA-C      melatonin  3 mg Oral HS ROSA Plunkett      metoprolol succinate  50 mg Oral Daily Gunner Tucker MD      ondansetron  4 mg Intravenous Q6H PRN Gunner Tucker MD      oxyCODONE  5 mg Oral Q4H PRN Gunner Tucker MD      pantoprazole  40 mg Oral Early Morning Sarai Dwyer MD          Today, Patient Was Seen By: Chapis Beach PA-C    ** Please Note: Dictation voice to text software may have been used in the creation of this document   **

## 2021-04-22 NOTE — ASSESSMENT & PLAN NOTE
Lab Results   Component Value Date    HGBA1C 7 6 (H) 04/18/2021     · Patient on both linagliptin and metformin; hold while inpatient    · SSI/accuchecks while admitted  · Added basal insulin, will increase and add scheduled humalog with meals  · Likely steroid induced hyperglycemia contributing   Recent Labs     04/21/21  1615 04/21/21 2051 04/22/21  0537 04/22/21  1042   POCGLU 280* 253* 178* 179*       Blood Sugar Average: Last 72 hrs:  (P) 100 [id number]

## 2021-04-22 NOTE — PLAN OF CARE
Problem: PHYSICAL THERAPY ADULT  Goal: Performs mobility at highest level of function for planned discharge setting  See evaluation for individualized goals  Description: Treatment/Interventions: Functional transfer training, LE strengthening/ROM, Elevations, Therapeutic exercise, Endurance training, Patient/family training, Equipment eval/education, Bed mobility, Gait training  Equipment Recommended: Walker(already owns)       See flowsheet documentation for full assessment, interventions and recommendations  Outcome: Progressing  Note: Prognosis: Good  Problem List: Decreased strength, Impaired balance, Decreased endurance, Decreased mobility, Decreased cognition, Decreased safety awareness  Assessment: Pt continues to present with BLE weakness, impaired gait, and decreased activity tolerance  Pt requires Denisa for transfers and ambulation  Pt only able to tolerate short distance ambulation this session, with quality of gait declining with fatigue  Pt requires VCs for proximity to RW, increased step length, and improved posture during gait (pt has tendency to walk with her forearms resting on RW)  Educated pt on proper safety/mechanics when using RW  Time spent for setup, rest breaks, and repositioning for comfort  Pt would benefit from continued acute skilled PT to address above deficits  Continuing to recommend home with HHPT and social support from her family  PT Discharge Recommendation: Home with home health rehabilitation     PT - OK to Discharge: No    See flowsheet documentation for full assessment

## 2021-04-22 NOTE — PHYSICAL THERAPY NOTE
Physical Therapy Treatment Note    Patient's Name: Virginie Pedersen  : 1960       04/22/21 0950   PT Last Visit   PT Visit Date 21   Note Type   Note Type Treatment   Pain Assessment   Pain Assessment Tool Pain Assessment not indicated - pt denies pain   Restrictions/Precautions   Weight Bearing Precautions Per Order No   Other Precautions Multiple lines;Telemetry; Fall Risk   General   Chart Reviewed Yes   Family/Caregiver Present No   Cognition   Overall Cognitive Status WFL   Attention Within functional limits   Orientation Level Oriented X4   Memory Decreased recall of precautions   Following Commands Follows one step commands without difficulty   Bed Mobility   Additional Comments OOB upon arrival, not assessed   Transfers   Sit to Stand 4  Minimal assistance   Additional items Assist x 1;Verbal cues   Stand to Sit 4  Minimal assistance   Additional items Assist x 1;Verbal cues   Additional Comments Transfers with RW  VCs for proper hand placement   Ambulation/Elevation   Gait pattern Excessively slow; Short stride;R Foot drag; Foward flexed   Gait Assistance 4  Minimal assist   Additional items Assist x 1  (SBA of 2nd for chair follow)   Assistive Device Rolling walker   Distance 30ft + 40ft with pt resting forearms on RW with fatigue  VCs for increased step length as pt drags R foot with fatigue  Balance   Static Sitting Normal   Dynamic Sitting Good   Static Standing Fair   Dynamic Standing Poor +   Ambulatory Poor +   Endurance Deficit   Endurance Deficit Yes   Endurance Deficit Description fatigue, weakness   Activity Tolerance   Activity Tolerance Patient limited by fatigue   Nurse Made Aware ok to see per RN   Assessment   Prognosis Good   Problem List Decreased strength; Impaired balance;Decreased endurance;Decreased mobility; Decreased cognition;Decreased safety awareness   Assessment Pt continues to present with BLE weakness, impaired gait, and decreased activity tolerance   Pt requires Denisa for transfers and ambulation  Pt only able to tolerate short distance ambulation this session, with quality of gait declining with fatigue  Pt requires VCs for proximity to RW, increased step length, and improved posture during gait (pt has tendency to walk with her forearms resting on RW)  Educated pt on proper safety/mechanics when using RW  Time spent for setup, rest breaks, and repositioning for comfort  Pt would benefit from continued acute skilled PT to address above deficits  Continuing to recommend home with HHPT and social support from her family  Goals   Patient Goals to get better   STG Expiration Date 05/04/21   PT Treatment Day 1   Plan   Treatment/Interventions Functional transfer training;LE strengthening/ROM; Elevations; Therapeutic exercise; Endurance training;Gait training;Bed mobility;Spoke to nursing;Spoke to case management;OT   Progress Progressing toward goals   PT Frequency Other (Comment)  (3-5x/wk)   Recommendation   PT Discharge Recommendation Home with home health rehabilitation   Equipment Recommended Walker  (pt already owns)   AM-PAC Basic Mobility Inpatient   Turning in Bed Without Bedrails 3   Lying on Back to Sitting on Edge of Flat Bed 3   Moving Bed to Chair 3   Standing Up From Chair 3   Walk in Room 3   Climb 3-5 Stairs 2   Basic Mobility Inpatient Raw Score 17   Basic Mobility Standardized Score 39 67       Paco Lopez, PT, DPT

## 2021-04-23 LAB
ANION GAP SERPL CALCULATED.3IONS-SCNC: 6 MMOL/L (ref 4–13)
APTT PPP: 75 SECONDS (ref 23–37)
BUN SERPL-MCNC: 10 MG/DL (ref 5–25)
CALCIUM SERPL-MCNC: 8.7 MG/DL (ref 8.3–10.1)
CHLORIDE SERPL-SCNC: 98 MMOL/L (ref 100–108)
CO2 SERPL-SCNC: 29 MMOL/L (ref 21–32)
CREAT SERPL-MCNC: 0.29 MG/DL (ref 0.6–1.3)
ERYTHROCYTE [DISTWIDTH] IN BLOOD BY AUTOMATED COUNT: 15.4 % (ref 11.6–15.1)
GFR SERPL CREATININE-BSD FRML MDRD: 146 ML/MIN/1.73SQ M
GLUCOSE SERPL-MCNC: 104 MG/DL (ref 65–140)
GLUCOSE SERPL-MCNC: 161 MG/DL (ref 65–140)
GLUCOSE SERPL-MCNC: 171 MG/DL (ref 65–140)
GLUCOSE SERPL-MCNC: 95 MG/DL (ref 65–140)
GLUCOSE SERPL-MCNC: 99 MG/DL (ref 65–140)
HCT VFR BLD AUTO: 34.3 % (ref 34.8–46.1)
HGB BLD-MCNC: 11.3 G/DL (ref 11.5–15.4)
MCH RBC QN AUTO: 31.2 PG (ref 26.8–34.3)
MCHC RBC AUTO-ENTMCNC: 32.9 G/DL (ref 31.4–37.4)
MCV RBC AUTO: 95 FL (ref 82–98)
PLATELET # BLD AUTO: 331 THOUSANDS/UL (ref 149–390)
PMV BLD AUTO: 9 FL (ref 8.9–12.7)
POTASSIUM SERPL-SCNC: 3.8 MMOL/L (ref 3.5–5.3)
RBC # BLD AUTO: 3.62 MILLION/UL (ref 3.81–5.12)
SODIUM SERPL-SCNC: 133 MMOL/L (ref 136–145)
WBC # BLD AUTO: 7.38 THOUSAND/UL (ref 4.31–10.16)

## 2021-04-23 PROCEDURE — 80048 BASIC METABOLIC PNL TOTAL CA: CPT | Performed by: PHYSICIAN ASSISTANT

## 2021-04-23 PROCEDURE — 99232 SBSQ HOSP IP/OBS MODERATE 35: CPT | Performed by: PHYSICIAN ASSISTANT

## 2021-04-23 PROCEDURE — 82948 REAGENT STRIP/BLOOD GLUCOSE: CPT

## 2021-04-23 PROCEDURE — 85027 COMPLETE CBC AUTOMATED: CPT | Performed by: PHYSICIAN ASSISTANT

## 2021-04-23 PROCEDURE — 85730 THROMBOPLASTIN TIME PARTIAL: CPT | Performed by: INTERNAL MEDICINE

## 2021-04-23 RX ORDER — TRAZODONE HYDROCHLORIDE 50 MG/1
50 TABLET ORAL
Status: DISCONTINUED | OUTPATIENT
Start: 2021-04-23 | End: 2021-04-24 | Stop reason: HOSPADM

## 2021-04-23 RX ORDER — FUROSEMIDE 10 MG/ML
20 INJECTION INTRAMUSCULAR; INTRAVENOUS ONCE
Status: COMPLETED | OUTPATIENT
Start: 2021-04-23 | End: 2021-04-23

## 2021-04-23 RX ADMIN — PANTOPRAZOLE SODIUM 40 MG: 40 TABLET, DELAYED RELEASE ORAL at 05:11

## 2021-04-23 RX ADMIN — AMLODIPINE BESYLATE 10 MG: 10 TABLET ORAL at 09:57

## 2021-04-23 RX ADMIN — DEXAMETHASONE 4 MG: 4 TABLET ORAL at 21:36

## 2021-04-23 RX ADMIN — OXYCODONE HYDROCHLORIDE 5 MG: 5 TABLET ORAL at 12:28

## 2021-04-23 RX ADMIN — DOCUSATE SODIUM 100 MG: 100 CAPSULE, LIQUID FILLED ORAL at 09:57

## 2021-04-23 RX ADMIN — INSULIN LISPRO 2 UNITS: 100 INJECTION, SOLUTION INTRAVENOUS; SUBCUTANEOUS at 17:35

## 2021-04-23 RX ADMIN — INSULIN LISPRO 2 UNITS: 100 INJECTION, SOLUTION INTRAVENOUS; SUBCUTANEOUS at 12:13

## 2021-04-23 RX ADMIN — FUROSEMIDE 20 MG: 10 INJECTION, SOLUTION INTRAMUSCULAR; INTRAVENOUS at 12:03

## 2021-04-23 RX ADMIN — TRAZODONE HYDROCHLORIDE 50 MG: 50 TABLET ORAL at 21:40

## 2021-04-23 RX ADMIN — ACETAMINOPHEN 650 MG: 325 TABLET ORAL at 21:52

## 2021-04-23 RX ADMIN — ATORVASTATIN CALCIUM 20 MG: 20 TABLET, FILM COATED ORAL at 17:31

## 2021-04-23 RX ADMIN — INSULIN LISPRO 2 UNITS: 100 INJECTION, SOLUTION INTRAVENOUS; SUBCUTANEOUS at 09:56

## 2021-04-23 RX ADMIN — DEXAMETHASONE 4 MG: 4 TABLET ORAL at 09:57

## 2021-04-23 RX ADMIN — MELATONIN TAB 3 MG 3 MG: 3 TAB at 21:37

## 2021-04-23 RX ADMIN — INSULIN LISPRO 1 UNITS: 100 INJECTION, SOLUTION INTRAVENOUS; SUBCUTANEOUS at 12:12

## 2021-04-23 RX ADMIN — INSULIN LISPRO 1 UNITS: 100 INJECTION, SOLUTION INTRAVENOUS; SUBCUTANEOUS at 06:36

## 2021-04-23 RX ADMIN — INSULIN GLARGINE 15 UNITS: 100 INJECTION, SOLUTION SUBCUTANEOUS at 21:53

## 2021-04-23 RX ADMIN — METOPROLOL SUCCINATE 50 MG: 50 TABLET, EXTENDED RELEASE ORAL at 09:57

## 2021-04-23 RX ADMIN — HEPARIN SODIUM 14 UNITS/KG/HR: 10000 INJECTION, SOLUTION INTRAVENOUS at 17:33

## 2021-04-23 NOTE — OCCUPATIONAL THERAPY NOTE
Occupational Therapy Cancellation Note        Patient Name: Christopher Sanabria  DSKOH'X Date: 4/23/2021 04/23/21 1000   OT Last Visit   OT Visit Date 04/23/21   Note Type   Note Type Treatment   Cancel Reasons Other       Chart reviewed, OT treatment attempted  Pt initially reporting she is very tired and would like therapy to return later  Re-attempted and pt in bed with RN requesting to hold at this time  OT will continue to follow and see as medically appropriate and able       PARIS Joy, OTR/L

## 2021-04-23 NOTE — ASSESSMENT & PLAN NOTE
Noted to have LLE >>RLE  · Duplex was checked and despite report being in EPIC showing negative, previous provider personally discussed it with the vascular tech and vascular on call and patient [address] DVT in the peroneal vein and posterior tibial vein  Non-occlusive thrombus in the popliteal and NEEDS TO BE TREATED per d/w vascular  · Updated report to reflect findings of acute non-occlusive DVT in popliteal vein and acute occlusive DVT in peroneal and posterior tibial veins on the left  · D/w thoracic surgery, placed on heparin gtt  D/w thoracic and ok with AC    Have sent price check to pharmacy for Eliquis  · Continues with lower extremity edema - encourage elevation

## 2021-04-23 NOTE — UTILIZATION REVIEW
Continued Stay Review    Date: 4/23/21                          Current Patient Class:     Current Level of Care:     HPI:61 y o  female initially admitted on  3/18/21    Assessment/Plan:   * Malignant pericardial effusion [facility])  Assessment & Plan  Presented with malignant pericardial effusion in setting of lung CA  Thoracic Surgery following   · S/P pericardial window with drain placement on 4/18  ? Drain in place, continue local care per thoracic  D/w thoracic via TT drain to remain in place at this time  ? Follow cultures  · Oncology input appreciated  Recommend outpatient f/u to discuss 2nd line chemotherapy  · Monitor labs   · PT/OT     Acute respiratory insufficiency  Assessment & Plan  · Patient is on 2 L NC O2, reports not on oxygen at home  · Now weaned to room air      Acute DVT (deep venous thrombosis) (HCC)  Assessment & Plan  Noted to have LLE >>RLE  · Duplex was checked and despite report being in EPIC showing negative, previous provider personally discussed it with the vascular tech and vascular on call and patient [address] DVT in the peroneal vein and posterior tibial vein  Non-occlusive thrombus in the popliteal and NEEDS TO BE TREATED per d/w vascular  ? 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 mediastinal involvement as well as brain metastases, and possible leptomeningeal involvement  · Now with malignant effusion s/p window  · S/p WBRT and radiation to thoracic spine  · Was on Pembrolizumab/carboplatin/Alimta by L  V  [facility]  · Oncology consult appreciated, will need outpatient f/u to discuss 2nd line therapy      History of smoking  Assessment & Plan  · Currently patient has not been smoking      Hyperlipidemia  Assessment & Plan  · Continue Lipitor 20 mg daily     Essential hypertension  Assessment & Plan  BP stable   · Continue Norvasc 10 mg daily  · Metoprolol succinate 50 mg q day     Diabetes mellitus type 2  Assessment & Plan        Lab Results   Component Value Date     HGBA1C 7 6 (H) 04/18/2021      · Patient on both linagliptin and metformin; hold while inpatient    · SSI/accuchecks while admitted  · Added basal insulin, will increase and add scheduled humalog with meals  · Likely steroid induced hyperglycemia contributing          Recent Labs     04/22/21  1042 04/22/21  1616 04/22/21  2056 04/23/21  0611   POCGLU 179* 309* 192* 161*         Blood Sugar Average: Last 72 hrs:  (P) 241          Vital Signs:   23/21 0254  97 2 °F (36 2 °C)Abnormal   76  18  133/84  100  98 %  --       Pertinent Labs/Diagnostic Results:       Results from last 7 days   Lab Units 04/23/21  0455 04/22/21  0450 04/21/21  0414 04/20/21  0447 04/19/21  0518 04/18/21  0605   WBC Thousand/uL 7 38 7 05 6 33 6 37 7 66 8 71   HEMOGLOBIN g/dL 11 3* 11 4* 10 1* 10 4* 10 3* 10 7*   HEMATOCRIT % 34 3* 34 6* 31 3* 32 2* 32 2* 33 1*   PLATELETS Thousands/uL 331 320 273 284 271 304   NEUTROS ABS Thousands/µL  --   --  5 59 5 78 6 79  --    BANDS PCT %  --   --   --   --   --  1     Results from last 7 days   Lab Units 04/23/21  0455 04/22/21  0450 04/21/21  0414 04/20/21  0447 04/19/21  0518 04/18/21  0605 04/17/21  2303   SODIUM mmol/L 133* 134* 134* 133* 136 130* 131*   POTASSIUM mmol/L 3 8 4 0 4 2 4 3 4 1 4 3 4 3   CHLORIDE mmol/L 98* 99* 100 100 102 94* 93*   CO2 mmol/L 29 30 29 30 27 27 27   ANION GAP mmol/L 6 5 5 3* 7 9 11   BUN mg/dL 10 12 14 21 25 34* 33*   CREATININE mg/dL 0 29* 0 38* 0 34* 0 40* 0 41* 0 66 0 79   EGFR ml/min/1 73sq m 146 133 138 131 130 111 94   CALCIUM mg/dL 8 7 8 8 8 5 8 7 8 5 9 3 8 8   MAGNESIUM mg/dL  --   --   --   --   --  1 5* 1 2*   PHOSPHORUS mg/dL  --   --   --   --   --  4 0  --      Results from last 7 days   Lab Units 04/18/21  0605 04/17/21  2303   AST U/L 18 16   ALT U/L 47 39   ALK PHOS U/L 107 98   TOTAL PROTEIN g/dL 6 6 6 5   ALBUMIN g/dL 3 1* 2 9*   TOTAL BILIRUBIN mg/dL 1 09* 0 64     Results from last 7 days   Lab Units 04/23/21  1051 04/23/21  0611 04/22/21 2056 04/22/21  1616 04/22/21  1042 04/22/21  0537 04/21/21  2051 04/21/21  1615 04/21/21  1107 04/21/21  0550 04/20/21 2053 04/20/21  1553   POC GLUCOSE mg/dl 171* 161* 192* 309* 179* 178* 253* 280* 247* 219* 275* 263*     Results from last 7 days   Lab Units 04/23/21  0455 04/22/21  0450 04/21/21  0414 04/20/21  0447 04/19/21  0518 04/18/21  0605 04/17/21  2303   GLUCOSE RANDOM mg/dL 95 160* 238* 208* 211* 240* 284*     Results from last 7 days   Lab Units 04/18/21  0632   HEMOGLOBIN A1C % 7 6*   EAG mg/dl 171     Results from last 7 days   Lab Units 04/17/21  2303   TROPONIN I ng/mL <0 02     Results from last 7 days   Lab Units 04/23/21  0455 04/22/21  0450 04/21/21 2027 04/19/21  0518   PROTIME seconds  --   --   --   --  14 8*   INR   --   --   --   --  1 16   PTT seconds 75* 76* 64*   < > 24    < > = values in this interval not displayed       Results from last 7 days   Lab Units 04/17/21  2303   NT-PRO BNP pg/mL 127*     Results from last 7 days   Lab Units 04/18/21  1306   GRAM STAIN RESULT  2+ Polys  No bacteria seen   BODY FLUID CULTURE, STERILE  No growth     gmf  aptt  bmp  Medications:   Scheduled Medications:  amLODIPine, 10 mg, Oral, Daily  atorvastatin, 20 mg, Oral, Daily With Dinner  dexamethasone, 4 mg, Oral, Q12H SCARLETT  docusate sodium, 100 mg, Oral, Daily  insulin glargine, 15 Units, Subcutaneous, HS  insulin lispro, 1-5 Units, Subcutaneous, TID AC  insulin lispro, 1-5 Units, Subcutaneous, HS  insulin lispro, 2 Units, Subcutaneous, TID With Meals  melatonin, 3 mg, Oral, HS  metoprolol succinate, 50 mg, Oral, Daily  pantoprazole, 40 mg, Oral, Early Morning  traZODone, 50 mg, Oral, HS      Continuous IV Infusions:  heparin (porcine), 3-30 Units/kg/hr (Order-Specific), Intravenous, Titrated      PRN Meds:  acetaminophen, 650 mg, Oral, Q6H PRN  aluminum-magnesium hydroxide-simethicone, 30 mL, Oral, Q6H PRN  ondansetron, 4 mg, Intravenous, Q6H PRN  oxyCODONE, 5 mg, Oral, Q4H PRN        Discharge Plan: d  Network Utilization Review Department  ATTENTION: Please call with any questions or concerns to [phone] and carefully listen to the prompts so that you are directed to the right person  All voicemails are confidential   Ajay Reddy all requests for admission clinical reviews, approved or denied determinations and any other requests to dedicated fax number below belonging to the campus where the patient is receiving treatment   List of dedicated fax numbers for the Facilities:  [address] DENIALS (Administrative/Medical Necessity) [phone]   [address] (Maternity/NICU/Pediatrics) [phone] [address] Bernadette Sanchez [id number] [place] [address] [address] Singleton [address] [facility] [place] [address] [phone]

## 2021-04-23 NOTE — ASSESSMENT & PLAN NOTE
Presented with malignant pericardial effusion in setting of lung CA  Thoracic Surgery following   · S/P pericardial window with drain placement on 4/18  · Drain in place, continue local care per thoracic  D/w thoracic via TT drain to remain in place at this time  · Follow cultures  · Oncology input appreciated   Recommend outpatient f/u to discuss 2nd line chemotherapy  · Monitor labs   · PT/OT

## 2021-04-23 NOTE — ASSESSMENT & PLAN NOTE
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

## 2021-04-23 NOTE — PLAN OF CARE
Problem: Potential for Falls  Goal: Patient will remain free of falls  Description: INTERVENTIONS:  - Assess patient frequently for physical needs  -  Identify cognitive and physical deficits and behaviors that affect risk of falls    -  [place] fall precautions as indicated by assessment   - Educate patient/family on patient safety including physical limitations  - Instruct patient to call for assistance with activity based on assessment  - Modify environment to reduce risk of injury  - Consider OT/PT consult to assist with strengthening/mobility  Outcome: Progressing     Problem: Prexisting or High Potential for Compromised Skin Integrity  Goal: Skin integrity is maintained or improved  Description: INTERVENTIONS:  - Identify patients at risk for skin breakdown  - Assess and monitor skin integrity  - Assess and monitor nutrition and hydration status  - Monitor labs   - Assess for incontinence   - Turn and reposition patient  - Assist with mobility/ambulation  - Relieve pressure over bony prominences  - Avoid friction and shearing  - Provide appropriate hygiene as needed including keeping skin clean and dry  - Evaluate need for skin moisturizer/barrier cream  - Collaborate with interdisciplinary team   - Patient/family teaching  - Consider wound care consult   Outcome: Progressing     Problem: PAIN - ADULT  Goal: Verbalizes/displays adequate comfort level or baseline comfort level  Description: Interventions:  - Encourage patient to monitor pain and request assistance  - Assess pain using appropriate pain scale  - Administer analgesics based on type and severity of pain and evaluate response  - Implement non-pharmacological measures as appropriate and evaluate response  - Consider cultural and social influences on pain and pain management  - Notify physician/advanced practitioner if interventions unsuccessful or patient reports new pain  Outcome: Progressing     Problem: INFECTION - ADULT  Goal: Absence or prevention of progression during hospitalization  Description: INTERVENTIONS:  - Assess and monitor for signs and symptoms of infection  - Monitor lab/diagnostic results  - Monitor all insertion sites, i e  indwelling lines, tubes, and drains  - Monitor endotracheal if appropriate and nasal secretions for changes in amount and color  - [place] appropriate cooling/warming therapies per order  - Administer medications as ordered  - Instruct and encourage patient and family to use good hand hygiene technique  - Identify and instruct in appropriate isolation precautions for identified infection/condition  Outcome: Progressing  Goal: Absence of fever/infection during neutropenic period  Description: INTERVENTIONS:  - Monitor WBC    Outcome: Progressing     Problem: SAFETY ADULT  Goal: Patient will remain free of falls  Description: INTERVENTIONS:  - Assess patient frequently for physical needs  -  Identify cognitive and physical deficits and behaviors that affect risk of falls    -  [place] fall precautions as indicated by assessment   - Educate patient/family on patient safety including physical limitations  - Instruct patient to call for assistance with activity based on assessment  - Modify environment to reduce risk of injury  - Consider OT/PT consult to assist with strengthening/mobility  Outcome: Progressing  Goal: Maintain or return to baseline ADL function  Description: INTERVENTIONS:  -  Assess patient's ability to carry out ADLs; assess patient's baseline for ADL function and identify physical deficits which impact ability to perform ADLs (bathing, care of mouth/teeth, toileting, grooming, dressing, etc )  - Assess/evaluate cause of self-care deficits   - Assess range of motion  - Assess patient's mobility; develop plan if impaired  - Assess patient's need for assistive devices and provide as appropriate  - Encourage maximum independence but intervene and supervise when necessary  - Involve family in performance of ADLs  - Assess for home care needs following discharge   - Consider OT consult to assist with ADL evaluation and planning for discharge  - Provide patient education as appropriate  Outcome: Progressing  Goal: Maintain or return mobility status to optimal level  Description: INTERVENTIONS:  - Assess patient's baseline mobility status (ambulation, transfers, stairs, etc )    - Identify cognitive and physical deficits and behaviors that affect mobility  - Identify mobility aids required to assist with transfers and/or ambulation (gait belt, sit-to-stand, lift, walker, cane, etc )  - [place] fall precautions as indicated by assessment  - Record patient progress and toleration of activity level on Mobility SBAR; progress patient to next Phase/Stage  - Instruct patient to call for assistance with activity based on assessment  - Consider rehabilitation consult to assist with strengthening/weightbearing, etc   Outcome: Progressing     Problem: DISCHARGE PLANNING  Goal: Discharge to home or other facility with appropriate resources  Description: INTERVENTIONS:  - Identify barriers to discharge w/patient and caregiver  - Arrange for needed discharge resources and transportation as appropriate  - Identify discharge learning needs (meds, wound care, etc )  - Arrange for interpretive services to assist at discharge as needed  - Refer to Case Management Department for coordinating discharge planning if the patient needs post-hospital services based on physician/advanced practitioner order or complex needs related to functional status, cognitive ability, or social support system  Outcome: Progressing     Problem: Knowledge Deficit  Goal: Patient/family/caregiver demonstrates understanding of disease process, treatment plan, medications, and discharge instructions  Description: Complete learning assessment and assess knowledge base    Interventions:  - Provide teaching at level of understanding  - Provide teaching via preferred learning methods  Outcome: Progressing

## 2021-04-23 NOTE — PROGRESS NOTES
[address] [facility]  Progress Note - Crystal Whitley 1960, 62 y o  female MRN: [id number]  Unit/Bed#: [facility] 426-01 Encounter: [id number]  Primary Care Provider: Patricia Preston DO   Date and time admitted to hospital: 4/18/2021  4:53 AM    * Malignant pericardial effusion [facility])  Assessment & Plan  Presented with malignant pericardial effusion in setting of lung CA  Thoracic Surgery following   · S/P pericardial window with drain placement on 4/18  · Drain in place, continue local care per thoracic  D/w thoracic via TT drain to remain in place at this time  · Follow cultures  · Oncology input appreciated  Recommend outpatient f/u to discuss 2nd line chemotherapy  · Monitor labs   · PT/OT    Acute respiratory insufficiency  Assessment & Plan  · Patient is on 2 L NC O2, reports not on oxygen at home  · Now weaned to room air     Acute DVT (deep venous thrombosis) (HCC)  Assessment & Plan  Noted to have LLE >>RLE  · Duplex was checked and despite report being in EPIC showing negative, previous provider personally discussed it with the vascular tech and vascular on call and patient [address] DVT in the peroneal vein and posterior tibial vein  Non-occlusive thrombus in the popliteal and NEEDS TO BE TREATED per d/w vascular  · Updated report to reflect findings of acute non-occlusive DVT in popliteal vein and acute occlusive DVT in peroneal and posterior tibial veins on the left  · D/w thoracic surgery, placed on heparin gtt  D/w thoracic and ok with AC    Have sent price check to pharmacy for Eliquis  · Continues with lower extremity edema - encourage elevation     Lung cancer metastatic to brain [facility])  Assessment & Plan  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 mediastinal involvement as well as brain metastases, and possible leptomeningeal involvement  · Now with malignant effusion s/p window  · S/p WBRT and radiation to thoracic spine  · Was on Pembrolizumab/carboplatin/Alimta by Dr Yulisa Andre  · Oncology consult appreciated, will need outpatient f/u to discuss 2nd line therapy     History of smoking  Assessment & Plan  · Currently patient has not been smoking  Hyperlipidemia  Assessment & Plan  · Continue Lipitor 20 mg daily    Essential hypertension  Assessment & Plan  BP stable   · Continue Norvasc 10 mg daily  · Metoprolol succinate 50 mg q day    Diabetes mellitus type 2  Assessment & Plan  Lab Results   Component Value Date    HGBA1C 7 6 (H) 04/18/2021     · Patient on both linagliptin and metformin; hold while inpatient  · SSI/accuchecks while admitted  · Added basal insulin, will increase and add scheduled humalog with meals  · Likely steroid induced hyperglycemia contributing   Recent Labs     04/22/21  1042 04/22/21  1616 04/22/21  2056 04/23/21  0611   POCGLU 179* 309* 192* 161*       Blood Sugar Average: Last 72 hrs:  (P) 241        VTE Pharmacologic Prophylaxis:   Pharmacologic: Heparin Drip  Mechanical VTE Prophylaxis in Place: No    Patient Centered Rounds: I have performed bedside rounds with nursing staff today  Discussions with Specialists or Other Care Team Provider: RN    Education and Discussions with Family / Patient: patient, daughter over phone    Time Spent for Care: 20 minutes  More than 50% of total time spent on counseling and coordination of care as described above  Current Length of Stay: 5 day(s)    Current Patient Status: Inpatient   Certification Statement: The patient will continue to require additional inpatient hospital stay due to drain mgmt    Discharge Plan: when cleared by thoracics, drain removed, AC plan - will d/w CM price of Eliquis     Code Status: Level 1 - Full Code      Subjective: The patient reports to insomnia and asking for something to help her sleep, tearful that she is so tired       Objective:     Vitals:   Temp (24hrs), Av 3 °F (36 8 °C), Min:97 2 °F (36 2 °C), Max:99 °F (37 2 °C)    Temp:  [97 2 °F (36 2 °C)-99 °F (37 2 °C)] 97 2 °F (36 2 °C)  HR:  [74-96] 76  Resp:  [18-20] 18  BP: (112-145)/(62-85) 133/84  SpO2:  [96 %-98 %] 98 %  Body mass index is 29 97 kg/m²  Input and Output Summary (last 24 hours): Intake/Output Summary (Last 24 hours) at 2021 1020  Last data filed at 2021 0600  Gross per 24 hour   Intake 347 05 ml   Output 1235 ml   Net -887 95 ml       Physical Exam:     Physical Exam  Vitals signs reviewed  Constitutional:       General: She is not in acute distress  Appearance: She is not toxic-appearing  HENT:      Head: Normocephalic and atraumatic  Eyes:      General: No scleral icterus  Extraocular Movements: Extraocular movements intact  Neck:      Musculoskeletal: Normal range of motion  Cardiovascular:      Rate and Rhythm: Normal rate and regular rhythm  Pulmonary:      Effort: Pulmonary effort is normal  No respiratory distress  Breath sounds: Normal breath sounds  Abdominal:      General: Bowel sounds are normal  There is no distension  Palpations: Abdomen is soft  Tenderness: There is no abdominal tenderness  Musculoskeletal: Normal range of motion  General: Swelling present  Right lower leg: Edema present  Left lower leg: Edema present  Neurological:      General: No focal deficit present  Mental Status: She is alert and oriented to person, place, and time     Psychiatric:         Mood and Affect: Mood normal       Comments: tearful           Additional Data:     Labs:    Results from last 7 days   Lab Units 21  0455  21  0414  21  0605   WBC Thousand/uL 7 38   < > 6 33   < > 8 71   HEMOGLOBIN g/dL 11 3*   < > 10 1*   < > 10 7*   HEMATOCRIT % 34 3*   < > 31 3*   < > 33 1*   PLATELETS Thousands/uL 331   < > 273   < > 304   BANDS PCT %  --   --   --   --  1   NEUTROS PCT %  --   --  88*   < >  --    LYMPHS PCT %  --   --  3*   < >  --    LYMPHO PCT %  --   --   --   --  1*   MONOS PCT %  --   --  7   < >  --    MONO PCT %  --   --   --   --  5   EOS PCT %  --   --  0   < > 0    < > = values in this interval not displayed  Results from last 7 days   Lab Units 04/23/21  0455  04/18/21  0605   SODIUM mmol/L 133*   < > 130*   POTASSIUM mmol/L 3 8   < > 4 3   CHLORIDE mmol/L 98*   < > 94*   CO2 mmol/L 29   < > 27   BUN mg/dL 10   < > 34*   CREATININE mg/dL 0 29*   < > 0 66   ANION GAP mmol/L 6   < > 9   CALCIUM mg/dL 8 7   < > 9 3   ALBUMIN g/dL  --   --  3 1*   TOTAL BILIRUBIN mg/dL  --   --  1 09*   ALK PHOS U/L  --   --  107   ALT U/L  --   --  47   AST U/L  --   --  18   GLUCOSE RANDOM mg/dL 95   < > 240*    < > = values in this interval not displayed  Results from last 7 days   Lab Units 04/19/21  0518   INR  1 16     Results from last 7 days   Lab Units 04/23/21  0611 04/22/21  2056 04/22/21  1616 04/22/21  1042 04/22/21  0537 04/21/21  2051 04/21/21  1615 04/21/21  1107 04/21/21  0550 04/20/21  2053 04/20/21  1553 04/20/21  1059   POC GLUCOSE mg/dl 161* 192* 309* 179* 178* 253* 280* 247* 219* 275* 263* 335*     Results from last 7 days   Lab Units 04/18/21  0632   HEMOGLOBIN A1C % 7 6*               * I Have Reviewed All Lab Data Listed Above  * Additional Pertinent Lab Tests Reviewed:  All Labs Within Last 24 Hours Reviewed    Imaging:    Imaging Reports Reviewed Today Include: none  Imaging Personally Reviewed by Myself Includes:  none    Recent Cultures (last 7 days):     Results from last 7 days   Lab Units 04/18/21  1306   GRAM STAIN RESULT  2+ Polys  No bacteria seen   BODY FLUID CULTURE, STERILE  No growth       Last 24 Hours Medication List:   Current Facility-Administered Medications   Medication Dose Route Frequency Provider Last Rate    acetaminophen  650 mg Oral Q6H PRN Nataly Goins MD      aluminum-magnesium hydroxide-simethicone  30 mL Oral Q6H PRN Nataly Goins MD      amLODIPine 10 mg Oral Daily Soy Wills MD      atorvastatin  20 mg Oral Daily With Cyn Mims MD      dexamethasone  4 mg Oral Q12H [facility] Soy Wills MD      docusate sodium  100 mg Oral Daily Soy Wills MD      heparin (porcine)  3-30 Units/kg/hr (Order-Specific) Intravenous Titrated Martine Mccullough PA-C 14 Units/kg/hr (04/23/21 7612)    insulin glargine  15 Units Subcutaneous HS Nely Mckenna PA-C      insulin lispro  1-5 Units Subcutaneous TID AC Nevin Harley PA-C      insulin lispro  1-5 Units Subcutaneous HS Nevin Harley PA-C      insulin lispro  2 Units Subcutaneous TID With Meals [employer], PA-C      melatonin  3 mg Oral HS ROSA Plunkett      metoprolol succinate  50 mg Oral Daily Soy Wills MD      ondansetron  4 mg Intravenous Q6H PRN Soy Wills MD      oxyCODONE  5 mg Oral Q4H PRN Soy Wills MD      pantoprazole  40 mg Oral Early Morning Gabbie Schumacher MD      traZODone  50 mg Oral HS Nely Mckenna PA-C          Today, Patient Was Seen By: Chapis Beach PA-C    ** Please Note: Dictation voice to text software may have been used in the creation of this document   **

## 2021-04-23 NOTE — CASE MANAGEMENT
Per [facility] RADHA FISH PA-C, pt is anticipated to be medically cleared for d/c over the weekend, pending Thoracic Surg  approval  Pt will be d/c home with VNA services for PT, OT and Nursing with [facility]  Pt's family will provide transport  CM will follow

## 2021-04-23 NOTE — ASSESSMENT & PLAN NOTE
Lab Results   Component Value Date    HGBA1C 7 6 (H) 04/18/2021     · Patient on both linagliptin and metformin; hold while inpatient    · SSI/accuchecks while admitted  · Added basal insulin, will increase and add scheduled humalog with meals  · Likely steroid induced hyperglycemia contributing   Recent Labs     04/22/21  1042 04/22/21  1616 04/22/21 2056 04/23/21  0611   POCGLU 179* 309* 192* 161*       Blood Sugar Average: Last 72 hrs:  (P) 241

## 2021-04-24 VITALS
HEART RATE: 69 BPM | RESPIRATION RATE: 18 BRPM | WEIGHT: 153.44 LBS | TEMPERATURE: 98.3 F | HEIGHT: 60 IN | OXYGEN SATURATION: 95 % | DIASTOLIC BLOOD PRESSURE: 82 MMHG | BODY MASS INDEX: 30.12 KG/M2 | SYSTOLIC BLOOD PRESSURE: 127 MMHG

## 2021-04-24 PROBLEM — I31.31 MALIGNANT PERICARDIAL EFFUSION: Status: RESOLVED | Noted: 2017-05-16 | Resolved: 2021-04-24

## 2021-04-24 PROBLEM — R06.89 ACUTE RESPIRATORY INSUFFICIENCY: Status: RESOLVED | Noted: 2021-04-21 | Resolved: 2021-04-24

## 2021-04-24 PROBLEM — C80.1 MALIGNANT PERICARDIAL EFFUSION (HCC): Status: RESOLVED | Noted: 2017-05-16 | Resolved: 2021-04-24

## 2021-04-24 PROBLEM — I31.3 MALIGNANT PERICARDIAL EFFUSION (HCC): Status: RESOLVED | Noted: 2017-05-16 | Resolved: 2021-04-24

## 2021-04-24 LAB
ANION GAP SERPL CALCULATED.3IONS-SCNC: 9 MMOL/L (ref 4–13)
APTT PPP: 91 SECONDS (ref 23–37)
BUN SERPL-MCNC: 15 MG/DL (ref 5–25)
CALCIUM SERPL-MCNC: 8.5 MG/DL (ref 8.3–10.1)
CHLORIDE SERPL-SCNC: 96 MMOL/L (ref 100–108)
CO2 SERPL-SCNC: 27 MMOL/L (ref 21–32)
CREAT SERPL-MCNC: 0.46 MG/DL (ref 0.6–1.3)
GFR SERPL CREATININE-BSD FRML MDRD: 125 ML/MIN/1.73SQ M
GLUCOSE SERPL-MCNC: 165 MG/DL (ref 65–140)
GLUCOSE SERPL-MCNC: 170 MG/DL (ref 65–140)
GLUCOSE SERPL-MCNC: 190 MG/DL (ref 65–140)
GLUCOSE SERPL-MCNC: 202 MG/DL (ref 65–140)
POTASSIUM SERPL-SCNC: 4 MMOL/L (ref 3.5–5.3)
SODIUM SERPL-SCNC: 132 MMOL/L (ref 136–145)

## 2021-04-24 PROCEDURE — 85730 THROMBOPLASTIN TIME PARTIAL: CPT | Performed by: INTERNAL MEDICINE

## 2021-04-24 PROCEDURE — 80048 BASIC METABOLIC PNL TOTAL CA: CPT | Performed by: PHYSICIAN ASSISTANT

## 2021-04-24 PROCEDURE — 82948 REAGENT STRIP/BLOOD GLUCOSE: CPT

## 2021-04-24 PROCEDURE — 99239 HOSP IP/OBS DSCHRG MGMT >30: CPT | Performed by: INTERNAL MEDICINE

## 2021-04-24 RX ORDER — GABAPENTIN 100 MG/1
100 CAPSULE ORAL 2 TIMES DAILY
Status: DISCONTINUED | OUTPATIENT
Start: 2021-04-24 | End: 2021-04-24 | Stop reason: HOSPADM

## 2021-04-24 RX ORDER — ACETAMINOPHEN 325 MG/1
975 TABLET ORAL EVERY 8 HOURS
Status: DISCONTINUED | OUTPATIENT
Start: 2021-04-24 | End: 2021-04-24 | Stop reason: HOSPADM

## 2021-04-24 RX ADMIN — DOCUSATE SODIUM 100 MG: 100 CAPSULE, LIQUID FILLED ORAL at 08:12

## 2021-04-24 RX ADMIN — OXYCODONE HYDROCHLORIDE 5 MG: 5 TABLET ORAL at 08:12

## 2021-04-24 RX ADMIN — INSULIN LISPRO 1 UNITS: 100 INJECTION, SOLUTION INTRAVENOUS; SUBCUTANEOUS at 06:38

## 2021-04-24 RX ADMIN — GABAPENTIN 100 MG: 100 CAPSULE ORAL at 13:40

## 2021-04-24 RX ADMIN — GABAPENTIN 100 MG: 100 CAPSULE ORAL at 18:00

## 2021-04-24 RX ADMIN — ATORVASTATIN CALCIUM 20 MG: 20 TABLET, FILM COATED ORAL at 18:00

## 2021-04-24 RX ADMIN — AMLODIPINE BESYLATE 10 MG: 10 TABLET ORAL at 08:12

## 2021-04-24 RX ADMIN — INSULIN LISPRO 2 UNITS: 100 INJECTION, SOLUTION INTRAVENOUS; SUBCUTANEOUS at 11:06

## 2021-04-24 RX ADMIN — DEXAMETHASONE 4 MG: 4 TABLET ORAL at 08:14

## 2021-04-24 RX ADMIN — INSULIN LISPRO 2 UNITS: 100 INJECTION, SOLUTION INTRAVENOUS; SUBCUTANEOUS at 18:02

## 2021-04-24 RX ADMIN — INSULIN LISPRO 1 UNITS: 100 INJECTION, SOLUTION INTRAVENOUS; SUBCUTANEOUS at 11:05

## 2021-04-24 RX ADMIN — ACETAMINOPHEN 975 MG: 325 TABLET ORAL at 13:40

## 2021-04-24 RX ADMIN — METOPROLOL SUCCINATE 50 MG: 50 TABLET, EXTENDED RELEASE ORAL at 08:12

## 2021-04-24 RX ADMIN — INSULIN LISPRO 2 UNITS: 100 INJECTION, SOLUTION INTRAVENOUS; SUBCUTANEOUS at 08:14

## 2021-04-24 RX ADMIN — APIXABAN 10 MG: 5 TABLET, FILM COATED ORAL at 17:59

## 2021-04-24 RX ADMIN — APIXABAN 10 MG: 5 TABLET, FILM COATED ORAL at 11:05

## 2021-04-24 RX ADMIN — PANTOPRAZOLE SODIUM 40 MG: 40 TABLET, DELAYED RELEASE ORAL at 05:42

## 2021-04-24 RX ADMIN — INSULIN LISPRO 1 UNITS: 100 INJECTION, SOLUTION INTRAVENOUS; SUBCUTANEOUS at 18:01

## 2021-04-24 NOTE — DISCHARGE INSTRUCTIONS
Acute Delirium   WHAT YOU NEED TO KNOW:   What is acute delirium? Acute delirium is temporary confusion and change in consciousness  Consciousness is how alert and aware of your surroundings you are  You may have trouble remembering, listening, or doing things you usually do  What causes or increases my risk for acute delirium? · Age older than 79 years    · Use of 5 or more medicines at the same time    · Illness or injury    · Lack of sleep    · Drug or alcohol abuse, or suddenly stopping after long-term use    · Chemicals such as smoke or fumes     · A medical condition such as a stroke or heart attack that stops blood flow to your brain    · Medicines such as anesthesia, opioids (narcotics), or tranquilizers    · Surgery, especially in elderly people    · Dehydration, or nutrition problems    · Kidney, liver, or other organ disease    · Use of a medical device such as a catheter to help you urinate    What are the signs and symptoms of acute delirium? Your symptoms may come and go quickly  You may feel better at times and worse at other times  You or someone close to you may notice any of the following:  · Fast mood changes, being easily angered, restless, or excited    · Confusion, such as where you are or what day it is    · Memory problems, such as forgetting who someone is or things that have just been said    · False beliefs about yourself and the area around you (delusions)    · Hearing, seeing, smelling, or feeling things that are not real (hallucinations)    · Trouble paying attention, talking, and thinking    · Feeling lazy or sleepy, or trouble staying asleep through the night    · Not caring about what happens around you, or not wanting to eat    · Slow to think, move, or respond to people    How is acute delirium diagnosed? Healthcare providers will ask when symptoms started  They will need to know about any recent accident, head injury, or surgery   Tell them about all current and recent medicines, and any drug or alcohol use  Also tell them about chemical exposure at work or home  How is acute delirium treated? If a medical condition is causing your delirium, your healthcare provider will treat the condition first  He or she may make changes to your current medicines  You may also need any of the following:  · Extra liquid  may be given if you are dehydrated  The liquid may contain sodium or other chemicals if you have an electrolyte imbalance  You may be given liquid nutrition if needed  · Antipsychotics  help you stop seeing or hearing things that are not real     · Benzodiazepines  are used if your delirium occurs after you suddenly stop using drugs or drinking alcohol  How can I manage or prevent acute delirium? · Talk to counselors  Healthcare providers will work with you to help you feel calm and talk about your thoughts and feelings  They will help you remember where you are  They will work to keep you and those around you safe  · Talk to family and friends  Talk to those around you when you feel lonely or sad  Ask for help when you forget the time, place, or names of people around you  · Change your surroundings  Keep your home or room quiet and comfortable  Surround yourself with familiar objects  Keep a calendar and clock nearby to remind you of the date and time  Keep pictures of your family and friends nearby  This will help you stay aware of yourself and the area around you  It may also help you feel safe and calm  · Take all of your medicines as directed  This will help prevent delirium caused by medicines  · Write daily schedules and routines  Record medical appointments, times to take your medicines, meal times, or any other things to remember  Write down reminders to use the bathroom if you have trouble remembering  · Eat healthy foods  Healthy foods will help prevent nutrition problems   Examples are fruits, vegetables, whole-grain breads, low-fat dairy products, beans, lean meats, and fish  Ask if you need to be on a special diet  Your healthcare provider may also recommend vitamins or other supplements if needed  Do not take anything without talking to your provider first     · Drink liquids as directed  Liquids will help prevent dehydration and constipation  · Exercise as directed  Exercise such as walking can help improve your mood and ability to think clearly  Exercise can also help you sleep more easily  Your healthcare provider can help you create a safe exercise plan  Call your local emergency number (911 in the [address]) if:   · You want to harm yourself or others  When should I seek immediate care? · You cannot eat or drink, and you feel weak or dizzy  When should I call my doctor? · You have new or worsening trouble remembering  · You have new or worsening trouble sleeping  · You have questions or concerns about your condition or care  CARE AGREEMENT:   You have the right to help plan your care  Learn about your health condition and how it may be treated  Discuss treatment options with your healthcare providers to decide what care you want to receive  You always have the right to refuse treatment  The above information is an  only  It is not intended as medical advice for individual conditions or treatments  Talk to your doctor, nurse or pharmacist before following any medical regimen to see if it is safe and effective for you  © Copyright [address] Information is for End User's use only and may not be sold, redistributed or otherwise used for commercial purposes  All illustrations and images included in CareNotes® are the copyrighted property of [employer]  or [facility] Britany Villatoro       Acute Delirium   WHAT YOU NEED TO KNOW:   Acute delirium is temporary confusion and change in consciousness  Consciousness is how alert and aware of your surroundings you are   You may have trouble remembering, listening, or doing things you usually do  Acute delirium may be caused by an illness, injury, surgery, medicine, or alcohol or drug use  DISCHARGE INSTRUCTIONS:   Call your local emergency number (911 in the [address]) if:   · You want to harm yourself or others  Seek immediate care if:   · You cannot eat or drink, and you feel weak or dizzy  Call your doctor if:   · You have new or worsening trouble remembering  · You have new or worsening trouble sleeping  · You have questions or concerns about your condition or care  Self-care:   · Talk to counselors  Healthcare providers will work with you to help you feel calm and talk about your thoughts and feelings  They will help you remember where you are  They will work to keep you and those around you safe  · Talk to family and friends  Talk to those around you when you feel lonely or sad  Ask for help when you forget the time, place, or names of people around you  · Change your surroundings  Keep your home or room quiet and comfortable  Surround yourself with familiar objects  Keep a calendar and clock nearby to remind you of the date and time  Keep pictures of your family and friends nearby  This will help you stay aware of yourself and the area around you  It may also help you feel safe and calm  · Keep your mind and body active  Do activities that you love, such as art, gardening, or listening to music  Call or visit people often  · Take all of your medicines as directed  This will help prevent delirium caused by medicines  · Write daily schedules and routines  Record medical appointments, times to take your medicines, meal times, or any other things to remember  Write down reminders to use the bathroom if you have trouble remembering  · Eat healthy foods  Healthy foods will help prevent nutrition problems  Examples are fruits, vegetables, whole-grain breads, low-fat dairy products, beans, lean meats, and fish   Ask if you need to be on a special diet  Your healthcare provider may also recommend vitamins or other supplements if needed  Do not take anything without talking to your provider first     · Drink liquids as directed  Liquids will help prevent dehydration and constipation  · Exercise as directed  Exercise such as walking can help improve your mood and ability to think clearly  Exercise can also help you sleep more easily  Your healthcare provider can help you create a safe exercise plan  Follow up with your healthcare provider as directed:  Ask for help if you have a drug or alcohol problem  You may need several appointments to see if your treatment is working  Write down your questions so you remember to ask them during your visits  © Copyright [address] Information is for End User's use only and may not be sold, redistributed or otherwise used for commercial purposes  All illustrations and images included in CareNotes® are the copyrighted property of [employer] Inc  or [facility] Britany Villatoro   The above information is an  only  It is not intended as medical advice for individual conditions or treatments  Talk to your doctor, nurse or pharmacist before following any medical regimen to see if it is safe and effective for you  Complications of Infection   WHAT YOU NEED TO KNOW:   What are complications of infection? Complications can happen if an infection is not diagnosed and treated early  Some infections may have complications even when they are treated early  The infection can spread from one place in your body to the entire body through your bloodstream  Early diagnosis and treatment may prevent complications such as bacteremia, sepsis, and septic shock  These are serious, life-threatening conditions that need immediate treatment  What types of complications can happen? · Bacteremia  is when there is bacteria in the blood   Bacteremia can happen when infections in other parts of the body, such as the lungs, kidneys, or skin, travel to the blood  It can also happen when indwelling catheters, such as a central venous access devices, pacemaker wires, or urinary catheters become infected  A central venous access device is a special IV that is placed in a large vein and left there for an extended period of time  · Sepsis  happens when an infection spreads and causes the body to react strongly to the germs  The body's defense system normally releases chemicals to fight off infection at the infected area  In sepsis, chemicals are released throughout the body  The chemicals cause inflammation and can cause clotting in small blood vessels that is difficult to control  Inflammation and clotting decreases blood flow and oxygen to organs  This may cause them to stop working correctly  Sepsis is also called systemic inflammatory response syndrome (SIRS) due to infection  · Septic shock  is a severe type of sepsis that happens as sepsis gets worse and causes multiple organs to shut down  The blood pressure drops very low and organs do not get enough blood  This may cause permanent damage to organs  What increases my risk for complications of infection? · Treatment in a hospital for a serious illness or having an indwelling catheter    · Being very young or very old    · A chronic condition, such as COPD, heart failure, or diabetes    · A weak immune system from a long-term condition or medicine    · A recent surgery or dental procedure    · Severe injuries, such as large burns    What are the signs and symptoms that an infection has become worse?    · Fever or very low body temperature with chills and violent shaking    · Swelling in the ankles or legs    · A change in mental status such as confusion, loss of consciousness, or seizures    · A fast or irregular heartbeat    · Urinating very little or not at all    · Difficulty breathing, dizziness, or weakness    · A rash or warm, red skin    How are complications of infection diagnosed? · Measurement of your vital signs  may show an abnormal temperature, heart rate, or blood pressure  · Blood and urine tests  will show infection, what germ is causing your illness, organ function, and give information about your overall health  · Blood gases  will show how much oxygen and carbon dioxide is in the blood  The results will tell healthcare providers how well your lungs, heart, and kidneys are working  · An x-ray, ultrasound, CT, or MRI  may show the source of infection  You may be given contrast liquid to swallow or in your IV to help the infection show up better in the pictures  Tell the healthcare provider if you have ever had an allergic reaction to any type of contrast liquid  Do not enter the MRI room with anything metal  Metal can cause serious injury  Tell the healthcare provider if you have any metal in or on your body  · An EKG  is a test that records your heart rhythm and how fast your heart beats  It is used to check for abnormal heart rhythms and damage to the heart from infection  · An echocardiogram  is a type of ultrasound  Sound waves are used to show the structure and function of your heart  The test may show if the infection has spread to the heart valves  · A lumbar puncture (spinal tap)  may be done to test your cerebrospinal fluid (CSF) for germs or signs of infection in or around your brain  CSF is the fluid that surrounds your spinal column and your brain  How are complications of infection treated? You may  need any of the following depending on how severe the complications are:  · Removal or change of a catheter  may be needed to get rid of the infection  · Medicines  may be given to increase your blood pressure and blood flow to your organs  Antibiotics may be given to treat an infection   Medicines may also be given to decrease inflammation, control your blood sugar, prevent stomach ulcers, and prevent blood clots  · Surgery or other procedures  may be needed to treat problems causing sepsis or related to the complications of your infection  This may include draining an abscess or removing infected tissue  What can I do to prevent an infection? The follow can help prevent an infection, or keep an infection from getting worse:      · Wash your hands often  Wash your hands several times each day  Wash after you use the bathroom, change a child's diaper, and before you prepare or eat food  Use soap and water every time  Rub your soapy hands together, lacing your fingers  Wash the front and back of your hands, and in between your fingers  Use the fingers of one hand to scrub under the fingernails of the other hand  Wash for at least 20 seconds  Rinse with warm, running water for several seconds  Then dry your hands with a clean towel or paper towel  Use hand  that contains alcohol if soap and water are not available  Do not touch your eyes, nose, or mouth without washing your hands first          · Cover a sneeze or cough  Use a tissue that covers your mouth and nose  Throw the tissue away in a trash can right away  Use the bend of your arm if a tissue is not available  Wash your hands well with soap and water or use a hand   · Clean surfaces often  Clean doorknobs, countertops, cell phones, and other surfaces that are touched often  Use a disinfecting wipe, a single-use sponge, or a cloth you can wash and reuse  Use disinfecting  if you do not have wipes  You can create a disinfecting  by mixing 1 part bleach with 10 parts water  · Ask about vaccines you may need  Vaccines help prevent infection from some viruses and bacteria  Get the influenza (flu) vaccine as soon as recommended each year  The flu vaccine is usually available starting in September or October  Flu viruses change, so it is important to get a flu vaccine every year   Get the pneumonia vaccine if recommended  This vaccine is usually recommended every 5 years  Your provider will tell you when to get this vaccine, if needed  Your healthcare provider can tell you if you should get other vaccines, and when to get them  Call your local emergency number [phone] in the [address]) or have someone call if:   · You have any of the following signs of a heart attack:      ? Squeezing, pressure, or pain in your chest    ? You may  also have any of the following:     § Discomfort or pain in your back, neck, jaw, stomach, or arm    § Shortness of breath    § Nausea or vomiting    § Lightheadedness or a sudden cold sweat    · You have a seizure or lose consciousness  · You have trouble breathing  · Your lips or fingernails are blue  · You feel extremely weak and have a hard time moving  When should I seek immediate care? · Your symptoms, such as fever, get worse, even if you are taking medicine to treat the infection  · You have increased swelling in your legs, feet, or abdomen  · You feel weak, dizzy, or faint  · You stop urinating or urinate very little  When should I call my doctor? · You have questions or concerns about your condition or care  CARE AGREEMENT:   You have the right to help plan your care  Learn about your health condition and how it may be treated  Discuss treatment options with your healthcare providers to decide what care you want to receive  You always have the right to refuse treatment  The above information is an  only  It is not intended as medical advice for individual conditions or treatments  Talk to your doctor, nurse or pharmacist before following any medical regimen to see if it is safe and effective for you  © Copyright [address] Information is for End User's use only and may not be sold, redistributed or otherwise used for commercial purposes   All illustrations and images included in CareNotes® are the copyrighted property of [employer]  or [address] [facility]

## 2021-04-24 NOTE — DISCHARGE SUMMARY
Discharge Summary - [facility] Internal Medicine    Patient Information: Gustavo Gaming 62 y o  female MRN: [id number]  Unit/Bed#: [facility] 426-01 Encounter: [id number]    Discharging Physician / Practitioner: Wayne Ruiz MD  PCP: Connie Green DO  Admission Date: 4/18/2021  Discharge Date: 04/24/21    Reason for Admission:  Cough and shortness of breath    Discharge Diagnoses:     Principal Problem (Resolved): Malignant pericardial effusion (HCC)  Active Problems:    Diabetes mellitus type 2    Essential hypertension    Hyperlipidemia    History of smoking    Lung cancer metastatic to brain ([email or web address] )    Acute DVT (deep venous thrombosis) ([email or web address] 75 )  Resolved Problems:    Acute respiratory insufficiency      Consultations During Hospital Stay:  · Oncology  · Thoracic surgery    Procedures Performed:   · Subxiphoid pericardial window 4/18    Significant Findings:   · Malignant pericardial effusion  · Acute hypoxic respiratory failure    Incidental Findings:   · No     Test Results Pending at Discharge (will require follow up):   · No     Outpatient Tests Requested:  · No    Complications:  No    Hospital Course:     Gustavo Gaming is a 62 y o  female patient who originally presented to the hospital on 4/18/2021 due to cough and shortness of breath  Patient was found to have pericardial fluid and thoracic surgery is consulted  Oncology was consulted as patient has malignant lung cancer  Patient underwent subxiphoid pericardial window  She tolerated procedure well  Patient initially required oxygen short-term but was discontinued prior to discharge as she had good O2 saturations on room air  Patient also incidentally noted to have left lower extremity swelling and a duplex is ordered  Patient found to have acute occlusive DVT and peroneal vein and posterior tibial vein along with nonocclusive DVT in popliteal patient was started on anticoagulation and transition to Eliquis prior to discharge    Patient was advised to follow-up with oncology after discharge to discuss second-line therapy  JAROD drain removed prior to discharge  Condition at Discharge: fair     Discharge Day Visit / Exam:     Subjective:  Patient has no complaints  She states she feels well  Vitals: Blood Pressure: 110/73 (04/24/21 0806)  Pulse: (!) 108 (04/24/21 0753)  Temperature: (!) 97 4 °F (36 3 °C) (04/24/21 0753)  Temp Source: Oral (04/23/21 1514)  Respirations: 18 (04/24/21 0753)  Height: 5' (152 4 cm) (04/18/21 1054)  Weight - Scale: 69 6 kg (153 lb 7 oz) (04/24/21 0600)  SpO2: 95 % (04/24/21 0800)  Exam:   Physical Exam  Vitals signs and nursing note reviewed  Constitutional:       Appearance: Normal appearance  She is normal weight  HENT:      Head: Normocephalic and atraumatic  Right Ear: External ear normal       Left Ear: External ear normal       Nose: Nose normal       Mouth/Throat:      Mouth: Mucous membranes are moist       Pharynx: Oropharynx is clear  Eyes:      Conjunctiva/sclera: Conjunctivae normal       Pupils: Pupils are equal, round, and reactive to light  Neck:      Musculoskeletal: Neck supple  No muscular tenderness  Cardiovascular:      Rate and Rhythm: Normal rate and regular rhythm  Pulses: Normal pulses  Heart sounds: Normal heart sounds  Comments: Midline dressings clean dry and intact  Pulmonary:      Effort: Pulmonary effort is normal       Breath sounds: Normal breath sounds  Abdominal:      General: Abdomen is flat  Bowel sounds are normal  There is no distension  Palpations: Abdomen is soft  Tenderness: There is no abdominal tenderness  Musculoskeletal:         General: No swelling or tenderness  Skin:     General: Skin is warm and dry  Capillary Refill: Capillary refill takes less than 2 seconds  Findings: No erythema or rash  Neurological:      General: No focal deficit present  Mental Status: She is alert and oriented to person, place, and time   Mental status is at baseline  Psychiatric:         Mood and Affect: Mood normal          Behavior: Behavior normal          Thought Content: Thought content normal          Judgment: Judgment normal          Discharge instructions/Information to patient and family:   See after visit summary for information provided to patient and family  Provisions for Follow-Up Care:  See after visit summary for information related to follow-up care and any pertinent home health orders  Disposition:     Home with [facility]NA Services (Reminder: Complete face to face encounter)    For Discharges to [facility]:   · Not Applicable to this Patient - Not Applicable to this Patient    Planned Readmission:  No     Discharge Statement:  I spent 38 minutes discharging the patient  This time was spent on the day of discharge  I had direct contact with the patient on the day of discharge  Greater than 50% of the total time was spent examining patient, answering all patient questions, arranging and discussing plan of care with patient as well as directly providing post-discharge instructions  Additional time then spent on discharge activities  Discharge Medications:  See after visit summary for reconciled discharge medications provided to patient and family  ** Please Note: Dragon 360 Dictation voice to text software may have been used in the creation of this document   **

## 2021-04-24 NOTE — QUICK NOTE
Patient was seen in supine position and the dressing over the pericardial drain was removed  The site was painted with betadiene  The drain was taken off suction and the drain stitch was cut and the drain pulled out with tip intact  The site was covered with a gauze dressing

## 2021-04-26 ENCOUNTER — TRANSITIONAL CARE MANAGEMENT (OUTPATIENT)
Dept: INTERNAL MEDICINE CLINIC | Facility: CLINIC | Age: 61
End: 2021-04-26

## 2021-04-26 NOTE — UTILIZATION REVIEW
Notification of Discharge   This is a Notification of Discharge from our facility [address]  Please be advised that this patient has been discharge from our facility  Below you will find the admission and discharge date and time including the patients disposition  UTILIZATION REVIEW CONTACT:  Abilio Lawrence  Utilization   Network Utilization Review Department  Phone: [phone] x carefully listen to the prompts  All voicemails are confidential   Email: [email or web address]     PHYSICIAN ADVISORY SERVICES:  FOR HVUS-KU-YECI REVIEW - MEDICAL NECESSITY DENIAL  Phone: [phone]  Fax: [phone]  Email: [email or web address]     PRESENTATION DATE: 4/18/2021  4:53 AM  OBERVATION ADMISSION DATE:   INPATIENT ADMISSION DATE: 4/18/21 0453   DISCHARGE DATE: 4/24/2021  6:00 PM  DISPOSITION: Home with [place] with [address] INFORMATION:  Send all requests for admission clinical reviews, approved or denied determinations and any other requests to dedicated fax number below belonging to the campus where the patient is receiving treatment   List of dedicated fax numbers:  [address] [place] [address] DENIALS (Administrative/Medical Necessity) [phone]   [address] [facility] (Maternity/NICU/Pediatrics) [phone]   Nathanael Torres [phone]   Lucho Massey [phone]   [facility] [phone]   Tirso Brown [facility] [address] [facility] [phone]   [facility]  [phone]   [address] [facility]  [address] [facility] [address] [facility] [phone]

## 2021-04-26 NOTE — QUICK NOTE
This is a late entry note as an addendum to progress note dated 4/23 at 1020  Acute DVT, likely POA

## 2021-04-28 ENCOUNTER — OFFICE VISIT (OUTPATIENT)
Dept: HEMATOLOGY ONCOLOGY | Facility: CLINIC | Age: 61
End: 2021-04-28
Payer: COMMERCIAL

## 2021-04-28 VITALS
OXYGEN SATURATION: 90 % | TEMPERATURE: 98.3 F | DIASTOLIC BLOOD PRESSURE: 98 MMHG | SYSTOLIC BLOOD PRESSURE: 170 MMHG | RESPIRATION RATE: 20 BRPM | HEART RATE: 101 BPM

## 2021-04-28 DIAGNOSIS — C34.90 LUNG CANCER METASTATIC TO BRAIN (HCC): Primary | ICD-10-CM

## 2021-04-28 DIAGNOSIS — C79.49 LEPTOMENINGITIS, CARCINOMATOUS (HCC): ICD-10-CM

## 2021-04-28 DIAGNOSIS — Z71.89 GOALS OF CARE, COUNSELING/DISCUSSION: ICD-10-CM

## 2021-04-28 DIAGNOSIS — I82.4Y9 ACUTE DEEP VEIN THROMBOSIS (DVT) OF PROXIMAL VEIN OF LOWER EXTREMITY, UNSPECIFIED LATERALITY (HCC): ICD-10-CM

## 2021-04-28 DIAGNOSIS — C80.1 LEPTOMENINGITIS, CARCINOMATOUS (HCC): ICD-10-CM

## 2021-04-28 DIAGNOSIS — C79.31 LUNG CANCER METASTATIC TO BRAIN (HCC): Primary | ICD-10-CM

## 2021-04-28 PROCEDURE — 99214 OFFICE O/P EST MOD 30 MIN: CPT | Performed by: INTERNAL MEDICINE

## 2021-04-28 NOTE — PROGRESS NOTES
[address] [facility] - Hematology & Medical Oncology  Outpatient Visit Encounter Note    Miroslava Gardiner 62 y o  female [id number] Date:  4/28/2021    ONCOLOGY HISTORY        Oncology History   Lung cancer metastatic to brain [facility])   2/5/2021 Initial Diagnosis    A  Level 2L lymph node (excision):     - Metastatic non-small cell carcinoma with necrosis compatible with pulmonary adenocarcinoma involving three (3) of three (3) lymph nodes  - Extranodal extension present  B  Level 4R lymph node #1 (excision):     - Metastatic non-small cell carcinoma with necrosis compatible with pulmonary adenocarcinoma involving portions of fibroconnective tissue and scant lymphoid tissue  C  Level 4R lymph node #2 (excision):     - Metastatic non-small cell carcinoma with necrosis compatible with pulmonary adenocarcinoma involving portions of fibroconnective tissue and scant lymphoid tissue  D  Level 4L lymph node (excision):     - Metastatic non-small cell carcinoma compatible with pulmonary adenocarcinoma involving two (2) of two (2) lymph nodes  CT TAP 1/17/21  1  Mediastinal and right hilar lymphadenopathy  2   Chronic splenic mass, slowly enlarging since 2017  This is almost certainly benign and the differential diagnosis includes hamartoma, hemorrhagic cyst, littoral cell angioma or lymphangioma  3   No evidence of primary malignancy in the chest, abdomen or pelvis  MRI Brain 1/18/21  Metastatic disease, with 2 Parenchymal lesions, 2 1 and 5 mm in the greatest linear dimension  There is diffuse enhancement of the internal auditory canals bilaterally, which warrants lumbar puncture to exclude leptomeningeal tumor       2/10/2021 - 2/24/2021 Radiation    Whole brain to 3000 cGy in 10 fractions  L1-S3 spine 3000 cGy in 10 fractions     3/3/2021 -  [facility]  PDL1 100% 3/4/21  NGS no target mutations 3/9/21  Staging form: Lung, AJCC 8th Edition  - Clinical stage from 3/3/2021: Stage IV (cT0, cNX, cM1) - Signed by Sameera Sahni MD on 3/3/2021  Stage prefix: Initial diagnosis  Sites of metastasis: Brain, Other  Type of lung cancer: Locally advanced or metastatic non-small cell lung cancer  Stage used in treatment planning: Yes  National guidelines used in treatment planning: Yes  Type of national guideline used in treatment planning: NCCN       3/10/2021 - 4/20/2021 Chemotherapy    pembrolizumab (KEYTRUDA) 200 mg in sodium chloride 0 9 % 50 mL IVPB, 200 mg, Intravenous, Once, 2 of 6 cycles  Administration: 200 mg (3/10/2021), 200 mg (3/31/2021)     3/12/2021 - 3/12/2021 Chemotherapy    cyanocobalamin injection 1,000 mcg, 1,000 mcg, Intramuscular, Once, 1 of 3 cycles  Administration: 1,000 mcg (3/3/2021)  fosaprepitant (EMEND) 150 mg in sodium chloride 0 9 % 250 mL IVPB, 150 mg, Intravenous, Once, 0 of 6 cycles  CARBOplatin (PARAPLATIN) in sodium chloride 0 9 % 250 mL IVPB, , Intravenous, Once, 0 of 6 cycles  PEMEtrexed (ALIMTA) 850 mg in sodium chloride 0 9 % 100 mL chemo infusion, 500 mg/m2, Intravenous, Once, 0 of 6 cycles  pembrolizumab (KEYTRUDA) 200 mg in sodium chloride 0 9 % 50 mL IVPB, 200 mg, Intravenous, Once, 0 of 6 cycles     Leptomeningitis, carcinomatous ([address] )   2/10/2021 Initial Diagnosis    Leptomeningitis, carcinomatous ([address] )     2/10/2021 - 2/24/2021 Radiation    Whole brain to 3000 cGy in 10 fractions  L1-S3 spine 3000 cGy in 10 fractions     3/12/2021 - 3/12/2021 Chemotherapy    cyanocobalamin injection 1,000 mcg, 1,000 mcg, Intramuscular, Once, 1 of 3 cycles  Administration: 1,000 mcg (3/3/2021)  fosaprepitant (EMEND) 150 mg in sodium chloride 0 9 % 250 mL IVPB, 150 mg, Intravenous, Once, 0 of 6 cycles  CARBOplatin (PARAPLATIN) in sodium chloride 0 9 % 250 mL IVPB, , Intravenous, Once, 0 of 6 cycles  PEMEtrexed (ALIMTA) 850 mg in sodium chloride 0 9 % 100 mL chemo infusion, 500 mg/m2, Intravenous, Once, 0 of 6 cycles  pembrolizumab (KEYTRUDA) 200 mg in sodium chloride 0 9 % 50 mL IVPB, 200 mg, Intravenous, Once, 0 of 6 cycles           SUBJECTIVE      ECOG Initial Visit 0   ECOG This Visit 4   Pain Score Initial Visit 0   Pain Score This Visit 0   Personal Cancer History None   Family Cancer History Not Fully Aware   Tobacco Use History Yes; 20 years <1/2ppd   Alcohol Use History Denies     Initial HPI  Ms Miroslava Gardiner is here for a new consult visit  She self-referred  She was recently diagnosed with metastatic cancer of likely non-small cell histology  In review of her chart and talking with her, her story began when she was hospitalized at [place] on January 17th with persistent dizziness and a headache  During her hospitalization, her MRI found 2 parenchymal lesions at 2 1 cm and 0 5 cm in the left parietal region  There was diffuse enhancement of the internal auditory canals bilaterally which was concerning for leptomeningeal tumor  Her MRI of the spine also showed there was enhancement of nerve roots that is being read as compatible with CSF spread of the tumor  Additionally, a CT scan of her chest abdomen and pelvis showed mediastinal and right hilar lymphadenopathy and a chronic splenic mass  As per the radiologist, there is no evidence of primary malignancy in the chest abdomen or pelvis  She got an IR biopsy of her malignant appearing lymph node  The pathology resulted as squamous cell carcinoma, poorly differentiated  This Visit:    She is here today to see me  Since I saw her last time, she was hospitalized with worsening breathing and was diagnosed the malignant pericardial effusion and also a left lower extremity DVT  She also had radiographic findings of likely disease progression  She is here in a wheelchair with her granddaughter  She tells me her quality of life has suffered since being discharged  She is weaker and tired with no appetite    She is dependent on her entire family member group to help her with her activities of daily living  She says her legs have also been swollen op bilaterally  Denies any worsening of cardiopulmonary symptoms    I have reviewed the relevant past medical, surgical, social and family history  I have also reviewed allergies and medications for this patient  Review of Systems  Review of Systems   All other systems reviewed and are negative  OBJECTIVE     Physical Exam  Vitals:    04/28/21 1406   BP: 170/98   BP Location: Left arm   Patient Position: Sitting   Cuff Size: Adult   Pulse: 101   Resp: 20   Temp: 98 3 °F (36 8 °C)   TempSrc: Tympanic   SpO2: 90%       Physical Exam  Vitals signs reviewed  Constitutional:       General: She is not in acute distress  Appearance: She is ill-appearing  She is not toxic-appearing  HENT:      Head: Normocephalic and atraumatic  Eyes:      Extraocular Movements: Extraocular movements intact  Neck:      Musculoskeletal: Normal range of motion  Cardiovascular:      Rate and Rhythm: Normal rate  Pulmonary:      Effort: Pulmonary effort is normal  No respiratory distress  Abdominal:      General: There is no distension  Musculoskeletal:         General: Swelling present  Right lower leg: Edema present  Left lower leg: Edema present  Neurological:      Mental Status: She is alert and oriented to person, place, and time  Mental status is at baseline  Gait: Gait abnormal           Imaging  Relevant imaging reviewed in chart    Labs  Relevant labs reviewed in chart   ASSESSMENT & PLAN      Diagnosis ICD-10-CM Associated Orders   1  Lung cancer metastatic to brain [facility])  C34 90 Ambulatory referral to hospice    C79 31    2  Leptomeningitis, carcinomatous ([facility] )  C79 49 Ambulatory referral to hospice    C80 1    3  Goals of care, counseling/discussion  Z71 89 Ambulatory referral to hospice   4   Acute deep vein thrombosis (DVT) of proximal vein of lower extremity, unspecified laterality [facility])  I80 4Y5        71-year-old female with biopsy-proven primary lung adenocarcinoma with intracranial metastases with leptomeningeal carcinomatosis  See below for details   Oncology history updated accordingly this visit   Goals of care/patient communication  o I spoke with the patient and told her that because her ECOG is 4 and given that her disease has progressing rapidly, I am afraid that her cancer is showing signs of aggressive behavior  o I recommend that she enter into hospice because I do not believe she will have any benefit with further lines of palliative systemic therapy   o I relayed this recommendation to the patient's granddaughter and the patient's daughter  I answered all of their questions and they appreciated the care that I have given them throughout the course of cancer management   o Order for hospice has been entered        All questions were answered to the patient's satisfaction during this encounter  They appreciated and thanked me for spending time with them  The patient knows the contact information for our office and know to reach out for any relevant concerns related to this encounter  For all other listed problems and medical diagnosis in his chart - they are managed by PCP and/or other specialists, which patient acknowledges  Bri Akins MD  Hematology & Medical Oncology

## 2021-04-30 ENCOUNTER — OFFICE VISIT (OUTPATIENT)
Dept: INTERNAL MEDICINE CLINIC | Facility: CLINIC | Age: 61
End: 2021-04-30
Payer: COMMERCIAL

## 2021-04-30 VITALS
TEMPERATURE: 97.5 F | OXYGEN SATURATION: 88 % | DIASTOLIC BLOOD PRESSURE: 70 MMHG | HEART RATE: 86 BPM | BODY MASS INDEX: 29.57 KG/M2 | WEIGHT: 150.6 LBS | SYSTOLIC BLOOD PRESSURE: 134 MMHG | HEIGHT: 60 IN

## 2021-04-30 DIAGNOSIS — G93.6 CEREBRAL EDEMA (HCC): ICD-10-CM

## 2021-04-30 DIAGNOSIS — E11.21 DIABETIC NEPHROPATHY ASSOCIATED WITH TYPE 2 DIABETES MELLITUS (HCC): Primary | Chronic | ICD-10-CM

## 2021-04-30 DIAGNOSIS — C80.1 LEPTOMENINGITIS, CARCINOMATOUS (HCC): ICD-10-CM

## 2021-04-30 DIAGNOSIS — C79.31 LUNG CANCER METASTATIC TO BRAIN (HCC): ICD-10-CM

## 2021-04-30 DIAGNOSIS — Z23 ENCOUNTER FOR IMMUNIZATION: ICD-10-CM

## 2021-04-30 DIAGNOSIS — I10 ESSENTIAL HYPERTENSION: Chronic | ICD-10-CM

## 2021-04-30 DIAGNOSIS — C34.90 LUNG CANCER METASTATIC TO BRAIN (HCC): ICD-10-CM

## 2021-04-30 DIAGNOSIS — E78.2 MIXED HYPERLIPIDEMIA: ICD-10-CM

## 2021-04-30 DIAGNOSIS — H91.91 HEARING LOSS OF RIGHT EAR, UNSPECIFIED HEARING LOSS TYPE: ICD-10-CM

## 2021-04-30 DIAGNOSIS — I25.10 CAD IN NATIVE ARTERY: Chronic | ICD-10-CM

## 2021-04-30 DIAGNOSIS — C79.49 LEPTOMENINGITIS, CARCINOMATOUS (HCC): ICD-10-CM

## 2021-04-30 PROCEDURE — 99496 TRANSJ CARE MGMT HIGH F2F 7D: CPT | Performed by: PHYSICIAN ASSISTANT

## 2021-04-30 PROCEDURE — 3066F NEPHROPATHY DOC TX: CPT | Performed by: INTERNAL MEDICINE

## 2021-04-30 PROCEDURE — 3008F BODY MASS INDEX DOCD: CPT | Performed by: INTERNAL MEDICINE

## 2021-04-30 PROCEDURE — 1111F DSCHRG MED/CURRENT MED MERGE: CPT | Performed by: PHYSICIAN ASSISTANT

## 2021-04-30 NOTE — PROGRESS NOTES
Assessment/Plan:  I reviewed her hospital records she has had follow-up with oncology 2 days ago she has now signed up for hospice  She is comfortable she and her son have no questions  No problem-specific Assessment & Plan notes found for this encounter  Diagnoses and all orders for this visit:    Diabetic nephropathy associated with type 2 diabetes mellitus ([facility] )    Encounter for immunization    Lung cancer metastatic to brain [facility])    CAD in native artery    Essential hypertension    Cerebral edema (HCC)    Leptomeningitis, carcinomatous ([facility] )    Hearing loss of right ear, unspecified hearing loss type    Mixed hyperlipidemia    Other orders  -     Cancel: PNEUMOCOCCAL POLYSACCHARIDE VACCINE 23-VALENT =>3YO SQ IM  -     Cancel: TDAP VACCINE GREATER THAN OR EQUAL TO 8YO IM        Subjective:     Patient ID: Nitin Katz is a 62 y o  female  Hospital follow-up she was hospitalized for 8 days last week because of shortness of breath he was found have a pericardial effusion and had a pericardial window done the fluid was malignant  She has known metastatic lung cancer to the brain  She developed DVT in the hospital and was put on anticoagulation  She has been home for 6 days she has been comfortable at home no longer short of breath no fever chills  Her appetite  Not very good  She is in no pain  Sugars have been a little elevated she is on steroids  Overall under control however  Seen by oncology 2 days ago now is stopping treatment and has signed up for hospice at home        The following portions of the patient's history were reviewed and updated as appropriate:  She  has a past medical history of Acute on chronic congestive heart failure with left ventricular diastolic dysfunction ([facility] ), Asthma, Atelectasis, CHF (congestive heart failure) ([facility] ), Diabetes mellitus ([facility] ), Diverticulitis (2017), Hyperlipidemia, Hypertension, Lesion of spleen, Lung cancer metastatic to brain [facility]), and Pericardial effusion  She   Patient Active Problem List    Diagnosis Date Noted    Acute DVT (deep venous thrombosis) ([facility] ) 2021    Advice given about COVID-19 virus infection 2021    Encounter for gynecological examination 2021    Mild protein-calorie malnutrition ([facility] ) 2021    Goals of care, counseling/discussion 2021    Encounter for antineoplastic immunotherapy 2021    Cancer related pain 2021    Obesity (BMI 30 0-34 9) 2021    Leptomeningitis, carcinomatous ([facility] ) 02/10/2021    Lung cancer metastatic to brain ([place] [address] ) 2021    Sciatica of left side 2021    Lumbar back pain with radiculopathy affecting left lower extremity 2021    Cerebral edema ([place] [address] ) 2021    History of smoking 2019    CAD in native artery 2018    Essential hypertension 2017    Hyperlipidemia 2017    Diabetic nephropathy associated with type 2 diabetes mellitus ([place] [address] ) 2017    Chronic diastolic HF (heart failure) ([place] [address] )     Uncomplicated asthma     Diabetes mellitus type 2 2017     She  has a past surgical history that includes HARTMANS PROCEDURE (N/A, 2017); VAC DRESSING APPLICATION (N/A, 3/7/0362); Colostomy (2017); Abdominal surgery;  section; pr colonoscopy flx dx w/collj spec when pfrmd (N/A, 2017); Colonoscopy; Colon surgery; pr close enterostomy (N/A, 2017); pr exc skin benig <0 5 cm remainder body (N/A, 2017); Mammo stereotactic breast biopsy left (all inc) (Left, 10/4/2018); pr repair incisional hernia,reducible (N/A, 2019); pr total abdom hysterectomy (N/A, 2019); Hysterectomy (2018); Oophorectomy (Bilateral, ); Breast biopsy (Left, 10/04/2018); IR lumbar puncture (2021);  IR biopsy lymph node (2021); pr bronchoscopy,diagnostic (N/A, 2021); pr mediastinoscopy with lymph node biopsy/ies (N/A, 2021); and Pericardial window (N/A, 4/18/2021)  Her family history includes Diabetes in her mother; Heart disease in her son; Hypertension in her mother; Lung cancer in her maternal aunt; No Known Problems in her daughter, maternal aunt, maternal aunt, maternal aunt, paternal aunt, and sister  She  reports that she quit smoking about 3 months ago  Her smoking use included cigarettes  She started smoking about 29 years ago  She has a 5 00 pack-year smoking history  She has never used smokeless tobacco  She reports previous alcohol use of about 1 0 standard drinks of alcohol per week  She reports that she does not use drugs    Current Outpatient Medications   Medication Sig Dispense Refill    acetaminophen (TYLENOL) 500 mg tablet Take 2 tablets (1,000 mg total) by mouth every 8 (eight) hours 180 tablet 0    amLODIPine (NORVASC) 10 mg tablet TAKE 1 TABLET BY MOUTH EVERY DAY 90 tablet 2    apixaban (ELIQUIS) 5 mg Take 1 tablet (5 mg total) by mouth 2 (two) times a day Take 10 mg twice daily x 7 days, then 5 mg twice daily 74 tablet 0    [START ON 5/1/2021] apixaban (ELIQUIS) 5 mg Take 1 tablet (5 mg total) by mouth 2 (two) times a day 60 tablet 0    atorvastatin (LIPITOR) 20 mg tablet TAKE 1 TABLET DAILY 90 tablet 1    dexamethasone (DECADRON) 4 mg tablet Take 1 tablet (4 mg total) by mouth 2 (two) times a day 60 tablet 0    docusate sodium (COLACE) 100 mg capsule Take 1 capsule (100 mg total) by mouth 2 (two) times a day (Patient taking differently: Take 100 mg by mouth daily ) 60 capsule 0    gabapentin (NEURONTIN) 100 mg capsule Take 1 capsule (100 mg total) by mouth 2 (two) times a day 60 capsule 0    metFORMIN (GLUCOPHAGE) 1000 MG tablet Take 1 tablet (1,000 mg total) by mouth 2 (two) times a day with meals 180 tablet 3    metoprolol succinate (TOPROL-XL) 50 mg 24 hr tablet Take 1 tablet (50 mg total) by mouth daily 90 tablet 1    ondansetron (ZOFRAN) 8 mg tablet Take 1 tablet (8 mg total) by mouth every 8 (eight) hours as needed for nausea or vomiting 30 tablet 0    oxyCODONE (ROXICODONE) 15 mg immediate release tablet Take 1 tablet (15 mg total) by mouth every 3 (three) hours as needed (cancer pain)Max Daily Amount: 120 mg 90 tablet 0    pantoprazole (PROTONIX) 20 mg tablet Take 1 tablet (20 mg total) by mouth daily (Patient taking differently: Take 20 mg by mouth as needed ) 30 tablet 0    senna (SENOKOT) 8 6 mg Take 1 tablet (8 6 mg total) by mouth 2 (two) times a day 60 tablet 0    Tradjenta 5 MG TABS TAKE 1 TABLET BY MOUTH EVERY DAY 90 tablet 3     No current facility-administered medications for this visit  She is allergic to ciprofloxacin       Review of Systems   Constitutional: Negative for chills and fever  HENT: Positive for hearing loss  Negative for ear pain and sore throat  Eyes: Negative for pain and visual disturbance  Respiratory: Negative for cough and shortness of breath  Cardiovascular: Negative for chest pain and palpitations  Gastrointestinal: Negative for abdominal pain and vomiting  Genitourinary: Negative for dysuria and hematuria  Musculoskeletal: Positive for gait problem  Negative for arthralgias and back pain  Skin: Negative for color change and rash  Neurological: Negative for seizures and syncope  All other systems reviewed and are negative  Objective:     Physical Exam  Vitals signs reviewed  Constitutional:       Appearance: She is well-developed  HENT:      Head: Normocephalic  Right Ear: Ear canal and external ear normal       Left Ear: Tympanic membrane and external ear normal       Nose: Nose normal       Mouth/Throat:      Mouth: Mucous membranes are moist    Eyes:      Extraocular Movements: Extraocular movements intact  Conjunctiva/sclera: Conjunctivae normal       Pupils: Pupils are equal, round, and reactive to light  Neck:      Musculoskeletal: Neck supple  Thyroid: No thyromegaly     Cardiovascular:      Rate and Rhythm: Normal rate and regular rhythm  Pulses: Normal pulses  Heart sounds: Normal heart sounds  No murmur  Pulmonary:      Effort: Pulmonary effort is normal  No respiratory distress  Breath sounds: No stridor  Rhonchi (  On the right) present  No wheezing or rales  Abdominal:      General: Abdomen is flat  Bowel sounds are normal  There is no distension  Palpations: Abdomen is soft  Tenderness: There is no abdominal tenderness  Musculoskeletal: Normal range of motion  General: Swelling ( 2+ ankle) present  Skin:     General: Skin is warm and dry  Neurological:      General: No focal deficit present  Mental Status: She is alert and oriented to person, place, and time  Gait: Gait abnormal ( in a wheelchair)  Deep Tendon Reflexes: Reflexes are normal and symmetric  Psychiatric:         Mood and Affect: Mood normal          Thought Content: Thought content normal          Judgment: Judgment normal            Vitals:    04/30/21 1237   BP: 134/70   BP Location: Right arm   Patient Position: Sitting   Cuff Size: Standard   Pulse: 86   Temp: 97 5 °F (36 4 °C)   SpO2: (!) 88%   Weight: 68 3 kg (150 lb 9 6 oz)   Height: 5' (1 524 m)       Transitional Care Management Review:  Beata Hudson is a 62 y o  female here for TCM follow up  During the TCM phone call patient stated:    TCM Call (since 3/30/2021)     Date and time call was made  4/26/2021  8:18 AM    Hospital care reviewed  Records reviewed    Patient [facility] hospitial[facility] at  [facility]        Date of Admission  04/18/21    Date of discharge  04/24/21    Diagnosis  Malignant pericardial effusion ([address] )    Disposition  Home    Were the patients medications reviewed and updated  Yes (Comment)  eliquis    Current Symptoms  None      TCM Call (since 3/30/2021)     Post hospital issues  None    Should patient be enrolled in anticoag monitoring? Yes (Comment)  eliquis    Scheduled for follow up?   Yes (Comment)  04/30/2021- at 115pm w/ ju2    Patients specialists  Other (comment)    Other specialists names  Dr Jess Hewitt    Other specialists contcat #  [id number]    Referrals needed  n/a    Did you obtain your prescribed medications  Yes    Do you need help managing your prescriptions or medications  No    Is transportation to your appointment needed  Yes    Specify why  [address]    I have advised the patient to call PCP with any new or worsening symptoms  [facility]  Family members; [address];  Children    The type of support provided  Physical; Emotional; Financial    Do you have social support  Yes, as much as I need    Are you recieving any outpatient services  No    Are you recieving home care services  No    Are you using any community resources  No    Current waiver services  No    Have you fallen in the last 12 months  No    Interperter language line needed  No    Counseling  Patient    Counseling topics  Importance of RX compliance              Latrell Bah PA-C

## 2021-05-01 DIAGNOSIS — E78.2 MIXED HYPERLIPIDEMIA: ICD-10-CM

## 2021-05-01 RX ORDER — ATORVASTATIN CALCIUM 20 MG/1
TABLET, FILM COATED ORAL
Qty: 90 TABLET | Refills: 1 | Status: SHIPPED | OUTPATIENT
Start: 2021-05-01

## 2021-05-04 DIAGNOSIS — G89.3 CANCER RELATED PAIN: ICD-10-CM

## 2021-05-04 RX ORDER — OXYCODONE HCL 20 MG/ML
CONCENTRATE, ORAL ORAL
Qty: 30 ML | Refills: 0 | Status: SHIPPED | OUTPATIENT
Start: 2021-05-04

## 2021-05-04 RX ORDER — LORAZEPAM 2 MG/ML
0.5 CONCENTRATE ORAL EVERY 4 HOURS PRN
Qty: 30 ML | Refills: 4 | Status: SHIPPED | OUTPATIENT
Start: 2021-05-04

## 2021-05-16 LAB — FUNGUS SPEC CULT: NORMAL

## 2021-06-07 LAB
MYCOBACTERIUM SPEC CULT: NORMAL
RHODAMINE-AURAMINE STN SPEC: NORMAL

## 2021-06-20 DIAGNOSIS — E11.9 CONTROLLED TYPE 2 DIABETES MELLITUS WITHOUT COMPLICATION, WITHOUT LONG-TERM CURRENT USE OF INSULIN (HCC): Chronic | ICD-10-CM

## 2021-06-20 DIAGNOSIS — I10 ESSENTIAL HYPERTENSION: ICD-10-CM

## 2021-06-21 RX ORDER — AMLODIPINE BESYLATE 10 MG/1
TABLET ORAL
Qty: 90 TABLET | Refills: 2 | Status: SHIPPED | OUTPATIENT
Start: 2021-06-21

## 2022-01-04 ENCOUNTER — TELEPHONE (OUTPATIENT)
Dept: OBGYN CLINIC | Facility: CLINIC | Age: 62
End: 2022-01-04

## 2022-08-02 NOTE — TELEPHONE ENCOUNTER
Progress Note       SUBJECTIVE:  Pain well controlled. No concerns.     Denies f/c/n/v/cp/sob    OBJECTIVE:    Vital signs in last 24 hours:    Patient Vitals for the past 24 hrs:   BP Temp Temp src Pulse Resp SpO2   08/02/22 1055 (!) 149/83 98.4 °F (36.9 °C) Oral 72 -- 96 %   08/02/22 0806 (!) 149/77 98.4 °F (36.9 °C) Oral 64 -- 95 %   08/01/22 2330 (!) 143/72 97.7 °F (36.5 °C) Oral 64 16 97 %   08/01/22 1939 (!) 142/81 98.1 °F (36.7 °C) Oral 71 17 98 %   08/01/22 1839 -- -- -- -- 16 --   08/01/22 1529 -- -- -- -- 16 --   08/01/22 1512 (!) 150/66 98.2 °F (36.8 °C) Oral 73 16 98 %   08/01/22 1429 -- -- -- -- 16 --       Physical Exam:  Alert and oriented, NAD  LUE:  No skin tenting over left clavicle  No skin changes  Elbow, wrist, hand ROM intact  NVI      Data Review:    Chem:  Lab Results   Component Value Date    CO2 25 08/02/2022    BUN 23 (H) 08/02/2022    CREATININE 0.92 08/02/2022    GLUCOSE 96 08/02/2022    CALCIUM 8.7 08/02/2022     CBC:  Lab Results   Component Value Date    WBC 8.8 08/02/2022    RBC 4.70 08/02/2022    HGB 15.3 08/02/2022     08/02/2022    HCT 44.1 08/02/2022       No components found for: ORGANISMID      [unfilled]      ASSESSMENT/PLAN:  Left clavicle fracture    Sling LUE  NWB LUE  Elbow, hand, wrist ROM ok  Dc planning - ok to dc per ortho when medically cleared  Follow up in 1 weeks with Dr. Rose   Unable to get a hold of Priyank Callaway regarding her infusion appt tomorrow  LVM for daughter Alma Cooper advising her if she prefers, we can try and get the appt changed to Wednesday after her appt with Dr Kiki Ruiz  Asked she call us back and let us know

## 2022-08-26 ENCOUNTER — TELEPHONE (OUTPATIENT)
Dept: INTERNAL MEDICINE CLINIC | Facility: CLINIC | Age: 62
End: 2022-08-26

## 2022-10-12 PROBLEM — Z01.419 ENCOUNTER FOR GYNECOLOGICAL EXAMINATION: Status: RESOLVED | Noted: 2021-04-06 | Resolved: 2022-10-12

## 2024-03-19 NOTE — LETTER
March 30, 2020     Patient: Clementine Espinal   YOB: 1960     To Whom it May Concern:    Clementine Espinal is under my professional care  I confirm that she is at increased risk for COVID-19 due to underlying medical conditions  Her medical conditions include:  · Type 2 diabetes with diabetic nephropathy  · Asthma  · Hypertension & Hyperlipidemia  · Chronic diastolic congestive heart failure  · Coronary artery disease    If you have any questions or concerns, please don't hesitate to call         Sincerely,          Eduin Chavarria, DO Detail Level: Detailed Show Aperture Variable?: Yes Consent: The patient's consent was obtained including but not limited to risks of crusting, scabbing, blistering, scarring, darker or lighter pigmentary change, recurrence, incomplete removal and infection. Render Note In Bullet Format When Appropriate: No Duration Of Freeze Thaw-Cycle (Seconds): 0 Post-Care Instructions: I reviewed with the patient in detail post-care instructions. Patient is to wear sunprotection, and avoid picking at any of the treated lesions. Pt may apply Vaseline to crusted or scabbing areas.

## (undated) DEVICE — INTERCEED

## (undated) DEVICE — SINGLE USE SUCTION VALVE MAJ-209: Brand: SINGLE USE SUCTION VALVE (STERILE)

## (undated) DEVICE — POOLE SUCTION HANDLE: Brand: CARDINAL HEALTH

## (undated) DEVICE — GLOVE SRG BIOGEL ECLIPSE 6

## (undated) DEVICE — PAD GROUNDING ADULT

## (undated) DEVICE — 2000CC GUARDIAN II: Brand: GUARDIAN

## (undated) DEVICE — LIGHT HANDLE COVER CAMERA DISP

## (undated) DEVICE — OSTO KIT COLOS 2-3/4 FL

## (undated) DEVICE — GLOVE SRG BIOGEL ECLIPSE 7.5

## (undated) DEVICE — GLOVE INDICATOR PI UNDERGLOVE SZ 7.5 BLUE

## (undated) DEVICE — SUT VICRYL 0 CT-1 27 IN J260H

## (undated) DEVICE — TUBING SUCTION 5MM X 12 FT

## (undated) DEVICE — GAUZE SPONGES,16 PLY: Brand: CURITY

## (undated) DEVICE — PENCIL ELECTROSURG E-Z CLEAN -0035H

## (undated) DEVICE — GLOVE INDICATOR PI UNDERGLOVE SZ 6.5 BLUE

## (undated) DEVICE — SPONGE LAP 18 X 18 IN

## (undated) DEVICE — STERILE MAJOR GENERAL PACK: Brand: CARDINAL HEALTH

## (undated) DEVICE — SUT VICRYL 4-0 PS-2 27 IN J426H

## (undated) DEVICE — JP PERF DRN SIL FLT 10MM FULL: Brand: CARDINAL HEALTH

## (undated) DEVICE — 3M™ IOBAN™ 2 ANTIMICROBIAL INCISE DRAPE 6640EZ: Brand: IOBAN™ 2

## (undated) DEVICE — PACK UNIVERSAL NECK

## (undated) DEVICE — SUT PROLENE 2-0 SH 30 IN 8833H

## (undated) DEVICE — ADAPTOR TRACH SWIVEL

## (undated) DEVICE — GLOVE SRG BIOGEL ECLIPSE 6.5

## (undated) DEVICE — TRAY FOLEY 16FR URIMETER SURESTEP

## (undated) DEVICE — PURSESTRING DEVICE

## (undated) DEVICE — ALL PURPOSE SPONGES,NONWOVEN, 4 PLY: Brand: CURITY

## (undated) DEVICE — SINGLE USE BIOPSY VALVE MAJ-210: Brand: SINGLE USE BIOPSY VALVE (STERILE)

## (undated) DEVICE — REM POLYHESIVE ADULT PATIENT RETURN ELECTRODE: Brand: VALLEYLAB

## (undated) DEVICE — SCD SEQUENTIAL COMPRESSION COMFORT SLEEVE MEDIUM KNEE LENGTH: Brand: KENDALL SCD

## (undated) DEVICE — ROSEBUD DISSECTORS: Brand: DEROYAL

## (undated) DEVICE — NEEDLE 25G X 1 1/2

## (undated) DEVICE — TOWEL SET X-RAY

## (undated) DEVICE — SUT SILK 2-0 TIES 144 IN LA55G

## (undated) DEVICE — SUT PLAIN 3-0 CTX 27 IN 873H

## (undated) DEVICE — PLUMEPEN PRO 10FT

## (undated) DEVICE — CHLORAPREP HI-LITE 26ML ORANGE

## (undated) DEVICE — JACKSON-PRATT 100CC BULB RESERVOIR: Brand: CARDINAL HEALTH

## (undated) DEVICE — GLOVE SRG BIOGEL 7.5

## (undated) DEVICE — SUT VICRYL 3-0 SH 27 IN J416H

## (undated) DEVICE — INTENDED FOR TISSUE SEPARATION, AND OTHER PROCEDURES THAT REQUIRE A SHARP SURGICAL BLADE TO PUNCTURE OR CUT.: Brand: BARD-PARKER SAFETY BLADES SIZE 15, STERILE

## (undated) DEVICE — MEDI-VAC YANK SUCT HNDL W/TPRD BULBOUS TIP: Brand: CARDINAL HEALTH

## (undated) DEVICE — TELFA NON-ADHERENT ABSORBENT DRESSING: Brand: TELFA

## (undated) DEVICE — PACKING VAGINAL 2 IN

## (undated) DEVICE — CONMED ACCESSORY ELECTRODE, NEEDLE WITH EXTENDED INSULATION: Brand: CONMED

## (undated) DEVICE — PROXIMATE RELOADABLE LINEAR CUTTER WITH SAFETY LOCK-OUT, 75MM: Brand: PROXIMATE

## (undated) DEVICE — FIRST STEP BEDSIDE KIT - STAND-UP POUCH, ENDOSCOPIC CLEANING PAD - 1 POUCH: Brand: FIRST STEP BEDSIDE KIT - STAND-UP POUCH, ENDOSCOPIC CLEANING PAD

## (undated) DEVICE — 3M™ TEGADERM™ TRANSPARENT FILM DRESSING FRAME STYLE, 1626W, 4 IN X 4-3/4 IN (10 CM X 12 CM), 50/CT 4CT/CASE: Brand: 3M™ TEGADERM™

## (undated) DEVICE — POV-IOD SWAB STICKS

## (undated) DEVICE — SUT MONOCRYL 4-0 PS-2 18 IN Y496G

## (undated) DEVICE — 3M™ TEGADERM™ TRANSPARENT FILM DRESSING FRAME STYLE, 1628, 6 IN X 8 IN (15 CM X 20 CM), 10/CT 8CT/CASE: Brand: 3M™ TEGADERM™

## (undated) DEVICE — ANTI-FOG SOLUTION WITH FOAM PAD: Brand: DEVON

## (undated) DEVICE — ELECTRODE BLADE MOD E-Z CLEAN 2.5IN 6.4CM -0012M

## (undated) DEVICE — SPONGE TONSIL STRUNG 7/8 IN X-RAY DETECT

## (undated) DEVICE — LIGASURE IMPACT OPEN

## (undated) DEVICE — 1820 FOAM BLOCK NEEDLE COUNTER: Brand: DEVON

## (undated) DEVICE — INTENDED FOR TISSUE SEPARATION, AND OTHER PROCEDURES THAT REQUIRE A SHARP SURGICAL BLADE TO PUNCTURE OR CUT.: Brand: BARD-PARKER SAFETY BLADES SIZE 10, STERILE

## (undated) DEVICE — ENDOSCOPIC CURVED INTRALUMINAL STAPLER (ILS) 24 TITANIUM ADJUSTABLE HEIGHT STAPLES

## (undated) DEVICE — SPECIMEN TRAP: Brand: ARGYLE

## (undated) DEVICE — SURGICEL 4 X 8

## (undated) DEVICE — BULB SYRINGE,IRRIGATION WITH PROTECTIVE CAP: Brand: DOVER

## (undated) DEVICE — BETHLEHEM UNIVERSAL MINOR GEN: Brand: CARDINAL HEALTH

## (undated) DEVICE — ABDOMINAL PAD: Brand: DERMACEA

## (undated) DEVICE — BETHLEHEM UNIVERSAL LAPAROTOMY: Brand: CARDINAL HEALTH

## (undated) DEVICE — SUT NUROLON 0 CTX C551D

## (undated) DEVICE — MEDI-VAC YANKAUER SUCTION HANDLE W/STRAIGHT TIP & CONTROL VENT: Brand: CARDINAL HEALTH

## (undated) DEVICE — PROXIMATE LINEAR CUTTER RELOAD, BLUE, 75MM: Brand: PROXIMATE

## (undated) DEVICE — SPECIMEN CONTAINER STERILE PEEL PACK

## (undated) DEVICE — SUT VICRYL 2-0 18 IN J911T

## (undated) DEVICE — SUT PDS II 1 CTX 36 IN Z371T

## (undated) DEVICE — 3M™ IOBAN™ 2 ANTIMICROBIAL INCISE DRAPE 6650EZ: Brand: IOBAN™ 2

## (undated) DEVICE — ADHESIVE SKIN HIGH VISCOSITY EXOFIN 1ML

## (undated) DEVICE — 3M™ V.A.C.® GRANUFOAM™ DRESSING KIT, M8275052, MEDIUM: Brand: 3M™ V.A.C.® GRANUFOAM™

## (undated) DEVICE — 3M™ STERI-STRIP™ REINFORCED ADHESIVE SKIN CLOSURES, R1547, 1/2 IN X 4 IN (12 MM X 100 MM), 6 STRIPS/ENVELOPE: Brand: 3M™ STERI-STRIP™

## (undated) DEVICE — 3M™ STERI-STRIP™ REINFORCED ADHESIVE SKIN CLOSURES, R1546, 1/4 IN X 4 IN (6 MM X 100 MM), 10 STRIPS/ENVELOPE: Brand: 3M™ STERI-STRIP™

## (undated) DEVICE — SUT VICRYL 0 REEL 54 IN J287G

## (undated) DEVICE — DRAIN SPONGES,6 PLY: Brand: EXCILON

## (undated) DEVICE — COTTON TIP APPLICTOR 2 PK

## (undated) DEVICE — LIGHT HANDLE COVER SLEEVE DISP BLUE STELLAR

## (undated) DEVICE — TRAY FOLEY 16FR URIMETER SILICONE SURESTEP

## (undated) DEVICE — BETHLEHEM MAJOR GENERAL PACK: Brand: CARDINAL HEALTH

## (undated) DEVICE — SPONGE DRAIN 4 X 4

## (undated) DEVICE — SUT PDS II 1 XLH 96 IN LOOPED Z881G

## (undated) DEVICE — ASTOUND STANDARD SURGICAL GOWN, XL: Brand: CONVERTORS

## (undated) DEVICE — CHLORAPREP HI-LITE 10.5ML ORANGE

## (undated) DEVICE — GRADUATE PLASTIC 1000 ML

## (undated) DEVICE — DRAPE EQUIPMENT RF WAND

## (undated) DEVICE — ENSEAL TISSUE SEALER G2 SUPER JAW CURVED FOR USE WITH GENERATOR G11 22CM SHAFT LENGTH: Brand: ENSEAL

## (undated) DEVICE — PROXIMATE RELOADABLE LINEAR STAPLER, 60MM: Brand: PROXIMATE